# Patient Record
Sex: MALE | Race: WHITE | NOT HISPANIC OR LATINO | Employment: OTHER | RURAL
[De-identification: names, ages, dates, MRNs, and addresses within clinical notes are randomized per-mention and may not be internally consistent; named-entity substitution may affect disease eponyms.]

---

## 2017-02-06 RX ORDER — BLOOD SUGAR DIAGNOSTIC
STRIP MISCELLANEOUS
Qty: 200 EACH | Refills: 2 | Status: SHIPPED | OUTPATIENT
Start: 2017-02-06 | End: 2018-04-17 | Stop reason: SDUPTHER

## 2017-03-22 ENCOUNTER — OFFICE VISIT (OUTPATIENT)
Dept: FAMILY MEDICINE CLINIC | Facility: CLINIC | Age: 67
End: 2017-03-22

## 2017-03-22 VITALS
BODY MASS INDEX: 26.9 KG/M2 | SYSTOLIC BLOOD PRESSURE: 122 MMHG | DIASTOLIC BLOOD PRESSURE: 68 MMHG | TEMPERATURE: 97.9 F | HEIGHT: 69 IN | WEIGHT: 181.6 LBS

## 2017-03-22 DIAGNOSIS — J06.9 ACUTE URI: Primary | ICD-10-CM

## 2017-03-22 PROCEDURE — 99213 OFFICE O/P EST LOW 20 MIN: CPT | Performed by: FAMILY MEDICINE

## 2017-03-22 PROCEDURE — 96372 THER/PROPH/DIAG INJ SC/IM: CPT | Performed by: FAMILY MEDICINE

## 2017-03-22 RX ORDER — AZITHROMYCIN 250 MG/1
TABLET, FILM COATED ORAL
Qty: 6 TABLET | Refills: 0 | Status: SHIPPED | OUTPATIENT
Start: 2017-03-22 | End: 2017-06-19

## 2017-03-22 RX ORDER — TAMSULOSIN HYDROCHLORIDE 0.4 MG/1
1 CAPSULE ORAL NIGHTLY
COMMUNITY
End: 2017-06-24 | Stop reason: SDUPTHER

## 2017-03-22 RX ORDER — CEFTRIAXONE SODIUM 250 MG/1
250 INJECTION, POWDER, FOR SOLUTION INTRAMUSCULAR; INTRAVENOUS ONCE
Status: COMPLETED | OUTPATIENT
Start: 2017-03-22 | End: 2017-03-22

## 2017-03-22 RX ORDER — FLUTICASONE PROPIONATE 50 MCG
SPRAY, SUSPENSION (ML) NASAL
Refills: 5 | COMMUNITY
Start: 2017-01-09 | End: 2017-05-07 | Stop reason: SDUPTHER

## 2017-03-22 RX ORDER — GLIPIZIDE 10 MG/1
10 TABLET, FILM COATED, EXTENDED RELEASE ORAL 2 TIMES DAILY
COMMUNITY
End: 2018-04-17 | Stop reason: SDUPTHER

## 2017-03-22 RX ORDER — LISINOPRIL 10 MG/1
10 TABLET ORAL EVERY MORNING
COMMUNITY
End: 2018-03-20 | Stop reason: SDUPTHER

## 2017-03-22 RX ORDER — PREDNISONE 20 MG/1
TABLET ORAL
Qty: 8 TABLET | Refills: 0 | Status: SHIPPED | OUTPATIENT
Start: 2017-03-22 | End: 2017-06-19

## 2017-03-22 RX ORDER — INSULIN GLARGINE 100 [IU]/ML
15 INJECTION, SOLUTION SUBCUTANEOUS NIGHTLY
COMMUNITY
End: 2017-06-19 | Stop reason: CLARIF

## 2017-03-22 RX ORDER — ATORVASTATIN CALCIUM 20 MG/1
20 TABLET, FILM COATED ORAL NIGHTLY
COMMUNITY
End: 2017-06-19 | Stop reason: DRUGHIGH

## 2017-03-22 RX ORDER — BRIMONIDINE TARTRATE 0.1 %
DROPS OPHTHALMIC (EYE)
Refills: 5 | COMMUNITY
Start: 2017-01-09 | End: 2017-06-19

## 2017-03-22 RX ADMIN — CEFTRIAXONE SODIUM 250 MG: 250 INJECTION, POWDER, FOR SOLUTION INTRAMUSCULAR; INTRAVENOUS at 15:21

## 2017-03-22 NOTE — PROGRESS NOTES
"Bernard Matta    1950    Chief Complaint   Patient presents with   • URI       Vitals:    03/22/17 1455   BP: 122/68   Temp: 97.9 °F (36.6 °C)   Weight: 181 lb 9.6 oz (82.4 kg)   Height: 69\" (175.3 cm)       URI    This is a new problem. The current episode started in the past 7 days. The problem has been unchanged. There has been no fever. Associated symptoms include congestion, coughing, sinus pain and sneezing. He has tried inhaler use for the symptoms. The treatment provided mild relief.       Review of Systems   HENT: Positive for congestion and sneezing.    Respiratory: Positive for cough.        Past Medical History:   Diagnosis Date   • Diabetes mellitus    • Erectile disorder due to 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    • Hyperlipidemia    • Hypertension    • Ischemic optic neuropathy of both eyes          Current Outpatient Prescriptions:   •  ACCU-CHEK SMARTVIEW test strip, USE TO TEST TWICE A DAY, Disp: 200 each, Rfl: 2  •  ALPHAGAN P 0.1 % solution ophthalmic solution, INSTILL 1 DROP INTO LEFT EYE 3 TIMES A DAY, Disp: , Rfl: 5  •  atorvastatin (LIPITOR) 20 MG tablet, Take 20 mg by mouth Every Night., Disp: , Rfl:   •  fluticasone (FLONASE) 50 MCG/ACT nasal spray, USE 2 SPRAYS IN EACH NOSTIL EVERY DAY, Disp: , Rfl: 5  •  glipiZIDE (GLUCOTROL) 10 MG 24 hr tablet, Take 10 mg by mouth 2 (Two) Times a Day., Disp: , Rfl:   •  insulin detemir (LEVEMIR) 100 UNIT/ML injection, Inject 15 Units under the skin Every Night., Disp: 3 each, Rfl: 12  •  insulin glargine (LANTUS) 100 UNIT/ML injection, Inject 15 Units under the skin Every Night., Disp: , Rfl:   •  lisinopril (PRINIVIL,ZESTRIL) 10 MG tablet, Take 10 mg by mouth Every Morning., Disp: , Rfl:   •  metFORMIN (GLUCOPHAGE) 500 MG tablet, Take 500 mg by mouth 2 (Two) Times a Day., Disp: , Rfl: 1  •  tamsulosin (FLOMAX) 0.4 MG capsule 24 hr capsule, Take 1 capsule by mouth Every Night., Disp: , Rfl:   •  azithromycin (ZITHROMAX Z-RAJESH) 250 MG tablet, Take 2 tablets " "the first day, then 1 tablet daily for 4 days., Disp: 6 tablet, Rfl: 0  •  predniSONE (DELTASONE) 20 MG tablet, 2 pills now then one every morning till medicine gone, Disp: 8 tablet, Rfl: 0    Current Facility-Administered Medications:   •  cefTRIAXone (ROCEPHIN) injection 250 mg, 250 mg, Intramuscular, Once, Brando Webb MD    No Known Allergies    There is no problem list on file for this patient.      Social History     Social History   • Marital status:      Spouse name: N/A   • Number of children: N/A   • Years of education: N/A     Social History Main Topics   • Smoking status: Former Smoker   • Smokeless tobacco: None   • Alcohol use None   • Drug use: None   • Sexual activity: Not Asked     Other Topics Concern   • None     Social History Narrative       History reviewed. No pertinent surgical history.    Physical Exam   Constitutional: He appears well-developed and well-nourished.   HENT:   Head: Normocephalic.   Right Ear: External ear normal.   Left Ear: External ear normal.   Mouth/Throat: Oropharynx is clear and moist.   Cardiovascular: Normal rate and regular rhythm.    Pulmonary/Chest: Effort normal. He has wheezes. He has rales.   Lymphadenopathy:     He has no cervical adenopathy.   Neurological: He is alert.   Psychiatric: He has a normal mood and affect.   Vitals reviewed.      Vitals:    03/22/17 1455   BP: 122/68   Temp: 97.9 °F (36.6 °C)   Weight: 181 lb 9.6 oz (82.4 kg)   Height: 69\" (175.3 cm)       Bernard was seen today for uri.    Diagnoses and all orders for this visit:    Acute URI  -     cefTRIAXone (ROCEPHIN) injection 250 mg; Inject 250 mg into the shoulder, thigh, or buttocks 1 (One) Time.    Other orders  -     azithromycin (ZITHROMAX Z-RAJESH) 250 MG tablet; Take 2 tablets the first day, then 1 tablet daily for 4 days.  -     predniSONE (DELTASONE) 20 MG tablet; 2 pills now then one every morning till medicine gone        Problem List Items Addressed This Visit     None "      Visit Diagnoses     Acute URI    -  Primary    Relevant Medications    cefTRIAXone (ROCEPHIN) injection 250 mg (Start on 3/22/2017  3:45 PM)    azithromycin (ZITHROMAX Z-RAJESH) 250 MG tablet              Above plus Flonase and Zyrtec                        Brandoramana Webb MD  3/22/2017

## 2017-03-31 ENCOUNTER — OFFICE VISIT (OUTPATIENT)
Dept: FAMILY MEDICINE CLINIC | Facility: CLINIC | Age: 67
End: 2017-03-31

## 2017-03-31 VITALS
DIASTOLIC BLOOD PRESSURE: 70 MMHG | OXYGEN SATURATION: 98 % | SYSTOLIC BLOOD PRESSURE: 116 MMHG | HEART RATE: 68 BPM | TEMPERATURE: 98 F

## 2017-03-31 DIAGNOSIS — J40 BRONCHITIS: ICD-10-CM

## 2017-03-31 DIAGNOSIS — J06.9 ACUTE URI: Primary | ICD-10-CM

## 2017-03-31 LAB
BASOPHILS # BLD AUTO: 0.03 10*3/MM3 (ref 0–0.2)
BASOPHILS NFR BLD AUTO: 0.4 % (ref 0–2)
BUN SERPL-MCNC: 19 MG/DL (ref 5–21)
BUN/CREAT SERPL: 17.3 (ref 7–25)
CALCIUM SERPL-MCNC: 9.4 MG/DL (ref 8.4–10.4)
CHLORIDE SERPL-SCNC: 95 MMOL/L (ref 98–110)
CO2 SERPL-SCNC: 30 MMOL/L (ref 24–31)
CREAT SERPL-MCNC: 1.1 MG/DL (ref 0.5–1.4)
EOSINOPHIL # BLD AUTO: 0.37 10*3/MM3 (ref 0–0.7)
EOSINOPHIL NFR BLD AUTO: 4.8 % (ref 0–4)
ERYTHROCYTE [DISTWIDTH] IN BLOOD BY AUTOMATED COUNT: 12.9 % (ref 12–15)
GLUCOSE SERPL-MCNC: 324 MG/DL (ref 70–100)
HCT VFR BLD AUTO: 40.1 % (ref 40–52)
HGB BLD-MCNC: 13 G/DL (ref 14–18)
IMM GRANULOCYTES # BLD: 0.03 10*3/MM3 (ref 0–0.03)
IMM GRANULOCYTES NFR BLD: 0.4 % (ref 0–5)
LYMPHOCYTES # BLD AUTO: 1.57 10*3/MM3 (ref 0.72–4.86)
LYMPHOCYTES NFR BLD AUTO: 20.2 % (ref 15–45)
MCH RBC QN AUTO: 29.7 PG (ref 28–32)
MCHC RBC AUTO-ENTMCNC: 32.4 G/DL (ref 33–36)
MCV RBC AUTO: 91.8 FL (ref 82–95)
MONOCYTES # BLD AUTO: 0.77 10*3/MM3 (ref 0.19–1.3)
MONOCYTES NFR BLD AUTO: 9.9 % (ref 4–12)
NEUTROPHILS # BLD AUTO: 4.99 10*3/MM3 (ref 1.87–8.4)
NEUTROPHILS NFR BLD AUTO: 64.3 % (ref 39–78)
PLATELET # BLD AUTO: 203 10*3/MM3 (ref 130–400)
POTASSIUM SERPL-SCNC: 5 MMOL/L (ref 3.5–5.3)
RBC # BLD AUTO: 4.37 10*6/MM3 (ref 4.8–5.9)
SODIUM SERPL-SCNC: 137 MMOL/L (ref 135–145)
WBC # BLD AUTO: 7.76 10*3/MM3 (ref 4.8–10.8)

## 2017-03-31 PROCEDURE — 99213 OFFICE O/P EST LOW 20 MIN: CPT | Performed by: FAMILY MEDICINE

## 2017-03-31 RX ORDER — LEVOFLOXACIN 500 MG/1
500 TABLET, FILM COATED ORAL DAILY
Qty: 7 TABLET | Refills: 0 | Status: SHIPPED | OUTPATIENT
Start: 2017-03-31 | End: 2017-06-19

## 2017-03-31 NOTE — PROGRESS NOTES
Bernard Matta    1950    No chief complaint on file.      There were no vitals filed for this visit.    URI    This is a new problem. The current episode started in the past 7 days. The problem has been unchanged. There has been no fever. The fever has been present for 1 to 2 days. Associated symptoms include congestion, coughing, sinus pain and sneezing. Treatments tried: Finish Z-Rajesh. The treatment provided mild relief.       Review of Systems   HENT: Positive for congestion and sneezing.    Respiratory: Positive for cough.        Past Medical History:   Diagnosis Date   • Diabetes mellitus    • Erectile disorder due to 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    • Hyperlipidemia    • Hypertension    • Ischemic optic neuropathy of both eyes          Current Outpatient Prescriptions:   •  [employer]-CHEK SMARTVIEW test strip, USE TO TEST TWICE A DAY, Disp: 200 each, Rfl: 2  •  ALPHAGAN P 0.1 % solution ophthalmic solution, INSTILL 1 DROP INTO LEFT EYE 3 TIMES A DAY, Disp: , Rfl: 5  •  atorvastatin (LIPITOR) 20 MG tablet, Take 20 mg by mouth Every Night., Disp: , Rfl:   •  azithromycin (ZITHROMAX Z-RAJESH) 250 MG tablet, Take 2 tablets the first day, then 1 tablet daily for 4 days., Disp: 6 tablet, Rfl: 0  •  fluticasone (FLONASE) 50 MCG/ACT nasal spray, USE 2 SPRAYS IN EACH NOSTIL EVERY DAY, Disp: , Rfl: 5  •  glipiZIDE (GLUCOTROL) 10 MG 24 hr tablet, Take 10 mg by mouth 2 (Two) Times a Day., Disp: , Rfl:   •  insulin detemir (LEVEMIR) 100 UNIT/ML injection, Inject 15 Units under the skin Every Night., Disp: 3 each, Rfl: 12  •  insulin glargine (LANTUS) 100 UNIT/ML injection, Inject 15 Units under the skin Every Night., Disp: , Rfl:   •  levoFLOXacin (LEVAQUIN) 500 MG tablet, Take 1 tablet by mouth Daily., Disp: 7 tablet, Rfl: 0  •  lisinopril (PRINIVIL,ZESTRIL) 10 MG tablet, Take 10 mg by mouth Every Morning., Disp: , Rfl:   •  metFORMIN (GLUCOPHAGE) 500 MG tablet, Take 500 mg by mouth 2 (Two) Times a Day., Disp: , Rfl: 1  •   predniSONE (DELTASONE) 20 MG tablet, 2 pills now then one every morning till medicine gone, Disp: 8 tablet, Rfl: 0  •  tamsulosin (FLOMAX) 0.4 MG capsule 24 hr capsule, Take 1 capsule by mouth Every Night., Disp: , Rfl:     No Known Allergies    There is no problem list on file for this patient.      Social History     Social History   • Marital status:      Spouse name: N/A   • Number of children: N/A   • Years of education: N/A     Social History Main Topics   • Smoking status: Former Smoker   • Smokeless tobacco: Not on file   • Alcohol use Not on file   • Drug use: Not on file   • Sexual activity: Not on file     Other Topics Concern   • Not on file     Social History Narrative       No past surgical history on file.    Physical Exam   Constitutional: He appears well-developed and well-nourished.   HENT:   Right Ear: External ear normal.   Left Ear: External ear normal.   Mouth/Throat: Oropharynx is clear and moist.   Eyes: Conjunctivae are normal.   Cardiovascular: Normal rate and regular rhythm.    Pulmonary/Chest: Effort normal. He has no wheezes. He has rales.   Lymphadenopathy:     He has no cervical adenopathy.   Neurological: He is alert.   Vitals reviewed.      There were no vitals filed for this visit.    Diagnoses and all orders for this visit:    Acute URI  -     CBC & Differential  -     Basic Metabolic Panel    Other orders  -     levoFLOXacin (LEVAQUIN) 500 MG tablet; Take 1 tablet by mouth Daily.        Problem List Items Addressed This Visit     None      Visit Diagnoses     Acute URI    -  Primary    Relevant Medications    levoFLOXacin (LEVAQUIN) 500 MG tablet    Other Relevant Orders    CBC & Differential    Basic Metabolic Panel                Plan above plus chest x-ray and lab                      Brando Webb MD  3/31/2017

## 2017-05-08 RX ORDER — FLUTICASONE PROPIONATE 50 MCG
2 SPRAY, SUSPENSION (ML) NASAL DAILY
Qty: 16 ML | Refills: 5 | Status: SHIPPED | OUTPATIENT
Start: 2017-05-08 | End: 2017-06-03 | Stop reason: SDUPTHER

## 2017-06-03 RX ORDER — FLUTICASONE PROPIONATE 50 MCG
2 SPRAY, SUSPENSION (ML) NASAL DAILY
Qty: 16 ML | Refills: 5 | Status: SHIPPED | OUTPATIENT
Start: 2017-06-03 | End: 2017-10-30 | Stop reason: SDUPTHER

## 2017-06-19 ENCOUNTER — OFFICE VISIT (OUTPATIENT)
Dept: FAMILY MEDICINE CLINIC | Facility: CLINIC | Age: 67
End: 2017-06-19

## 2017-06-19 VITALS
SYSTOLIC BLOOD PRESSURE: 122 MMHG | OXYGEN SATURATION: 98 % | HEART RATE: 85 BPM | WEIGHT: 178 LBS | TEMPERATURE: 98.1 F | DIASTOLIC BLOOD PRESSURE: 77 MMHG | BODY MASS INDEX: 26.36 KG/M2 | HEIGHT: 69 IN

## 2017-06-19 DIAGNOSIS — E55.9 UNSPECIFIED VITAMIN D DEFICIENCY: ICD-10-CM

## 2017-06-19 DIAGNOSIS — R53.83 FATIGUE, UNSPECIFIED TYPE: ICD-10-CM

## 2017-06-19 DIAGNOSIS — E78.2 MIXED HYPERLIPIDEMIA: Primary | ICD-10-CM

## 2017-06-19 DIAGNOSIS — Z72.89 OTHER PROBLEMS RELATED TO LIFESTYLE: ICD-10-CM

## 2017-06-19 DIAGNOSIS — N40.1 BENIGN NON-NODULAR PROSTATIC HYPERPLASIA WITH LOWER URINARY TRACT SYMPTOMS: ICD-10-CM

## 2017-06-19 DIAGNOSIS — R73.9 HYPERGLYCEMIA: ICD-10-CM

## 2017-06-19 DIAGNOSIS — Z12.5 SPECIAL SCREENING FOR MALIGNANT NEOPLASM OF PROSTATE: ICD-10-CM

## 2017-06-19 LAB
25(OH)D3+25(OH)D2 SERPL-MCNC: 55.6 NG/ML (ref 30–100)
ALBUMIN SERPL-MCNC: 4.5 G/DL (ref 3.5–5)
ALBUMIN/GLOB SERPL: 1.6 G/DL (ref 1.1–2.5)
ALP SERPL-CCNC: 67 U/L (ref 24–120)
ALT SERPL-CCNC: 54 U/L (ref 0–54)
AST SERPL-CCNC: 23 U/L (ref 7–45)
BASOPHILS # BLD AUTO: 0.04 10*3/MM3 (ref 0–0.2)
BASOPHILS NFR BLD AUTO: 0.6 % (ref 0–2)
BILIRUB BLD-MCNC: NEGATIVE MG/DL
BILIRUB SERPL-MCNC: 0.6 MG/DL (ref 0.1–1)
BUN SERPL-MCNC: 15 MG/DL (ref 5–21)
BUN/CREAT SERPL: 14.3 (ref 7–25)
CALCIUM SERPL-MCNC: 9.9 MG/DL (ref 8.4–10.4)
CHLORIDE SERPL-SCNC: 97 MMOL/L (ref 98–110)
CHOLEST SERPL-MCNC: 159 MG/DL (ref 130–200)
CLARITY, POC: CLEAR
CO2 SERPL-SCNC: 31 MMOL/L (ref 24–31)
COLOR UR: YELLOW
CREAT SERPL-MCNC: 1.05 MG/DL (ref 0.5–1.4)
EOSINOPHIL # BLD AUTO: 0.36 10*3/MM3 (ref 0–0.7)
EOSINOPHIL NFR BLD AUTO: 5.7 % (ref 0–4)
ERYTHROCYTE [DISTWIDTH] IN BLOOD BY AUTOMATED COUNT: 13.2 % (ref 12–15)
GLOBULIN SER CALC-MCNC: 2.8 GM/DL
GLUCOSE SERPL-MCNC: 148 MG/DL (ref 70–100)
GLUCOSE UR STRIP-MCNC: ABNORMAL MG/DL
HBA1C MFR BLD: 8.2 %
HCT VFR BLD AUTO: 41.4 % (ref 40–52)
HDLC SERPL-MCNC: 41 MG/DL
HGB BLD-MCNC: 13.5 G/DL (ref 14–18)
IMM GRANULOCYTES # BLD: 0.01 10*3/MM3 (ref 0–0.03)
IMM GRANULOCYTES NFR BLD: 0.2 % (ref 0–5)
KETONES UR QL: NEGATIVE
LDLC SERPL CALC-MCNC: 100 MG/DL (ref 0–99)
LEUKOCYTE EST, POC: NEGATIVE
LYMPHOCYTES # BLD AUTO: 1.31 10*3/MM3 (ref 0.72–4.86)
LYMPHOCYTES NFR BLD AUTO: 20.8 % (ref 15–45)
MCH RBC QN AUTO: 29.7 PG (ref 28–32)
MCHC RBC AUTO-ENTMCNC: 32.6 G/DL (ref 33–36)
MCV RBC AUTO: 91.2 FL (ref 82–95)
MONOCYTES # BLD AUTO: 0.42 10*3/MM3 (ref 0.19–1.3)
MONOCYTES NFR BLD AUTO: 6.7 % (ref 4–12)
NEUTROPHILS # BLD AUTO: 4.15 10*3/MM3 (ref 1.87–8.4)
NEUTROPHILS NFR BLD AUTO: 66 % (ref 39–78)
NITRITE UR-MCNC: NEGATIVE MG/ML
PH UR: 5.5 [PH] (ref 5–8)
PLATELET # BLD AUTO: 167 10*3/MM3 (ref 130–400)
POTASSIUM SERPL-SCNC: 5.2 MMOL/L (ref 3.5–5.3)
PROT SERPL-MCNC: 7.3 G/DL (ref 6.3–8.7)
PROT UR STRIP-MCNC: NEGATIVE MG/DL
PSA SERPL-MCNC: 0.67 NG/ML (ref 0–4)
RBC # BLD AUTO: 4.54 10*6/MM3 (ref 4.8–5.9)
RBC # UR STRIP: NEGATIVE /UL
SODIUM SERPL-SCNC: 140 MMOL/L (ref 135–145)
SP GR UR: 1.02 (ref 1–1.03)
T4 FREE SERPL-MCNC: 1.16 NG/DL (ref 0.78–2.19)
TRIGL SERPL-MCNC: 92 MG/DL (ref 0–149)
TSH SERPL DL<=0.005 MIU/L-ACNC: 1.63 MIU/ML (ref 0.47–4.68)
UROBILINOGEN UR QL: NORMAL
VLDLC SERPL CALC-MCNC: 18.4 MG/DL
WBC # BLD AUTO: 6.29 10*3/MM3 (ref 4.8–10.8)

## 2017-06-19 PROCEDURE — G0102 PROSTATE CA SCREENING; DRE: HCPCS | Performed by: FAMILY MEDICINE

## 2017-06-19 PROCEDURE — 81003 URINALYSIS AUTO W/O SCOPE: CPT | Performed by: FAMILY MEDICINE

## 2017-06-19 PROCEDURE — G0439 PPPS, SUBSEQ VISIT: HCPCS | Performed by: FAMILY MEDICINE

## 2017-06-19 RX ORDER — DORZOLAMIDE HYDROCHLORIDE AND TIMOLOL MALEATE 20; 5 MG/ML; MG/ML
1 SOLUTION/ DROPS OPHTHALMIC 2 TIMES DAILY
COMMUNITY

## 2017-06-19 RX ORDER — MULTIPLE VITAMINS W/ MINERALS TAB 9MG-400MCG
1 TAB ORAL DAILY
COMMUNITY

## 2017-06-19 RX ORDER — CETIRIZINE HYDROCHLORIDE 10 MG/1
10 TABLET ORAL DAILY
COMMUNITY

## 2017-06-19 RX ORDER — OMEGA-3S/DHA/EPA/FISH OIL/D3 300MG-1000
400 CAPSULE ORAL DAILY
COMMUNITY

## 2017-06-19 RX ORDER — ATORVASTATIN CALCIUM 40 MG/1
40 TABLET, FILM COATED ORAL DAILY
COMMUNITY
End: 2017-07-20 | Stop reason: SDUPTHER

## 2017-06-19 RX ORDER — LATANOPROST 50 UG/ML
1 SOLUTION/ DROPS OPHTHALMIC NIGHTLY
COMMUNITY

## 2017-06-19 NOTE — PATIENT INSTRUCTIONS
Exercising to Stay Healthy  Exercising regularly is important. It has many health benefits, such as:  · Improving your overall fitness, flexibility, and endurance.  · Increasing your bone density.  · Helping with weight control.  · Decreasing your body fat.  · Increasing your muscle strength.  · Reducing stress and tension.  · Improving your overall health.  In order to become healthy and stay healthy, it is recommended that you do moderate-intensity and vigorous-intensity exercise. You can tell that you are exercising at a moderate intensity if you have a higher heart rate and faster breathing, but you are still able to hold a conversation. You can tell that you are exercising at a vigorous intensity if you are breathing much harder and faster and cannot hold a conversation while exercising.  HOW OFTEN SHOULD I EXERCISE?  Choose an activity that you enjoy and set realistic goals. Your health care provider can help you to make an activity plan that works for you. Exercise regularly as directed by your health care provider. This may include:   · Doing resistance training twice each week, such as:    Push-ups.    Sit-ups.    Lifting weights.    Using resistance bands.  · Doing a given intensity of exercise for a given amount of time. Choose from these options:    150 minutes of moderate-intensity exercise every week.    75 minutes of vigorous-intensity exercise every week.    A mix of moderate-intensity and vigorous-intensity exercise every week.  Children, pregnant women, people who are out of shape, people who are overweight, and older adults may need to consult a health care provider for individual recommendations. If you have any sort of medical condition, be sure to consult your health care provider before starting a new exercise program.   WHAT ARE SOME EXERCISE IDEAS?  Some moderate-intensity exercise ideas include:   · Walking at a rate of 1 mile in 15  minutes.  · Biking.  · Hiking.  · Golfing.  · Dancing.  Some vigorous-intensity exercise ideas include:   · Walking at a rate of at least 4.5 miles per hour.  · Jogging or running at a rate of 5 miles per hour.  · Biking at a rate of at least 10 miles per hour.  · Lap swimming.  · Roller-skating or in-line skating.  · Cross-country skiing.  · Vigorous competitive sports, such as football, basketball, and soccer.  · Jumping rope.  · Aerobic dancing.  WHAT ARE SOME EVERYDAY ACTIVITIES THAT CAN HELP ME TO GET EXERCISE?  · Yard work, such as:    Pushing a .    Raking and bagging leaves.  · Washing and waxing your car.  · Pushing a stroller.  · Shoveling snow.  · Gardening.  · Washing windows or floors.  HOW CAN I BE MORE ACTIVE IN MY DAY-TO-DAY ACTIVITIES?  · Use the stairs instead of the elevator.  · Take a walk during your lunch break.  · If you drive, park your car farther away from work or school.  · If you take public transportation, get off one stop early and walk the rest of the way.  · Make all of your phone calls while standing up and walking around.  · Get up, stretch, and walk around every 30 minutes throughout the day.  WHAT GUIDELINES SHOULD I FOLLOW WHILE EXERCISING?  · Do not exercise so much that you hurt yourself, feel dizzy, or get very short of breath.  · Consult your health care provider before starting a new exercise program.  · Wear comfortable clothes and shoes with good support.  · Drink plenty of water while you exercise to prevent dehydration or heat stroke. Body water is lost during exercise and must be replaced.  · Work out until you breathe faster and your heart beats faster.     This information is not intended to replace advice given to you by your health care provider. Make sure you discuss any questions you have with your health care provider.     Document Released: 01/20/2012 Document Revised: 01/08/2016 Document Reviewed: 05/21/2015  [employer] Patient Education  ©2017 [employer]    Fall Prevention in the Home   Falls can cause injuries and can affect people from all age groups. There are many simple things that you can do to make your home safe and to help prevent falls.  WHAT CAN I DO ON THE OUTSIDE OF MY HOME?  · Regularly repair the edges of walkways and driveways and fix any cracks.  · Remove high doorway thresholds.  · Trim any shrubbery on the main path into your home.  · Use bright outdoor lighting.  · Clear walkways of debris and clutter, including tools and rocks.  · Regularly check that handrails are securely fastened and in good repair. Both sides of any steps should have handrails.  · Install guardrails along the edges of any raised decks or porches.  · Have leaves, snow, and ice cleared regularly.  · Use sand or salt on walkways during winter months.  · In the garage, clean up any spills right away, including grease or oil spills.  WHAT CAN I DO IN THE BATHROOM?  · Use night lights.  · Install grab bars by the toilet and in the tub and shower. Do not use towel bars as grab bars.  · Use non-skid mats or decals on the floor of the tub or shower.  · If you need to sit down while you are in the shower, use a plastic, non-slip stool.  · Keep the floor dry. Immediately clean up any water that spills on the floor.  · Remove soap buildup in the tub or shower on a regular basis.  · Attach bath mats securely with double-sided non-slip rug tape.  · Remove throw rugs and other tripping hazards from the floor.  WHAT CAN I DO IN THE BEDROOM?  · Use night lights.  · Make sure that a bedside light is easy to reach.  · Do not use oversized bedding that drapes onto the floor.  · Have a firm chair that has side arms to use for getting dressed.  · Remove throw rugs and other tripping hazards from the floor.  WHAT CAN I DO IN THE KITCHEN?   · Clean up any spills right away.  · Avoid walking on wet floors.  · Place frequently used items in easy-to-reach places.  · If you need to  reach for something above you, use a sturdy step stool that has a grab bar.  · Keep electrical cables out of the way.  · Do not use floor polish or wax that makes floors slippery. If you have to use wax, make sure that it is non-skid floor wax.  · Remove throw rugs and other tripping hazards from the floor.  WHAT CAN I DO IN THE STAIRWAYS?  · Do not leave any items on the stairs.  · Make sure that there are handrails on both sides of the stairs. Fix handrails that are broken or loose. Make sure that handrails are as long as the stairways.  · Check any carpeting to make sure that it is firmly attached to the stairs. Fix any carpet that is loose or worn.  · Avoid having throw rugs at the top or bottom of stairways, or secure the rugs with carpet tape to prevent them from moving.  · Make sure that you have a light switch at the top of the stairs and the bottom of the stairs. If you do not have them, have them installed.  WHAT ARE SOME OTHER FALL PREVENTION TIPS?  · Wear closed-toe shoes that fit well and support your feet. Wear shoes that have rubber soles or low heels.  · When you use a stepladder, make sure that it is completely opened and that the sides are firmly locked. Have someone hold the ladder while you are using it. Do not climb a closed stepladder.  · Add color or contrast paint or tape to grab bars and handrails in your home. Place contrasting color strips on the first and last steps.  · Use mobility aids as needed, such as canes, walkers, scooters, and crutches.  · Turn on lights if it is dark. Replace any light bulbs that burn out.  · Set up furniture so that there are clear paths. Keep the furniture in the same spot.  · Fix any uneven floor surfaces.  · Choose a carpet design that does not hide the edge of steps of a stairway.  · Be aware of any and all pets.  · Review your medicines with your healthcare provider. Some medicines can cause dizziness or changes in blood pressure, which increase your risk  of falling.  Talk with your health care provider about other ways that you can decrease your risk of falls. This may include working with a physical therapist or  to improve your strength, balance, and endurance.     This information is not intended to replace advice given to you by your health care provider. Make sure you discuss any questions you have with your health care provider.     Document Released: 2003 Document Revised: 04/10/2017 Document Reviewed: 2016  [employer] Patient Education © [employer]    Medicare Wellness  Personal Prevention Plan of Service     Date of Office Visit:  2017  Encounter Provider:  Brando Webb MD  Place of Service:  [facility]  Patient Name: Bernard Matta  :  1950    As part of the Medicare Wellness portion of your visit today, we are providing you with this personalized preventive plan of services (PPPS). This plan is based upon recommendations of the [place] Preventive Services Task Force (USPSTF) and the Advisory Committee on Immunization Practices (ACIP).    This lists the preventive care services that should be considered, and provides dates of when you are due. Items listed as completed are up-to-date and do not require any further intervention.    Health Maintenance   Topic Date Due   • HEPATITIS C SCREENING  2017   • MEDICARE ANNUAL WELLNESS  2017   • ZOSTER VACCINE  2017   • LIPID PANEL  2017   • AAA SCREEN (ONE-TIME)  2017   • PNEUMOCOCCAL VACCINES (65+ LOW/MEDIUM RISK) (2 of 2 - PPSV23) 2017   • INFLUENZA VACCINE  2017   • TDAP/TD VACCINES (2 - Td) 2023   • COLONOSCOPY  2026       No orders of the defined types were placed in this encounter.      Return in 3 months (on 2017).

## 2017-06-19 NOTE — PROGRESS NOTES
QUICK REFERENCE INFORMATION:  The ABCs of the Annual Wellness Visit    Subsequent Medicare Wellness Visit    HEALTH RISK ASSESSMENT    1950    Recent Hospitalizations:  No hospitalization(s) within the last year..        Current Medical Providers:  Patient Care Team:  Brando Webb MD as PCP - General (Family Medicine)  Jim Cha OD (Optometry)        Smoking Status:  History   Smoking Status   • Former Smoker   Smokeless Tobacco   • Never Used       Alcohol Consumption:  History   Alcohol use Not on file       Depression Screen:   PHQ-9 Depression Screening 6/19/2017   Little interest or pleasure in doing things 0   Feeling down, depressed, or hopeless 0   Trouble falling or staying asleep, or sleeping too much -   Feeling tired or having little energy -   Poor appetite or overeating -   Feeling bad about yourself - or that you are a failure or have let yourself or your family down -   Trouble concentrating on things, such as reading the newspaper or watching television -   Moving or speaking so slowly that other people could have noticed. Or the opposite - being so fidgety or restless that you have been moving around a lot more than usual -   Thoughts that you would be better off dead, or of hurting yourself in some way -   PHQ-9 Total Score 0   If you checked off any problems, how difficult have these problems made it for you to do your work, take care of things at home, or get along with other people? -       Health Habits and Functional and Cognitive Screening:  Functional & Cognitive Status 6/19/2017   Do you have difficulty preparing food and eating? No   Do you have difficulty bathing yourself? No   Do you have difficulty getting dressed? No   Do you have difficulty using the toilet? No   Do you have difficulty moving around from place to place? No   In the past year have you fallen or experienced a near fall? No   Do you need help using the phone?  No   Are you deaf or do you have  serious difficulty hearing?  No   Do you need help with transportation? No   Do you need help shopping? No   Do you need help preparing meals?  No   Do you need help with housework?  No   Do you need help with laundry? No   Do you need help taking your medications? No   Do you need help managing money? -   Do you have difficulty concentrating, remembering or making decisions? -       Health Habits  Current Diet: Well Balanced Diet  Dental Exam: Up to date  Eye Exam: Up to date  Exercise (times per week): 7 times per week  Current Exercise Activities Include: Walking      Does the patient have evidence of cognitive impairment? Yes    Aspirin use counseling: Taking ASA appropriately as indicated      Recent Lab Results:  CMP:  Lab Results   Component Value Date     (H) 03/31/2017    BUN 19 03/31/2017    CREATININE 1.10 03/31/2017    EGFRIFNONA 67 03/31/2017    EGFRIFAFRI 81 03/31/2017    BCR 17.3 03/31/2017     03/31/2017    K 5.0 03/31/2017    CO2 30.0 03/31/2017    CALCIUM 9.4 03/31/2017     Lipid Panel:     HbA1c:       Visual Acuity:  Patient visually challenged    Age-appropriate Screening Schedule:  Refer to the list below for future screening recommendations based on patient's age, sex and/or medical conditions. Orders for these recommended tests are listed in the plan section. The patient has been provided with a written plan.    Health Maintenance   Topic Date Due   • ZOSTER VACCINE  01/17/2017   • LIPID PANEL  06/14/2017   • INFLUENZA VACCINE  08/01/2017   • TDAP/TD VACCINES (2 - Td) 05/08/2023   • COLONOSCOPY  07/12/2026   • PNEUMOCOCCAL VACCINES (65+ LOW/MEDIUM RISK)  Completed      -- The Timed Up and Go (TUG) Test (CDC Version)                  - Testing directions adhered to:                                  1) Patients wears regular footwear and may use walking aid(s) if    needed.                                  2) Patient to sit back in standard arm chair and identify a line 10 feet  "   away from patient on floor.                                  3) Test time begins at \"Go\" and stops when patient reseated in arm    chair.                    - Instructions read aloud (and demonstrated as applicable) to patient:                                      When I say \"Go,\" I want you to:                                    1) Stand up from the chair.                                  2) Walk to the line on the floor (10 feet away) at your normal pace.                                  3) Turn.                                  4) Walk back to the chair at your normal pace.                                  5) Sit down again.                    - Result: 4                    - Observations during test:Normal gait        Subjective   History of Present Illness    Bernard Matat is a 66 y.o. male who presents for an Subsequent Wellness Visit.    The following portions of the patient's history were reviewed and updated as appropriate: allergies, current medications, past family history, past medical history, past social history, past surgical history and problem list.    Outpatient Medications Prior to Visit   Medication Sig Dispense Refill   • ACCU-CHEK SMARTVIEW test strip USE TO TEST TWICE A  each 2   • fluticasone (FLONASE) 50 MCG/ACT nasal spray 2 sprays into each nostril Daily. 16 mL 5   • glipiZIDE (GLUCOTROL) 10 MG 24 hr tablet Take 10 mg by mouth 2 (Two) Times a Day.     • insulin detemir (LEVEMIR) 100 UNIT/ML injection Inject 15 Units under the skin Every Night. 3 each 12   • lisinopril (PRINIVIL,ZESTRIL) 10 MG tablet Take 10 mg by mouth Every Morning.     • metFORMIN (GLUCOPHAGE) 500 MG tablet Take 1 tablet by mouth 2 (Two) Times a Day. 180 tablet 2   • tamsulosin (FLOMAX) 0.4 MG capsule 24 hr capsule Take 1 capsule by mouth Every Night.     • ALPHAGAN P 0.1 % solution ophthalmic solution INSTILL 1 DROP INTO LEFT EYE 3 TIMES A DAY  5   • atorvastatin (LIPITOR) 20 MG tablet Take 20 mg by mouth Every " Night.     • azithromycin (ZITHROMAX Z-RAJESH) 250 MG tablet Take 2 tablets the first day, then 1 tablet daily for 4 days. 6 tablet 0   • insulin glargine (LANTUS) 100 UNIT/ML injection Inject 15 Units under the skin Every Night.     • levoFLOXacin (LEVAQUIN) 500 MG tablet Take 1 tablet by mouth Daily. 7 tablet 0   • predniSONE (DELTASONE) 20 MG tablet 2 pills now then one every morning till medicine gone 8 tablet 0     No facility-administered medications prior to visit.        There is no problem list on file for this patient.      Advance Care Planning:  has NO advance directive - information provided to the patient today    Identification of Risk Factors:  Risk factors include: weight , unhealthy diet, cardiovascular risk and increased fall risk.    Review of Systems   HENT:        [facility] glaucoma specialist and retinal specialist   Eyes:        See above plus eyes are stable at this point   Genitourinary:        Nocturia ×2 or 3   All other systems reviewed and are negative.      Compared to one year ago, the patient feels his physical health is the same.  Compared to one year ago, the patient feels his mental health is the same.    Objective     Physical Exam   Constitutional: He is oriented to person, place, and time. He appears well-developed and well-nourished.   HENT:   Head: Normocephalic.   Right Ear: External ear normal.   Left Ear: External ear normal.   Nose: Nose normal.   Mouth/Throat: Oropharynx is clear and moist.   Eyes: EOM are normal. Pupils are equal, round, and reactive to light. Right eye exhibits no discharge. Left eye exhibits no discharge.   Neck: No thyromegaly present.   Cardiovascular: Normal rate, regular rhythm and normal heart sounds.    Pulmonary/Chest: Effort normal and breath sounds normal.   Abdominal: Soft. Bowel sounds are normal.   Genitourinary: Rectum normal, prostate normal and penis normal.   Genitourinary Comments: Prostate median raphae well-maintained-guaiac not done because  "stool not present rectum-testicles normal no inguinal hernias no inguinal adenopathy   Musculoskeletal: Normal range of motion.    Bernard had a diabetic foot exam performed today.   During the foot exam he had a monofilament test not performed.    Vascular Status -  His exam exhibits right foot vasculature normal. His exam exhibits no right foot edema. His exam exhibits left foot vasculature normal. His exam exhibits no left foot edema.   Skin Integrity  -  His right foot skin is intact.     Bernard 's left foot skin is intact. .   Foot Structure and Biomechanics -  His right foot has no hallux valgus, no pes cavus, no claw toes and no hammer toes present. His left foot has no hallux valgus, no pes cavus, no claw toes and no hammer toes.      Lymphadenopathy:     He has no cervical adenopathy.   Neurological: He is alert and oriented to person, place, and time.   Skin: Skin is warm and dry.   Psychiatric: He has a normal mood and affect. His behavior is normal. Judgment and thought content normal.   Vitals reviewed.      Vitals:    06/19/17 0921   BP: 122/77   BP Location: Right arm   Patient Position: Sitting   Cuff Size: Adult   Pulse: 85   Temp: 98.1 °F (36.7 °C)   TempSrc: Oral   SpO2: 98%   Weight: 178 lb (80.7 kg)   Height: 69\" (175.3 cm)   PainSc: 0-No pain       Body mass index is 26.29 kg/(m^2).  Discussed the patient's BMI with him. The BMI is in the acceptable range.    Assessment/Plan   Patient Self-Management and Personalized Health Advice  The patient has been provided with information about: diet, exercise, prevention of cardiac or vascular disease and fall prevention and preventive services including:   · Advance directive, Fall Risk plan of care done, Glaucoma screening recommended, Prostate cancer screening discussed.    Visit Diagnoses:    ICD-10-CM ICD-9-CM   1. Mixed hyperlipidemia E78.2 272.2   2. Special screening for malignant neoplasm of prostate Z12.5 V76.44   3. Fatigue, unspecified type R53.83 " 780.79   4. Hyperglycemia R73.9 790.29   5. Other problems related to lifestyle Z72.89 V69.8   6. Unspecified vitamin D deficiency E55.9 268.9   7. Benign non-nodular prostatic hyperplasia with lower urinary tract symptoms N40.1 600.91       Orders Placed This Encounter   Procedures   • Hepatitis C antibody     Standing Status:   Future     Standing Expiration Date:   6/19/2018   • TSH   • T4, free   • Comprehensive Metabolic Panel   • Hemoglobin A1c   • Lipid Panel   • PSA   • Vitamin D 25 Hydroxy   • CBC & Differential     Order Specific Question:   Manual Differential     Answer:   No       Outpatient Encounter Prescriptions as of 6/19/2017   Medication Sig Dispense Refill   • ACCU-CHEK SMARTVIEW test strip USE TO TEST TWICE A  each 2   • aspirin 81 MG tablet Take 81 mg by mouth Daily.     • atorvastatin (LIPITOR) 40 MG tablet Take 40 mg by mouth Daily.     • cetirizine (zyrTEC) 10 MG tablet Take 10 mg by mouth Daily.     • cholecalciferol (VITAMIN D3) 400 UNITS tablet Take 400 Units by mouth Daily.     • dorzolamide-timolol (COSOPT) 22.3-6.8 MG/ML ophthalmic solution 1 drop 2 (Two) Times a Day.     • fluticasone (FLONASE) 50 MCG/ACT nasal spray 2 sprays into each nostril Daily. 16 mL 5   • glipiZIDE (GLUCOTROL) 10 MG 24 hr tablet Take 10 mg by mouth 2 (Two) Times a Day.     • insulin detemir (LEVEMIR) 100 UNIT/ML injection Inject 15 Units under the skin Every Night. 3 each 12   • latanoprost (XALATAN) 0.005 % ophthalmic solution 1 drop Every Night.     • lisinopril (PRINIVIL,ZESTRIL) 10 MG tablet Take 10 mg by mouth Every Morning.     • metFORMIN (GLUCOPHAGE) 500 MG tablet Take 1 tablet by mouth 2 (Two) Times a Day. 180 tablet 2   • Multiple Vitamins-Minerals (MULTIVITAMIN WITH MINERALS) tablet tablet Take 1 tablet by mouth Daily.     • tamsulosin (FLOMAX) 0.4 MG capsule 24 hr capsule Take 1 capsule by mouth Every Night.     • [DISCONTINUED] ALPHAGAN P 0.1 % solution ophthalmic solution INSTILL 1 DROP  INTO LEFT EYE 3 TIMES A DAY  5   • [DISCONTINUED] atorvastatin (LIPITOR) 20 MG tablet Take 20 mg by mouth Every Night.     • [DISCONTINUED] azithromycin (ZITHROMAX Z-RAJESH) 250 MG tablet Take 2 tablets the first day, then 1 tablet daily for 4 days. 6 tablet 0   • [DISCONTINUED] insulin glargine (LANTUS) 100 UNIT/ML injection Inject 15 Units under the skin Every Night.     • [DISCONTINUED] levoFLOXacin (LEVAQUIN) 500 MG tablet Take 1 tablet by mouth Daily. 7 tablet 0   • [DISCONTINUED] predniSONE (DELTASONE) 20 MG tablet 2 pills now then one every morning till medicine gone 8 tablet 0     No facility-administered encounter medications on file as of 6/19/2017.        Reviewed use of high risk medication in the elderly: yes  Reviewed for potential of harmful drug interactions in the elderly: yes    Follow Up:  Return in 3 months (on 9/19/2017).     An After Visit Summary and PPPS with all of these plans were given to the patient.      plan-has not been years since colonoscopy, up-to-date on immunizations, continue seeing specialist, aggressive regulation of diabetes

## 2017-06-20 ENCOUNTER — TELEPHONE (OUTPATIENT)
Dept: FAMILY MEDICINE CLINIC | Facility: CLINIC | Age: 67
End: 2017-06-20

## 2017-06-20 NOTE — TELEPHONE ENCOUNTER
----- Message from Brando Webb MD sent at 6/20/2017  7:10 AM CDT -----  All lab good except sugar–too high A1c is 8–call sugars 1 week

## 2017-06-23 LAB
Lab: NORMAL
Lab: NORMAL

## 2017-06-24 ENCOUNTER — RESULTS ENCOUNTER (OUTPATIENT)
Dept: FAMILY MEDICINE CLINIC | Facility: CLINIC | Age: 67
End: 2017-06-24

## 2017-06-24 DIAGNOSIS — Z72.89 OTHER PROBLEMS RELATED TO LIFESTYLE: ICD-10-CM

## 2017-06-24 LAB
HCV AB S/CO SERPL IA: <0.1 S/CO RATIO (ref 0–0.9)
WRITTEN AUTHORIZATION: NORMAL

## 2017-06-26 RX ORDER — TAMSULOSIN HYDROCHLORIDE 0.4 MG/1
1 CAPSULE ORAL EVERY EVENING
Qty: 180 CAPSULE | Refills: 3 | Status: SHIPPED | OUTPATIENT
Start: 2017-06-26 | End: 2017-09-25 | Stop reason: SDUPTHER

## 2017-07-03 ENCOUNTER — TELEPHONE (OUTPATIENT)
Dept: FAMILY MEDICINE CLINIC | Facility: CLINIC | Age: 67
End: 2017-07-03

## 2017-07-03 NOTE — TELEPHONE ENCOUNTER
BS READINGS-ADVISED PT'S WIFE TO INCREASE LEVEMIR TO 20 UNITS QD AND CHECK SUGARS BID. BRING RESULTS 1 WK.

## 2017-07-20 RX ORDER — ATORVASTATIN CALCIUM 40 MG/1
40 TABLET, FILM COATED ORAL NIGHTLY
Qty: 90 TABLET | Refills: 1 | Status: SHIPPED | OUTPATIENT
Start: 2017-07-20 | End: 2018-06-17 | Stop reason: SDUPTHER

## 2017-09-25 RX ORDER — TAMSULOSIN HYDROCHLORIDE 0.4 MG/1
1 CAPSULE ORAL EVERY EVENING
Qty: 180 CAPSULE | Refills: 3 | Status: SHIPPED | OUTPATIENT
Start: 2017-09-25 | End: 2018-01-22 | Stop reason: SDUPTHER

## 2017-10-02 ENCOUNTER — CLINICAL SUPPORT (OUTPATIENT)
Dept: FAMILY MEDICINE CLINIC | Facility: CLINIC | Age: 67
End: 2017-10-02

## 2017-10-02 DIAGNOSIS — Z23 NEED FOR INFLUENZA VACCINATION: Primary | ICD-10-CM

## 2017-10-02 PROCEDURE — 90471 IMMUNIZATION ADMIN: CPT | Performed by: FAMILY MEDICINE

## 2017-10-02 PROCEDURE — 90662 IIV NO PRSV INCREASED AG IM: CPT | Performed by: FAMILY MEDICINE

## 2017-10-30 RX ORDER — FLUTICASONE PROPIONATE 50 MCG
SPRAY, SUSPENSION (ML) NASAL
Qty: 16 ML | Refills: 5 | Status: SHIPPED | OUTPATIENT
Start: 2017-10-30 | End: 2018-04-17 | Stop reason: SDUPTHER

## 2017-11-01 DIAGNOSIS — E11.9 TYPE 2 DIABETES MELLITUS WITHOUT COMPLICATION, WITH LONG-TERM CURRENT USE OF INSULIN (HCC): Primary | ICD-10-CM

## 2017-11-01 DIAGNOSIS — Z79.4 TYPE 2 DIABETES MELLITUS WITHOUT COMPLICATION, WITH LONG-TERM CURRENT USE OF INSULIN (HCC): Primary | ICD-10-CM

## 2017-11-01 RX ORDER — PEN NEEDLE, DIABETIC 31 GX5/16"
NEEDLE, DISPOSABLE MISCELLANEOUS
Qty: 100 EACH | Refills: 2 | Status: SHIPPED | OUTPATIENT
Start: 2017-11-01 | End: 2018-09-03 | Stop reason: SDUPTHER

## 2018-01-22 RX ORDER — TAMSULOSIN HYDROCHLORIDE 0.4 MG/1
2 CAPSULE ORAL EVERY EVENING
Qty: 180 CAPSULE | Refills: 3 | Status: SHIPPED | OUTPATIENT
Start: 2018-01-22 | End: 2019-01-07 | Stop reason: SDUPTHER

## 2018-02-19 ENCOUNTER — OFFICE VISIT (OUTPATIENT)
Dept: FAMILY MEDICINE CLINIC | Facility: CLINIC | Age: 68
End: 2018-02-19

## 2018-02-19 VITALS
HEART RATE: 79 BPM | OXYGEN SATURATION: 98 % | SYSTOLIC BLOOD PRESSURE: 114 MMHG | HEIGHT: 69 IN | WEIGHT: 179.8 LBS | DIASTOLIC BLOOD PRESSURE: 68 MMHG | TEMPERATURE: 98.7 F | BODY MASS INDEX: 26.63 KG/M2

## 2018-02-19 DIAGNOSIS — J06.9 ACUTE URI: Primary | ICD-10-CM

## 2018-02-19 PROCEDURE — 99213 OFFICE O/P EST LOW 20 MIN: CPT | Performed by: FAMILY MEDICINE

## 2018-02-19 RX ORDER — AZITHROMYCIN 250 MG/1
TABLET, FILM COATED ORAL
Qty: 6 TABLET | Refills: 0 | Status: SHIPPED | OUTPATIENT
Start: 2018-02-19 | End: 2018-06-21

## 2018-02-19 NOTE — PROGRESS NOTES
Subjective   Bernard Matta is a 67 y.o. male.     History of Present Illness    {Common H&P Review Areas:64710}    Review of Systems    Objective   Physical Exam    Assessment/Plan   Bernard was seen today for uri and cough.    Diagnoses and all orders for this visit:    Acute URI    Other orders  -     azithromycin (ZITHROMAX Z-RAJESH) 250 MG tablet; Take 2 tablets the first day, then 1 tablet daily for 4 days.

## 2018-02-19 NOTE — PROGRESS NOTES
Subjective   Bernard Matta is a 67 y.o. male.     URI    The current episode started in the past 7 days. The problem has been unchanged. The fever has been present for less than 1 day. Associated symptoms include congestion, coughing, sinus pain and sneezing. He has tried nothing for the symptoms.       The following portions of the patient's history were reviewed and updated as appropriate: allergies, current medications, past family history, past medical history, past social history, past surgical history and problem list.    Review of Systems   HENT: Positive for congestion, sinus pain and sneezing.    Respiratory: Positive for cough. Negative for shortness of breath.        Objective   Physical Exam   Constitutional: He is oriented to person, place, and time. He appears well-developed and well-nourished.   HENT:   Right Ear: External ear normal.   Left Ear: External ear normal.   Mouth/Throat: Oropharynx is clear and moist.   Cardiovascular: Normal rate and regular rhythm.    Pulmonary/Chest: Effort normal and breath sounds normal.   Lymphadenopathy:     He has no cervical adenopathy.   Neurological: He is alert and oriented to person, place, and time.   Psychiatric: He has a normal mood and affect. His behavior is normal.   Nursing note and vitals reviewed.      Assessment/Plan   Bernard was seen today for uri and cough.    Diagnoses and all orders for this visit:    Acute URI    Other orders  -     azithromycin (ZITHROMAX Z-RAJESH) 250 MG tablet; Take 2 tablets the first day, then 1 tablet daily for 4 days.              plan-above

## 2018-03-20 RX ORDER — LISINOPRIL 10 MG/1
TABLET ORAL
Qty: 90 TABLET | Refills: 0 | Status: SHIPPED | OUTPATIENT
Start: 2018-03-20 | End: 2018-06-17 | Stop reason: SDUPTHER

## 2018-04-17 RX ORDER — FLUTICASONE PROPIONATE 50 MCG
SPRAY, SUSPENSION (ML) NASAL
Qty: 16 ML | Refills: 5 | Status: SHIPPED | OUTPATIENT
Start: 2018-04-17 | End: 2018-08-31 | Stop reason: SDUPTHER

## 2018-04-17 RX ORDER — GLIPIZIDE 10 MG/1
TABLET, FILM COATED, EXTENDED RELEASE ORAL
Qty: 180 TABLET | Refills: 0 | Status: SHIPPED | OUTPATIENT
Start: 2018-04-17 | End: 2018-07-15 | Stop reason: SDUPTHER

## 2018-04-17 RX ORDER — BLOOD SUGAR DIAGNOSTIC
STRIP MISCELLANEOUS
Qty: 200 EACH | Refills: 1 | Status: SHIPPED | OUTPATIENT
Start: 2018-04-17 | End: 2018-12-03 | Stop reason: SDUPTHER

## 2018-04-27 PROBLEM — I10 HYPERTENSION: Status: ACTIVE | Noted: 2018-04-27

## 2018-04-27 PROBLEM — H47.019 ANTERIOR ISCHEMIC OPTIC NEUROPATHY: Status: ACTIVE | Noted: 2018-04-27

## 2018-04-27 PROBLEM — E11.9 TYPE 2 DIABETES MELLITUS: Status: ACTIVE | Noted: 2018-04-27

## 2018-05-29 RX ORDER — INSULIN DETEMIR 100 [IU]/ML
20 INJECTION, SOLUTION SUBCUTANEOUS NIGHTLY
Qty: 3 ML | Refills: 3 | Status: SHIPPED | OUTPATIENT
Start: 2018-05-29 | End: 2018-07-19 | Stop reason: SDUPTHER

## 2018-06-18 RX ORDER — ATORVASTATIN CALCIUM 40 MG/1
40 TABLET, FILM COATED ORAL NIGHTLY
Qty: 90 TABLET | Refills: 0 | Status: SHIPPED | OUTPATIENT
Start: 2018-06-18 | End: 2018-09-15 | Stop reason: SDUPTHER

## 2018-06-18 RX ORDER — LISINOPRIL 10 MG/1
TABLET ORAL
Qty: 90 TABLET | Refills: 0 | Status: SHIPPED | OUTPATIENT
Start: 2018-06-18 | End: 2018-09-15 | Stop reason: SDUPTHER

## 2018-06-21 ENCOUNTER — OFFICE VISIT (OUTPATIENT)
Dept: FAMILY MEDICINE CLINIC | Facility: CLINIC | Age: 68
End: 2018-06-21

## 2018-06-21 VITALS
SYSTOLIC BLOOD PRESSURE: 120 MMHG | HEIGHT: 69 IN | HEART RATE: 84 BPM | BODY MASS INDEX: 26.01 KG/M2 | WEIGHT: 175.6 LBS | DIASTOLIC BLOOD PRESSURE: 72 MMHG | OXYGEN SATURATION: 97 % | TEMPERATURE: 98.5 F

## 2018-06-21 DIAGNOSIS — R73.9 HYPERGLYCEMIA: ICD-10-CM

## 2018-06-21 DIAGNOSIS — Z00.00 ANNUAL PHYSICAL EXAM: ICD-10-CM

## 2018-06-21 DIAGNOSIS — Z23 NEED FOR SHINGLES VACCINE: ICD-10-CM

## 2018-06-21 DIAGNOSIS — R53.83 FATIGUE, UNSPECIFIED TYPE: Primary | ICD-10-CM

## 2018-06-21 DIAGNOSIS — E78.2 MIXED HYPERLIPIDEMIA: ICD-10-CM

## 2018-06-21 DIAGNOSIS — E55.9 VITAMIN D DEFICIENCY: ICD-10-CM

## 2018-06-21 DIAGNOSIS — Z12.5 SPECIAL SCREENING FOR MALIGNANT NEOPLASM OF PROSTATE: ICD-10-CM

## 2018-06-21 DIAGNOSIS — Z12.12 SCREENING FOR MALIGNANT NEOPLASM OF THE RECTUM: ICD-10-CM

## 2018-06-21 DIAGNOSIS — Z13.6 SCREENING FOR AAA (ABDOMINAL AORTIC ANEURYSM): ICD-10-CM

## 2018-06-21 LAB
BILIRUB BLD-MCNC: NEGATIVE MG/DL
CLARITY, POC: CLEAR
COLOR UR: YELLOW
GLUCOSE UR STRIP-MCNC: ABNORMAL MG/DL
KETONES UR QL: NEGATIVE
LEUKOCYTE EST, POC: NEGATIVE
NITRITE UR-MCNC: NEGATIVE MG/ML
PH UR: 6 [PH] (ref 5–8)
PROT UR STRIP-MCNC: ABNORMAL MG/DL
RBC # UR STRIP: NEGATIVE /UL
SP GR UR: 1.02 (ref 1–1.03)
UROBILINOGEN UR QL: NORMAL

## 2018-06-21 PROCEDURE — 81003 URINALYSIS AUTO W/O SCOPE: CPT | Performed by: FAMILY MEDICINE

## 2018-06-21 PROCEDURE — 96160 PT-FOCUSED HLTH RISK ASSMT: CPT | Performed by: FAMILY MEDICINE

## 2018-06-21 PROCEDURE — G0439 PPPS, SUBSEQ VISIT: HCPCS | Performed by: FAMILY MEDICINE

## 2018-06-21 PROCEDURE — 99213 OFFICE O/P EST LOW 20 MIN: CPT | Performed by: FAMILY MEDICINE

## 2018-06-21 NOTE — PROGRESS NOTES
QUICK REFERENCE INFORMATION:  The ABCs of the Annual Wellness Visit    Subsequent Medicare Wellness Visit    HEALTH RISK ASSESSMENT    1950    Recent Hospitalizations:  No hospitalization(s) within the last year..        Current Medical Providers:  Patient Care Team:  Brando Webb MD as PCP - General (Family Medicine)  Jim Cha OD (Optometry)  Anand Santoyo MD as Consulting Physician (Ophthalmology)        Smoking Status:  History   Smoking Status   • Former Smoker   • Packs/day: 0.50   • Years: 10.00   • Types: Cigarettes   • Quit date: 1/1/1979   Smokeless Tobacco   • Never Used       Alcohol Consumption:  History   Alcohol Use No       Depression Screen:   PHQ-2/PHQ-9 Depression Screening 6/21/2018   Little interest or pleasure in doing things 0   Feeling down, depressed, or hopeless 0   Trouble falling or staying asleep, or sleeping too much -   Feeling tired or having little energy -   Poor appetite or overeating -   Feeling bad about yourself - or that you are a failure or have let yourself or your family down -   Trouble concentrating on things, such as reading the newspaper or watching television -   Moving or speaking so slowly that other people could have noticed. Or the opposite - being so fidgety or restless that you have been moving around a lot more than usual -   Thoughts that you would be better off dead, or of hurting yourself in some way -   Total Score 0   If you checked off any problems, how difficult have these problems made it for you to do your work, take care of things at home, or get along with other people? -       Health Habits and Functional and Cognitive Screening:  Functional & Cognitive Status 6/21/2018   Do you have difficulty preparing food and eating? No   Do you have difficulty bathing yourself, getting dressed or grooming yourself? No   Do you have difficulty using the toilet? No   Do you have difficulty moving around from place to place? No   Do you have  trouble with steps or getting out of a bed or a chair? No   In the past year have you fallen or experienced a near fall? No   Current Diet Well Balanced Diet   Dental Exam Up to date   Eye Exam Up to date   Exercise (times per week) 5 times per week   Current Exercise Activities Include Walking   Do you need help using the phone?  No   Are you deaf or do you have serious difficulty hearing?  No   Do you need help with transportation? No   Do you need help shopping? No   Do you need help preparing meals?  No   Do you need help with housework?  No   Do you need help with laundry? No   Do you need help taking your medications? No   Do you need help managing money? No   Do you ever drive or ride in a car without wearing a seat belt? No   Have you felt unusual stress, anger or loneliness in the last month? No   Who do you live with? Spouse   If you need help, do you have trouble finding someone available to you? No   Have you been bothered in the last four weeks by sexual problems? No   Do you have difficulty concentrating, remembering or making decisions? No           Does the patient have evidence of cognitive impairment? Yes    Aspirin use counseling: Taking ASA appropriately as indicated      Recent Lab Results:  CMP:  Lab Results   Component Value Date     (H) 06/19/2017    BUN 15 06/19/2017    CREATININE 1.05 06/19/2017    EGFRIFNONA 70 06/19/2017    EGFRIFAFRI 85 06/19/2017    BCR 14.3 06/19/2017     06/19/2017    K 5.2 06/19/2017    CO2 31.0 06/19/2017    CALCIUM 9.9 06/19/2017    PROTENTOTREF 7.3 06/19/2017    ALBUMIN 4.50 06/19/2017    LABGLOBREF 2.8 06/19/2017    LABIL2 1.6 06/19/2017    BILITOT 0.6 06/19/2017    ALKPHOS 67 06/19/2017    AST 23 06/19/2017    ALT 54 06/19/2017     Lipid Panel:  Lab Results   Component Value Date    TRIG 92 06/19/2017    HDL 41 06/19/2017    VLDL 18.4 06/19/2017     HbA1c:  Lab Results   Component Value Date    HGBA1C 8.20 06/19/2017       Visual Acuity:  No exam  data present    Age-appropriate Screening Schedule:  Refer to the list below for future screening recommendations based on patient's age, sex and/or medical conditions. Orders for these recommended tests are listed in the plan section. The patient has been provided with a written plan.    Health Maintenance   Topic Date Due   • ZOSTER VACCINE (2 of 2) 08/14/2017   • INFLUENZA VACCINE  08/01/2018   • HEMOGLOBIN A1C  12/21/2018   • DIABETIC EYE EXAM  01/01/2019   • DIABETIC FOOT EXAM  06/21/2019   • LIPID PANEL  06/21/2019   • URINE MICROALBUMIN  06/21/2019   • TDAP/TD VACCINES (2 - Td) 05/08/2023   • COLONOSCOPY  07/12/2026   • PNEUMOCOCCAL VACCINES (65+ LOW/MEDIUM RISK)  Completed        Subjective   History of Present Illness    Bernard Matta is a 67 y.o. male who presents for an Subsequent Wellness Visit.    The following portions of the patient's history were reviewed and updated as appropriate: allergies, current medications, past family history, past medical history, past social history, past surgical history and problem list.    Outpatient Medications Prior to Visit   Medication Sig Dispense Refill   • ACCU-CHEK SMARTVIEW test strip USE TO TEST TWICE A  each 1   • aspirin 81 MG tablet Take 81 mg by mouth Daily.     • atorvastatin (LIPITOR) 40 MG tablet TAKE 1 TABLET BY MOUTH EVERY NIGHT. 90 tablet 0   • B-D ULTRAFINE III SHORT PEN 31G X 8 MM misc USE WITH LANTUS SOLOSTAR AS DIRECTED 100 each 2   • cetirizine (zyrTEC) 10 MG tablet Take 10 mg by mouth Daily.     • cholecalciferol (VITAMIN D3) 400 UNITS tablet Take 400 Units by mouth Daily.     • dorzolamide-timolol (COSOPT) 22.3-6.8 MG/ML ophthalmic solution 1 drop 2 (Two) Times a Day.     • fluticasone (FLONASE) 50 MCG/ACT nasal spray USE 2 SPRAYS IN EACH NOSTIL EVERY DAY 16 mL 5   • glipiZIDE (GLUCOTROL XL) 10 MG 24 hr tablet TAKE 1 TABLET BY MOUTH TWICE A  tablet 0   • insulin detemir (LEVEMIR) 100 UNIT/ML injection Inject 15 Units under the skin  Every Night. 3 each 12   • latanoprost (XALATAN) 0.005 % ophthalmic solution 1 drop Every Night.     • LEVEMIR FLEXTOUCH 100 UNIT/ML injection INJECT 20 UNITS UNDER THE SKIN EVERY NIGHT. 3 mL 3   • lisinopril (PRINIVIL,ZESTRIL) 10 MG tablet TAKE 1 TABLET BY MOUTH EVERY MORNING 90 tablet 0   • metFORMIN (GLUCOPHAGE) 500 MG tablet TAKE 1 TABLET BY MOUTH 2 (TWO) TIMES A DAY. 180 tablet 0   • Multiple Vitamins-Minerals (MULTIVITAMIN WITH MINERALS) tablet tablet Take 1 tablet by mouth Daily.     • tamsulosin (FLOMAX) 0.4 MG capsule 24 hr capsule Take 2 capsules by mouth Every Evening. 180 capsule 3   • azithromycin (ZITHROMAX Z-RAJESH) 250 MG tablet Take 2 tablets the first day, then 1 tablet daily for 4 days. 6 tablet 0     No facility-administered medications prior to visit.        Patient Active Problem List   Diagnosis   • Anterior ischemic optic neuropathy   • Hypertension   • Type 2 diabetes mellitus       Advance Care Planning:  has NO advance directive - information provided to the patient today    Identification of Risk Factors:  Risk factors include: weight , unhealthy diet, cardiovascular risk, inactivity and increased fall risk.    Review of Systems   Eyes:        Sees ophthalmologist every 6 months   Cardiovascular: Negative for chest pain and leg swelling.        Sees cardiologist yearly   Gastrointestinal: Negative for abdominal distention and abdominal pain.   Genitourinary:        Nocturia ×3   All other systems reviewed and are negative.      Compared to one year ago, the patient feels his physical health is the same.  Compared to one year ago, the patient feels his mental health is the same.    Objective     Physical Exam   Constitutional: He is oriented to person, place, and time. He appears well-developed and well-nourished.   HENT:   Right Ear: External ear normal.   Left Ear: External ear normal.   Mouth/Throat: Oropharynx is clear and moist.   Eyes: EOM are normal. Pupils are equal, round, and  "reactive to light.   Neck: No thyromegaly present.   Good carotid pulses   Cardiovascular: Normal rate and regular rhythm.    Pulmonary/Chest: Effort normal and breath sounds normal.   Abdominal: Soft. Bowel sounds are normal. He exhibits no mass. There is no tenderness.   Genitourinary: Rectum normal, prostate normal and penis normal. Rectal exam shows guaiac negative stool.   Genitourinary Comments: No testicular masses inguinal hernia or adenopathy-prostate raphae maintained 2+ not enlarged no nodules guaiac-negative   Musculoskeletal: He exhibits no edema.    Bernard had a diabetic foot exam performed today.   During the foot exam he had a monofilament test not performed.  Vascular Status -  His right foot exhibits abnormal foot vasculature . His right foot exhibits no edema. His left foot exhibits abnormal foot vasculature . His left foot exhibits no edema.  Skin Integrity  -  His right foot skin is not intact.  His left foot skin is not intact..   Foot Structure and Biomechanics -  His right foot has no hallux valgus, no pes cavus, no claw toes and no hammer toes present. His left foot has no hallux valgus, no pes cavus, no claw toes and no hammer toes.  Lymphadenopathy:     He has no cervical adenopathy.   Neurological: He is alert and oriented to person, place, and time.   Skin: Skin is warm and dry. Capillary refill takes less than 2 seconds.   Psychiatric: He has a normal mood and affect. His behavior is normal. Judgment and thought content normal.   Nursing note and vitals reviewed.      Vitals:    06/21/18 0759   BP: 120/72   BP Location: Left arm   Patient Position: Sitting   Cuff Size: Adult   Pulse: 84   Temp: 98.5 °F (36.9 °C)   TempSrc: Oral   SpO2: 97%   Weight: 79.7 kg (175 lb 9.6 oz)   Height: 175.3 cm (69.02\")   PainSc: 0-No pain       Patient's Body mass index is 25.92 kg/m². BMI is within normal parameters. No follow-up required.      Assessment/Plan   Patient Self-Management and Personalized " Health Advice  The patient has been provided with information about: diet, exercise, weight management, prevention of cardiac or vascular disease and fall prevention and preventive services including:   · Advance directive, Colorectal cancer screening, fecal occult blood test, Fall Risk plan of care done, Screening for AAA, referral for ultrasound placed.    Visit Diagnoses:    ICD-10-CM ICD-9-CM   1. Fatigue, unspecified type R53.83 780.79   2. Mixed hyperlipidemia E78.2 272.2   3. Hyperglycemia R73.9 790.29   4. Special screening for malignant neoplasm of prostate Z12.5 V76.44   5. Vitamin D deficiency E55.9 268.9   6. Annual physical exam Z00.00 V70.0       Orders Placed This Encounter   Procedures   • TSH   • T4, free   • Comprehensive Metabolic Panel   • Lipid Panel   • Hemoglobin A1c   • PSA Screen   • Vitamin D 25 Hydroxy   • POC Urinalysis Dipstick, Automated   • CBC & Differential     Order Specific Question:   Manual Differential     Answer:   No       Outpatient Encounter Prescriptions as of 6/21/2018   Medication Sig Dispense Refill   • ACCU-CHEK SMARTVIEW test strip USE TO TEST TWICE A  each 1   • aspirin 81 MG tablet Take 81 mg by mouth Daily.     • atorvastatin (LIPITOR) 40 MG tablet TAKE 1 TABLET BY MOUTH EVERY NIGHT. 90 tablet 0   • B-D ULTRAFINE III SHORT PEN 31G X 8 MM misc USE WITH LANTUS SOLOSTAR AS DIRECTED 100 each 2   • cetirizine (zyrTEC) 10 MG tablet Take 10 mg by mouth Daily.     • cholecalciferol (VITAMIN D3) 400 UNITS tablet Take 400 Units by mouth Daily.     • dorzolamide-timolol (COSOPT) 22.3-6.8 MG/ML ophthalmic solution 1 drop 2 (Two) Times a Day.     • fluticasone (FLONASE) 50 MCG/ACT nasal spray USE 2 SPRAYS IN EACH NOSTIL EVERY DAY 16 mL 5   • glipiZIDE (GLUCOTROL XL) 10 MG 24 hr tablet TAKE 1 TABLET BY MOUTH TWICE A  tablet 0   • insulin detemir (LEVEMIR) 100 UNIT/ML injection Inject 15 Units under the skin Every Night. 3 each 12   • latanoprost (XALATAN) 0.005 %  ophthalmic solution 1 drop Every Night.     • LEVEMIR FLEXTOUCH 100 UNIT/ML injection INJECT 20 UNITS UNDER THE SKIN EVERY NIGHT. 3 mL 3   • lisinopril (PRINIVIL,ZESTRIL) 10 MG tablet TAKE 1 TABLET BY MOUTH EVERY MORNING 90 tablet 0   • metFORMIN (GLUCOPHAGE) 500 MG tablet TAKE 1 TABLET BY MOUTH 2 (TWO) TIMES A DAY. 180 tablet 0   • Multiple Vitamins-Minerals (MULTIVITAMIN WITH MINERALS) tablet tablet Take 1 tablet by mouth Daily.     • tamsulosin (FLOMAX) 0.4 MG capsule 24 hr capsule Take 2 capsules by mouth Every Evening. 180 capsule 3   • [DISCONTINUED] azithromycin (ZITHROMAX Z-RAJESH) 250 MG tablet Take 2 tablets the first day, then 1 tablet daily for 4 days. 6 tablet 0     No facility-administered encounter medications on file as of 6/21/2018.        Reviewed use of high risk medication in the elderly: yes  Reviewed for potential of harmful drug interactions in the elderly: yes    Follow Up:  Return in 6 months (on 12/21/2018).     An After Visit Summary and PPPS with all of these plans were given to the patient.       plan-above

## 2018-06-21 NOTE — PATIENT INSTRUCTIONS
Exercising to Stay Healthy  Exercising regularly is important. It has many health benefits, such as:  · Improving your overall fitness, flexibility, and endurance.  · Increasing your bone density.  · Helping with weight control.  · Decreasing your body fat.  · Increasing your muscle strength.  · Reducing stress and tension.  · Improving your overall health.    In order to become healthy and stay healthy, it is recommended that you do moderate-intensity and vigorous-intensity exercise. You can tell that you are exercising at a moderate intensity if you have a higher heart rate and faster breathing, but you are still able to hold a conversation. You can tell that you are exercising at a vigorous intensity if you are breathing much harder and faster and cannot hold a conversation while exercising.  How often should I exercise?  Choose an activity that you enjoy and set realistic goals. Your health care provider can help you to make an activity plan that works for you. Exercise regularly as directed by your health care provider. This may include:  · Doing resistance training twice each week, such as:  ? Push-ups.  ? Sit-ups.  ? Lifting weights.  ? Using resistance bands.  · Doing a given intensity of exercise for a given amount of time. Choose from these options:  ? 150 minutes of moderate-intensity exercise every week.  ? 75 minutes of vigorous-intensity exercise every week.  ? A mix of moderate-intensity and vigorous-intensity exercise every week.    Children, pregnant women, people who are out of shape, people who are overweight, and older adults may need to consult a health care provider for individual recommendations. If you have any sort of medical condition, be sure to consult your health care provider before starting a new exercise program.  What are some exercise ideas?  Some moderate-intensity exercise ideas include:  · Walking at a rate of 1 mile in 15  minutes.  · Biking.  · Hiking.  · Golfing.  · Dancing.    Some vigorous-intensity exercise ideas include:  · Walking at a rate of at least 4.5 miles per hour.  · Jogging or running at a rate of 5 miles per hour.  · Biking at a rate of at least 10 miles per hour.  · Lap swimming.  · Roller-skating or in-line skating.  · Cross-country skiing.  · Vigorous competitive sports, such as football, basketball, and soccer.  · Jumping rope.  · Aerobic dancing.    What are some everyday activities that can help me to get exercise?  · Yard work, such as:  ? Pushing a .  ? Raking and bagging leaves.  · Washing and waxing your car.  · Pushing a stroller.  · Shoveling snow.  · Gardening.  · Washing windows or floors.  How can I be more active in my day-to-day activities?  · Use the stairs instead of the elevator.  · Take a walk during your lunch break.  · If you drive, park your car farther away from work or school.  · If you take public transportation, get off one stop early and walk the rest of the way.  · Make all of your phone calls while standing up and walking around.  · Get up, stretch, and walk around every 30 minutes throughout the day.  What guidelines should I follow while exercising?  · Do not exercise so much that you hurt yourself, feel dizzy, or get very short of breath.  · Consult your health care provider before starting a new exercise program.  · Wear comfortable clothes and shoes with good support.  · Drink plenty of water while you exercise to prevent dehydration or heat stroke. Body water is lost during exercise and must be replaced.  · Work out until you breathe faster and your heart beats faster.  This information is not intended to replace advice given to you by your health care provider. Make sure you discuss any questions you have with your health care provider.  Document Released: 01/20/2012 Document Revised: 05/25/2017 Document Reviewed: 05/21/2015  [employer] Interactive Patient Education © 2018  [employer]    Fall Prevention in the Home  Falls can cause injuries and can affect people from all age groups. There are many simple things that you can do to make your home safe and to help prevent falls.  What can I do on the outside of my home?  · Regularly repair the edges of walkways and driveways and fix any cracks.  · Remove high doorway thresholds.  · Trim any shrubbery on the main path into your home.  · Use bright outdoor lighting.  · Clear walkways of debris and clutter, including tools and rocks.  · Regularly check that handrails are securely fastened and in good repair. Both sides of any steps should have handrails.  · Install guardrails along the edges of any raised decks or porches.  · Have leaves, snow, and ice cleared regularly.  · Use sand or salt on walkways during winter months.  · In the garage, clean up any spills right away, including grease or oil spills.  What can I do in the bathroom?  · Use night lights.  · Install grab bars by the toilet and in the tub and shower. Do not use towel bars as grab bars.  · Use non-skid mats or decals on the floor of the tub or shower.  · If you need to sit down while you are in the shower, use a plastic, non-slip stool.  · Keep the floor dry. Immediately clean up any water that spills on the floor.  · Remove soap buildup in the tub or shower on a regular basis.  · Attach bath mats securely with double-sided non-slip rug tape.  · Remove throw rugs and other tripping hazards from the floor.  What can I do in the bedroom?  · Use night lights.  · Make sure that a bedside light is easy to reach.  · Do not use oversized bedding that drapes onto the floor.  · Have a firm chair that has side arms to use for getting dressed.  · Remove throw rugs and other tripping hazards from the floor.  What can I do in the kitchen?  · Clean up any spills right away.  · Avoid walking on wet floors.  · Place frequently used items in easy-to-reach places.  · If you need to reach  for something above you, use a sturdy step stool that has a grab bar.  · Keep electrical cables out of the way.  · Do not use floor polish or wax that makes floors slippery. If you have to use wax, make sure that it is non-skid floor wax.  · Remove throw rugs and other tripping hazards from the floor.  What can I do in the stairways?  · Do not leave any items on the stairs.  · Make sure that there are handrails on both sides of the stairs. Fix handrails that are broken or loose. Make sure that handrails are as long as the stairways.  · Check any carpeting to make sure that it is firmly attached to the stairs. Fix any carpet that is loose or worn.  · Avoid having throw rugs at the top or bottom of stairways, or secure the rugs with carpet tape to prevent them from moving.  · Make sure that you have a light switch at the top of the stairs and the bottom of the stairs. If you do not have them, have them installed.  What are some other fall prevention tips?  · Wear closed-toe shoes that fit well and support your feet. Wear shoes that have rubber soles or low heels.  · When you use a stepladder, make sure that it is completely opened and that the sides are firmly locked. Have someone hold the ladder while you are using it. Do not climb a closed stepladder.  · Add color or contrast paint or tape to grab bars and handrails in your home. Place contrasting color strips on the first and last steps.  · Use mobility aids as needed, such as canes, walkers, scooters, and crutches.  · Turn on lights if it is dark. Replace any light bulbs that burn out.  · Set up furniture so that there are clear paths. Keep the furniture in the same spot.  · Fix any uneven floor surfaces.  · Choose a carpet design that does not hide the edge of steps of a stairway.  · Be aware of any and all pets.  · Review your medicines with your healthcare provider. Some medicines can cause dizziness or changes in blood pressure, which increase your risk of  falling.  Talk with your health care provider about other ways that you can decrease your risk of falls. This may include working with a physical therapist or  to improve your strength, balance, and endurance.  This information is not intended to replace advice given to you by your health care provider. Make sure you discuss any questions you have with your health care provider.  Document Released: 2003 Document Revised: 2017 Document Reviewed: 2016  [employer] Interactive Patient Education © 2018 [employer]    Medicare Wellness  Personal Prevention Plan of Service     Date of Office Visit:  2018  Encounter Provider:  Brando Webb MD  Place of Service:  [facility]  Patient Name: Bernard Matta  :  1950    As part of the Medicare Wellness portion of your visit today, we are providing you with this personalized preventive plan of services (PPPS). This plan is based upon recommendations of the [place] Preventive Services Task Force (USPSTF) and the Advisory Committee on Immunization Practices (ACIP).    This lists the preventive care services that should be considered, and provides dates of when you are due. Items listed as completed are up-to-date and do not require any further intervention.    Health Maintenance   Topic Date Due   • URINE MICROALBUMIN  1950   • DIABETIC EYE EXAM  2017   • AAA SCREEN (ONE-TIME)  2017   • ZOSTER VACCINE (2 of 2) 2017   • HEMOGLOBIN A1C  2017   • MEDICARE ANNUAL WELLNESS  2018   • DIABETIC FOOT EXAM  2018   • LIPID PANEL  2018   • INFLUENZA VACCINE  2018   • TDAP/TD VACCINES (2 - Td) 2023   • COLONOSCOPY  2026   • HEPATITIS C SCREENING  Completed   • PNEUMOCOCCAL VACCINES (65+ LOW/MEDIUM RISK)  Completed       Orders Placed This Encounter   Procedures   • TSH   • T4, free   • Comprehensive Metabolic Panel   • Lipid Panel   • Hemoglobin  A1c   • PSA Screen   • Vitamin D 25 Hydroxy   • POC Urinalysis Dipstick, Automated   • CBC & Differential     Order Specific Question:   Manual Differential     Answer:   No       Return in 6 months (on 12/21/2018).

## 2018-06-22 LAB
25(OH)D3+25(OH)D2 SERPL-MCNC: 54 NG/ML (ref 30–100)
ALBUMIN SERPL-MCNC: 4.1 G/DL (ref 3.5–5)
ALBUMIN/GLOB SERPL: 1.4 G/DL (ref 1.1–2.5)
ALP SERPL-CCNC: 67 U/L (ref 24–120)
ALT SERPL-CCNC: 47 U/L (ref 0–54)
AST SERPL-CCNC: 23 U/L (ref 7–45)
BASOPHILS # BLD AUTO: 0.04 10*3/MM3 (ref 0–0.2)
BASOPHILS NFR BLD AUTO: 0.7 % (ref 0–2)
BILIRUB SERPL-MCNC: 0.6 MG/DL (ref 0.1–1)
BUN SERPL-MCNC: 20 MG/DL (ref 5–21)
BUN/CREAT SERPL: 22 (ref 7–25)
CALCIUM SERPL-MCNC: 9.7 MG/DL (ref 8.4–10.4)
CHLORIDE SERPL-SCNC: 100 MMOL/L (ref 98–110)
CHOLEST SERPL-MCNC: 164 MG/DL (ref 130–200)
CO2 SERPL-SCNC: 30 MMOL/L (ref 24–31)
CREAT SERPL-MCNC: 0.91 MG/DL (ref 0.5–1.4)
EOSINOPHIL # BLD AUTO: 0.27 10*3/MM3 (ref 0–0.7)
EOSINOPHIL NFR BLD AUTO: 4.9 % (ref 0–4)
ERYTHROCYTE [DISTWIDTH] IN BLOOD BY AUTOMATED COUNT: 12.9 % (ref 12–15)
GFR SERPLBLD CREATININE-BSD FMLA CKD-EPI: 100 ML/MIN/1.73
GFR SERPLBLD CREATININE-BSD FMLA CKD-EPI: 83 ML/MIN/1.73
GLOBULIN SER CALC-MCNC: 2.9 GM/DL
GLUCOSE SERPL-MCNC: 158 MG/DL (ref 70–100)
HBA1C MFR BLD: 10.4 %
HCT VFR BLD AUTO: 41 % (ref 40–52)
HDLC SERPL-MCNC: 37 MG/DL
HGB BLD-MCNC: 13.2 G/DL (ref 14–18)
IMM GRANULOCYTES # BLD: 0.01 10*3/MM3 (ref 0–0.03)
IMM GRANULOCYTES NFR BLD: 0.2 % (ref 0–5)
LDLC SERPL CALC-MCNC: 109 MG/DL (ref 0–99)
LYMPHOCYTES # BLD AUTO: 0.98 10*3/MM3 (ref 0.72–4.86)
LYMPHOCYTES NFR BLD AUTO: 17.9 % (ref 15–45)
MCH RBC QN AUTO: 29.7 PG (ref 28–32)
MCHC RBC AUTO-ENTMCNC: 32.2 G/DL (ref 33–36)
MCV RBC AUTO: 92.1 FL (ref 82–95)
MICROALBUMIN UR-MCNC: 20.4 UG/ML
MONOCYTES # BLD AUTO: 0.46 10*3/MM3 (ref 0.19–1.3)
MONOCYTES NFR BLD AUTO: 8.4 % (ref 4–12)
NEUTROPHILS # BLD AUTO: 3.7 10*3/MM3 (ref 1.87–8.4)
NEUTROPHILS NFR BLD AUTO: 67.9 % (ref 39–78)
NRBC BLD AUTO-RTO: 0.4 /100 WBC (ref 0–0)
PLATELET # BLD AUTO: 188 10*3/MM3 (ref 130–400)
POTASSIUM SERPL-SCNC: 4.8 MMOL/L (ref 3.5–5.3)
PROT SERPL-MCNC: 7 G/DL (ref 6.3–8.7)
PSA SERPL-MCNC: 0.69 NG/ML (ref 0–4)
RBC # BLD AUTO: 4.45 10*6/MM3 (ref 4.8–5.9)
SODIUM SERPL-SCNC: 142 MMOL/L (ref 135–145)
T4 FREE SERPL-MCNC: 1.23 NG/DL (ref 0.78–2.19)
TRIGL SERPL-MCNC: 92 MG/DL (ref 0–149)
TSH SERPL DL<=0.005 MIU/L-ACNC: 1.57 MIU/ML (ref 0.47–4.68)
VLDLC SERPL CALC-MCNC: 18.4 MG/DL
WBC # BLD AUTO: 5.46 10*3/MM3 (ref 4.8–10.8)

## 2018-06-26 DIAGNOSIS — Z13.6 SCREENING FOR AAA (ABDOMINAL AORTIC ANEURYSM): ICD-10-CM

## 2018-06-28 ENCOUNTER — TELEPHONE (OUTPATIENT)
Dept: FAMILY MEDICINE CLINIC | Facility: CLINIC | Age: 68
End: 2018-06-28

## 2018-06-28 NOTE — TELEPHONE ENCOUNTER
BLOOD SUGAR READINGS      06/24  178  187  06/26  172  194  06/28  148  198      LEVEMIR  25U QHS  GLIPIZIDE 10MG (2) QAM  METFORMIN 500MG (2) QAM             increase Levemir to 35 units plus come by tomorrow and  some more money that I will them

## 2018-07-16 RX ORDER — GLIPIZIDE 10 MG/1
TABLET, FILM COATED, EXTENDED RELEASE ORAL
Qty: 180 TABLET | Refills: 3 | Status: SHIPPED | OUTPATIENT
Start: 2018-07-16 | End: 2019-06-26 | Stop reason: SDUPTHER

## 2018-07-21 ENCOUNTER — RESULTS ENCOUNTER (OUTPATIENT)
Dept: FAMILY MEDICINE CLINIC | Facility: CLINIC | Age: 68
End: 2018-07-21

## 2018-07-21 DIAGNOSIS — Z12.12 SCREENING FOR MALIGNANT NEOPLASM OF THE RECTUM: ICD-10-CM

## 2018-07-24 NOTE — PROGRESS NOTES
Patient returned call and stated had mailed hemoccult back.  Advised patient we did not receive.  She will come to office to receive another set to complete

## 2018-07-25 DIAGNOSIS — I10 SYSTEMIC PRIMARY ARTERIAL HYPERTENSION: ICD-10-CM

## 2018-07-25 DIAGNOSIS — E78.5 DYSLIPIDEMIA: ICD-10-CM

## 2018-07-25 DIAGNOSIS — R94.31 ABNORMAL ELECTROCARDIOGRAM (ECG) (EKG): ICD-10-CM

## 2018-07-25 DIAGNOSIS — Z87.891 HISTORY OF TOBACCO ABUSE: ICD-10-CM

## 2018-07-25 PROBLEM — N52.9 ERECTILE DYSFUNCTION: Status: ACTIVE | Noted: 2018-07-25

## 2018-07-25 PROBLEM — E11.9 TYPE 2 DIABETES MELLITUS (HCC): Status: ACTIVE | Noted: 2018-07-25

## 2018-07-25 RX ORDER — M-VIT,TX,IRON,MINS/CALC/FOLIC 27MG-0.4MG
1 TABLET ORAL DAILY
COMMUNITY
End: 2019-07-29

## 2018-07-25 RX ORDER — OMEGA-3S/DHA/EPA/FISH OIL/D3 300MG-1000
400 CAPSULE ORAL DAILY
COMMUNITY

## 2018-07-25 RX ORDER — LISINOPRIL 5 MG/1
5 TABLET ORAL DAILY
COMMUNITY
End: 2018-07-31 | Stop reason: DRUGHIGH

## 2018-07-25 RX ORDER — CHROMIUM PICOLINATE 200 MCG
TABLET ORAL
COMMUNITY
End: 2018-07-31 | Stop reason: ALTCHOICE

## 2018-07-25 RX ORDER — ATORVASTATIN CALCIUM 40 MG/1
40 TABLET, FILM COATED ORAL DAILY
COMMUNITY

## 2018-07-25 RX ORDER — TAMSULOSIN HYDROCHLORIDE 0.4 MG/1
0.8 CAPSULE ORAL DAILY
COMMUNITY

## 2018-07-25 RX ORDER — TRAZODONE HYDROCHLORIDE 50 MG/1
50 TABLET ORAL NIGHTLY
COMMUNITY
End: 2018-07-31

## 2018-07-25 RX ORDER — GLIPIZIDE 10 MG/1
10 TABLET, FILM COATED, EXTENDED RELEASE ORAL 2 TIMES DAILY
COMMUNITY
End: 2020-10-27 | Stop reason: ALTCHOICE

## 2018-07-25 RX ORDER — LATANOPROST 50 UG/ML
1 SOLUTION/ DROPS OPHTHALMIC NIGHTLY
COMMUNITY

## 2018-07-27 LAB — HEMOCCULT STL QL IA: NEGATIVE

## 2018-07-31 ENCOUNTER — OFFICE VISIT (OUTPATIENT)
Dept: CARDIOLOGY | Age: 68
End: 2018-07-31
Payer: MEDICARE

## 2018-07-31 VITALS
HEIGHT: 69 IN | HEART RATE: 77 BPM | BODY MASS INDEX: 26.81 KG/M2 | WEIGHT: 181 LBS | SYSTOLIC BLOOD PRESSURE: 140 MMHG | DIASTOLIC BLOOD PRESSURE: 88 MMHG

## 2018-07-31 DIAGNOSIS — R94.31 ABNORMAL ELECTROCARDIOGRAM (ECG) (EKG): Primary | ICD-10-CM

## 2018-07-31 DIAGNOSIS — Z87.891 HISTORY OF TOBACCO ABUSE: ICD-10-CM

## 2018-07-31 DIAGNOSIS — E78.5 DYSLIPIDEMIA: ICD-10-CM

## 2018-07-31 PROCEDURE — 3017F COLORECTAL CA SCREEN DOC REV: CPT | Performed by: INTERNAL MEDICINE

## 2018-07-31 PROCEDURE — G8427 DOCREV CUR MEDS BY ELIG CLIN: HCPCS | Performed by: INTERNAL MEDICINE

## 2018-07-31 PROCEDURE — 99201 PR OFFICE OUTPATIENT NEW 10 MINUTES: CPT | Performed by: INTERNAL MEDICINE

## 2018-07-31 PROCEDURE — 1036F TOBACCO NON-USER: CPT | Performed by: INTERNAL MEDICINE

## 2018-07-31 PROCEDURE — 93000 ELECTROCARDIOGRAM COMPLETE: CPT | Performed by: INTERNAL MEDICINE

## 2018-07-31 PROCEDURE — G8419 CALC BMI OUT NRM PARAM NOF/U: HCPCS | Performed by: INTERNAL MEDICINE

## 2018-07-31 PROCEDURE — 4040F PNEUMOC VAC/ADMIN/RCVD: CPT | Performed by: INTERNAL MEDICINE

## 2018-07-31 PROCEDURE — 1101F PT FALLS ASSESS-DOCD LE1/YR: CPT | Performed by: INTERNAL MEDICINE

## 2018-07-31 PROCEDURE — 1123F ACP DISCUSS/DSCN MKR DOCD: CPT | Performed by: INTERNAL MEDICINE

## 2018-07-31 RX ORDER — LISINOPRIL 10 MG/1
10 TABLET ORAL DAILY
COMMUNITY

## 2018-07-31 ASSESSMENT — ENCOUNTER SYMPTOMS
CHEST TIGHTNESS: 0
BLOOD IN STOOL: 0
WHEEZING: 0
SHORTNESS OF BREATH: 0
CHOKING: 0
BACK PAIN: 0
APNEA: 0
NAUSEA: 0
ABDOMINAL DISTENTION: 0
COUGH: 0

## 2018-07-31 NOTE — PROGRESS NOTES
MRN: [id number] Todays Date: 2018  Last Appointment: Visit date not found  Lexi Carter is a 69 y.o. patient  : 1950  Referring Provider: No ref. 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 Walks 3 miles/day with no cardiac symptoms. S/P nephrectomy for clear cell carcinoma in . Risk Factors:  History reviewed. No pertinent family history. HTN: none      Diabetes: No  none    Past Medical History:   Diagnosis Date    Abnormal EKG     Dyslipidemia     ED (erectile dysfunction)     History of tobacco abuse     Hypertension     Type 2 diabetes mellitus ([address]      No Known Allergies  History reviewed. No pertinent surgical history. History reviewed. No pertinent family history.     Social History     Social History    Marital status:      Spouse name: N/A    Number of children: N/A    Years of education: N/A     Social History Main Topics    Smoking status: Former Smoker    Smokeless tobacco: Never Used    Alcohol use No    Drug use: No    Sexual activity: Not Asked     Other Topics Concern    None     Social History Narrative    None         Current Outpatient Prescriptions:     lisinopril (PRINIVIL;ZESTRIL) 10 MG tablet, Take 10 mg by mouth daily, Disp: , Rfl:     atorvastatin (LIPITOR) 40 MG tablet, Take 40 mg by mouth daily , Disp: , Rfl:     tamsulosin (FLOMAX) 0.4 MG capsule, Take 0.8 mg by mouth daily Takes 2 capsules every evening, Disp: , Rfl:     Multiple Vitamins-Minerals (THERAPEUTIC MULTIVITAMIN-MINERALS) tablet, Take 1 tablet by mouth daily, Disp: , Rfl:     aspirin 81 MG tablet, Take 81 mg by mouth daily, Disp: , Rfl:     vitamin D3 (CHOLECALCIFEROL) 400 units TABS tablet, Take 400 Units by mouth daily, Disp: , Rfl:     glipiZIDE (GLUCOTROL XL) 10 MG extended release tablet, Take 10 mg by mouth 2

## 2018-08-31 RX ORDER — FLUTICASONE PROPIONATE 50 MCG
SPRAY, SUSPENSION (ML) NASAL
Qty: 16 ML | Refills: 5 | Status: SHIPPED | OUTPATIENT
Start: 2018-08-31 | End: 2019-05-03 | Stop reason: SDUPTHER

## 2018-09-03 DIAGNOSIS — Z79.4 TYPE 2 DIABETES MELLITUS WITHOUT COMPLICATION, WITH LONG-TERM CURRENT USE OF INSULIN (HCC): ICD-10-CM

## 2018-09-03 DIAGNOSIS — E11.9 TYPE 2 DIABETES MELLITUS WITHOUT COMPLICATION, WITH LONG-TERM CURRENT USE OF INSULIN (HCC): ICD-10-CM

## 2018-09-04 RX ORDER — LANCETS
EACH MISCELLANEOUS
Qty: 200 EACH | Refills: 3 | Status: SHIPPED | OUTPATIENT
Start: 2018-09-04 | End: 2018-09-06 | Stop reason: SDUPTHER

## 2018-09-04 RX ORDER — PEN NEEDLE, DIABETIC 31 GX5/16"
NEEDLE, DISPOSABLE MISCELLANEOUS
Qty: 100 EACH | Refills: 2 | Status: SHIPPED | OUTPATIENT
Start: 2018-09-04 | End: 2019-07-30

## 2018-09-07 RX ORDER — LANCETS
EACH MISCELLANEOUS
Qty: 200 EACH | Refills: 1 | Status: SHIPPED | OUTPATIENT
Start: 2018-09-07 | End: 2019-06-05 | Stop reason: SDUPTHER

## 2018-09-17 RX ORDER — LISINOPRIL 10 MG/1
TABLET ORAL
Qty: 90 TABLET | Refills: 2 | Status: SHIPPED | OUTPATIENT
Start: 2018-09-17 | End: 2019-05-26 | Stop reason: SDUPTHER

## 2018-09-17 RX ORDER — ATORVASTATIN CALCIUM 40 MG/1
40 TABLET, FILM COATED ORAL NIGHTLY
Qty: 90 TABLET | Refills: 2 | Status: SHIPPED | OUTPATIENT
Start: 2018-09-17 | End: 2019-07-30 | Stop reason: SDUPTHER

## 2018-10-08 ENCOUNTER — FLU SHOT (OUTPATIENT)
Dept: FAMILY MEDICINE CLINIC | Facility: CLINIC | Age: 68
End: 2018-10-08

## 2018-10-08 PROCEDURE — G0008 ADMIN INFLUENZA VIRUS VAC: HCPCS | Performed by: FAMILY MEDICINE

## 2018-10-08 PROCEDURE — 90662 IIV NO PRSV INCREASED AG IM: CPT | Performed by: FAMILY MEDICINE

## 2018-12-03 RX ORDER — BLOOD SUGAR DIAGNOSTIC
STRIP MISCELLANEOUS
Qty: 200 EACH | Refills: 3 | Status: SHIPPED | OUTPATIENT
Start: 2018-12-03 | End: 2019-12-06 | Stop reason: SDUPTHER

## 2019-01-07 RX ORDER — TAMSULOSIN HYDROCHLORIDE 0.4 MG/1
2 CAPSULE ORAL EVERY EVENING
Qty: 180 CAPSULE | Refills: 3 | Status: SHIPPED | OUTPATIENT
Start: 2019-01-07 | End: 2020-01-13

## 2019-01-07 RX ORDER — TAMSULOSIN HYDROCHLORIDE 0.4 MG/1
2 CAPSULE ORAL EVERY EVENING
Qty: 180 CAPSULE | Refills: 3 | Status: SHIPPED | OUTPATIENT
Start: 2019-01-07 | End: 2019-06-26 | Stop reason: SDUPTHER

## 2019-05-03 RX ORDER — FLUTICASONE PROPIONATE 50 MCG
SPRAY, SUSPENSION (ML) NASAL
Qty: 16 ML | Refills: 5 | Status: SHIPPED | OUTPATIENT
Start: 2019-05-03 | End: 2019-12-06 | Stop reason: SDUPTHER

## 2019-05-29 RX ORDER — LISINOPRIL 10 MG/1
10 TABLET ORAL EVERY MORNING
Qty: 30 TABLET | Refills: 0 | Status: SHIPPED | OUTPATIENT
Start: 2019-05-29 | End: 2019-06-29 | Stop reason: SDUPTHER

## 2019-05-29 NOTE — TELEPHONE ENCOUNTER
Refill Lisinopril 10mg and Metformin 500mg.    Patient last seen on 06/21/2018.  30 day supply sent instead of 90 because patient did not keep 6 month follow up appt and does not have an appt scheduled yet.

## 2019-06-05 DIAGNOSIS — E11.9 TYPE 2 DIABETES MELLITUS WITHOUT COMPLICATION, WITH LONG-TERM CURRENT USE OF INSULIN (HCC): Primary | ICD-10-CM

## 2019-06-05 DIAGNOSIS — Z79.4 TYPE 2 DIABETES MELLITUS WITHOUT COMPLICATION, WITH LONG-TERM CURRENT USE OF INSULIN (HCC): Primary | ICD-10-CM

## 2019-06-05 RX ORDER — LANCETS
EACH MISCELLANEOUS
Qty: 204 EACH | Refills: 0 | Status: SHIPPED | OUTPATIENT
Start: 2019-06-05 | End: 2019-09-13 | Stop reason: SDUPTHER

## 2019-06-26 ENCOUNTER — OFFICE VISIT (OUTPATIENT)
Dept: FAMILY MEDICINE CLINIC | Facility: CLINIC | Age: 69
End: 2019-06-26

## 2019-06-26 VITALS
BODY MASS INDEX: 27.43 KG/M2 | HEIGHT: 69 IN | TEMPERATURE: 97.9 F | DIASTOLIC BLOOD PRESSURE: 89 MMHG | SYSTOLIC BLOOD PRESSURE: 136 MMHG | WEIGHT: 185.2 LBS | HEART RATE: 89 BPM | OXYGEN SATURATION: 98 %

## 2019-06-26 DIAGNOSIS — Z12.12 SCREENING FOR COLORECTAL CANCER: ICD-10-CM

## 2019-06-26 DIAGNOSIS — R06.02 SHORTNESS OF BREATH: ICD-10-CM

## 2019-06-26 DIAGNOSIS — Z12.11 SCREENING FOR COLORECTAL CANCER: ICD-10-CM

## 2019-06-26 DIAGNOSIS — E11.8 TYPE 2 DIABETES MELLITUS WITH COMPLICATION, WITHOUT LONG-TERM CURRENT USE OF INSULIN (HCC): ICD-10-CM

## 2019-06-26 DIAGNOSIS — Z12.5 SPECIAL SCREENING FOR MALIGNANT NEOPLASM OF PROSTATE: ICD-10-CM

## 2019-06-26 DIAGNOSIS — R53.83 FATIGUE, UNSPECIFIED TYPE: ICD-10-CM

## 2019-06-26 DIAGNOSIS — E78.2 MIXED HYPERLIPIDEMIA: Primary | ICD-10-CM

## 2019-06-26 DIAGNOSIS — Z00.00 ANNUAL PHYSICAL EXAM: ICD-10-CM

## 2019-06-26 DIAGNOSIS — E55.9 VITAMIN D DEFICIENCY: ICD-10-CM

## 2019-06-26 LAB
BILIRUB BLD-MCNC: NEGATIVE MG/DL
CLARITY, POC: CLEAR
COLOR UR: YELLOW
GLUCOSE UR STRIP-MCNC: ABNORMAL MG/DL
KETONES UR QL: NEGATIVE
LEUKOCYTE EST, POC: NEGATIVE
NITRITE UR-MCNC: NEGATIVE MG/ML
PH UR: 5 [PH] (ref 5–8)
PROT UR STRIP-MCNC: NEGATIVE MG/DL
RBC # UR STRIP: NEGATIVE /UL
SP GR UR: 1.02 (ref 1–1.03)
UROBILINOGEN UR QL: NORMAL

## 2019-06-26 PROCEDURE — G0439 PPPS, SUBSEQ VISIT: HCPCS | Performed by: FAMILY MEDICINE

## 2019-06-26 PROCEDURE — 81003 URINALYSIS AUTO W/O SCOPE: CPT | Performed by: FAMILY MEDICINE

## 2019-06-26 NOTE — PATIENT INSTRUCTIONS
Exercising to Stay Healthy  Exercising regularly is important. It has many health benefits, such as:  · Improving your overall fitness, flexibility, and endurance.  · Increasing your bone density.  · Helping with weight control.  · Decreasing your body fat.  · Increasing your muscle strength.  · Reducing stress and tension.  · Improving your overall health.    In order to become healthy and stay healthy, it is recommended that you do moderate-intensity and vigorous-intensity exercise. You can tell that you are exercising at a moderate intensity if you have a higher heart rate and faster breathing, but you are still able to hold a conversation. You can tell that you are exercising at a vigorous intensity if you are breathing much harder and faster and cannot hold a conversation while exercising.  How often should I exercise?  Choose an activity that you enjoy and set realistic goals. Your health care provider can help you to make an activity plan that works for you. Exercise regularly as directed by your health care provider. This may include:  · Doing resistance training twice each week, such as:  ? Push-ups.  ? Sit-ups.  ? Lifting weights.  ? Using resistance bands.  · Doing a given intensity of exercise for a given amount of time. Choose from these options:  ? 150 minutes of moderate-intensity exercise every week.  ? 75 minutes of vigorous-intensity exercise every week.  ? A mix of moderate-intensity and vigorous-intensity exercise every week.    Children, pregnant women, people who are out of shape, people who are overweight, and older adults may need to consult a health care provider for individual recommendations. If you have any sort of medical condition, be sure to consult your health care provider before starting a new exercise program.  What are some exercise ideas?  Some moderate-intensity exercise ideas include:  · Walking at a rate of 1 mile in 15  minutes.  · Biking.  · Hiking.  · Golfing.  · Dancing.    Some vigorous-intensity exercise ideas include:  · Walking at a rate of at least 4.5 miles per hour.  · Jogging or running at a rate of 5 miles per hour.  · Biking at a rate of at least 10 miles per hour.  · Lap swimming.  · Roller-skating or in-line skating.  · Cross-country skiing.  · Vigorous competitive sports, such as football, basketball, and soccer.  · Jumping rope.  · Aerobic dancing.    What are some everyday activities that can help me to get exercise?  · Yard work, such as:  ? Pushing a .  ? Raking and bagging leaves.  · Washing and waxing your car.  · Pushing a stroller.  · Shoveling snow.  · Gardening.  · Washing windows or floors.  How can I be more active in my day-to-day activities?  · Use the stairs instead of the elevator.  · Take a walk during your lunch break.  · If you drive, park your car farther away from work or school.  · If you take public transportation, get off one stop early and walk the rest of the way.  · Make all of your phone calls while standing up and walking around.  · Get up, stretch, and walk around every 30 minutes throughout the day.  What guidelines should I follow while exercising?  · Do not exercise so much that you hurt yourself, feel dizzy, or get very short of breath.  · Consult your health care provider before starting a new exercise program.  · Wear comfortable clothes and shoes with good support.  · Drink plenty of water while you exercise to prevent dehydration or heat stroke. Body water is lost during exercise and must be replaced.  · Work out until you breathe faster and your heart beats faster.  This information is not intended to replace advice given to you by your health care provider. Make sure you discuss any questions you have with your health care provider.  Document Released: 01/20/2012 Document Revised: 05/25/2017 Document Reviewed: 05/21/2015  [employer] Interactive Patient Education © 2018  [employer]      Fall Prevention in the Home, Adult  Falls can cause injuries and can affect people from all age groups. There are many simple things that you can do to make your home safe and to help prevent falls. Ask for help when making these changes, if needed.  What actions can I take to prevent falls?  General instructions  · Use good lighting in all rooms. Replace any light bulbs that burn out.  · Turn on lights if it is dark. Use night-lights.  · Place frequently used items in easy-to-reach places. Lower the shelves around your home if necessary.  · Set up furniture so that there are clear paths around it. Avoid moving your furniture around.  · Remove throw rugs and other tripping hazards from the floor.  · Avoid walking on wet floors.  · Fix any uneven floor surfaces.  · Add color or contrast paint or tape to grab bars and handrails in your home. Place contrasting color strips on the first and last steps of stairways.  · When you use a stepladder, make sure that it is completely opened and that the sides are firmly locked. Have someone hold the ladder while you are using it. Do not climb a closed stepladder.  · Be aware of any and all pets.  What can I do in the bathroom?  · Keep the floor dry. Immediately clean up any water that spills onto the floor.  · Remove soap buildup in the tub or shower on a regular basis.  · Use non-skid mats or decals on the floor of the tub or shower.  · Attach bath mats securely with double-sided, non-slip rug tape.  · If you need to sit down while you are in the shower, use a plastic, non-slip stool.  · Install grab bars by the toilet and in the tub and shower. Do not use towel bars as grab bars.  What can I do in the bedroom?  · Make sure that a bedside light is easy to reach.  · Do not use oversized bedding that drapes onto the floor.  · Have a firm chair that has side arms to use for getting dressed.  What can I do in the kitchen?  · Clean up any spills right away.  · If  you need to reach for something above you, use a sturdy step stool that has a grab bar.  · Keep electrical cables out of the way.  · Do not use floor polish or wax that makes floors slippery. If you must use wax, make sure that it is non-skid floor wax.  What can I do in the stairways?  · Do not leave any items on the stairs.  · Make sure that you have a light switch at the top of the stairs and the bottom of the stairs. Have them installed if you do not have them.  · Make sure that there are handrails on both sides of the stairs. Fix handrails that are broken or loose. Make sure that handrails are as long as the stairways.  · Install non-slip stair treads on all stairs in your home.  · Avoid having throw rugs at the top or bottom of stairways, or secure the rugs with carpet tape to prevent them from moving.  · Choose a carpet design that does not hide the edge of steps on the stairway.  · Check any carpeting to make sure that it is firmly attached to the stairs. Fix any carpet that is loose or worn.  What can I do on the outside of my home?  · Use bright outdoor lighting.  · Regularly repair the edges of walkways and driveways and fix any cracks.  · Remove high doorway thresholds.  · Trim any shrubbery on the main path into your home.  · Regularly check that handrails are securely fastened and in good repair. Both sides of any steps should have handrails.  · Install guardrails along the edges of any raised decks or porches.  · Clear walkways of debris and clutter, including tools and rocks.  · Have leaves, snow, and ice cleared regularly.  · Use sand or salt on walkways during winter months.  · In the garage, clean up any spills right away, including grease or oil spills.  What other actions can I take?  · Wear closed-toe shoes that fit well and support your feet. Wear shoes that have rubber soles or low heels.  · Use mobility aids as needed, such as canes, walkers, scooters, and crutches.  · Review your medicines  with your health care provider. Some medicines can cause dizziness or changes in blood pressure, which increase your risk of falling.  Talk with your health care provider about other ways that you can decrease your risk of falls. This may include working with a physical therapist or  to improve your strength, balance, and endurance.  Where to find more information  · Centers for Disease Control and Prevention, [place]: https://www.cdc.gov  · National [place] on Aging: https://xm5vwpk.antonieta.nih.gov  Contact a health care provider if:  · You are afraid of falling at home.  · You feel weak, drowsy, or dizzy at home.  · You fall at home.  Summary  · There are many simple things that you can do to make your home safe and to help prevent falls.  · Ways to make your home safe include removing tripping hazards and installing grab bars in the bathroom.  · Ask for help when making these changes in your home.  This information is not intended to replace advice given to you by your health care provider. Make sure you discuss any questions you have with your health care provider.  Document Released: 2003 Document Revised: 2018 Document Reviewed: 2018  [employer] Interactive Patient Education © 2019 [employer]    Medicare Wellness  Personal Prevention Plan of Service     Date of Office Visit:  2019  Encounter Provider:  Brando Webb MD  Place of Service:  [facility]  Patient Name: Bernard Matta  :  1950    As part of the Medicare Wellness portion of your visit today, we are providing you with this personalized preventive plan of services (PPPS). This plan is based upon recommendations of the [place] Preventive Services Task Force (USPSTF) and the Advisory Committee on Immunization Practices (ACIP).    This lists the preventive care services that should be considered, and provides dates of when you are due. Items listed as completed are  up-to-date and do not require any further intervention.    Health Maintenance   Topic Date Due   • DIABETIC EYE EXAM  01/01/2019   • INFLUENZA VACCINE  08/01/2019   • HEMOGLOBIN A1C  12/26/2019   • MEDICARE ANNUAL WELLNESS  06/26/2020   • DIABETIC FOOT EXAM  06/26/2020   • LIPID PANEL  06/26/2020   • URINE MICROALBUMIN  06/26/2020   • TDAP/TD VACCINES (2 - Td) 05/08/2023   • COLONOSCOPY  07/12/2026   • HEPATITIS C SCREENING  Completed   • PNEUMOCOCCAL VACCINES (65+ LOW/MEDIUM RISK)  Completed   • AAA SCREEN (ONE-TIME)  Completed   • ZOSTER VACCINE  Addressed       Orders Placed This Encounter   Procedures   • TSH   • T4, free   • Cologuard - Stool, Per Rectum     Standing Status:   Future     Standing Expiration Date:   6/26/2020   • Comprehensive Metabolic Panel   • Lipid Panel   • Vitamin D 25 Hydroxy   • PSA Screen   • Hemoglobin A1c   • Microalbumin / Creatinine Urine Ratio - Urine, Clean Catch   • POC Urinalysis Dipstick, Multipro   • CBC & Differential     Order Specific Question:   Manual Differential     Answer:   No       Return in 6 months (on 12/26/2019).

## 2019-06-26 NOTE — PROGRESS NOTES
QUICK REFERENCE INFORMATION:  The ABCs of the Annual Wellness Visit    Subsequent Medicare Wellness Visit     HEALTH RISK ASSESSMENT    : 1950    Recent Hospitalizations:  No hospitalization(s) within the last year..  ccc      Current Medical Providers:  Patient Care Team:  Brando Webb MD as PCP - General (Family Medicine)  Jim Cha OD (Optometry)  Anand Santoyo MD as Consulting Physician (Ophthalmology)        Smoking Status:  Social History     Tobacco Use   Smoking Status Former Smoker   • Packs/day: 0.50   • Years: 10.00   • Pack years: 5.00   • Types: Cigarettes   • Last attempt to quit: 1979   • Years since quittin.5   Smokeless Tobacco Never Used       Alcohol Consumption:  Social History     Substance and Sexual Activity   Alcohol Use No       Depression Screen:   PHQ-2/PHQ-9 Depression Screening 2019   Little interest or pleasure in doing things 0   Feeling down, depressed, or hopeless 0   Trouble falling or staying asleep, or sleeping too much -   Feeling tired or having little energy -   Poor appetite or overeating -   Feeling bad about yourself - or that you are a failure or have let yourself or your family down -   Trouble concentrating on things, such as reading the newspaper or watching television -   Moving or speaking so slowly that other people could have noticed. Or the opposite - being so fidgety or restless that you have been moving around a lot more than usual -   Thoughts that you would be better off dead, or of hurting yourself in some way -   Total Score 0   If you checked off any problems, how difficult have these problems made it for you to do your work, take care of things at home, or get along with other people? -       Health Habits and Functional and Cognitive Screening:  Functional & Cognitive Status 2019   Do you have difficulty preparing food and eating? No   Do you have difficulty bathing yourself, getting dressed or grooming  yourself? No   Do you have difficulty using the toilet? No   Do you have difficulty moving around from place to place? No   Do you have trouble with steps or getting out of a bed or a chair? No   In the past year have you fallen or experienced a near fall? No   Current Diet Unhealthy Diet   Dental Exam Up to date   Eye Exam Up to date   Exercise (times per week) 3 times per week   Current Exercise Activities Include Walking   Do you need help using the phone?  No   Are you deaf or do you have serious difficulty hearing?  No   Do you need help with transportation? No   Do you need help shopping? No   Do you need help preparing meals?  No   Do you need help with housework?  No   Do you need help with laundry? No   Do you need help taking your medications? No   Do you need help managing money? No   Do you ever drive or ride in a car without wearing a seat belt? No   Have you felt unusual stress, anger or loneliness in the last month? No   Who do you live with? -   If you need help, do you have trouble finding someone available to you? No   Have you been bothered in the last four weeks by sexual problems? No   Do you have difficulty concentrating, remembering or making decisions? No           Does the patient have evidence of cognitive impairment? Yes    Asiprin use counseling: Taking ASA appropriately as indicated      Recent Lab Results:    Lab Results   Component Value Date     (H) 06/21/2018     Lab Results   Component Value Date    HGBA1C 10.40 06/21/2018     Lab Results   Component Value Date    TRIG 92 06/21/2018    HDL 37 (L) 06/21/2018    VLDL 18.4 06/21/2018           Age-appropriate Screening Schedule:  Refer to the list below for future screening recommendations based on patient's age, sex and/or medical conditions. Orders for these recommended tests are listed in the plan section. The patient has been provided with a written plan.    Health Maintenance   Topic Date Due   • DIABETIC EYE EXAM   01/01/2019   • INFLUENZA VACCINE  08/01/2019   • HEMOGLOBIN A1C  12/26/2019   • DIABETIC FOOT EXAM  06/26/2020   • LIPID PANEL  06/26/2020   • URINE MICROALBUMIN  06/26/2020   • TDAP/TD VACCINES (2 - Td) 05/08/2023   • COLONOSCOPY  07/12/2026   • PNEUMOCOCCAL VACCINES (65+ LOW/MEDIUM RISK)  Completed   • ZOSTER VACCINE  Addressed        Subjective   History of Present Illness    Bernard Matta is a 68 y.o. male who presents for an Annual Wellness Visit.    The following portions of the patient's history were reviewed and updated as appropriate: allergies, current medications, past family history, past medical history, past social history, past surgical history and problem list.    Outpatient Medications Prior to Visit   Medication Sig Dispense Refill   • ACCU-CHEK FASTCLIX LANCETS misc USE TO TEST BLOOD SUGAR TWICE A  each 0   • ACCU-CHEK SMARTVIEW test strip USE TO TEST TWICE A  each 3   • aspirin 81 MG tablet Take 81 mg by mouth Daily.     • atorvastatin (LIPITOR) 40 MG tablet TAKE 1 TABLET BY MOUTH EVERY NIGHT. 90 tablet 2   • B-D ULTRAFINE III SHORT PEN 31G X 8 MM misc USE WITH LANTUS SOLOSTAR AS DIRECTED 100 each 2   • cetirizine (zyrTEC) 10 MG tablet Take 10 mg by mouth Daily.     • cholecalciferol (VITAMIN D3) 400 UNITS tablet Take 400 Units by mouth Daily.     • dorzolamide-timolol (COSOPT) 22.3-6.8 MG/ML ophthalmic solution 1 drop 2 (Two) Times a Day.     • fluticasone (FLONASE) 50 MCG/ACT nasal spray USE 2 SPRAYS IN EACH NOSTRIL EVERY DAY 16 mL 5   • glipiZIDE (GLUCOTROL XL) 10 MG 24 hr tablet TAKE 1 TABLET BY MOUTH TWICE A  tablet 3   • insulin detemir (LEVEMIR FLEXTOUCH) 100 UNIT/ML injection Inject 35 Units under the skin into the appropriate area as directed Every Night. 32 mL 3   • latanoprost (XALATAN) 0.005 % ophthalmic solution 1 drop Every Night.     • lisinopril (PRINIVIL,ZESTRIL) 10 MG tablet Take 1 tablet by mouth Every Morning. Patient needs to reschedule follow up appt for  more refills 30 tablet 0   • metFORMIN (GLUCOPHAGE) 500 MG tablet Take 1 tablet by mouth 2 (Two) Times a Day. Patient needs to schedule follow up appt for more refills 60 tablet 0   • Multiple Vitamins-Minerals (MULTIVITAMIN WITH MINERALS) tablet tablet Take 1 tablet by mouth Daily.     • tamsulosin (FLOMAX) 0.4 MG capsule 24 hr capsule TAKE 2 CAPSULES BY MOUTH EVERY EVENING. 180 capsule 3   • tamsulosin (FLOMAX) 0.4 MG capsule 24 hr capsule Take 2 capsules by mouth Every Evening. 180 capsule 3     No facility-administered medications prior to visit.        Patient Active Problem List   Diagnosis   • Anterior ischemic optic neuropathy   • Hypertension   • Type 2 diabetes mellitus (CMS/[facility])       Advance Care Planning:  Patient does not have an advance directive - information provided to the patient today    Identification of Risk Factors:  Risk factors include: weight , inactivity and increased fall risk.    Review of Systems   Respiratory: Positive for shortness of breath.         Exertional shortness of breath over the last 6 months-denies chest discomfort   Cardiovascular: Negative for chest pain.   Genitourinary: Negative for difficulty urinating.   All other systems reviewed and are negative.      Compared to one year ago, the patient feels his physical health is the same.  Compared to one year ago, the patient feels his mental health is the same.    Objective     Physical Exam   Constitutional: He is oriented to person, place, and time.   Slightly overweight   HENT:   Right Ear: External ear normal.   Left Ear: External ear normal.   Mouth/Throat: Oropharynx is clear and moist.   Eyes: EOM are normal. Pupils are equal, round, and reactive to light.   Neck: No thyromegaly present.   Carotid pulses normal no bruits   Cardiovascular: Normal rate and regular rhythm.   Pulmonary/Chest: Effort normal and breath sounds normal.   Abdominal: Soft. Bowel sounds are normal.   Genitourinary: Rectum normal, prostate normal  "and penis normal.   Genitourinary Comments: No testicular masses inguinal hernia or adenopathy-prostate normal median raphae well-maintained no nodules-guaiac negative   Musculoskeletal: He exhibits no edema.    Bernard had a diabetic foot exam performed today.   During the foot exam he had a monofilament test not performed.  Vascular Status -  His right foot exhibits normal foot vasculature  and no edema. His left foot exhibits normal foot vasculature  and no edema.  Skin Integrity  -  His right foot skin is intact.His left foot skin is intact..   Foot Structure and Biomechanics -  His right foot has no hallux valgus, no pes cavus, no claw toes and no hammer toes present. His left foot has no hallux valgus, no pes cavus, no claw toes and no hammer toes.  Lymphadenopathy:     He has no cervical adenopathy.   Neurological: He is alert and oriented to person, place, and time.   Skin: Skin is warm. Capillary refill takes less than 2 seconds.   Psychiatric: He has a normal mood and affect. His behavior is normal. Judgment and thought content normal.   Nursing note and vitals reviewed.      Vitals:    06/26/19 1341   BP: 136/89   BP Location: Left arm   Patient Position: Sitting   Cuff Size: Adult   Pulse: 89   Temp: 97.9 °F (36.6 °C)   TempSrc: Temporal   SpO2: 98%   Weight: 84 kg (185 lb 3.2 oz)   Height: 175.3 cm (69\")   PainSc: 0-No pain       Patient's Body mass index is 27.35 kg/m². BMI is above normal parameters. Recommendations include: exercise counseling.      Assessment/Plan   Patient Self-Management and Personalized Health Advice  The patient has been provided with information about: fall prevention and preventive services including:   · Colorectal cancer screening, cologuard test ordered, Fall Risk plan of care done, Prostate cancer screening discussed.    Visit Diagnoses:    ICD-10-CM ICD-9-CM   1. Mixed hyperlipidemia E78.2 272.2   2. Type 2 diabetes mellitus with complication, without long-term current use " of insulin (CMS/HCC) E11.8 250.90   3. Fatigue, unspecified type R53.83 780.79   4. Vitamin D deficiency E55.9 268.9   5. Screening for colorectal cancer Z12.11 V76.51    Z12.12    6. Special screening for malignant neoplasm of prostate Z12.5 V76.44   7. Annual physical exam Z00.00 V70.0   8. Shortness of breath R06.02 786.05       Orders Placed This Encounter   Procedures   • XR Chest PA & Lateral     Standing Status:   Future     Standing Expiration Date:   6/26/2020     Order Specific Question:   Reason for Exam:     Answer:   shortness of breath   • TSH   • T4, free   • Cologuard - Stool, Per Rectum     Standing Status:   Future     Standing Expiration Date:   6/26/2020   • Comprehensive Metabolic Panel   • Lipid Panel   • Vitamin D 25 Hydroxy   • PSA Screen   • Hemoglobin A1c   • Microalbumin / Creatinine Urine Ratio - Urine, Clean Catch   • POC Urinalysis Dipstick, Multipro   • CBC & Differential     Order Specific Question:   Manual Differential     Answer:   No       Outpatient Encounter Medications as of 6/26/2019   Medication Sig Dispense Refill   • ACCU-CHEK FASTCLIX LANCETS misc USE TO TEST BLOOD SUGAR TWICE A  each 0   • ACCU-CHEK SMARTVIEW test strip USE TO TEST TWICE A  each 3   • aspirin 81 MG tablet Take 81 mg by mouth Daily.     • atorvastatin (LIPITOR) 40 MG tablet TAKE 1 TABLET BY MOUTH EVERY NIGHT. 90 tablet 2   • B-D ULTRAFINE III SHORT PEN 31G X 8 MM misc USE WITH LANTUS SOLOSTAR AS DIRECTED 100 each 2   • cetirizine (zyrTEC) 10 MG tablet Take 10 mg by mouth Daily.     • cholecalciferol (VITAMIN D3) 400 UNITS tablet Take 400 Units by mouth Daily.     • dorzolamide-timolol (COSOPT) 22.3-6.8 MG/ML ophthalmic solution 1 drop 2 (Two) Times a Day.     • fluticasone (FLONASE) 50 MCG/ACT nasal spray USE 2 SPRAYS IN EACH NOSTRIL EVERY DAY 16 mL 5   • glipiZIDE (GLUCOTROL XL) 10 MG 24 hr tablet TAKE 1 TABLET BY MOUTH TWICE A  tablet 3   • insulin detemir (LEVEMIR FLEXTOUCH) 100  UNIT/ML injection Inject 35 Units under the skin into the appropriate area as directed Every Night. 32 mL 3   • latanoprost (XALATAN) 0.005 % ophthalmic solution 1 drop Every Night.     • lisinopril (PRINIVIL,ZESTRIL) 10 MG tablet Take 1 tablet by mouth Every Morning. Patient needs to reschedule follow up appt for more refills 30 tablet 0   • metFORMIN (GLUCOPHAGE) 500 MG tablet Take 1 tablet by mouth 2 (Two) Times a Day. Patient needs to schedule follow up appt for more refills 60 tablet 0   • Multiple Vitamins-Minerals (MULTIVITAMIN WITH MINERALS) tablet tablet Take 1 tablet by mouth Daily.     • tamsulosin (FLOMAX) 0.4 MG capsule 24 hr capsule TAKE 2 CAPSULES BY MOUTH EVERY EVENING. 180 capsule 3   • [DISCONTINUED] tamsulosin (FLOMAX) 0.4 MG capsule 24 hr capsule Take 2 capsules by mouth Every Evening. 180 capsule 3     No facility-administered encounter medications on file as of 6/26/2019.        Reviewed use of high risk medication in the elderly: yes  Reviewed for potential of harmful drug interactions in the elderly: yes    Follow Up:  Return in 6 months (on 12/26/2019).     An After Visit Summary and PPPS with all of these plans were given to the patient.

## 2019-06-27 DIAGNOSIS — R06.02 SHORTNESS OF BREATH: ICD-10-CM

## 2019-06-27 DIAGNOSIS — E11.8 TYPE 2 DIABETES MELLITUS WITH COMPLICATION, WITHOUT LONG-TERM CURRENT USE OF INSULIN (HCC): Primary | ICD-10-CM

## 2019-06-27 LAB
25(OH)D3+25(OH)D2 SERPL-MCNC: 48.9 NG/ML (ref 30–100)
ALBUMIN SERPL-MCNC: 4 G/DL (ref 3.5–5.2)
ALBUMIN/CREAT UR: 16 MG/G CREAT (ref 0–30)
ALBUMIN/GLOB SERPL: 1.4 G/DL
ALP SERPL-CCNC: 76 U/L (ref 39–117)
ALT SERPL-CCNC: 29 U/L (ref 1–41)
AST SERPL-CCNC: 15 U/L (ref 1–40)
BASOPHILS # BLD AUTO: 0.03 10*3/MM3 (ref 0–0.2)
BASOPHILS NFR BLD AUTO: 0.6 % (ref 0–1.5)
BILIRUB SERPL-MCNC: 0.5 MG/DL (ref 0.2–1.2)
BUN SERPL-MCNC: 17 MG/DL (ref 8–23)
BUN/CREAT SERPL: 13.7 (ref 7–25)
CALCIUM SERPL-MCNC: 9.5 MG/DL (ref 8.6–10.5)
CHLORIDE SERPL-SCNC: 99 MMOL/L (ref 98–107)
CHOLEST SERPL-MCNC: 168 MG/DL (ref 0–200)
CO2 SERPL-SCNC: 29.7 MMOL/L (ref 22–29)
CREAT SERPL-MCNC: 1.24 MG/DL (ref 0.76–1.27)
CREAT UR-MCNC: 84.8 MG/DL
EOSINOPHIL # BLD AUTO: 0.24 10*3/MM3 (ref 0–0.4)
EOSINOPHIL NFR BLD AUTO: 4.5 % (ref 0.3–6.2)
ERYTHROCYTE [DISTWIDTH] IN BLOOD BY AUTOMATED COUNT: 13.7 % (ref 12.3–15.4)
GLOBULIN SER CALC-MCNC: 2.9 GM/DL
GLUCOSE SERPL-MCNC: 221 MG/DL (ref 65–99)
HBA1C MFR BLD: 11 % (ref 4.8–5.6)
HCT VFR BLD AUTO: 41.3 % (ref 37.5–51)
HDLC SERPL-MCNC: 40 MG/DL (ref 40–60)
HGB BLD-MCNC: 12.7 G/DL (ref 13–17.7)
IMM GRANULOCYTES # BLD AUTO: 0 10*3/MM3 (ref 0–0.05)
IMM GRANULOCYTES NFR BLD AUTO: 0 % (ref 0–0.5)
LDLC SERPL CALC-MCNC: 105 MG/DL (ref 0–100)
LYMPHOCYTES # BLD AUTO: 1.38 10*3/MM3 (ref 0.7–3.1)
LYMPHOCYTES NFR BLD AUTO: 26 % (ref 19.6–45.3)
MCH RBC QN AUTO: 29.9 PG (ref 26.6–33)
MCHC RBC AUTO-ENTMCNC: 30.8 G/DL (ref 31.5–35.7)
MCV RBC AUTO: 97.2 FL (ref 79–97)
MICROALBUMIN UR-MCNC: 13.6 UG/ML
MONOCYTES # BLD AUTO: 0.53 10*3/MM3 (ref 0.1–0.9)
MONOCYTES NFR BLD AUTO: 10 % (ref 5–12)
NEUTROPHILS # BLD AUTO: 3.13 10*3/MM3 (ref 1.7–7)
NEUTROPHILS NFR BLD AUTO: 58.9 % (ref 42.7–76)
PLATELET # BLD AUTO: 173 10*3/MM3 (ref 140–450)
POTASSIUM SERPL-SCNC: 4.3 MMOL/L (ref 3.5–5.2)
PROT SERPL-MCNC: 6.9 G/DL (ref 6–8.5)
PSA SERPL-MCNC: 0.72 NG/ML (ref 0–4)
RBC # BLD AUTO: 4.25 10*6/MM3 (ref 4.14–5.8)
SODIUM SERPL-SCNC: 141 MMOL/L (ref 136–145)
T4 FREE SERPL-MCNC: 1.35 NG/DL (ref 0.93–1.7)
TRIGL SERPL-MCNC: 113 MG/DL (ref 0–150)
TSH SERPL DL<=0.005 MIU/L-ACNC: 2.55 MIU/ML (ref 0.27–4.2)
VLDLC SERPL CALC-MCNC: 22.6 MG/DL
WBC # BLD AUTO: 5.31 10*3/MM3 (ref 3.4–10.8)

## 2019-06-27 RX ORDER — GLIPIZIDE 10 MG/1
TABLET, FILM COATED, EXTENDED RELEASE ORAL
Qty: 180 TABLET | Refills: 3 | Status: SHIPPED | OUTPATIENT
Start: 2019-06-27 | End: 2019-07-30

## 2019-06-28 NOTE — TELEPHONE ENCOUNTER
Pt called, stated that he received phone call from Roxanne yesterday advising that he had medication at [facility] for . Pt said he went to [facility] today, and they advised Pt no medication had been requested for Pt. He is requesting this to be resent.

## 2019-07-01 RX ORDER — LISINOPRIL 10 MG/1
10 TABLET ORAL EVERY MORNING
Qty: 30 TABLET | Refills: 0 | Status: SHIPPED | OUTPATIENT
Start: 2019-07-01 | End: 2019-07-12 | Stop reason: SDUPTHER

## 2019-07-12 ENCOUNTER — OFFICE VISIT (OUTPATIENT)
Dept: FAMILY MEDICINE CLINIC | Facility: CLINIC | Age: 69
End: 2019-07-12

## 2019-07-12 VITALS
BODY MASS INDEX: 27.64 KG/M2 | OXYGEN SATURATION: 98 % | HEART RATE: 87 BPM | SYSTOLIC BLOOD PRESSURE: 133 MMHG | WEIGHT: 186.6 LBS | HEIGHT: 69 IN | DIASTOLIC BLOOD PRESSURE: 82 MMHG | TEMPERATURE: 98.2 F

## 2019-07-12 DIAGNOSIS — Z12.12 SCREENING FOR COLORECTAL CANCER: ICD-10-CM

## 2019-07-12 DIAGNOSIS — R19.5 POSITIVE COLORECTAL CANCER SCREENING USING COLOGUARD TEST: Primary | ICD-10-CM

## 2019-07-12 DIAGNOSIS — Z12.11 SCREENING FOR COLORECTAL CANCER: ICD-10-CM

## 2019-07-12 DIAGNOSIS — R53.83 FATIGUE, UNSPECIFIED TYPE: ICD-10-CM

## 2019-07-12 DIAGNOSIS — Z79.4 TYPE 2 DIABETES MELLITUS WITHOUT COMPLICATION, WITH LONG-TERM CURRENT USE OF INSULIN (HCC): ICD-10-CM

## 2019-07-12 DIAGNOSIS — E11.9 TYPE 2 DIABETES MELLITUS WITHOUT COMPLICATION, WITH LONG-TERM CURRENT USE OF INSULIN (HCC): ICD-10-CM

## 2019-07-12 PROCEDURE — 99213 OFFICE O/P EST LOW 20 MIN: CPT | Performed by: FAMILY MEDICINE

## 2019-07-12 RX ORDER — LISINOPRIL 10 MG/1
10 TABLET ORAL EVERY MORNING
Qty: 90 TABLET | Refills: 3 | Status: SHIPPED | OUTPATIENT
Start: 2019-07-12 | End: 2020-07-13

## 2019-07-12 NOTE — PROGRESS NOTES
Subjective   Bernard Matta is a 68 y.o. male.     68-year-old male with insulin-dependent diabetes fatigue hypoglycemic episodes and poor diabetic control      Fatigue   This is a new problem. The current episode started more than 1 month ago. The problem occurs intermittently. The problem has been waxing and waning. Associated symptoms include fatigue. Pertinent negatives include no chest pain. Associated symptoms comments: Hypoglycemic episodes through the night. Exacerbated by: Diabetic therapy. Treatments tried: Medication adjustment.       The following portions of the patient's history were reviewed and updated as appropriate: allergies, current medications, past family history, past medical history, past social history, past surgical history and problem list.    Review of Systems   Constitutional: Positive for fatigue.   Eyes:        History of ischemic optic retinopathy   Cardiovascular: Negative for chest pain.   Endocrine: Negative for polydipsia, polyphagia and polyuria.       Objective   Physical Exam   Constitutional: He is oriented to person, place, and time.   Overweight   Neurological: He is alert and oriented to person, place, and time.   Psychiatric: He has a normal mood and affect. His behavior is normal. Judgment and thought content normal.   Nursing note and vitals reviewed.      Assessment/Plan   Bernard was seen today for diabetes.    Diagnoses and all orders for this visit:    Positive colorectal cancer screening using Cologuard test  -     Ambulatory Referral to General Surgery    Fatigue, unspecified type    Type 2 diabetes mellitus without complication, with long-term current use of insulin (CMS/[facility])  -     Ambulatory Referral to Diabetic Education    Plan diabetic education-reduce glipizide 10-1-1/2 each a.m. increase Levemir to 45 units

## 2019-07-19 ENCOUNTER — TELEPHONE (OUTPATIENT)
Dept: FAMILY MEDICINE CLINIC | Facility: CLINIC | Age: 69
End: 2019-07-19

## 2019-07-19 DIAGNOSIS — E16.1 NOCTURNAL HYPOGLYCEMIA: Primary | ICD-10-CM

## 2019-07-29 ENCOUNTER — OFFICE VISIT (OUTPATIENT)
Dept: CARDIOLOGY | Age: 69
End: 2019-07-29
Payer: MEDICARE

## 2019-07-29 VITALS
WEIGHT: 183 LBS | HEIGHT: 69 IN | DIASTOLIC BLOOD PRESSURE: 76 MMHG | HEART RATE: 79 BPM | SYSTOLIC BLOOD PRESSURE: 124 MMHG | BODY MASS INDEX: 27.11 KG/M2

## 2019-07-29 DIAGNOSIS — E78.5 DYSLIPIDEMIA: ICD-10-CM

## 2019-07-29 DIAGNOSIS — I10 SYSTEMIC PRIMARY ARTERIAL HYPERTENSION: Primary | ICD-10-CM

## 2019-07-29 PROCEDURE — 93000 ELECTROCARDIOGRAM COMPLETE: CPT | Performed by: NURSE PRACTITIONER

## 2019-07-29 PROCEDURE — 99213 OFFICE O/P EST LOW 20 MIN: CPT | Performed by: NURSE PRACTITIONER

## 2019-07-29 RX ORDER — DORZOLAMIDE HYDROCHLORIDE AND TIMOLOL MALEATE 20; 5 MG/ML; MG/ML
1 SOLUTION/ DROPS OPHTHALMIC 2 TIMES DAILY
COMMUNITY

## 2019-07-29 RX ORDER — CETIRIZINE HYDROCHLORIDE 10 MG/1
10 TABLET ORAL DAILY
COMMUNITY

## 2019-07-30 ENCOUNTER — OFFICE VISIT (OUTPATIENT)
Dept: ENDOCRINOLOGY | Facility: CLINIC | Age: 69
End: 2019-07-30

## 2019-07-30 ENCOUNTER — TELEPHONE (OUTPATIENT)
Dept: ENDOCRINOLOGY | Facility: CLINIC | Age: 69
End: 2019-07-30

## 2019-07-30 VITALS
BODY MASS INDEX: 27.59 KG/M2 | HEART RATE: 88 BPM | HEIGHT: 69 IN | SYSTOLIC BLOOD PRESSURE: 128 MMHG | DIASTOLIC BLOOD PRESSURE: 76 MMHG | WEIGHT: 186.3 LBS | OXYGEN SATURATION: 98 %

## 2019-07-30 DIAGNOSIS — E11.69 MIXED DIABETIC HYPERLIPIDEMIA ASSOCIATED WITH TYPE 2 DIABETES MELLITUS (HCC): ICD-10-CM

## 2019-07-30 DIAGNOSIS — Z79.4 TYPE 2 DIABETES MELLITUS WITH HYPERGLYCEMIA, WITH LONG-TERM CURRENT USE OF INSULIN (HCC): Primary | ICD-10-CM

## 2019-07-30 DIAGNOSIS — E11.59 HYPERTENSION ASSOCIATED WITH DIABETES (HCC): ICD-10-CM

## 2019-07-30 DIAGNOSIS — E78.2 MIXED DIABETIC HYPERLIPIDEMIA ASSOCIATED WITH TYPE 2 DIABETES MELLITUS (HCC): ICD-10-CM

## 2019-07-30 DIAGNOSIS — I15.2 HYPERTENSION ASSOCIATED WITH DIABETES (HCC): ICD-10-CM

## 2019-07-30 DIAGNOSIS — E11.65 TYPE 2 DIABETES MELLITUS WITH HYPERGLYCEMIA, WITH LONG-TERM CURRENT USE OF INSULIN (HCC): Primary | ICD-10-CM

## 2019-07-30 LAB — GLUCOSE BLDC GLUCOMTR-MCNC: 360 MG/DL (ref 70–130)

## 2019-07-30 PROCEDURE — 99204 OFFICE O/P NEW MOD 45 MIN: CPT | Performed by: INTERNAL MEDICINE

## 2019-07-30 PROCEDURE — 82962 GLUCOSE BLOOD TEST: CPT | Performed by: INTERNAL MEDICINE

## 2019-07-30 RX ORDER — ATORVASTATIN CALCIUM 40 MG/1
40 TABLET, FILM COATED ORAL NIGHTLY
Qty: 30 TABLET | Refills: 11 | Status: SHIPPED | OUTPATIENT
Start: 2019-07-30 | End: 2020-10-12

## 2019-07-30 RX ORDER — METFORMIN HYDROCHLORIDE 500 MG/1
TABLET, EXTENDED RELEASE ORAL
Qty: 120 TABLET | Refills: 11 | Status: SHIPPED | OUTPATIENT
Start: 2019-07-30 | End: 2020-07-13 | Stop reason: SDUPTHER

## 2019-07-30 NOTE — PROGRESS NOTES
Bernard Matta is a 68 y.o.. male seen by diabetes educator on 07/30/2019 for review of medications.     1. Humalog Dosing - start with just sliding scale    I. Sliding scale: 2 units per 50 mg/dl above 150 sliding scale   II. Patient demonstrated understanding by calculating correct dosage for example blood sugars    2. Stop Levemir - start Tresiba    I. Decrease dose to 28 units daily, same time of day.    3. Metformin   I. 1,000 mg twice a week, take with meals    4. Trulicity    I. Instructed on using a prefilled auto injector, injection placement, and rotation of injection sites - provided hand-out showing injection sites.    II. Reviewed once weekly injection to be given the same day of the week.    III. Dose: 0.75 mg weekly.    IV. Reviewed side effects - if nausea occurs try smaller meals. If vomiting occurs, stop taking medication.    5. Ashley paperwork faxed.     Follow up per Dr. Haywood.  Sena Figueredo, BSN, RN  Diabetes Educator

## 2019-07-30 NOTE — PROGRESS NOTES
" Bernard Matta is a 68 y.o. male who presents for  evaluation of   Chief Complaint   Patient presents with   • Diabetes       Referring provider    Brando Webb MD  [address] [place] [address]    Primary Care Provider    Brando Webb MD    Duration 10 years    Timing - Diabetes is Constant    Quality -  poorly controlled    Severity -  severe    Complications - none    Current symptoms/problems  increase appetite, polydipsia and polyuria     Alleviating Factors: Compliance      Side Effects  none    Current diet  in general, a \"healthy\" diet      Current exercise walking    Current monitoring regimen: home blood tests - checking 4 x daily   Home blood sugar records:     Hypoglycemia nocturnal    Past Medical History:   Diagnosis Date   • Diabetes mellitus (CMS/[facility])    • Erectile disorder due to 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    • Hyperlipidemia    • Hypertension    • Ischemic optic neuropathy of both eyes      Family History   Problem Relation Age of Onset   • No Known Problems Maternal Grandmother    • No Known Problems Maternal Grandfather    • No Known Problems Paternal Grandmother    • No Known Problems Paternal Grandfather    • No Known Problems Mother    • No Known Problems Father      Social History     Tobacco Use   • Smoking status: Former Smoker     Packs/day: 0.50     Years: 10.00     Pack years: 5.00     Types: Cigarettes     Last attempt to quit: 1979     Years since quittin.6   • Smokeless tobacco: Never Used   Substance Use Topics   • Alcohol use: No   • Drug use: No         Current Outpatient Medications:   •  ACCU-CHEK FASTCLIX LANCETS misc, USE TO TEST BLOOD SUGAR TWICE A DAY, Disp: 204 each, Rfl: 0  •  ACCU-CHEK SMARTVIEW test strip, USE TO TEST TWICE A DAY, Disp: 200 each, Rfl: 3  •  aspirin 81 MG tablet, Take 81 mg by mouth Daily., Disp: , Rfl:   •  atorvastatin (LIPITOR) 40 MG tablet, Take 1 tablet by mouth Every Night., Disp: 30 tablet, Rfl: 11  •  " cetirizine (zyrTEC) 10 MG tablet, Take 10 mg by mouth Daily., Disp: , Rfl:   •  cholecalciferol (VITAMIN D3) 400 UNITS tablet, Take 400 Units by mouth Daily., Disp: , Rfl:   •  dorzolamide-timolol (COSOPT) 22.3-6.8 MG/ML ophthalmic solution, 1 drop 2 (Two) Times a Day., Disp: , Rfl:   •  fluticasone (FLONASE) 50 MCG/ACT nasal spray, USE 2 SPRAYS IN EACH NOSTRIL EVERY DAY, Disp: 16 mL, Rfl: 5  •  latanoprost (XALATAN) 0.005 % ophthalmic solution, 1 drop Every Night., Disp: , Rfl:   •  lisinopril (PRINIVIL,ZESTRIL) 10 MG tablet, Take 1 tablet by mouth Every Morning., Disp: 90 tablet, Rfl: 3  •  Multiple Vitamins-Minerals (MULTIVITAMIN WITH MINERALS) tablet tablet, Take 1 tablet by mouth Daily., Disp: , Rfl:   •  tamsulosin (FLOMAX) 0.4 MG capsule 24 hr capsule, TAKE 2 CAPSULES BY MOUTH EVERY EVENING., Disp: 180 capsule, Rfl: 3  •  Dulaglutide 0.75 MG/0.5ML solution pen-injector, Inject 0.75 mg under the skin into the appropriate area as directed 1 (One) Time Per Week., Disp: 4 pen, Rfl: 11  •  insulin degludec (TRESIBA FLEXTOUCH) 100 UNIT/ML solution pen-injector injection, Inject 28 Units under the skin into the appropriate area as directed Every Night., Disp: 3 pen, Rfl: 11  •  Insulin Lispro (HUMALOG KWIKPEN) 100 UNIT/ML solution pen-injector, Up to 20 units with meals, Disp: 6 pen, Rfl: 11  •  Insulin Pen Needle (B-D UF III MINI PEN NEEDLES) 31G X 5 MM misc, Use 4 times daily  , ICD10 code is E11.9, Disp: 120 each, Rfl: 11  •  metFORMIN ER (GLUCOPHAGE XR) 500 MG 24 hr tablet, 2 tabs twice daily with meals , generic ok, Disp: 120 tablet, Rfl: 11    Review of Systems    Review of Systems   Constitutional: Positive for fatigue and unexpected weight change. Negative for activity change, appetite change, chills, diaphoresis and fever.   HENT: Negative for congestion, dental problem, drooling, ear discharge, ear pain, facial swelling, mouth sores, postnasal drip, rhinorrhea, sinus pressure, sore throat, tinnitus,  "trouble swallowing and voice change.    Eyes: Negative for photophobia, pain, discharge, redness, itching and visual disturbance.   Respiratory: Negative for apnea, cough, choking, chest tightness, shortness of breath, wheezing and stridor.    Cardiovascular: Negative for chest pain, palpitations and leg swelling.   Gastrointestinal: Negative for abdominal distention, abdominal pain, constipation, diarrhea, nausea and vomiting.   Endocrine: Negative for cold intolerance, heat intolerance, polydipsia, polyphagia and polyuria.   Genitourinary: Negative for decreased urine volume, difficulty urinating, dysuria, flank pain, frequency, hematuria and urgency.   Musculoskeletal: Positive for arthralgias and myalgias. Negative for back pain, gait problem, joint swelling, neck pain and neck stiffness.   Skin: Negative for color change, pallor, rash and wound.   Allergic/Immunologic: Negative for immunocompromised state.   Neurological: Positive for weakness. Negative for dizziness, tremors, seizures, syncope, facial asymmetry, speech difficulty, light-headedness, numbness and headaches.   Hematological: Negative for adenopathy.   Psychiatric/Behavioral: Negative for agitation, behavioral problems, confusion, decreased concentration, dysphoric mood, hallucinations, self-injury, sleep disturbance and suicidal ideas. The patient is not nervous/anxious and is not hyperactive.         Objective:   /76   Pulse 88   Ht 175.3 cm (69\")   Wt 84.5 kg (186 lb 4.8 oz)   SpO2 98%   BMI 27.51 kg/m²     Physical Exam   Constitutional: He is oriented to person, place, and time. He appears well-developed and well-nourished. He is cooperative.   HENT:   Head: Normocephalic and atraumatic.   Right Ear: External ear normal.   Left Ear: External ear normal.   Nose: Nose normal.   Mouth/Throat: Oropharynx is clear and moist. No oropharyngeal exudate.   Eyes: Conjunctivae and EOM are normal. Pupils are equal, round, and reactive to " light. No scleral icterus. Right eye exhibits normal extraocular motion. Left eye exhibits normal extraocular motion.   Neck: Neck supple. No JVD present. No muscular tenderness present. No tracheal deviation, no edema and no erythema present. No thyromegaly present.   Cardiovascular: Normal rate, regular rhythm, normal heart sounds and intact distal pulses. Exam reveals no gallop and no friction rub.   No murmur heard.  Pulmonary/Chest: Effort normal and breath sounds normal. No stridor. No respiratory distress. He has no decreased breath sounds. He has no wheezes. He has no rhonchi. He has no rales. He exhibits no tenderness.   Abdominal: Soft. Bowel sounds are normal. He exhibits no distension and no mass. There is no hepatomegaly. There is no tenderness. There is no rebound and no guarding. No hernia.   Musculoskeletal: Normal range of motion. He exhibits no edema, tenderness or deformity.   Lymphadenopathy:     He has no cervical adenopathy.   Neurological: He is alert and oriented to person, place, and time. He has normal reflexes. No cranial nerve deficit. He exhibits normal muscle tone. Coordination normal.   Skin: Skin is warm. No rash noted. No erythema. No pallor.   Psychiatric: He has a normal mood and affect. His behavior is normal. Judgment and thought content normal.   Nursing note and vitals reviewed.      Lab Review    Results for orders placed or performed in visit on 07/30/19   POC Glucose   Result Value Ref Range    Glucose 360 (A) 70 - 130 mg/dL         Assessment/Plan       ICD-10-CM ICD-9-CM   1. Type 2 diabetes mellitus with hyperglycemia, with long-term current use of insulin (CMS/[facility]) E11.65 250.00    Z79.4 790.29     V58.67   2. Hypertension associated with diabetes (CMS/[facility]) E11.59 250.80    I10 401.9   3. Mixed diabetic hyperlipidemia associated with type 2 diabetes mellitus (CMS/[facility]) E11.69 250.80    E78.2 272.2         I reviewed and summarized records from Brando Webb MD  from current year  and I reviewed / ordered labs.   From review of records :    Pt has type 2 diabetes with hyperglycemia    Glycemic Management:   Lab Results   Component Value Date    HGBA1C 11.00 (H) 06/26/2019    HGBA1C 10.40 06/21/2018    HGBA1C 8.20 06/19/2017     Lab Results   Component Value Date    BUN 17 06/26/2019    CREATININE 1.24 06/26/2019    EGFRIFNONA 58 (L) 06/26/2019    EGFRIFAFRI 70 06/26/2019    BCR 13.7 06/26/2019    K 4.3 06/26/2019    CO2 29.7 (H) 06/26/2019    CALCIUM 9.5 06/26/2019    PROTENTOTREF 6.9 06/26/2019    ALBUMIN 4.00 06/26/2019    LABIL2 1.4 06/26/2019    AST 15 06/26/2019    ALT 29 06/26/2019     Lab Results   Component Value Date    WBC 5.31 06/26/2019    HGB 12.7 (L) 06/26/2019    HCT 41.3 06/26/2019    MCV 97.2 (H) 06/26/2019     06/26/2019     levemir doing 40 units, decrease to 28 and change to tresiba    Metformin 500 mg po bid - increase to 1000 mg po bid    Glipizide - stop     Add Trulicity 0.75 mg weekly     Sliding scale before meals,     Next appt [id number] i    Approve for Insulin Pump and or Continuous Glucose Sensor     #1  Patient has diabetes mellitus, insulin-dependent.    #2 He performs blood glucose testing at least times daily and blood glucose log was brought to office with variability from .    #3  He is requiring  Basal insulin  and Prandial Insulin for a total of at least  4 injections per day and has been doing this for at least 6 months.     #4 Patient tests blood sugars at least 4 times daily and makes frequent self-adjustments and patient is injecting insulin at least 4 times daily. He has been doing this for more than 6 months . He tests frequently due to hypoglycemia and hyperglycemia.     #5 I have personally seen patient within the past 6 months    #6 We plan on seeing her every 2-3 months for continuous adjustment of her diabetes regimen     #7 patient has hypoglycemia with episodes of unawareness.    #8 patient has day-to-day variation  in her mealtime which confounds the degree of insulin dosing with multiple daily injections.    #9 patient has completed diabetes education program with us.    #10 He has demonstrated the ability to self monitor her glucose.        #11 Patient is motivated in improving  diabetes control         Lipid Management  No results found for: CHOL  Lab Results   Component Value Date    TRIG 113 06/26/2019    TRIG 92 06/21/2018    TRIG 92 06/19/2017     Lab Results   Component Value Date    HDL 40 06/26/2019    HDL 37 (L) 06/21/2018    HDL 41 06/19/2017     No components found for: LDLCALC  Lab Results   Component Value Date     (H) 06/26/2019     (H) 06/21/2018     (H) 06/19/2017     No results found for: LDLDIRECT    On lipitor 20 mg qhs - change to 40 mg qhs         Blood Pressure Management:    Vitals:    07/30/19 0929   BP: 128/76   Pulse: 88   SpO2: 98%     Lab Results   Component Value Date    CALCIUM 9.5 06/26/2019     06/26/2019    K 4.3 06/26/2019    CO2 29.7 (H) 06/26/2019    CL 99 06/26/2019    BUN 17 06/26/2019    CREATININE 1.24 06/26/2019    EGFRIFAFRI 70 06/26/2019    EGFRIFNONA 58 (L) 06/26/2019    BCR 13.7 06/26/2019               Microvascular Complication Monitoring:      Eye Exam Evaluation    -----------    Last Microalbumin-Proteinuria Assessment    Lab Results   Component Value Date    MALBCRERATIO 16.0 06/26/2019       No results found for: UTPCR    -----------      Neuropathy      Immunizations:          Preventive Care:        Weight Related:   Wt Readings from Last 3 Encounters:   07/30/19 84.5 kg (186 lb 4.8 oz)   07/12/19 84.6 kg (186 lb 9.6 oz)   06/26/19 84 kg (185 lb 3.2 oz)     Body mass index is 27.51 kg/m².        Diet interventions: moderate (500 kCal/d) deficit diet.      Bone Health    Lab Results   Component Value Date    CALCIUM 9.5 06/26/2019    [id number] 48.9 06/26/2019       Thyroid Health    Lab Results   Component Value Date    TSH 2.550 06/26/2019    TSH  1.570 06/21/2018    TSH 1.630 06/19/2017       Lab Results   Component Value Date    FREET4 1.35 06/26/2019    FREET4 1.23 06/21/2018    FREET4 1.16 06/19/2017       No results found for: Y6NYEEG    Thyroid Antibodies  TPO AB  No results found for: THYROIDAB    Thyroid Stimulating Immunoglobulin    No results found for: LABTHYR              Other Diabetes Related Aspects       No results found for: [id number]           Orders Placed This Encounter   Procedures   • POC Glucose         A copy of my note was sent to Brando Webb MD    Please see my above opinion and suggestions.

## 2019-08-12 ENCOUNTER — TELEPHONE (OUTPATIENT)
Dept: ENDOCRINOLOGY | Facility: CLINIC | Age: 69
End: 2019-08-12

## 2019-08-12 NOTE — TELEPHONE ENCOUNTER
Patient stated he will have a colonoscopy in the morning. Patient stated he normally takes 2 metformin in the morning and 2 in the evening. Instructed patient to hold the metformin in the morning and he can take the 2 in the evening if he is eating. Instructed patient to only take 25 units of Tresiba tonight then he can go back to the normal 28 units tomorrow night. Patient verbalized understanding.

## 2019-08-20 ENCOUNTER — TELEPHONE (OUTPATIENT)
Dept: ENDOCRINOLOGY | Facility: CLINIC | Age: 69
End: 2019-08-20

## 2019-08-20 NOTE — TELEPHONE ENCOUNTER
Stop Trulicity. For the next week take 20 units of Tresiba due to hypoglycemia. Then next week when Trulicity is out of system go back to Tresiba 28units.   Next week when you start back taking Tresiba 28 units also start meal time insulin of 6 units plus sliding scale 2:50 before meals. Patient verbalized understanding.

## 2019-08-27 ENCOUNTER — OFFICE VISIT (OUTPATIENT)
Dept: ENDOCRINOLOGY | Facility: CLINIC | Age: 69
End: 2019-08-27

## 2019-08-27 VITALS
BODY MASS INDEX: 27.8 KG/M2 | HEIGHT: 69 IN | WEIGHT: 187.7 LBS | DIASTOLIC BLOOD PRESSURE: 70 MMHG | SYSTOLIC BLOOD PRESSURE: 110 MMHG | HEART RATE: 89 BPM

## 2019-08-27 DIAGNOSIS — E11.8 TYPE 2 DIABETES MELLITUS WITH COMPLICATION, WITH LONG-TERM CURRENT USE OF INSULIN (HCC): Primary | ICD-10-CM

## 2019-08-27 DIAGNOSIS — I10 HYPERTENSION, UNSPECIFIED TYPE: ICD-10-CM

## 2019-08-27 DIAGNOSIS — Z79.4 TYPE 2 DIABETES MELLITUS WITH COMPLICATION, WITH LONG-TERM CURRENT USE OF INSULIN (HCC): Primary | ICD-10-CM

## 2019-08-27 LAB — HBA1C MFR BLD: 8.6 %

## 2019-08-27 PROCEDURE — 99214 OFFICE O/P EST MOD 30 MIN: CPT | Performed by: NURSE PRACTITIONER

## 2019-08-27 PROCEDURE — 83036 HEMOGLOBIN GLYCOSYLATED A1C: CPT | Performed by: NURSE PRACTITIONER

## 2019-08-27 NOTE — PROGRESS NOTES
" Bernard Matta is a 68 y.o. male who presents for  evaluation of his type 2 diabetes. Patient states that his blood sugar has been running low in the mornings.       Chief Complaint   Patient presents with   • Diabetes       Referring provider    No referring provider defined for this encounter.    Primary Care Provider    Brando Webb MD    Duration 10 years    Timing - Diabetes is Constant    Quality -  poorly controlled    Severity -  severe    Complications - none    Current symptoms/problems  increase appetite, polydipsia and polyuria     Alleviating Factors: Compliance      Side Effects  none    Current diet  in general, a \"healthy\" diet      Current exercise walking    Current monitoring regimen: home blood tests - checking 4 x daily     Home blood sugar records:     Hypoglycemia nocturnal    Past Medical History:   Diagnosis Date   • Diabetes mellitus (CMS/[facility])    • Erectile disorder due to 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    • Hyperlipidemia    • Hypertension    • Ischemic optic neuropathy of both eyes      Family History   Problem Relation Age of Onset   • No Known Problems Maternal Grandmother    • No Known Problems Maternal Grandfather    • No Known Problems Paternal Grandmother    • No Known Problems Paternal Grandfather    • No Known Problems Mother    • No Known Problems Father      Social History     Tobacco Use   • Smoking status: Former Smoker     Packs/day: 0.50     Years: 10.00     Pack years: 5.00     Types: Cigarettes     Last attempt to quit: 1979     Years since quittin.6   • Smokeless tobacco: Never Used   Substance Use Topics   • Alcohol use: No   • Drug use: No         Current Outpatient Medications:   •  ACCU-CHEK FASTCLIX LANCETS misc, USE TO TEST BLOOD SUGAR TWICE A DAY, Disp: 204 each, Rfl: 0  •  ACCU-CHEK SMARTVIEW test strip, USE TO TEST TWICE A DAY, Disp: 200 each, Rfl: 3  •  aspirin 81 MG tablet, Take 81 mg by mouth Daily., Disp: , Rfl:   •  atorvastatin (LIPITOR) " 40 MG tablet, Take 1 tablet by mouth Every Night., Disp: 30 tablet, Rfl: 11  •  cetirizine (zyrTEC) 10 MG tablet, Take 10 mg by mouth Daily., Disp: , Rfl:   •  cholecalciferol (VITAMIN D3) 400 UNITS tablet, Take 400 Units by mouth Daily., Disp: , Rfl:   •  dorzolamide-timolol (COSOPT) 22.3-6.8 MG/ML ophthalmic solution, 1 drop 2 (Two) Times a Day., Disp: , Rfl:   •  Dulaglutide 0.75 MG/0.5ML solution pen-injector, Inject 0.75 mg under the skin into the appropriate area as directed 1 (One) Time Per Week., Disp: 4 pen, Rfl: 11  •  fluticasone (FLONASE) 50 MCG/ACT nasal spray, USE 2 SPRAYS IN EACH NOSTRIL EVERY DAY, Disp: 16 mL, Rfl: 5  •  insulin degludec (TRESIBA FLEXTOUCH) 100 UNIT/ML solution pen-injector injection, Inject 28 Units under the skin into the appropriate area as directed Every Night., Disp: 3 pen, Rfl: 11  •  Insulin Lispro (HUMALOG KWIKPEN) 100 UNIT/ML solution pen-injector, Up to 20 units with meals, Disp: 6 pen, Rfl: 11  •  Insulin Pen Needle (B-D UF III MINI PEN NEEDLES) 31G X 5 MM misc, Use 4 times daily  , ICD10 code is E11.9, Disp: 120 each, Rfl: 11  •  latanoprost (XALATAN) 0.005 % ophthalmic solution, 1 drop Every Night., Disp: , Rfl:   •  lisinopril (PRINIVIL,ZESTRIL) 10 MG tablet, Take 1 tablet by mouth Every Morning., Disp: 90 tablet, Rfl: 3  •  metFORMIN ER (GLUCOPHAGE XR) 500 MG 24 hr tablet, 2 tabs twice daily with meals , generic ok, Disp: 120 tablet, Rfl: 11  •  Multiple Vitamins-Minerals (MULTIVITAMIN WITH MINERALS) tablet tablet, Take 1 tablet by mouth Daily., Disp: , Rfl:   •  tamsulosin (FLOMAX) 0.4 MG capsule 24 hr capsule, TAKE 2 CAPSULES BY MOUTH EVERY EVENING., Disp: 180 capsule, Rfl: 3    Review of Systems    Review of Systems   Constitutional: Negative for activity change, appetite change, chills, diaphoresis, fatigue, fever and unexpected weight change.   HENT: Negative for congestion, dental problem, drooling, ear discharge, ear pain, facial swelling, mouth sores, postnasal  "drip, rhinorrhea, sinus pressure, sore throat, tinnitus, trouble swallowing and voice change.    Eyes: Negative for photophobia, pain, discharge, redness, itching and visual disturbance.   Respiratory: Negative for apnea, cough, choking, chest tightness, shortness of breath, wheezing and stridor.    Cardiovascular: Negative for chest pain, palpitations and leg swelling.   Gastrointestinal: Negative for abdominal distention, abdominal pain, constipation, diarrhea, nausea and vomiting.   Endocrine: Negative for cold intolerance, heat intolerance, polydipsia, polyphagia and polyuria.   Genitourinary: Negative for decreased urine volume, difficulty urinating, dysuria, flank pain, frequency, hematuria and urgency.   Musculoskeletal: Negative for arthralgias, back pain, gait problem, joint swelling, myalgias, neck pain and neck stiffness.   Skin: Negative for color change, pallor, rash and wound.   Allergic/Immunologic: Negative for immunocompromised state.   Neurological: Negative for dizziness, tremors, seizures, syncope, facial asymmetry, speech difficulty, weakness, light-headedness, numbness and headaches.   Hematological: Negative for adenopathy.   Psychiatric/Behavioral: Negative for agitation, behavioral problems, confusion, decreased concentration, dysphoric mood, hallucinations, self-injury, sleep disturbance and suicidal ideas. The patient is not nervous/anxious and is not hyperactive.         Objective:   /70 (BP Location: Right arm, Patient Position: Sitting, Cuff Size: Adult)   Pulse 89   Ht 175.3 cm (69.02\")   Wt 85.1 kg (187 lb 11.2 oz)   BMI 27.71 kg/m²     Physical Exam   Constitutional: He is oriented to person, place, and time. He appears well-developed and well-nourished. He is cooperative.   HENT:   Head: Normocephalic and atraumatic.   Right Ear: External ear normal.   Left Ear: External ear normal.   Nose: Nose normal.   Mouth/Throat: Oropharynx is clear and moist. No oropharyngeal " exudate.   Eyes: Conjunctivae and EOM are normal. Pupils are equal, round, and reactive to light. No scleral icterus. Right eye exhibits normal extraocular motion. Left eye exhibits normal extraocular motion.   Neck: Neck supple. No JVD present. No muscular tenderness present. No tracheal deviation, no edema and no erythema present. No thyromegaly present.   Cardiovascular: Normal rate, regular rhythm, normal heart sounds and intact distal pulses. Exam reveals no gallop and no friction rub.   No murmur heard.  Pulmonary/Chest: Effort normal and breath sounds normal. No stridor. No respiratory distress. He has no decreased breath sounds. He has no wheezes. He has no rhonchi. He has no rales. He exhibits no tenderness.   Abdominal: Soft. Bowel sounds are normal. He exhibits no distension and no mass. There is no hepatomegaly. There is no tenderness. There is no rebound and no guarding. No hernia.   Musculoskeletal: Normal range of motion. He exhibits no edema, tenderness or deformity.   Lymphadenopathy:     He has no cervical adenopathy.   Neurological: He is alert and oriented to person, place, and time. He has normal reflexes. No cranial nerve deficit. He exhibits normal muscle tone. Coordination normal.   Skin: Skin is warm. No rash noted. No erythema. No pallor.   Psychiatric: He has a normal mood and affect. His behavior is normal. Judgment and thought content normal.   Nursing note and vitals reviewed.      Lab Review    Results for orders placed or performed in visit on 08/27/19   POC Glycosylated Hemoglobin (Hb A1C)   Result Value Ref Range    Hemoglobin A1C 8.6 %         Assessment/Plan       ICD-10-CM ICD-9-CM   1. Type 2 diabetes mellitus with complication, with long-term current use of insulin (CMS/[facility]) E11.8 250.90    Z79.4 V58.67   2. Hypertension, unspecified type I10 401.9          Most recent labs reviewed    1. Type 2 diabetes     Glycemic Management:   Lab Results   Component Value Date    HGBA1C  8.6 08/27/2019    HGBA1C 11.00 (H) 06/26/2019    HGBA1C 10.40 06/21/2018     Lab Results   Component Value Date    BUN 17 06/26/2019    CREATININE 1.24 06/26/2019    EGFRIFNONA 58 (L) 06/26/2019    EGFRIFAFRI 70 06/26/2019    BCR 13.7 06/26/2019    K 4.3 06/26/2019    CO2 29.7 (H) 06/26/2019    CALCIUM 9.5 06/26/2019    PROTENTOTREF 6.9 06/26/2019    ALBUMIN 4.00 06/26/2019    LABIL2 1.4 06/26/2019    AST 15 06/26/2019    ALT 29 06/26/2019     Lab Results   Component Value Date    WBC 5.31 06/26/2019    HGB 12.7 (L) 06/26/2019    HCT 41.3 06/26/2019    MCV 97.2 (H) 06/26/2019     06/26/2019      Previously on Glipizide - stopped      levemir doing 40 units, decrease to 28 and change to tresiba--decreased to 20 - decrease to 17 if still waking up low decrease to 15     Metformin 1000 mg po bid    Trulicity 0.75 mg weekly---stopped a week ago ( 8/18/2019) due to GI side effects     Sliding scale before meals     Set dose of 6 units with each meal     Average blood sugars     Results for orders placed or performed in visit on 08/27/19   POC Glycosylated Hemoglobin (Hb A1C)   Result Value Ref Range    Hemoglobin A1C 8.6 %         Next appt sglt2 i    Lipid Management  Most recent labs reviewed     Lab Results   Component Value Date    TRIG 113 06/26/2019    TRIG 92 06/21/2018    TRIG 92 06/19/2017     Lab Results   Component Value Date    HDL 40 06/26/2019    HDL 37 (L) 06/21/2018    HDL 41 06/19/2017     No components found for: LDLCALC  Lab Results   Component Value Date     (H) 06/26/2019     (H) 06/21/2018     (H) 06/19/2017     No results found for: LDLDIRECT    On lipitor 20 mg qhs - change to 40 mg qhs     Continue on medication     2. Hypertension     Blood Pressure Management:    Vitals:    08/27/19 0958   BP: 110/70   Pulse: 89     Lab Results   Component Value Date    CALCIUM 9.5 06/26/2019     06/26/2019    K 4.3 06/26/2019    CO2 29.7 (H) 06/26/2019    CL 99  06/26/2019    BUN 17 06/26/2019    CREATININE 1.24 06/26/2019    EGFRIFAFRI 70 06/26/2019    EGFRIFNONA 58 (L) 06/26/2019    BCR 13.7 06/26/2019     Lisinopril 10mg     Continue on medication     Microvascular Complication Monitoring:      Eye Exam Evaluation    -----------    Last Microalbumin-Proteinuria Assessment    Lab Results   Component Value Date    MALBCRERATIO 16.0 06/26/2019       No results found for: UTPCR    -----------      Neuropathy      Immunizations:          Preventive Care:        Weight Related:   Wt Readings from Last 3 Encounters:   08/27/19 85.1 kg (187 lb 11.2 oz)   07/30/19 84.5 kg (186 lb 4.8 oz)   07/12/19 84.6 kg (186 lb 9.6 oz)     Body mass index is 27.71 kg/m².        Diet interventions: moderate (500 kCal/d) deficit diet.      Bone Health    Lab Results   Component Value Date    CALCIUM 9.5 06/26/2019    TYGN65OY 48.9 06/26/2019       Thyroid Health    Lab Results   Component Value Date    TSH 2.550 06/26/2019    TSH 1.570 06/21/2018    TSH 1.630 06/19/2017       Lab Results   Component Value Date    FREET4 1.35 06/26/2019    FREET4 1.23 06/21/2018    FREET4 1.16 06/19/2017         Other Diabetes Related Aspects         Orders Placed This Encounter   Procedures   • POC Glycosylated Hemoglobin (Hb A1C)     Return in about 3 months (around 11/27/2019), or if symptoms worsen or fail to improve, for Recheck.      A copy of my note was sent to Brando Webb MD    Please see my above opinion and suggestions.         This document has been electronically signed by ALONSO Alfaro on August 27, 2019 10:52 AM

## 2019-09-09 ENCOUNTER — FLU SHOT (OUTPATIENT)
Dept: FAMILY MEDICINE CLINIC | Facility: CLINIC | Age: 69
End: 2019-09-09

## 2019-09-09 DIAGNOSIS — Z23 NEED FOR INFLUENZA VACCINATION: Primary | ICD-10-CM

## 2019-09-09 PROCEDURE — G0008 ADMIN INFLUENZA VIRUS VAC: HCPCS | Performed by: FAMILY MEDICINE

## 2019-09-09 PROCEDURE — 90653 IIV ADJUVANT VACCINE IM: CPT | Performed by: FAMILY MEDICINE

## 2019-09-12 PROBLEM — Z86.010 HX OF ADENOMATOUS COLONIC POLYPS: Status: ACTIVE | Noted: 2019-09-12

## 2019-09-13 DIAGNOSIS — Z79.4 TYPE 2 DIABETES MELLITUS WITHOUT COMPLICATION, WITH LONG-TERM CURRENT USE OF INSULIN (HCC): ICD-10-CM

## 2019-09-13 DIAGNOSIS — E11.9 TYPE 2 DIABETES MELLITUS WITHOUT COMPLICATION, WITH LONG-TERM CURRENT USE OF INSULIN (HCC): ICD-10-CM

## 2019-09-13 RX ORDER — LANCETS
EACH MISCELLANEOUS
Qty: 180 EACH | Refills: 3 | Status: SHIPPED | OUTPATIENT
Start: 2019-09-13 | End: 2020-09-14 | Stop reason: SDUPTHER

## 2019-11-27 ENCOUNTER — OFFICE VISIT (OUTPATIENT)
Dept: ENDOCRINOLOGY | Facility: CLINIC | Age: 69
End: 2019-11-27

## 2019-11-27 VITALS
HEIGHT: 69 IN | SYSTOLIC BLOOD PRESSURE: 110 MMHG | OXYGEN SATURATION: 98 % | BODY MASS INDEX: 27.7 KG/M2 | WEIGHT: 187 LBS | HEART RATE: 94 BPM | DIASTOLIC BLOOD PRESSURE: 68 MMHG

## 2019-11-27 DIAGNOSIS — E11.649 TYPE 2 DIABETES MELLITUS WITH HYPOGLYCEMIA WITHOUT COMA, WITH LONG-TERM CURRENT USE OF INSULIN (HCC): Primary | ICD-10-CM

## 2019-11-27 DIAGNOSIS — Z79.4 TYPE 2 DIABETES MELLITUS WITH HYPOGLYCEMIA WITHOUT COMA, WITH LONG-TERM CURRENT USE OF INSULIN (HCC): Primary | ICD-10-CM

## 2019-11-27 DIAGNOSIS — E55.9 VITAMIN D DEFICIENCY: ICD-10-CM

## 2019-11-27 PROCEDURE — 99214 OFFICE O/P EST MOD 30 MIN: CPT | Performed by: NURSE PRACTITIONER

## 2019-11-27 NOTE — PROGRESS NOTES
" Bernard Matta is a 69 y.o. male who presents for  evaluation of his type 2 diabetes.     Chief Complaint   Patient presents with   • Diabetes       Referring provider    No referring provider defined for this encounter.    Primary Care Provider    Brando Webb MD    Duration 10 years    Timing - Diabetes is Constant    Quality -  poorly controlled, improving     Severity -  severe    Complications - none    Current symptoms/problems  increase appetite, polydipsia and polyuria     Alleviating Factors: Compliance      Side Effects  none    Current diet  in general, a \"healthy\" diet      Current exercise walking    Current monitoring regimen: home blood tests - checking 4 x daily     Home blood sugar records:     Hypoglycemia nocturnal    Past Medical History:   Diagnosis Date   • Diabetes mellitus (CMS/[facility])    • Erectile disorder due to 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    • Hyperlipidemia    • Hypertension    • Ischemic optic neuropathy of both eyes      Family History   Problem Relation Age of Onset   • No Known Problems Maternal Grandmother    • No Known Problems Maternal Grandfather    • No Known Problems Paternal Grandmother    • No Known Problems Paternal Grandfather    • No Known Problems Mother    • No Known Problems Father      Social History     Tobacco Use   • Smoking status: Former Smoker     Packs/day: 0.50     Years: 10.00     Pack years: 5.00     Types: Cigarettes     Last attempt to quit: 1979     Years since quittin.9   • Smokeless tobacco: Never Used   Substance Use Topics   • Alcohol use: No   • Drug use: No         Current Outpatient Medications:   •  ACCU-CHEK FASTCLIX LANCETS misc, USE TO TEST BLOOD SUGAR TWICE A DAY, Disp: 180 each, Rfl: 3  •  ACCU-CHEK SMARTVIEW test strip, USE TO TEST TWICE A DAY, Disp: 200 each, Rfl: 3  •  aspirin 81 MG tablet, Take 81 mg by mouth Daily., Disp: , Rfl:   •  atorvastatin (LIPITOR) 40 MG tablet, Take 1 tablet by mouth Every Night., Disp: 30 " tablet, Rfl: 11  •  cetirizine (zyrTEC) 10 MG tablet, Take 10 mg by mouth Daily., Disp: , Rfl:   •  cholecalciferol (VITAMIN D3) 400 UNITS tablet, Take 400 Units by mouth Daily., Disp: , Rfl:   •  dorzolamide-timolol (COSOPT) 22.3-6.8 MG/ML ophthalmic solution, 1 drop 2 (Two) Times a Day., Disp: , Rfl:   •  Dulaglutide 0.75 MG/0.5ML solution pen-injector, Inject 0.75 mg under the skin into the appropriate area as directed 1 (One) Time Per Week., Disp: 4 pen, Rfl: 11  •  fluticasone (FLONASE) 50 MCG/ACT nasal spray, USE 2 SPRAYS IN EACH NOSTRIL EVERY DAY, Disp: 16 mL, Rfl: 5  •  insulin degludec (TRESIBA FLEXTOUCH) 100 UNIT/ML solution pen-injector injection, Inject 28 Units under the skin into the appropriate area as directed Every Night., Disp: 3 pen, Rfl: 11  •  Insulin Lispro (HUMALOG KWIKPEN) 100 UNIT/ML solution pen-injector, Up to 20 units with meals, Disp: 6 pen, Rfl: 11  •  Insulin Pen Needle (B-D UF III MINI PEN NEEDLES) 31G X 5 MM misc, Use 4 times daily  , ICD10 code is E11.9, Disp: 120 each, Rfl: 11  •  latanoprost (XALATAN) 0.005 % ophthalmic solution, 1 drop Every Night., Disp: , Rfl:   •  lisinopril (PRINIVIL,ZESTRIL) 10 MG tablet, Take 1 tablet by mouth Every Morning., Disp: 90 tablet, Rfl: 3  •  metFORMIN ER (GLUCOPHAGE XR) 500 MG 24 hr tablet, 2 tabs twice daily with meals , generic ok, Disp: 120 tablet, Rfl: 11  •  Multiple Vitamins-Minerals (MULTIVITAMIN WITH MINERALS) tablet tablet, Take 1 tablet by mouth Daily., Disp: , Rfl:   •  tamsulosin (FLOMAX) 0.4 MG capsule 24 hr capsule, TAKE 2 CAPSULES BY MOUTH EVERY EVENING., Disp: 180 capsule, Rfl: 3    Review of Systems    Review of Systems   Constitutional: Negative for activity change, appetite change, chills, diaphoresis, fatigue, fever and unexpected weight change.   HENT: Negative for congestion, dental problem, drooling, ear discharge, ear pain, facial swelling, mouth sores, postnasal drip, rhinorrhea, sinus pressure, sore throat, tinnitus,  "trouble swallowing and voice change.    Eyes: Negative for photophobia, pain, discharge, redness, itching and visual disturbance.   Respiratory: Negative for apnea, cough, choking, chest tightness, shortness of breath, wheezing and stridor.    Cardiovascular: Negative for chest pain, palpitations and leg swelling.   Gastrointestinal: Negative for abdominal distention, abdominal pain, constipation, diarrhea, nausea and vomiting.   Endocrine: Negative for cold intolerance, heat intolerance, polydipsia, polyphagia and polyuria.   Genitourinary: Negative for decreased urine volume, difficulty urinating, dysuria, flank pain, frequency, hematuria and urgency.   Musculoskeletal: Negative for arthralgias, back pain, gait problem, joint swelling, myalgias, neck pain and neck stiffness.   Skin: Negative for color change, pallor, rash and wound.   Allergic/Immunologic: Negative for immunocompromised state.   Neurological: Negative for dizziness, tremors, seizures, syncope, facial asymmetry, speech difficulty, weakness, light-headedness, numbness and headaches.   Hematological: Negative for adenopathy.   Psychiatric/Behavioral: Negative for agitation, behavioral problems, confusion, decreased concentration, dysphoric mood, hallucinations, self-injury, sleep disturbance and suicidal ideas. The patient is not nervous/anxious and is not hyperactive.         Objective:   /68   Pulse 94   Ht 175.3 cm (69\")   Wt 84.8 kg (187 lb)   SpO2 98%   BMI 27.62 kg/m²     Physical Exam   Constitutional: He is oriented to person, place, and time. He appears well-developed and well-nourished. He is cooperative.   HENT:   Head: Normocephalic and atraumatic.   Right Ear: External ear normal.   Left Ear: External ear normal.   Nose: Nose normal.   Mouth/Throat: Oropharynx is clear and moist. No oropharyngeal exudate.   Eyes: Conjunctivae and EOM are normal. Pupils are equal, round, and reactive to light. No scleral icterus. Right eye " exhibits normal extraocular motion. Left eye exhibits normal extraocular motion.   Neck: Neck supple. No JVD present. No muscular tenderness present. No tracheal deviation, no edema and no erythema present. No thyromegaly present.   Cardiovascular: Normal rate, regular rhythm, normal heart sounds and intact distal pulses. Exam reveals no gallop and no friction rub.   No murmur heard.  Pulmonary/Chest: Effort normal and breath sounds normal. No stridor. No respiratory distress. He has no decreased breath sounds. He has no wheezes. He has no rhonchi. He has no rales. He exhibits no tenderness.   Abdominal: Soft. Bowel sounds are normal. He exhibits no distension and no mass. There is no hepatomegaly. There is no tenderness. There is no rebound and no guarding. No hernia.   Musculoskeletal: Normal range of motion. He exhibits no edema, tenderness or deformity.   Lymphadenopathy:     He has no cervical adenopathy.   Neurological: He is alert and oriented to person, place, and time. He has normal reflexes. No cranial nerve deficit. He exhibits normal muscle tone. Coordination normal.   Skin: Skin is warm. No rash noted. No erythema. No pallor.   Psychiatric: He has a normal mood and affect. His behavior is normal. Judgment and thought content normal.   Nursing note and vitals reviewed.      Lab Review    Results for orders placed or performed in visit on 08/27/19   POC Glycosylated Hemoglobin (Hb A1C)   Result Value Ref Range    Hemoglobin A1C 8.6 %         Assessment/Plan       ICD-10-CM ICD-9-CM   1. Type 2 diabetes mellitus with hypoglycemia without coma, with long-term current use of insulin (CMS/[facility]) E11.649 250.80    Z79.4 251.2     V58.67   2. Vitamin D deficiency E55.9 268.9          Most recent labs reviewed    1. Type 2 diabetes     Glycemic Management:   Lab Results   Component Value Date    HGBA1C 8.6 08/27/2019    HGBA1C 11.00 (H) 06/26/2019    HGBA1C 10.40 06/21/2018     Lab Results   Component Value Date     BUN 17 06/26/2019    CREATININE 1.24 06/26/2019    EGFRIFNONA 58 (L) 06/26/2019    EGFRIFAFRI 70 06/26/2019    BCR 13.7 06/26/2019    K 4.3 06/26/2019    CO2 29.7 (H) 06/26/2019    CALCIUM 9.5 06/26/2019    PROTENTOTREF 6.9 06/26/2019    ALBUMIN 4.00 06/26/2019    LABIL2 1.4 06/26/2019    AST 15 06/26/2019    ALT 29 06/26/2019     Lab Results   Component Value Date    WBC 5.31 06/26/2019    HGB 12.7 (L) 06/26/2019    HCT 41.3 06/26/2019    MCV 97.2 (H) 06/26/2019     06/26/2019      Previously on Glipizide - stopped      levemir doing 40 units, decrease to 28 and change to tresiba--decreased to 20 -17 - decreased to 15 last night    Metformin 1000 mg po bid    Trulicity 0.75 mg weekly---stopped a week ago ( 8/18/2019) due to GI side effects     Sliding scale before meals     Set dose of 6 units with each meal     Average blood sugars     Rarely goes above 150     This AM woke up at 98, patient understand how to titrate Tresiba     Results for orders placed or performed in visit on 08/27/19   POC Glycosylated Hemoglobin (Hb A1C)   Result Value Ref Range    Hemoglobin A1C 8.6 %         Next appt sglt2 i    Lipid Management  Most recent labs reviewed     Lab Results   Component Value Date    TRIG 113 06/26/2019    TRIG 92 06/21/2018    TRIG 92 06/19/2017     Lab Results   Component Value Date    HDL 40 06/26/2019    HDL 37 (L) 06/21/2018    HDL 41 06/19/2017     No components found for: LDLCALC  Lab Results   Component Value Date     (H) 06/26/2019     (H) 06/21/2018     (H) 06/19/2017     No results found for: LDLDIRECT    On lipitor 20 mg qhs - change to 40 mg qhs     Continue on medication     2. Hypertension     Blood Pressure Management:    Vitals:    11/27/19 0854   BP: 110/68   Pulse: 94   SpO2: 98%     Lab Results   Component Value Date    CALCIUM 9.5 06/26/2019     06/26/2019    K 4.3 06/26/2019    CO2 29.7 (H) 06/26/2019    CL 99 06/26/2019    BUN 17 06/26/2019     CREATININE 1.24 06/26/2019    EGFRIFAFRI 70 06/26/2019    EGFRIFNONA 58 (L) 06/26/2019    BCR 13.7 06/26/2019     Lisinopril 10mg     Continue on medication     Microvascular Complication Monitoring:      Eye Exam Evaluation    -----------    Last Microalbumin-Proteinuria Assessment    Lab Results   Component Value Date    TRIXIE 16.0 06/26/2019       No results found for: UTPCR    -----------      Neuropathy      Immunizations:          Preventive Care:        Weight Related:   Wt Readings from Last 3 Encounters:   11/27/19 84.8 kg (187 lb)   08/27/19 85.1 kg (187 lb 11.2 oz)   07/30/19 84.5 kg (186 lb 4.8 oz)     Body mass index is 27.62 kg/m².        Diet interventions: moderate (500 kCal/d) deficit diet.      Bone Health    Lab Results   Component Value Date    CALCIUM 9.5 06/26/2019    [id number] 48.9 06/26/2019       Thyroid Health    Lab Results   Component Value Date    TSH 2.550 06/26/2019    TSH 1.570 06/21/2018    TSH 1.630 06/19/2017       Lab Results   Component Value Date    FREET4 1.35 06/26/2019    FREET4 1.23 06/21/2018    FREET4 1.16 06/19/2017         Other Diabetes Related Aspects     Complete ordered labs before next scheduled visit     Orders Placed This Encounter   Procedures   • TSH     Standing Status:   Future     Standing Expiration Date:   11/27/2020   • Vitamin D 25 Hydroxy     Standing Status:   Future     Standing Expiration Date:   11/26/2020   • Comprehensive Metabolic Panel     Standing Status:   Future     Standing Expiration Date:   11/27/2020   • Hemoglobin A1c     Standing Status:   Future     Standing Expiration Date:   11/27/2020   • Vitamin B12     Standing Status:   Future     Standing Expiration Date:   11/27/2020   • Lipid Panel     These lab test should be done fasting. This means do not eat or drink for at least 12 hours prior to getting your blood drawn.     Standing Status:   Future     Standing Expiration Date:   11/27/2020   • CBC & Differential     Standing  Status:   Future     Standing Expiration Date:   11/27/2020     Order Specific Question:   Manual Differential     Answer:   No     Return in about 3 months (around 2/27/2020), or if symptoms worsen or fail to improve, for Recheck.      A copy of my note was sent to Brando Webb MD    Please see my above opinion and suggestions.         This document has been electronically signed by ALONSO Alfaro on November 27, 2019 10:57 AM

## 2019-12-03 ENCOUNTER — TELEPHONE (OUTPATIENT)
Dept: ENDOCRINOLOGY | Facility: CLINIC | Age: 69
End: 2019-12-03

## 2019-12-03 NOTE — TELEPHONE ENCOUNTER
Bernard Matta [place]: [id number] - [place] Case ID: [id number] Need help? Call us at [phone]   Status   [facility]   [facility]   Nasbret RECINOS Form

## 2019-12-06 DIAGNOSIS — E11.649 TYPE 2 DIABETES MELLITUS WITH HYPOGLYCEMIA WITHOUT COMA, WITH LONG-TERM CURRENT USE OF INSULIN (HCC): Primary | ICD-10-CM

## 2019-12-06 DIAGNOSIS — Z79.4 TYPE 2 DIABETES MELLITUS WITH HYPOGLYCEMIA WITHOUT COMA, WITH LONG-TERM CURRENT USE OF INSULIN (HCC): Primary | ICD-10-CM

## 2019-12-06 RX ORDER — BLOOD SUGAR DIAGNOSTIC
STRIP MISCELLANEOUS
Qty: 200 EACH | Refills: 3 | Status: SHIPPED | OUTPATIENT
Start: 2019-12-06 | End: 2020-09-14 | Stop reason: SDUPTHER

## 2019-12-06 RX ORDER — FLUTICASONE PROPIONATE 50 MCG
SPRAY, SUSPENSION (ML) NASAL
Qty: 48 ML | Refills: 1 | Status: SHIPPED | OUTPATIENT
Start: 2019-12-06 | End: 2020-05-29

## 2020-01-13 RX ORDER — TAMSULOSIN HYDROCHLORIDE 0.4 MG/1
2 CAPSULE ORAL EVERY EVENING
Qty: 180 CAPSULE | Refills: 1 | Status: SHIPPED | OUTPATIENT
Start: 2020-01-13 | End: 2020-07-13

## 2020-02-27 ENCOUNTER — OFFICE VISIT (OUTPATIENT)
Dept: ENDOCRINOLOGY | Facility: CLINIC | Age: 70
End: 2020-02-27

## 2020-02-27 VITALS
SYSTOLIC BLOOD PRESSURE: 120 MMHG | OXYGEN SATURATION: 98 % | WEIGHT: 188.4 LBS | DIASTOLIC BLOOD PRESSURE: 70 MMHG | HEIGHT: 69 IN | HEART RATE: 89 BPM | BODY MASS INDEX: 27.91 KG/M2

## 2020-02-27 DIAGNOSIS — Z79.4 TYPE 2 DIABETES MELLITUS WITH COMPLICATION, WITH LONG-TERM CURRENT USE OF INSULIN (HCC): Primary | ICD-10-CM

## 2020-02-27 DIAGNOSIS — I10 HYPERTENSION, UNSPECIFIED TYPE: ICD-10-CM

## 2020-02-27 DIAGNOSIS — E11.8 TYPE 2 DIABETES MELLITUS WITH COMPLICATION, WITH LONG-TERM CURRENT USE OF INSULIN (HCC): Primary | ICD-10-CM

## 2020-02-27 PROCEDURE — 99214 OFFICE O/P EST MOD 30 MIN: CPT | Performed by: NURSE PRACTITIONER

## 2020-02-27 NOTE — PROGRESS NOTES
" Bernard Matta is a 69 y.o. male who presents for  Follow up of his type 2 diabetes.     Chief Complaint   Patient presents with   • Diabetes       Referring provider    No referring provider defined for this encounter.    Primary Care Provider    Brando Webb MD    Duration 10 years    Timing - Diabetes is Constant    Quality -  poorly controlled, improving     Severity -  severe    Complications - none    Current symptoms/problems  increase appetite, polydipsia and polyuria     Alleviating Factors: Compliance      Side Effects  none    Current diet  in general, a \"healthy\" diet      Current exercise walking    Current monitoring regimen: home blood tests - checking 4 x daily     Home blood sugar records:     Hypoglycemia nocturnal, none since last visit     Past Medical History:   Diagnosis Date   • Diabetes mellitus (CMS/[facility])    • Erectile disorder due to 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    • Hyperlipidemia    • Hypertension    • Ischemic optic neuropathy of both eyes      Family History   Problem Relation Age of Onset   • No Known Problems Maternal Grandmother    • No Known Problems Maternal Grandfather    • No Known Problems Paternal Grandmother    • No Known Problems Paternal Grandfather    • No Known Problems Mother    • No Known Problems Father      Social History     Tobacco Use   • Smoking status: Former Smoker     Packs/day: 0.50     Years: 10.00     Pack years: 5.00     Types: Cigarettes     Last attempt to quit: 1979     Years since quittin.1   • Smokeless tobacco: Never Used   Substance Use Topics   • Alcohol use: No   • Drug use: No         Current Outpatient Medications:   •  ACCU-CHEK FASTCLIX LANCETS misc, USE TO TEST BLOOD SUGAR TWICE A DAY, Disp: 180 each, Rfl: 3  •  ACCU-CHEK SMARTVIEW test strip, USE TO TEST TWICE A DAY, Disp: 200 each, Rfl: 3  •  aspirin 81 MG tablet, Take 81 mg by mouth Daily., Disp: , Rfl:   •  atorvastatin (LIPITOR) 40 MG tablet, Take 1 tablet by mouth " Every Night., Disp: 30 tablet, Rfl: 11  •  cetirizine (zyrTEC) 10 MG tablet, Take 10 mg by mouth Daily., Disp: , Rfl:   •  cholecalciferol (VITAMIN D3) 400 UNITS tablet, Take 400 Units by mouth Daily., Disp: , Rfl:   •  dorzolamide-timolol (COSOPT) 22.3-6.8 MG/ML ophthalmic solution, 1 drop 2 (Two) Times a Day., Disp: , Rfl:   •  fluticasone (FLONASE) 50 MCG/ACT nasal spray, USE 2 SPRAYS IN EACH NOSTRIL EVERY DAY, Disp: 48 mL, Rfl: 1  •  insulin degludec (TRESIBA FLEXTOUCH) 100 UNIT/ML solution pen-injector injection, Inject 28 Units under the skin into the appropriate area as directed Every Night., Disp: 3 pen, Rfl: 11  •  Insulin Lispro (HUMALOG KWIKPEN) 100 UNIT/ML solution pen-injector, Up to 20 units with meals, Disp: 6 pen, Rfl: 11  •  Insulin Pen Needle (B-D UF III MINI PEN NEEDLES) 31G X 5 MM misc, Use 4 times daily  , ICD10 code is E11.9, Disp: 120 each, Rfl: 11  •  latanoprost (XALATAN) 0.005 % ophthalmic solution, 1 drop Every Night., Disp: , Rfl:   •  lisinopril (PRINIVIL,ZESTRIL) 10 MG tablet, Take 1 tablet by mouth Every Morning., Disp: 90 tablet, Rfl: 3  •  metFORMIN ER (GLUCOPHAGE XR) 500 MG 24 hr tablet, 2 tabs twice daily with meals , generic ok, Disp: 120 tablet, Rfl: 11  •  Multiple Vitamins-Minerals (MULTIVITAMIN WITH MINERALS) tablet tablet, Take 1 tablet by mouth Daily., Disp: , Rfl:   •  tamsulosin (FLOMAX) 0.4 MG capsule 24 hr capsule, TAKE 2 CAPSULES BY MOUTH EVERY EVENING., Disp: 180 capsule, Rfl: 1  •  Empagliflozin (JARDIANCE) 10 MG tablet, Take 10 mg by mouth Daily., Disp: 30 tablet, Rfl: 11    Review of Systems    Review of Systems   Constitutional: Negative for activity change, appetite change, chills, diaphoresis, fatigue, fever and unexpected weight change.   HENT: Negative for congestion, dental problem, drooling, ear discharge, ear pain, facial swelling, mouth sores, postnasal drip, rhinorrhea, sinus pressure, sore throat, tinnitus, trouble swallowing and voice change.    Eyes:  "Negative for photophobia, pain, discharge, redness, itching and visual disturbance.   Respiratory: Negative for apnea, cough, choking, chest tightness, shortness of breath, wheezing and stridor.    Cardiovascular: Negative for chest pain, palpitations and leg swelling.   Gastrointestinal: Negative for abdominal distention, abdominal pain, constipation, diarrhea, nausea and vomiting.   Endocrine: Negative for cold intolerance, heat intolerance, polydipsia, polyphagia and polyuria.   Genitourinary: Negative for decreased urine volume, difficulty urinating, dysuria, flank pain, frequency, hematuria and urgency.   Musculoskeletal: Negative for arthralgias, back pain, gait problem, joint swelling, myalgias, neck pain and neck stiffness.   Skin: Negative for color change, pallor, rash and wound.   Allergic/Immunologic: Negative for immunocompromised state.   Neurological: Negative for dizziness, tremors, seizures, syncope, facial asymmetry, speech difficulty, weakness, light-headedness, numbness and headaches.   Hematological: Negative for adenopathy.   Psychiatric/Behavioral: Negative for agitation, behavioral problems, confusion, decreased concentration, dysphoric mood, hallucinations, self-injury, sleep disturbance and suicidal ideas. The patient is not nervous/anxious and is not hyperactive.         Objective:   /70   Pulse 89   Ht 175.3 cm (69\")   Wt 85.5 kg (188 lb 6.4 oz)   SpO2 98%   BMI 27.82 kg/m²     Physical Exam   Constitutional: He is oriented to person, place, and time. He appears well-developed and well-nourished. He is cooperative.   HENT:   Head: Normocephalic and atraumatic.   Right Ear: External ear normal.   Left Ear: External ear normal.   Nose: Nose normal.   Mouth/Throat: Oropharynx is clear and moist. No oropharyngeal exudate.   Eyes: Pupils are equal, round, and reactive to light. Conjunctivae and EOM are normal. No scleral icterus. Right eye exhibits normal extraocular motion. Left " eye exhibits normal extraocular motion.   Neck: Neck supple. No JVD present. No muscular tenderness present. No tracheal deviation, no edema and no erythema present. No thyromegaly present.   Cardiovascular: Normal rate, regular rhythm, normal heart sounds and intact distal pulses. Exam reveals no gallop and no friction rub.   No murmur heard.  Pulmonary/Chest: Effort normal and breath sounds normal. No stridor. No respiratory distress. He has no decreased breath sounds. He has no wheezes. He has no rhonchi. He has no rales. He exhibits no tenderness.   Abdominal: Soft. Bowel sounds are normal. He exhibits no distension and no mass. There is no hepatomegaly. There is no tenderness. There is no rebound and no guarding. No hernia.   Musculoskeletal: Normal range of motion. He exhibits no edema, tenderness or deformity.   Lymphadenopathy:     He has no cervical adenopathy.   Neurological: He is alert and oriented to person, place, and time. He has normal reflexes. No cranial nerve deficit. He exhibits normal muscle tone. Coordination normal.   Skin: Skin is warm. No rash noted. No erythema. No pallor.   Psychiatric: He has a normal mood and affect. His behavior is normal. Judgment and thought content normal.   Nursing note and vitals reviewed.      Lab Review    Results for orders placed or performed in visit on 08/27/19   POC Glycosylated Hemoglobin (Hb A1C)   Result Value Ref Range    Hemoglobin A1C 8.6 %         Assessment/Plan       ICD-10-CM ICD-9-CM   1. Type 2 diabetes mellitus with complication, with long-term current use of insulin (CMS/[facility]) E11.8 250.90    Z79.4 V58.67   2. Hypertension, unspecified type I10 401.9          Most recent labs reviewed    1. Type 2 diabetes     Glycemic Management:   Lab Results   Component Value Date    HGBA1C 8.6 08/27/2019    HGBA1C 11.00 (H) 06/26/2019    HGBA1C 10.40 06/21/2018     Lab Results   Component Value Date    BUN 17 06/26/2019    CREATININE 1.24 06/26/2019     EGFRIFNONA 58 (L) 2019    EGFRIFAFRI 70 2019    BCR 13.7 2019    K 4.3 2019    CO2 29.7 (H) 2019    CALCIUM 9.5 2019    PROTENTOTREF 6.9 2019    ALBUMIN 4.00 2019    LABIL2 1.4 2019    AST 15 2019    ALT 29 2019     Lab Results   Component Value Date    WBC 5.31 2019    HGB 12.7 (L) 2019    HCT 41.3 2019    MCV 97.2 (H) 2019     2019      Previously on Glipizide - stopped      levemir doing 40 units, decrease to 28 and change to tresiba--decreased to 20 -17 - decreased to 15 last night    Metformin 1000 mg po bid    Trulicity 0.75 mg weekly---stopped a week ago ( 2019) due to GI side effects     Sliding scale before meals     Set dose of 6 units with each meal - we need better meal time control increase to 7-8 units with meals.    Check blood sugar 2 hours after meals     This AM woke up at 93, patient understands how to titrate Tresiba     Last a1c from 2020- 8.6    We will add jardiance     Lipid Management  Most recent labs reviewed     Labs from 2020:  Chol:145  Tri  HDL:36  LDL:91    Lab Results   Component Value Date    TRIG 113 2019    TRIG 92 2018    TRIG 92 2017     Lab Results   Component Value Date    HDL 40 2019    HDL 37 (L) 2018    HDL 41 2017     No components found for: LDLCALC  Lab Results   Component Value Date     (H) 2019     (H) 2018     (H) 2017     No results found for: LDLDIRECT    On lipitor 20 mg qhs - changed to 40 mg qhs     Continue on medication     2. Hypertension     Blood Pressure Management:    Vitals:    20 0853   BP: 120/70   Pulse: 89   SpO2: 98%     Lab Results   Component Value Date    CALCIUM 9.5 2019     2019    K 4.3 2019    CO2 29.7 (H) 2019    CL 99 2019    BUN 17 2019    CREATININE 1.24 2019    EGFRIFAFRI 70 2019     EGFRIFNONA 58 (L) 06/26/2019    BCR 13.7 06/26/2019     Lisinopril 10mg     Continue on medication     Microvascular Complication Monitoring:      Eye Exam Evaluation: Up to date         -----------    Last Microalbumin-Proteinuria Assessment    Lab Results   Component Value Date    TRIXIE 16.0 06/26/2019       No results found for: UTPCR    -----------      Neuropathy      Immunizations:          Preventive Care:        Weight Related:   Wt Readings from Last 3 Encounters:   02/27/20 85.5 kg (188 lb 6.4 oz)   11/27/19 84.8 kg (187 lb)   08/27/19 85.1 kg (187 lb 11.2 oz)     Body mass index is 27.82 kg/m².        Diet interventions: moderate (500 kCal/d) deficit diet.      Bone Health    2/2020 Vit D: 46.4    Lab Results   Component Value Date    CALCIUM 9.5 06/26/2019    SYVI52YS 48.9 06/26/2019       Thyroid Health    Lab Results   Component Value Date    TSH 2.550 06/26/2019    TSH 1.570 06/21/2018    TSH 1.630 06/19/2017       Lab Results   Component Value Date    FREET4 1.35 06/26/2019    FREET4 1.23 06/21/2018    FREET4 1.16 06/19/2017 2/2020- 2.760    Other Diabetes Related Aspects       2/2020 [id number]    Complete ordered labs before next scheduled visit     No orders of the defined types were placed in this encounter.    Return in about 3 months (around 5/27/2020), or if symptoms worsen or fail to improve, for Recheck.            This document has been electronically signed by ALONSO Alfaro on February 27, 2020 10:13 AM

## 2020-02-28 ENCOUNTER — TELEPHONE (OUTPATIENT)
Dept: FAMILY MEDICINE CLINIC | Facility: CLINIC | Age: 70
End: 2020-02-28

## 2020-03-12 ENCOUNTER — OFFICE VISIT (OUTPATIENT)
Dept: FAMILY MEDICINE CLINIC | Facility: CLINIC | Age: 70
End: 2020-03-12

## 2020-03-12 VITALS
SYSTOLIC BLOOD PRESSURE: 121 MMHG | TEMPERATURE: 98.2 F | HEIGHT: 67 IN | HEART RATE: 94 BPM | WEIGHT: 187.2 LBS | OXYGEN SATURATION: 96 % | BODY MASS INDEX: 29.38 KG/M2 | DIASTOLIC BLOOD PRESSURE: 75 MMHG

## 2020-03-12 DIAGNOSIS — L03.012 CELLULITIS OF FINGER OF LEFT HAND: Primary | ICD-10-CM

## 2020-03-12 PROCEDURE — 99213 OFFICE O/P EST LOW 20 MIN: CPT | Performed by: NURSE PRACTITIONER

## 2020-03-12 RX ORDER — CLINDAMYCIN HYDROCHLORIDE 300 MG/1
300 CAPSULE ORAL 3 TIMES DAILY
Qty: 30 CAPSULE | Refills: 0 | Status: SHIPPED | OUTPATIENT
Start: 2020-03-12 | End: 2020-03-22

## 2020-03-12 NOTE — PROGRESS NOTES
CC:infected finger    History:  Bernard Matta is a 69 y.o. male who presents today for evaluation of the above problems.      Wound Infection   This is a new (infected hangnail on left index finger) problem. The current episode started in the past 7 days. The problem occurs constantly. The problem has been gradually worsening. Pertinent negatives include no chills, fever or nausea. Nothing aggravates the symptoms. He has tried nothing for the symptoms.     ROS:  Review of Systems   Constitutional: Negative for chills and fever.   Gastrointestinal: Negative for nausea.       No Known Allergies  Past Medical History:   Diagnosis Date   • Diabetes mellitus (CMS/HCC)    • Erectile disorder due to 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    • Hyperlipidemia    • Hypertension    • Ischemic optic neuropathy of both eyes      History reviewed. No pertinent surgical history.  Family History   Problem Relation Age of Onset   • No Known Problems Maternal Grandmother    • No Known Problems Maternal Grandfather    • No Known Problems Paternal Grandmother    • No Known Problems Paternal Grandfather    • No Known Problems Mother    • No Known Problems Father       reports that he quit smoking about 41 years ago. His smoking use included cigarettes. He has a 5.00 pack-year smoking history. He has never used smokeless tobacco. He reports that he does not drink alcohol or use drugs.      Current Outpatient Medications:   •  ACCU-CHEK FASTCLIX LANCETS misc, USE TO TEST BLOOD SUGAR TWICE A DAY, Disp: 180 each, Rfl: 3  •  ACCU-CHEK SMARTVIEW test strip, USE TO TEST TWICE A DAY, Disp: 200 each, Rfl: 3  •  aspirin 81 MG tablet, Take 81 mg by mouth Daily., Disp: , Rfl:   •  atorvastatin (LIPITOR) 40 MG tablet, Take 1 tablet by mouth Every Night., Disp: 30 tablet, Rfl: 11  •  cetirizine (zyrTEC) 10 MG tablet, Take 10 mg by mouth Daily., Disp: , Rfl:   •  cholecalciferol (VITAMIN D3) 400 UNITS tablet, Take 400 Units by mouth Daily., Disp: , Rfl:   •   "dorzolamide-timolol (COSOPT) 22.3-6.8 MG/ML ophthalmic solution, 1 drop 2 (Two) Times a Day., Disp: , Rfl:   •  Empagliflozin (JARDIANCE) 10 MG tablet, Take 10 mg by mouth Daily., Disp: 30 tablet, Rfl: 11  •  fluticasone (FLONASE) 50 MCG/ACT nasal spray, USE 2 SPRAYS IN EACH NOSTRIL EVERY DAY, Disp: 48 mL, Rfl: 1  •  insulin degludec (TRESIBA FLEXTOUCH) 100 UNIT/ML solution pen-injector injection, Inject 28 Units under the skin into the appropriate area as directed Every Night., Disp: 3 pen, Rfl: 11  •  Insulin Lispro (HUMALOG KWIKPEN) 100 UNIT/ML solution pen-injector, Up to 20 units with meals, Disp: 6 pen, Rfl: 11  •  Insulin Pen Needle (B-D UF III MINI PEN NEEDLES) 31G X 5 MM misc, Use 4 times daily  , ICD10 code is E11.9, Disp: 120 each, Rfl: 11  •  latanoprost (XALATAN) 0.005 % ophthalmic solution, 1 drop Every Night., Disp: , Rfl:   •  lisinopril (PRINIVIL,ZESTRIL) 10 MG tablet, Take 1 tablet by mouth Every Morning., Disp: 90 tablet, Rfl: 3  •  metFORMIN ER (GLUCOPHAGE XR) 500 MG 24 hr tablet, 2 tabs twice daily with meals , generic ok, Disp: 120 tablet, Rfl: 11  •  Multiple Vitamins-Minerals (MULTIVITAMIN WITH MINERALS) tablet tablet, Take 1 tablet by mouth Daily., Disp: , Rfl:   •  tamsulosin (FLOMAX) 0.4 MG capsule 24 hr capsule, TAKE 2 CAPSULES BY MOUTH EVERY EVENING., Disp: 180 capsule, Rfl: 1  •  clindamycin (CLEOCIN) 300 MG capsule, Take 1 capsule by mouth 3 (Three) Times a Day for 10 days., Disp: 30 capsule, Rfl: 0    OBJECTIVE:  /75 (BP Location: Left arm, Patient Position: Sitting, Cuff Size: Adult)   Pulse 94   Temp 98.2 °F (36.8 °C) (Oral)   Ht 170.2 cm (67\")   Wt 84.9 kg (187 lb 3.2 oz)   SpO2 96%   BMI 29.32 kg/m²    Physical Exam   Constitutional: He is oriented to person, place, and time. Vital signs are normal. He appears well-developed and well-nourished.   Cardiovascular: Normal rate and regular rhythm.   Potential grade 1 transient aortic murmur.   Pulmonary/Chest: Effort " normal and breath sounds normal.   Neurological: He is alert and oriented to person, place, and time.   Skin:   Hangnail on left index finger with dime size erythema extending onto finger. No drainage. No odor.    Psychiatric: He has a normal mood and affect. His behavior is normal.   Vitals reviewed.      Assessment/Plan    Bernard was seen today for hand pain.    Diagnoses and all orders for this visit:    Cellulitis of finger of left hand  -     clindamycin (CLEOCIN) 300 MG capsule; Take 1 capsule by mouth 3 (Three) Times a Day for 10 days.    Not concerning for systemic infection.    Advised epsom salt soaks 3x daily for about 20 minutes and antibiotic ointment with band-aid to cover lesion.     An After Visit Summary was printed and given to the patient at discharge.  Return if symptoms worsen or fail to improve, for Next scheduled follow up.       ALONSO Lott 03/12/2020    Electronically signed.

## 2020-05-29 RX ORDER — FLUTICASONE PROPIONATE 50 MCG
SPRAY, SUSPENSION (ML) NASAL
Qty: 48 ML | Refills: 1 | Status: SHIPPED | OUTPATIENT
Start: 2020-05-29 | End: 2020-09-14 | Stop reason: SDUPTHER

## 2020-07-13 ENCOUNTER — OFFICE VISIT (OUTPATIENT)
Dept: ENDOCRINOLOGY | Facility: CLINIC | Age: 70
End: 2020-07-13

## 2020-07-13 VITALS
HEIGHT: 69 IN | OXYGEN SATURATION: 98 % | WEIGHT: 185.6 LBS | DIASTOLIC BLOOD PRESSURE: 88 MMHG | BODY MASS INDEX: 27.49 KG/M2 | SYSTOLIC BLOOD PRESSURE: 142 MMHG | HEART RATE: 86 BPM

## 2020-07-13 DIAGNOSIS — E11.8 TYPE 2 DIABETES MELLITUS WITH COMPLICATION, WITH LONG-TERM CURRENT USE OF INSULIN (HCC): Primary | ICD-10-CM

## 2020-07-13 DIAGNOSIS — Z79.4 TYPE 2 DIABETES MELLITUS WITH COMPLICATION, WITH LONG-TERM CURRENT USE OF INSULIN (HCC): Primary | ICD-10-CM

## 2020-07-13 PROCEDURE — 99214 OFFICE O/P EST MOD 30 MIN: CPT | Performed by: NURSE PRACTITIONER

## 2020-07-13 RX ORDER — LISINOPRIL 10 MG/1
TABLET ORAL
Qty: 90 TABLET | Refills: 0 | Status: SHIPPED | OUTPATIENT
Start: 2020-07-13 | End: 2020-07-14 | Stop reason: SDUPTHER

## 2020-07-13 RX ORDER — TAMSULOSIN HYDROCHLORIDE 0.4 MG/1
2 CAPSULE ORAL EVERY EVENING
Qty: 180 CAPSULE | Refills: 0 | Status: SHIPPED | OUTPATIENT
Start: 2020-07-13 | End: 2020-07-14 | Stop reason: SDUPTHER

## 2020-07-13 RX ORDER — METFORMIN HYDROCHLORIDE 500 MG/1
TABLET, EXTENDED RELEASE ORAL
Qty: 120 TABLET | Refills: 11 | Status: SHIPPED | OUTPATIENT
Start: 2020-07-13 | End: 2020-07-14 | Stop reason: SINTOL

## 2020-07-13 NOTE — PROGRESS NOTES
" Bernard Matta is a 69 y.o. male who presents for  Follow up of his type 2 diabetes.     Chief Complaint   Patient presents with   • Diabetes       Referring provider    No referring provider defined for this encounter.    Primary Care Provider    Brando Webb MD    Duration 10 years    Timing - Diabetes is Constant    Quality -  poorly controlled, improving     Severity -  severe    Complications - none    Current symptoms/problems  increase appetite, polydipsia and polyuria     Alleviating Factors: Compliance      Side Effects  none    Current diet  in general, a \"healthy\" diet      Current exercise walking    Current monitoring regimen: home blood tests - checking 4 x daily     Home blood sugar records:     Hypoglycemia nocturnal, none since last visit     Blood sugar has been running in normal range     Past Medical History:   Diagnosis Date   • Diabetes mellitus (CMS/[facility])    • Erectile disorder due to 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    • Hyperlipidemia    • Hypertension    • Ischemic optic neuropathy of both eyes      Family History   Problem Relation Age of Onset   • No Known Problems Maternal Grandmother    • No Known Problems Maternal Grandfather    • No Known Problems Paternal Grandmother    • No Known Problems Paternal Grandfather    • No Known Problems Mother    • No Known Problems Father      Social History     Tobacco Use   • Smoking status: Former Smoker     Packs/day: 0.50     Years: 10.00     Pack years: 5.00     Types: Cigarettes     Last attempt to quit: 1979     Years since quittin.5   • Smokeless tobacco: Never Used   Substance Use Topics   • Alcohol use: No   • Drug use: No         Current Outpatient Medications:   •  ACCU-CHEK FASTCLIX LANCETS misc, USE TO TEST BLOOD SUGAR TWICE A DAY, Disp: 180 each, Rfl: 3  •  ACCU-CHEK SMARTVIEW test strip, USE TO TEST TWICE A DAY, Disp: 200 each, Rfl: 3  •  aspirin 81 MG tablet, Take 81 mg by mouth Daily., Disp: , Rfl:   •  atorvastatin " (LIPITOR) 40 MG tablet, Take 1 tablet by mouth Every Night., Disp: 30 tablet, Rfl: 11  •  cetirizine (zyrTEC) 10 MG tablet, Take 10 mg by mouth Daily., Disp: , Rfl:   •  cholecalciferol (VITAMIN D3) 400 UNITS tablet, Take 400 Units by mouth Daily., Disp: , Rfl:   •  dorzolamide-timolol (COSOPT) 22.3-6.8 MG/ML ophthalmic solution, 1 drop 2 (Two) Times a Day., Disp: , Rfl:   •  fluticasone (FLONASE) 50 MCG/ACT nasal spray, USE 2 SPRAYS IN EACH NOSTRIL EVERY DAY, Disp: 48 mL, Rfl: 1  •  insulin degludec (TRESIBA FLEXTOUCH) 100 UNIT/ML solution pen-injector injection, Inject 28 Units under the skin into the appropriate area as directed Every Night. (Patient taking differently: Inject 15 Units under the skin into the appropriate area as directed Every Night.), Disp: 3 pen, Rfl: 11  •  Insulin Lispro (HUMALOG KWIKPEN) 100 UNIT/ML solution pen-injector, Up to 20 units with meals, Disp: 6 pen, Rfl: 11  •  Insulin Pen Needle (B-D UF III MINI PEN NEEDLES) 31G X 5 MM misc, Use 4 times daily  , ICD10 code is E11.9, Disp: 120 each, Rfl: 11  •  latanoprost (XALATAN) 0.005 % ophthalmic solution, 1 drop Every Night., Disp: , Rfl:   •  lisinopril (PRINIVIL,ZESTRIL) 10 MG tablet, TAKE 1 TABLET BY MOUTH EVERY DAY IN THE MORNING, Disp: 90 tablet, Rfl: 0  •  metFORMIN ER (Glucophage XR) 500 MG 24 hr tablet, 2 tabs twice daily with meals , generic ok, Disp: 120 tablet, Rfl: 11  •  Multiple Vitamins-Minerals (MULTIVITAMIN WITH MINERALS) tablet tablet, Take 1 tablet by mouth Daily., Disp: , Rfl:   •  tamsulosin (FLOMAX) 0.4 MG capsule 24 hr capsule, TAKE 2 CAPSULES BY MOUTH EVERY EVENING., Disp: 180 capsule, Rfl: 0    Review of Systems    Review of Systems   Constitutional: Negative for activity change, appetite change, chills, diaphoresis, fatigue, fever and unexpected weight change.   HENT: Negative for congestion, dental problem, drooling, ear discharge, ear pain, facial swelling, mouth sores, postnasal drip, rhinorrhea, sinus pressure,  "sore throat, tinnitus, trouble swallowing and voice change.    Eyes: Negative for photophobia, pain, discharge, redness, itching and visual disturbance.   Respiratory: Negative for apnea, cough, choking, chest tightness, shortness of breath, wheezing and stridor.    Cardiovascular: Negative for chest pain, palpitations and leg swelling.   Gastrointestinal: Negative for abdominal distention, abdominal pain, constipation, diarrhea, nausea and vomiting.   Endocrine: Negative for cold intolerance, heat intolerance, polydipsia, polyphagia and polyuria.   Genitourinary: Negative for decreased urine volume, difficulty urinating, dysuria, flank pain, frequency, hematuria and urgency.   Musculoskeletal: Negative for arthralgias, back pain, gait problem, joint swelling, myalgias, neck pain and neck stiffness.   Skin: Negative for color change, pallor, rash and wound.   Allergic/Immunologic: Negative for immunocompromised state.   Neurological: Negative for dizziness, tremors, seizures, syncope, facial asymmetry, speech difficulty, weakness, light-headedness, numbness and headaches.   Hematological: Negative for adenopathy.   Psychiatric/Behavioral: Negative for agitation, behavioral problems, confusion, decreased concentration, dysphoric mood, hallucinations, self-injury, sleep disturbance and suicidal ideas. The patient is not nervous/anxious and is not hyperactive.         Objective:   /88   Pulse 86   Ht 175.3 cm (69\")   Wt 84.2 kg (185 lb 9.6 oz)   SpO2 98%   BMI 27.41 kg/m²     Physical Exam   Constitutional: He is oriented to person, place, and time. He appears well-developed and well-nourished. He is cooperative.   HENT:   Head: Normocephalic and atraumatic.   Right Ear: External ear normal.   Left Ear: External ear normal.   Nose: Nose normal.   Mouth/Throat: Oropharynx is clear and moist. No oropharyngeal exudate.   Eyes: Pupils are equal, round, and reactive to light. Conjunctivae and EOM are normal. No " scleral icterus. Right eye exhibits normal extraocular motion. Left eye exhibits normal extraocular motion.   Neck: Neck supple. No JVD present. No muscular tenderness present. No tracheal deviation, no edema and no erythema present. No thyromegaly present.   Cardiovascular: Normal rate, regular rhythm, normal heart sounds and intact distal pulses. Exam reveals no gallop and no friction rub.   No murmur heard.  Pulmonary/Chest: Effort normal and breath sounds normal. No stridor. No respiratory distress. He has no decreased breath sounds. He has no wheezes. He has no rhonchi. He has no rales. He exhibits no tenderness.   Abdominal: Soft. Bowel sounds are normal. He exhibits no distension and no mass. There is no hepatomegaly. There is no tenderness. There is no rebound and no guarding. No hernia.   Musculoskeletal: Normal range of motion. He exhibits no edema, tenderness or deformity.   Lymphadenopathy:     He has no cervical adenopathy.   Neurological: He is alert and oriented to person, place, and time. He has normal reflexes. No cranial nerve deficit. He exhibits normal muscle tone. Coordination normal.   Skin: Skin is warm. No rash noted. No erythema. No pallor.   Psychiatric: He has a normal mood and affect. His behavior is normal. Judgment and thought content normal.   Nursing note and vitals reviewed.      Lab Review    Results for orders placed or performed in visit on 08/27/19   POC Glycosylated Hemoglobin (Hb A1C)   Result Value Ref Range    Hemoglobin A1C 8.6 %         Assessment/Plan       ICD-10-CM ICD-9-CM   1. Type 2 diabetes mellitus with complication, with long-term current use of insulin (CMS/[facility]) E11.8 250.90    Z79.4 V58.67          Most recent labs reviewed    1. Type 2 diabetes     Glycemic Management:   Lab Results   Component Value Date    HGBA1C 8.6 08/27/2019    HGBA1C 11.00 (H) 06/26/2019    HGBA1C 10.40 06/21/2018     Lab Results   Component Value Date    BUN 17 06/26/2019    CREATININE  1.24 2019    EGFRIFNONA 58 (L) 2019    EGFRIFAFRI 70 2019    BCR 13.7 2019    K 4.3 2019    CO2 29.7 (H) 2019    CALCIUM 9.5 2019    PROTENTOTREF 6.9 2019    ALBUMIN 4.00 2019    LABIL2 1.4 2019    AST 15 2019    ALT 29 2019     Lab Results   Component Value Date    WBC 5.31 2019    HGB 12.7 (L) 2019    HCT 41.3 2019    MCV 97.2 (H) 2019     2019      Previously on Glipizide - stopped      levemir doing 40 units, decrease to 28 and change to tresiba--decreased to 20 -17 - decreased to 15     Metformin 1000 mg po bid    Trulicity 0.75 mg weekly---stopped a week ago ( 2019) due to GI side effects     Sliding scale before meals     Set dose of 6 units with each meal - we need better meal time control increase to 7-8 units with meals.    Check blood sugar 2 hours after meals     Last a1c from 2020- 8.6      Lipid Management  Most recent labs reviewed     Labs from 2020:  Chol:145  Tri  HDL:36  LDL:91    Lab Results   Component Value Date    TRIG 113 2019    TRIG 92 2018    TRIG 92 2017     Lab Results   Component Value Date    HDL 40 2019    HDL 37 (L) 2018    HDL 41 2017     No components found for: LDLCALC  Lab Results   Component Value Date     (H) 2019     (H) 2018     (H) 2017     No results found for: LDLDIRECT    On lipitor 20 mg qhs - changed to 40 mg qhs     Continue on medication      2. Hypertension     Blood Pressure Management:    Vitals:    20 1006   BP: 142/88   Pulse: 86   SpO2: 98%     Lab Results   Component Value Date    CALCIUM 9.5 2019     2019    K 4.3 2019    CO2 29.7 (H) 2019    CL 99 2019    BUN 17 2019    CREATININE 1.24 2019    EGFRIFAFRI 70 2019    EGFRIFNONA 58 (L) 2019    BCR 13.7 2019     Lisinopril 10mg     Continue on  medication     Microvascular Complication Monitoring:      Eye Exam Evaluation: Up to date     -----------    Last Microalbumin-Proteinuria Assessment    Lab Results   Component Value Date    TRIXIE 16.0 06/26/2019       No results found for: UTPCR    -----------      Neuropathy      Immunizations:          Preventive Care:        Weight Related:   Wt Readings from Last 3 Encounters:   07/13/20 84.2 kg (185 lb 9.6 oz)   03/12/20 84.9 kg (187 lb 3.2 oz)   02/27/20 85.5 kg (188 lb 6.4 oz)     Body mass index is 27.41 kg/m².        Diet interventions: moderate (500 kCal/d) deficit diet.      Bone Health    2/2020 Vit D: 46.4    Lab Results   Component Value Date    CALCIUM 9.5 06/26/2019    TPOY14JD 48.9 06/26/2019       Thyroid Health    Lab Results   Component Value Date    TSH 2.550 06/26/2019    TSH 1.570 06/21/2018    TSH 1.630 06/19/2017       Lab Results   Component Value Date    FREET4 1.35 06/26/2019    FREET4 1.23 06/21/2018    FREET4 1.16 06/19/2017 2/2020- 2.760    Other Diabetes Related Aspects     DM eye exam: up to date     2/2020 [id number]    Complete ordered labs before next scheduled visit, PCP doing labs next week     No orders of the defined types were placed in this encounter.    Return in about 4 months (around 11/13/2020), or if symptoms worsen or fail to improve, for Recheck.            This document has been electronically signed by ALONSO Alfaro on July 13, 2020 13:14

## 2020-07-14 ENCOUNTER — OFFICE VISIT (OUTPATIENT)
Dept: FAMILY MEDICINE CLINIC | Facility: CLINIC | Age: 70
End: 2020-07-14

## 2020-07-14 VITALS
BODY MASS INDEX: 27.25 KG/M2 | SYSTOLIC BLOOD PRESSURE: 124 MMHG | HEART RATE: 92 BPM | RESPIRATION RATE: 16 BRPM | TEMPERATURE: 96.8 F | HEIGHT: 69 IN | OXYGEN SATURATION: 97 % | DIASTOLIC BLOOD PRESSURE: 82 MMHG | WEIGHT: 184 LBS

## 2020-07-14 DIAGNOSIS — E11.649 TYPE 2 DIABETES MELLITUS WITH HYPOGLYCEMIA WITHOUT COMA, WITH LONG-TERM CURRENT USE OF INSULIN (HCC): Primary | ICD-10-CM

## 2020-07-14 DIAGNOSIS — Z86.010 HX OF ADENOMATOUS COLONIC POLYPS: ICD-10-CM

## 2020-07-14 DIAGNOSIS — E78.2 MIXED HYPERLIPIDEMIA: ICD-10-CM

## 2020-07-14 DIAGNOSIS — Z12.5 SPECIAL SCREENING FOR MALIGNANT NEOPLASM OF PROSTATE: ICD-10-CM

## 2020-07-14 DIAGNOSIS — R53.83 FATIGUE, UNSPECIFIED TYPE: ICD-10-CM

## 2020-07-14 DIAGNOSIS — Z79.4 TYPE 2 DIABETES MELLITUS WITH HYPOGLYCEMIA WITHOUT COMA, WITH LONG-TERM CURRENT USE OF INSULIN (HCC): Primary | ICD-10-CM

## 2020-07-14 DIAGNOSIS — Z00.00 ANNUAL PHYSICAL EXAM: ICD-10-CM

## 2020-07-14 LAB
BILIRUB BLD-MCNC: NEGATIVE MG/DL
CLARITY, POC: CLEAR
COLOR UR: YELLOW
GLUCOSE UR STRIP-MCNC: ABNORMAL MG/DL
KETONES UR QL: NEGATIVE
LEUKOCYTE EST, POC: NEGATIVE
NITRITE UR-MCNC: NEGATIVE MG/ML
PH UR: 5.5 [PH] (ref 5–8)
PROT UR STRIP-MCNC: NEGATIVE MG/DL
RBC # UR STRIP: ABNORMAL /UL
SP GR UR: 1.02 (ref 1–1.03)
UROBILINOGEN UR QL: NORMAL

## 2020-07-14 PROCEDURE — 96160 PT-FOCUSED HLTH RISK ASSMT: CPT | Performed by: FAMILY MEDICINE

## 2020-07-14 PROCEDURE — G0439 PPPS, SUBSEQ VISIT: HCPCS | Performed by: FAMILY MEDICINE

## 2020-07-14 PROCEDURE — 81003 URINALYSIS AUTO W/O SCOPE: CPT | Performed by: FAMILY MEDICINE

## 2020-07-14 RX ORDER — TAMSULOSIN HYDROCHLORIDE 0.4 MG/1
2 CAPSULE ORAL EVERY EVENING
Qty: 180 CAPSULE | Refills: 0 | Status: SHIPPED | OUTPATIENT
Start: 2020-07-14 | End: 2020-09-14 | Stop reason: SDUPTHER

## 2020-07-14 RX ORDER — LISINOPRIL 10 MG/1
10 TABLET ORAL EVERY MORNING
Qty: 90 TABLET | Refills: 0 | Status: SHIPPED | OUTPATIENT
Start: 2020-07-14 | End: 2020-09-14 | Stop reason: SDUPTHER

## 2020-07-14 NOTE — PATIENT INSTRUCTIONS
Exercising to Stay Healthy  To become healthy and stay healthy, it is recommended that you do moderate-intensity and vigorous-intensity exercise. You can tell that you are exercising at a moderate intensity if your heart starts beating faster and you start breathing faster but can still hold a conversation. You can tell that you are exercising at a vigorous intensity if you are breathing much harder and faster and cannot hold a conversation while exercising.  Exercising regularly is important. It has many health benefits, such as:  · Improving overall fitness, flexibility, and endurance.  · Increasing bone density.  · Helping with weight control.  · Decreasing body fat.  · Increasing muscle strength.  · Reducing stress and tension.  · Improving overall health.  How often should I exercise?  Choose an activity that you enjoy, and set realistic goals. Your health care provider can help you make an activity plan that works for you.  Exercise regularly as told by your health care provider. This may include:  · Doing strength training two times a week, such as:  ? Lifting weights.  ? Using resistance bands.  ? Push-ups.  ? Sit-ups.  ? Yoga.  · Doing a certain intensity of exercise for a given amount of time. Choose from these options:  ? A total of 150 minutes of moderate-intensity exercise every week.  ? A total of 75 minutes of vigorous-intensity exercise every week.  ? A mix of moderate-intensity and vigorous-intensity exercise every week.  Children, pregnant women, people who have not exercised regularly, people who are overweight, and older adults may need to talk with a health care provider about what activities are safe to do. If you have a medical condition, be sure to talk with your health care provider before you start a new exercise program.  What are some exercise ideas?  Moderate-intensity exercise ideas include:  · Walking 1 mile (1.6 km) in about 15  minutes.  · Biking.  · Hiking.  · Golfing.  · Dancing.  · Water aerobics.  Vigorous-intensity exercise ideas include:  · Walking 4.5 miles (7.2 km) or more in about 1 hour.  · Jogging or running 5 miles (8 km) in about 1 hour.  · Biking 10 miles (16.1 km) or more in about 1 hour.  · Lap swimming.  · Roller-skating or in-line skating.  · Cross-country skiing.  · Vigorous competitive sports, such as football, basketball, and soccer.  · Jumping rope.  · Aerobic dancing.  What are some everyday activities that can help me to get exercise?  · Yard work, such as:  ? Pushing a .  ? Raking and bagging leaves.  · Washing your car.  · Pushing a stroller.  · Shoveling snow.  · Gardening.  · Washing windows or floors.  How can I be more active in my day-to-day activities?  · Use stairs instead of an elevator.  · Take a walk during your lunch break.  · If you drive, park your car farther away from your work or school.  · If you take public transportation, get off one stop early and walk the rest of the way.  · Stand up or walk around during all of your indoor phone calls.  · Get up, stretch, and walk around every 30 minutes throughout the day.  · Enjoy exercise with a friend. Support to continue exercising will help you keep a regular routine of activity.  What guidelines can I follow while exercising?  · Before you start a new exercise program, talk with your health care provider.  · Do not exercise so much that you hurt yourself, feel dizzy, or get very short of breath.  · Wear comfortable clothes and wear shoes with good support.  · Drink plenty of water while you exercise to prevent dehydration or heat stroke.  · Work out until your breathing and your heartbeat get faster.  Where to find more information  · [place] Department of Health and Human Services: www.hhs.gov  · Centers for Disease Control and Prevention (CDC): www.cdc.gov  Summary  · Exercising regularly is important. It will improve your overall fitness,  flexibility, and endurance.  · Regular exercise also will improve your overall health. It can help you control your weight, reduce stress, and improve your bone density.  · Do not exercise so much that you hurt yourself, feel dizzy, or get very short of breath.  · Before you start a new exercise program, talk with your health care provider.  This information is not intended to replace advice given to you by your health care provider. Make sure you discuss any questions you have with your health care provider.  Document Released: 01/20/2012 Document Revised: 11/30/2018 Document Reviewed: 11/08/2018  Elsevier Patient Education © 2020 [employer]      Fall Prevention in the Home, Adult  Falls can cause injuries and can affect people from all age groups. There are many simple things that you can do to make your home safe and to help prevent falls. Ask for help when making these changes, if needed.  What actions can I take to prevent falls?  General instructions  · Use good lighting in all rooms. Replace any light bulbs that burn out.  · Turn on lights if it is dark. Use night-lights.  · Place frequently used items in easy-to-reach places. Lower the shelves around your home if necessary.  · Set up furniture so that there are clear paths around it. Avoid moving your furniture around.  · Remove throw rugs and other tripping hazards from the floor.  · Avoid walking on wet floors.  · Fix any uneven floor surfaces.  · Add color or contrast paint or tape to grab bars and handrails in your home. Place contrasting color strips on the first and last steps of stairways.  · When you use a stepladder, make sure that it is completely opened and that the sides are firmly locked. Have someone hold the ladder while you are using it. Do not climb a closed stepladder.  · Be aware of any and all pets.  What can I do in the bathroom?         · Keep the floor dry. Immediately clean up any water that spills onto the floor.  · Remove soap  buildup in the tub or shower on a regular basis.  · Use non-skid mats or decals on the floor of the tub or shower.  · Attach bath mats securely with double-sided, non-slip rug tape.  · If you need to sit down while you are in the shower, use a plastic, non-slip stool.  · Install grab bars by the toilet and in the tub and shower. Do not use towel bars as grab bars.  What can I do in the bedroom?  · Make sure that a bedside light is easy to reach.  · Do not use oversized bedding that drapes onto the floor.  · Have a firm chair that has side arms to use for getting dressed.  What can I do in the kitchen?  · Clean up any spills right away.  · If you need to reach for something above you, use a sturdy step stool that has a grab bar.  · Keep electrical cables out of the way.  · Do not use floor polish or wax that makes floors slippery. If you must use wax, make sure that it is non-skid floor wax.  What can I do in the stairways?  · Do not leave any items on the stairs.  · Make sure that you have a light switch at the top of the stairs and the bottom of the stairs. Have them installed if you do not have them.  · Make sure that there are handrails on both sides of the stairs. Fix handrails that are broken or loose. Make sure that handrails are as long as the stairways.  · Install non-slip stair treads on all stairs in your home.  · Avoid having throw rugs at the top or bottom of stairways, or secure the rugs with carpet tape to prevent them from moving.  · Choose a carpet design that does not hide the edge of steps on the stairway.  · Check any carpeting to make sure that it is firmly attached to the stairs. Fix any carpet that is loose or worn.  What can I do on the outside of my home?  · Use bright outdoor lighting.  · Regularly repair the edges of walkways and driveways and fix any cracks.  · Remove high doorway thresholds.  · Trim any shrubbery on the main path into your home.  · Regularly check that handrails are  securely fastened and in good repair. Both sides of any steps should have handrails.  · Install guardrails along the edges of any raised decks or porches.  · Clear walkways of debris and clutter, including tools and rocks.  · Have leaves, snow, and ice cleared regularly.  · Use sand or salt on walkways during winter months.  · In the garage, clean up any spills right away, including grease or oil spills.  What other actions can I take?  · Wear closed-toe shoes that fit well and support your feet. Wear shoes that have rubber soles or low heels.  · Use mobility aids as needed, such as canes, walkers, scooters, and crutches.  · Review your medicines with your health care provider. Some medicines can cause dizziness or changes in blood pressure, which increase your risk of falling.  Talk with your health care provider about other ways that you can decrease your risk of falls. This may include working with a physical therapist or  to improve your strength, balance, and endurance.  Where to find more information  · Centers for Disease Control and Prevention, [place]: https://www.cdc.gov  · National [place] [employer] Aging: https://lx0xgmz.antonieta.nih.gov  Contact a health care provider if:  · You are afraid of falling at home.  · You feel weak, drowsy, or dizzy at home.  · You fall at home.  Summary  · There are many simple things that you can do to make your home safe and to help prevent falls.  · Ways to make your home safe include removing tripping hazards and installing grab bars in the bathroom.  · Ask for help when making these changes in your home.  This information is not intended to replace advice given to you by your health care provider. Make sure you discuss any questions you have with your health care provider.  Document Released: 12/08/2003 Document Revised: 11/30/2018 Document Reviewed: 08/02/2018  [employer] Patient Education © 2020 [employer]    Medicare Wellness  Personal Prevention Plan of Service      Date of Office Visit:  2020  Encounter Provider:  Brando Webb MD  Place of Service:  [facility]  Patient Name: Bernard Matta  :  1950    As part of the Medicare Wellness portion of your visit today, we are providing you with this personalized preventive plan of services (PPPS). This plan is based upon recommendations of the [place] Preventive Services Task Force (USPSTF) and the Advisory Committee on Immunization Practices (ACIP).    This lists the preventive care services that should be considered, and provides dates of when you are due. Items listed as completed are up-to-date and do not require any further intervention.    Health Maintenance   Topic Date Due   • INFLUENZA VACCINE  2020   • HEMOGLOBIN A1C  2021   • DIABETIC EYE EXAM  2021   • MEDICARE ANNUAL WELLNESS  2021   • DIABETIC FOOT EXAM  2021   • LIPID PANEL  2021   • URINE MICROALBUMIN  2021   • TDAP/TD VACCINES (2 - Td) 2023   • COLONOSCOPY  2029   • HEPATITIS C SCREENING  Completed   • Pneumococcal Vaccine Once at 65 Years Old  Completed   • AAA SCREEN (ONE-TIME)  Completed   • ZOSTER VACCINE  Addressed       No orders of the defined types were placed in this encounter.      Return in 6 months (on 2021).

## 2020-07-14 NOTE — PROGRESS NOTES
The ABCs of the Annual Wellness Visit  Subsequent Medicare Wellness Visit    Chief Complaint   Patient presents with   • Medicare Wellness-subsequent     Fasting       Subjective   History of Present Illness:  Bernard Matta is a 69 y.o. male who presents for a Subsequent Medicare Wellness Visit.    HEALTH RISK ASSESSMENT    Recent Hospitalizations:  No hospitalization(s) within the last year.    Current Medical Providers:  Patient Care Team:  Brando Webb MD as PCP - General (Family Medicine)  Jim Cha OD (Optometry)  Anand Santoyo MD as Consulting Physician (Ophthalmology)    Smoking Status:  Social History     Tobacco Use   Smoking Status Former Smoker   • Packs/day: 0.50   • Years: 10.00   • Pack years: 5.00   • Types: Cigarettes   • Last attempt to quit: 1979   • Years since quittin.5   Smokeless Tobacco Never Used       Alcohol Consumption:  Social History     Substance and Sexual Activity   Alcohol Use No       Depression Screen:   PHQ-2/PHQ-9 Depression Screening 2020   Little interest or pleasure in doing things 0   Feeling down, depressed, or hopeless 0   Trouble falling or staying asleep, or sleeping too much -   Feeling tired or having little energy -   Poor appetite or overeating -   Feeling bad about yourself - or that you are a failure or have let yourself or your family down -   Trouble concentrating on things, such as reading the newspaper or watching television -   Moving or speaking so slowly that other people could have noticed. Or the opposite - being so fidgety or restless that you have been moving around a lot more than usual -   Thoughts that you would be better off dead, or of hurting yourself in some way -   Total Score 0   If you checked off any problems, how difficult have these problems made it for you to do your work, take care of things at home, or get along with other people? -       Fall Risk Screen:  STEADI Fall Risk Assessment was completed, and  patient is at LOW risk for falls.Assessment completed on:7/14/2020    Health Habits and Functional and Cognitive Screening:  Functional & Cognitive Status 7/14/2020   Do you have difficulty preparing food and eating? No   Do you have difficulty bathing yourself, getting dressed or grooming yourself? No   Do you have difficulty using the toilet? No   Do you have difficulty moving around from place to place? No   Do you have trouble with steps or getting out of a bed or a chair? No   Current Diet Well Balanced Diet   Dental Exam Up to date   Eye Exam Up to date   Exercise (times per week) 3 times per week   Current Exercise Activities Include Walking   Do you need help using the phone?  No   Are you deaf or do you have serious difficulty hearing?  No   Do you need help with transportation? No   Do you need help shopping? No   Do you need help preparing meals?  No   Do you need help with housework?  No   Do you need help with laundry? No   Do you need help taking your medications? No   Do you need help managing money? No   Do you ever drive or ride in a car without wearing a seat belt? No   Have you felt unusual stress, anger or loneliness in the last month? No   Who do you live with? Spouse   If you need help, do you have trouble finding someone available to you? No   Have you been bothered in the last four weeks by sexual problems? No   Do you have difficulty concentrating, remembering or making decisions? No         Does the patient have evidence of cognitive impairment? Yes    Asprin use counseling:Taking ASA appropriately as indicated    Age-appropriate Screening Schedule:  Refer to the list below for future screening recommendations based on patient's age, sex and/or medical conditions. Orders for these recommended tests are listed in the plan section. The patient has been provided with a written plan.    Health Maintenance   Topic Date Due   • INFLUENZA VACCINE  08/01/2020   • HEMOGLOBIN A1C  01/14/2021   •  DIABETIC EYE EXAM  02/19/2021   • DIABETIC FOOT EXAM  07/14/2021   • LIPID PANEL  07/14/2021   • URINE MICROALBUMIN  07/14/2021   • TDAP/TD VACCINES (2 - Td) 05/08/2023   • COLONOSCOPY  08/13/2029   • ZOSTER VACCINE  Addressed          The following portions of the patient's history were reviewed and updated as appropriate: allergies, current medications, past family history, past medical history, past social history, past surgical history and problem list.    Outpatient Medications Prior to Visit   Medication Sig Dispense Refill   • aspirin 81 MG tablet Take 81 mg by mouth Daily.     • atorvastatin (LIPITOR) 40 MG tablet Take 1 tablet by mouth Every Night. 30 tablet 11   • cetirizine (zyrTEC) 10 MG tablet Take 10 mg by mouth Daily.     • cholecalciferol (VITAMIN D3) 400 UNITS tablet Take 400 Units by mouth Daily.     • dorzolamide-timolol (COSOPT) 22.3-6.8 MG/ML ophthalmic solution 1 drop 2 (Two) Times a Day.     • fluticasone (FLONASE) 50 MCG/ACT nasal spray USE 2 SPRAYS IN EACH NOSTRIL EVERY DAY 48 mL 1   • insulin degludec (TRESIBA FLEXTOUCH) 100 UNIT/ML solution pen-injector injection Inject 28 Units under the skin into the appropriate area as directed Every Night. (Patient taking differently: Inject 15 Units under the skin into the appropriate area as directed Every Night.) 3 pen 11   • Insulin Lispro (HUMALOG KWIKPEN) 100 UNIT/ML solution pen-injector Up to 20 units with meals 6 pen 11   • Insulin Pen Needle (B-D UF III MINI PEN NEEDLES) 31G X 5 MM misc Use 4 times daily  , ICD10 code is E11.9 120 each 11   • latanoprost (XALATAN) 0.005 % ophthalmic solution 1 drop Every Night.     • metFORMIN (GLUCOPHAGE) 500 MG tablet Take 500 mg by mouth 2 (Two) Times a Day With Meals.     • Multiple Vitamins-Minerals (MULTIVITAMIN WITH MINERALS) tablet tablet Take 1 tablet by mouth Daily.     • lisinopril (PRINIVIL,ZESTRIL) 10 MG tablet TAKE 1 TABLET BY MOUTH EVERY DAY IN THE MORNING 90 tablet 0   • tamsulosin (FLOMAX)  "0.4 MG capsule 24 hr capsule TAKE 2 CAPSULES BY MOUTH EVERY EVENING. 180 capsule 0   • ACCU-CHEK FASTCLIX LANCETS misc USE TO TEST BLOOD SUGAR TWICE A  each 3   • ACCU-CHEK SMARTVIEW test strip USE TO TEST TWICE A  each 3   • metFORMIN ER (Glucophage XR) 500 MG 24 hr tablet 2 tabs twice daily with meals , generic ok 120 tablet 11     No facility-administered medications prior to visit.        Patient Active Problem List   Diagnosis   • Anterior ischemic optic neuropathy   • Hypertension   • Type 2 diabetes mellitus (CMS/HCC)   • Hx of adenomatous colonic polyps       Advanced Care Planning:  ACP discussion was declined by the patient. Patient does not have an advance directive, declines further assistance.    Review of Systems   Cardiovascular: Negative for chest pain and leg swelling.        Sees cardiologist next month   Gastrointestinal:        Residual colonic polyps-referred for scope   Endocrine:        Seeing diabetologist   All other systems reviewed and are negative.      Compared to one year ago, the patient feels his physical health is better.  Compared to one year ago, the patient feels his mental health is better.    Reviewed chart for potential of high risk medication in the elderly: yes  Reviewed chart for potential of harmful drug interactions in the elderly:yes    Objective         Vitals:    07/14/20 0918   BP: 124/82   BP Location: Left arm   Patient Position: Sitting   Cuff Size: Adult   Pulse: 92   Resp: 16   Temp: 96.8 °F (36 °C)   TempSrc: Temporal   SpO2: 97%   Weight: 83.5 kg (184 lb)   Height: 175.3 cm (69\")   PainSc: 0-No pain       Body mass index is 27.17 kg/m².  Discussed the patient's BMI with him. The BMI is above average; BMI management plan is completed.    Physical Exam   Constitutional: He is oriented to person, place, and time. He appears well-developed and well-nourished.   Slightly overweight   HENT:   Right Ear: External ear normal.   Left Ear: External ear " normal.   Eyes: Pupils are equal, round, and reactive to light.   Neck: No thyromegaly present.   Good carotid pulses no bruits heard   Cardiovascular: Normal rate and regular rhythm.   Pulmonary/Chest: Effort normal and breath sounds normal.   Abdominal: Soft. Bowel sounds are normal. He exhibits no distension and no mass.   Genitourinary: Rectum normal, prostate normal and penis normal.   Genitourinary Comments: No testicular masses inguinal hernias or adenopathy- prostate 2-3+ no nodules   Musculoskeletal: He exhibits no edema.   Lymphadenopathy:     He has no cervical adenopathy.   Neurological: He is alert and oriented to person, place, and time.   Skin: Skin is warm and dry. Capillary refill takes less than 2 seconds.   Psychiatric: He has a normal mood and affect. His behavior is normal. Judgment and thought content normal.   Nursing note and vitals reviewed.            Assessment/Plan   Medicare Risks and Personalized Health Plan  CMS Preventative Services Quick Reference  Colon Cancer Screening  Prostate Cancer Screening     The above risks/problems have been discussed with the patient.  Pertinent information has been shared with the patient in the After Visit Summary.  Follow up plans and orders are seen below in the Assessment/Plan Section.    Diagnoses and all orders for this visit:    1. Type 2 diabetes mellitus with hypoglycemia without coma, with long-term current use of insulin (CMS/formerly [facility]) (Primary)  -     MicroAlbumin, Urine, Random - Urine, Clean Catch  -     Hemoglobin A1c  -     POC Urinalysis Dipstick, Multipro    2. Fatigue, unspecified type  -     TSH  -     T4, free  -     CBC & Differential    3. Mixed hyperlipidemia  -     Comprehensive Metabolic Panel  -     Lipid Panel    4. Special screening for malignant neoplasm of prostate  -     PSA Screen    5. Annual physical exam    6. Hx of adenomatous colonic polyps  -     Ambulatory Referral to Gastroenterology    Other orders  -     lisinopril  (PRINIVIL,ZESTRIL) 10 MG tablet; Take 1 tablet by mouth Every Morning.  Dispense: 90 tablet; Refill: 0  -     tamsulosin (FLOMAX) 0.4 MG capsule 24 hr capsule; Take 2 capsules by mouth Every Evening.  Dispense: 180 capsule; Refill: 0      Follow Up:  Return in 6 months (on 1/14/2021).     An After Visit Summary and PPPS were given to the patient.         Plan above, COVID-19 safety, get flu shot early

## 2020-07-15 ENCOUNTER — TELEPHONE (OUTPATIENT)
Dept: ENDOCRINOLOGY | Facility: CLINIC | Age: 70
End: 2020-07-15

## 2020-07-15 ENCOUNTER — TELEPHONE (OUTPATIENT)
Dept: FAMILY MEDICINE CLINIC | Facility: CLINIC | Age: 70
End: 2020-07-15

## 2020-07-15 LAB
ALBUMIN SERPL-MCNC: 4.7 G/DL (ref 3.5–5.2)
ALBUMIN/GLOB SERPL: 2 G/DL
ALP SERPL-CCNC: 73 U/L (ref 39–117)
ALT SERPL-CCNC: 23 U/L (ref 1–41)
AST SERPL-CCNC: 13 U/L (ref 1–40)
BASOPHILS # BLD AUTO: 0.04 10*3/MM3 (ref 0–0.2)
BASOPHILS NFR BLD AUTO: 0.7 % (ref 0–1.5)
BILIRUB SERPL-MCNC: 0.5 MG/DL (ref 0–1.2)
BUN SERPL-MCNC: 25 MG/DL (ref 8–23)
BUN/CREAT SERPL: 12 (ref 7–25)
CALCIUM SERPL-MCNC: 9.7 MG/DL (ref 8.6–10.5)
CHLORIDE SERPL-SCNC: 98 MMOL/L (ref 98–107)
CHOLEST SERPL-MCNC: 164 MG/DL (ref 0–200)
CO2 SERPL-SCNC: 28.9 MMOL/L (ref 22–29)
CREAT SERPL-MCNC: 2.09 MG/DL (ref 0.76–1.27)
EOSINOPHIL # BLD AUTO: 0.29 10*3/MM3 (ref 0–0.4)
EOSINOPHIL NFR BLD AUTO: 4.8 % (ref 0.3–6.2)
ERYTHROCYTE [DISTWIDTH] IN BLOOD BY AUTOMATED COUNT: 12.7 % (ref 12.3–15.4)
GLOBULIN SER CALC-MCNC: 2.3 GM/DL
GLUCOSE SERPL-MCNC: 101 MG/DL (ref 65–99)
HBA1C MFR BLD: 10.2 % (ref 4.8–5.6)
HCT VFR BLD AUTO: 33.8 % (ref 37.5–51)
HDLC SERPL-MCNC: 35 MG/DL (ref 40–60)
HGB BLD-MCNC: 11.4 G/DL (ref 13–17.7)
IMM GRANULOCYTES # BLD AUTO: 0.02 10*3/MM3 (ref 0–0.05)
IMM GRANULOCYTES NFR BLD AUTO: 0.3 % (ref 0–0.5)
LDLC SERPL CALC-MCNC: 103 MG/DL (ref 0–100)
LYMPHOCYTES # BLD AUTO: 1.41 10*3/MM3 (ref 0.7–3.1)
LYMPHOCYTES NFR BLD AUTO: 23.5 % (ref 19.6–45.3)
MCH RBC QN AUTO: 29.9 PG (ref 26.6–33)
MCHC RBC AUTO-ENTMCNC: 33.7 G/DL (ref 31.5–35.7)
MCV RBC AUTO: 88.7 FL (ref 79–97)
MICROALBUMIN UR-MCNC: 26.1 UG/ML
MONOCYTES # BLD AUTO: 0.5 10*3/MM3 (ref 0.1–0.9)
MONOCYTES NFR BLD AUTO: 8.3 % (ref 5–12)
NEUTROPHILS # BLD AUTO: 3.74 10*3/MM3 (ref 1.7–7)
NEUTROPHILS NFR BLD AUTO: 62.4 % (ref 42.7–76)
NRBC BLD AUTO-RTO: 0 /100 WBC (ref 0–0.2)
PLATELET # BLD AUTO: 177 10*3/MM3 (ref 140–450)
POTASSIUM SERPL-SCNC: 5.2 MMOL/L (ref 3.5–5.2)
PROT SERPL-MCNC: 7 G/DL (ref 6–8.5)
PSA SERPL-MCNC: 1.02 NG/ML (ref 0–4)
RBC # BLD AUTO: 3.81 10*6/MM3 (ref 4.14–5.8)
SODIUM SERPL-SCNC: 139 MMOL/L (ref 136–145)
T4 FREE SERPL-MCNC: 1.42 NG/DL (ref 0.93–1.7)
TRIGL SERPL-MCNC: 128 MG/DL (ref 0–150)
TSH SERPL DL<=0.005 MIU/L-ACNC: 2.28 UIU/ML (ref 0.27–4.2)
VLDLC SERPL CALC-MCNC: 25.6 MG/DL
WBC # BLD AUTO: 6 10*3/MM3 (ref 3.4–10.8)

## 2020-07-17 ENCOUNTER — TELEPHONE (OUTPATIENT)
Dept: FAMILY MEDICINE CLINIC | Facility: CLINIC | Age: 70
End: 2020-07-17

## 2020-07-17 ENCOUNTER — TELEPHONE (OUTPATIENT)
Dept: ENDOCRINOLOGY | Facility: CLINIC | Age: 70
End: 2020-07-17

## 2020-07-17 NOTE — TELEPHONE ENCOUNTER
Patient called questioning how he was suppose to be taking his regular Metformin 500mg since Dr. Webb took him off metformin ER.  I informed him that after we got his A1C result of 10.2, I called Dr. Abraham's and they are suppose to be calling him to talk about his insulin and making some changes.  I suggested he call them to find out what they want him to do.

## 2020-07-20 ENCOUNTER — TELEPHONE (OUTPATIENT)
Dept: ENDOCRINOLOGY | Facility: CLINIC | Age: 70
End: 2020-07-20

## 2020-07-20 NOTE — TELEPHONE ENCOUNTER
Pt is wanting to discuss his Metformin with you, he states that his PCP took him off of it, and he would like to be called at [phone].         Returned patient call, voicemail left

## 2020-07-21 ENCOUNTER — TELEPHONE (OUTPATIENT)
Dept: ENDOCRINOLOGY | Facility: CLINIC | Age: 70
End: 2020-07-21

## 2020-08-01 NOTE — H&P (VIEW-ONLY)
Chief Complaint   Patient presents with   • Colonoscopy     had colon last yeat  22 polyps removed1 polyp could not get dr. linda       PCP: Brando Webb MD  REFER: Brando Webb,*    Subjective     HPI    Bernard Matta is a 69 y.o. male who presents to office for preventative maintenance.  There is  a personal history of colon polyps.  There is not a history of colon cancer.  He does not have complaints of nausea/vomiting, change in bowels, weight loss, no BRBPR, no melena.  There is not a family history of colon cancer.  There is not a family history of colon polyps.  His last colonoscopy-2019 .  Bowels do move on regular basis.    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     • atorvastatin (LIPITOR) 40 MG tablet Take 1 tablet by mouth Every Night. 30 tablet 11   • cetirizine (zyrTEC) 10 MG tablet Take 10 mg by mouth Daily.     • cholecalciferol (VITAMIN D3) 400 UNITS tablet Take 400 Units by mouth Daily.     • dorzolamide-timolol (COSOPT) 22.3-6.8 MG/ML ophthalmic solution 1 drop 2 (Two) Times a Day.     • fluticasone (FLONASE) 50 MCG/ACT nasal spray USE 2 SPRAYS IN EACH NOSTRIL EVERY DAY 48 mL 1   • insulin degludec (TRESIBA FLEXTOUCH) 100 UNIT/ML solution pen-injector injection Inject 28 Units under the skin into the appropriate area as directed Every Night. (Patient taking differently: Inject 15 Units under the skin into  the appropriate area as directed Every Night.) 3 pen 11   • Insulin Lispro (HUMALOG KWIKPEN) 100 UNIT/ML solution pen-injector Up to 20 units with meals 6 pen 11   • latanoprost (XALATAN) 0.005 % ophthalmic solution 1 drop Every Night.     • lisinopril (PRINIVIL,ZESTRIL) 10 MG tablet Take 1 tablet by mouth Every Morning. 90 tablet 0   • Multiple Vitamins-Minerals (MULTIVITAMIN WITH MINERALS) tablet tablet Take 1 tablet by mouth Daily.     • tamsulosin (FLOMAX) 0.4 MG capsule 24 hr capsule Take 2 capsules by mouth Every Evening. 180 capsule 0     No Known Allergies  Social History     Socioeconomic History   • Marital status:      Spouse name: Not on file   • Number of children: Not on file   • Years of education: Not on file   • Highest education level: Not on file   Tobacco Use   • Smoking status: Former Smoker     Packs/day: 0.50     Years: 10.00     Pack years: 5.00     Types: Cigarettes     Last attempt to quit: 1979     Years since quittin.6   • Smokeless tobacco: Never Used   Substance and Sexual Activity   • Alcohol use: No   • Drug use: No   • Sexual activity: Defer     Family History   Problem Relation Age of Onset   • No Known Problems Maternal Grandmother    • No Known Problems Maternal Grandfather    • No Known Problems Paternal Grandmother    • No Known Problems Paternal Grandfather    • No Known Problems Mother    • No Known Problems Father    • Colon cancer Neg Hx    • Colon polyps Neg Hx    • Esophageal cancer Neg Hx      Review of Systems   Constitutional: Negative for fatigue, fever and unexpected weight change.   HENT: Negative for hearing loss, sore throat and voice change.    Eyes: Negative for visual disturbance.   Respiratory: Negative for cough, shortness of breath and wheezing.    Cardiovascular: Negative for chest pain and palpitations.   Gastrointestinal: Negative for abdominal pain, blood in stool and vomiting.   Endocrine: Negative for polydipsia and polyuria.    Genitourinary: Negative for difficulty urinating, dysuria, hematuria and urgency.   Musculoskeletal: Negative for joint swelling and myalgias.   Skin: Negative for color change, rash and wound.   Neurological: Negative for dizziness, tremors, seizures and syncope.   Hematological: Does not bruise/bleed easily.   Psychiatric/Behavioral: Negative for agitation and confusion. The patient is not nervous/anxious.      Objective   Vitals:    08/03/20 0931   BP: 126/84   Pulse: 79   Temp: 97.8 °F (36.6 °C)   SpO2: 98%     Physical Exam   Constitutional: He is oriented to person, place, and time. He appears well-developed and well-nourished. He is cooperative.   HENT:   Head: Normocephalic and atraumatic.   Eyes: Pupils are equal, round, and reactive to light. Conjunctivae are normal. No scleral icterus.   Neck: Normal range of motion. Neck supple. No JVD present. No thyroid mass and no thyromegaly present.   Cardiovascular: Normal rate, regular rhythm and normal heart sounds. Exam reveals no gallop and no friction rub.   No murmur heard.  Pulmonary/Chest: Effort normal and breath sounds normal. No accessory muscle usage. No respiratory distress. He has no wheezes. He has no rales.   Abdominal: Soft. Normal appearance and bowel sounds are normal. He exhibits no distension, no ascites and no mass. There is no hepatosplenomegaly. There is no tenderness. There is no rebound and no guarding.   Musculoskeletal: Normal range of motion. He exhibits no edema or tenderness.     Vascular Status -  His right foot exhibits normal foot vasculature  and no edema. His left foot exhibits normal foot vasculature  and no edema.  Lymphadenopathy:     He has no cervical adenopathy.   Neurological: He is alert and oriented to person, place, and time. He has normal strength. Gait normal.   Skin: Skin is warm, dry and intact. No rash noted.     Imaging Results (Most Recent)     None        Body mass index is 27.02 kg/m².    Assessment/Plan    Bernard was seen today for colonoscopy.    Diagnoses and all orders for this visit:    Hx of adenomatous colonic polyps  -     Case Request; Standing  -     Case Request      COLONOSCOPY WITH ANESTHESIA (N/A)  Patient already had suprep    Pt to hold diabetes medication/insulin day of procedure to prevent any risk of complications of hypoglycemia intraprocedure.  If taking insulin 1/2 the PM dose as well     Advised pt to stop ASA, use of NSAIDs, Fish Oil, and MV 5 days prior to procedure, per Dr Mckenzie protocol.  Tylenol based products are ok to take.  Pt verbalized understanding.     Patient is to continue all blood pressure and cardiac medications prior to procedure and has been advised to take medications morning of procedure   Pt verbalized understanding     All risks, benefits, alternatives, and indications of colonoscopy procedure have been discussed with the patient. Risks to include perforation of the colon requiring possible surgery or colostomy, risk of bleeding from biopsies or removal of colon tissue, possibility of missing a colon polyp or cancer, or adverse drug reaction.  Benefits to include the diagnosis and management of disease of the colon and rectum. Alternatives to include barium enema, radiographic evaluation, lab testing or no intervention. He verbalizes understanding and agrees.     Precautions are currently being put in place due to COVID-19.  I have explained to Bernard Matta they will be required to undergo COVID testing prior to their procedure.  Bernard Matta verbalized understanding and was willing to proceed.     Patient's Body mass index is 27.02 kg/m². BMI is above normal parameters. Recommendations include: no follow up.          Kendall Saenz, APRN  08/03/20        There are no Patient Instructions on file for this visit.

## 2020-08-01 NOTE — PROGRESS NOTES
Chief Complaint   Patient presents with   • Colonoscopy     had colon last yeat  22 polyps removed1 polyp could not get dr. linda       PCP: Brando Webb MD  REFER: Brando Webb,*    Subjective     HPI    Bernard Matta is a 69 y.o. male who presents to office for preventative maintenance.  There is  a personal history of colon polyps.  There is not a history of colon cancer.  He does not have complaints of nausea/vomiting, change in bowels, weight loss, no BRBPR, no melena.  There is not a family history of colon cancer.  There is not a family history of colon polyps.  His last colonoscopy-2019 .  Bowels do move on regular basis.    Colonoscopy Dr Linda 8/2019-multiple polyps removed-patient stated 22 polyps removed  Prior colonoscopy was apx 2016    Past Medical History:   Diagnosis Date   • Diabetes mellitus (CMS/HCC)    • Erectile disorder due to 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    • Hyperlipidemia    • Hypertension    • Ischemic optic neuropathy of both eyes      Past Surgical History:   Procedure Laterality Date   • COLONOSCOPY  2019    [place]     Outpatient Medications Marked as Taking for the 8/3/20 encounter (Office Visit) with Kendall Saenz APRN   Medication Sig Dispense Refill   • aspirin 81 MG tablet Take 81 mg by mouth Daily.     • atorvastatin (LIPITOR) 40 MG tablet Take 1 tablet by mouth Every Night. 30 tablet 11   • cetirizine (zyrTEC) 10 MG tablet Take 10 mg by mouth Daily.     • cholecalciferol (VITAMIN D3) 400 UNITS tablet Take 400 Units by mouth Daily.     • dorzolamide-timolol (COSOPT) 22.3-6.8 MG/ML ophthalmic solution 1 drop 2 (Two) Times a Day.     • fluticasone (FLONASE) 50 MCG/ACT nasal spray USE 2 SPRAYS IN EACH NOSTRIL EVERY DAY 48 mL 1   • insulin degludec (TRESIBA FLEXTOUCH) 100 UNIT/ML solution pen-injector injection Inject 28 Units under the skin into the appropriate area as directed Every Night. (Patient taking differently: Inject 15 Units under the skin into  the appropriate area as directed Every Night.) 3 pen 11   • Insulin Lispro (HUMALOG KWIKPEN) 100 UNIT/ML solution pen-injector Up to 20 units with meals 6 pen 11   • latanoprost (XALATAN) 0.005 % ophthalmic solution 1 drop Every Night.     • lisinopril (PRINIVIL,ZESTRIL) 10 MG tablet Take 1 tablet by mouth Every Morning. 90 tablet 0   • Multiple Vitamins-Minerals (MULTIVITAMIN WITH MINERALS) tablet tablet Take 1 tablet by mouth Daily.     • tamsulosin (FLOMAX) 0.4 MG capsule 24 hr capsule Take 2 capsules by mouth Every Evening. 180 capsule 0     No Known Allergies  Social History     Socioeconomic History   • Marital status:      Spouse name: Not on file   • Number of children: Not on file   • Years of education: Not on file   • Highest education level: Not on file   Tobacco Use   • Smoking status: Former Smoker     Packs/day: 0.50     Years: 10.00     Pack years: 5.00     Types: Cigarettes     Last attempt to quit: 1979     Years since quittin.6   • Smokeless tobacco: Never Used   Substance and Sexual Activity   • Alcohol use: No   • Drug use: No   • Sexual activity: Defer     Family History   Problem Relation Age of Onset   • No Known Problems Maternal Grandmother    • No Known Problems Maternal Grandfather    • No Known Problems Paternal Grandmother    • No Known Problems Paternal Grandfather    • No Known Problems Mother    • No Known Problems Father    • Colon cancer Neg Hx    • Colon polyps Neg Hx    • Esophageal cancer Neg Hx      Review of Systems   Constitutional: Negative for fatigue, fever and unexpected weight change.   HENT: Negative for hearing loss, sore throat and voice change.    Eyes: Negative for visual disturbance.   Respiratory: Negative for cough, shortness of breath and wheezing.    Cardiovascular: Negative for chest pain and palpitations.   Gastrointestinal: Negative for abdominal pain, blood in stool and vomiting.   Endocrine: Negative for polydipsia and polyuria.    Genitourinary: Negative for difficulty urinating, dysuria, hematuria and urgency.   Musculoskeletal: Negative for joint swelling and myalgias.   Skin: Negative for color change, rash and wound.   Neurological: Negative for dizziness, tremors, seizures and syncope.   Hematological: Does not bruise/bleed easily.   Psychiatric/Behavioral: Negative for agitation and confusion. The patient is not nervous/anxious.      Objective   Vitals:    08/03/20 0931   BP: 126/84   Pulse: 79   Temp: 97.8 °F (36.6 °C)   SpO2: 98%     Physical Exam   Constitutional: He is oriented to person, place, and time. He appears well-developed and well-nourished. He is cooperative.   HENT:   Head: Normocephalic and atraumatic.   Eyes: Pupils are equal, round, and reactive to light. Conjunctivae are normal. No scleral icterus.   Neck: Normal range of motion. Neck supple. No JVD present. No thyroid mass and no thyromegaly present.   Cardiovascular: Normal rate, regular rhythm and normal heart sounds. Exam reveals no gallop and no friction rub.   No murmur heard.  Pulmonary/Chest: Effort normal and breath sounds normal. No accessory muscle usage. No respiratory distress. He has no wheezes. He has no rales.   Abdominal: Soft. Normal appearance and bowel sounds are normal. He exhibits no distension, no ascites and no mass. There is no hepatosplenomegaly. There is no tenderness. There is no rebound and no guarding.   Musculoskeletal: Normal range of motion. He exhibits no edema or tenderness.     Vascular Status -  His right foot exhibits normal foot vasculature  and no edema. His left foot exhibits normal foot vasculature  and no edema.  Lymphadenopathy:     He has no cervical adenopathy.   Neurological: He is alert and oriented to person, place, and time. He has normal strength. Gait normal.   Skin: Skin is warm, dry and intact. No rash noted.     Imaging Results (Most Recent)     None        Body mass index is 27.02 kg/m².    Assessment/Plan    Bernard was seen today for colonoscopy.    Diagnoses and all orders for this visit:    Hx of adenomatous colonic polyps  -     Case Request; Standing  -     Case Request      COLONOSCOPY WITH ANESTHESIA (N/A)  Patient already had suprep    Pt to hold diabetes medication/insulin day of procedure to prevent any risk of complications of hypoglycemia intraprocedure.  If taking insulin 1/2 the PM dose as well     Advised pt to stop ASA, use of NSAIDs, Fish Oil, and MV 5 days prior to procedure, per Dr Mckenzie protocol.  Tylenol based products are ok to take.  Pt verbalized understanding.     Patient is to continue all blood pressure and cardiac medications prior to procedure and has been advised to take medications morning of procedure   Pt verbalized understanding     All risks, benefits, alternatives, and indications of colonoscopy procedure have been discussed with the patient. Risks to include perforation of the colon requiring possible surgery or colostomy, risk of bleeding from biopsies or removal of colon tissue, possibility of missing a colon polyp or cancer, or adverse drug reaction.  Benefits to include the diagnosis and management of disease of the colon and rectum. Alternatives to include barium enema, radiographic evaluation, lab testing or no intervention. He verbalizes understanding and agrees.     Precautions are currently being put in place due to COVID-19.  I have explained to Bernard Matta they will be required to undergo COVID testing prior to their procedure.  Bernard Matta verbalized understanding and was willing to proceed.     Patient's Body mass index is 27.02 kg/m². BMI is above normal parameters. Recommendations include: no follow up.          Kendall Saenz, APRN  08/03/20        There are no Patient Instructions on file for this visit.

## 2020-08-03 ENCOUNTER — OFFICE VISIT (OUTPATIENT)
Dept: GASTROENTEROLOGY | Facility: CLINIC | Age: 70
End: 2020-08-03

## 2020-08-03 VITALS
DIASTOLIC BLOOD PRESSURE: 84 MMHG | TEMPERATURE: 97.8 F | HEART RATE: 79 BPM | BODY MASS INDEX: 27.11 KG/M2 | SYSTOLIC BLOOD PRESSURE: 126 MMHG | HEIGHT: 69 IN | WEIGHT: 183 LBS | OXYGEN SATURATION: 98 %

## 2020-08-03 DIAGNOSIS — Z86.010 HX OF ADENOMATOUS COLONIC POLYPS: Primary | ICD-10-CM

## 2020-08-03 PROCEDURE — S0260 H&P FOR SURGERY: HCPCS | Performed by: NURSE PRACTITIONER

## 2020-08-04 ENCOUNTER — TRANSCRIBE ORDERS (OUTPATIENT)
Dept: ADMINISTRATIVE | Facility: HOSPITAL | Age: 70
End: 2020-08-04

## 2020-08-04 DIAGNOSIS — Z01.818 PRE-OP TESTING: Primary | ICD-10-CM

## 2020-08-08 ENCOUNTER — LAB (OUTPATIENT)
Dept: LAB | Facility: HOSPITAL | Age: 70
End: 2020-08-08

## 2020-08-08 PROCEDURE — U0003 INFECTIOUS AGENT DETECTION BY NUCLEIC ACID (DNA OR RNA); SEVERE ACUTE RESPIRATORY SYNDROME CORONAVIRUS 2 (SARS-COV-2) (CORONAVIRUS DISEASE [COVID-19]), AMPLIFIED PROBE TECHNIQUE, MAKING USE OF HIGH THROUGHPUT TECHNOLOGIES AS DESCRIBED BY CMS-2020-01-R: HCPCS | Performed by: INTERNAL MEDICINE

## 2020-08-08 PROCEDURE — C9803 HOPD COVID-19 SPEC COLLECT: HCPCS | Performed by: INTERNAL MEDICINE

## 2020-08-09 LAB
COVID LABCORP PRIORITY: NORMAL
SARS-COV-2 RNA RESP QL NAA+PROBE: NOT DETECTED

## 2020-08-11 ENCOUNTER — ANESTHESIA EVENT (OUTPATIENT)
Dept: GASTROENTEROLOGY | Facility: HOSPITAL | Age: 70
End: 2020-08-11

## 2020-08-11 ENCOUNTER — TELEPHONE (OUTPATIENT)
Dept: GASTROENTEROLOGY | Facility: CLINIC | Age: 70
End: 2020-08-11

## 2020-08-11 ENCOUNTER — ANESTHESIA (OUTPATIENT)
Dept: GASTROENTEROLOGY | Facility: HOSPITAL | Age: 70
End: 2020-08-11

## 2020-08-11 ENCOUNTER — HOSPITAL ENCOUNTER (OUTPATIENT)
Facility: HOSPITAL | Age: 70
Setting detail: HOSPITAL OUTPATIENT SURGERY
Discharge: HOME | End: 2020-08-11
Attending: INTERNAL MEDICINE | Admitting: INTERNAL MEDICINE

## 2020-08-11 VITALS
DIASTOLIC BLOOD PRESSURE: 60 MMHG | HEART RATE: 81 BPM | BODY MASS INDEX: 26.36 KG/M2 | OXYGEN SATURATION: 97 % | RESPIRATION RATE: 16 BRPM | TEMPERATURE: 97 F | SYSTOLIC BLOOD PRESSURE: 87 MMHG | WEIGHT: 178 LBS | HEIGHT: 69 IN

## 2020-08-11 DIAGNOSIS — Z86.010 HX OF ADENOMATOUS COLONIC POLYPS: ICD-10-CM

## 2020-08-11 LAB — GLUCOSE BLDC GLUCOMTR-MCNC: 182 MG/DL (ref 70–130)

## 2020-08-11 PROCEDURE — 88305 TISSUE EXAM BY PATHOLOGIST: CPT | Performed by: INTERNAL MEDICINE

## 2020-08-11 PROCEDURE — 25010000002 PROPOFOL 10 MG/ML EMULSION: Performed by: NURSE ANESTHETIST, CERTIFIED REGISTERED

## 2020-08-11 PROCEDURE — 82962 GLUCOSE BLOOD TEST: CPT

## 2020-08-11 PROCEDURE — 45385 COLONOSCOPY W/LESION REMOVAL: CPT | Performed by: INTERNAL MEDICINE

## 2020-08-11 RX ORDER — SODIUM CHLORIDE 9 MG/ML
500 INJECTION, SOLUTION INTRAVENOUS CONTINUOUS PRN
Status: DISCONTINUED | OUTPATIENT
Start: 2020-08-11 | End: 2020-08-11 | Stop reason: HOSPADM

## 2020-08-11 RX ORDER — SODIUM CHLORIDE 0.9 % (FLUSH) 0.9 %
10 SYRINGE (ML) INJECTION AS NEEDED
Status: DISCONTINUED | OUTPATIENT
Start: 2020-08-11 | End: 2020-08-11 | Stop reason: HOSPADM

## 2020-08-11 RX ORDER — PROPOFOL 10 MG/ML
VIAL (ML) INTRAVENOUS AS NEEDED
Status: DISCONTINUED | OUTPATIENT
Start: 2020-08-11 | End: 2020-08-11 | Stop reason: SURG

## 2020-08-11 RX ORDER — LIDOCAINE HYDROCHLORIDE 10 MG/ML
0.5 INJECTION, SOLUTION EPIDURAL; INFILTRATION; INTRACAUDAL; PERINEURAL ONCE AS NEEDED
Status: DISCONTINUED | OUTPATIENT
Start: 2020-08-11 | End: 2020-08-11 | Stop reason: HOSPADM

## 2020-08-11 RX ADMIN — LIDOCAINE HYDROCHLORIDE 50 MG: 20 INJECTION, SOLUTION INTRAVENOUS at 10:32

## 2020-08-11 RX ADMIN — SODIUM CHLORIDE 500 ML: 9 INJECTION, SOLUTION INTRAVENOUS at 08:13

## 2020-08-11 RX ADMIN — PROPOFOL 450 MG: 10 INJECTION, EMULSION INTRAVENOUS at 10:33

## 2020-08-11 NOTE — ANESTHESIA POSTPROCEDURE EVALUATION
Patient: Bernard Matta    Procedure Summary     Date:  08/11/20 Room / Location:   [facility] /  [facility]    Anesthesia Start:  1030 Anesthesia Stop:  1053    Procedure:  COLONOSCOPY WITH ANESTHESIA (N/A ) Diagnosis:       Hx of adenomatous colonic polyps      (Hx of adenomatous colonic polyps [Z86.010])    Surgeon:  Shaquille Mckenzie DO Provider:  Stefan Aranda CRNA    Anesthesia Type:  MAC ASA Status:  3          Anesthesia Type: MAC    Vitals  Vitals Value Taken Time   BP     Temp     Pulse 78 8/11/2020 10:52 AM   Resp     SpO2 100 % 8/11/2020 10:52 AM   Vitals shown include unvalidated device data.        Post Anesthesia Care and Evaluation    Patient location during evaluation: PACU  Patient participation: complete - patient participated  Level of consciousness: awake and awake and alert  Pain score: 0  Pain management: adequate  Airway patency: patent  Anesthetic complications: No anesthetic complications    Cardiovascular status: acceptable and stable  Respiratory status: acceptable and unassisted  Hydration status: acceptable

## 2020-08-11 NOTE — ANESTHESIA PREPROCEDURE EVALUATION
Anesthesia Evaluation     Patient summary reviewed and Nursing notes reviewed   no history of anesthetic complications:  NPO Solid Status: > 8 hours  NPO Liquid Status: > 4 hours           Airway   Mallampati: I  TM distance: >3 FB  Neck ROM: full  No difficulty expected  Dental    (+) lower dentures and upper dentures    Pulmonary    (+) a smoker (quit 40 years ago) Former,   Cardiovascular   Exercise tolerance: good (4-7 METS)    (+) hypertension, hyperlipidemia,   (-) CAD      Neuro/Psych  (-) seizures, TIA, CVA  GI/Hepatic/Renal/Endo    (+)   diabetes mellitus type 2 using insulin,   (-) liver disease, no renal disease    Musculoskeletal     Abdominal    Substance History      OB/GYN          Other                        Anesthesia Plan    ASA 3     MAC     intravenous induction     Anesthetic plan, all risks, benefits, and alternatives have been provided, discussed and informed consent has been obtained with: patient.

## 2020-08-12 LAB
LAB AP CASE REPORT: NORMAL
PATH REPORT.FINAL DX SPEC: NORMAL
PATH REPORT.GROSS SPEC: NORMAL

## 2020-08-19 ENCOUNTER — OFFICE VISIT (OUTPATIENT)
Dept: ENDOCRINOLOGY | Facility: CLINIC | Age: 70
End: 2020-08-19

## 2020-08-19 VITALS
SYSTOLIC BLOOD PRESSURE: 120 MMHG | OXYGEN SATURATION: 98 % | DIASTOLIC BLOOD PRESSURE: 70 MMHG | BODY MASS INDEX: 26.96 KG/M2 | HEIGHT: 69 IN | WEIGHT: 182 LBS | HEART RATE: 85 BPM

## 2020-08-19 DIAGNOSIS — I15.2 HYPERTENSION ASSOCIATED WITH DIABETES (HCC): ICD-10-CM

## 2020-08-19 DIAGNOSIS — E11.59 HYPERTENSION ASSOCIATED WITH DIABETES (HCC): ICD-10-CM

## 2020-08-19 DIAGNOSIS — Z79.4 TYPE 2 DIABETES MELLITUS WITH COMPLICATION, WITH LONG-TERM CURRENT USE OF INSULIN (HCC): Primary | ICD-10-CM

## 2020-08-19 DIAGNOSIS — E11.8 TYPE 2 DIABETES MELLITUS WITH COMPLICATION, WITH LONG-TERM CURRENT USE OF INSULIN (HCC): Primary | ICD-10-CM

## 2020-08-19 PROCEDURE — 99214 OFFICE O/P EST MOD 30 MIN: CPT | Performed by: NURSE PRACTITIONER

## 2020-08-19 NOTE — PROGRESS NOTES
" Bernard Matta is a 69 y.o. male who presents for  Follow up of his type 2 diabetes.     Chief Complaint   Patient presents with   • Diabetes       Referring provider    No referring provider defined for this encounter.    Primary Care Provider    Brando Webb MD    Duration 10 years    Timing - Diabetes is Constant    Quality -  poorly controlled, improving     Severity -  severe    Complications - none    Current symptoms/problems  increase appetite, polydipsia and polyuria     Alleviating Factors: Compliance      Side Effects  none    Current diet  in general, a \"healthy\" diet      Current exercise walking    Current monitoring regimen: home blood tests - checking 4 x daily     Home blood sugar records:     Hypoglycemia nocturnal, none since last visit       Blood sugar has been running high     Past Medical History:   Diagnosis Date   • Diabetes mellitus (CMS/[facility])    • Erectile disorder due to 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    • Hyperlipidemia    • Hypertension    • Ischemic optic neuropathy of both eyes      Family History   Problem Relation Age of Onset   • No Known Problems Maternal Grandmother    • No Known Problems Maternal Grandfather    • No Known Problems Paternal Grandmother    • No Known Problems Paternal Grandfather    • No Known Problems Mother    • No Known Problems Father    • Colon cancer Neg Hx    • Colon polyps Neg Hx    • Esophageal cancer Neg Hx      Social History     Tobacco Use   • Smoking status: Former Smoker     Packs/day: 0.50     Years: 10.00     Pack years: 5.00     Types: Cigarettes     Last attempt to quit: 1979     Years since quittin.6   • Smokeless tobacco: Never Used   Substance Use Topics   • Alcohol use: No   • Drug use: No         Current Outpatient Medications:   •  ACCU-CHEK FASTCLIX LANCETS misc, USE TO TEST BLOOD SUGAR TWICE A DAY, Disp: 180 each, Rfl: 3  •  ACCU-CHEK SMARTVIEW test strip, USE TO TEST TWICE A DAY, Disp: 200 each, Rfl: 3  •  aspirin 81 " MG tablet, Take 81 mg by mouth Daily., Disp: , Rfl:   •  atorvastatin (LIPITOR) 40 MG tablet, Take 1 tablet by mouth Every Night., Disp: 30 tablet, Rfl: 11  •  cetirizine (zyrTEC) 10 MG tablet, Take 10 mg by mouth Daily., Disp: , Rfl:   •  cholecalciferol (VITAMIN D3) 400 UNITS tablet, Take 400 Units by mouth Daily., Disp: , Rfl:   •  dorzolamide-timolol (COSOPT) 22.3-6.8 MG/ML ophthalmic solution, 1 drop 2 (Two) Times a Day., Disp: , Rfl:   •  fluticasone (FLONASE) 50 MCG/ACT nasal spray, USE 2 SPRAYS IN EACH NOSTRIL EVERY DAY, Disp: 48 mL, Rfl: 1  •  insulin degludec (Tresiba FlexTouch) 100 UNIT/ML solution pen-injector injection, Inject 15 Units under the skin into the appropriate area as directed Every Night., Disp: 9 pen, Rfl: 11  •  Insulin Lispro (HUMALOG KWIKPEN) 100 UNIT/ML solution pen-injector, Up to 20 units with meals, Disp: 6 pen, Rfl: 11  •  Insulin Pen Needle (B-D UF III MINI PEN NEEDLES) 31G X 5 MM misc, Use 4 times daily  , ICD10 code is E11.9, Disp: 120 each, Rfl: 11  •  latanoprost (XALATAN) 0.005 % ophthalmic solution, 1 drop Every Night., Disp: , Rfl:   •  lisinopril (PRINIVIL,ZESTRIL) 10 MG tablet, Take 1 tablet by mouth Every Morning., Disp: 90 tablet, Rfl: 0  •  Multiple Vitamins-Minerals (MULTIVITAMIN WITH MINERALS) tablet tablet, Take 1 tablet by mouth Daily., Disp: , Rfl:   •  tamsulosin (FLOMAX) 0.4 MG capsule 24 hr capsule, Take 2 capsules by mouth Every Evening., Disp: 180 capsule, Rfl: 0    Review of Systems    Review of Systems   Constitutional: Negative for activity change, appetite change, chills, diaphoresis, fatigue, fever and unexpected weight change.   HENT: Negative for congestion, dental problem, drooling, ear discharge, ear pain, facial swelling, mouth sores, postnasal drip, rhinorrhea, sinus pressure, sore throat, tinnitus, trouble swallowing and voice change.    Eyes: Negative for photophobia, pain, discharge, redness, itching and visual disturbance.   Respiratory:  "Negative for apnea, cough, choking, chest tightness, shortness of breath, wheezing and stridor.    Cardiovascular: Negative for chest pain, palpitations and leg swelling.   Gastrointestinal: Negative for abdominal distention, abdominal pain, constipation, diarrhea, nausea and vomiting.   Endocrine: Negative for cold intolerance, heat intolerance, polydipsia, polyphagia and polyuria.   Genitourinary: Negative for decreased urine volume, difficulty urinating, dysuria, flank pain, frequency, hematuria and urgency.   Musculoskeletal: Negative for arthralgias, back pain, gait problem, joint swelling, myalgias, neck pain and neck stiffness.   Skin: Negative for color change, pallor, rash and wound.   Allergic/Immunologic: Negative for immunocompromised state.   Neurological: Negative for dizziness, tremors, seizures, syncope, facial asymmetry, speech difficulty, weakness, light-headedness, numbness and headaches.   Hematological: Negative for adenopathy.   Psychiatric/Behavioral: Negative for agitation, behavioral problems, confusion, decreased concentration, dysphoric mood, hallucinations, self-injury, sleep disturbance and suicidal ideas. The patient is not nervous/anxious and is not hyperactive.         Objective:   /70   Pulse 85   Ht 175.3 cm (69\")   Wt 82.6 kg (182 lb)   SpO2 98%   BMI 26.88 kg/m²     Physical Exam   Constitutional: He is oriented to person, place, and time. He appears well-developed and well-nourished. He is cooperative.   HENT:   Head: Normocephalic and atraumatic.   Right Ear: External ear normal.   Left Ear: External ear normal.   Nose: Nose normal.   Mouth/Throat: Oropharynx is clear and moist. No oropharyngeal exudate.   Eyes: Pupils are equal, round, and reactive to light. Conjunctivae and EOM are normal. No scleral icterus. Right eye exhibits normal extraocular motion. Left eye exhibits normal extraocular motion.   Neck: Neck supple. No JVD present. No muscular tenderness present. " No tracheal deviation, no edema and no erythema present. No thyromegaly present.   Cardiovascular: Normal rate, regular rhythm, normal heart sounds and intact distal pulses. Exam reveals no gallop and no friction rub.   No murmur heard.  Pulmonary/Chest: Effort normal and breath sounds normal. No stridor. No respiratory distress. He has no decreased breath sounds. He has no wheezes. He has no rhonchi. He has no rales. He exhibits no tenderness.   Abdominal: Soft. Bowel sounds are normal. He exhibits no distension and no mass. There is no hepatomegaly. There is no tenderness. There is no rebound and no guarding. No hernia.   Musculoskeletal: Normal range of motion. He exhibits no edema, tenderness or deformity.   Lymphadenopathy:     He has no cervical adenopathy.   Neurological: He is alert and oriented to person, place, and time. He has normal reflexes. No cranial nerve deficit. He exhibits normal muscle tone. Coordination normal.   Skin: Skin is warm. No rash noted. No erythema. No pallor.   Psychiatric: He has a normal mood and affect. His behavior is normal. Judgment and thought content normal.   Nursing note and vitals reviewed.      Lab Review          Assessment/Plan       ICD-10-CM ICD-9-CM   1. Type 2 diabetes mellitus with complication, with long-term current use of insulin (CMS/[facility]) E11.8 250.90    Z79.4 V58.67   2. Hypertension associated with diabetes (CMS/[facility]) E11.59 250.80    I10 401.9          Most recent labs reviewed    1. Type 2 diabetes     Glycemic Management:   Lab Results   Component Value Date    HGBA1C 10.20 (H) 07/14/2020    HGBA1C 8.6 08/27/2019    HGBA1C 11.00 (H) 06/26/2019     Lab Results   Component Value Date    BUN 25 (H) 07/14/2020    CREATININE 2.09 (H) 07/14/2020    EGFRIFNONA 31 (L) 07/14/2020    EGFRIFAFRI 38 (L) 07/14/2020    BCR 12.0 07/14/2020    K 5.2 07/14/2020    CO2 28.9 07/14/2020    CALCIUM 9.7 07/14/2020    PROTENTOTREF 7.0 07/14/2020    ALBUMIN 4.70 07/14/2020     LABIL2 2.0 07/14/2020    AST 13 07/14/2020    ALT 23 07/14/2020     Lab Results   Component Value Date    WBC 6.00 07/14/2020    HGB 11.4 (L) 07/14/2020    HCT 33.8 (L) 07/14/2020    MCV 88.7 07/14/2020     07/14/2020      Previously on Glipizide - stopped      levemir doing 40 units, decrease to 28 and change to tresiba--decreased to 20 -17 - 18-  Increase to 20    Metformin 1000 mg po bid- stopped,  side effects     Trulicity 0.75 mg weekly---stopped a week ago ( 8/18/2019) due to GI side effects     Sliding scale before meals     Set dose of 6 units with each meal - we need better meal time control increase to 7-8 units with meals.- He has not been giving set doses, only using sliding scale- he will start giving set dose in addition to sliding scale        a1c from 2/2020- 8.6    Lab Results   Component Value Date    HGBA1C 10.20 (H) 07/14/2020       We need better meal time control     Diagnostic eleuterio inserted in office today to define blood sugar pattern           approve for CGM      1. Patient is diabetic, insulin dependent  2. He checks his sugars 4 times daily with variability from 50 up to 400  3, requires basal insulin and prandial insulin 3 times daily for a total of 4 injections per day  4. Based on glucose readings we make adjustments to the insulin  5. We have not achieved ideal outcomes with more information with a continuous  glucose sensor we should be able to do so  6. We see him every 2 to 3 months for diabetes management  7. Patient has hypoglycemia with unawareness  8. Patient has completed diabetes education  9. Patient has day to day variation in mealtime which confounds the degree of insulin dosing with multiple injections unless we have the CGMS that allows us to better  insulin dosing           Lipid Management  Most recent labs reviewed     Lab Results   Component Value Date    TRIG 128 07/14/2020    TRIG 113 06/26/2019    TRIG 92 06/21/2018     Lab Results   Component Value  Date    HDL 35 (L) 07/14/2020    HDL 40 06/26/2019    HDL 37 (L) 06/21/2018     No components found for: LDLCALC  Lab Results   Component Value Date     (H) 07/14/2020     (H) 06/26/2019     (H) 06/21/2018     No results found for: LDLDIRECT    On lipitor 20 mg qhs - changed to 40 mg qhs     Continue on medication      2. Hypertension     Blood Pressure Management:    Vitals:    08/19/20 0845   BP: 120/70   Pulse: 85   SpO2: 98%     Lab Results   Component Value Date    CALCIUM 9.7 07/14/2020     07/14/2020    K 5.2 07/14/2020    CO2 28.9 07/14/2020    CL 98 07/14/2020    BUN 25 (H) 07/14/2020    CREATININE 2.09 (H) 07/14/2020    EGFRIFAFRI 38 (L) 07/14/2020    EGFRIFNONA 31 (L) 07/14/2020    BCR 12.0 07/14/2020     Lisinopril 10mg     Continue on medication     Microvascular Complication Monitoring:      Eye Exam Evaluation: Up to date     -----------    Last Microalbumin-Proteinuria Assessment    Lab Results   Component Value Date    MALBCRERATIO 16.0 06/26/2019       No results found for: UTPCR    -----------      Neuropathy      Immunizations:          Preventive Care:        Weight Related:   Wt Readings from Last 3 Encounters:   08/19/20 82.6 kg (182 lb)   08/11/20 80.7 kg (178 lb)   08/03/20 83 kg (183 lb)     Body mass index is 26.88 kg/m².        Diet interventions: moderate (500 kCal/d) deficit diet.      Bone Health    2/2020 Vit D: 46.4    Lab Results   Component Value Date    CALCIUM 9.7 07/14/2020    GILP61LJ 48.9 06/26/2019       Thyroid Health    Lab Results   Component Value Date    TSH 2.280 07/14/2020    TSH 2.550 06/26/2019    TSH 1.570 06/21/2018       Lab Results   Component Value Date    FREET4 1.42 07/14/2020    FREET4 1.35 06/26/2019    FREET4 1.23 06/21/2018         Other Diabetes Related Aspects     DM eye exam: up to date       No orders of the defined types were placed in this encounter.    Return in about 4 weeks (around 9/16/2020), or if symptoms worsen or  fail to improve, for Recheck.            This document has been electronically signed by ALONSO Alfaro on August 19, 2020 10:44

## 2020-08-26 ENCOUNTER — TELEPHONE (OUTPATIENT)
Dept: GASTROENTEROLOGY | Facility: CLINIC | Age: 70
End: 2020-08-26

## 2020-08-26 NOTE — TELEPHONE ENCOUNTER
patient called for results of his colon done on 8-.i told him the 3 polyps  Showed tubular adenoma which was not cancer but dr. Mckenize would do another colon in 1 year.

## 2020-09-09 ENCOUNTER — OFFICE VISIT (OUTPATIENT)
Dept: ENDOCRINOLOGY | Facility: CLINIC | Age: 70
End: 2020-09-09

## 2020-09-09 ENCOUNTER — FLU SHOT (OUTPATIENT)
Dept: FAMILY MEDICINE CLINIC | Facility: CLINIC | Age: 70
End: 2020-09-09

## 2020-09-09 DIAGNOSIS — Z79.4 TYPE 2 DIABETES MELLITUS WITH HYPERGLYCEMIA, WITH LONG-TERM CURRENT USE OF INSULIN (HCC): Primary | ICD-10-CM

## 2020-09-09 DIAGNOSIS — E55.9 VITAMIN D DEFICIENCY: ICD-10-CM

## 2020-09-09 DIAGNOSIS — E11.59 HYPERTENSION ASSOCIATED WITH DIABETES (HCC): ICD-10-CM

## 2020-09-09 DIAGNOSIS — E11.65 TYPE 2 DIABETES MELLITUS WITH HYPERGLYCEMIA, WITH LONG-TERM CURRENT USE OF INSULIN (HCC): Primary | ICD-10-CM

## 2020-09-09 DIAGNOSIS — Z23 NEED FOR INFLUENZA VACCINATION: ICD-10-CM

## 2020-09-09 DIAGNOSIS — I15.2 HYPERTENSION ASSOCIATED WITH DIABETES (HCC): ICD-10-CM

## 2020-09-09 PROCEDURE — 95251 CONT GLUC MNTR ANALYSIS I&R: CPT | Performed by: NURSE PRACTITIONER

## 2020-09-09 PROCEDURE — G0008 ADMIN INFLUENZA VIRUS VAC: HCPCS | Performed by: FAMILY MEDICINE

## 2020-09-09 PROCEDURE — 99213 OFFICE O/P EST LOW 20 MIN: CPT | Performed by: NURSE PRACTITIONER

## 2020-09-09 PROCEDURE — 90694 VACC AIIV4 NO PRSRV 0.5ML IM: CPT | Performed by: FAMILY MEDICINE

## 2020-09-09 NOTE — PROGRESS NOTES
" Bernard Matta is a 69 y.o. male who presents for  Follow up of his type 2 diabetes.     Chief Complaint   Patient presents with   • Diabetes     You have chosen to receive care through a telephone visit. Do you consent to use a telephone visit for your medical care today? Yes    This telephone visit lasted 14 minutes     Referring provider    No referring provider defined for this encounter.    Primary Care Provider    Barndo Webb MD    Duration 10 years    Timing - Diabetes is Constant    Quality -  poorly controlled    Severity -  severe    Complications - none    Current symptoms/problems  increase appetite, polydipsia and polyuria     Alleviating Factors: Compliance      Side Effects  none    Current diet  in general, a \"healthy\" diet      Current exercise walking    Current monitoring regimen: home blood tests - checking 4 x daily     Home blood sugar records:     Hypoglycemia nocturnal, none since last visit       Diagnostic Ashley readings from Aug 21- Sept 3     Very high-4%  High- 39%  Target-57%  Low-0%    Past Medical History:   Diagnosis Date   • Diabetes mellitus (CMS/HCC)    • Erectile disorder due to 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    • Hyperlipidemia    • Hypertension    • Ischemic optic neuropathy of both eyes      Family History   Problem Relation Age of Onset   • No Known Problems Maternal Grandmother    • No Known Problems Maternal Grandfather    • No Known Problems Paternal Grandmother    • No Known Problems Paternal Grandfather    • No Known Problems Mother    • No Known Problems Father    • Colon cancer Neg Hx    • Colon polyps Neg Hx    • Esophageal cancer Neg Hx      Social History     Tobacco Use   • Smoking status: Former Smoker     Packs/day: 0.50     Years: 10.00     Pack years: 5.00     Types: Cigarettes     Last attempt to quit: 1979     Years since quittin.7   • Smokeless tobacco: Never Used   Substance Use Topics   • Alcohol use: No   • Drug use: No "         Current Outpatient Medications:   •  ACCU-CHEK FASTCLIX LANCETS misc, USE TO TEST BLOOD SUGAR TWICE A DAY, Disp: 180 each, Rfl: 3  •  ACCU-CHEK SMARTVIEW test strip, USE TO TEST TWICE A DAY, Disp: 200 each, Rfl: 3  •  aspirin 81 MG tablet, Take 81 mg by mouth Daily., Disp: , Rfl:   •  atorvastatin (LIPITOR) 40 MG tablet, Take 1 tablet by mouth Every Night., Disp: 30 tablet, Rfl: 11  •  cetirizine (zyrTEC) 10 MG tablet, Take 10 mg by mouth Daily., Disp: , Rfl:   •  cholecalciferol (VITAMIN D3) 400 UNITS tablet, Take 400 Units by mouth Daily., Disp: , Rfl:   •  dorzolamide-timolol (COSOPT) 22.3-6.8 MG/ML ophthalmic solution, 1 drop 2 (Two) Times a Day., Disp: , Rfl:   •  fluticasone (FLONASE) 50 MCG/ACT nasal spray, USE 2 SPRAYS IN EACH NOSTRIL EVERY DAY, Disp: 48 mL, Rfl: 1  •  insulin degludec (Tresiba FlexTouch) 100 UNIT/ML solution pen-injector injection, Inject 15 Units under the skin into the appropriate area as directed Every Night., Disp: 9 pen, Rfl: 11  •  Insulin Lispro (HUMALOG KWIKPEN) 100 UNIT/ML solution pen-injector, Up to 20 units with meals, Disp: 6 pen, Rfl: 11  •  Insulin Pen Needle (B-D UF III MINI PEN NEEDLES) 31G X 5 MM misc, USE 4 TIMES DAILY, Disp: 200 each, Rfl: 11  •  latanoprost (XALATAN) 0.005 % ophthalmic solution, 1 drop Every Night., Disp: , Rfl:   •  lisinopril (PRINIVIL,ZESTRIL) 10 MG tablet, Take 1 tablet by mouth Every Morning., Disp: 90 tablet, Rfl: 0  •  Multiple Vitamins-Minerals (MULTIVITAMIN WITH MINERALS) tablet tablet, Take 1 tablet by mouth Daily., Disp: , Rfl:   •  tamsulosin (FLOMAX) 0.4 MG capsule 24 hr capsule, Take 2 capsules by mouth Every Evening., Disp: 180 capsule, Rfl: 0    Review of Systems    Review of Systems   Constitutional: Negative for activity change, appetite change, chills, diaphoresis, fatigue, fever and unexpected weight change.   HENT: Negative for congestion, dental problem, drooling, ear discharge, ear pain, facial swelling, mouth sores,  postnasal drip, rhinorrhea, sinus pressure, sore throat, tinnitus, trouble swallowing and voice change.    Eyes: Negative for photophobia, pain, discharge, redness, itching and visual disturbance.   Respiratory: Negative for apnea, cough, choking, chest tightness, shortness of breath, wheezing and stridor.    Cardiovascular: Negative for chest pain, palpitations and leg swelling.   Gastrointestinal: Negative for abdominal distention, abdominal pain, constipation, diarrhea, nausea and vomiting.   Endocrine: Negative for cold intolerance, heat intolerance, polydipsia, polyphagia and polyuria.   Genitourinary: Negative for decreased urine volume, difficulty urinating, dysuria, flank pain, frequency, hematuria and urgency.   Musculoskeletal: Negative for arthralgias, back pain, gait problem, joint swelling, myalgias, neck pain and neck stiffness.   Skin: Negative for color change, pallor, rash and wound.   Allergic/Immunologic: Negative for immunocompromised state.   Neurological: Negative for dizziness, tremors, seizures, syncope, facial asymmetry, speech difficulty, weakness, light-headedness, numbness and headaches.   Hematological: Negative for adenopathy.   Psychiatric/Behavioral: Negative for agitation, behavioral problems, confusion, decreased concentration, dysphoric mood, hallucinations, self-injury, sleep disturbance and suicidal ideas. The patient is not nervous/anxious and is not hyperactive.         Objective:   There were no vitals taken for this visit.    Physical Exam   Constitutional: He is oriented to person, place, and time.   Neurological: He is alert and oriented to person, place, and time.   Psychiatric: He has a normal mood and affect. His speech is normal and behavior is normal. Judgment and thought content normal. Cognition and memory are normal.       Lab Review          Assessment/Plan       ICD-10-CM ICD-9-CM   1. Type 2 diabetes mellitus with hyperglycemia, with long-term current use of  insulin (CMS/HCC) E11.65 250.00    Z79.4 790.29     V58.67   2. Hypertension associated with diabetes (CMS/[facility]) E11.59 250.80    I10 401.9   3. Vitamin D deficiency E55.9 268.9          Most recent labs reviewed    1. Type 2 diabetes     Glycemic Management:   Lab Results   Component Value Date    HGBA1C 10.20 (H) 07/14/2020    HGBA1C 8.6 08/27/2019    HGBA1C 11.00 (H) 06/26/2019     Lab Results   Component Value Date    BUN 25 (H) 07/14/2020    CREATININE 2.09 (H) 07/14/2020    EGFRIFNONA 31 (L) 07/14/2020    EGFRIFAFRI 38 (L) 07/14/2020    BCR 12.0 07/14/2020    K 5.2 07/14/2020    CO2 28.9 07/14/2020    CALCIUM 9.7 07/14/2020    PROTENTOTREF 7.0 07/14/2020    ALBUMIN 4.70 07/14/2020    LABIL2 2.0 07/14/2020    AST 13 07/14/2020    ALT 23 07/14/2020     Lab Results   Component Value Date    WBC 6.00 07/14/2020    HGB 11.4 (L) 07/14/2020    HCT 33.8 (L) 07/14/2020    MCV 88.7 07/14/2020     07/14/2020      Previously on Glipizide - stopped       tresiba--decreased to 20 -17 - 18-  Increase-22    Metformin 1000 mg po bid- stopped,  side effects     Trulicity 0.75 mg weekly---stopped-  side effects     Sliding scale before meals     Set dose of 6 units with each meal - we need better meal time control increase to 7-8 units with meals.- He has not been giving set doses, only using sliding scale- he will start giving set dose in addition to sliding scale     Ashley revealed highs around meal times, especially after supper- We will increase Treiba as well as meal time insulin     He did not have time to check his blood sugar while we were in the phone      He has concerns with his kidney function- PCP stopped metformin     Diagnostic Ashley readings from Aug 21- Sept 3     Very high-4%  High- 39%  Target-57%  Low-0%    Lab Results   Component Value Date    HGBA1C 10.20 (H) 07/14/2020       We need better meal time control       Lipid Management  Most recent labs reviewed     Lab Results   Component Value Date     TRIG 128 07/14/2020    TRIG 113 06/26/2019    TRIG 92 06/21/2018     Lab Results   Component Value Date    HDL 35 (L) 07/14/2020    HDL 40 06/26/2019    HDL 37 (L) 06/21/2018     No components found for: LDLCALC  Lab Results   Component Value Date     (H) 07/14/2020     (H) 06/26/2019     (H) 06/21/2018     No results found for: LDLDIRECT    On lipitor 20 mg qhs - changed to 40 mg qhs     Continue on medication      2. Hypertension     Blood Pressure Management:    There were no vitals filed for this visit.  Lab Results   Component Value Date    CALCIUM 9.7 07/14/2020     07/14/2020    K 5.2 07/14/2020    CO2 28.9 07/14/2020    CL 98 07/14/2020    BUN 25 (H) 07/14/2020    CREATININE 2.09 (H) 07/14/2020    EGFRIFAFRI 38 (L) 07/14/2020    EGFRIFNONA 31 (L) 07/14/2020    BCR 12.0 07/14/2020     Lisinopril 10mg     Continue on medication     Microvascular Complication Monitoring:      Eye Exam Evaluation: Up to date     -----------    Last Microalbumin-Proteinuria Assessment    Lab Results   Component Value Date    MALBCRERATIO 16.0 06/26/2019       No results found for: UTPCR    -----------      Neuropathy      Immunizations:          Preventive Care:        Weight Related:   Wt Readings from Last 3 Encounters:   08/19/20 82.6 kg (182 lb)   08/11/20 80.7 kg (178 lb)   08/03/20 83 kg (183 lb)     There is no height or weight on file to calculate BMI.        Diet interventions: moderate (500 kCal/d) deficit diet.      Bone Health    2/2020 Vit D: 46.4    Lab Results   Component Value Date    CALCIUM 9.7 07/14/2020    GVYB35EQ 48.9 06/26/2019       Thyroid Health    Lab Results   Component Value Date    TSH 2.280 07/14/2020    TSH 2.550 06/26/2019    TSH 1.570 06/21/2018       Lab Results   Component Value Date    FREET4 1.42 07/14/2020    FREET4 1.35 06/26/2019    FREET4 1.23 06/21/2018         Other Diabetes Related Aspects     DM eye exam: up to date       Orders Placed This Encounter    Procedures   • TSH     Standing Status:   Future     Standing Expiration Date:   9/10/2021   • Vitamin D 25 Hydroxy     Standing Status:   Future     Standing Expiration Date:   9/9/2021   • Comprehensive Metabolic Panel     Standing Status:   Future     Standing Expiration Date:   9/9/2021   • Hemoglobin A1c     Standing Status:   Future     Standing Expiration Date:   9/9/2021   • Vitamin B12     Standing Status:   Future     Standing Expiration Date:   9/9/2021   • Lipid Panel     These lab test should be done fasting. This means do not eat or drink for at least 12 hours prior to getting your blood drawn.     Standing Status:   Future     Standing Expiration Date:   9/9/2021   • Microalbumin / Creatinine Urine Ratio - Urine, Clean Catch     Standing Status:   Future     Standing Expiration Date:   9/9/2021   • CBC & Differential     Standing Status:   Future     Standing Expiration Date:   9/9/2021     Order Specific Question:   Manual Differential     Answer:   No     Return if symptoms worsen or fail to improve, for Next scheduled follow up.            This document has been electronically signed by ALONSO Alfaro on September 9, 2020 12:50

## 2020-09-14 ENCOUNTER — OFFICE VISIT (OUTPATIENT)
Dept: FAMILY MEDICINE CLINIC | Facility: CLINIC | Age: 70
End: 2020-09-14

## 2020-09-14 VITALS
OXYGEN SATURATION: 98 % | HEIGHT: 69 IN | DIASTOLIC BLOOD PRESSURE: 82 MMHG | HEART RATE: 97 BPM | BODY MASS INDEX: 27.7 KG/M2 | WEIGHT: 187 LBS | SYSTOLIC BLOOD PRESSURE: 132 MMHG | TEMPERATURE: 96.8 F

## 2020-09-14 DIAGNOSIS — Z23 NEED FOR INFLUENZA VACCINATION: Primary | ICD-10-CM

## 2020-09-14 DIAGNOSIS — Z79.4 TYPE 2 DIABETES MELLITUS WITH HYPOGLYCEMIA WITHOUT COMA, WITH LONG-TERM CURRENT USE OF INSULIN (HCC): ICD-10-CM

## 2020-09-14 DIAGNOSIS — E11.649 TYPE 2 DIABETES MELLITUS WITH HYPOGLYCEMIA WITHOUT COMA, WITH LONG-TERM CURRENT USE OF INSULIN (HCC): ICD-10-CM

## 2020-09-14 DIAGNOSIS — Z79.4 TYPE 2 DIABETES MELLITUS WITHOUT COMPLICATION, WITH LONG-TERM CURRENT USE OF INSULIN (HCC): ICD-10-CM

## 2020-09-14 DIAGNOSIS — E11.9 TYPE 2 DIABETES MELLITUS WITHOUT COMPLICATION, WITH LONG-TERM CURRENT USE OF INSULIN (HCC): ICD-10-CM

## 2020-09-14 PROCEDURE — 99214 OFFICE O/P EST MOD 30 MIN: CPT | Performed by: FAMILY MEDICINE

## 2020-09-14 RX ORDER — LANCETS
1 EACH MISCELLANEOUS 2 TIMES DAILY
Qty: 180 EACH | Refills: 3 | Status: SHIPPED | OUTPATIENT
Start: 2020-09-14 | End: 2021-05-10

## 2020-09-14 RX ORDER — FLUTICASONE PROPIONATE 50 MCG
2 SPRAY, SUSPENSION (ML) NASAL DAILY
Qty: 48 ML | Refills: 3 | Status: SHIPPED | OUTPATIENT
Start: 2020-09-14 | End: 2021-09-16

## 2020-09-14 RX ORDER — TAMSULOSIN HYDROCHLORIDE 0.4 MG/1
2 CAPSULE ORAL EVERY EVENING
Qty: 180 CAPSULE | Refills: 3 | Status: SHIPPED | OUTPATIENT
Start: 2020-09-14 | End: 2021-07-12

## 2020-09-14 RX ORDER — LISINOPRIL 10 MG/1
10 TABLET ORAL EVERY MORNING
Qty: 90 TABLET | Refills: 3 | Status: SHIPPED | OUTPATIENT
Start: 2020-09-14 | End: 2021-07-12

## 2020-09-14 RX ORDER — BLOOD SUGAR DIAGNOSTIC
1 STRIP MISCELLANEOUS 2 TIMES DAILY
Qty: 200 EACH | Refills: 3 | Status: SHIPPED | OUTPATIENT
Start: 2020-09-14 | End: 2020-11-24 | Stop reason: SDUPTHER

## 2020-09-14 NOTE — PROGRESS NOTES
Subjective   Beranrd Matta is a 69 y.o. male.     69-year-old male with diabetes hyperlipidemia and hypertension    Hyperlipidemia  This is a chronic problem. The current episode started more than 1 year ago. The problem is controlled. Recent lipid tests were reviewed and are variable. Exacerbating diseases include diabetes. There are no known factors aggravating his hyperlipidemia. Pertinent negatives include no chest pain or shortness of breath. Current antihyperlipidemic treatment includes diet change and statins. The current treatment provides moderate improvement of lipids. There are no compliance problems.  Risk factors for coronary artery disease include diabetes mellitus, dyslipidemia, hypertension and male sex.       The following portions of the patient's history were reviewed and updated as appropriate: allergies, current medications, past family history, past medical history, past social history, past surgical history and problem list.    Review of Systems   Respiratory: Negative for shortness of breath.         Flu shot last week   Cardiovascular: Negative for chest pain and leg swelling.   Endocrine: Negative for polydipsia, polyphagia and polyuria.       Objective   Physical Exam  Vitals signs and nursing note reviewed.   Constitutional:       Appearance: Normal appearance.   Eyes:      Extraocular Movements: Extraocular movements intact.      Pupils: Pupils are equal, round, and reactive to light.   Cardiovascular:      Rate and Rhythm: Normal rate and regular rhythm.   Pulmonary:      Effort: Pulmonary effort is normal.      Breath sounds: Normal breath sounds.   Abdominal:      General: Abdomen is flat.      Palpations: Abdomen is soft.   Musculoskeletal:      Right lower leg: No edema.      Left lower leg: No edema.   Skin:     General: Skin is warm and dry.   Neurological:      General: No focal deficit present.      Mental Status: He is alert and oriented to person, place, and time.    Psychiatric:         Mood and Affect: Mood normal.         Behavior: Behavior normal.         Thought Content: Thought content normal.         Judgment: Judgment normal.     Plan above-flu shot last week-continue seeing diabetologist-may need to see nephrologist  Assessment/Plan   Bernard was seen today for diabetes.    Diagnoses and all orders for this visit:    Need for influenza vaccination    Type 2 diabetes mellitus with hypoglycemia without coma, with long-term current use of insulin (CMS/[facility])  -     glucose blood (Accu-Chek SmartView) test strip; 1 each by Other route 2 (Two) Times a Day. Use as instructed    Type 2 diabetes mellitus without complication, with long-term current use of insulin (CMS/[facility])  -     Accu-Chek FastClix Lancets misc; 1 each by Other route 2 (two) times a day.    Other orders  -     lisinopril (PRINIVIL,ZESTRIL) 10 MG tablet; Take 1 tablet by mouth Every Morning.  -     tamsulosin (FLOMAX) 0.4 MG capsule 24 hr capsule; Take 2 capsules by mouth Every Evening.  -     fluticasone (FLONASE) 50 MCG/ACT nasal spray; 2 sprays by Each Nare route Daily.

## 2020-10-12 RX ORDER — ATORVASTATIN CALCIUM 40 MG/1
TABLET, FILM COATED ORAL
Qty: 90 TABLET | Refills: 3 | Status: SHIPPED | OUTPATIENT
Start: 2020-10-12 | End: 2020-11-10 | Stop reason: SDUPTHER

## 2020-10-27 ENCOUNTER — OFFICE VISIT (OUTPATIENT)
Dept: CARDIOLOGY | Age: 70
End: 2020-10-27
Payer: MEDICARE

## 2020-10-27 VITALS
HEIGHT: 69 IN | DIASTOLIC BLOOD PRESSURE: 74 MMHG | HEART RATE: 87 BPM | BODY MASS INDEX: 28.14 KG/M2 | WEIGHT: 190 LBS | SYSTOLIC BLOOD PRESSURE: 122 MMHG

## 2020-10-27 PROCEDURE — G8427 DOCREV CUR MEDS BY ELIG CLIN: HCPCS | Performed by: INTERNAL MEDICINE

## 2020-10-27 PROCEDURE — G8484 FLU IMMUNIZE NO ADMIN: HCPCS | Performed by: INTERNAL MEDICINE

## 2020-10-27 PROCEDURE — 1123F ACP DISCUSS/DSCN MKR DOCD: CPT | Performed by: INTERNAL MEDICINE

## 2020-10-27 PROCEDURE — 93000 ELECTROCARDIOGRAM COMPLETE: CPT | Performed by: INTERNAL MEDICINE

## 2020-10-27 PROCEDURE — 1036F TOBACCO NON-USER: CPT | Performed by: INTERNAL MEDICINE

## 2020-10-27 PROCEDURE — G8417 CALC BMI ABV UP PARAM F/U: HCPCS | Performed by: INTERNAL MEDICINE

## 2020-10-27 PROCEDURE — 3017F COLORECTAL CA SCREEN DOC REV: CPT | Performed by: INTERNAL MEDICINE

## 2020-10-27 PROCEDURE — 4040F PNEUMOC VAC/ADMIN/RCVD: CPT | Performed by: INTERNAL MEDICINE

## 2020-10-27 PROCEDURE — 99213 OFFICE O/P EST LOW 20 MIN: CPT | Performed by: INTERNAL MEDICINE

## 2020-10-27 NOTE — PROGRESS NOTES
depressed HDL. Does exercise regularly. 3.  Type 2 diabetes -inadequate control. To be addressed by Dr. Lindy Hraley in December  4.   Pandemic response -appropriate

## 2020-11-10 ENCOUNTER — OFFICE VISIT (OUTPATIENT)
Dept: ENDOCRINOLOGY | Facility: CLINIC | Age: 70
End: 2020-11-10

## 2020-11-10 ENCOUNTER — TELEPHONE (OUTPATIENT)
Dept: ENDOCRINOLOGY | Facility: CLINIC | Age: 70
End: 2020-11-10

## 2020-11-10 VITALS
HEART RATE: 97 BPM | WEIGHT: 190.1 LBS | BODY MASS INDEX: 28.16 KG/M2 | HEIGHT: 69 IN | SYSTOLIC BLOOD PRESSURE: 128 MMHG | DIASTOLIC BLOOD PRESSURE: 64 MMHG | OXYGEN SATURATION: 98 %

## 2020-11-10 DIAGNOSIS — E11.69 MIXED DIABETIC HYPERLIPIDEMIA ASSOCIATED WITH TYPE 2 DIABETES MELLITUS (HCC): ICD-10-CM

## 2020-11-10 DIAGNOSIS — E78.2 MIXED DIABETIC HYPERLIPIDEMIA ASSOCIATED WITH TYPE 2 DIABETES MELLITUS (HCC): ICD-10-CM

## 2020-11-10 DIAGNOSIS — E11.59 HYPERTENSION ASSOCIATED WITH DIABETES (HCC): ICD-10-CM

## 2020-11-10 DIAGNOSIS — E11.65 TYPE 2 DIABETES MELLITUS WITH HYPERGLYCEMIA, WITH LONG-TERM CURRENT USE OF INSULIN (HCC): Primary | ICD-10-CM

## 2020-11-10 DIAGNOSIS — E65 LIPOHYPERTROPHY: ICD-10-CM

## 2020-11-10 DIAGNOSIS — I15.2 HYPERTENSION ASSOCIATED WITH DIABETES (HCC): ICD-10-CM

## 2020-11-10 DIAGNOSIS — Z79.4 TYPE 2 DIABETES MELLITUS WITH HYPERGLYCEMIA, WITH LONG-TERM CURRENT USE OF INSULIN (HCC): Primary | ICD-10-CM

## 2020-11-10 DIAGNOSIS — E55.9 VITAMIN D DEFICIENCY: ICD-10-CM

## 2020-11-10 LAB
GLUCOSE BLDC GLUCOMTR-MCNC: 313 MG/DL (ref 70–130)
HBA1C MFR BLD: 9.8 %

## 2020-11-10 PROCEDURE — 95250 CONT GLUC MNTR PHYS/QHP EQP: CPT | Performed by: INTERNAL MEDICINE

## 2020-11-10 PROCEDURE — 99214 OFFICE O/P EST MOD 30 MIN: CPT | Performed by: INTERNAL MEDICINE

## 2020-11-10 PROCEDURE — 83036 HEMOGLOBIN GLYCOSYLATED A1C: CPT | Performed by: INTERNAL MEDICINE

## 2020-11-10 RX ORDER — INSULIN DEGLUDEC INJECTION 100 U/ML
22 INJECTION, SOLUTION SUBCUTANEOUS NIGHTLY
Qty: 7 PEN | Refills: 3 | Status: SHIPPED | OUTPATIENT
Start: 2020-11-10 | End: 2021-05-10

## 2020-11-10 RX ORDER — ATORVASTATIN CALCIUM 40 MG/1
40 TABLET, FILM COATED ORAL
Qty: 90 TABLET | Refills: 3 | Status: SHIPPED | OUTPATIENT
Start: 2020-11-10 | End: 2021-09-16

## 2020-11-10 NOTE — TELEPHONE ENCOUNTER
Pt left a vm stating that he is needing to talk to Sena, this pt just left and had a Ashley sensor put on.

## 2020-11-10 NOTE — TELEPHONE ENCOUNTER
Spoke with pt. Clarified do not give Humalog. New instructions Tresiba 22 units daily and Afrezza 8 units before meals. Pt verbalized understanding.

## 2020-11-10 NOTE — PROGRESS NOTES
" Bernard Matta is a 70 y.o. male who presents for  Follow up of his type 2 diabetes.     Chief Complaint   Patient presents with   • Follow-up     4 mo joseline type 2           Referring provider     Primary Care Provider    Brando Webb MD    Duration 10 years    Timing - Diabetes is Constant    Quality -  poorly controlled    Severity -  severe    Complications - none    Current symptoms/problems  increase appetite, polydipsia and polyuria     Alleviating Factors: Compliance      Side Effects  none    Current diet  in general, a \"healthy\" diet      Current exercise walking    Current monitoring regimen: home blood tests - checking 4 x daily     Home blood sugar records:     Hypoglycemia nocturnal, none since last visit           Past Medical History:   Diagnosis Date   • Diabetes mellitus (CMS/HCC)    • Erectile disorder due to 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    • Hyperlipidemia    • Hypertension    • Ischemic optic neuropathy of both eyes      Family History   Problem Relation Age of Onset   • No Known Problems Maternal Grandmother    • No Known Problems Maternal Grandfather    • No Known Problems Paternal Grandmother    • No Known Problems Paternal Grandfather    • No Known Problems Mother    • No Known Problems Father    • Colon cancer Neg Hx    • Colon polyps Neg Hx    • Esophageal cancer Neg Hx      Social History     Tobacco Use   • Smoking status: Former Smoker     Packs/day: 0.50     Years: 10.00     Pack years: 5.00     Types: Cigarettes     Quit date: 1979     Years since quittin.8   • Smokeless tobacco: Never Used   Substance Use Topics   • Alcohol use: No   • Drug use: No         Current Outpatient Medications:   •  Accu-Chek FastClix Lancets misc, 1 each by Other route 2 (two) times a day., Disp: 180 each, Rfl: 3  •  aspirin 81 MG tablet, Take 81 mg by mouth Daily., Disp: , Rfl:   •  atorvastatin (LIPITOR) 40 MG tablet, TAKE 1 TABLET BY MOUTH EVERY DAY AT NIGHT, Disp: 90 tablet, Rfl: " 3  •  cetirizine (zyrTEC) 10 MG tablet, Take 10 mg by mouth Daily., Disp: , Rfl:   •  cholecalciferol (VITAMIN D3) 400 UNITS tablet, Take 400 Units by mouth Daily., Disp: , Rfl:   •  dorzolamide-timolol (COSOPT) 22.3-6.8 MG/ML ophthalmic solution, 1 drop 2 (Two) Times a Day., Disp: , Rfl:   •  fluticasone (FLONASE) 50 MCG/ACT nasal spray, 2 sprays by Each Nare route Daily., Disp: 48 mL, Rfl: 3  •  glucose blood (Accu-Chek SmartView) test strip, 1 each by Other route 2 (Two) Times a Day. Use as instructed, Disp: 200 each, Rfl: 3  •  insulin degludec (Tresiba FlexTouch) 100 UNIT/ML solution pen-injector injection, Inject 15 Units under the skin into the appropriate area as directed Every Night. (Patient taking differently: Inject 22 Units under the skin into the appropriate area as directed Every Night.), Disp: 9 pen, Rfl: 11  •  Insulin Lispro (HUMALOG KWIKPEN) 100 UNIT/ML solution pen-injector, Up to 20 units with meals (Patient taking differently: 10-14 units after with meals as needed), Disp: 6 pen, Rfl: 11  •  Insulin Pen Needle (B-D UF III MINI PEN NEEDLES) 31G X 5 MM misc, USE 4 TIMES DAILY, Disp: 200 each, Rfl: 11  •  latanoprost (XALATAN) 0.005 % ophthalmic solution, 1 drop Every Night., Disp: , Rfl:   •  lisinopril (PRINIVIL,ZESTRIL) 10 MG tablet, Take 1 tablet by mouth Every Morning., Disp: 90 tablet, Rfl: 3  •  Multiple Vitamins-Minerals (MULTIVITAMIN WITH MINERALS) tablet tablet, Take 1 tablet by mouth Daily., Disp: , Rfl:   •  tamsulosin (FLOMAX) 0.4 MG capsule 24 hr capsule, Take 2 capsules by mouth Every Evening., Disp: 180 capsule, Rfl: 3    Review of Systems    Review of Systems   Constitutional: Negative for activity change, appetite change, chills, diaphoresis, fatigue, fever and unexpected weight change.   HENT: Negative for congestion, dental problem, drooling, ear discharge, ear pain, facial swelling, mouth sores, postnasal drip, rhinorrhea, sinus pressure, sore throat, tinnitus, trouble  "swallowing and voice change.    Eyes: Negative for photophobia, pain, discharge, redness, itching and visual disturbance.   Respiratory: Negative for apnea, cough, choking, chest tightness, shortness of breath, wheezing and stridor.    Cardiovascular: Negative for chest pain, palpitations and leg swelling.   Gastrointestinal: Negative for abdominal distention, abdominal pain, constipation, diarrhea, nausea and vomiting.   Endocrine: Negative for cold intolerance, heat intolerance, polydipsia, polyphagia and polyuria.   Genitourinary: Negative for decreased urine volume, difficulty urinating, dysuria, flank pain, frequency, hematuria and urgency.   Musculoskeletal: Negative for arthralgias, back pain, gait problem, joint swelling, myalgias, neck pain and neck stiffness.   Skin: Negative for color change, pallor, rash and wound.   Allergic/Immunologic: Negative for immunocompromised state.   Neurological: Negative for dizziness, tremors, seizures, syncope, facial asymmetry, speech difficulty, weakness, light-headedness, numbness and headaches.   Hematological: Negative for adenopathy.   Psychiatric/Behavioral: Negative for agitation, behavioral problems, confusion, decreased concentration, dysphoric mood, hallucinations, self-injury, sleep disturbance and suicidal ideas. The patient is not nervous/anxious and is not hyperactive.         Objective:   /64 (BP Location: Left arm, Patient Position: Sitting, Cuff Size: Large Adult)   Pulse 97   Ht 174 cm (68.5\")   Wt 86.2 kg (190 lb 1.6 oz)   SpO2 98%   BMI 28.48 kg/m²     Physical Exam  Vitals signs and nursing note reviewed.   Constitutional:       Appearance: He is well-developed.   HENT:      Head: Normocephalic and atraumatic.      Right Ear: External ear normal.      Left Ear: External ear normal.      Nose: Nose normal.      Mouth/Throat:      Pharynx: No oropharyngeal exudate.   Eyes:      General: No scleral icterus.     Extraocular Movements:      " Right eye: Normal extraocular motion.      Left eye: Normal extraocular motion.      Conjunctiva/sclera: Conjunctivae normal.      Pupils: Pupils are equal, round, and reactive to light.   Neck:      Musculoskeletal: Neck supple. No edema, erythema or muscular tenderness.      Thyroid: No thyromegaly.      Vascular: No JVD.      Trachea: No tracheal deviation.   Cardiovascular:      Rate and Rhythm: Normal rate and regular rhythm.      Heart sounds: Normal heart sounds. No murmur. No friction rub. No gallop.    Pulmonary:      Effort: Pulmonary effort is normal. No respiratory distress.      Breath sounds: Normal breath sounds. No stridor. No decreased breath sounds, wheezing, rhonchi or rales.   Chest:      Chest wall: No tenderness.   Abdominal:      General: Bowel sounds are normal. There is no distension.      Palpations: Abdomen is soft. There is no hepatomegaly or mass.      Tenderness: There is no abdominal tenderness. There is no guarding or rebound.      Hernia: No hernia is present.   Musculoskeletal: Normal range of motion.         General: No tenderness or deformity.   Lymphadenopathy:      Cervical: No cervical adenopathy.   Skin:     General: Skin is warm.      Coloration: Skin is not pale.      Findings: No erythema or rash.   Neurological:      Mental Status: He is alert and oriented to person, place, and time.      Cranial Nerves: No cranial nerve deficit.      Motor: No abnormal muscle tone.      Coordination: Coordination normal.      Deep Tendon Reflexes: Reflexes are normal and symmetric.   Psychiatric:         Behavior: Behavior normal. Behavior is cooperative.         Thought Content: Thought content normal.         Judgment: Judgment normal.         Lab Review          Assessment/Plan       ICD-10-CM ICD-9-CM   1. Type 2 diabetes mellitus with hyperglycemia, with long-term current use of insulin (CMS/[facility])  E11.65 250.00    Z79.4 790.29     V58.67   2. Hypertension associated with diabetes  (CMS/[facility])  E11.59 250.80    I10 401.9   3. Mixed diabetic hyperlipidemia associated with type 2 diabetes mellitus (CMS/[facility])  E11.69 250.80    E78.2 272.2   4. Vitamin D deficiency  E55.9 268.9          Most recent labs reviewed    1. Type 2 diabetes     Glycemic Management:   Lab Results   Component Value Date    HGBA1C 10.20 (H) 07/14/2020    HGBA1C 8.6 08/27/2019    HGBA1C 11.00 (H) 06/26/2019     Lab Results   Component Value Date    BUN 25 (H) 07/14/2020    CREATININE 2.09 (H) 07/14/2020    EGFRIFNONA 31 (L) 07/14/2020    EGFRIFAFRI 38 (L) 07/14/2020    BCR 12.0 07/14/2020    K 5.2 07/14/2020    CO2 28.9 07/14/2020    CALCIUM 9.7 07/14/2020    PROTENTOTREF 7.0 07/14/2020    ALBUMIN 4.70 07/14/2020    LABIL2 2.0 07/14/2020    AST 13 07/14/2020    ALT 23 07/14/2020     Lab Results   Component Value Date    WBC 6.00 07/14/2020    HGB 11.4 (L) 07/14/2020    HCT 33.8 (L) 07/14/2020    MCV 88.7 07/14/2020     07/14/2020      Previously on Glipizide - stopped       tresiba--keep at 22     Metformin 1000 mg po bid- stopped,  side effects     Trulicity 0.75 mg weekly---stopped-  side effects     Doing novolog / humalog w meals but only sliding scale    Today we spoke about adding mealtime coverage, roughly 1 unit per 10 grams in addition to 1 unit per 25 above 150 but  this was confusing     New plan since there is lipohypertrophy anyway    Tresiba 22 units at night    Afrezza  Start  8 units before each meal -- blue cartridge    Eventually 4-32 u w meals, max dose 96 per day         Come back in 2 weeks  to see how well you did       Approve for  FSL2  #1  Patient has diabetes mellitus, insulin-dependent.    #2 He performs blood glucose testing at least 4 times daily and blood glucose log was brought to office with variability from .    #3  He is requiring  Basal insulin  and Prandial Insulin for a total of at least  4 injections or boluses per day and has been doing this for at least 6 months     #4  Patient tests blood sugars at least 4 times daily and makes frequent self-adjustments based on information provided by fingerstick and patient is injecting insulin at least 4 times daily. He has been doing this for more than 6 months . He tests frequently due to hypoglycemia and hyperglycemia.   He has frequent variability with activity     #5 I have personally seen patient within the past 6 months    #6 We plan on seeing her every 2-3 months for continuous adjustment of her diabetes regimen     #7 patient has hypoglycemia with episodes of unawareness.    #8 patient has day-to-day variation in her mealtime which confounds the degree of insulin dosing with multiple daily injections.    #9 patient has completed diabetes education program with us.    #10 He has demonstrated the ability to self monitor her glucose.        #11 Patient is motivated in improving  diabetes control         Uncontrolled diabetes  Hypoglycemia and Hyperglycemia    Insertion of continuous glucose monitor to define pattern    The continuous glucose monitor that was inserted is a eleuterio glucose monitor        Lab Results   Component Value Date    HGBA1C 10.20 (H) 07/14/2020       We need better meal time control       Lipid Management  Most recent labs reviewed     Lab Results   Component Value Date    TRIG 128 07/14/2020    TRIG 113 06/26/2019    TRIG 92 06/21/2018     Lab Results   Component Value Date    HDL 35 (L) 07/14/2020    HDL 40 06/26/2019    HDL 37 (L) 06/21/2018     No components found for: LDLCALC  Lab Results   Component Value Date     (H) 07/14/2020     (H) 06/26/2019     (H) 06/21/2018     No results found for: LDLDIRECT    On lipitor 20 mg qhs - changed to 40 mg qhs     Continue on medication      2. Hypertension     Blood Pressure Management:    Vitals:    11/10/20 0917   BP: 128/64   Pulse: 97   SpO2: 98%     Lab Results   Component Value Date    CALCIUM 9.7 07/14/2020     07/14/2020    K 5.2  2020    CO2 28.9 2020    CL 98 2020    BUN 25 (H) 2020    CREATININE 2.09 (H) 2020    EGFRIFAFRI 38 (L) 2020    EGFRIFNONA 31 (L) 2020    BCR 12.0 2020     Lisinopril 10mg     Continue on medication     Microvascular Complication Monitoring:      Eye Exam Evaluation: Up to date     -----------    Last Microalbumin-Proteinuria Assessment    Lab Results   Component Value Date    MALBCRERATIO 16.0 2019       No results found for: UTPCR    -----------      Neuropathy      Immunizations:          Preventive Care:        Weight Related:   Wt Readings from Last 3 Encounters:   11/10/20 86.2 kg (190 lb 1.6 oz)   20 84.8 kg (187 lb)   20 82.6 kg (182 lb)     Body mass index is 28.48 kg/m².        Diet interventions: moderate (500 kCal/d) deficit diet.      Bone Health    2020 Vit D: 46.4    Lab Results   Component Value Date    CALCIUM 9.7 2020    OYIG83AF 48.9 2019       Thyroid Health    Lab Results   Component Value Date    TSH 2.280 2020    TSH 2.550 2019    TSH 1.570 2018       Lab Results   Component Value Date    FREET4 1.42 2020    FREET4 1.35 2019    FREET4 1.23 2018         Other Diabetes Related Aspects     DM eye exam: up to date       No orders of the defined types were placed in this encounter.    No follow-ups on file.            This document has been electronically signed by Deric Abraham MD on November 10, 2020 09:35 CST    Patient Demographics    Patient Name   Bernard Matta Sex   Male    1950 [facility]    Address   [address] [place] [address] Phone   [phone] (Home)   [phone] (Mobile) *Preferred*   Emergency Contact(s)    Name Relation Home Work Mobile   Nury Matta Spouse   [phone]   PCP and Center    Primary Care Provider Phone Center   Brando Webb [phone] [facility]   Patient Employment    Status   Retired   Active  Insurance as of 11/10/2020    [employer] MEDICARE REPLACEMENT - HU[employer]NA MEDICARE REPLACEMENT    Payor Plan Insurance Group Employer/Plan Group   HUMANA MEDICARE REPLACEMENT HUMANA MEDICARE REPLACEMENT [id number]    Payor Plan Address Payor Plan Phone Number Payor Plan Fax Number Effective Dates   [address] [address] [phone]  1/1/2019 - None Entered   [facility] [id number]      Subscriber Name Subscriber Birth Date Member ID    VERENA CRUZ 1950 [id number]    Current Medications    Accu-Chek FastClix Lancets misc   aspirin 81 MG tablet   atorvastatin (LIPITOR) 40 MG tablet   cetirizine (zyrTEC) 10 MG tablet   cholecalciferol (VITAMIN D3) 400 UNITS tablet   dorzolamide-timolol (COSOPT) 22.3-6.8 MG/ML ophthalmic solution   fluticasone (FLONASE) 50 MCG/ACT nasal spray   glucose blood (Accu-Chek SmartView) test strip   insulin degludec (Tresiba FlexTouch) 100 UNIT/ML solution pen-injector injection   Insulin Lispro (HUMALOG KWIKPEN) 100 UNIT/ML solution pen-injector   Insulin Pen Needle (B-D UF III MINI PEN NEEDLES) 31G X 5 MM misc   latanoprost (XALATAN) 0.005 % ophthalmic solution   lisinopril (PRINIVIL,ZESTRIL) 10 MG tablet   Multiple Vitamins-Minerals (MULTIVITAMIN WITH MINERALS) tablet tablet   tamsulosin (FLOMAX) 0.4 MG capsule 24 hr capsule   atorvastatin (LIPITOR) 40 MG tablet (Discontinued)   insulin degludec (Tresiba FlexTouch) 100 UNIT/ML solution pen-injector injection (Discontinued)   Allergies    No Known Allergies  Future Appointments     Provider Department Center   12/14/2020 9:00 AM Brando Webb MD [facility]

## 2020-11-13 DIAGNOSIS — E11.9 TYPE 2 DIABETES MELLITUS WITHOUT COMPLICATION, WITH LONG-TERM CURRENT USE OF INSULIN (HCC): Primary | ICD-10-CM

## 2020-11-13 DIAGNOSIS — Z79.4 TYPE 2 DIABETES MELLITUS WITHOUT COMPLICATION, WITH LONG-TERM CURRENT USE OF INSULIN (HCC): Primary | ICD-10-CM

## 2020-11-13 RX ORDER — BLOOD-GLUCOSE METER
1 EACH MISCELLANEOUS ONCE
Qty: 1 KIT | Refills: 0 | Status: SHIPPED | OUTPATIENT
Start: 2020-11-13 | End: 2020-11-13

## 2020-11-24 ENCOUNTER — OFFICE VISIT (OUTPATIENT)
Dept: ENDOCRINOLOGY | Facility: CLINIC | Age: 70
End: 2020-11-24

## 2020-11-24 VITALS
OXYGEN SATURATION: 98 % | HEIGHT: 69 IN | SYSTOLIC BLOOD PRESSURE: 118 MMHG | DIASTOLIC BLOOD PRESSURE: 68 MMHG | WEIGHT: 190.7 LBS | HEART RATE: 93 BPM | BODY MASS INDEX: 28.24 KG/M2

## 2020-11-24 DIAGNOSIS — E11.65 TYPE 2 DIABETES MELLITUS WITH HYPERGLYCEMIA, WITH LONG-TERM CURRENT USE OF INSULIN (HCC): Primary | ICD-10-CM

## 2020-11-24 DIAGNOSIS — E11.59 HYPERTENSION ASSOCIATED WITH DIABETES (HCC): ICD-10-CM

## 2020-11-24 DIAGNOSIS — N18.32 STAGE 3B CHRONIC KIDNEY DISEASE (HCC): ICD-10-CM

## 2020-11-24 DIAGNOSIS — I15.2 HYPERTENSION ASSOCIATED WITH DIABETES (HCC): ICD-10-CM

## 2020-11-24 DIAGNOSIS — E11.649 TYPE 2 DIABETES MELLITUS WITH HYPOGLYCEMIA WITHOUT COMA, WITH LONG-TERM CURRENT USE OF INSULIN (HCC): ICD-10-CM

## 2020-11-24 DIAGNOSIS — E11.69 MIXED DIABETIC HYPERLIPIDEMIA ASSOCIATED WITH TYPE 2 DIABETES MELLITUS (HCC): ICD-10-CM

## 2020-11-24 DIAGNOSIS — Z79.4 TYPE 2 DIABETES MELLITUS WITH HYPERGLYCEMIA, WITH LONG-TERM CURRENT USE OF INSULIN (HCC): Primary | ICD-10-CM

## 2020-11-24 DIAGNOSIS — Z79.4 TYPE 2 DIABETES MELLITUS WITH HYPOGLYCEMIA WITHOUT COMA, WITH LONG-TERM CURRENT USE OF INSULIN (HCC): ICD-10-CM

## 2020-11-24 DIAGNOSIS — E55.9 VITAMIN D DEFICIENCY: ICD-10-CM

## 2020-11-24 DIAGNOSIS — E78.2 MIXED DIABETIC HYPERLIPIDEMIA ASSOCIATED WITH TYPE 2 DIABETES MELLITUS (HCC): ICD-10-CM

## 2020-11-24 PROCEDURE — 99214 OFFICE O/P EST MOD 30 MIN: CPT | Performed by: INTERNAL MEDICINE

## 2020-11-24 PROCEDURE — 95251 CONT GLUC MNTR ANALYSIS I&R: CPT | Performed by: INTERNAL MEDICINE

## 2020-11-24 RX ORDER — BLOOD-GLUCOSE METER
KIT MISCELLANEOUS
Qty: 120 EACH | Refills: 11 | Status: SHIPPED | OUTPATIENT
Start: 2020-11-24 | End: 2021-05-10

## 2020-11-24 RX ORDER — GLUCOSAMINE HCL/CHONDROITIN SU 500-400 MG
CAPSULE ORAL
Qty: 120 EACH | Refills: 11 | Status: SHIPPED | OUTPATIENT
Start: 2020-11-24 | End: 2021-05-10

## 2020-11-24 RX ORDER — ISOPROPYL ALCOHOL 0.7 ML/1
SWAB TOPICAL
Qty: 120 EACH | Refills: 11 | Status: SHIPPED | OUTPATIENT
Start: 2020-11-24 | End: 2021-12-10

## 2020-11-24 RX ORDER — LANCING DEVICE
EACH MISCELLANEOUS
Qty: 1 EACH | Refills: 11 | Status: SHIPPED | OUTPATIENT
Start: 2020-11-24 | End: 2021-05-19

## 2020-11-24 RX ORDER — GLUCOSAMINE HCL/CHONDROITIN SU 500-400 MG
CAPSULE ORAL
Qty: 120 EACH | Refills: 11 | Status: SHIPPED | OUTPATIENT
Start: 2020-11-24 | End: 2020-11-24 | Stop reason: SDUPTHER

## 2020-11-24 RX ORDER — BLOOD SUGAR DIAGNOSTIC
1 STRIP MISCELLANEOUS 2 TIMES DAILY
Qty: 200 EACH | Refills: 3 | Status: SHIPPED | OUTPATIENT
Start: 2020-11-24 | End: 2021-05-10

## 2020-11-24 NOTE — PROGRESS NOTES
" Bernard Matta is a 70 y.o. male who presents for  Follow up of his type 2 diabetes.     Chief Complaint   Patient presents with   • Follow-up     2 week joseline type 2           Referring provider     Primary Care Provider    Brando Webb MD    Duration 10 years    Timing - Diabetes is Constant    Quality -  Mild improvement     Severity -  severe    Complications - CKD stage III    Current symptoms/problems  increase appetite, polydipsia and polyuria     Alleviating Factors: Compliance      Side Effects  none    Current diet  in general, a \"healthy\" diet      Current exercise walking    Current monitoring regimen: home blood tests - checking 4 x daily     Home blood sugar records:     Hypoglycemia nocturnal, none since last visit           Past Medical History:   Diagnosis Date   • Diabetes mellitus (CMS/HCC)    • Erectile disorder due to 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    • Hyperlipidemia    • Hypertension    • Ischemic optic neuropathy of both eyes      Family History   Problem Relation Age of Onset   • No Known Problems Maternal Grandmother    • No Known Problems Maternal Grandfather    • No Known Problems Paternal Grandmother    • No Known Problems Paternal Grandfather    • No Known Problems Mother    • No Known Problems Father    • Colon cancer Neg Hx    • Colon polyps Neg Hx    • Esophageal cancer Neg Hx      Social History     Tobacco Use   • Smoking status: Former Smoker     Packs/day: 0.50     Years: 10.00     Pack years: 5.00     Types: Cigarettes     Quit date: 1979     Years since quittin.9   • Smokeless tobacco: Never Used   Substance Use Topics   • Alcohol use: No   • Drug use: No         Current Outpatient Medications:   •  Accu-Chek FastClix Lancets misc, 1 each by Other route 2 (two) times a day., Disp: 180 each, Rfl: 3  •  aspirin 81 MG tablet, Take 81 mg by mouth Daily., Disp: , Rfl:   •  atorvastatin (LIPITOR) 40 MG tablet, Take 1 tablet by mouth every night at bedtime., Disp: " 90 tablet, Rfl: 3  •  cetirizine (zyrTEC) 10 MG tablet, Take 10 mg by mouth Daily., Disp: , Rfl:   •  cholecalciferol (VITAMIN D3) 400 UNITS tablet, Take 400 Units by mouth Daily., Disp: , Rfl:   •  dorzolamide-timolol (COSOPT) 22.3-6.8 MG/ML ophthalmic solution, 1 drop 2 (Two) Times a Day., Disp: , Rfl:   •  fluticasone (FLONASE) 50 MCG/ACT nasal spray, 2 sprays by Each Nare route Daily., Disp: 48 mL, Rfl: 3  •  glucose blood (Accu-Chek SmartView) test strip, 1 each by Other route 2 (Two) Times a Day. Use as instructed, Disp: 200 each, Rfl: 3  •  insulin degludec (Tresiba FlexTouch) 100 UNIT/ML solution pen-injector injection, Inject 22 Units under the skin into the appropriate area as directed Every Night., Disp: 7 pen, Rfl: 3  •  Insulin Lispro (HUMALOG KWIKPEN) 100 UNIT/ML solution pen-injector, Up to 20 units with meals (Patient taking differently: 10-14 units after with meals as needed), Disp: 6 pen, Rfl: 11  •  Insulin Pen Needle (B-D UF III MINI PEN NEEDLES) 31G X 5 MM misc, USE 4 TIMES DAILY, Disp: 200 each, Rfl: 11  •  Insulin Regular Human 4 & 8 & 12 units powder, Inhale 4-32 Units 3 (Three) Times a Day With Meals. 4-32 units TID , max dose 96 units daily, Disp: 360 each, Rfl: 11  •  latanoprost (XALATAN) 0.005 % ophthalmic solution, 1 drop Every Night., Disp: , Rfl:   •  lisinopril (PRINIVIL,ZESTRIL) 10 MG tablet, Take 1 tablet by mouth Every Morning., Disp: 90 tablet, Rfl: 3  •  Multiple Vitamins-Minerals (MULTIVITAMIN WITH MINERALS) tablet tablet, Take 1 tablet by mouth Daily., Disp: , Rfl:   •  tamsulosin (FLOMAX) 0.4 MG capsule 24 hr capsule, Take 2 capsules by mouth Every Evening., Disp: 180 capsule, Rfl: 3    Review of Systems    Review of Systems   Constitutional: Positive for fatigue. Negative for activity change, appetite change, chills, diaphoresis, fever and unexpected weight change.   HENT: Negative for congestion, dental problem, drooling, ear discharge, ear pain, facial swelling, mouth  "sores, postnasal drip, rhinorrhea, sinus pressure, sore throat, tinnitus, trouble swallowing and voice change.    Eyes: Negative for photophobia, pain, discharge, redness, itching and visual disturbance.   Respiratory: Negative for apnea, cough, choking, chest tightness, shortness of breath, wheezing and stridor.    Cardiovascular: Negative for chest pain, palpitations and leg swelling.   Gastrointestinal: Negative for abdominal distention, abdominal pain, constipation, diarrhea, nausea and vomiting.   Endocrine: Negative for cold intolerance, heat intolerance, polydipsia, polyphagia and polyuria.   Genitourinary: Negative for decreased urine volume, difficulty urinating, dysuria, flank pain, frequency, hematuria and urgency.   Musculoskeletal: Negative for arthralgias, back pain, gait problem, joint swelling, myalgias, neck pain and neck stiffness.   Skin: Negative for color change, pallor, rash and wound.   Allergic/Immunologic: Negative for immunocompromised state.   Neurological: Negative for dizziness, tremors, seizures, syncope, facial asymmetry, speech difficulty, weakness, light-headedness, numbness and headaches.   Hematological: Negative for adenopathy.   Psychiatric/Behavioral: Negative for agitation, behavioral problems, confusion, decreased concentration, dysphoric mood, hallucinations, self-injury, sleep disturbance and suicidal ideas. The patient is not nervous/anxious and is not hyperactive.         Objective:   /68 (BP Location: Right arm, Patient Position: Sitting, Cuff Size: Large Adult)   Pulse 93   Ht 174 cm (68.5\")   Wt 86.5 kg (190 lb 11.2 oz)   SpO2 98%   BMI 28.57 kg/m²     Physical Exam  Vitals signs and nursing note reviewed.   Constitutional:       Appearance: He is well-developed.   HENT:      Head: Normocephalic and atraumatic.      Right Ear: External ear normal.      Left Ear: External ear normal.      Nose: Nose normal.      Mouth/Throat:      Pharynx: No oropharyngeal " exudate.   Eyes:      General: No scleral icterus.     Extraocular Movements:      Right eye: Normal extraocular motion.      Left eye: Normal extraocular motion.      Conjunctiva/sclera: Conjunctivae normal.      Pupils: Pupils are equal, round, and reactive to light.   Neck:      Musculoskeletal: Neck supple. No edema, erythema or muscular tenderness.      Thyroid: No thyromegaly.      Vascular: No JVD.      Trachea: No tracheal deviation.   Cardiovascular:      Rate and Rhythm: Normal rate and regular rhythm.      Heart sounds: Normal heart sounds. No murmur. No friction rub. No gallop.    Pulmonary:      Effort: Pulmonary effort is normal. No respiratory distress.      Breath sounds: Normal breath sounds. No stridor. No decreased breath sounds, wheezing, rhonchi or rales.   Chest:      Chest wall: No tenderness.   Abdominal:      General: Bowel sounds are normal. There is no distension.      Palpations: Abdomen is soft. There is no hepatomegaly or mass.      Tenderness: There is no abdominal tenderness. There is no guarding or rebound.      Hernia: No hernia is present.   Musculoskeletal: Normal range of motion.         General: No tenderness or deformity.   Lymphadenopathy:      Cervical: No cervical adenopathy.   Skin:     General: Skin is warm.      Coloration: Skin is not pale.      Findings: No erythema or rash.   Neurological:      Mental Status: He is alert and oriented to person, place, and time.      Cranial Nerves: No cranial nerve deficit.      Motor: No abnormal muscle tone.      Coordination: Coordination normal.      Deep Tendon Reflexes: Reflexes are normal and symmetric.   Psychiatric:         Behavior: Behavior normal. Behavior is cooperative.         Thought Content: Thought content normal.         Judgment: Judgment normal.         Lab Review          Assessment/Plan       ICD-10-CM ICD-9-CM   1. Type 2 diabetes mellitus with hyperglycemia, with long-term current use of insulin (CMS/[facility])   E11.65 250.00    Z79.4 790.29     V58.67   2. Hypertension associated with diabetes (CMS/[facility])  E11.59 250.80    I10 401.9   3. Mixed diabetic hyperlipidemia associated with type 2 diabetes mellitus (CMS/[facility])  E11.69 250.80    E78.2 272.2   4. Vitamin D deficiency  E55.9 268.9          Most recent labs reviewed    1. Type 2 diabetes     Glycemic Management:   Lab Results   Component Value Date    HGBA1C 9.8 11/10/2020    HGBA1C 10.20 (H) 07/14/2020    HGBA1C 8.6 08/27/2019     Lab Results   Component Value Date    BUN 25 (H) 07/14/2020    CREATININE 2.09 (H) 07/14/2020    EGFRIFNONA 31 (L) 07/14/2020    EGFRIFAFRI 38 (L) 07/14/2020    BCR 12.0 07/14/2020    K 5.2 07/14/2020    CO2 28.9 07/14/2020    CALCIUM 9.7 07/14/2020    PROTENTOTREF 7.0 07/14/2020    ALBUMIN 4.70 07/14/2020    LABIL2 2.0 07/14/2020    AST 13 07/14/2020    ALT 23 07/14/2020     Lab Results   Component Value Date    WBC 6.00 07/14/2020    HGB 11.4 (L) 07/14/2020    HCT 33.8 (L) 07/14/2020    MCV 88.7 07/14/2020     07/14/2020      Previously on Glipizide - stopped      Metformin 1000 mg po bid- stopped,  side effects     Trulicity 0.75 mg weekly---stopped-  side effects     Ashley for 2 weeks reviewed  avg 269  No lows    Overnight dropping     After meals - rising      Plan     Tresiba--keep at 22 ----decrease to 20       Afrezza    Doing 4-8  units of afrezza w meals     Increase to 12 units w every meal   If 2 hours later it is more than 180 --- take extra 4       Approve for  FSL2 , submitted           Lab Results   Component Value Date    HGBA1C 9.8 11/10/2020        Lipid Management  Most recent labs reviewed     Lab Results   Component Value Date    TRIG 128 07/14/2020    TRIG 113 06/26/2019    TRIG 92 06/21/2018     Lab Results   Component Value Date    HDL 35 (L) 07/14/2020    HDL 40 06/26/2019    HDL 37 (L) 06/21/2018     No components found for: LDLCALC  Lab Results   Component Value Date     (H) 07/14/2020     (H)  06/26/2019     (H) 06/21/2018     No results found for: LDLDIRECT    On lipitor 20 mg qhs - changed to 40 mg qhs     Continue on medication      2. Hypertension     Blood Pressure Management:    Vitals:    11/24/20 1430   BP: 118/68   Pulse: 93   SpO2: 98%     Lab Results   Component Value Date    CALCIUM 9.7 07/14/2020     07/14/2020    K 5.2 07/14/2020    CO2 28.9 07/14/2020    CL 98 07/14/2020    BUN 25 (H) 07/14/2020    CREATININE 2.09 (H) 07/14/2020    EGFRIFAFRI 38 (L) 07/14/2020    EGFRIFNONA 31 (L) 07/14/2020    BCR 12.0 07/14/2020     Lisinopril 10mg     Continue on medication     ckd stage III, refer to nephrology     Microvascular Complication Monitoring:      Eye Exam Evaluation: Up to date     -----------    Last Microalbumin-Proteinuria Assessment    Lab Results   Component Value Date    MALBCRERATIO 16.0 06/26/2019       No results found for: UTPCR    -----------      Neuropathy      Immunizations:          Preventive Care:        Weight Related:   Wt Readings from Last 3 Encounters:   11/24/20 86.5 kg (190 lb 11.2 oz)   11/10/20 86.2 kg (190 lb 1.6 oz)   09/14/20 84.8 kg (187 lb)     Body mass index is 28.57 kg/m².        Diet interventions: moderate (500 kCal/d) deficit diet.      Bone Health    2/2020 Vit D: 46.4    Lab Results   Component Value Date    CALCIUM 9.7 07/14/2020    LXOF17PI 48.9 06/26/2019       Thyroid Health    Lab Results   Component Value Date    TSH 2.280 07/14/2020    TSH 2.550 06/26/2019    TSH 1.570 06/21/2018       Lab Results   Component Value Date    FREET4 1.42 07/14/2020    FREET4 1.35 06/26/2019    FREET4 1.23 06/21/2018         Other Diabetes Related Aspects     DM eye exam: up to date       No orders of the defined types were placed in this encounter.    No follow-ups on file.            This document has been electronically signed by Deric Abraham MD on November 24, 2020 14:42 CST    Patient Demographics

## 2020-11-30 ENCOUNTER — TELEPHONE (OUTPATIENT)
Dept: ENDOCRINOLOGY | Facility: CLINIC | Age: 70
End: 2020-11-30

## 2020-12-14 ENCOUNTER — OFFICE VISIT (OUTPATIENT)
Dept: FAMILY MEDICINE CLINIC | Facility: CLINIC | Age: 70
End: 2020-12-14

## 2020-12-14 ENCOUNTER — NURSE TRIAGE (OUTPATIENT)
Dept: CALL CENTER | Facility: HOSPITAL | Age: 70
End: 2020-12-14

## 2020-12-14 VITALS
HEIGHT: 69 IN | RESPIRATION RATE: 16 BRPM | WEIGHT: 186 LBS | DIASTOLIC BLOOD PRESSURE: 78 MMHG | BODY MASS INDEX: 27.55 KG/M2 | HEART RATE: 107 BPM | OXYGEN SATURATION: 98 % | TEMPERATURE: 97.4 F | SYSTOLIC BLOOD PRESSURE: 116 MMHG

## 2020-12-14 DIAGNOSIS — E11.9 TYPE 2 DIABETES MELLITUS WITHOUT COMPLICATION, WITH LONG-TERM CURRENT USE OF INSULIN (HCC): Primary | ICD-10-CM

## 2020-12-14 DIAGNOSIS — L98.9 SKIN LESION: ICD-10-CM

## 2020-12-14 DIAGNOSIS — Z79.4 TYPE 2 DIABETES MELLITUS WITHOUT COMPLICATION, WITH LONG-TERM CURRENT USE OF INSULIN (HCC): Primary | ICD-10-CM

## 2020-12-14 DIAGNOSIS — E78.2 MIXED HYPERLIPIDEMIA: ICD-10-CM

## 2020-12-14 PROCEDURE — 99214 OFFICE O/P EST MOD 30 MIN: CPT | Performed by: FAMILY MEDICINE

## 2020-12-14 NOTE — PROGRESS NOTES
Subjective   Bernard Matta is a 70 y.o. male.     70-year-old male with diabetes and hyperlipidemia    Hyperlipidemia  This is a chronic problem. The current episode started more than 1 year ago. The problem is controlled. Recent lipid tests were reviewed and are normal. Exacerbating diseases include diabetes. There are no known factors aggravating his hyperlipidemia. Pertinent negatives include no chest pain. Current antihyperlipidemic treatment includes diet change and statins. The current treatment provides moderate improvement of lipids. There are no compliance problems.  Risk factors for coronary artery disease include diabetes mellitus, male sex, hypertension and dyslipidemia.     Vitals:    12/14/20 0857   BP: 116/78   Pulse: 107   Resp: 16   Temp: 97.4 °F (36.3 °C)   SpO2: 98%       The following portions of the patient's history were reviewed and updated as appropriate: allergies, current medications, past family history, past medical history, past social history, past surgical history and problem list.    Review of Systems   Eyes:        Visually challenged   Cardiovascular: Negative for chest pain and leg swelling.   Endocrine: Negative for polydipsia, polyphagia and polyuria.        Poorly controlled diabetes-seeing endocrinologist   Skin:        Probable basal cell right cheek       Objective   Physical Exam  Vitals signs and nursing note reviewed.   Constitutional:       Appearance: Normal appearance.   Cardiovascular:      Rate and Rhythm: Normal rate and regular rhythm.      Pulses: Normal pulses.      Heart sounds: Normal heart sounds.   Pulmonary:      Effort: Pulmonary effort is normal.      Breath sounds: Normal breath sounds.   Musculoskeletal:      Right lower leg: No edema.      Left lower leg: No edema.   Skin:     Comments: 1 cm lesion basal cell right cheek   Neurological:      General: No focal deficit present.      Mental Status: He is alert and oriented to person, place, and time.    Psychiatric:         Mood and Affect: Mood normal.         Behavior: Behavior normal.         Patient's Body mass index is 27.87 kg/m². BMI is above normal parameters. Recommendations include: exercise counseling.      Assessment/Plan   Patient Active Problem List   Diagnosis   • Anterior ischemic optic neuropathy   • Hypertension   • Type 2 diabetes mellitus (CMS/HCC)   • Hx of adenomatous colonic polyps     Diagnoses and all orders for this visit:    1. Type 2 diabetes mellitus without complication, with long-term current use of insulin (CMS/HCC) (Primary)  -     Hemoglobin A1c    2. Mixed hyperlipidemia  -     Comprehensive Metabolic Panel  -     Lipid Panel    3. Skin lesion  -     Ambulatory Referral to Dermatology       Return if symptoms worsen or fail to improve, for Next scheduled follow up, Annual physical.       Plan above-COVID-19 safety-dermatology referral-continue seeing endocrinologist      Electronically signed by Brando Webb MD 12/14/2020

## 2020-12-15 DIAGNOSIS — E87.5 HYPERKALEMIA: Primary | ICD-10-CM

## 2020-12-15 LAB
ALBUMIN SERPL-MCNC: 4.6 G/DL (ref 3.5–5.2)
ALBUMIN/GLOB SERPL: 1.6 G/DL
ALP SERPL-CCNC: 97 U/L (ref 39–117)
ALT SERPL-CCNC: 34 U/L (ref 1–41)
AST SERPL-CCNC: 18 U/L (ref 1–40)
BILIRUB SERPL-MCNC: 0.5 MG/DL (ref 0–1.2)
BUN SERPL-MCNC: 33 MG/DL (ref 8–23)
BUN/CREAT SERPL: 16.4 (ref 7–25)
CALCIUM SERPL-MCNC: 9.9 MG/DL (ref 8.6–10.5)
CHLORIDE SERPL-SCNC: 104 MMOL/L (ref 98–107)
CHOLEST SERPL-MCNC: 170 MG/DL (ref 0–200)
CO2 SERPL-SCNC: 31 MMOL/L (ref 22–29)
CREAT SERPL-MCNC: 2.01 MG/DL (ref 0.76–1.27)
GLOBULIN SER CALC-MCNC: 2.8 GM/DL
GLUCOSE SERPL-MCNC: 49 MG/DL (ref 65–99)
HBA1C MFR BLD: 10.61 % (ref 4.8–5.6)
HDLC SERPL-MCNC: 40 MG/DL (ref 40–60)
LDLC SERPL CALC-MCNC: 108 MG/DL (ref 0–100)
POTASSIUM SERPL-SCNC: 5.3 MMOL/L (ref 3.5–5.2)
PROT SERPL-MCNC: 7.4 G/DL (ref 6–8.5)
SODIUM SERPL-SCNC: 147 MMOL/L (ref 136–145)
TRIGL SERPL-MCNC: 123 MG/DL (ref 0–150)
VLDLC SERPL CALC-MCNC: 22 MG/DL (ref 5–40)

## 2020-12-15 NOTE — TELEPHONE ENCOUNTER
"Lab [facility] calling has critical value of Blood Glucose drawn at 09:11 AM Value is 49, called to Dr. Webb called he will take care of it    Reason for Disposition  • Lab or radiology calling with CRITICAL test results    Additional Information  • Negative: Lab calling with strep throat test results and triager can call in prescription  • Negative: Lab calling with urinalysis test results and triager can call in prescription  • Negative: Medication questions  • Negative: ED call to PCP  • Negative: Physician call to PCP  • Negative: Call about patient who is currently hospitalized  • Negative: [1] Prescription not at pharmacy AND [2] was prescribed today by PCP    Answer Assessment - Initial Assessment Questions  1. REASON FOR CALL or QUESTION: \"What is your reason for calling today?\" or \"How can I best  help you?\" or \"What question do you have that I can help answer?\"      Critical Value Lab [facility]  2. CALLER: Document the source of call. (e.g., laboratory, patient).      Lab [facility] Blood Glucose 49    Protocols used: PCP CALL - NO TRIAGE-ADULT-      "

## 2020-12-17 ENCOUNTER — TELEPHONE (OUTPATIENT)
Dept: ENDOCRINOLOGY | Facility: CLINIC | Age: 70
End: 2020-12-17

## 2020-12-17 NOTE — TELEPHONE ENCOUNTER
Pt called stating that he has been taking afrezza, the past few days he has been waking up and his sugars are in the 200-300's, middle of the day it has been 438. It was 264 this morning. He is needing to discuss this with a nurse please.

## 2020-12-21 ENCOUNTER — TELEPHONE (OUTPATIENT)
Dept: ENDOCRINOLOGY | Facility: CLINIC | Age: 70
End: 2020-12-21

## 2021-01-06 ENCOUNTER — OFFICE VISIT (OUTPATIENT)
Dept: ENDOCRINOLOGY | Facility: CLINIC | Age: 71
End: 2021-01-06

## 2021-01-06 DIAGNOSIS — I15.2 HYPERTENSION ASSOCIATED WITH DIABETES (HCC): ICD-10-CM

## 2021-01-06 DIAGNOSIS — Z79.4 TYPE 2 DIABETES MELLITUS WITH HYPERGLYCEMIA, WITH LONG-TERM CURRENT USE OF INSULIN (HCC): Primary | ICD-10-CM

## 2021-01-06 DIAGNOSIS — E11.65 TYPE 2 DIABETES MELLITUS WITH HYPERGLYCEMIA, WITH LONG-TERM CURRENT USE OF INSULIN (HCC): Primary | ICD-10-CM

## 2021-01-06 DIAGNOSIS — E55.9 VITAMIN D DEFICIENCY: ICD-10-CM

## 2021-01-06 DIAGNOSIS — E78.2 MIXED DIABETIC HYPERLIPIDEMIA ASSOCIATED WITH TYPE 2 DIABETES MELLITUS (HCC): ICD-10-CM

## 2021-01-06 DIAGNOSIS — E11.59 HYPERTENSION ASSOCIATED WITH DIABETES (HCC): ICD-10-CM

## 2021-01-06 DIAGNOSIS — E11.69 MIXED DIABETIC HYPERLIPIDEMIA ASSOCIATED WITH TYPE 2 DIABETES MELLITUS (HCC): ICD-10-CM

## 2021-01-06 PROCEDURE — 99442 PR PHYS/QHP TELEPHONE EVALUATION 11-20 MIN: CPT | Performed by: NURSE PRACTITIONER

## 2021-01-06 NOTE — PROGRESS NOTES
" Bernard Matta is a 70 y.o. male who presents for  Follow up of his type 2 diabetes.     Chief Complaint   Patient presents with   • Diabetes     You have chosen to receive care through a telephone visit. Do you consent to use a telephone visit for your medical care today? Yes    This telephone visit lasted 13 minutes       Referring provider     Primary Care Provider    Brando Webb MD    Duration 10 years    Timing - Diabetes is Constant    Quality -  Mild improvement     Severity -  severe    Complications - CKD stage III    Current symptoms/problems  increase appetite, polydipsia and polyuria     Alleviating Factors: Compliance      Side Effects  none    Current diet  in general, a \"healthy\" diet      Current exercise walking    Current monitoring regimen: home blood tests - checking 4 x daily     Home blood sugar records:     Hypoglycemia nocturnal, none since last visit       Issues with hyperglycemia     Past Medical History:   Diagnosis Date   • Diabetes mellitus (CMS/[facility])    • Erectile disorder due to 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    • Hyperlipidemia    • Hypertension    • Ischemic optic neuropathy of both eyes      Family History   Problem Relation Age of Onset   • No Known Problems Maternal Grandmother    • No Known Problems Maternal Grandfather    • No Known Problems Paternal Grandmother    • No Known Problems Paternal Grandfather    • No Known Problems Mother    • No Known Problems Father    • Colon cancer Neg Hx    • Colon polyps Neg Hx    • Esophageal cancer Neg Hx      Social History     Tobacco Use   • Smoking status: Former Smoker     Packs/day: 0.50     Years: 10.00     Pack years: 5.00     Types: Cigarettes     Quit date: 1979     Years since quittin.0   • Smokeless tobacco: Never Used   Substance Use Topics   • Alcohol use: No   • Drug use: No         Current Outpatient Medications:   •  Accu-Chek FastClix Lancets misc, 1 each by Other route 2 (two) times a day., Disp: 180 " each, Rfl: 3  •  Alcohol Swabs (Alcohol Wipes) 70 % pads, Use 4 x daily, Disp: 120 each, Rfl: 11  •  aspirin 81 MG tablet, Take 81 mg by mouth Daily., Disp: , Rfl:   •  atorvastatin (LIPITOR) 40 MG tablet, Take 1 tablet by mouth every night at bedtime., Disp: 90 tablet, Rfl: 3  •  B-D ULTRA-FINE 33 LANCETS misc, Use 4 x daily , any brand covered by insurance, Disp: 120 each, Rfl: 11  •  Blood Glucose Monitoring Suppl w/Device kit, accu chek albertina, Disp: 1 each, Rfl: 1  •  cetirizine (zyrTEC) 10 MG tablet, Take 10 mg by mouth Daily., Disp: , Rfl:   •  cholecalciferol (VITAMIN D3) 400 UNITS tablet, Take 400 Units by mouth Daily., Disp: , Rfl:   •  dorzolamide-timolol (COSOPT) 22.3-6.8 MG/ML ophthalmic solution, 1 drop 2 (Two) Times a Day., Disp: , Rfl:   •  fluticasone (FLONASE) 50 MCG/ACT nasal spray, 2 sprays by Each Nare route Daily., Disp: 48 mL, Rfl: 3  •  glucose blood (Accu-Chek SmartView) test strip, 1 each by Other route 2 (Two) Times a Day. Use as instructed, Disp: 200 each, Rfl: 3  •  Glucose Blood (Blood Glucose Test) strip, accuchek albertina ICD10 code is E11.9, Disp: 120 each, Rfl: 11  •  insulin degludec (Tresiba FlexTouch) 100 UNIT/ML solution pen-injector injection, Inject 22 Units under the skin into the appropriate area as directed Every Night., Disp: 7 pen, Rfl: 3  •  Insulin Pen Needle (B-D UF III MINI PEN NEEDLES) 31G X 5 MM misc, USE 4 TIMES DAILY, Disp: 200 each, Rfl: 11  •  Insulin Regular Human 4 & 8 & 12 units powder, Inhale 4-32 Units 3 (Three) Times a Day With Meals. 4-32 units TID , max dose 96 units daily, Disp: 360 each, Rfl: 11  •  Lancet Devices (Lancing Device) misc, USE AS INDICATED TO CORRELATE WITH STRIPS AND METER, Disp: 1 each, Rfl: 11  •  latanoprost (XALATAN) 0.005 % ophthalmic solution, 1 drop Every Night., Disp: , Rfl:   •  lisinopril (PRINIVIL,ZESTRIL) 10 MG tablet, Take 1 tablet by mouth Every Morning., Disp: 90 tablet, Rfl: 3  •  Multiple Vitamins-Minerals (MULTIVITAMIN WITH  MINERALS) tablet tablet, Take 1 tablet by mouth Daily., Disp: , Rfl:   •  tamsulosin (FLOMAX) 0.4 MG capsule 24 hr capsule, Take 2 capsules by mouth Every Evening., Disp: 180 capsule, Rfl: 3    Review of Systems    Review of Systems   Constitutional: Positive for fatigue. Negative for activity change, appetite change, chills, diaphoresis, fever and unexpected weight change.   HENT: Negative for congestion, dental problem, drooling, ear discharge, ear pain, facial swelling, mouth sores, postnasal drip, rhinorrhea, sinus pressure, sore throat, tinnitus, trouble swallowing and voice change.    Eyes: Negative for photophobia, pain, discharge, redness, itching and visual disturbance.   Respiratory: Negative for apnea, cough, choking, chest tightness, shortness of breath, wheezing and stridor.    Cardiovascular: Negative for chest pain, palpitations and leg swelling.   Gastrointestinal: Negative for abdominal distention, abdominal pain, constipation, diarrhea, nausea and vomiting.   Endocrine: Negative for cold intolerance, heat intolerance, polydipsia, polyphagia and polyuria.   Genitourinary: Negative for decreased urine volume, difficulty urinating, dysuria, flank pain, frequency, hematuria and urgency.   Musculoskeletal: Negative for arthralgias, back pain, gait problem, joint swelling, myalgias, neck pain and neck stiffness.   Skin: Negative for color change, pallor, rash and wound.   Allergic/Immunologic: Negative for immunocompromised state.   Neurological: Negative for dizziness, tremors, seizures, syncope, facial asymmetry, speech difficulty, weakness, light-headedness, numbness and headaches.   Hematological: Negative for adenopathy.   Psychiatric/Behavioral: Negative for agitation, behavioral problems, confusion, decreased concentration, dysphoric mood, hallucinations, self-injury, sleep disturbance and suicidal ideas. The patient is not nervous/anxious and is not hyperactive.         Objective:   There were no  vitals taken for this visit.    Physical Exam  Neurological:      Mental Status: He is alert.   Psychiatric:         Mood and Affect: Mood normal.         Speech: Speech normal.         Behavior: Behavior normal. Behavior is cooperative.         Thought Content: Thought content normal.         Cognition and Memory: Cognition and memory normal.         Judgment: Judgment normal.         Lab Review          Assessment/Plan       ICD-10-CM ICD-9-CM   1. Type 2 diabetes mellitus with hyperglycemia, with long-term current use of insulin (CMS/[facility])  E11.65 250.00    Z79.4 790.29     V58.67   2. Hypertension associated with diabetes (CMS/[facility])  E11.59 250.80    I10 401.9   3. Mixed diabetic hyperlipidemia associated with type 2 diabetes mellitus (CMS/[facility])  E11.69 250.80    E78.2 272.2   4. Vitamin D deficiency  E55.9 268.9          Most recent labs reviewed    1. Type 2 diabetes     Glycemic Management:   Lab Results   Component Value Date    HGBA1C 10.61 (H) 12/14/2020    HGBA1C 9.8 11/10/2020    HGBA1C 10.20 (H) 07/14/2020     Lab Results   Component Value Date    BUN 19 12/29/2020    CREATININE 1.80 (H) 12/29/2020    EGFRIFNONA 37 (L) 12/29/2020    EGFRIFAFRI 45 (L) 12/29/2020    BCR 10.6 12/29/2020    K 4.5 12/29/2020    CO2 30.2 (H) 12/29/2020    CALCIUM 9.1 12/29/2020    PROTENTOTREF 7.4 12/14/2020    ALBUMIN 4.60 12/14/2020    LABIL2 1.6 12/14/2020    AST 18 12/14/2020    ALT 34 12/14/2020     Lab Results   Component Value Date    WBC 6.00 07/14/2020    HGB 11.4 (L) 07/14/2020    HCT 33.8 (L) 07/14/2020    MCV 88.7 07/14/2020     07/14/2020      Previously on Glipizide - stopped      Metformin 1000 mg po bid- stopped,  side effects     Trulicity 0.75 mg weekly---stopped-  side effects           Plan     Tresiba--20, increased to 22    Now waking up most days under 150, no lows    Highs tend to be during the day       Afrezza- Having coughing spells, raspy voice    Doing 12 units of afrezza w meals     If 2  hours later it is more than 180 --- taking extra 4       We will go back to Humalog- 12 units with meals     Please check your blood sugar 2 hours after meals, our goal is to be under 180    We discussed giving additional insulin if he is over 180. We will start out by giving an extra 2 units.     Approve for  FSL2 - patient states this is too expensive right now, he would like to wait until his deductible is met           Lab Results   Component Value Date    HGBA1C 10.61 (H) 12/14/2020        Lipid Management  Most recent labs reviewed     Lab Results   Component Value Date    TRIG 123 12/14/2020    TRIG 128 07/14/2020    TRIG 113 06/26/2019     Lab Results   Component Value Date    HDL 40 12/14/2020    HDL 35 (L) 07/14/2020    HDL 40 06/26/2019     No components found for: LDLCALC  Lab Results   Component Value Date     (H) 12/14/2020     (H) 07/14/2020     (H) 06/26/2019     No results found for: LDLDIRECT    On lipitor 20 mg qhs - changed to 40 mg qhs     Continue on medication      2. Hypertension     Blood Pressure Management:    There were no vitals filed for this visit.  Lab Results   Component Value Date    CALCIUM 9.1 12/29/2020     12/29/2020    K 4.5 12/29/2020    CO2 30.2 (H) 12/29/2020     12/29/2020    BUN 19 12/29/2020    CREATININE 1.80 (H) 12/29/2020    EGFRIFAFRI 45 (L) 12/29/2020    EGFRIFNONA 37 (L) 12/29/2020    BCR 10.6 12/29/2020     Lisinopril 10mg     Continue on medication     Following with Dr. Lemus     Microvascular Complication Monitoring:      Eye Exam Evaluation: Up to date     -----------    Last Microalbumin-Proteinuria Assessment    Lab Results   Component Value Date    MALBCRERATIO 16.0 06/26/2019       No results found for: UTPCR    -----------      Neuropathy      Immunizations:          Preventive Care:        Weight Related:   Wt Readings from Last 3 Encounters:   12/14/20 84.4 kg (186 lb)   11/24/20 86.5 kg (190 lb 11.2 oz)   11/10/20 86.2  kg (190 lb 1.6 oz)     There is no height or weight on file to calculate BMI.        Diet interventions: moderate (500 kCal/d) deficit diet.      Bone Health        Lab Results   Component Value Date    CALCIUM 9.1 12/29/2020    ONPE58HN 48.9 06/26/2019       Thyroid Health    Lab Results   Component Value Date    TSH 2.280 07/14/2020    TSH 2.550 06/26/2019    TSH 1.570 06/21/2018       Lab Results   Component Value Date    FREET4 1.42 07/14/2020    FREET4 1.35 06/26/2019    FREET4 1.23 06/21/2018         Other Diabetes Related Aspects     DM eye exam: up to date       Orders Placed This Encounter   Procedures   • TSH     Standing Status:   Future     Standing Expiration Date:   1/7/2022   • Vitamin D 25 Hydroxy     Standing Status:   Future     Standing Expiration Date:   1/6/2022   • Comprehensive Metabolic Panel     Standing Status:   Future     Standing Expiration Date:   1/6/2022   • Hemoglobin A1c     Standing Status:   Future     Standing Expiration Date:   1/6/2022   • Vitamin B12     Standing Status:   Future     Standing Expiration Date:   1/6/2022   • Lipid Panel     These lab test should be done fasting. This means do not eat or drink for at least 12 hours prior to getting your blood drawn.     Standing Status:   Future     Standing Expiration Date:   1/6/2022   • Microalbumin / Creatinine Urine Ratio - Urine, Clean Catch     Standing Status:   Future     Standing Expiration Date:   1/6/2022   • CBC & Differential     Standing Status:   Future     Standing Expiration Date:   1/6/2022     Order Specific Question:   Manual Differential     Answer:   No     Return in about 4 weeks (around 2/3/2021), or if symptoms worsen or fail to improve, for Recheck.          This document has been electronically signed by ALONSO Alfaro on January 6, 2021 12:52 CST          This document has been electronically signed by ALONSO Alfaro on January 6, 2021 12:52 CST

## 2021-01-20 RX ORDER — FLURBIPROFEN SODIUM 0.3 MG/ML
SOLUTION/ DROPS OPHTHALMIC
Qty: 200 EACH | Refills: 11 | Status: SHIPPED | OUTPATIENT
Start: 2021-01-20 | End: 2021-01-26 | Stop reason: SDUPTHER

## 2021-01-26 RX ORDER — FLURBIPROFEN SODIUM 0.3 MG/ML
SOLUTION/ DROPS OPHTHALMIC
Qty: 600 EACH | Refills: 3 | Status: SHIPPED | OUTPATIENT
Start: 2021-01-26 | End: 2022-03-25

## 2021-02-11 ENCOUNTER — OFFICE VISIT (OUTPATIENT)
Dept: ENDOCRINOLOGY | Facility: CLINIC | Age: 71
End: 2021-02-11

## 2021-02-11 DIAGNOSIS — E11.65 TYPE 2 DIABETES MELLITUS WITH HYPERGLYCEMIA, WITH LONG-TERM CURRENT USE OF INSULIN (HCC): Primary | ICD-10-CM

## 2021-02-11 DIAGNOSIS — E78.2 MIXED DIABETIC HYPERLIPIDEMIA ASSOCIATED WITH TYPE 2 DIABETES MELLITUS (HCC): ICD-10-CM

## 2021-02-11 DIAGNOSIS — Z79.4 TYPE 2 DIABETES MELLITUS WITH HYPERGLYCEMIA, WITH LONG-TERM CURRENT USE OF INSULIN (HCC): Primary | ICD-10-CM

## 2021-02-11 DIAGNOSIS — E11.69 MIXED DIABETIC HYPERLIPIDEMIA ASSOCIATED WITH TYPE 2 DIABETES MELLITUS (HCC): ICD-10-CM

## 2021-02-11 DIAGNOSIS — I15.2 HYPERTENSION ASSOCIATED WITH DIABETES (HCC): ICD-10-CM

## 2021-02-11 DIAGNOSIS — E55.9 VITAMIN D DEFICIENCY: ICD-10-CM

## 2021-02-11 DIAGNOSIS — E11.59 HYPERTENSION ASSOCIATED WITH DIABETES (HCC): ICD-10-CM

## 2021-02-11 PROCEDURE — 99442 PR PHYS/QHP TELEPHONE EVALUATION 11-20 MIN: CPT | Performed by: NURSE PRACTITIONER

## 2021-02-11 NOTE — PROGRESS NOTES
" Bernard Matta is a 70 y.o. male who presents for follow up    Patient has Type 2 DM    Chief Complaint   Patient presents with   • Diabetes     You have chosen to receive care through a telephone visit. Do you consent to use a telephone visit for your medical care today? Yes    This telephone visit lasted 11 minutes       Referring provider     Primary Care Provider    Brando Webb MD    Duration 10 years    Timing - Diabetes is Constant    Quality -  Mild improvement     Severity -  severe    Complications - CKD stage III    Current symptoms/problems  increase appetite, polydipsia and polyuria     Alleviating Factors: Compliance      Side Effects  none    Current diet  in general, a \"healthy\" diet      Current exercise walking    Current monitoring regimen: home blood tests - checking 4 x daily     Home blood sugar records:     Hypoglycemia nocturnal, none since last visit       Blood glucose readings have been improving since last visit     Past Medical History:   Diagnosis Date   • Diabetes mellitus (CMS/[facility])    • Erectile disorder due to 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    • Hyperlipidemia    • Hypertension    • Ischemic optic neuropathy of both eyes      Family History   Problem Relation Age of Onset   • No Known Problems Maternal Grandmother    • No Known Problems Maternal Grandfather    • No Known Problems Paternal Grandmother    • No Known Problems Paternal Grandfather    • No Known Problems Mother    • No Known Problems Father    • Colon cancer Neg Hx    • Colon polyps Neg Hx    • Esophageal cancer Neg Hx      Social History     Tobacco Use   • Smoking status: Former Smoker     Packs/day: 0.50     Years: 10.00     Pack years: 5.00     Types: Cigarettes     Quit date: 1979     Years since quittin.1   • Smokeless tobacco: Never Used   Substance Use Topics   • Alcohol use: No   • Drug use: No         Current Outpatient Medications:   •  Accu-Chek FastClix Lancets misc, 1 each by Other route " 2 (two) times a day., Disp: 180 each, Rfl: 3  •  Alcohol Swabs (Alcohol Wipes) 70 % pads, Use 4 x daily, Disp: 120 each, Rfl: 11  •  aspirin 81 MG tablet, Take 81 mg by mouth Daily., Disp: , Rfl:   •  atorvastatin (LIPITOR) 40 MG tablet, Take 1 tablet by mouth every night at bedtime., Disp: 90 tablet, Rfl: 3  •  B-D ULTRA-FINE 33 LANCETS misc, Use 4 x daily , any brand covered by insurance, Disp: 120 each, Rfl: 11  •  Blood Glucose Monitoring Suppl w/Device kit, accu chek albertina, Disp: 1 each, Rfl: 1  •  cetirizine (zyrTEC) 10 MG tablet, Take 10 mg by mouth Daily., Disp: , Rfl:   •  cholecalciferol (VITAMIN D3) 400 UNITS tablet, Take 400 Units by mouth Daily., Disp: , Rfl:   •  dorzolamide-timolol (COSOPT) 22.3-6.8 MG/ML ophthalmic solution, 1 drop 2 (Two) Times a Day., Disp: , Rfl:   •  fluticasone (FLONASE) 50 MCG/ACT nasal spray, 2 sprays by Each Nare route Daily., Disp: 48 mL, Rfl: 3  •  glucose blood (Accu-Chek SmartView) test strip, 1 each by Other route 2 (Two) Times a Day. Use as instructed, Disp: 200 each, Rfl: 3  •  Glucose Blood (Blood Glucose Test) strip, accuchek albertina ICD10 code is E11.9, Disp: 120 each, Rfl: 11  •  insulin degludec (Tresiba FlexTouch) 100 UNIT/ML solution pen-injector injection, Inject 22 Units under the skin into the appropriate area as directed Every Night., Disp: 7 pen, Rfl: 3  •  Insulin Pen Needle (B-D UF III MINI PEN NEEDLES) 31G X 5 MM misc, USE 4 TIMES DAILY, E10.65, Disp: 600 each, Rfl: 3  •  Insulin Regular Human 4 & 8 & 12 units powder, Inhale 4-32 Units 3 (Three) Times a Day With Meals. 4-32 units TID , max dose 96 units daily, Disp: 360 each, Rfl: 11  •  Lancet Devices (Lancing Device) misc, USE AS INDICATED TO CORRELATE WITH STRIPS AND METER, Disp: 1 each, Rfl: 11  •  latanoprost (XALATAN) 0.005 % ophthalmic solution, 1 drop Every Night., Disp: , Rfl:   •  lisinopril (PRINIVIL,ZESTRIL) 10 MG tablet, Take 1 tablet by mouth Every Morning., Disp: 90 tablet, Rfl: 3  •  Multiple  Vitamins-Minerals (MULTIVITAMIN WITH MINERALS) tablet tablet, Take 1 tablet by mouth Daily., Disp: , Rfl:   •  tamsulosin (FLOMAX) 0.4 MG capsule 24 hr capsule, Take 2 capsules by mouth Every Evening., Disp: 180 capsule, Rfl: 3    Review of Systems    Review of Systems   Constitutional: Positive for fatigue. Negative for activity change, appetite change, chills, diaphoresis, fever and unexpected weight change.   HENT: Negative for congestion, dental problem, drooling, ear discharge, ear pain, facial swelling, mouth sores, postnasal drip, rhinorrhea, sinus pressure, sore throat, tinnitus, trouble swallowing and voice change.    Eyes: Negative for photophobia, pain, discharge, redness, itching and visual disturbance.   Respiratory: Negative for apnea, cough, choking, chest tightness, shortness of breath, wheezing and stridor.    Cardiovascular: Negative for chest pain, palpitations and leg swelling.   Gastrointestinal: Negative for abdominal distention, abdominal pain, constipation, diarrhea, nausea and vomiting.   Endocrine: Negative for cold intolerance, heat intolerance, polydipsia, polyphagia and polyuria.   Genitourinary: Negative for decreased urine volume, difficulty urinating, dysuria, flank pain, frequency, hematuria and urgency.   Musculoskeletal: Negative for arthralgias, back pain, gait problem, joint swelling, myalgias, neck pain and neck stiffness.   Skin: Negative for color change, pallor, rash and wound.   Allergic/Immunologic: Negative for immunocompromised state.   Neurological: Negative for dizziness, tremors, seizures, syncope, facial asymmetry, speech difficulty, weakness, light-headedness, numbness and headaches.   Hematological: Negative for adenopathy.   Psychiatric/Behavioral: Negative for agitation, behavioral problems, confusion, decreased concentration, dysphoric mood, hallucinations, self-injury, sleep disturbance and suicidal ideas. The patient is not nervous/anxious and is not  hyperactive.         Objective:   There were no vitals taken for this visit.    Physical Exam  Neurological:      Mental Status: He is alert.   Psychiatric:         Mood and Affect: Mood normal.         Speech: Speech normal.         Behavior: Behavior normal. Behavior is cooperative.         Thought Content: Thought content normal.         Cognition and Memory: Cognition and memory normal.         Judgment: Judgment normal.         Lab Review          Assessment/Plan       ICD-10-CM ICD-9-CM   1. Type 2 diabetes mellitus with hyperglycemia, with long-term current use of insulin (CMS/[facility])  E11.65 250.00    Z79.4 790.29     V58.67   2. Hypertension associated with diabetes (CMS/[facility])  E11.59 250.80    I15.2 401.9   3. Mixed diabetic hyperlipidemia associated with type 2 diabetes mellitus (CMS/[facility])  E11.69 250.80    E78.2 272.2   4. Vitamin D deficiency  E55.9 268.9          Most recent labs reviewed    1. Type 2 diabetes     Glycemic Management:   Lab Results   Component Value Date    HGBA1C 10.61 (H) 12/14/2020    HGBA1C 9.8 11/10/2020    HGBA1C 10.20 (H) 07/14/2020     Lab Results   Component Value Date    BUN 19 12/29/2020    CREATININE 1.80 (H) 12/29/2020    EGFRIFNONA 37 (L) 12/29/2020    EGFRIFAFRI 45 (L) 12/29/2020    BCR 10.6 12/29/2020    K 4.5 12/29/2020    CO2 30.2 (H) 12/29/2020    CALCIUM 9.1 12/29/2020    PROTENTOTREF 7.4 12/14/2020    ALBUMIN 4.60 12/14/2020    LABIL2 1.6 12/14/2020    AST 18 12/14/2020    ALT 34 12/14/2020     Lab Results   Component Value Date    WBC 6.00 07/14/2020    HGB 11.4 (L) 07/14/2020    HCT 33.8 (L) 07/14/2020    MCV 88.7 07/14/2020     07/14/2020      Previously on Glipizide - stopped      Metformin 1000 mg po bid- stopped,  side effects     Trulicity 0.75 mg weekly---stopped-  side effects           Plan     Tresiba-- 22- continue     Now waking up most days under 150, no lows        Afrezza- Having coughing spells, raspy voice but would still like to continue      Doing 8-12 units of afrezza w meals     If 2 hours later it is more than 180 --- taking extra 4 , this seems to be working          Approve for  FSL2 - patient states this is too expensive right now, he would like to wait until his deductible is met      He would greatly benefit from the Free style         Lab Results   Component Value Date    HGBA1C 10.61 (H) 12/14/2020        Lipid Management  Most recent labs reviewed     Lab Results   Component Value Date    TRIG 123 12/14/2020    TRIG 128 07/14/2020    TRIG 113 06/26/2019     Lab Results   Component Value Date    HDL 40 12/14/2020    HDL 35 (L) 07/14/2020    HDL 40 06/26/2019     No components found for: LDLCALC  Lab Results   Component Value Date     (H) 12/14/2020     (H) 07/14/2020     (H) 06/26/2019     No results found for: LDLDIRECT    On lipitor 20 mg qhs - changed to 40 mg qhs     Continue on medication      2. Hypertension     Blood Pressure Management:    There were no vitals filed for this visit.  Lab Results   Component Value Date    CALCIUM 9.1 12/29/2020     12/29/2020    K 4.5 12/29/2020    CO2 30.2 (H) 12/29/2020     12/29/2020    BUN 19 12/29/2020    CREATININE 1.80 (H) 12/29/2020    EGFRIFAFRI 45 (L) 12/29/2020    EGFRIFNONA 37 (L) 12/29/2020    BCR 10.6 12/29/2020     Lisinopril 10mg     Continue on medication     Following with Dr. Lemus     Microvascular Complication Monitoring:      Eye Exam Evaluation: Up to date     -----------    Last Microalbumin-Proteinuria Assessment    Lab Results   Component Value Date    MALBCRERATIO 16.0 06/26/2019       No results found for: UTPCR    -----------      Neuropathy  No issues with feet at this time     Immunizations:          Preventive Care:        Weight Related:   Wt Readings from Last 3 Encounters:   12/14/20 84.4 kg (186 lb)   11/24/20 86.5 kg (190 lb 11.2 oz)   11/10/20 86.2 kg (190 lb 1.6 oz)     There is no height or weight on file to calculate  BMI.        Diet interventions: moderate (500 kCal/d) deficit diet.      Bone Health        Lab Results   Component Value Date    CALCIUM 9.1 12/29/2020    [id number] 48.9 06/26/2019       Thyroid Health    Lab Results   Component Value Date    TSH 2.280 07/14/2020    TSH 2.550 06/26/2019    TSH 1.570 06/21/2018       Lab Results   Component Value Date    FREET4 1.42 07/14/2020    FREET4 1.35 06/26/2019    FREET4 1.23 06/21/2018         Other Diabetes Related Aspects     DM eye exam: up to date 12/2020    Please complete ordered labs before next appt.      No orders of the defined types were placed in this encounter.    Return in about 3 months (around 5/11/2021), or if symptoms worsen or fail to improve, for Recheck.          This document has been electronically signed by ALONSO Alfaro on February 11, 2021 10:12 CST          This document has been electronically signed by ALONSO Alfaro on February 11, 2021 10:12 CST

## 2021-03-01 NOTE — PROGRESS NOTES
Subjective    Mr. Matta is 70 y.o. male    Chief Complaint: BPH    History of Present Illness     Benign Prostatic Hypertrophy  Patient complains of lower urinary tract symptoms. He reports frequency, incomplete emptying, nocturia two times a night, straining, urgency and weak stream. He denies intermittency. Patient states symptoms are of moderate severity. Onset of symptoms was several years ago and was gradual in onset. His AUA Symptom Score is, 15/35.He reports a history of no complicating symptoms. He denies flank pain, gross hematuria, kidney stones and recurrent UTI.  Patient has tried Alpha blockers without improvement. Last PSA was 1.02 .            The following portions of the patient's history were reviewed and updated as appropriate: allergies, current medications, past family history, past medical history, past social history, past surgical history and problem list.    Review of Systems   Constitutional: Negative for appetite change, chills and fever.   HENT: Negative for hearing loss and sore throat.    Eyes: Negative for pain and redness.   Respiratory: Negative for cough and shortness of breath.    Cardiovascular: Negative for chest pain and leg swelling.   Gastrointestinal: Negative for abdominal pain, anal bleeding, blood in stool, nausea and vomiting.   Endocrine: Negative for cold intolerance and heat intolerance.   Genitourinary: Positive for frequency and urgency. Negative for dysuria, flank pain and hematuria.   Musculoskeletal: Negative for joint swelling and myalgias.   Skin: Negative for color change and rash.   Allergic/Immunologic: Negative for immunocompromised state.   Neurological: Negative for dizziness and speech difficulty.   Hematological: Negative for adenopathy. Does not bruise/bleed easily.   Psychiatric/Behavioral: Negative for dysphoric mood and suicidal ideas.         Current Outpatient Medications:   •  Accu-Chek FastClix Lancets misc, 1 each by Other route 2 (two) times  a day., Disp: 180 each, Rfl: 3  •  Alcohol Swabs (Alcohol Wipes) 70 % pads, Use 4 x daily, Disp: 120 each, Rfl: 11  •  aspirin 81 MG tablet, Take 81 mg by mouth Daily., Disp: , Rfl:   •  atorvastatin (LIPITOR) 40 MG tablet, Take 1 tablet by mouth every night at bedtime., Disp: 90 tablet, Rfl: 3  •  B-D ULTRA-FINE 33 LANCETS misc, Use 4 x daily , any brand covered by insurance, Disp: 120 each, Rfl: 11  •  Blood Glucose Monitoring Suppl w/Device kit, accu chek albertina, Disp: 1 each, Rfl: 1  •  cetirizine (zyrTEC) 10 MG tablet, Take 10 mg by mouth Daily., Disp: , Rfl:   •  cholecalciferol (VITAMIN D3) 400 UNITS tablet, Take 400 Units by mouth Daily., Disp: , Rfl:   •  dorzolamide-timolol (COSOPT) 22.3-6.8 MG/ML ophthalmic solution, 1 drop 2 (Two) Times a Day., Disp: , Rfl:   •  fluticasone (FLONASE) 50 MCG/ACT nasal spray, 2 sprays by Each Nare route Daily., Disp: 48 mL, Rfl: 3  •  glucose blood (Accu-Chek SmartView) test strip, 1 each by Other route 2 (Two) Times a Day. Use as instructed, Disp: 200 each, Rfl: 3  •  Glucose Blood (Blood Glucose Test) strip, accuchek albertina ICD10 code is E11.9, Disp: 120 each, Rfl: 11  •  insulin degludec (Tresiba FlexTouch) 100 UNIT/ML solution pen-injector injection, Inject 22 Units under the skin into the appropriate area as directed Every Night., Disp: 7 pen, Rfl: 3  •  Insulin Lispro, 1 Unit Dial, (HumaLOG KwikPen) 100 UNIT/ML solution pen-injector, Humalog KwikPen (U-100) Insulin 100 unit/mL subcutaneous  sliding scale, Disp: , Rfl:   •  Insulin Pen Needle (B-D UF III MINI PEN NEEDLES) 31G X 5 MM misc, USE 4 TIMES DAILY, E10.65, Disp: 600 each, Rfl: 3  •  Lancet Devices (Lancing Device) misc, USE AS INDICATED TO CORRELATE WITH STRIPS AND METER, Disp: 1 each, Rfl: 11  •  latanoprost (XALATAN) 0.005 % ophthalmic solution, 1 drop Every Night., Disp: , Rfl:   •  lisinopril (PRINIVIL,ZESTRIL) 10 MG tablet, Take 1 tablet by mouth Every Morning., Disp: 90 tablet, Rfl: 3  •  Multiple  Vitamins-Minerals (MULTIVITAMIN WITH MINERALS) tablet tablet, Take 1 tablet by mouth Daily., Disp: , Rfl:   •  tamsulosin (FLOMAX) 0.4 MG capsule 24 hr capsule, Take 2 capsules by mouth Every Evening., Disp: 180 capsule, Rfl: 3  •  Dulaglutide 0.75 MG/0.5ML solution pen-injector, Inject  under the skin into the appropriate area as directed., Disp: , Rfl:   •  insulin detemir (LEVEMIR) 100 UNIT/ML injection, Inject  under the skin into the appropriate area as directed., Disp: , Rfl:   •  Insulin Regular Human 4 & 8 & 12 units powder, Inhale 4-32 Units 3 (Three) Times a Day With Meals. 4-32 units TID , max dose 96 units daily, Disp: 360 each, Rfl: 11  •  Pseudoephedrine-Naproxen Na (ALEVE-D SINUS & COLD PO), daily, Disp: , Rfl:     Past Medical History:   Diagnosis Date   • Diabetes mellitus (CMS/HCC)    • Erectile disorder due to 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    • Hyperlipidemia    • Hypertension    • Ischemic optic neuropathy of both eyes        Past Surgical History:   Procedure Laterality Date   • COLONOSCOPY      [place]   • COLONOSCOPY N/A 2020    Procedure: COLONOSCOPY WITH ANESTHESIA;  Surgeon: Shaquille Mckenzie DO;  Location: [facility] ENDOSCOPY;  Service: Gastroenterology;  Laterality: N/A;  preop; hx of polyps   postop; polyps   PCP Brando Webb        Social History     Socioeconomic History   • Marital status:      Spouse name: Not on file   • Number of children: Not on file   • Years of education: Not on file   • Highest education level: Not on file   Tobacco Use   • Smoking status: Former Smoker     Packs/day: 0.50     Years: 10.00     Pack years: 5.00     Types: Cigarettes     Quit date: 1979     Years since quittin.2   • Smokeless tobacco: Never Used   Vaping Use   • Vaping Use: Never used   Substance and Sexual Activity   • Alcohol use: No   • Drug use: No   • Sexual activity: Defer       Family History   Problem Relation Age of Onset   • No Known Problems Maternal Grandmother   "  • No Known Problems Maternal Grandfather    • No Known Problems Paternal Grandmother    • No Known Problems Paternal Grandfather    • No Known Problems Mother    • No Known Problems Father    • Colon cancer Neg Hx    • Colon polyps Neg Hx    • Esophageal cancer Neg Hx        Objective    Temp 97.2 °F (36.2 °C) (Temporal)   Ht 175.3 cm (69\")   Wt 86.2 kg (190 lb)   BMI 28.06 kg/m²     Physical Exam  Vitals reviewed.   Constitutional:       General: He is not in acute distress.     Appearance: He is well-developed. He is not diaphoretic.   HENT:      Nose: Nose normal.   Neck:      Thyroid: No thyromegaly.      Trachea: No tracheal deviation.   Cardiovascular:      Rate and Rhythm: Normal rate and regular rhythm.      Comments: No significant edema or tenderness   Pulmonary:      Effort: No accessory muscle usage or respiratory distress.      Breath sounds: Normal breath sounds.   Abdominal:      General: Bowel sounds are normal. There is no distension.      Palpations: Abdomen is soft. There is no mass.      Tenderness: There is no abdominal tenderness. There is no guarding or rebound.      Hernia: No hernia is present.      Comments: Stool specimen is not indicated for my portion of the exam   Genitourinary:     Penis: Normal. No tenderness (no lesion or deformities).       Testes: Normal.         Right: Mass, tenderness or swelling not present.         Left: Mass, tenderness or swelling not present.      Prostate: Tender: no nodules.      Rectum: Guaiac result negative. No mass or tenderness. Normal anal tone.      Comments:  The urethral meatus normal in position without evidence of stricture. Epididymis without mass or tenderness. Vas Deferens is palpably normal.Anus and perineum without mass or tenderness. The seminal vesicle are without mass or enlargement. The prostate is approximately 40 ml. It is Symmetric, with a Soft consistency. There are no nodules present. . The seminal vesicles are Not palpable " due to the size of the prostate.    Musculoskeletal:      Cervical back: Normal range of motion and neck supple.   Lymphadenopathy:      Cervical: No cervical adenopathy.      Lower Body: No right inguinal adenopathy. No left inguinal adenopathy.   Skin:     General: Skin is warm and dry.      Coloration: Skin is not pale.      Findings: No rash.   Neurological:      Mental Status: He is alert and oriented to person, place, and time.   Psychiatric:         Behavior: Behavior normal.             Results for orders placed or performed in visit on 03/08/21   POC Urinalysis Dipstick, Multipro    Specimen: Urine   Result Value Ref Range    Color Yellow Yellow, Straw, Dark Yellow, Delisa    Clarity, UA Clear Clear    Glucose, UA 3+ (A) Negative, 1000 mg/dL (3+) mg/dL    Bilirubin Negative Negative    Ketones, UA Negative Negative    Specific Gravity  1.020 1.005 - 1.030    Blood, UA Trace (A) Negative    pH, Urine 5.5 5.0 - 8.0    Protein, POC Negative Negative mg/dL    Urobilinogen, UA Normal Normal    Nitrite, UA Negative Negative    Leukocytes Negative Negative     Assessment and Plan    Diagnoses and all orders for this visit:    1. Bladder outlet obstruction (Primary)  -     POC Urinalysis Dipstick, Multipro    2. BPH with urinary obstruction        Patient with AUA symptom score 15/35 despite alpha-blocker therapy with Flomax 0.8.  Plan for cystoscopic evaluation to decide on further therapy from here.  He is not interested in TURP.

## 2021-03-08 ENCOUNTER — OFFICE VISIT (OUTPATIENT)
Dept: UROLOGY | Facility: CLINIC | Age: 71
End: 2021-03-08

## 2021-03-08 VITALS — HEIGHT: 69 IN | BODY MASS INDEX: 28.14 KG/M2 | TEMPERATURE: 97.2 F | WEIGHT: 190 LBS

## 2021-03-08 DIAGNOSIS — N40.1 BPH WITH URINARY OBSTRUCTION: ICD-10-CM

## 2021-03-08 DIAGNOSIS — N32.0 BLADDER OUTLET OBSTRUCTION: Primary | ICD-10-CM

## 2021-03-08 DIAGNOSIS — N13.8 BPH WITH URINARY OBSTRUCTION: ICD-10-CM

## 2021-03-08 PROCEDURE — 81003 URINALYSIS AUTO W/O SCOPE: CPT | Performed by: UROLOGY

## 2021-03-08 PROCEDURE — 99204 OFFICE O/P NEW MOD 45 MIN: CPT | Performed by: UROLOGY

## 2021-03-08 RX ORDER — INSULIN LISPRO 100 [IU]/ML
INJECTION, SOLUTION INTRAVENOUS; SUBCUTANEOUS
COMMUNITY
End: 2021-05-10 | Stop reason: SDUPTHER

## 2021-04-19 ENCOUNTER — PROCEDURE VISIT (OUTPATIENT)
Dept: UROLOGY | Facility: CLINIC | Age: 71
End: 2021-04-19

## 2021-04-19 DIAGNOSIS — N32.0 BLADDER OUTLET OBSTRUCTION: Primary | ICD-10-CM

## 2021-04-19 LAB
BILIRUB BLD-MCNC: NEGATIVE MG/DL
CLARITY, POC: CLEAR
COLOR UR: YELLOW
GLUCOSE UR STRIP-MCNC: ABNORMAL MG/DL
KETONES UR QL: NEGATIVE
LEUKOCYTE EST, POC: NEGATIVE
NITRITE UR-MCNC: NEGATIVE MG/ML
PH UR: 6 [PH] (ref 5–8)
PROT UR STRIP-MCNC: NEGATIVE MG/DL
RBC # UR STRIP: NEGATIVE /UL
SP GR UR: 1.02 (ref 1–1.03)
UROBILINOGEN UR QL: NORMAL

## 2021-04-19 PROCEDURE — 52000 CYSTOURETHROSCOPY: CPT | Performed by: UROLOGY

## 2021-04-19 PROCEDURE — 81003 URINALYSIS AUTO W/O SCOPE: CPT | Performed by: UROLOGY

## 2021-04-19 NOTE — PROGRESS NOTES
Pre- operative diagnosis:  Benign prostatic hypertrophy    Post operative diagnosis:  Same    Procedure:  The patient was prepped and draped in a normal sterile fashion.  The urethra was anesthetized with 2% lidocaine jelly.  A flexible cystoscope was introduced per urethra.      Urethra:  Normal    Bladder:  moderate trabeculation and diverticuli    Ureteral orifices:  Normal position bilaterally and Clear efflux bilaterally    Prostate:  lateral lobe hypertrophy    Patient tolerated the procedure well    Complications: none    Blood loss: minimal    Follow up:    Routine follow up    After discussion of his options he wants to hold off on pursuing UroLift.  He will continue Flomax.  He will follow-up in 1 year.

## 2021-04-21 ENCOUNTER — OFFICE VISIT (OUTPATIENT)
Dept: UROLOGY | Facility: CLINIC | Age: 71
End: 2021-04-21

## 2021-04-21 VITALS — BODY MASS INDEX: 27.85 KG/M2 | TEMPERATURE: 98.1 F | WEIGHT: 188 LBS | HEIGHT: 69 IN

## 2021-04-21 DIAGNOSIS — N39.0 URINARY TRACT INFECTION WITH HEMATURIA, SITE UNSPECIFIED: ICD-10-CM

## 2021-04-21 DIAGNOSIS — R30.0 DYSURIA: ICD-10-CM

## 2021-04-21 DIAGNOSIS — R31.9 URINARY TRACT INFECTION WITH HEMATURIA, SITE UNSPECIFIED: ICD-10-CM

## 2021-04-21 DIAGNOSIS — R39.11 HESITANCY: Primary | ICD-10-CM

## 2021-04-21 LAB
BILIRUB BLD-MCNC: NEGATIVE MG/DL
CLARITY, POC: CLEAR
COLOR UR: YELLOW
GLUCOSE UR STRIP-MCNC: ABNORMAL MG/DL
KETONES UR QL: NEGATIVE
LEUKOCYTE EST, POC: ABNORMAL
NITRITE UR-MCNC: POSITIVE MG/ML
PH UR: 5.5 [PH] (ref 5–8)
PROT UR STRIP-MCNC: ABNORMAL MG/DL
RBC # UR STRIP: ABNORMAL /UL
SP GR UR: 1.01 (ref 1–1.03)
UROBILINOGEN UR QL: NORMAL

## 2021-04-21 PROCEDURE — 51798 US URINE CAPACITY MEASURE: CPT | Performed by: PHYSICIAN ASSISTANT

## 2021-04-21 PROCEDURE — 81003 URINALYSIS AUTO W/O SCOPE: CPT | Performed by: PHYSICIAN ASSISTANT

## 2021-04-21 PROCEDURE — 99214 OFFICE O/P EST MOD 30 MIN: CPT | Performed by: PHYSICIAN ASSISTANT

## 2021-04-21 PROCEDURE — 87147 CULTURE TYPE IMMUNOLOGIC: CPT | Performed by: PHYSICIAN ASSISTANT

## 2021-04-21 PROCEDURE — 87186 SC STD MICRODIL/AGAR DIL: CPT | Performed by: PHYSICIAN ASSISTANT

## 2021-04-21 PROCEDURE — 87086 URINE CULTURE/COLONY COUNT: CPT | Performed by: PHYSICIAN ASSISTANT

## 2021-04-21 RX ORDER — CEFDINIR 300 MG/1
300 CAPSULE ORAL 2 TIMES DAILY
Qty: 20 CAPSULE | Refills: 0 | Status: SHIPPED | OUTPATIENT
Start: 2021-04-21 | End: 2021-05-01

## 2021-04-21 RX ORDER — PHENAZOPYRIDINE HYDROCHLORIDE 100 MG/1
100 TABLET, FILM COATED ORAL 3 TIMES DAILY PRN
Qty: 20 TABLET | Refills: 0 | Status: SHIPPED | OUTPATIENT
Start: 2021-04-21 | End: 2021-09-14

## 2021-04-21 NOTE — PROGRESS NOTES
Subjective    Mr. Matta is 70 y.o. male    Chief Complaint: Unable to Urinate after Cysto    History of Present Illness  Patient is 70-year-old gentleman who presents with decent creasing difficulty voiding with frequency urgency burning with urination.  He does not have any acute pain does has an urge to void and going small frequent amounts.  He had a cystoscopy 04/19/2021 with Dr. Wyatt for evaluation of worsening lower urinary tract symptoms and obstructive symptoms.  Patient is not seen any gross hematuria.  No fever chills nausea vomiting or flank pain.  Patient is taking 2 tamsulosin daily after his cystoscopy he decided to continue on the medication as opposed to attempting a UroLift procedure.      The following portions of the patient's history were reviewed and updated as appropriate: allergies, current medications, past family history, past medical history, past social history, past surgical history and problem list.    Review of Systems   Constitutional: Negative for appetite change and fever.   HENT: Negative for hearing loss and sore throat.    Eyes: Negative for pain and redness.   Respiratory: Negative for cough and shortness of breath.    Cardiovascular: Negative for chest pain and leg swelling.   Gastrointestinal: Negative for anal bleeding, nausea and vomiting.   Endocrine: Negative for cold intolerance and heat intolerance.   Genitourinary: Positive for dysuria and frequency. Negative for flank pain, hematuria and urgency.   Musculoskeletal: Negative for joint swelling and myalgias.   Skin: Negative for color change and rash.   Allergic/Immunologic: Negative for immunocompromised state.   Neurological: Negative for dizziness and speech difficulty.   Hematological: Negative for adenopathy. Does not bruise/bleed easily.   Psychiatric/Behavioral: Negative for dysphoric mood and suicidal ideas.         Current Outpatient Medications:   •  Accu-Chek FastClix Lancets misc, 1 each by Other route 2  "(two) times a day., Disp: 180 each, Rfl: 3  •  Alcohol Swabs (Alcohol Wipes) 70 % pads, Use 4 x daily, Disp: 120 each, Rfl: 11  •  aspirin 81 MG tablet, Take 81 mg by mouth Daily., Disp: , Rfl:   •  atorvastatin (LIPITOR) 40 MG tablet, Take 1 tablet by mouth every night at bedtime., Disp: 90 tablet, Rfl: 3  •  B-D ULTRA-FINE 33 LANCETS misc, Use 4 x daily , any brand covered by insurance, Disp: 120 each, Rfl: 11  •  Blood Glucose Monitoring Suppl w/Device kit, accu chek albertina, Disp: 1 each, Rfl: 1  •  cetirizine (zyrTEC) 10 MG tablet, Take 10 mg by mouth Daily., Disp: , Rfl:   •  cholecalciferol (VITAMIN D3) 400 UNITS tablet, Take 400 Units by mouth Daily., Disp: , Rfl:   •  dorzolamide-timolol (COSOPT) 22.3-6.8 MG/ML ophthalmic solution, 1 drop 2 (Two) Times a Day., Disp: , Rfl:   •  Dulaglutide 0.75 MG/0.5ML solution pen-injector, Inject  under the skin into the appropriate area as directed., Disp: , Rfl:   •  fluticasone (FLONASE) 50 MCG/ACT nasal spray, 2 sprays by Each Nare route Daily., Disp: 48 mL, Rfl: 3  •  glucose blood (Accu-Chek SmartView) test strip, 1 each by Other route 2 (Two) Times a Day. Use as instructed, Disp: 200 each, Rfl: 3  •  Glucose Blood (Blood Glucose Test) strip, accuchek albertina ICD10 code is E11.9, Disp: 120 each, Rfl: 11  •  glucose blood test strip, Accu-Chek Marry Plus test strips, Disp: , Rfl:   •  insulin degludec (Tresiba FlexTouch) 100 UNIT/ML solution pen-injector injection, Inject 22 Units under the skin into the appropriate area as directed Every Night., Disp: 7 pen, Rfl: 3  •  Insulin Lispro, 1 Unit Dial, (HumaLOG KwikPen) 100 UNIT/ML solution pen-injector, Humalog KwikPen (U-100) Insulin 100 unit/mL subcutaneous  sliding scale, Disp: , Rfl:   •  Insulin Pen Needle (B-D UF III MINI PEN NEEDLES) 31G X 5 MM misc, USE 4 TIMES DAILY, E10.65, Disp: 600 each, Rfl: 3  •  Insulin Pen Needle 31G X 5 MM misc, Droplet Pen Needle 31 gauge x 3/16\", Disp: , Rfl:   •  Lancet Devices (Lancing " Device) misc, USE AS INDICATED TO CORRELATE WITH STRIPS AND METER, Disp: 1 each, Rfl: 11  •  latanoprost (XALATAN) 0.005 % ophthalmic solution, 1 drop Every Night., Disp: , Rfl:   •  lisinopril (PRINIVIL,ZESTRIL) 10 MG tablet, Take 1 tablet by mouth Every Morning., Disp: 90 tablet, Rfl: 3  •  Multiple Vitamins-Minerals (MULTIVITAMIN WITH MINERALS) tablet tablet, Take 1 tablet by mouth Daily., Disp: , Rfl:   •  tamsulosin (FLOMAX) 0.4 MG capsule 24 hr capsule, Take 2 capsules by mouth Every Evening., Disp: 180 capsule, Rfl: 3  •  cefdinir (OMNICEF) 300 MG capsule, Take 1 capsule by mouth 2 (Two) Times a Day for 10 days., Disp: 20 capsule, Rfl: 0  •  insulin detemir (LEVEMIR) 100 UNIT/ML injection, Inject  under the skin into the appropriate area as directed., Disp: , Rfl:   •  Insulin Regular Human 4 & 8 & 12 units powder, Inhale 4-32 Units 3 (Three) Times a Day With Meals. 4-32 units TID , max dose 96 units daily, Disp: 360 each, Rfl: 11  •  phenazopyridine (PYRIDIUM) 100 MG tablet, Take 1 tablet by mouth 3 (Three) Times a Day As Needed for Bladder Spasms., Disp: 20 tablet, Rfl: 0  •  Pseudoephedrine-Naproxen Na (ALEVE-D SINUS & COLD PO), daily, Disp: , Rfl:     Past Medical History:   Diagnosis Date   • Diabetes mellitus (CMS/HCC)    • Erectile disorder due to 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    • Hyperlipidemia    • Hypertension    • Ischemic optic neuropathy of both eyes        Past Surgical History:   Procedure Laterality Date   • COLONOSCOPY  2019    [place]   • COLONOSCOPY N/A 8/11/2020    Procedure: COLONOSCOPY WITH ANESTHESIA;  Surgeon: Shaquille Mckenzie DO;  Location: [facility] ENDOSCOPY;  Service: Gastroenterology;  Laterality: N/A;  preop; hx of polyps   postop; polyps   PCP Brando Webb        Social History     Socioeconomic History   • Marital status:      Spouse name: Not on file   • Number of children: Not on file   • Years of education: Not on file   • Highest education level: Not on file   Tobacco  "Use   • Smoking status: Former Smoker     Packs/day: 0.50     Years: 10.00     Pack years: 5.00     Types: Cigarettes     Quit date: 1979     Years since quittin.3   • Smokeless tobacco: Never Used   Vaping Use   • Vaping Use: Never used   Substance and Sexual Activity   • Alcohol use: No   • Drug use: No   • Sexual activity: Defer       Family History   Problem Relation Age of Onset   • No Known Problems Maternal Grandmother    • No Known Problems Maternal Grandfather    • No Known Problems Paternal Grandmother    • No Known Problems Paternal Grandfather    • No Known Problems Mother    • No Known Problems Father    • Colon cancer Neg Hx    • Colon polyps Neg Hx    • Esophageal cancer Neg Hx        Objective    Bladder Scan interpretation  Estimation of residual urine via abdominal ultrasound  Residual Urine: 359 ml  Indication: Retention   Position: Supine  Examination: Incremental scanning of the suprapubic area using 3 MHz transducer using copious amounts of acoustic gel.   Findings: An anechoic area was demonstrated which represented the bladder, with measurement of residual urine as noted. I inspected this myself. In that the residual urine was stable or insignificant, no treatment will be necessary at this time.         Temp 98.1 °F (36.7 °C) (Temporal)   Ht 175.3 cm (69\")   Wt 85.3 kg (188 lb)   BMI 27.76 kg/m²     Physical Exam  Vitals reviewed.   Constitutional:       General: He is not in acute distress.     Appearance: Normal appearance. He is not ill-appearing.   HENT:      Head: Normocephalic and atraumatic.      Right Ear: External ear normal.      Left Ear: External ear normal.      Nose: No congestion.   Eyes:      Conjunctiva/sclera: Conjunctivae normal.   Neck:      Comments: I observed no obvious neck masses  Pulmonary:      Effort: Pulmonary effort is normal.   Abdominal:      General: There is no distension.      Palpations: Abdomen is soft.      Tenderness: There is no abdominal " tenderness.   Skin:     Coloration: Skin is not pale.      Findings: No rash.   Neurological:      General: No focal deficit present.      Mental Status: He is alert and oriented to person, place, and time.   Psychiatric:         Mood and Affect: Mood normal.         Behavior: Behavior normal.             Results for orders placed or performed in visit on 04/21/21   POC Urinalysis Dipstick, Multipro    Specimen: Urine   Result Value Ref Range    Color Yellow Yellow, Straw, Dark Yellow, Delisa    Clarity, UA Clear Clear    Glucose, UA >=1000 mg/dL (3+) (A) Negative, 1000 mg/dL (3+) mg/dL    Bilirubin Negative Negative    Ketones, UA Negative Negative    Specific Gravity  1.015 1.005 - 1.030    Blood, UA Moderate (A) Negative    pH, Urine 5.5 5.0 - 8.0    Protein, POC 30 mg/dL (A) Negative mg/dL    Urobilinogen, UA Normal Normal    Nitrite, UA Positive (A) Negative    Leukocytes Trace (A) Negative     Assessment and Plan    Diagnoses and all orders for this visit:    1. Hesitancy (Primary)  -     POC Urinalysis Dipstick, Multipro    2. Urinary tract infection with hematuria, site unspecified  -     Urine Culture - Urine, Urine, Random Void; Future  -     Urine Culture - Urine, Urine, Random Void  -     phenazopyridine (PYRIDIUM) 100 MG tablet; Take 1 tablet by mouth 3 (Three) Times a Day As Needed for Bladder Spasms.  Dispense: 20 tablet; Refill: 0  -     cefdinir (OMNICEF) 300 MG capsule; Take 1 capsule by mouth 2 (Two) Times a Day for 10 days.  Dispense: 20 capsule; Refill: 0    3. Dysuria    Urine suspicious for urinary tract infection will send urine for culture sensitivity treat with Pyridium and antibiotic.  He does have elevated residual in his bladder he is going frequent small amounts has some urgency no pain.  This could be related to urinary tract infection so at this point we are avoiding catheter placement.  If he should get worse or not voiding at all he needs to go to the emergency department if  overnight to have a catheter placed to return here tomorrow for bladder scan and possible catheter placement.  Otherwise he will follow-up as scheduled.

## 2021-04-24 LAB — BACTERIA SPEC AEROBE CULT: ABNORMAL

## 2021-04-26 ENCOUNTER — TELEPHONE (OUTPATIENT)
Dept: UROLOGY | Facility: CLINIC | Age: 71
End: 2021-04-26

## 2021-04-26 DIAGNOSIS — N39.0 URINARY TRACT INFECTION WITHOUT HEMATURIA, SITE UNSPECIFIED: Primary | ICD-10-CM

## 2021-04-26 RX ORDER — SULFAMETHOXAZOLE AND TRIMETHOPRIM 800; 160 MG/1; MG/1
1 TABLET ORAL 2 TIMES DAILY
Qty: 20 TABLET | Refills: 0 | Status: SHIPPED | OUTPATIENT
Start: 2021-04-26 | End: 2021-04-28 | Stop reason: SDUPTHER

## 2021-04-26 NOTE — TELEPHONE ENCOUNTER
Contacted pt and advised of results. Pt stated understanding     ----- Message from JORJE Pope sent at 4/26/2021  8:22 AM CDT -----  Regarding: Urine culture  Urine culture and sensitivities now complete would switch from his current antibiotic to Bactrim DS which I sent to his pharmacy and take for total of 10 days.  ----- Message -----  From: Isaura Portillo MA  Sent: 4/21/2021   2:33 PM CDT  To: JORJE Pope

## 2021-04-28 DIAGNOSIS — N39.0 URINARY TRACT INFECTION WITHOUT HEMATURIA, SITE UNSPECIFIED: Primary | ICD-10-CM

## 2021-04-28 RX ORDER — SULFAMETHOXAZOLE AND TRIMETHOPRIM 800; 160 MG/1; MG/1
1 TABLET ORAL 2 TIMES DAILY
Qty: 20 TABLET | Refills: 0 | Status: SHIPPED | OUTPATIENT
Start: 2021-04-28 | End: 2021-05-08

## 2021-04-28 NOTE — TELEPHONE ENCOUNTER
Pt called to advise that the Bactrim was sent to [facility] and not [facility]. Advised pt that we will fix the script and have it sent to the local pharmacy.

## 2021-05-06 ENCOUNTER — LAB (OUTPATIENT)
Dept: FAMILY MEDICINE CLINIC | Facility: CLINIC | Age: 71
End: 2021-05-06

## 2021-05-10 ENCOUNTER — OFFICE VISIT (OUTPATIENT)
Dept: ENDOCRINOLOGY | Facility: CLINIC | Age: 71
End: 2021-05-10

## 2021-05-10 VITALS
BODY MASS INDEX: 27.42 KG/M2 | DIASTOLIC BLOOD PRESSURE: 66 MMHG | HEIGHT: 69 IN | WEIGHT: 185.1 LBS | SYSTOLIC BLOOD PRESSURE: 108 MMHG | OXYGEN SATURATION: 98 % | HEART RATE: 87 BPM

## 2021-05-10 DIAGNOSIS — E11.65 TYPE 2 DIABETES MELLITUS WITH HYPERGLYCEMIA, WITH LONG-TERM CURRENT USE OF INSULIN (HCC): Primary | ICD-10-CM

## 2021-05-10 DIAGNOSIS — E78.2 MIXED DIABETIC HYPERLIPIDEMIA ASSOCIATED WITH TYPE 2 DIABETES MELLITUS (HCC): ICD-10-CM

## 2021-05-10 DIAGNOSIS — N18.4 CHRONIC KIDNEY DISEASE (CKD), STAGE IV (SEVERE) (HCC): ICD-10-CM

## 2021-05-10 DIAGNOSIS — E11.59 HYPERTENSION ASSOCIATED WITH DIABETES (HCC): ICD-10-CM

## 2021-05-10 DIAGNOSIS — I15.2 HYPERTENSION ASSOCIATED WITH DIABETES (HCC): ICD-10-CM

## 2021-05-10 DIAGNOSIS — E11.69 MIXED DIABETIC HYPERLIPIDEMIA ASSOCIATED WITH TYPE 2 DIABETES MELLITUS (HCC): ICD-10-CM

## 2021-05-10 DIAGNOSIS — Z79.4 TYPE 2 DIABETES MELLITUS WITH HYPERGLYCEMIA, WITH LONG-TERM CURRENT USE OF INSULIN (HCC): Primary | ICD-10-CM

## 2021-05-10 DIAGNOSIS — E87.5 HYPERKALEMIA: ICD-10-CM

## 2021-05-10 PROCEDURE — 99214 OFFICE O/P EST MOD 30 MIN: CPT | Performed by: INTERNAL MEDICINE

## 2021-05-10 RX ORDER — SULFAMETHOXAZOLE AND TRIMETHOPRIM 200; 40 MG/5ML; MG/5ML
SUSPENSION ORAL 2 TIMES DAILY
COMMUNITY
End: 2021-07-22

## 2021-05-10 RX ORDER — INSULIN LISPRO 100 [IU]/ML
INJECTION, SOLUTION INTRAVENOUS; SUBCUTANEOUS
Qty: 15 PEN | Refills: 3 | Status: SHIPPED | OUTPATIENT
Start: 2021-05-10 | End: 2021-07-22

## 2021-05-10 RX ORDER — LANCETS
EACH MISCELLANEOUS
Qty: 360 EACH | Refills: 3 | Status: SHIPPED | OUTPATIENT
Start: 2021-05-10 | End: 2021-05-19 | Stop reason: SDUPTHER

## 2021-05-10 RX ORDER — INSULIN DEGLUDEC INJECTION 100 U/ML
20 INJECTION, SOLUTION SUBCUTANEOUS NIGHTLY
Qty: 6 PEN | Refills: 3 | Status: SHIPPED | OUTPATIENT
Start: 2021-05-10 | End: 2022-01-03

## 2021-05-10 RX ORDER — GLUCOSAMINE HCL/CHONDROITIN SU 500-400 MG
CAPSULE ORAL
Qty: 360 EACH | Refills: 11 | Status: SHIPPED | OUTPATIENT
Start: 2021-05-10 | End: 2021-05-10 | Stop reason: SDUPTHER

## 2021-05-10 RX ORDER — GLUCOSAMINE HCL/CHONDROITIN SU 500-400 MG
CAPSULE ORAL
Qty: 360 EACH | Refills: 11 | OUTPATIENT
Start: 2021-05-10 | End: 2022-06-24

## 2021-05-10 NOTE — PROGRESS NOTES
Bernard Matta is a 70 y.o. male who presents for follow up T2DM      HPI     T2DM complicated by CKD  GFR from May 2020 - 20 with K of 5.9          Assessment/Plan       ICD-10-CM ICD-9-CM   1. Type 2 diabetes mellitus with hyperglycemia, with long-term current use of insulin (CMS/HCC)  E11.65 250.00    Z79.4 790.29     V58.67   2. Hypertension associated with diabetes (CMS/HCC)  E11.59 250.80    I15.2 401.9   3. Mixed diabetic hyperlipidemia associated with type 2 diabetes mellitus (CMS/HCC)  E11.69 250.80    E78.2 272.2   4. Chronic kidney disease (CKD), stage IV (severe) (CMS/HCC)  N18.4 585.4   5. Hyperkalemia  E87.5 276.7          Most recent labs reviewed    1. Type 2 diabetes     Glycemic Management:   Lab Results   Component Value Date    HGBA1C 10.61 (H) 12/14/2020    HGBA1C 9.8 11/10/2020    HGBA1C 10.20 (H) 07/14/2020     Lab Results   Component Value Date    BUN 19 12/29/2020    CREATININE 1.80 (H) 12/29/2020    EGFRIFNONA 37 (L) 12/29/2020    EGFRIFAFRI 45 (L) 12/29/2020    BCR 10.6 12/29/2020    K 4.5 12/29/2020    CO2 30.2 (H) 12/29/2020    CALCIUM 9.1 12/29/2020    PROTENTOTREF 7.4 12/14/2020    ALBUMIN 4.60 12/14/2020    LABIL2 1.6 12/14/2020    AST 18 12/14/2020    ALT 34 12/14/2020     Lab Results   Component Value Date    WBC 6.00 07/14/2020    HGB 11.4 (L) 07/14/2020    HCT 33.8 (L) 07/14/2020    MCV 88.7 07/14/2020     07/14/2020      Previously on Glipizide - stopped      Metformin 1000 mg po bid- stopped,  side effects     Trulicity 0.75 mg weekly---stopped-  side effects           Plan     Tresiba-- 23-  To be cautious take 20     Humalog , sliding scale only  add 6 units   Explained 1 unit per 10 grams if they can count carbs        He would greatly benefit from the Free style but can't afford       Lab Results   Component Value Date    HGBA1C 10.61 (H) 12/14/2020        Lipid Management       Lab Results   Component Value Date    TRIG 123 12/14/2020    TRIG 128 07/14/2020    TRIG  113 06/26/2019     Lab Results   Component Value Date    HDL 40 12/14/2020    HDL 35 (L) 07/14/2020    HDL 40 06/26/2019     No components found for: LDLCALC  Lab Results   Component Value Date     (H) 12/14/2020     (H) 07/14/2020     (H) 06/26/2019     No results found for: LDLDIRECT    On lipitor 20 mg qhs - changed to 40 mg qhs          2. Hypertension     Blood Pressure Management:    Vitals:    05/10/21 1024   BP: 108/66   Pulse: 87   SpO2: 98%     Per Allan  Refer back  GFR 20   K elevated  Gave binding resin     -----------    Last Microalbumin-Proteinuria Assessment    Lab Results   Component Value Date    MALBCRERATIO 16.0 06/26/2019       No results found for: UTPCR    -----------      Neuropathy  No issues with feet at this time     Immunizations:          Preventive Care:        Weight Related:   Wt Readings from Last 3 Encounters:   05/10/21 84 kg (185 lb 1.6 oz)   04/21/21 85.3 kg (188 lb)   03/08/21 86.2 kg (190 lb)     Body mass index is 27.33 kg/m².        Diet interventions: moderate (500 kCal/d) deficit diet.      Bone Health        Lab Results   Component Value Date    CALCIUM 9.1 12/29/2020    RDCA58RK 48.9 06/26/2019       Thyroid Health    Lab Results   Component Value Date    TSH 2.280 07/14/2020    TSH 2.550 06/26/2019    TSH 1.570 06/21/2018       Lab Results   Component Value Date    FREET4 1.42 07/14/2020    FREET4 1.35 06/26/2019    FREET4 1.23 06/21/2018         Other Diabetes Related Aspects     DM eye exam: up to date 12/2020    Please complete ordered labs before next appt.

## 2021-05-13 ENCOUNTER — DOCUMENTATION (OUTPATIENT)
Dept: ENDOCRINOLOGY | Facility: CLINIC | Age: 71
End: 2021-05-13

## 2021-05-17 ENCOUNTER — TELEPHONE (OUTPATIENT)
Dept: ENDOCRINOLOGY | Facility: CLINIC | Age: 71
End: 2021-05-17

## 2021-05-19 RX ORDER — LANCING DEVICE
EACH MISCELLANEOUS
Qty: 1 EACH | Refills: 11 | Status: SHIPPED | OUTPATIENT
Start: 2021-05-19

## 2021-05-19 RX ORDER — LANCETS
EACH MISCELLANEOUS
Qty: 360 EACH | Refills: 3 | Status: SHIPPED | OUTPATIENT
Start: 2021-05-19

## 2021-05-27 DIAGNOSIS — E11.9 TYPE 2 DIABETES MELLITUS WITHOUT COMPLICATION, WITH LONG-TERM CURRENT USE OF INSULIN (HCC): Primary | ICD-10-CM

## 2021-05-27 DIAGNOSIS — Z79.4 TYPE 2 DIABETES MELLITUS WITHOUT COMPLICATION, WITH LONG-TERM CURRENT USE OF INSULIN (HCC): Primary | ICD-10-CM

## 2021-05-29 RX ORDER — INSULIN ASPART 100 [IU]/ML
INJECTION, SOLUTION INTRAVENOUS; SUBCUTANEOUS
Qty: 15 PEN | Refills: 3 | Status: SHIPPED | OUTPATIENT
Start: 2021-05-29 | End: 2021-06-03 | Stop reason: SDUPTHER

## 2021-06-03 DIAGNOSIS — E11.9 TYPE 2 DIABETES MELLITUS WITHOUT COMPLICATION, WITH LONG-TERM CURRENT USE OF INSULIN (HCC): ICD-10-CM

## 2021-06-03 DIAGNOSIS — Z79.4 TYPE 2 DIABETES MELLITUS WITHOUT COMPLICATION, WITH LONG-TERM CURRENT USE OF INSULIN (HCC): ICD-10-CM

## 2021-06-03 NOTE — PROGRESS NOTES
Chief Complaint   Patient presents with   • Colonoscopy     8- colon polyps  1 year recall       PCP: Brando Webb MD  REFER: No ref. provider found    Subjective     HPI    Bernard Matta is a 70 y.o. male who presents to office for preventative maintenance.  There is  a personal history of colon polyps.  There is not a history of colon cancer.  He does not have complaints of nausea/vomiting, change in bowels, weight loss, no BRBPR, no melena.  There is not a family history of colon cancer.  There is not a family history of colon polyps.  His last colonoscopy-2020 .  Bowels do move on regular basis.    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    • Hyperlipidemia    • Hypertension    • Ischemic optic neuropathy of both eyes      Past Surgical History:   Procedure Laterality Date   • COLONOSCOPY  2019    [facility]   • COLONOSCOPY N/A 8/11/2020    Procedure: COLONOSCOPY WITH ANESTHESIA;  Surgeon: Shaquille Mckenzie DO;  Location: [facility] ENDOSCOPY;  Service: Gastroenterology;  Laterality: N/A;  preop; hx of polyps   postop; polyps   PCP Brando Webb      Outpatient Medications Marked as Taking for the 6/7/21 encounter (Office Visit) with Kendall Saenz APRN   Medication Sig Dispense Refill   • aspirin 81 MG tablet Take 81 mg by mouth Daily.     • atorvastatin (LIPITOR) 40 MG tablet Take 1 tablet by mouth every night at bedtime. 90 tablet 3   • cetirizine (zyrTEC) 10 MG tablet Take 10 mg by mouth Daily.     • cholecalciferol (VITAMIN D3) 400 UNITS tablet Take 400 Units by mouth Daily.     • dorzolamide-timolol (COSOPT) 22.3-6.8 MG/ML ophthalmic solution 1 drop 2 (Two) Times a Day.     • fluticasone (FLONASE) 50 MCG/ACT nasal spray 2 sprays by Each Nare route Daily. 48 mL 3   • insulin aspart (NovoLOG FlexPen) 100 UNIT/ML solution pen-injector sc pen Use up  to 20 units with meals. 15 pen 3   • insulin degludec (Tresiba FlexTouch) 100 UNIT/ML solution pen-injector injection Inject 20 Units under the skin into the appropriate area as directed Every Night. 6 pen 3   • latanoprost (XALATAN) 0.005 % ophthalmic solution 1 drop Every Night.     • lisinopril (PRINIVIL,ZESTRIL) 10 MG tablet Take 1 tablet by mouth Every Morning. 90 tablet 3   • Multiple Vitamins-Minerals (MULTIVITAMIN WITH MINERALS) tablet tablet Take 1 tablet by mouth Daily.     • tamsulosin (FLOMAX) 0.4 MG capsule 24 hr capsule Take 2 capsules by mouth Every Evening. 180 capsule 3     No Known Allergies  Social History     Socioeconomic History   • Marital status:      Spouse name: Not on file   • Number of children: Not on file   • Years of education: Not on file   • Highest education level: Not on file   Tobacco Use   • Smoking status: Former Smoker     Packs/day: 0.50     Years: 10.00     Pack years: 5.00     Types: Cigarettes     Quit date: 1979     Years since quittin.4   • Smokeless tobacco: Never Used   Vaping Use   • Vaping Use: Never used   Substance and Sexual Activity   • Alcohol use: No   • Drug use: No   • Sexual activity: Defer     Review of Systems   Constitutional: Negative for unexpected weight change.   Respiratory: Negative for shortness of breath.    Cardiovascular: Negative for chest pain.   Gastrointestinal: Negative for abdominal pain and anal bleeding.     Objective   Vitals:    21 0922   BP: 126/74   Pulse: 100   Temp: 97 °F (36.1 °C)   SpO2: 98%     Physical Exam  Constitutional:       Appearance: Normal appearance. He is well-developed.   Eyes:      General: No scleral icterus.  Cardiovascular:      Rate and Rhythm: Regular rhythm.      Heart sounds: Normal heart sounds. No murmur heard.     Pulmonary:      Effort: Pulmonary effort is normal. No accessory muscle usage.      Breath sounds: Normal breath sounds.   Abdominal:      General: Bowel sounds are normal.  There is no distension.      Palpations: Abdomen is soft. There is no mass.      Tenderness: There is no abdominal tenderness. There is no guarding or rebound.   Skin:     General: Skin is warm and dry.      Coloration: Skin is not jaundiced.   Neurological:      Mental Status: He is alert.   Psychiatric:         Behavior: Behavior is cooperative.       Imaging Results (Most Recent)     None        Body mass index is 27.76 kg/m².    Assessment/Plan   Diagnoses and all orders for this visit:    1. History of adenomatous polyp of colon (Primary)  -     Case Request; Standing  -     Implement Anesthesia Orders Day of Procedure; Standing  -     Obtain Informed Consent; Standing  -     Case Request    Other orders  -     sodium-potassium-magnesium sulfates (Suprep Bowel Prep Kit) 17.5-3.13-1.6 GM/177ML solution oral solution; Take as directed  Dispense: 177 mL; Refill: 0      COLONOSCOPY WITH ANESTHESIA (N/A)      Pt to hold diabetes medication/insulin day of procedure to prevent any risk of complications of hypoglycemia intraprocedure.  If taking insulin 1/2 the PM dose as well     Patient is to continue all blood pressure and cardiac medications prior to procedure and has been advised to take medications morning of procedure   Pt verbalized understanding      Advised pt to stop use of NSAIDs, Fish Oil, and MV 5 days prior to procedure, per Dr Mckenzie protocol.  Tylenol based products are ok to take.  Pt verbalized understanding.     All risks, benefits, alternatives, and indications of colonoscopy procedure have been discussed with the patient. Risks to include perforation of the colon requiring possible surgery or colostomy, risk of bleeding from biopsies or removal of colon tissue, possibility of missing a colon polyp or cancer, or adverse drug reaction.  Benefits to include the diagnosis and management of disease of the colon and rectum. Alternatives to include barium enema, radiographic evaluation, lab testing or no  intervention. He verbalizes understanding and agrees.         Kendall Saenz, APRN  06/07/21        There are no Patient Instructions on file for this visit.

## 2021-06-07 ENCOUNTER — OFFICE VISIT (OUTPATIENT)
Dept: GASTROENTEROLOGY | Facility: CLINIC | Age: 71
End: 2021-06-07

## 2021-06-07 VITALS
WEIGHT: 188 LBS | OXYGEN SATURATION: 98 % | HEART RATE: 100 BPM | HEIGHT: 69 IN | SYSTOLIC BLOOD PRESSURE: 126 MMHG | TEMPERATURE: 97 F | DIASTOLIC BLOOD PRESSURE: 74 MMHG | BODY MASS INDEX: 27.85 KG/M2

## 2021-06-07 DIAGNOSIS — Z86.010 HISTORY OF ADENOMATOUS POLYP OF COLON: Primary | ICD-10-CM

## 2021-06-07 PROCEDURE — S0260 H&P FOR SURGERY: HCPCS | Performed by: NURSE PRACTITIONER

## 2021-06-07 RX ORDER — SODIUM, POTASSIUM,MAG SULFATES 17.5-3.13G
SOLUTION, RECONSTITUTED, ORAL ORAL
Qty: 177 ML | Refills: 0 | Status: SHIPPED | OUTPATIENT
Start: 2021-06-07 | End: 2021-09-14

## 2021-06-07 RX ORDER — INSULIN ASPART 100 [IU]/ML
INJECTION, SOLUTION INTRAVENOUS; SUBCUTANEOUS
Qty: 15 PEN | Refills: 3 | Status: SHIPPED | OUTPATIENT
Start: 2021-06-07 | End: 2022-07-12

## 2021-07-12 RX ORDER — TAMSULOSIN HYDROCHLORIDE 0.4 MG/1
2 CAPSULE ORAL EVERY EVENING
Qty: 180 CAPSULE | Refills: 3 | Status: SHIPPED | OUTPATIENT
Start: 2021-07-12 | End: 2022-03-24

## 2021-07-12 RX ORDER — LISINOPRIL 10 MG/1
TABLET ORAL
Qty: 90 TABLET | Refills: 3 | Status: SHIPPED | OUTPATIENT
Start: 2021-07-12 | End: 2022-03-24

## 2021-07-22 ENCOUNTER — OFFICE VISIT (OUTPATIENT)
Dept: FAMILY MEDICINE CLINIC | Facility: CLINIC | Age: 71
End: 2021-07-22

## 2021-07-22 VITALS
SYSTOLIC BLOOD PRESSURE: 129 MMHG | HEIGHT: 68 IN | DIASTOLIC BLOOD PRESSURE: 81 MMHG | WEIGHT: 182.6 LBS | TEMPERATURE: 98.2 F | BODY MASS INDEX: 27.68 KG/M2 | HEART RATE: 81 BPM | OXYGEN SATURATION: 99 %

## 2021-07-22 DIAGNOSIS — R53.83 FATIGUE, UNSPECIFIED TYPE: ICD-10-CM

## 2021-07-22 DIAGNOSIS — E11.9 TYPE 2 DIABETES MELLITUS WITHOUT COMPLICATION, WITH LONG-TERM CURRENT USE OF INSULIN (HCC): ICD-10-CM

## 2021-07-22 DIAGNOSIS — Z12.5 SPECIAL SCREENING FOR MALIGNANT NEOPLASM OF PROSTATE: ICD-10-CM

## 2021-07-22 DIAGNOSIS — Z79.4 TYPE 2 DIABETES MELLITUS WITHOUT COMPLICATION, WITH LONG-TERM CURRENT USE OF INSULIN (HCC): ICD-10-CM

## 2021-07-22 DIAGNOSIS — E78.2 MIXED HYPERLIPIDEMIA: Primary | ICD-10-CM

## 2021-07-22 DIAGNOSIS — Z00.00 MEDICARE ANNUAL WELLNESS VISIT, SUBSEQUENT: ICD-10-CM

## 2021-07-22 LAB
BILIRUB BLD-MCNC: NEGATIVE MG/DL
CLARITY, POC: CLEAR
COLOR UR: YELLOW
GLUCOSE UR STRIP-MCNC: ABNORMAL MG/DL
KETONES UR QL: NEGATIVE
LEUKOCYTE EST, POC: NEGATIVE
NITRITE UR-MCNC: NEGATIVE MG/ML
PH UR: 5 [PH] (ref 5–8)
PROT UR STRIP-MCNC: NEGATIVE MG/DL
RBC # UR STRIP: ABNORMAL /UL
SP GR UR: 1.02 (ref 1–1.03)
UROBILINOGEN UR QL: NORMAL

## 2021-07-22 PROCEDURE — 1126F AMNT PAIN NOTED NONE PRSNT: CPT | Performed by: NURSE PRACTITIONER

## 2021-07-22 PROCEDURE — 81003 URINALYSIS AUTO W/O SCOPE: CPT | Performed by: NURSE PRACTITIONER

## 2021-07-22 PROCEDURE — 1170F FXNL STATUS ASSESSED: CPT | Performed by: NURSE PRACTITIONER

## 2021-07-22 PROCEDURE — G0439 PPPS, SUBSEQ VISIT: HCPCS | Performed by: NURSE PRACTITIONER

## 2021-07-22 PROCEDURE — 1160F RVW MEDS BY RX/DR IN RCRD: CPT | Performed by: NURSE PRACTITIONER

## 2021-07-22 PROCEDURE — 96160 PT-FOCUSED HLTH RISK ASSMT: CPT | Performed by: NURSE PRACTITIONER

## 2021-07-22 NOTE — PATIENT INSTRUCTIONS
Medicare Wellness  Personal Prevention Plan of Service     Date of Office Visit:  2021  Encounter Provider:  ALONSO Lemus  Place of Service:  [facility]  Patient Name: Bernard Matta  :  1950    As part of the Medicare Wellness portion of your visit today, we are providing you with this personalized preventive plan of services (PPPS). This plan is based upon recommendations of the [place] Preventive Services Task Force (USPSTF) and the Advisory Committee on Immunization Practices (ACIP).    This lists the preventive care services that should be considered, and provides dates of when you are due. Items listed as completed are up-to-date and do not require any further intervention.    Health Maintenance   Topic Date Due   • Pneumococcal Vaccine 65+ (2 of 2 - PPSV23) 2020   • URINE MICROALBUMIN  2021   • COLORECTAL CANCER SCREENING  2021   • INFLUENZA VACCINE  10/01/2021   • HEMOGLOBIN A1C  2021   • LIPID PANEL  2022   • DIABETIC EYE EXAM  2022   • ANNUAL WELLNESS VISIT  2022   • DIABETIC FOOT EXAM  2022   • TDAP/TD VACCINES (2 - Td or Tdap) 2023   • HEPATITIS C SCREENING  Completed   • COVID-19 Vaccine  Completed   • AAA SCREEN (ONE-TIME)  Completed   • ZOSTER VACCINE  Addressed       Orders Placed This Encounter   Procedures   • TSH     Order Specific Question:   Release to patient     Answer:   Immediate   • T4, free     Order Specific Question:   Release to patient     Answer:   Immediate   • Comprehensive Metabolic Panel     Order Specific Question:   Release to patient     Answer:   Immediate   • Hemoglobin A1c     Order Specific Question:   Release to patient     Answer:   Immediate   • Lipid Panel   • PSA Screen     Order Specific Question:   Release to patient     Answer:   Immediate   • POC Urinalysis Dipstick, Multipro     Order Specific Question:   Release to patient     Answer:   Immediate    • CBC & Differential     Order Specific Question:   Manual Differential     Answer:   No       Return in about 6 months (around 1/22/2022).      Advance Care Planning and Advance Directives     You make decisions on a daily basis - decisions about where you want to live, your career, your home, your life. Perhaps one of the most important decisions you face is your choice for future medical care. Take time to talk with your family and your healthcare team and start planning today.  Advance Care Planning is a process that can help you:  · Understand possible future healthcare decisions in light of your own experiences  · Reflect on those decision in light of your goals and values  · Discuss your decisions with those closest to you and the healthcare professionals that care for you  · Make a plan by creating a document that reflects your wishes    Surrogate Decision Maker  In the event of a medical emergency, which has left you unable to communicate or to make your own decisions, you would need someone to make decisions for you.  It is important to discuss your preferences for medical treatment with this person while you are in good health.     Qualities of a surrogate decision maker:  • Willing to take on this role and responsibility  • Knows what you want for future medical care  • Willing to follow your wishes even if they don't agree with them  • Able to make difficult medical decisions under stressful circumstances    Advance Directives  These are legal documents you can create that will guide your healthcare team and decision maker(s) when needed. These documents can be stored in the electronic medical record.    · Living Will - a legal document to guide your care if you have a terminal condition or a serious illness and are unable to communicate. States vary by statute in document names/types, but most forms may include one or more of the following:        -  Directions regarding life-prolonging treatments         -  Directions regarding artificially provided nutrition/hydration        -  Choosing a healthcare decision maker        -  Direction regarding organ/tissue donation    · Durable Power of  for Healthcare - this document names an -in-fact to make medical decisions for you, but it may also allow this person to make personal and financial decisions for you. Please seek the advice of an  if you need this type of document.    **Advance Directives are not required and no one may discriminate against you if you do not sign one.    Medical Orders  Many states allow specific forms/orders signed by your physician to record your wishes for medical treatment in your current state of health. This form, signed in personal communication with your physician, addresses resuscitation and other medical interventions that you may or may not want.      For more information or to schedule a time with a [facility] Advance Care Planning Facilitator contact: [facility] or call [phone] and someone will contact you directly.      Obesity, Adult  Obesity is the condition of having too much total body fat. Being overweight or obese means that your weight is greater than what is considered healthy for your body size. Obesity is determined by a measurement called BMI. BMI is an estimate of body fat and is calculated from height and weight. For adults, a BMI of 30 or higher is considered obese.  Obesity can lead to other health concerns and major illnesses, including:  · Stroke.  · Coronary artery disease (CAD).  · Type 2 diabetes.  · Some types of cancer, including cancers of the colon, breast, uterus, and gallbladder.  · Osteoarthritis.  · High blood pressure (hypertension).  · High cholesterol.  · Sleep apnea.  · Gallbladder stones.  · Infertility problems.  What are the causes?  Common causes of this condition include:  · Eating daily meals that are high in calories, sugar, and fat.  · Being born  with genes that may make you more likely to become obese.  · Having a medical condition that causes obesity, including:  ? Hypothyroidism.  ? Polycystic ovarian syndrome (PCOS).  ? Binge-eating disorder.  ? Cushing syndrome.  · Taking certain medicines, such as steroids, antidepressants, and seizure medicines.  · Not being physically active (sedentary lifestyle).  · Not getting enough sleep.  · Drinking high amounts of sugar-sweetened beverages, such as soft drinks.  What increases the risk?  The following factors may make you more likely to develop this condition:  · Having a family history of obesity.  · Being a woman of  descent.  · Being a man of  descent.  · Living in an area with limited access to:  ? [place], recreation centers, or sidewalks.  ? Healthy food choices, such as grocery stores and farmers' markets.  What are the signs or symptoms?  The main sign of this condition is having too much body fat.  How is this diagnosed?  This condition is diagnosed based on:  · Your BMI. If you are an adult with a BMI of 30 or higher, you are considered obese.  · Your waist circumference. This measures the distance around your waistline.  · Your skinfold thickness. Your health care provider may gently pinch a fold of your skin and measure it.  You may have other tests to check for underlying conditions.  How is this treated?  Treatment for this condition often includes changing your lifestyle. Treatment may include some or all of the following:  · Dietary changes. This may include developing a healthy meal plan.  · Regular physical activity. This may include activity that causes your heart to beat faster (aerobic exercise) and strength training. Work with your health care provider to design an exercise program that works for you.  · Medicine to help you lose weight if you are unable to lose 1 pound a week after 6 weeks of healthy eating and more physical activity.  · Treating conditions that  cause the obesity (underlying conditions).  · Surgery. Surgical options may include gastric banding and gastric bypass. Surgery may be done if:  ? Other treatments have not helped to improve your condition.  ? You have a BMI of 40 or higher.  ? You have life-threatening health problems related to obesity.  Follow these instructions at home:  Eating and drinking    · Follow recommendations from your health care provider about what you eat and drink. Your health care provider may advise you to:  ? Limit fast food, sweets, and processed snack foods.  ? Choose low-fat options, such as low-fat milk instead of whole milk.  ? Eat 5 or more servings of fruits or vegetables every day.  ? Eat at home more often. This gives you more control over what you eat.  ? Choose healthy foods when you eat out.  ? Learn to read food labels. This will help you understand how much food is considered 1 serving.  ? Learn what a healthy serving size is.  ? Keep low-fat snacks available.  ? Limit sugary drinks, such as soda, fruit juice, sweetened iced tea, and flavored milk.  · Drink enough water to keep your urine pale yellow.  · Do not follow a fad diet. Fad diets can be unhealthy and even dangerous.  Physical activity  · Exercise regularly, as told by your health care provider.  ? Most adults should get up to 150 minutes of moderate-intensity exercise every week.  ? Ask your health care provider what types of exercise are safe for you and how often you should exercise.  · Warm up and stretch before being active.  · Cool down and stretch after being active.  · Rest between periods of activity.  Lifestyle  · Work with your health care provider and a dietitian to set a weight-loss goal that is healthy and reasonable for you.  · Limit your screen time.  · Find ways to reward yourself that do not involve food.  · Do not drink alcohol if:  ? Your health care provider tells you not to drink.  ? You are pregnant, may be pregnant, or are planning  to become pregnant.  · If you drink alcohol:  ? Limit how much you use to:  § 0-1 drink a day for women.  § 0-2 drinks a day for men.  ? Be aware of how much alcohol is in your drink. In the [place] one drink equals one 12 oz bottle of beer (355 mL), one 5 oz glass of wine (148 mL), or one 1½ oz glass of hard liquor (44 mL).  General instructions  · Keep a weight-loss journal to keep track of the food you eat and how much exercise you get.  · Take over-the-counter and prescription medicines only as told by your health care provider.  · Take vitamins and supplements only as told by your health care provider.  · Consider joining a support group. Your health care provider may be able to recommend a support group.  · Keep all follow-up visits as told by your health care provider. This is important.  Contact a health care provider if:  · You are unable to meet your weight loss goal after 6 weeks of dietary and lifestyle changes.  Get help right away if you are having:  · Trouble breathing.  · Suicidal thoughts or behaviors.  Summary  · Obesity is the condition of having too much total body fat.  · Being overweight or obese means that your weight is greater than what is considered healthy for your body size.  · Work with your health care provider and a dietitian to set a weight-loss goal that is healthy and reasonable for you.  · Exercise regularly, as told by your health care provider. Ask your health care provider what types of exercise are safe for you and how often you should exercise.  This information is not intended to replace advice given to you by your health care provider. Make sure you discuss any questions you have with your health care provider.  Document Revised: 08/22/2019 Document Reviewed: 08/22/2019  [employer]vier Patient Education © 2021 [employer]

## 2021-07-22 NOTE — PROGRESS NOTES
The ABCs of the Annual Wellness Visit  Subsequent Medicare Wellness Visit    Chief Complaint   Patient presents with   • Medicare Wellness-subsequent     fasting       Subjective   History of Present Illness:  Bernard Matta is a 70 y.o. male who presents for a Subsequent Medicare Wellness Visit.    HEALTH RISK ASSESSMENT    Recent Hospitalizations:  No hospitalization(s) within the last year.    Current Medical Providers:  Patient Care Team:  Brando Webb MD as PCP - General (Family Medicine)  Jim Cha OD (Optometry)  Anand Santoyo MD as Consulting Physician (Ophthalmology)  Deric Byers MD as Consulting Physician (Endocrinology)  Shaquille Mckenzie DO as Consulting Physician (Gastroenterology)    Smoking Status:  Social History     Tobacco Use   Smoking Status Former Smoker   • Packs/day: 0.50   • Years: 10.00   • Pack years: 5.00   • Types: Cigarettes   • Quit date: 1979   • Years since quittin.5   Smokeless Tobacco Never Used       Alcohol Consumption:  Social History     Substance and Sexual Activity   Alcohol Use No       Depression Screen:   PHQ-2/PHQ-9 Depression Screening 2021   Little interest or pleasure in doing things 0   Feeling down, depressed, or hopeless 0   Trouble falling or staying asleep, or sleeping too much -   Feeling tired or having little energy -   Poor appetite or overeating -   Feeling bad about yourself - or that you are a failure or have let yourself or your family down -   Trouble concentrating on things, such as reading the newspaper or watching television -   Moving or speaking so slowly that other people could have noticed. Or the opposite - being so fidgety or restless that you have been moving around a lot more than usual -   Thoughts that you would be better off dead, or of hurting yourself in some way -   Total Score 0   If you checked off any problems, how difficult have these problems made it for you to do your work, take care  of things at home, or get along with other people? -       Fall Risk Screen:  STEFANI Fall Risk Assessment was completed, and patient is at LOW risk for falls.Assessment completed on:7/22/2021    Health Habits and Functional and Cognitive Screening:  Functional & Cognitive Status 7/22/2021   Do you have difficulty preparing food and eating? No   Do you have difficulty bathing yourself, getting dressed or grooming yourself? No   Do you have difficulty using the toilet? No   Do you have difficulty moving around from place to place? No   Do you have trouble with steps or getting out of a bed or a chair? No   Current Diet Diabetic Diet   Dental Exam Up to date   Eye Exam Up to date   Exercise (times per week) 3 times per week   Current Exercises Include Walking   Current Exercise Activities Include -   Do you need help using the phone?  No   Are you deaf or do you have serious difficulty hearing?  No   Do you need help with transportation? No   Do you need help shopping? No   Do you need help preparing meals?  No   Do you need help with housework?  No   Do you need help with laundry? No   Do you need help taking your medications? No   Do you need help managing money? -   Do you ever drive or ride in a car without wearing a seat belt? No   Have you felt unusual stress, anger or loneliness in the last month? No   Who do you live with? Spouse   If you need help, do you have trouble finding someone available to you? No   Have you been bothered in the last four weeks by sexual problems? No   Do you have difficulty concentrating, remembering or making decisions? No         Does the patient have evidence of cognitive impairment? No    Asprin use counseling:Taking ASA appropriately as indicated    Age-appropriate Screening Schedule:  Refer to the list below for future screening recommendations based on patient's age, sex and/or medical conditions. Orders for these recommended tests are listed in the plan section. The patient has  been provided with a written plan.    Health Maintenance   Topic Date Due   • URINE MICROALBUMIN  07/14/2021   • INFLUENZA VACCINE  10/01/2021   • HEMOGLOBIN A1C  11/06/2021   • LIPID PANEL  05/06/2022   • DIABETIC EYE EXAM  06/09/2022   • DIABETIC FOOT EXAM  07/22/2022   • TDAP/TD VACCINES (2 - Td or Tdap) 05/08/2023   • ZOSTER VACCINE  Addressed          The following portions of the patient's history were reviewed and updated as appropriate: allergies, current medications, past family history, past medical history, past social history, past surgical history and problem list.    Outpatient Medications Prior to Visit   Medication Sig Dispense Refill   • Accu-Chek FastClix Lancets misc Use 4 x daily ,   ICD10 code is E11.9 You dispensed the wrong this, patient wants acucheck fast click lancets 360 each 3   • Alcohol Swabs (Alcohol Wipes) 70 % pads Use 4 x daily 120 each 11   • aspirin 81 MG tablet Take 81 mg by mouth Daily.     • atorvastatin (LIPITOR) 40 MG tablet Take 1 tablet by mouth every night at bedtime. 90 tablet 3   • Blood Glucose Monitoring Suppl w/Device kit accu chek albertina 1 each 1   • cetirizine (zyrTEC) 10 MG tablet Take 10 mg by mouth Daily.     • cholecalciferol (VITAMIN D3) 400 UNITS tablet Take 400 Units by mouth Daily.     • dorzolamide-timolol (COSOPT) 22.3-6.8 MG/ML ophthalmic solution 1 drop 2 (Two) Times a Day.     • fluticasone (FLONASE) 50 MCG/ACT nasal spray 2 sprays by Each Nare route Daily. 48 mL 3   • Glucose Blood (Blood Glucose Test) strip Use 4 x daily , accuchek breanna plus strips   ICD10 code is E11.9 360 each 11   • insulin aspart (NovoLOG FlexPen) 100 UNIT/ML solution pen-injector sc pen Use up to 20 units with meals. 15 pen 3   • insulin degludec (Tresiba FlexTouch) 100 UNIT/ML solution pen-injector injection Inject 20 Units under the skin into the appropriate area as directed Every Night. 6 pen 3   • Insulin Pen Needle (B-D UF III MINI PEN NEEDLES) 31G X 5 MM misc USE 4 TIMES  "DAILY, E10.65 600 each 3   • Insulin Pen Needle 31G X 5 MM misc Droplet Pen Needle 31 gauge x 3/16\"     • Lancet Devices (Lancing Device) misc acucheck fast click lancing device. Make certain you send the correct one. Talk to patient . States you sent the wrong one 1 each 11   • latanoprost (XALATAN) 0.005 % ophthalmic solution 1 drop Every Night.     • lisinopril (PRINIVIL,ZESTRIL) 10 MG tablet TAKE 1 TABLET EVERY MORNING 90 tablet 3   • Multiple Vitamins-Minerals (MULTIVITAMIN WITH MINERALS) tablet tablet Take 1 tablet by mouth Daily.     • phenazopyridine (PYRIDIUM) 100 MG tablet Take 1 tablet by mouth 3 (Three) Times a Day As Needed for Bladder Spasms. (Patient taking differently: Take 100 mg by mouth 3 (Three) Times a Day As Needed for Bladder Spasms.) 20 tablet 0   • tamsulosin (FLOMAX) 0.4 MG capsule 24 hr capsule TAKE 2 CAPSULES BY MOUTH EVERY EVENING. 180 capsule 3   • Insulin Lispro, 1 Unit Dial, (HumaLOG KwikPen) 100 UNIT/ML solution pen-injector Up to 20 units w meals 15 pen 3   • sodium-potassium-magnesium sulfates (Suprep Bowel Prep Kit) 17.5-3.13-1.6 GM/177ML solution oral solution Take as directed 177 mL 0   • Pseudoephedrine-Naproxen Na (ALEVE-D SINUS & COLD PO) daily     • sulfamethoxazole-trimethoprim (BACTRIM,SEPTRA) 200-40 MG/5ML suspension Take  by mouth 2 (Two) Times a Day.       No facility-administered medications prior to visit.       Patient Active Problem List   Diagnosis   • Anterior ischemic optic neuropathy   • Hypertension   • Type 2 diabetes mellitus (CMS/HCC)   • Hx of adenomatous colonic polyps   • History of adenomatous polyp of colon       Advanced Care Planning:  ACP discussion was held with the patient during this visit. Patient does not have an advance directive, information provided.    Review of Systems   Constitutional: Negative for fever.   Respiratory: Negative for shortness of breath.    Cardiovascular: Negative for chest pain.   Gastrointestinal: Negative for " "constipation, diarrhea, nausea and vomiting.   Neurological: Negative for dizziness, light-headedness and headaches.       Compared to one year ago, the patient feels his physical health is the same.  Compared to one year ago, the patient feels his mental health is the same.    Reviewed chart for potential of high risk medication in the elderly: yes  Reviewed chart for potential of harmful drug interactions in the elderly:yes    Objective         Vitals:    07/22/21 0751   BP: 129/81   BP Location: Left arm   Patient Position: Sitting   Cuff Size: Adult   Pulse: 81   Temp: 98.2 °F (36.8 °C)   TempSrc: Temporal   SpO2: 99%   Weight: 82.8 kg (182 lb 9.6 oz)   Height: 172.7 cm (68\")   PainSc: 0-No pain       Body mass index is 27.76 kg/m².  Discussed the patient's BMI with him. The BMI is above average; BMI management plan is completed.    Physical Exam  Vitals reviewed.   Constitutional:       Appearance: He is well-developed.   HENT:      Right Ear: Tympanic membrane, ear canal and external ear normal.      Left Ear: Tympanic membrane, ear canal and external ear normal.   Cardiovascular:      Rate and Rhythm: Normal rate and regular rhythm.      Heart sounds: Normal heart sounds.   Pulmonary:      Effort: Pulmonary effort is normal.      Breath sounds: Normal breath sounds.   Abdominal:      General: Abdomen is flat. Bowel sounds are normal.      Palpations: Abdomen is soft.   Genitourinary:     Penis: Normal and uncircumcised.       Testes: Normal.      Prostate: Normal.      Rectum: Normal.   Musculoskeletal:      Right foot: No deformity.      Left foot: No deformity.   Feet:      Right foot:      Skin integrity: Skin integrity normal. No ulcer, blister or skin breakdown.      Left foot:      Skin integrity: Skin integrity normal. No ulcer, blister or skin breakdown.      Comments: Diabetic foot exam performed  Neurological:      Mental Status: He is alert and oriented to person, place, and time.   Psychiatric:    "      Behavior: Behavior normal.               Assessment/Plan   Medicare Risks and Personalized Health Plan  CMS Preventative Services Quick Reference  Advance Directive Discussion  Colon Cancer Screening  Obesity/Overweight   Prostate Cancer Screening     The above risks/problems have been discussed with the patient.  Pertinent information has been shared with the patient in the After Visit Summary.  Follow up plans and orders are seen below in the Assessment/Plan Section.    Diagnoses and all orders for this visit:    1. Mixed hyperlipidemia (Primary)  -     Lipid Panel    2. Type 2 diabetes mellitus without complication, with long-term current use of insulin (CMS/[facility])  -     Comprehensive Metabolic Panel  -     Hemoglobin A1c  -     POC Urinalysis Dipstick, Multipro    3. Fatigue, unspecified type  -     CBC & Differential  -     TSH  -     T4, free  -     Comprehensive Metabolic Panel  -     POC Urinalysis Dipstick, Multipro    4. Special screening for malignant neoplasm of prostate  -     PSA Screen    5. Medicare annual wellness visit, subsequent  -     CBC & Differential  -     TSH  -     T4, free  -     Comprehensive Metabolic Panel  -     Hemoglobin A1c  -     Lipid Panel  -     POC Urinalysis Dipstick, Multipro  -     PSA Screen    Other orders  -     Cancel: TSH  -     Cancel: T4, free  -     Cancel: CBC & Differential  -     Cancel: Comprehensive Metabolic Panel  -     Cancel: Hemoglobin A1c  -     Cancel: Lipid Panel  -     Cancel: PSA Screen  -     Cancel: POC Urinalysis Dipstick, Multipro      Follow Up:  Return in about 6 months (around 1/22/2022) for Next scheduled follow up.     An After Visit Summary and PPPS were given to the patient.         Marj Castellon, APRN 7/22/21

## 2021-07-23 LAB
ALBUMIN SERPL-MCNC: 4.6 G/DL (ref 3.5–5.2)
ALBUMIN/GLOB SERPL: 1.9 G/DL
ALP SERPL-CCNC: 100 U/L (ref 39–117)
ALT SERPL-CCNC: 29 U/L (ref 1–41)
AST SERPL-CCNC: 20 U/L (ref 1–40)
BASOPHILS # BLD AUTO: 0.05 10*3/MM3 (ref 0–0.2)
BASOPHILS NFR BLD AUTO: 0.8 % (ref 0–1.5)
BILIRUB SERPL-MCNC: 0.4 MG/DL (ref 0–1.2)
BUN SERPL-MCNC: 22 MG/DL (ref 8–23)
BUN/CREAT SERPL: 12 (ref 7–25)
CALCIUM SERPL-MCNC: 9.3 MG/DL (ref 8.6–10.5)
CHLORIDE SERPL-SCNC: 102 MMOL/L (ref 98–107)
CHOLEST SERPL-MCNC: 151 MG/DL (ref 0–200)
CO2 SERPL-SCNC: 29.2 MMOL/L (ref 22–29)
CREAT SERPL-MCNC: 1.84 MG/DL (ref 0.76–1.27)
EOSINOPHIL # BLD AUTO: 0.37 10*3/MM3 (ref 0–0.4)
EOSINOPHIL NFR BLD AUTO: 5.8 % (ref 0.3–6.2)
ERYTHROCYTE [DISTWIDTH] IN BLOOD BY AUTOMATED COUNT: 13.2 % (ref 12.3–15.4)
GLOBULIN SER CALC-MCNC: 2.4 GM/DL
GLUCOSE SERPL-MCNC: 213 MG/DL (ref 65–99)
HBA1C MFR BLD: 10.41 % (ref 4.8–5.6)
HCT VFR BLD AUTO: 37.6 % (ref 37.5–51)
HDLC SERPL-MCNC: 37 MG/DL (ref 40–60)
HGB BLD-MCNC: 11.9 G/DL (ref 13–17.7)
IMM GRANULOCYTES # BLD AUTO: 0.01 10*3/MM3 (ref 0–0.05)
IMM GRANULOCYTES NFR BLD AUTO: 0.2 % (ref 0–0.5)
LDLC SERPL CALC-MCNC: 95 MG/DL (ref 0–100)
LYMPHOCYTES # BLD AUTO: 1.15 10*3/MM3 (ref 0.7–3.1)
LYMPHOCYTES NFR BLD AUTO: 18.1 % (ref 19.6–45.3)
MCH RBC QN AUTO: 28.9 PG (ref 26.6–33)
MCHC RBC AUTO-ENTMCNC: 31.6 G/DL (ref 31.5–35.7)
MCV RBC AUTO: 91.3 FL (ref 79–97)
MONOCYTES # BLD AUTO: 0.53 10*3/MM3 (ref 0.1–0.9)
MONOCYTES NFR BLD AUTO: 8.4 % (ref 5–12)
NEUTROPHILS # BLD AUTO: 4.23 10*3/MM3 (ref 1.7–7)
NEUTROPHILS NFR BLD AUTO: 66.7 % (ref 42.7–76)
NRBC BLD AUTO-RTO: 0.2 /100 WBC (ref 0–0.2)
PLATELET # BLD AUTO: 172 10*3/MM3 (ref 140–450)
POTASSIUM SERPL-SCNC: 4.9 MMOL/L (ref 3.5–5.2)
PROT SERPL-MCNC: 7 G/DL (ref 6–8.5)
PSA SERPL-MCNC: 0.89 NG/ML (ref 0–4)
RBC # BLD AUTO: 4.12 10*6/MM3 (ref 4.14–5.8)
SODIUM SERPL-SCNC: 142 MMOL/L (ref 136–145)
T4 FREE SERPL-MCNC: 1.22 NG/DL (ref 0.93–1.7)
TRIGL SERPL-MCNC: 105 MG/DL (ref 0–150)
TSH SERPL DL<=0.005 MIU/L-ACNC: 1.97 UIU/ML (ref 0.27–4.2)
VLDLC SERPL CALC-MCNC: 19 MG/DL (ref 5–40)
WBC # BLD AUTO: 6.34 10*3/MM3 (ref 3.4–10.8)

## 2021-07-23 NOTE — PROGRESS NOTES
Patient had routine labs at our office yesterday. A1C is 10.4, so I wanted to forward to you in case you wanted to make adjustments. Next appt. with you is in September.

## 2021-08-09 ENCOUNTER — TELEPHONE (OUTPATIENT)
Dept: ENDOCRINOLOGY | Facility: CLINIC | Age: 71
End: 2021-08-09

## 2021-08-09 NOTE — TELEPHONE ENCOUNTER
Pt called stating that he has eight boxes of Novolog and he called [employer] they are sending more out before he needs it. He is wanting to know what can be done.

## 2021-08-12 ENCOUNTER — ANESTHESIA (OUTPATIENT)
Dept: GASTROENTEROLOGY | Facility: HOSPITAL | Age: 71
End: 2021-08-12

## 2021-08-12 ENCOUNTER — HOSPITAL ENCOUNTER (OUTPATIENT)
Facility: HOSPITAL | Age: 71
Setting detail: HOSPITAL OUTPATIENT SURGERY
Discharge: HOME | End: 2021-08-12
Attending: INTERNAL MEDICINE | Admitting: INTERNAL MEDICINE

## 2021-08-12 ENCOUNTER — ANESTHESIA EVENT (OUTPATIENT)
Dept: GASTROENTEROLOGY | Facility: HOSPITAL | Age: 71
End: 2021-08-12

## 2021-08-12 VITALS
RESPIRATION RATE: 17 BRPM | WEIGHT: 180 LBS | TEMPERATURE: 97.5 F | DIASTOLIC BLOOD PRESSURE: 79 MMHG | HEART RATE: 88 BPM | OXYGEN SATURATION: 99 % | BODY MASS INDEX: 27.28 KG/M2 | SYSTOLIC BLOOD PRESSURE: 125 MMHG | HEIGHT: 68 IN

## 2021-08-12 DIAGNOSIS — Z86.010 HISTORY OF ADENOMATOUS POLYP OF COLON: ICD-10-CM

## 2021-08-12 LAB — GLUCOSE BLDC GLUCOMTR-MCNC: 125 MG/DL (ref 70–130)

## 2021-08-12 PROCEDURE — 25010000002 PROPOFOL 10 MG/ML EMULSION: Performed by: NURSE ANESTHETIST, CERTIFIED REGISTERED

## 2021-08-12 PROCEDURE — 82962 GLUCOSE BLOOD TEST: CPT

## 2021-08-12 PROCEDURE — G0105 COLORECTAL SCRN; HI RISK IND: HCPCS | Performed by: INTERNAL MEDICINE

## 2021-08-12 RX ORDER — SODIUM CHLORIDE 9 MG/ML
100 INJECTION, SOLUTION INTRAVENOUS CONTINUOUS
Status: DISCONTINUED | OUTPATIENT
Start: 2021-08-12 | End: 2021-08-12 | Stop reason: HOSPADM

## 2021-08-12 RX ORDER — MIDAZOLAM HYDROCHLORIDE 1 MG/ML
0.5 INJECTION, SOLUTION INTRAMUSCULAR; INTRAVENOUS
Status: DISCONTINUED | OUTPATIENT
Start: 2021-08-12 | End: 2021-08-12 | Stop reason: HOSPADM

## 2021-08-12 RX ORDER — PROPOFOL 10 MG/ML
VIAL (ML) INTRAVENOUS AS NEEDED
Status: DISCONTINUED | OUTPATIENT
Start: 2021-08-12 | End: 2021-08-12 | Stop reason: SURG

## 2021-08-12 RX ORDER — LIDOCAINE HYDROCHLORIDE 20 MG/ML
INJECTION, SOLUTION EPIDURAL; INFILTRATION; INTRACAUDAL; PERINEURAL AS NEEDED
Status: DISCONTINUED | OUTPATIENT
Start: 2021-08-12 | End: 2021-08-12 | Stop reason: SURG

## 2021-08-12 RX ORDER — SODIUM CHLORIDE 0.9 % (FLUSH) 0.9 %
10 SYRINGE (ML) INJECTION EVERY 12 HOURS SCHEDULED
Status: DISCONTINUED | OUTPATIENT
Start: 2021-08-12 | End: 2021-08-12 | Stop reason: HOSPADM

## 2021-08-12 RX ORDER — SODIUM CHLORIDE 0.9 % (FLUSH) 0.9 %
10 SYRINGE (ML) INJECTION AS NEEDED
Status: DISCONTINUED | OUTPATIENT
Start: 2021-08-12 | End: 2021-08-12 | Stop reason: HOSPADM

## 2021-08-12 RX ADMIN — PROPOFOL 50 MG: 10 INJECTION, EMULSION INTRAVENOUS at 10:05

## 2021-08-12 RX ADMIN — LIDOCAINE HYDROCHLORIDE 100 MG: 20 INJECTION, SOLUTION EPIDURAL; INFILTRATION; INTRACAUDAL; PERINEURAL at 10:00

## 2021-08-12 RX ADMIN — PROPOFOL 100 MG: 10 INJECTION, EMULSION INTRAVENOUS at 10:00

## 2021-08-12 RX ADMIN — SODIUM CHLORIDE 100 ML/HR: 9 INJECTION, SOLUTION INTRAVENOUS at 09:19

## 2021-08-12 NOTE — ANESTHESIA PREPROCEDURE EVALUATION
Anesthesia Evaluation     Patient summary reviewed   no history of anesthetic complications:  NPO Solid Status: > 8 hours  NPO Liquid Status: > 4 hours           Airway   Mallampati: I  TM distance: >3 FB  Neck ROM: full  No difficulty expected  Dental    (+) lower dentures and upper dentures    Pulmonary    (+) a smoker (quit 40 years ago) Former,   Cardiovascular   Exercise tolerance: good (4-7 METS)    (+) hypertension, hyperlipidemia,   (-) CAD      Neuro/Psych  (-) seizures, TIA, CVA    ROS Comment: Anterior ischemic optic neuropathy  GI/Hepatic/Renal/Endo    (+)   diabetes mellitus type 2 using insulin,   (-) liver disease, no renal disease    Musculoskeletal     Abdominal    Substance History      OB/GYN          Other                          Anesthesia Plan    ASA 3     MAC     intravenous induction     Anesthetic plan, all risks, benefits, and alternatives have been provided, discussed and informed consent has been obtained with: patient and spouse/significant other.

## 2021-08-12 NOTE — ANESTHESIA POSTPROCEDURE EVALUATION
Patient: Bernard Matta    Procedure Summary     Date: 08/12/21 Room / Location: [facility] ENDOSCOPY 5 / [facility] ENDOSCOPY    Anesthesia Start: 0956 Anesthesia Stop: 1014    Procedure: COLONOSCOPY WITH ANESTHESIA (N/A ) Diagnosis:       History of adenomatous polyp of colon      (History of adenomatous polyp of colon [Z86.010])    Surgeons: Shaquille Mckenzie DO Provider: Randy Burch CRNA    Anesthesia Type: MAC ASA Status: 3          Anesthesia Type: MAC    Vitals  Vitals Value Taken Time   BP     Temp     Pulse 85 08/12/21 1014   Resp     SpO2 99 % 08/12/21 1014   Vitals shown include unvalidated device data.        Post Anesthesia Care and Evaluation    Patient location during evaluation: PHASE II  Patient participation: complete - patient participated  Level of consciousness: awake  Pain score: 0  Pain management: adequate  Airway patency: patent  Anesthetic complications: No anesthetic complications  PONV Status: none  Cardiovascular status: acceptable  Respiratory status: acceptable  Hydration status: acceptable

## 2021-08-12 NOTE — H&P
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    • Hyperlipidemia    • Hypertension    • Ischemic optic neuropathy of both eyes        Past Surgical History:  Past Surgical History:   Procedure Laterality Date   • COLONOSCOPY  2019    [facility]   • COLONOSCOPY N/A 8/11/2020    Procedure: COLONOSCOPY WITH ANESTHESIA;  Surgeon: Shaquille Mckenzie DO;  Location[facility] ENDOSCOPY;  Service: Gastroenterology;  Laterality: N/A;  preop; hx of polyps   postop; polyps   PCP Brando Webb        Family History:  Family History   Problem Relation Age of Onset   • No Known Problems Maternal Grandmother    • No Known Problems Maternal Grandfather    • No Known Problems Paternal Grandmother    • No Known Problems Paternal Grandfather    • No Known Problems Mother    • No Known Problems Father    • Colon cancer Neg Hx    • Colon polyps Neg Hx    • Esophageal cancer Neg Hx        Social History:   reports that he quit smoking about 42 years ago. His smoking use included cigarettes. He has a 5.00 pack-year smoking history. He has never used smokeless tobacco. He reports that he does not drink alcohol and does not use drugs.    Medications:   Prior to Admission medications    Medication Sig Start Date End Date Taking? Authorizing Provider   aspirin 81 MG tablet Take 81 mg by mouth Daily.   Yes Lui Ruiz MD   atorvastatin (LIPITOR) 40 MG tablet Take 1 tablet by mouth every night at bedtime. 11/10/20  Yes Deric Byers MD   cetirizine (zyrTEC) 10 MG tablet Take 10 mg by mouth Daily.   Yes Lui Ruiz MD   cholecalciferol (VITAMIN D3) 400 UNITS tablet Take 400 Units by mouth Daily.   Yes Lui Ruiz MD   dorzolamide-timolol (COSOPT) 22.3-6.8 MG/ML ophthalmic solution  "1 drop 2 (Two) Times a Day.   Yes Lui Ruiz MD   fluticasone (FLONASE) 50 MCG/ACT nasal spray 2 sprays by Each Nare route Daily. 9/14/20  Yes Brando Webb MD   insulin aspart (NovoLOG FlexPen) 100 UNIT/ML solution pen-injector sc pen Use up to 20 units with meals. 6/7/21  Yes Deric Byers MD   insulin degludec (Tresiba FlexTouch) 100 UNIT/ML solution pen-injector injection Inject 20 Units under the skin into the appropriate area as directed Every Night. 5/10/21  Yes Deric Byers MD   latanoprost (XALATAN) 0.005 % ophthalmic solution 1 drop Every Night.   Yes Lui Ruiz MD   lisinopril (PRINIVIL,ZESTRIL) 10 MG tablet TAKE 1 TABLET EVERY MORNING 7/12/21  Yes Marj Castellon APRN   Multiple Vitamins-Minerals (MULTIVITAMIN WITH MINERALS) tablet tablet Take 1 tablet by mouth Daily.   Yes Lui Ruiz MD   sodium-potassium-magnesium sulfates (Suprep Bowel Prep Kit) 17.5-3.13-1.6 GM/177ML solution oral solution Take as directed 6/7/21  Yes Kendall Saenz APRN   tamsulosin (FLOMAX) 0.4 MG capsule 24 hr capsule TAKE 2 CAPSULES BY MOUTH EVERY EVENING. 7/12/21  Yes Marj Castellon APRN   Accu-Chek FastClix Lancets misc Use 4 x daily ,   ICD10 code is E11.9 You dispensed the wrong this, patient wants acucheck fast click lancets 5/19/21   Deric Byers MD   Alcohol Swabs (Alcohol Wipes) 70 % pads Use 4 x daily 11/24/20   Deric Byers MD   Blood Glucose Monitoring Suppl w/Device kit accu chek albertina 11/24/20   Deric Byers MD   Glucose Blood (Blood Glucose Test) strip Use 4 x daily , accuchek breanna plus strips   ICD10 code is E11.9 5/10/21   Deric Byers MD   Insulin Pen Needle (B-D UF III MINI PEN NEEDLES) 31G X 5 MM misc USE 4 TIMES DAILY, E10.65 1/26/21   Luis Metzger APRN   Insulin Pen Needle 31G X 5 MM misc Droplet Pen Needle 31 gauge x 3/16\"    Provider, MD Lui   Lancet Devices " "(Lancing Device) misc acucheck fast click lancing device. Make certain you send the correct one. Talk to patient . States you sent the wrong one 5/19/21   Deric Byers MD   phenazopyridine (PYRIDIUM) 100 MG tablet Take 1 tablet by mouth 3 (Three) Times a Day As Needed for Bladder Spasms.  Patient taking differently: Take 100 mg by mouth 3 (Three) Times a Day As Needed for Bladder Spasms. 4/21/21   Reji Wolfe PA       Allergies:  Patient has no known allergies.    ROS:    General: Weight stable  Resp: No SOA  Cardiovascular: No CP    Objective     Blood pressure 172/85, pulse 99, temperature 97.5 °F (36.4 °C), temperature source Temporal, resp. rate 20, height 172.7 cm (68\"), weight 81.6 kg (180 lb), SpO2 98 %.    Physical Exam   Constitutional: Pt is oriented to person, place, and in no distress.   HENT: Mouth/Throat: Oropharynx is clear.   Cardiovascular: Normal rate, regular rhythm.    Pulmonary/Chest: Effort normal. No respiratory distress. No  wheezes.   Abdominal: Soft. Non-distended.  Skin: Skin is warm and dry.   Psychiatric: Mood, memory, affect and judgment appear normal.     Assessment/Plan     Diagnosis:  Polyps    Anticipated Surgical Procedure:  C-scope    The risks, benefits, and alternatives of this procedure have been discussed with the patient or the responsible party- the patient understands and agrees to proceed.        "

## 2021-08-19 ENCOUNTER — TELEPHONE (OUTPATIENT)
Dept: GASTROENTEROLOGY | Facility: CLINIC | Age: 71
End: 2021-08-19

## 2021-09-07 ENCOUNTER — TELEPHONE (OUTPATIENT)
Dept: ENDOCRINOLOGY | Facility: CLINIC | Age: 71
End: 2021-09-07

## 2021-09-07 DIAGNOSIS — E11.65 TYPE 2 DIABETES MELLITUS WITH HYPERGLYCEMIA, WITH LONG-TERM CURRENT USE OF INSULIN (HCC): Primary | ICD-10-CM

## 2021-09-07 DIAGNOSIS — E55.9 VITAMIN D DEFICIENCY: ICD-10-CM

## 2021-09-07 DIAGNOSIS — Z79.4 TYPE 2 DIABETES MELLITUS WITH HYPERGLYCEMIA, WITH LONG-TERM CURRENT USE OF INSULIN (HCC): Primary | ICD-10-CM

## 2021-09-07 DIAGNOSIS — E78.2 MIXED DIABETIC HYPERLIPIDEMIA ASSOCIATED WITH TYPE 2 DIABETES MELLITUS (HCC): ICD-10-CM

## 2021-09-07 DIAGNOSIS — E11.69 MIXED DIABETIC HYPERLIPIDEMIA ASSOCIATED WITH TYPE 2 DIABETES MELLITUS (HCC): ICD-10-CM

## 2021-09-08 ENCOUNTER — LAB (OUTPATIENT)
Dept: FAMILY MEDICINE CLINIC | Facility: CLINIC | Age: 71
End: 2021-09-08

## 2021-09-09 LAB
25(OH)D3+25(OH)D2 SERPL-MCNC: 47.9 NG/ML (ref 30–100)
ALBUMIN SERPL-MCNC: 4.4 G/DL (ref 3.7–4.7)
ALBUMIN/CREAT UR: 24 MG/G CREAT (ref 0–29)
ALBUMIN/GLOB SERPL: 1.7 {RATIO} (ref 1.2–2.2)
ALP SERPL-CCNC: 94 IU/L (ref 48–121)
ALT SERPL-CCNC: 26 IU/L (ref 0–44)
AST SERPL-CCNC: 16 IU/L (ref 0–40)
BASOPHILS # BLD AUTO: 0.1 X10E3/UL (ref 0–0.2)
BASOPHILS NFR BLD AUTO: 1 %
BILIRUB SERPL-MCNC: 0.5 MG/DL (ref 0–1.2)
BUN SERPL-MCNC: 19 MG/DL (ref 8–27)
BUN/CREAT SERPL: 10 (ref 10–24)
CALCIUM SERPL-MCNC: 9.5 MG/DL (ref 8.6–10.2)
CHLORIDE SERPL-SCNC: 101 MMOL/L (ref 96–106)
CHOLEST SERPL-MCNC: 153 MG/DL (ref 100–199)
CO2 SERPL-SCNC: 29 MMOL/L (ref 20–29)
CREAT SERPL-MCNC: 1.99 MG/DL (ref 0.76–1.27)
CREAT UR-MCNC: 83.8 MG/DL
EOSINOPHIL # BLD AUTO: 0.3 X10E3/UL (ref 0–0.4)
EOSINOPHIL NFR BLD AUTO: 5 %
ERYTHROCYTE [DISTWIDTH] IN BLOOD BY AUTOMATED COUNT: 12.5 % (ref 11.6–15.4)
GLOBULIN SER CALC-MCNC: 2.6 G/DL (ref 1.5–4.5)
GLUCOSE SERPL-MCNC: 118 MG/DL (ref 65–99)
HBA1C MFR BLD: 10.8 % (ref 4.8–5.6)
HCT VFR BLD AUTO: 34.1 % (ref 37.5–51)
HDLC SERPL-MCNC: 39 MG/DL
HGB BLD-MCNC: 11.5 G/DL (ref 13–17.7)
IMM GRANULOCYTES # BLD AUTO: 0 X10E3/UL (ref 0–0.1)
IMM GRANULOCYTES NFR BLD AUTO: 0 %
LDLC SERPL CALC-MCNC: 96 MG/DL (ref 0–99)
LYMPHOCYTES # BLD AUTO: 1.2 X10E3/UL (ref 0.7–3.1)
LYMPHOCYTES NFR BLD AUTO: 22 %
MCH RBC QN AUTO: 29.9 PG (ref 26.6–33)
MCHC RBC AUTO-ENTMCNC: 33.7 G/DL (ref 31.5–35.7)
MCV RBC AUTO: 89 FL (ref 79–97)
MICROALBUMIN UR-MCNC: 20.2 UG/ML
MONOCYTES # BLD AUTO: 0.5 X10E3/UL (ref 0.1–0.9)
MONOCYTES NFR BLD AUTO: 9 %
NEUTROPHILS # BLD AUTO: 3.4 X10E3/UL (ref 1.4–7)
NEUTROPHILS NFR BLD AUTO: 63 %
PLATELET # BLD AUTO: 165 X10E3/UL (ref 150–450)
POTASSIUM SERPL-SCNC: 4.9 MMOL/L (ref 3.5–5.2)
PROT SERPL-MCNC: 7 G/DL (ref 6–8.5)
RBC # BLD AUTO: 3.84 X10E6/UL (ref 4.14–5.8)
SODIUM SERPL-SCNC: 139 MMOL/L (ref 134–144)
TRIGL SERPL-MCNC: 97 MG/DL (ref 0–149)
TSH SERPL DL<=0.005 MIU/L-ACNC: 2.17 UIU/ML (ref 0.45–4.5)
VIT B12 SERPL-MCNC: 765 PG/ML (ref 232–1245)
VLDLC SERPL CALC-MCNC: 18 MG/DL (ref 5–40)
WBC # BLD AUTO: 5.3 X10E3/UL (ref 3.4–10.8)

## 2021-09-14 ENCOUNTER — OFFICE VISIT (OUTPATIENT)
Dept: ENDOCRINOLOGY | Facility: CLINIC | Age: 71
End: 2021-09-14

## 2021-09-14 VITALS
DIASTOLIC BLOOD PRESSURE: 70 MMHG | WEIGHT: 181 LBS | HEART RATE: 92 BPM | HEIGHT: 68 IN | SYSTOLIC BLOOD PRESSURE: 130 MMHG | OXYGEN SATURATION: 98 % | BODY MASS INDEX: 27.43 KG/M2

## 2021-09-14 DIAGNOSIS — Z79.4 TYPE 2 DIABETES MELLITUS WITH HYPERGLYCEMIA, WITH LONG-TERM CURRENT USE OF INSULIN (HCC): Primary | ICD-10-CM

## 2021-09-14 DIAGNOSIS — E11.65 TYPE 2 DIABETES MELLITUS WITH HYPERGLYCEMIA, WITH LONG-TERM CURRENT USE OF INSULIN (HCC): Primary | ICD-10-CM

## 2021-09-14 DIAGNOSIS — N18.32 CHRONIC KIDNEY DISEASE (CKD) STAGE G3B/A2, MODERATELY DECREASED GLOMERULAR FILTRATION RATE (GFR) BETWEEN 30-44 ML/MIN/1.73 SQUARE METER AND ALBUMINURIA CREATININE RATIO BETWEEN 30-299 MG/G (CMS* (HCC): ICD-10-CM

## 2021-09-14 PROCEDURE — 99214 OFFICE O/P EST MOD 30 MIN: CPT | Performed by: INTERNAL MEDICINE

## 2021-09-14 NOTE — PROGRESS NOTES
" Bernard Matta is a 70 y.o. male who presents for follow up T2DM      HPI     T2DM complicated by CKD stage 3b / A2    Has hyperglycemia   Still struggling     PE    /70   Pulse 92   Ht 172.7 cm (68\")   Wt 82.1 kg (181 lb)   SpO2 98%   BMI 27.52 kg/m²   AOx3  No visible goiter  Normal Respiratory Effort , Lung CTA  RRR  No Edema    Labs    Lab Results   Component Value Date    WBC 5.3 09/08/2021    HGB 11.5 (L) 09/08/2021    HCT 34.1 (L) 09/08/2021    MCV 89 09/08/2021     09/08/2021     Lab Results   Component Value Date    BUN 19 09/08/2021    CREATININE 1.99 (H) 09/08/2021    EGFRIFNONA 33 (L) 09/08/2021    EGFRIFAFRI 38 (L) 09/08/2021    BCR 10 09/08/2021    K 4.9 09/08/2021    CO2 29 09/08/2021    CALCIUM 9.5 09/08/2021    PROTENTOTREF 7.0 09/08/2021    ALBUMIN 4.4 09/08/2021    LABIL2 1.7 09/08/2021    AST 16 09/08/2021    ALT 26 09/08/2021         Assessment/Plan       ICD-10-CM ICD-9-CM   1. Type 2 diabetes mellitus with hyperglycemia, with long-term current use of insulin (CMS/[facility])  E11.65 250.00    Z79.4 790.29     V58.67          Most recent labs reviewed    1. Type 2 diabetes     Glycemic Management:   Lab Results   Component Value Date    HGBA1C 10.8 (H) 09/08/2021    HGBA1C 10.41 (H) 07/22/2021    HGBA1C 10.61 (H) 12/14/2020     Lab Results   Component Value Date    BUN 19 09/08/2021    CREATININE 1.99 (H) 09/08/2021    EGFRIFNONA 33 (L) 09/08/2021    EGFRIFAFRI 38 (L) 09/08/2021    BCR 10 09/08/2021    K 4.9 09/08/2021    CO2 29 09/08/2021    CALCIUM 9.5 09/08/2021    PROTENTOTREF 7.0 09/08/2021    ALBUMIN 4.4 09/08/2021    LABIL2 1.7 09/08/2021    AST 16 09/08/2021    ALT 26 09/08/2021     Lab Results   Component Value Date    WBC 5.3 09/08/2021    HGB 11.5 (L) 09/08/2021    HCT 34.1 (L) 09/08/2021    MCV 89 09/08/2021     09/08/2021      Previously on Glipizide - stopped      Metformin 1000 mg po bid- stopped,  side effects     Trulicity 0.75 mg weekly---stopped-  side " effects     Jardiance , side effects         Plan     Tresiba-- 23-  To be cautious take 20 - now take 18  I was asked to write addendum by [employer] of how often patient is taking tresiba.    I thought it was obvious . Tresiba is every day, so for clarification purposes, patient is taking tresiba every 24 hours.   Frustrating that this addendum had to be written     Humalog , sliding scale only  add 6 units   This time we focused on self titrating by carbs and glucose reading and guesstimate between 2 to 20 units how much to take to achieve a 2 hour glucose goal less than 180          He would greatly benefit from the Free style but can't afford   Try again     Criteria for Approval of CGM and/or Insulin Pump     The patient has diabetes mellitus, insulin-dependent.    Our Diabetes Department has evaluated the patient in the last six months and will continue counseling on insulin adjustment.     The patient performs blood glucose testing at least four times daily with proven glucose variability from 50 to 300 mg per dl.    The patient is administering basal insulin and prandial insulin four times per day for more than six months.    The patient uses a home blood glucose monitor to assess blood glucose at least four times daily for more than six months.    The patient requires frequent adjustment of insulin treatment regimen based on blood glucose readings.    The patient has frequent variability in blood glucose readings due to activity and variability in meal content and time.     The patient has completed a diabetes education program with us.    The patient has demonstrated the ability to self-monitor her glucose.     The patient is motivated in improving diabetes control          Lipid Management       Lab Results   Component Value Date    TRIG 97 09/08/2021    TRIG 105 07/22/2021    TRIG 123 12/14/2020     Lab Results   Component Value Date    HDL 39 (L) 09/08/2021    HDL 37 (L) 07/22/2021    HDL 40  12/14/2020     No components found for: LDLCALC  Lab Results   Component Value Date    LDL 96 09/08/2021    LDL 95 07/22/2021     (H) 12/14/2020     No results found for: LDLDIRECT    On lipitor 20 mg qhs - changed to 40 mg qhs          2. Hypertension     Blood Pressure Management:    Vitals:    09/14/21 0803   BP: 130/70   Pulse: 92   SpO2: 98%     Per Allan       -----------    Last Microalbumin-Proteinuria Assessment    Lab Results   Component Value Date    MALBCRERATIO 24 09/08/2021    MALBCRERATIO 16.0 06/26/2019     ckd stage 3b/A2  Has had hyperkalemia , has used binding resins       On ace, can't tolerate sglt2 inhibitor   No finerenone for now   -----------      Neuropathy  No issues with feet at this time     Immunizations:          Preventive Care:        Weight Related:   Wt Readings from Last 3 Encounters:   09/14/21 82.1 kg (181 lb)   08/12/21 81.6 kg (180 lb)   07/22/21 82.8 kg (182 lb 9.6 oz)     Body mass index is 27.52 kg/m².        Diet interventions: moderate (500 kCal/d) deficit diet.      Bone Health        Lab Results   Component Value Date    CALCIUM 9.5 09/08/2021    REVI40ID 47.9 09/08/2021       Thyroid Health    Lab Results   Component Value Date    TSH 2.170 09/08/2021    TSH 1.970 07/22/2021    TSH 2.280 07/14/2020       Lab Results   Component Value Date    FREET4 1.22 07/22/2021    FREET4 1.42 07/14/2020    FREET4 1.35 06/26/2019         Other Diabetes Related Aspects     DM eye exam: up to date 12/2020    Please complete ordered labs before next appt.

## 2021-09-16 RX ORDER — ATORVASTATIN CALCIUM 40 MG/1
40 TABLET, FILM COATED ORAL
Qty: 90 TABLET | Refills: 3 | Status: SHIPPED | OUTPATIENT
Start: 2021-09-16 | End: 2022-07-12

## 2021-09-16 RX ORDER — FLUTICASONE PROPIONATE 50 MCG
SPRAY, SUSPENSION (ML) NASAL
Qty: 48 G | Refills: 5 | Status: SHIPPED | OUTPATIENT
Start: 2021-09-16 | End: 2022-09-19

## 2021-09-22 ENCOUNTER — DOCUMENTATION (OUTPATIENT)
Dept: ENDOCRINOLOGY | Facility: CLINIC | Age: 71
End: 2021-09-22

## 2021-09-22 NOTE — PROGRESS NOTES
Sent form for FSL[employer] on [employer] site. Per Ange's recommendation of using [employer]

## 2021-09-24 ENCOUNTER — DOCUMENTATION (OUTPATIENT)
Dept: ENDOCRINOLOGY | Facility: CLINIC | Age: 71
End: 2021-09-24

## 2021-10-11 ENCOUNTER — TELEPHONE (OUTPATIENT)
Dept: ENDOCRINOLOGY | Facility: CLINIC | Age: 71
End: 2021-10-11

## 2021-10-12 ENCOUNTER — TELEPHONE (OUTPATIENT)
Dept: ENDOCRINOLOGY | Facility: CLINIC | Age: 71
End: 2021-10-12

## 2021-10-12 NOTE — TELEPHONE ENCOUNTER
[employer] needs us to add notes including usage of tresiba and faxe to them at   Fax [phone]    Ph [phone] ask for Blanche

## 2021-10-19 ENCOUNTER — FLU SHOT (OUTPATIENT)
Dept: FAMILY MEDICINE CLINIC | Facility: CLINIC | Age: 71
End: 2021-10-19

## 2021-10-19 DIAGNOSIS — Z23 NEED FOR INFLUENZA VACCINATION: Primary | ICD-10-CM

## 2021-10-19 PROCEDURE — G0008 ADMIN INFLUENZA VIRUS VAC: HCPCS | Performed by: FAMILY MEDICINE

## 2021-10-19 PROCEDURE — 90662 IIV NO PRSV INCREASED AG IM: CPT | Performed by: FAMILY MEDICINE

## 2021-10-20 ENCOUNTER — PATIENT OUTREACH (OUTPATIENT)
Dept: CASE MANAGEMENT | Facility: OTHER | Age: 71
End: 2021-10-20

## 2021-10-20 NOTE — OUTREACH NOTE
Ambulatory Case Management Note    Patient Outreach    Proactive outreach for a [facility] patient with an abnormal Hgb A1C. Spoke with patient and he has been DM Type 2 since 2013. He reports medication compliance and denied any financial insecurity filling any medications. He has been trying to obtain a Free Style Ashley 2 glucose monitor. He was informed yesterday that the monitor and sensors for  the first month would be $100. Then, the sensors would be $50 per month and he can not afford. AC encouraged him to discuss if any type of assistance plan is available with the company. Discussed diet;generally feels his diet is healthy and has been provided educational resources in the past. He walks 3 miles 2-3 times per week with his spouse. He is aware of his Hgb A1C goal of 7. Encouragement provided for positive efforts. He denied needs at this time.     General & Health Literacy Assessment    Questions/Answers      Most Recent Value   Assessment Completed With Patient   Living Arrangement Spouse   Type of Residence Private Residence   Home Care Services No   Equiptment Used at Home --  [glucometer]   Communication Device Yes   Bed or Wheelchair Confined No   Difficulty Keeping Appointments No        Care Evaluation    Questions/Answers      Most Recent Value   Annual Wellness Visit:  Patient Has Completed  [completed 7.22.21]   Care Gaps Addressed Colon Cancer Screening,  Diabetic A1C,  Diabetic Eye Exam,  Flu Shot,  Pneumonia Vaccine,  Other (See Comment)   Colon Cancer Screening Type Colonoscopy   Colonoscopy Status Up to Date (< 10 yrs)   Colon Cancer Screening Completion at [facility] or [facility]   Colon Cancer Screening Comments completed 8.12.21   HbA1c Status Up to Date-outside defined limits   HbA1c Completion at [facility] or [facility]   HbA1c Comments 10.8 on 9.8.21   Diabetic Eye Exam Status Up to Date   Diabetic Eye Exam Completion at [facility] or Other Other   Diabetic Eye Exam Comments completed  at [facility] 6.9.21   Flu Shot Status Up to Date   Flu Shot Completion at [facility] or Other [facility]   Flu Shot Comments completed 10.19.21   Pneumonia Vaccine Status Up to Date   Pneumonia Vaccine Completion at [facility] or Other [facility]   Pneumonia Vaccine Comments completed PCV 13 on 6.11.15 and PPSV 23 on 1.1.13   Care Gap Comments completed COVID-19 vaccines 3.2.21 and 3.30.21   Other Patient Education/Resources  --  [provided ACM contact information]   Medication Adherence Medications understood   Healthy Lifestyle (Self-Efficacy) recognizes when to contact medical assistance        SDOH updated and reviewed with the patient during this program:    Sent via [employer].        Angelika Basurto RN  Ambulatory Case Management    10/20/2021, 15:56 CDT

## 2021-10-26 ENCOUNTER — OFFICE VISIT (OUTPATIENT)
Dept: CARDIOLOGY CLINIC | Age: 71
End: 2021-10-26
Payer: MEDICARE

## 2021-10-26 VITALS
BODY MASS INDEX: 26.96 KG/M2 | HEART RATE: 90 BPM | HEIGHT: 69 IN | SYSTOLIC BLOOD PRESSURE: 134 MMHG | WEIGHT: 182 LBS | OXYGEN SATURATION: 99 % | DIASTOLIC BLOOD PRESSURE: 72 MMHG

## 2021-10-26 DIAGNOSIS — I10 ESSENTIAL HYPERTENSION: Primary | ICD-10-CM

## 2021-10-26 DIAGNOSIS — R94.31 ABNORMAL ELECTROCARDIOGRAM (ECG) (EKG): ICD-10-CM

## 2021-10-26 DIAGNOSIS — E78.5 DYSLIPIDEMIA: ICD-10-CM

## 2021-10-26 PROCEDURE — G8427 DOCREV CUR MEDS BY ELIG CLIN: HCPCS | Performed by: NURSE PRACTITIONER

## 2021-10-26 PROCEDURE — 99214 OFFICE O/P EST MOD 30 MIN: CPT | Performed by: NURSE PRACTITIONER

## 2021-10-26 PROCEDURE — 3017F COLORECTAL CA SCREEN DOC REV: CPT | Performed by: NURSE PRACTITIONER

## 2021-10-26 PROCEDURE — 93000 ELECTROCARDIOGRAM COMPLETE: CPT | Performed by: NURSE PRACTITIONER

## 2021-10-26 PROCEDURE — G8484 FLU IMMUNIZE NO ADMIN: HCPCS | Performed by: NURSE PRACTITIONER

## 2021-10-26 PROCEDURE — 1123F ACP DISCUSS/DSCN MKR DOCD: CPT | Performed by: NURSE PRACTITIONER

## 2021-10-26 PROCEDURE — G8417 CALC BMI ABV UP PARAM F/U: HCPCS | Performed by: NURSE PRACTITIONER

## 2021-10-26 PROCEDURE — 4040F PNEUMOC VAC/ADMIN/RCVD: CPT | Performed by: NURSE PRACTITIONER

## 2021-10-26 PROCEDURE — 1036F TOBACCO NON-USER: CPT | Performed by: NURSE PRACTITIONER

## 2021-10-26 ASSESSMENT — ENCOUNTER SYMPTOMS
SORE THROAT: 0
SHORTNESS OF BREATH: 0
COUGH: 0
WHEEZING: 0
CHEST TIGHTNESS: 0

## 2021-10-26 NOTE — PROGRESS NOTES
daily      vitamin D3 (CHOLECALCIFEROL) 400 units TABS tablet Take 400 Units by mouth daily      fluticasone 100 MCG/ACT AEPB 100 mcg by Nasal route       latanoprost (XALATAN) 0.005 % ophthalmic solution 1 drop nightly       No current facility-administered medications for this visit. Allergies: Patient has no known allergies. Past Medical History:   Diagnosis Date    Abnormal EKG     Dyslipidemia     ED (erectile dysfunction)     History of tobacco abuse     Hypertension     Type 2 diabetes mellitus ([address]      History reviewed. No pertinent surgical history. History reviewed. No pertinent family history. Social History     Tobacco Use    Smoking status: Former Smoker    Smokeless tobacco: Never Used    Tobacco comment: quit around 1980   Substance Use Topics    Alcohol use: No          Review of Systems:    Review of Systems   Constitutional: Negative for activity change, chills, diaphoresis, fatigue and fever. HENT: Negative for congestion and sore throat. Respiratory: Negative for cough, chest tightness, shortness of breath and wheezing. Cardiovascular: Negative for chest pain, palpitations and leg swelling. Neurological: Negative for dizziness, syncope, light-headedness and headaches. Psychiatric/Behavioral: Negative for confusion. The patient is not nervous/anxious. Objective:    /72   Pulse 90   Ht 5' 9\" (1.753 m)   Wt 182 lb (82.6 kg)   SpO2 99%   BMI 26.88 kg/m²     GENERAL - well developed and well nourished, conversant  HEENT   PERRLA, Hearing appears normal, gentleman lids are normal.  External inspection of ears and nose appear normal.  NECK - no thyromegaly, no JVD, trachea is in the midline  CARDIOVASCULAR  PMI is in the mid line clavicular position, Normal S1 and S2 with no systolic murmur. No S3 or S4    PULMONARY  no respiratory distress. No wheezes or rales. Lungs are clear to ausculation, normal respiratory effort.   ABDOMEN   soft, non tender, no rebound  MUSCULOSKELETAL   range of motion of the upper and lower extermites appears normal and equal and is without pain   EXTREMITIES - no significant edema   NEUROLOGIC  gait and station are normal  SKIN - turgor is normal, can warm and dry. PSYCHIATRIC - normal mood and affect, alert and orientated x 3,      ASSESSMENT:    ALL THE CARDIOLOGY PROBLEMS ARE LISTED ABOVE; HOWEVER, THE FOLLOWING SPECIFIC CARDIAC PROBLEMS / CONDITIONS WERE ADDRESSED AND TREATED DURING THE OFFICE VISIT TODAY:                                                                                            MEDICAL DECISION MAKING             Cardiac Specific Problem / Diagnosis  Discussion and Data Reviewed Diagnostic Procedures Ordered Management Options Selected           1. Hypertension  Stable   Review and summation of old records:    Blood pressure in the office today 134/72. O2 sat 99%. Patient is on lisinopril 10 mg daily. No Continue current medications:     Yes           2. Hyperlipidemia  Stable   Patient is on Lipitor 40 mg daily. No Continue current medications:    Yes           3. History of abnormal EKG Shows no change  EKG in the office today showing sinus rhythm with a heart rate of 90 bpm with a right bundle branch block. This is unchanged from previous EKG. Patient is on aspirin 81 mg daily. Yes Continue current medications: Yes                     Orders Placed This Encounter   Procedures    EKG 12 lead     Order Specific Question:   Reason for Exam?     Answer:   Irregular heart rate     No orders of the defined types were placed in this encounter. Discussed with patient and spouse. Return in about 1 year (around 10/26/2022) for Dr Elgin Kitchen . I greatly appreciate the opportunity to meet John Rivers and your confidence in allowing me to participate in his cardiovascular care.     MARYURI Reyez NP  10/26/2021 1:45 PM CDT                    This dictation was generated by voice recognition computer software. Although all attempts are made to edit dictation for accuracy, there may be errors in the transcription that are not intended.

## 2021-12-02 ENCOUNTER — TELEPHONE (OUTPATIENT)
Dept: ENDOCRINOLOGY | Facility: CLINIC | Age: 71
End: 2021-12-02

## 2021-12-10 RX ORDER — ISOPROPYL ALCOHOL 0.75 G/1
SWAB TOPICAL
Qty: 400 EACH | Refills: 11 | Status: SHIPPED | OUTPATIENT
Start: 2021-12-10

## 2021-12-16 ENCOUNTER — TELEMEDICINE (OUTPATIENT)
Dept: ENDOCRINOLOGY | Facility: CLINIC | Age: 71
End: 2021-12-16

## 2021-12-16 DIAGNOSIS — Z79.4 TYPE 2 DIABETES MELLITUS WITH HYPERGLYCEMIA, WITH LONG-TERM CURRENT USE OF INSULIN (HCC): Primary | ICD-10-CM

## 2021-12-16 DIAGNOSIS — E11.65 TYPE 2 DIABETES MELLITUS WITH HYPERGLYCEMIA, WITH LONG-TERM CURRENT USE OF INSULIN (HCC): Primary | ICD-10-CM

## 2021-12-16 DIAGNOSIS — N18.32 CHRONIC KIDNEY DISEASE (CKD) STAGE G3B/A2, MODERATELY DECREASED GLOMERULAR FILTRATION RATE (GFR) BETWEEN 30-44 ML/MIN/1.73 SQUARE METER AND ALBUMINURIA CREATININE RATIO BETWEEN 30-299 MG/G (CMS* (HCC): ICD-10-CM

## 2021-12-16 DIAGNOSIS — E78.2 MIXED DIABETIC HYPERLIPIDEMIA ASSOCIATED WITH TYPE 2 DIABETES MELLITUS (HCC): ICD-10-CM

## 2021-12-16 DIAGNOSIS — E11.69 MIXED DIABETIC HYPERLIPIDEMIA ASSOCIATED WITH TYPE 2 DIABETES MELLITUS (HCC): ICD-10-CM

## 2021-12-16 PROCEDURE — 99214 OFFICE O/P EST MOD 30 MIN: CPT | Performed by: INTERNAL MEDICINE

## 2021-12-16 NOTE — PROGRESS NOTES
Bernard Matta is a 71 y.o. male who presents for follow up T2DM                                      This was a Telehealth Encounter. Benefits and Disadvantages of a Telehealth Visit were discussed and accepted by patient. .  Patient agreed to receive service through Telehealth visit as patient is being compliant with social distancing recommendations imparted by CDC.     You have chosen to receive care through a telehealth visit.  Do you consent to use a video/audio connection for your medical care today? Yes          HPI     T2DM complicated by CKD stage 3b / A2    Has hyperglycemia   Still struggling     PE    There were no vitals taken for this visit.  AOx3  No visible goiter  Normal Respiratory Effort , Lung CTA  RRR  No Edema    Labs    Lab Results   Component Value Date    WBC 5.3 09/08/2021    HGB 11.5 (L) 09/08/2021    HCT 34.1 (L) 09/08/2021    MCV 89 09/08/2021     09/08/2021     Lab Results   Component Value Date    GLUCOSE 118 (H) 09/08/2021    BUN 19 09/08/2021    CREATININE 1.99 (H) 09/08/2021    EGFRIFNONA 33 (L) 09/08/2021    EGFRIFAFRI 38 (L) 09/08/2021    BCR 10 09/08/2021    K 4.9 09/08/2021    CO2 29 09/08/2021    CALCIUM 9.5 09/08/2021    PROTENTOTREF 7.0 09/08/2021    ALBUMIN 4.4 09/08/2021    LABIL2 1.7 09/08/2021    AST 16 09/08/2021    ALT 26 09/08/2021         Assessment/Plan       ICD-10-CM ICD-9-CM   1. Type 2 diabetes mellitus with hyperglycemia, with long-term current use of insulin ([facility])  E11.65 250.00    Z79.4 790.29     V58.67   2. Chronic kidney disease (CKD) stage G3b/A2, moderately decreased glomerular filtration rate (GFR) between 30-44 mL/min/1.73 square meter and albuminuria creatinine ratio between  mg/g (CMS* ([facility])  N18.32 585.3   3. Mixed diabetic hyperlipidemia associated with type 2 diabetes mellitus ([facility])  E11.69 250.80    E78.2 272.2          Most recent labs reviewed    1. Type 2 diabetes     Glycemic Management:   Lab Results   Component Value Date     HGBA1C 10.8 (H) 09/08/2021    HGBA1C 10.41 (H) 07/22/2021    HGBA1C 10.61 (H) 12/14/2020     Lab Results   Component Value Date    GLUCOSE 118 (H) 09/08/2021    BUN 19 09/08/2021    CREATININE 1.99 (H) 09/08/2021    EGFRIFNONA 33 (L) 09/08/2021    EGFRIFAFRI 38 (L) 09/08/2021    BCR 10 09/08/2021    K 4.9 09/08/2021    CO2 29 09/08/2021    CALCIUM 9.5 09/08/2021    PROTENTOTREF 7.0 09/08/2021    ALBUMIN 4.4 09/08/2021    LABIL2 1.7 09/08/2021    AST 16 09/08/2021    ALT 26 09/08/2021     Lab Results   Component Value Date    WBC 5.3 09/08/2021    HGB 11.5 (L) 09/08/2021    HCT 34.1 (L) 09/08/2021    MCV 89 09/08/2021     09/08/2021      Previously on Glipizide - stopped      Metformin 1000 mg po bid- stopped,  side effects     Trulicity 0.75 mg weekly---stopped-  side effects     Jardiance , side effects         Plan     Tresiba 18 units     Humalog , sliding scale only  add 6 units   This time we focused on self titrating by carbs and glucose reading and guesstimate between 2 to 20 units how much to take to achieve a 2 hour glucose goal less than 180          He would greatly benefit from the Free style but can't afford        Lipid Management       Lab Results   Component Value Date    TRIG 97 09/08/2021    TRIG 105 07/22/2021    TRIG 123 12/14/2020     Lab Results   Component Value Date    HDL 39 (L) 09/08/2021    HDL 37 (L) 07/22/2021    HDL 40 12/14/2020     No components found for: LDLCALC  Lab Results   Component Value Date    LDL 96 09/08/2021    LDL 95 07/22/2021     (H) 12/14/2020     No results found for: LDLDIRECT    On lipitor 20 mg qhs - changed to 40 mg qhs          2. Hypertension     Blood Pressure Management:    There were no vitals filed for this visit.  Per Allan       -----------    Last Microalbumin-Proteinuria Assessment    Lab Results   Component Value Date    TRIXIE 24 09/08/2021    TRIXIE 16.0 06/26/2019     ckd stage 3b/A2  Has had hyperkalemia , has used binding  resins       On ace, can't tolerate sglt2 inhibitor   No finerenone for now   -----------      Neuropathy  No issues with feet at this time     Immunizations:          Preventive Care:        Weight Related:   Wt Readings from Last 3 Encounters:   09/14/21 82.1 kg (181 lb)   08/12/21 81.6 kg (180 lb)   07/22/21 82.8 kg (182 lb 9.6 oz)     There is no height or weight on file to calculate BMI.        Diet interventions: moderate (500 kCal/d) deficit diet.      Bone Health        Lab Results   Component Value Date    CALCIUM 9.5 09/08/2021    [id number] 47.9 09/08/2021       Thyroid Health    Lab Results   Component Value Date    TSH 2.170 09/08/2021    TSH 1.970 07/22/2021    TSH 2.280 07/14/2020       Lab Results   Component Value Date    FREET4 1.22 07/22/2021    FREET4 1.42 07/14/2020    FREET4 1.35 06/26/2019         Other Diabetes Related Aspects     DM eye exam: up to date 12/2020    Please complete ordered labs before next appt.

## 2022-01-03 RX ORDER — INSULIN DEGLUDEC INJECTION 100 U/ML
20 INJECTION, SOLUTION SUBCUTANEOUS NIGHTLY
Qty: 30 ML | Refills: 2 | Status: SHIPPED | OUTPATIENT
Start: 2022-01-03

## 2022-02-14 ENCOUNTER — OFFICE VISIT (OUTPATIENT)
Dept: FAMILY MEDICINE CLINIC | Facility: CLINIC | Age: 72
End: 2022-02-14

## 2022-02-14 VITALS
SYSTOLIC BLOOD PRESSURE: 167 MMHG | HEART RATE: 106 BPM | DIASTOLIC BLOOD PRESSURE: 98 MMHG | HEIGHT: 68 IN | BODY MASS INDEX: 27.58 KG/M2 | OXYGEN SATURATION: 98 % | WEIGHT: 182 LBS | TEMPERATURE: 97.5 F

## 2022-02-14 DIAGNOSIS — I10 PRIMARY HYPERTENSION: Primary | ICD-10-CM

## 2022-02-14 PROCEDURE — 99213 OFFICE O/P EST LOW 20 MIN: CPT | Performed by: FAMILY MEDICINE

## 2022-02-14 RX ORDER — AMOXICILLIN 500 MG/1
500 CAPSULE ORAL 2 TIMES DAILY
Qty: 14 CAPSULE | Refills: 0 | Status: SHIPPED | OUTPATIENT
Start: 2022-02-14 | End: 2022-02-21

## 2022-02-14 RX ORDER — CLONIDINE HYDROCHLORIDE 0.1 MG/1
TABLET ORAL
Qty: 30 TABLET | Refills: 2 | Status: SHIPPED | OUTPATIENT
Start: 2022-02-14 | End: 2022-03-17 | Stop reason: SDUPTHER

## 2022-02-14 RX ORDER — AMITRIPTYLINE HYDROCHLORIDE 10 MG/1
TABLET, FILM COATED ORAL
Qty: 30 TABLET | Refills: 2 | Status: SHIPPED | OUTPATIENT
Start: 2022-02-14 | End: 2022-03-03 | Stop reason: SDUPTHER

## 2022-02-14 NOTE — PROGRESS NOTES
Subjective   Bernard Matta is a 71 y.o. male.     71-year-old male with azotemia and hypertension      The following portions of the patient's history were reviewed and updated as appropriate: allergies, current medications, past family history, past medical history, past social history, past surgical history and problem list.    Review of Systems   Cardiovascular: Negative for chest pain and leg swelling.   Skin:        Paronychia right thumb with cellulitis   Neurological: Negative for headaches.       Objective   Physical Exam  Vitals and nursing note reviewed.   Constitutional:       Appearance: Normal appearance.   Cardiovascular:      Rate and Rhythm: Normal rate.      Pulses: Normal pulses.      Heart sounds: Normal heart sounds.   Pulmonary:      Effort: Pulmonary effort is normal.      Breath sounds: Normal breath sounds.   Musculoskeletal:      Right lower leg: No edema.      Left lower leg: No edema.   Skin:     Comments: Paronychia infection right thumb cellulitis- early   Neurological:      General: No focal deficit present.      Mental Status: He is alert and oriented to person, place, and time.   Psychiatric:         Mood and Affect: Mood normal.         Behavior: Behavior normal.         Thought Content: Thought content normal.         Judgment: Judgment normal.         Assessment/Plan   Diagnoses and all orders for this visit:    1. Primary hypertension (Primary)    Other orders  -     amitriptyline (ELAVIL) 10 MG tablet; 1 nightly as needed sleep  Dispense: 30 tablet; Refill: 2  -     amoxicillin (AMOXIL) 500 MG capsule; Take 1 capsule by mouth 2 (Two) Times a Day for 7 days.  Dispense: 14 capsule; Refill: 0  -     cloNIDine (CATAPRES) 0.1 MG tablet; 1 pill at bedtime for blood pressure  Dispense: 30 tablet; Refill: 2    Also,  Plan above-(last creatinine 1.7) also, antibiotics for his cellulitis and right paronychial thumb infection next diagnosis #2

## 2022-03-03 RX ORDER — AMITRIPTYLINE HYDROCHLORIDE 10 MG/1
TABLET, FILM COATED ORAL
Qty: 90 TABLET | Refills: 0 | Status: SHIPPED | OUTPATIENT
Start: 2022-03-03 | End: 2022-06-06 | Stop reason: SDUPTHER

## 2022-03-03 NOTE — TELEPHONE ENCOUNTER
Insurance requires 90 days supply    Rx Refill Note  Requested Prescriptions     Pending Prescriptions Disp Refills   • amitriptyline (ELAVIL) 10 MG tablet 90 tablet 0     Si nightly as needed sleep      Last office visit with prescribing clinician: 2022      Next office visit with prescribing clinician: Visit date not found            Tra Hillman Rep  22, 11:53 CST

## 2022-03-17 ENCOUNTER — OFFICE VISIT (OUTPATIENT)
Dept: FAMILY MEDICINE CLINIC | Facility: CLINIC | Age: 72
End: 2022-03-17

## 2022-03-17 VITALS
DIASTOLIC BLOOD PRESSURE: 78 MMHG | WEIGHT: 184.4 LBS | TEMPERATURE: 98.2 F | HEIGHT: 68 IN | BODY MASS INDEX: 27.95 KG/M2 | SYSTOLIC BLOOD PRESSURE: 143 MMHG | OXYGEN SATURATION: 99 % | HEART RATE: 93 BPM

## 2022-03-17 DIAGNOSIS — H93.8X2 IRRITATION OF LEFT EAR: Primary | ICD-10-CM

## 2022-03-17 PROCEDURE — 99212 OFFICE O/P EST SF 10 MIN: CPT | Performed by: NURSE PRACTITIONER

## 2022-03-17 RX ORDER — CLONIDINE HYDROCHLORIDE 0.1 MG/1
TABLET ORAL
Qty: 90 TABLET | Refills: 2 | Status: SHIPPED | OUTPATIENT
Start: 2022-03-17 | End: 2022-04-18 | Stop reason: SDUPTHER

## 2022-03-17 NOTE — PROGRESS NOTES
CC: bloody drainage from ear    History:  Bernard Matta is a 71 y.o. male who presents today for evaluation of the above problems.      Patient states that when he got up this morning he noticed bloody drainage from his left ear.  He denies any recent illness.  He denies any ear pain.  He notes that he does clean his ears with Q-tips after showering.    HPI  ROS:  Review of Systems   HENT:        Ear drainage       No Known Allergies  Past Medical History:   Diagnosis Date   • Diabetes mellitus (HCC)    • Erectile disorder due to 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    • Hyperlipidemia    • Hypertension    • Ischemic optic neuropathy of both eyes      Past Surgical History:   Procedure Laterality Date   • COLONOSCOPY  2019    [facility]   • COLONOSCOPY N/A 8/11/2020    Procedure: COLONOSCOPY WITH ANESTHESIA;  Surgeon: Shaquille Mckenzie DO;  Location: [facility] ENDOSCOPY;  Service: Gastroenterology;  Laterality: N/A;  preop; hx of polyps   postop; polyps   PCP Brando Webb    • COLONOSCOPY N/A 8/12/2021    Procedure: COLONOSCOPY WITH ANESTHESIA;  Surgeon: Shaquille Mckenzie DO;  Location:  [facility] ENDOSCOPY;  Service: Gastroenterology;  Laterality: N/A;  pre hx adenomatous colon polyp  post normal  Brando Webb MD     Family History   Problem Relation Age of Onset   • No Known Problems Maternal Grandmother    • No Known Problems Maternal Grandfather    • No Known Problems Paternal Grandmother    • No Known Problems Paternal Grandfather    • No Known Problems Mother    • No Known Problems Father    • Colon cancer Neg Hx    • Colon polyps Neg Hx    • Esophageal cancer Neg Hx       reports that he quit smoking about 43 years ago. His smoking use included cigarettes. He has a 5.00 pack-year smoking history. He has never used smokeless tobacco. He reports that he does not drink alcohol and does not use drugs.      Current Outpatient Medications:   •  amitriptyline (ELAVIL) 10 MG tablet, 1 nightly as needed sleep, Disp: 90  tablet, Rfl: 0  •  aspirin 81 MG tablet, Take 81 mg by mouth Daily., Disp: , Rfl:   •  atorvastatin (LIPITOR) 40 MG tablet, TAKE 1 TABLET BY MOUTH EVERY NIGHT AT BEDTIME., Disp: 90 tablet, Rfl: 3  •  cetirizine (zyrTEC) 10 MG tablet, Take 10 mg by mouth Daily., Disp: , Rfl:   •  cholecalciferol (VITAMIN D3) 400 UNITS tablet, Take 400 Units by mouth Daily., Disp: , Rfl:   •  cloNIDine (CATAPRES) 0.1 MG tablet, 1 pill at bedtime for blood pressure, Disp: 90 tablet, Rfl: 2  •  dorzolamide-timolol (COSOPT) 22.3-6.8 MG/ML ophthalmic solution, 1 drop 2 (Two) Times a Day., Disp: , Rfl:   •  fluticasone (FLONASE) 50 MCG/ACT nasal spray, USE 2 SPRAYS IN EACH NOSTRIL EVERY DAY, Disp: 48 g, Rfl: 5  •  insulin aspart (NovoLOG FlexPen) 100 UNIT/ML solution pen-injector sc pen, Use up to 20 units with meals., Disp: 15 pen, Rfl: 3  •  latanoprost (XALATAN) 0.005 % ophthalmic solution, 1 drop Every Night., Disp: , Rfl:   •  lisinopril (PRINIVIL,ZESTRIL) 10 MG tablet, TAKE 1 TABLET EVERY MORNING, Disp: 90 tablet, Rfl: 3  •  Multiple Vitamins-Minerals (MULTIVITAMIN WITH MINERALS) tablet tablet, Take 1 tablet by mouth Daily., Disp: , Rfl:   •  tamsulosin (FLOMAX) 0.4 MG capsule 24 hr capsule, TAKE 2 CAPSULES BY MOUTH EVERY EVENING., Disp: 180 capsule, Rfl: 3  •  Tresiba FlexTouch 100 UNIT/ML solution pen-injector injection, INJECT 20 UNITS UNDER THE SKIN INTO THE APPROPRIATE AREA AS DIRECTED EVERY NIGHT., Disp: 30 mL, Rfl: 2  •  Accu-Chek FastClix Lancets misc, Use 4 x daily ,   ICD10 code is E11.9 You dispensed the wrong this, patient wants acucheck fast click lancets, Disp: 360 each, Rfl: 3  •  Alcohol Swabs (B-D SINGLE USE SWABS REGULAR) pads, USE FOUR TIMES DAILY, Disp: 400 each, Rfl: 11  •  Blood Glucose Monitoring Suppl w/Device kit, accu chek albertina, Disp: 1 each, Rfl: 1  •  Glucose Blood (Blood Glucose Test) strip, Use 4 x daily , accuchek breanna plus strips   ICD10 code is E11.9, Disp: 360 each, Rfl: 11  •  Insulin Pen Needle  "(B-D UF III MINI PEN NEEDLES) 31G X 5 MM misc, USE 4 TIMES DAILY, E10.65, Disp: 600 each, Rfl: 3  •  Insulin Pen Needle 31G X 5 MM misc, Droplet Pen Needle 31 gauge x 3/16\", Disp: , Rfl:   •  Lancet Devices (Lancing Device) misc, acucheck fast click lancing device. Make certain you send the correct one. Talk to patient . States you sent the wrong one, Disp: 1 each, Rfl: 11    OBJECTIVE:  /78 (BP Location: Left arm, Patient Position: Sitting, Cuff Size: Adult)   Pulse 93   Temp 98.2 °F (36.8 °C) (Temporal)   Ht 172.7 cm (68\")   Wt 83.6 kg (184 lb 6.4 oz)   SpO2 99%   BMI 28.04 kg/m²    Physical Exam  Vitals reviewed.   Constitutional:       Appearance: He is well-developed.   HENT:      Right Ear: Tympanic membrane, ear canal and external ear normal. There is no impacted cerumen.      Left Ear: Tympanic membrane, ear canal and external ear normal. There is no impacted cerumen.   Cardiovascular:      Rate and Rhythm: Normal rate.   Pulmonary:      Effort: Pulmonary effort is normal.   Neurological:      Mental Status: He is alert and oriented to person, place, and time.   Psychiatric:         Behavior: Behavior normal.         Assessment/Plan    Diagnoses and all orders for this visit:    1. Irritation of left ear (Primary)    Other orders  -     cloNIDine (CATAPRES) 0.1 MG tablet; 1 pill at bedtime for blood pressure  Dispense: 90 tablet; Refill: 2    There was no visible abnormality with the left ear patient advised he likely scratched it with a Q-tip.    An After Visit Summary was printed and given to the patient at discharge.  Return if symptoms worsen or fail to improve, for Next scheduled follow up.       Marj Castellon, ALONSO 3/17/22    Electronically signed.  "

## 2022-03-24 RX ORDER — LISINOPRIL 10 MG/1
TABLET ORAL
Qty: 90 TABLET | Refills: 1 | Status: SHIPPED | OUTPATIENT
Start: 2022-03-24 | End: 2022-09-19

## 2022-03-24 RX ORDER — TAMSULOSIN HYDROCHLORIDE 0.4 MG/1
CAPSULE ORAL
Qty: 180 CAPSULE | Refills: 1 | Status: SHIPPED | OUTPATIENT
Start: 2022-03-24 | End: 2022-09-19

## 2022-03-24 NOTE — TELEPHONE ENCOUNTER
Rx Refill Note  Requested Prescriptions     Pending Prescriptions Disp Refills   • tamsulosin (FLOMAX) 0.4 MG capsule 24 hr capsule [Pharmacy Med Name: TAMSULOSIN HYDROCHLORIDE 0.4 MG Capsule] 180 capsule 3     Sig: TAKE 2 CAPSULES EVERY EVENING   • lisinopril (PRINIVIL,ZESTRIL) 10 MG tablet [Pharmacy Med Name: LISINOPRIL 10 MG Tablet] 90 tablet 3     Sig: TAKE 1 TABLET EVERY MORNING      Last office visit with prescribing clinician: 3/17/2022      Next office visit with prescribing clinician: Visit date not found   CPE done 07/2021    Pt was given a year supply of both medications July 2021.    {TIP  Please add Last Relevant Lab Date if appropriate: 12/16/2021  {TIP  Is Refill Pharmacy correct?: yes    Roxanne Ling MA  03/24/22, 11:43 CDT

## 2022-03-25 RX ORDER — PEN NEEDLE, DIABETIC 31 GX3/16"
NEEDLE, DISPOSABLE MISCELLANEOUS
Qty: 400 EACH | Refills: 3 | Status: SHIPPED | OUTPATIENT
Start: 2022-03-25

## 2022-04-05 ENCOUNTER — TELEMEDICINE (OUTPATIENT)
Dept: ENDOCRINOLOGY | Facility: CLINIC | Age: 72
End: 2022-04-05

## 2022-04-05 DIAGNOSIS — E11.65 TYPE 2 DIABETES MELLITUS WITH HYPERGLYCEMIA, WITH LONG-TERM CURRENT USE OF INSULIN: Primary | ICD-10-CM

## 2022-04-05 DIAGNOSIS — Z79.4 TYPE 2 DIABETES MELLITUS WITH STAGE 3B CHRONIC KIDNEY DISEASE, WITH LONG-TERM CURRENT USE OF INSULIN: ICD-10-CM

## 2022-04-05 DIAGNOSIS — E55.9 VITAMIN D DEFICIENCY: ICD-10-CM

## 2022-04-05 DIAGNOSIS — E11.69 MIXED DIABETIC HYPERLIPIDEMIA ASSOCIATED WITH TYPE 2 DIABETES MELLITUS: ICD-10-CM

## 2022-04-05 DIAGNOSIS — N18.32 TYPE 2 DIABETES MELLITUS WITH STAGE 3B CHRONIC KIDNEY DISEASE, WITH LONG-TERM CURRENT USE OF INSULIN: ICD-10-CM

## 2022-04-05 DIAGNOSIS — E78.2 MIXED DIABETIC HYPERLIPIDEMIA ASSOCIATED WITH TYPE 2 DIABETES MELLITUS: ICD-10-CM

## 2022-04-05 DIAGNOSIS — E11.59 HYPERTENSION ASSOCIATED WITH DIABETES: ICD-10-CM

## 2022-04-05 DIAGNOSIS — E11.22 TYPE 2 DIABETES MELLITUS WITH STAGE 3B CHRONIC KIDNEY DISEASE, WITH LONG-TERM CURRENT USE OF INSULIN: ICD-10-CM

## 2022-04-05 DIAGNOSIS — Z79.4 TYPE 2 DIABETES MELLITUS WITH HYPERGLYCEMIA, WITH LONG-TERM CURRENT USE OF INSULIN: Primary | ICD-10-CM

## 2022-04-05 DIAGNOSIS — I15.2 HYPERTENSION ASSOCIATED WITH DIABETES: ICD-10-CM

## 2022-04-05 DIAGNOSIS — N18.32 STAGE 3B CHRONIC KIDNEY DISEASE: ICD-10-CM

## 2022-04-05 PROCEDURE — 99214 OFFICE O/P EST MOD 30 MIN: CPT | Performed by: INTERNAL MEDICINE

## 2022-04-05 NOTE — PROGRESS NOTES
Bernard Matta is a 71 y.o. male who presents for follow up T2DM                                      This was a Telehealth Encounter. Benefits and Disadvantages of a Telehealth Visit were discussed and accepted by patient. .  Patient agreed to receive service through Telehealth visit as patient is being compliant with social distancing recommendations imparted by CDC.     You have chosen to receive care through a telehealth visit.  Do you consent to use a video/audio connection for your medical care today? Yes          HPI     T2DM complicated by CKD stage 3b / A2    Has hyperglycemia   Still struggling     PE    There were no vitals taken for this visit.  AOx3  No visible goiter  Normal Respiratory Effort , Lung CTA  RRR  No Edema    Labs    Lab Results   Component Value Date    WBC 5.3 09/08/2021    HGB 11.5 (L) 09/08/2021    HCT 34.1 (L) 09/08/2021    MCV 89 09/08/2021     09/08/2021     Lab Results   Component Value Date    GLUCOSE 118 (H) 09/08/2021    BUN 19 09/08/2021    CREATININE 1.99 (H) 09/08/2021    EGFRIFNONA 33 (L) 09/08/2021    EGFRIFAFRI 38 (L) 09/08/2021    BCR 10 09/08/2021    K 4.9 09/08/2021    CO2 29 09/08/2021    CALCIUM 9.5 09/08/2021    PROTENTOTREF 7.0 09/08/2021    ALBUMIN 4.4 09/08/2021    LABIL2 1.7 09/08/2021    AST 16 09/08/2021    ALT 26 09/08/2021         Assessment/Plan       ICD-10-CM ICD-9-CM   1. Type 2 diabetes mellitus with hyperglycemia, with long-term current use of insulin (HCC)  E11.65 250.00    Z79.4 790.29     V58.67   2. Mixed diabetic hyperlipidemia associated with type 2 diabetes mellitus (HCC)  E11.69 250.80    E78.2 272.2   3. Hypertension associated with diabetes (HCC)  E11.59 250.80    I15.2 401.9   4. Stage 3b chronic kidney disease (HCC)  N18.32 585.3   5. Vitamin D deficiency  E55.9 268.9   6. Diabetic nephropathy associated with type 2 diabetes mellitus (HCC)  E11.21 250.40     583.81          Most recent labs reviewed    1. Type 2 diabetes     Glycemic  Management:   Lab Results   Component Value Date    HGBA1C 10.8 (H) 09/08/2021    HGBA1C 10.41 (H) 07/22/2021    HGBA1C 10.61 (H) 12/14/2020     Lab Results   Component Value Date    GLUCOSE 118 (H) 09/08/2021    BUN 19 09/08/2021    CREATININE 1.99 (H) 09/08/2021    EGFRIFNONA 33 (L) 09/08/2021    EGFRIFAFRI 38 (L) 09/08/2021    BCR 10 09/08/2021    K 4.9 09/08/2021    CO2 29 09/08/2021    CALCIUM 9.5 09/08/2021    PROTENTOTREF 7.0 09/08/2021    ALBUMIN 4.4 09/08/2021    LABIL2 1.7 09/08/2021    AST 16 09/08/2021    ALT 26 09/08/2021     Lab Results   Component Value Date    WBC 5.3 09/08/2021    HGB 11.5 (L) 09/08/2021    HCT 34.1 (L) 09/08/2021    MCV 89 09/08/2021     09/08/2021      Previously on Glipizide - stopped      Metformin 1000 mg po bid- stopped,  side effects     Trulicity 0.75 mg weekly---stopped-  side effects     Jardiance , side effects         Plan     Tresiba 19 units     Humalog , sliding scale only  add 6 units - he self titrated to 10   This time we focused on self titrating by carbs and glucose reading and guesstimate between 2 to 20 units how much to take to achieve a 2 hour glucose goal less than 180          He would greatly benefit from the Free style but can't afford        Lipid Management       Lab Results   Component Value Date    TRIG 97 09/08/2021    TRIG 105 07/22/2021    TRIG 123 12/14/2020     Lab Results   Component Value Date    HDL 39 (L) 09/08/2021    HDL 37 (L) 07/22/2021    HDL 40 12/14/2020     No components found for: LDLCALC  Lab Results   Component Value Date    LDL 96 09/08/2021    LDL 95 07/22/2021     (H) 12/14/2020     No results found for: LDLDIRECT    On lipitor 20 mg qhs - changed to 40 mg qhs          2. Hypertension     Blood Pressure Management:    There were no vitals filed for this visit.  Per Allan       -----------    Last Microalbumin-Proteinuria Assessment    Lab Results   Component Value Date    TRIXIE 24 09/08/2021    TRIXIE  16.0 06/26/2019     ckd stage 3b/A2  Has had hyperkalemia , has used binding resins   April 2022 GFR 42 , UACR 32, K 4.8    On ace, can't tolerate sglt2 inhibitor     Start finerenone   -----------      Neuropathy  No issues with feet at this time     Immunizations:          Preventive Care:        Weight Related:   Wt Readings from Last 3 Encounters:   03/17/22 83.6 kg (184 lb 6.4 oz)   02/14/22 82.6 kg (182 lb)   09/14/21 82.1 kg (181 lb)     There is no height or weight on file to calculate BMI.        Diet interventions: moderate (500 kCal/d) deficit diet.      Bone Health        Lab Results   Component Value Date    CALCIUM 9.5 09/08/2021    DMYB66DQ 47.9 09/08/2021       Thyroid Health    Lab Results   Component Value Date    TSH 2.170 09/08/2021    TSH 1.970 07/22/2021    TSH 2.280 07/14/2020       Lab Results   Component Value Date    FREET4 1.22 07/22/2021    FREET4 1.42 07/14/2020    FREET4 1.35 06/26/2019         Other Diabetes Related Aspects     DM eye exam: up to date 12/2020    Please complete ordered labs before next appt.

## 2022-04-06 ENCOUNTER — SPECIALTY PHARMACY (OUTPATIENT)
Dept: ENDOCRINOLOGY | Facility: CLINIC | Age: 72
End: 2022-04-06

## 2022-04-06 NOTE — PROGRESS NOTES
Specialty Pharmacy Patient Management Program  Endocrinology Initial Assessment     Bernard Matta is a 71 y.o. male with Type 2 Diabetes seen by an Endocrinology provider and enrolled in the Endocrinology Patient Management program offered by [facility].  An initial outreach was conducted, including assessment of therapy appropriateness and specialty medication education for Kerendia. The patient was introduced to services offered by [facility], including: regular assessments, refill coordination, curbside pick-up or mail order delivery options, prior authorization maintenance, and financial assistance programs as applicable. The patient was also provided with contact information for the pharmacy team.     Insurance Coverage & Financial Support  Kerendia bridge card    Relevant Past Medical History and Comorbidities  Relevant medical history and concomitant health conditions were discussed with the patient. The patient's chart has been reviewed for relevant past medical history and comorbid health conditions and updated as necessary.   Past Medical History:   Diagnosis Date   • Diabetes mellitus (HCC)    • Erectile disorder due to 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    • Hyperlipidemia    • Hypertension    • Ischemic optic neuropathy of both eyes      Social History     Socioeconomic History   • Marital status:    Tobacco Use   • Smoking status: Former Smoker     Packs/day: 0.50     Years: 10.00     Pack years: 5.00     Types: Cigarettes     Quit date: 1979     Years since quittin.2   • Smokeless tobacco: Never Used   Vaping Use   • Vaping Use: Never used   Substance and Sexual Activity   • Alcohol use: No   • Drug use: No   • Sexual activity: Defer       Allergies  Known allergies and reactions were discussed with the patient. The patient's chart has been reviewed for  allergy information and updated as necessary.   Patient has no known allergies.    Current  Medication List  This medication list has been reviewed with the patient and evaluated for any interactions or necessary modifications/recommendations, and updated to include all prescription medications, OTC medications, and supplements the patient is currently taking.  This list reflects what is contained in the patient's profile, which has also been marked as reviewed to communicate to other providers it is the most up to date version of the patient's current medication therapy.     Current Outpatient Medications:   •  lisinopril (PRINIVIL,ZESTRIL) 10 MG tablet, TAKE 1 TABLET EVERY MORNING, Disp: 90 tablet, Rfl: 1  •  tamsulosin (FLOMAX) 0.4 MG capsule 24 hr capsule, TAKE 2 CAPSULES EVERY EVENING, Disp: 180 capsule, Rfl: 1  •  Accu-Chek FastClix Lancets misc, Use 4 x daily ,   ICD10 code is E11.9 You dispensed the wrong this, patient wants acucheck fast click lancets, Disp: 360 each, Rfl: 3  •  Alcohol Swabs (B-D SINGLE USE SWABS REGULAR) pads, USE FOUR TIMES DAILY, Disp: 400 each, Rfl: 11  •  amitriptyline (ELAVIL) 10 MG tablet, 1 nightly as needed sleep, Disp: 90 tablet, Rfl: 0  •  aspirin 81 MG tablet, Take 81 mg by mouth Daily., Disp: , Rfl:   •  atorvastatin (LIPITOR) 40 MG tablet, TAKE 1 TABLET BY MOUTH EVERY NIGHT AT BEDTIME., Disp: 90 tablet, Rfl: 3  •  Blood Glucose Monitoring Suppl w/Device kit, accu chek albertina, Disp: 1 each, Rfl: 1  •  cetirizine (zyrTEC) 10 MG tablet, Take 10 mg by mouth Daily., Disp: , Rfl:   •  cholecalciferol (VITAMIN D3) 400 UNITS tablet, Take 400 Units by mouth Daily., Disp: , Rfl:   •  cloNIDine (CATAPRES) 0.1 MG tablet, 1 pill at bedtime for blood pressure, Disp: 90 tablet, Rfl: 2  •  dorzolamide-timolol (COSOPT) 22.3-6.8 MG/ML ophthalmic solution, 1 drop 2 (Two) Times a Day., Disp: , Rfl:   •  Droplet Pen Needles 31G X 5 MM misc, USE FOUR TIMES DAILY AS DIRECTED, Disp: 400 each, Rfl: 3  •  Finerenone 10 MG tablet, Take 1 tablet by mouth Daily., Disp: 30 tablet, Rfl: 11  •   "fluticasone (FLONASE) 50 MCG/ACT nasal spray, USE 2 SPRAYS IN EACH NOSTRIL EVERY DAY, Disp: 48 g, Rfl: 5  •  Glucose Blood (Blood Glucose Test) strip, Use 4 x daily , accuchek breanna plus strips   ICD10 code is E11.9, Disp: 360 each, Rfl: 11  •  insulin aspart (NovoLOG FlexPen) 100 UNIT/ML solution pen-injector sc pen, Use up to 20 units with meals., Disp: 15 pen, Rfl: 3  •  Insulin Pen Needle 31G X 5 MM misc, Droplet Pen Needle 31 gauge x 3/16\", Disp: , Rfl:   •  Lancet Devices (Lancing Device) misc, acucheck fast click lancing device. Make certain you send the correct one. Talk to patient . States you sent the wrong one, Disp: 1 each, Rfl: 11  •  latanoprost (XALATAN) 0.005 % ophthalmic solution, 1 drop Every Night., Disp: , Rfl:   •  Multiple Vitamins-Minerals (MULTIVITAMIN WITH MINERALS) tablet tablet, Take 1 tablet by mouth Daily., Disp: , Rfl:   •  Tresiba FlexTouch 100 UNIT/ML solution pen-injector injection, INJECT 20 UNITS UNDER THE SKIN INTO THE APPROPRIATE AREA AS DIRECTED EVERY NIGHT., Disp: 30 mL, Rfl: 2    Drug Interactions  Ace inhibitors or angiotensin receptor blockers taken with Kerendia can increase the risk of hyperkalemia    Recommended Medications Assessment  • Statin - Currently Taking  • ACEi/ARB - Currently Taking    Relevant Laboratory Values  A1C Last 3 Results    HGBA1C Last 3 Results 7/22/21 9/8/21   Hemoglobin A1C 10.41 (A) 10.8 (A)   (A) Abnormal value       Comments are available for some flowsheets but are not being displayed.           Lab Results   Component Value Date    HGBA1C 10.8 (H) 09/08/2021     Lab Results   Component Value Date    GLUCOSE 118 (H) 09/08/2021    CALCIUM 9.5 09/08/2021     09/08/2021    K 4.9 09/08/2021    CO2 29 09/08/2021     09/08/2021    BUN 19 09/08/2021    CREATININE 1.99 (H) 09/08/2021    EGFRIFAFRI 38 (L) 09/08/2021    EGFRIFNONA 33 (L) 09/08/2021    BCR 10 09/08/2021     Lab Results   Component Value Date    CHLPL 153 09/08/2021    TRIG 97 " 09/08/2021    HDL 39 (L) 09/08/2021    LDL 96 09/08/2021     Microalbumin    Microalbumin 9/8/21   Microalbumin, Urine 20.2             Initial Education Provided for Specialty Medication  The patient has been provided with the following education and any applicable administration techniques (i.e. self-injection) have been demonstrated for the therapies indicated. All questions and concerns have been addressed prior to the patient receiving the medication, and the patient has verbalized understanding of the education and any materials provided.  Additional patient education shall be provided and documented upon request by the patient, provider or payer.      KERENDIA® (finerenone)  Medication Expectations   Why am I taking this medication? This medication helps reduce the progression of kidney disease, death from cardiovascular disease, and the risk of heart failure hospitalization in patients with chronic kidney disease and type 2 diabetes.    What should I expect while on this medication? You may see a slight increase in your potassium level when you get lab work.    How does the medication work? This medication blocks some sodium from being reabsorbed and some of the mineralocorticoid receptors in your body from being over activated. This is thought to decrease inflammation and fibrosis.    How long will I be on this medication? This is a maintenance medication meaning it will hopefully be beneficial for you going forward. If your potassium level or kidney function change we may need to adjust or stop the medication.     How do I take this medication? Take as directed on your prescription label. This medication may be taken anytime during the day and with or without food.   What are some possible side effects? The most common side effects include high potassium, low blood pressure, and low sodium. Call your doctor if you notice swelling in your face or hands, confusion, weakness, muscle twitching,  lightheadedness, fainting, or an uneven heartbeat.    What happens if I miss a dose? If you miss a dose, take it as soon as you remember. If it is close to your next dose, skip it (do not take 2 doses at once)     Medication Safety   What are things I should warn my doctor immediately about? Tell your doctor if you have ever been diagnosed with adrenal insufficiency. Let your provider know if you take potassium supplements or vitamins that may not be listed on your medication list. Also let your provider know if you take amiodarone or erythromycin. Also tell your doctor if you notice any signs/symptoms of an allergic reaction (rash, hives, difficulty breathing, etc.)    What are things that I should be cautious or aware of? Be cautious of any side effects from this medication or any new medications/supplements you start   What are some medications that can interact with this one? Do not eat grapefruit or drink grapefruit juice while taking this medication. The side effects of this medication may be increased when taken with other medications that increase potassium.       Medication Storage/Handling   How should I handle this medication? Keep this medication out of reach of pets/children in tightly sealed container.   How does this medication need to be stored? Store at room temperature and away from heat, moisture, and direct light.   How should I dispose of this medication? Take to your local police station or health department for proper disposal. Some pharmacies have take-back bins for medication drop-off.      Resources/Support   How can I remind myself to take this medication? You can download reminder apps to help you manage your refills. You may also set an alarm on your phone to remind you. The pharmacy carries pill boxes that you can place next to an area you pass everyday (such as where you place your car keys or where you charge your phone)   Is financial support available?  [facility] can provide co-pay  cards if you have commercial insurance or other patient assistance if you qualify. Ask your pharmacist for details.     Which vaccines are recommended for me? Talk to your doctor about these vaccines: Flu, Coronavirus (COVID-19), Pneumococcal (pneumonia), Tdap, Hepatitis B, Zoster (shingles)        Adherence and Self-Administration  • Barriers to Patient Adherence and/or Self-Administration: None, cost may be an issue in the future.  • Methods for Supporting Patient Adherence and/or Self-Administration: Kerendia bridge/copay card for now    Goals of Therapy  • Patient Goals of Therapy:  Take medication daily  • Clinical Goals or Therapeutic Targets, If Applicable: We hope to see a potassium within normal limits at the 1 month lab check. We would like to see an overall improvement in the amount of protein in the urine    Reassessment Plan & Follow-Up  1. Medication Therapy Changes: Add Kerenda  2. Additional Plans, Therapy Recommendations, or Therapy Problems to Be Addressed: None, we will continue to follow and assist with other medications as needed - there is room for improvement with his a1c  3. Pharmacist to perform regular reassessments no more than (6) months from the previous assessment.  4. Welcome information and patient satisfaction survey to be sent by retail team with patient's initial fill.  5. Care Coordinator to set up future refill outreaches, coordinate prescription delivery, and escalate clinical questions to pharmacist.     Attestation  I attest that the initiated specialty medication(s) are appropriate for the patient based on my assessment.  If the prescribed therapy is at any point deemed not appropriate based on the current or future assessments, a consultation will be initiated with the patient's specialty care provider to determine the best course of action. The revised plan of therapy will be documented along with any additional patient education provided.     Anthony Mota, TanaD, MPH, BCPS     4/6/2022  11:45 CDT

## 2022-04-18 ENCOUNTER — OFFICE VISIT (OUTPATIENT)
Dept: FAMILY MEDICINE CLINIC | Facility: CLINIC | Age: 72
End: 2022-04-18

## 2022-04-18 VITALS
WEIGHT: 187 LBS | RESPIRATION RATE: 16 BRPM | BODY MASS INDEX: 28.34 KG/M2 | HEIGHT: 68 IN | OXYGEN SATURATION: 96 % | SYSTOLIC BLOOD PRESSURE: 168 MMHG | DIASTOLIC BLOOD PRESSURE: 100 MMHG | HEART RATE: 109 BPM | TEMPERATURE: 97.5 F

## 2022-04-18 DIAGNOSIS — I10 PRIMARY HYPERTENSION: Primary | ICD-10-CM

## 2022-04-18 PROCEDURE — 99213 OFFICE O/P EST LOW 20 MIN: CPT | Performed by: FAMILY MEDICINE

## 2022-04-18 RX ORDER — CLONIDINE HYDROCHLORIDE 0.1 MG/1
0.1 TABLET ORAL 2 TIMES DAILY
Qty: 180 TABLET | Refills: 2 | Status: SHIPPED | OUTPATIENT
Start: 2022-04-18 | End: 2022-05-09 | Stop reason: DRUGHIGH

## 2022-04-18 RX ORDER — FUROSEMIDE 20 MG/1
20 TABLET ORAL DAILY
Qty: 90 TABLET | Refills: 3 | Status: SHIPPED | OUTPATIENT
Start: 2022-04-18 | End: 2022-07-25 | Stop reason: SDUPTHER

## 2022-04-18 NOTE — PROGRESS NOTES
Subjective   Bernard Matta is a 71 y.o. male.     71-year-old diabetic with hypertension      The following portions of the patient's history were reviewed and updated as appropriate: allergies, current medications, past family history, past medical history, past social history, past surgical history and problem list.    Review of Systems   Respiratory: Negative for shortness of breath.    Cardiovascular: Positive for leg swelling. Negative for chest pain.   Genitourinary:        Azotemia   Neurological: Negative for dizziness and headaches.       Objective   Physical Exam  Vitals and nursing note reviewed.   Constitutional:       Appearance: Normal appearance.   Cardiovascular:      Rate and Rhythm: Normal rate and regular rhythm.   Pulmonary:      Effort: Pulmonary effort is normal.      Breath sounds: Normal breath sounds.   Musculoskeletal:      Right lower leg: Edema present.      Left lower leg: Edema present.      Comments: Mild swelling   Skin:     General: Skin is warm and dry.   Neurological:      General: No focal deficit present.      Mental Status: He is alert and oriented to person, place, and time.   Psychiatric:         Mood and Affect: Mood normal.         Behavior: Behavior normal.         Thought Content: Thought content normal.         Judgment: Judgment normal.         Assessment/Plan   Diagnoses and all orders for this visit:    1. Primary hypertension (Primary)         Plan-increase clonidine 0.1 to twice daily- asked nephrologist if Lasix 20 mg okay to take-if so we will send it in

## 2022-04-18 NOTE — TELEPHONE ENCOUNTER
Rx Refill Note  Requested Prescriptions     Pending Prescriptions Disp Refills   • cloNIDine (CATAPRES) 0.1 MG tablet 90 tablet 2     Si pill at bedtime for blood pressure   • furosemide (Lasix) 20 MG tablet 90 tablet 3     Sig: Take 1 tablet by mouth Daily.      Last office visit with prescribing clinician: 2022      Next office visit with prescribing clinician: 2022   CPE done 2021    Okay to take lasix per Dr. Webb.  Wants to see him back in office in 3 weeks and do a BMP.    {TIP  Please add Last Relevant Lab Date if appropriate: 2021  {TIP  Is Refill Pharmacy correct?: yes    Roxanne Ling MA  22, 13:53 CDT

## 2022-04-18 NOTE — TELEPHONE ENCOUNTER
Caller: Bernard Matta    Relationship: Self    Best call back number: [phone]    Requested Prescriptions:   Requested Prescriptions     Pending Prescriptions Disp Refills   • cloNIDine (CATAPRES) 0.1 MG tablet 90 tablet 2     Si pill at bedtime for blood pressure      LASIX    Pharmacy where request should be sent: [facility]/PHARMACY #[id number] - [address] [phone] [facility] [phone] FX     Additional details provided by patient: PATIENT HAS STATED THAT HE CHECKED WITH HIS OTHER PROVIDER AND HAS GOTTEN THE CLEAR TO BE PUT ONTO LASIX.    Does the patient have less than a 3 day supply:  [x] Yes  [] No    Tra Major Rep   22 11:39 CDT

## 2022-05-09 ENCOUNTER — OFFICE VISIT (OUTPATIENT)
Dept: FAMILY MEDICINE CLINIC | Facility: CLINIC | Age: 72
End: 2022-05-09

## 2022-05-09 VITALS
RESPIRATION RATE: 18 BRPM | HEIGHT: 68 IN | OXYGEN SATURATION: 97 % | TEMPERATURE: 98 F | SYSTOLIC BLOOD PRESSURE: 158 MMHG | HEART RATE: 99 BPM | BODY MASS INDEX: 27.89 KG/M2 | WEIGHT: 184 LBS | DIASTOLIC BLOOD PRESSURE: 86 MMHG

## 2022-05-09 DIAGNOSIS — I10 PRIMARY HYPERTENSION: Primary | ICD-10-CM

## 2022-05-09 PROCEDURE — 99213 OFFICE O/P EST LOW 20 MIN: CPT | Performed by: FAMILY MEDICINE

## 2022-05-09 RX ORDER — CLONIDINE HYDROCHLORIDE 0.1 MG/1
0.2 TABLET ORAL NIGHTLY
COMMUNITY
End: 2022-07-07 | Stop reason: DRUGHIGH

## 2022-05-09 NOTE — PROGRESS NOTES
Subjective   Bernard Matta is a 71 y.o. male.     71-year-old male with history of chronic renal disease and hypertension      The following portions of the patient's history were reviewed and updated as appropriate: allergies, current medications, past family history, past medical history, past social history, past surgical history and problem list.    Review of Systems   Respiratory: Negative for shortness of breath.    Cardiovascular: Negative for chest pain and leg swelling.   Endocrine: Negative for polydipsia, polyphagia and polyuria.       Objective   Physical Exam  Vitals and nursing note reviewed.   Constitutional:       Appearance: Normal appearance.   Eyes:      Extraocular Movements: Extraocular movements intact.      Pupils: Pupils are equal, round, and reactive to light.   Cardiovascular:      Rate and Rhythm: Normal rate and regular rhythm.   Pulmonary:      Effort: Pulmonary effort is normal.      Breath sounds: Normal breath sounds.   Abdominal:      General: Abdomen is flat.      Palpations: Abdomen is soft.   Musculoskeletal:      Right lower leg: No edema.      Left lower leg: No edema.   Skin:     General: Skin is warm and dry.   Neurological:      General: No focal deficit present.      Mental Status: He is alert and oriented to person, place, and time.   Psychiatric:         Mood and Affect: Mood normal.         Behavior: Behavior normal.         Thought Content: Thought content normal.         Judgment: Judgment normal.         Assessment/Plan   Diagnoses and all orders for this visit:    1. Primary hypertension (Primary)       Labile blood pressure when takes Lasix-hold Lasix 20 mg unless he gains 2 pounds of fluid then take it-increase clonidine  .1 in the morning 2 of the .1  at night-lisinopril same-lab return 1 month

## 2022-05-11 ENCOUNTER — SPECIALTY PHARMACY (OUTPATIENT)
Dept: ENDOCRINOLOGY | Facility: CLINIC | Age: 72
End: 2022-05-11

## 2022-05-11 NOTE — PROGRESS NOTES
Specialty Pharmacy Refill Coordination Note     Bernard is a 71 y.o. male contacted today regarding refills of  [facility]ia specialty medication(s).    Reviewed and verified with patient: Allergies    Specialty medication(s) and dose(s) confirmed: yes    Refill Questions    Flowsheet Row Most Recent Value   Changes to allergies? No   Changes to medications? No   New conditions since last clinic visit No   Unplanned office visit, urgent care, ED, or hospital admission in the last 4 weeks  No   How does patient/caregiver feel medication is working? Very good   Financial problems or insurance changes  No   Since the previous refill, were any specialty medication doses or scheduled injections missed or delayed?  No   Does this patient require a clinical escalation to a pharmacist? No          Delivery Questions    Flowsheet Row Most Recent Value   Delivery method FedEx   Delivery address correct? Yes   Delivery phone number [phone]   Number of medications in delivery 1   Medication being filled and delivered [facility]   Doses left of specialty medications 0   Is there any medication that is due not being filled? No   Supplies needed? No supplies needed   Cooler needed? No   Do any medications need mixed or dated? No   Additional comments voucher   Questions or concerns for the pharmacist? No   Explain any questions or concerns for the pharmacist Labs not back yet, holding at 10mg at this time   Are any medications first time fills? No        Kerendia refilled.  We are going to keep it at 10mg we are waiting on his labs that were drawn in Dr Cortes office on 5/9/22.  We called his office and they are going to call [facility] and find out why its not back yet.           Follow-up: 1 month.      Saad Galvin  Specialty Pharmacy Technician

## 2022-05-16 ENCOUNTER — TELEPHONE (OUTPATIENT)
Dept: FAMILY MEDICINE CLINIC | Facility: CLINIC | Age: 72
End: 2022-05-16

## 2022-05-16 NOTE — TELEPHONE ENCOUNTER
Patient thought he had his A1C checked last on 05/09/2022 but I informed him that only a BMP was done that day and A1C are done every 3 months.

## 2022-05-16 NOTE — TELEPHONE ENCOUNTER
Caller: Bernard Matta    Relationship: Self    Best call back number: [phone]      What was the call regarding: PATIENT STATES HE HAD LABS DRAWN ON 5/9/22. PATIENT CALLED TO FOLLOW UP CONCERNING HIS A1C RESULTS. PATIENT WOULD LIKE A CALL BACK. PLEASE ADVISE    Do you require a callback: YES

## 2022-06-06 ENCOUNTER — SPECIALTY PHARMACY (OUTPATIENT)
Dept: ENDOCRINOLOGY | Facility: CLINIC | Age: 72
End: 2022-06-06

## 2022-06-06 ENCOUNTER — OFFICE VISIT (OUTPATIENT)
Dept: FAMILY MEDICINE CLINIC | Facility: CLINIC | Age: 72
End: 2022-06-06

## 2022-06-06 VITALS
HEIGHT: 68 IN | DIASTOLIC BLOOD PRESSURE: 88 MMHG | TEMPERATURE: 97.1 F | RESPIRATION RATE: 18 BRPM | WEIGHT: 186.6 LBS | HEART RATE: 111 BPM | BODY MASS INDEX: 28.28 KG/M2 | SYSTOLIC BLOOD PRESSURE: 140 MMHG | OXYGEN SATURATION: 95 %

## 2022-06-06 DIAGNOSIS — I10 PRIMARY HYPERTENSION: Primary | ICD-10-CM

## 2022-06-06 PROCEDURE — 99213 OFFICE O/P EST LOW 20 MIN: CPT | Performed by: FAMILY MEDICINE

## 2022-06-06 RX ORDER — AMITRIPTYLINE HYDROCHLORIDE 10 MG/1
TABLET, FILM COATED ORAL
Qty: 90 TABLET | Refills: 0 | Status: SHIPPED | OUTPATIENT
Start: 2022-06-06 | End: 2022-06-06 | Stop reason: SDUPTHER

## 2022-06-06 RX ORDER — AMITRIPTYLINE HYDROCHLORIDE 10 MG/1
TABLET, FILM COATED ORAL
Qty: 90 TABLET | Refills: 0 | Status: SHIPPED | OUTPATIENT
Start: 2022-06-06 | End: 2022-07-11 | Stop reason: SDUPTHER

## 2022-06-06 NOTE — PROGRESS NOTES
Specialty Pharmacy Refill Coordination Note     Bernard is a 71 y.o. male contacted today regarding refills of  [facility]ia specialty medication(s).    Reviewed and verified with patient: Allergies    Specialty medication(s) and dose(s) confirmed: yes    Refill Questions    Flowsheet Row Most Recent Value   Changes to allergies? No   Changes to medications? No   New conditions since last clinic visit No   Unplanned office visit, urgent care, ED, or hospital admission in the last 4 weeks  No   How does patient/caregiver feel medication is working? Very good   Since the previous refill, were any specialty medication doses or scheduled injections missed or delayed?  No   Does this patient require a clinical escalation to a pharmacist? No          Delivery Questions    Flowsheet Row Most Recent Value   Delivery method FedEx   Delivery address correct? Yes   Delivery phone number [phone]   Number of medications in delivery 1   Medication being filled and delivered [facility]   Doses left of specialty medications 4   Is there any medication that is due not being filled? No   Supplies needed? No supplies needed   Do any medications need mixed or dated? No   Additional comments voucher   Questions or concerns for the pharmacist? No        Pt reports no issues and will send a refill out tomorrow.  PA due next month.          Follow-up: 1 month.      Saad Galvin  Specialty Pharmacy Technician

## 2022-06-06 NOTE — PROGRESS NOTES
Subjective   Bernard Matta is a 71 y.o. male.     71-year-old male with history of diabetes hypertension and chronic renal disease      The following portions of the patient's history were reviewed and updated as appropriate: allergies, current medications, past family history, past medical history, past social history, past surgical history and problem list.    Review of Systems   Respiratory: Negative for shortness of breath.    Cardiovascular: Negative for chest pain and leg swelling.   Genitourinary: Positive for difficulty urinating.       Objective   Physical Exam  Vitals and nursing note reviewed.   Constitutional:       Appearance: Normal appearance.   Cardiovascular:      Rate and Rhythm: Normal rate and regular rhythm.   Pulmonary:      Effort: Pulmonary effort is normal.      Breath sounds: Normal breath sounds.   Musculoskeletal:      Right lower leg: No edema.      Left lower leg: No edema.   Skin:     General: Skin is warm and dry.   Neurological:      General: No focal deficit present.      Mental Status: He is alert and oriented to person, place, and time.   Psychiatric:         Mood and Affect: Mood normal.         Behavior: Behavior normal.         Thought Content: Thought content normal.         Judgment: Judgment normal.         Assessment & Plan   Diagnoses and all orders for this visit:    1. Primary hypertension (Primary)    Other orders  -     Discontinue: amitriptyline (ELAVIL) 10 MG tablet; 1 nightly as needed sleep  Dispense: 90 tablet; Refill: 0  -     amitriptyline (ELAVIL) 10 MG tablet; 1 nightly as needed sleep  Dispense: 90 tablet; Refill: 0         Plan above-follow-up with diabetologist and nephrologist

## 2022-06-24 RX ORDER — BLOOD SUGAR DIAGNOSTIC
STRIP MISCELLANEOUS
Qty: 400 EACH | Refills: 6 | Status: SHIPPED | OUTPATIENT
Start: 2022-06-24

## 2022-07-01 ENCOUNTER — DOCUMENTATION (OUTPATIENT)
Dept: ENDOCRINOLOGY | Facility: CLINIC | Age: 72
End: 2022-07-01

## 2022-07-05 ENCOUNTER — SPECIALTY PHARMACY (OUTPATIENT)
Dept: ENDOCRINOLOGY | Facility: CLINIC | Age: 72
End: 2022-07-05

## 2022-07-05 NOTE — PROGRESS NOTES
Specialty Pharmacy Refill Coordination Note     Bernard is a 71 y.o. male contacted today regarding refills of  [facility]ia specialty medication(s).    Reviewed and verified with patient:  Allergies         Specialty medication(s) and dose(s) confirmed: yes    PA was approved for Kerendia and pts               Follow-up: As needed.      Saad Galvin  Specialty Pharmacy Technician

## 2022-07-07 DIAGNOSIS — I10 PRIMARY HYPERTENSION: Primary | ICD-10-CM

## 2022-07-07 RX ORDER — CLONIDINE HYDROCHLORIDE 0.1 MG/1
0.1 TABLET ORAL 2 TIMES DAILY
Qty: 180 TABLET | Refills: 1 | Status: SHIPPED | OUTPATIENT
Start: 2022-07-07 | End: 2022-11-28

## 2022-07-07 NOTE — TELEPHONE ENCOUNTER
Per Dr. Webb dose directions for clonidine.    Take (1) 0.1mg tablet every morning and evening along with lisinopril.     Will send new rx

## 2022-07-08 ENCOUNTER — SPECIALTY PHARMACY (OUTPATIENT)
Dept: ENDOCRINOLOGY | Facility: CLINIC | Age: 72
End: 2022-07-08

## 2022-07-08 NOTE — PROGRESS NOTES
Specialty Pharmacy Refill Coordination Note     Bernard is a 71 y.o. male contacted today regarding refills of  [facility]ia specialty medication(s).    Reviewed and verified with patient:  Allergies         Specialty medication(s) and dose(s) confirmed: yes    Refill Questions    Flowsheet Row Most Recent Value   Changes to allergies? No   Changes to medications? No   New conditions since last clinic visit No   Unplanned office visit, urgent care, ED, or hospital admission in the last 4 weeks  No   How does patient/caregiver feel medication is working? Very good   Financial problems or insurance changes  No   Since the previous refill, were any specialty medication doses or scheduled injections missed or delayed?  No   Does this patient require a clinical escalation to a pharmacist? No          Delivery Questions    Flowsheet Row Most Recent Value   Delivery method FedEx   Delivery address correct? Yes   Delivery phone number [phone]   Number of medications in delivery 1   Medication being filled and delivered [facility]   Doses left of specialty medications 4   Is there any medication that is due not being filled? No   Supplies needed? No supplies needed   Cooler needed? No   Do any medications need mixed or dated? No   Copay form of payment Credit card on file   Questions or concerns for the pharmacist? No   Are any medications first time fills? No        Voucher has  monthly cost is going to be $55 he is ok with that this month.  We will call and verify monthly cost for him.  No issues to report.          Follow-up: 1 month.      Saad Galvin  Specialty Pharmacy Technician

## 2022-07-09 DIAGNOSIS — E11.9 TYPE 2 DIABETES MELLITUS WITHOUT COMPLICATION, WITH LONG-TERM CURRENT USE OF INSULIN: ICD-10-CM

## 2022-07-09 DIAGNOSIS — Z79.4 TYPE 2 DIABETES MELLITUS WITHOUT COMPLICATION, WITH LONG-TERM CURRENT USE OF INSULIN: ICD-10-CM

## 2022-07-11 RX ORDER — AMITRIPTYLINE HYDROCHLORIDE 10 MG/1
TABLET, FILM COATED ORAL
Qty: 90 TABLET | Refills: 3 | Status: SHIPPED | OUTPATIENT
Start: 2022-07-11

## 2022-07-11 NOTE — TELEPHONE ENCOUNTER
Rx Refill Note  Requested Prescriptions     Pending Prescriptions Disp Refills   • amitriptyline (ELAVIL) 10 MG tablet [Pharmacy Med Name: AMITRIPTYLINE HYDROCHLORIDE 10 MG Tablet] 90 tablet 0     Sig: TAKE 1 TABLET EVERY NIGHT AS NEEDED FOR SLEEP      Last office visit with prescribing clinician: 6/6/2022      Next office visit with prescribing clinician: 7/25/2022            Roxanne Yepez MA  07/11/22, 07:30 CDT

## 2022-07-12 RX ORDER — INSULIN ASPART 100 [IU]/ML
INJECTION, SOLUTION INTRAVENOUS; SUBCUTANEOUS
Qty: 60 ML | Refills: 3 | Status: SHIPPED | OUTPATIENT
Start: 2022-07-12

## 2022-07-12 RX ORDER — ATORVASTATIN CALCIUM 40 MG/1
40 TABLET, FILM COATED ORAL
Qty: 90 TABLET | Refills: 3 | Status: SHIPPED | OUTPATIENT
Start: 2022-07-12

## 2022-07-25 ENCOUNTER — OFFICE VISIT (OUTPATIENT)
Dept: FAMILY MEDICINE CLINIC | Facility: CLINIC | Age: 72
End: 2022-07-25

## 2022-07-25 VITALS
HEART RATE: 110 BPM | RESPIRATION RATE: 16 BRPM | BODY MASS INDEX: 28.34 KG/M2 | SYSTOLIC BLOOD PRESSURE: 142 MMHG | HEIGHT: 68 IN | WEIGHT: 187 LBS | TEMPERATURE: 97.8 F | OXYGEN SATURATION: 97 % | DIASTOLIC BLOOD PRESSURE: 80 MMHG

## 2022-07-25 DIAGNOSIS — R53.83 FATIGUE, UNSPECIFIED TYPE: ICD-10-CM

## 2022-07-25 DIAGNOSIS — E78.2 MIXED HYPERLIPIDEMIA: ICD-10-CM

## 2022-07-25 DIAGNOSIS — Z79.4 TYPE 2 DIABETES MELLITUS WITHOUT COMPLICATION, WITH LONG-TERM CURRENT USE OF INSULIN: Primary | ICD-10-CM

## 2022-07-25 DIAGNOSIS — R41.3 MEMORY LOSS: ICD-10-CM

## 2022-07-25 DIAGNOSIS — Z00.00 MEDICARE ANNUAL WELLNESS VISIT, SUBSEQUENT: ICD-10-CM

## 2022-07-25 DIAGNOSIS — E11.9 TYPE 2 DIABETES MELLITUS WITHOUT COMPLICATION, WITH LONG-TERM CURRENT USE OF INSULIN: Primary | ICD-10-CM

## 2022-07-25 DIAGNOSIS — Z12.5 SPECIAL SCREENING FOR MALIGNANT NEOPLASM OF PROSTATE: ICD-10-CM

## 2022-07-25 PROCEDURE — G0439 PPPS, SUBSEQ VISIT: HCPCS | Performed by: FAMILY MEDICINE

## 2022-07-25 PROCEDURE — 1160F RVW MEDS BY RX/DR IN RCRD: CPT | Performed by: FAMILY MEDICINE

## 2022-07-25 PROCEDURE — 81003 URINALYSIS AUTO W/O SCOPE: CPT | Performed by: FAMILY MEDICINE

## 2022-07-25 PROCEDURE — 3062F POS MACROALBUMINURIA REV: CPT | Performed by: FAMILY MEDICINE

## 2022-07-25 PROCEDURE — 1170F FXNL STATUS ASSESSED: CPT | Performed by: FAMILY MEDICINE

## 2022-07-25 PROCEDURE — 1126F AMNT PAIN NOTED NONE PRSNT: CPT | Performed by: FAMILY MEDICINE

## 2022-07-25 PROCEDURE — 96160 PT-FOCUSED HLTH RISK ASSMT: CPT | Performed by: FAMILY MEDICINE

## 2022-07-25 RX ORDER — FUROSEMIDE 20 MG/1
20 TABLET ORAL DAILY
Qty: 90 TABLET | Refills: 3 | Status: SHIPPED | OUTPATIENT
Start: 2022-07-25

## 2022-07-25 NOTE — PROGRESS NOTES
The ABCs of the Annual Wellness Visit  Subsequent Medicare Wellness Visit    Chief Complaint   Patient presents with   • Medicare Wellness-subsequent     Fasting      Subjective    History of Present Illness:  Bernard Matta is a 71 y.o. male who presents for a Subsequent Medicare Wellness Visit.    The following portions of the patient's history were reviewed and   updated as appropriate: allergies, current medications, past family history, past medical history, past social history, past surgical history and problem list.    Compared to one year ago, the patient feels his physical   health is the same.    Compared to one year ago, the patient feels his mental   health is the same.    Recent Hospitalizations:  He was not admitted to the hospital during the last year.       Current Medical Providers:  Patient Care Team:  Brando Webb MD as PCP - General (Family Medicine)  Jim Cha OD (Optometry)  Anand Santoyo MD as Consulting Physician (Ophthalmology)  Deric Byers MD as Consulting Physician (Endocrinology)  Shaquille Mckenzie DO as Consulting Physician (Gastroenterology)    Outpatient Medications Prior to Visit   Medication Sig Dispense Refill   • Accu-Chek FastClix Lancets misc Use 4 x daily ,   ICD10 code is E11.9 You dispensed the wrong this, patient wants acucheck fast click lancets 360 each 3   • Alcohol Swabs (B-D SINGLE USE SWABS REGULAR) pads USE FOUR TIMES DAILY 400 each 11   • amitriptyline (ELAVIL) 10 MG tablet TAKE 1 TABLET EVERY NIGHT AS NEEDED FOR SLEEP 90 tablet 3   • aspirin 81 MG tablet Take 81 mg by mouth Daily.     • atorvastatin (LIPITOR) 40 MG tablet TAKE 1 TABLET BY MOUTH EVERY NIGHT AT BEDTIME. 90 tablet 3   • Blood Glucose Monitoring Suppl w/Device kit accu chek albertina 1 each 1   • cetirizine (zyrTEC) 10 MG tablet Take 10 mg by mouth Daily.     • cholecalciferol (VITAMIN D3) 400 UNITS tablet Take 400 Units by mouth Daily.     • cloNIDine (Catapres) 0.1 MG  "tablet Take 1 tablet by mouth 2 (Two) Times a Day. 180 tablet 1   • dorzolamide-timolol (COSOPT) 22.3-6.8 MG/ML ophthalmic solution 1 drop 2 (Two) Times a Day.     • Droplet Pen Needles 31G X 5 MM misc USE FOUR TIMES DAILY AS DIRECTED 400 each 3   • Finerenone 10 MG tablet Take 1 tablet by mouth Daily. 30 tablet 11   • fluticasone (FLONASE) 50 MCG/ACT nasal spray USE 2 SPRAYS IN EACH NOSTRIL EVERY DAY 48 g 5   • glucose blood (Accu-Chek Marry Plus) test strip TEST BLOOD SUGAR FOUR TIMES DAILY 400 each 6   • insulin aspart (NovoLOG FlexPen) 100 UNIT/ML solution pen-injector sc pen USE UP TO 20 UNITS WITH MEALS. DISCARD PEN 28 DAYS AFTER OPENING 60 mL 3   • Insulin Pen Needle 31G X 5 MM misc Droplet Pen Needle 31 gauge x 3/16\"     • Lancet Devices (Lancing Device) misc acucheck fast click lancing device. Make certain you send the correct one. Talk to patient . States you sent the wrong one 1 each 11   • latanoprost (XALATAN) 0.005 % ophthalmic solution 1 drop Every Night.     • lisinopril (PRINIVIL,ZESTRIL) 10 MG tablet TAKE 1 TABLET EVERY MORNING (Patient taking differently: Every Night.) 90 tablet 1   • Multiple Vitamins-Minerals (MULTIVITAMIN WITH MINERALS) tablet tablet Take 1 tablet by mouth Daily.     • tamsulosin (FLOMAX) 0.4 MG capsule 24 hr capsule TAKE 2 CAPSULES EVERY EVENING 180 capsule 1   • Tresiba FlexTouch 100 UNIT/ML solution pen-injector injection INJECT 20 UNITS UNDER THE SKIN INTO THE APPROPRIATE AREA AS DIRECTED EVERY NIGHT. 30 mL 2   • furosemide (Lasix) 20 MG tablet Take 1 tablet by mouth Daily. 90 tablet 3     No facility-administered medications prior to visit.       No opioid medication identified on active medication list. I have reviewed chart for other potential  high risk medication/s and harmful drug interactions in the elderly.          Aspirin is on active medication list. Aspirin use is indicated based on review of current medical condition/s. Pros and cons of this therapy have been " "discussed today. Benefits of this medication outweigh potential harm.  Patient has been encouraged to continue taking this medication.  .      Patient Active Problem List   Diagnosis   • Anterior ischemic optic neuropathy   • Hypertension   • Type 2 diabetes mellitus (HCC)   • Hx of adenomatous colonic polyps     Advance Care Planning  Advance Directive is on file.  ACP discussion was declined by the patient. Patient has an advance directive in EMR which is still valid.     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    Cardiovascular: Negative for chest pain and leg swelling.   Gastrointestinal:        Recent colonoscopy   Genitourinary: Negative for difficulty urinating.   All other systems reviewed and are negative.       Objective    Vitals:    07/25/22 0901   BP: 142/80   BP Location: Left arm   Patient Position: Sitting   Cuff Size: Adult   Pulse: 110   Resp: 16   Temp: 97.8 °F (36.6 °C)   TempSrc: Temporal   SpO2: 97%   Weight: 84.8 kg (187 lb)   Height: 172.7 cm (68\")   PainSc: 0-No pain     Estimated body mass index is 28.43 kg/m² as calculated from the following:    Height as of this encounter: 172.7 cm (68\").    Weight as of this encounter: 84.8 kg (187 lb).    BMI is >= 25 and <30. (Overweight) The following options were offered after discussion;: exercise counseling/recommendations      Does the patient have evidence of cognitive impairment? No    Physical Exam  Vitals and nursing note reviewed.   Constitutional:       Appearance: Normal appearance.   HENT:      Right Ear: Tympanic membrane and ear canal normal.      Left Ear: Tympanic membrane and ear canal normal.   Eyes:      Conjunctiva/sclera: Conjunctivae normal.   Neck:      Vascular: No carotid bruit.   Cardiovascular:      Rate and Rhythm: Normal rate and regular rhythm.      Pulses: Normal pulses.      Heart sounds: Normal heart sounds.   Pulmonary:      Effort: " Pulmonary effort is normal.      Breath sounds: Normal breath sounds.   Abdominal:      General: Abdomen is flat.      Palpations: Abdomen is soft. There is no mass.   Genitourinary:     Comments: Per urologist  Musculoskeletal:      Right lower leg: No edema.      Left lower leg: No edema.   Lymphadenopathy:      Cervical: No cervical adenopathy.   Skin:     General: Skin is warm and dry.   Neurological:      General: No focal deficit present.      Mental Status: He is alert and oriented to person, place, and time.   Psychiatric:         Mood and Affect: Mood normal.         Behavior: Behavior normal.         Thought Content: Thought content normal.         Judgment: Judgment normal.                 HEALTH RISK ASSESSMENT    Smoking Status:  Social History     Tobacco Use   Smoking Status Former Smoker   • Packs/day: 0.50   • Years: 10.00   • Pack years: 5.00   • Types: Cigarettes   • Quit date: 1979   • Years since quittin.5   Smokeless Tobacco Never Used     Alcohol Consumption:  Social History     Substance and Sexual Activity   Alcohol Use No     Fall Risk Screen:    [facility] Fall Risk Assessment was completed, and patient is at LOW risk for falls.Assessment completed on:2022    Depression Screening:  PHQ-2/PHQ-9 Depression Screening 2022   Retired PHQ-9 Total Score -   Retired Total Score -   Little Interest or Pleasure in Doing Things 0-->not at all   Feeling Down, Depressed or Hopeless 0-->not at all   PHQ-9: Brief Depression Severity Measure Score 0       Health Habits and Functional and Cognitive Screening:  Functional & Cognitive Status 2022   Do you have difficulty preparing food and eating? No   Do you have difficulty bathing yourself, getting dressed or grooming yourself? No   Do you have difficulty using the toilet? No   Do you have difficulty moving around from place to place? No   Do you have trouble with steps or getting out of a bed or a chair? No   Current Diet Well Balanced  Diet   Dental Exam Up to date   Eye Exam Up to date   Exercise (times per week) 4 times per week   Current Exercises Include Walking;Yard Work   Current Exercise Activities Include -   Do you need help using the phone?  No   Are you deaf or do you have serious difficulty hearing?  No   Do you need help with transportation? No   Do you need help shopping? No   Do you need help preparing meals?  No   Do you need help with housework?  No   Do you need help with laundry? No   Do you need help taking your medications? No   Do you need help managing money? No   Do you ever drive or ride in a car without wearing a seat belt? No   Have you felt unusual stress, anger or loneliness in the last month? No   Who do you live with? Spouse   If you need help, do you have trouble finding someone available to you? No   Have you been bothered in the last four weeks by sexual problems? No   Do you have difficulty concentrating, remembering or making decisions? No       Age-appropriate Screening Schedule:  Refer to the list below for future screening recommendations based on patient's age, sex and/or medical conditions. Orders for these recommended tests are listed in the plan section. The patient has been provided with a written plan.    Health Maintenance   Topic Date Due   • HEMOGLOBIN A1C  06/10/2022   • INFLUENZA VACCINE  10/01/2022   • LIPID PANEL  12/10/2022   • URINE MICROALBUMIN  12/10/2022   • TDAP/TD VACCINES (2 - Td or Tdap) 05/08/2023   • DIABETIC EYE EXAM  06/13/2023   • DIABETIC FOOT EXAM  07/25/2023   • ZOSTER VACCINE  Addressed              Assessment & Plan   CMS Preventative Services Quick Reference  Risk Factors Identified During Encounter  Fall Risk-High or Moderate  The above risks/problems have been discussed with the patient.  Follow up actions/plans if indicated are seen below in the Assessment/Plan Section.  Pertinent information has been shared with the patient in the After Visit Summary.    Diagnoses and all  orders for this visit:    1. Type 2 diabetes mellitus without complication, with long-term current use of insulin (HCC) (Primary)  -     Cancel: POC Urinalysis Dipstick, Multipro  -     Hemoglobin A1c  -     POC Urinalysis Dipstick, Multipro  -     MicroAlbumin, Urine, Random - Urine, Clean Catch    2. Special screening for malignant neoplasm of prostate  -     PSA Screen    3. Memory loss  -     Folate    4. Medicare annual wellness visit, subsequent    5. Fatigue, unspecified type  -     TSH  -     T4, free  -     CBC & Differential    6. Mixed hyperlipidemia  -     Comprehensive Metabolic Panel  -     Lipid Panel    Other orders  -     furosemide (Lasix) 20 MG tablet; Take 1 tablet by mouth Daily.  Dispense: 90 tablet; Refill: 3        Follow Up:   Return in about 6 months (around 1/25/2023).     An After Visit Summary and PPPS were made available to the patient.          I spent 25 minutes caring for Bernard on this date of service. This time includes time spent by me in the following activities:preparing for the visit, reviewing tests, obtaining and/or reviewing a separately obtained history, performing a medically appropriate examination and/or evaluation , counseling and educating the patient/family/caregiver, ordering medications, tests, or procedures and documenting information in the medical record     Plan above-continue seeing specialist-advised to get second booster shot for COVID

## 2022-07-25 NOTE — PATIENT INSTRUCTIONS
Advance Care Planning and Advance Directives     You make decisions on a daily basis - decisions about where you want to live, your career, your home, your life. Perhaps one of the most important decisions you face is your choice for future medical care. Take time to talk with your family and your healthcare team and start planning today.  Advance Care Planning is a process that can help you:  · Understand possible future healthcare decisions in light of your own experiences  · Reflect on those decision in light of your goals and values  · Discuss your decisions with those closest to you and the healthcare professionals that care for you  · Make a plan by creating a document that reflects your wishes    Surrogate Decision Maker  In the event of a medical emergency, which has left you unable to communicate or to make your own decisions, you would need someone to make decisions for you.  It is important to discuss your preferences for medical treatment with this person while you are in good health.     Qualities of a surrogate decision maker:  • Willing to take on this role and responsibility  • Knows what you want for future medical care  • Willing to follow your wishes even if they don't agree with them  • Able to make difficult medical decisions under stressful circumstances    Advance Directives  These are legal documents you can create that will guide your healthcare team and decision maker(s) when needed. These documents can be stored in the electronic medical record.    · Living Will - a legal document to guide your care if you have a terminal condition or a serious illness and are unable to communicate. States vary by statute in document names/types, but most forms may include one or more of the following:        -  Directions regarding life-prolonging treatments        -  Directions regarding artificially provided nutrition/hydration        -  Choosing a healthcare decision maker        -  Direction  regarding organ/tissue donation    · Durable Power of  for Healthcare - this document names an -in-fact to make medical decisions for you, but it may also allow this person to make personal and financial decisions for you. Please seek the advice of an  if you need this type of document.    **Advance Directives are not required and no one may discriminate against you if you do not sign one.    Medical Orders  Many states allow specific forms/orders signed by your physician to record your wishes for medical treatment in your current state of health. This form, signed in personal communication with your physician, addresses resuscitation and other medical interventions that you may or may not want.      For more information or to schedule a time with a [facility] Advance Care Planning Facilitator contact: [place] or call [phone] and someone will contact you directly.    Medicare Wellness  Personal Prevention Plan of Service     Date of Office Visit:    Encounter Provider:  Brando Webb MD  Place of Service:  [facility]  Patient Name: Bernard Matta  :  1950    As part of the Medicare Wellness portion of your visit today, we are providing you with this personalized preventive plan of services (PPPS). This plan is based upon recommendations of the [place] Preventive Services Task Force (USPSTF) and the Advisory Committee on Immunization Practices (ACIP).    This lists the preventive care services that should be considered, and provides dates of when you are due. Items listed as completed are up-to-date and do not require any further intervention.    Health Maintenance   Topic Date Due   • HEMOGLOBIN A1C  06/10/2022   • COVID-19 Vaccine (4 - Booster for Moderna series) 2022 (Originally 3/2/2022)   • Pneumococcal Vaccine 65+ (3 - PPSV23 or PCV20) 2023 (Originally 2018)   • COLORECTAL CANCER SCREENING   08/12/2022   • INFLUENZA VACCINE  10/01/2022   • LIPID PANEL  12/10/2022   • URINE MICROALBUMIN  12/10/2022   • TDAP/TD VACCINES (2 - Td or Tdap) 05/08/2023   • DIABETIC EYE EXAM  06/13/2023   • ANNUAL WELLNESS VISIT  07/25/2023   • DIABETIC FOOT EXAM  07/25/2023   • HEPATITIS C SCREENING  Completed   • AAA SCREEN (ONE-TIME)  Completed   • ZOSTER VACCINE  Addressed       No orders of the defined types were placed in this encounter.      No follow-ups on file.

## 2022-07-26 LAB
ALBUMIN SERPL-MCNC: 4.2 G/DL (ref 3.5–5.2)
ALBUMIN/GLOB SERPL: 1.6 G/DL
ALP SERPL-CCNC: 96 U/L (ref 39–117)
ALT SERPL-CCNC: 21 U/L (ref 1–41)
AST SERPL-CCNC: 17 U/L (ref 1–40)
BASOPHILS # BLD AUTO: 0.05 10*3/MM3 (ref 0–0.2)
BASOPHILS NFR BLD AUTO: 1 % (ref 0–1.5)
BILIRUB SERPL-MCNC: 0.4 MG/DL (ref 0–1.2)
BUN SERPL-MCNC: 34 MG/DL (ref 8–23)
BUN/CREAT SERPL: 15 (ref 7–25)
CALCIUM SERPL-MCNC: 9.3 MG/DL (ref 8.6–10.5)
CHLORIDE SERPL-SCNC: 100 MMOL/L (ref 98–107)
CHOLEST SERPL-MCNC: 159 MG/DL (ref 0–200)
CO2 SERPL-SCNC: 29.1 MMOL/L (ref 22–29)
CREAT SERPL-MCNC: 2.27 MG/DL (ref 0.76–1.27)
EGFRCR SERPLBLD CKD-EPI 2021: 29.7 ML/MIN/1.73
EOSINOPHIL # BLD AUTO: 0.32 10*3/MM3 (ref 0–0.4)
EOSINOPHIL NFR BLD AUTO: 6.2 % (ref 0.3–6.2)
ERYTHROCYTE [DISTWIDTH] IN BLOOD BY AUTOMATED COUNT: 13.3 % (ref 12.3–15.4)
FOLATE SERPL-MCNC: >20 NG/ML (ref 4.78–24.2)
GLOBULIN SER CALC-MCNC: 2.7 GM/DL
GLUCOSE SERPL-MCNC: 170 MG/DL (ref 65–99)
HBA1C MFR BLD: 9.2 % (ref 4.8–5.6)
HCT VFR BLD AUTO: 30.8 % (ref 37.5–51)
HDLC SERPL-MCNC: 43 MG/DL (ref 40–60)
HGB BLD-MCNC: 9.7 G/DL (ref 13–17.7)
IMM GRANULOCYTES # BLD AUTO: 0.01 10*3/MM3 (ref 0–0.05)
IMM GRANULOCYTES NFR BLD AUTO: 0.2 % (ref 0–0.5)
LDLC SERPL CALC-MCNC: 100 MG/DL (ref 0–100)
LYMPHOCYTES # BLD AUTO: 1 10*3/MM3 (ref 0.7–3.1)
LYMPHOCYTES NFR BLD AUTO: 19.5 % (ref 19.6–45.3)
MCH RBC QN AUTO: 27.4 PG (ref 26.6–33)
MCHC RBC AUTO-ENTMCNC: 31.5 G/DL (ref 31.5–35.7)
MCV RBC AUTO: 87 FL (ref 79–97)
MONOCYTES # BLD AUTO: 0.4 10*3/MM3 (ref 0.1–0.9)
MONOCYTES NFR BLD AUTO: 7.8 % (ref 5–12)
NEUTROPHILS # BLD AUTO: 3.36 10*3/MM3 (ref 1.7–7)
NEUTROPHILS NFR BLD AUTO: 65.3 % (ref 42.7–76)
NRBC BLD AUTO-RTO: 0 /100 WBC (ref 0–0.2)
PLATELET # BLD AUTO: 191 10*3/MM3 (ref 140–450)
POTASSIUM SERPL-SCNC: 5.3 MMOL/L (ref 3.5–5.2)
PROT SERPL-MCNC: 6.9 G/DL (ref 6–8.5)
PSA SERPL-MCNC: 0.79 NG/ML (ref 0–4)
RBC # BLD AUTO: 3.54 10*6/MM3 (ref 4.14–5.8)
SODIUM SERPL-SCNC: 137 MMOL/L (ref 136–145)
T4 FREE SERPL-MCNC: 1.28 NG/DL (ref 0.93–1.7)
TRIGL SERPL-MCNC: 85 MG/DL (ref 0–150)
TSH SERPL DL<=0.005 MIU/L-ACNC: 2.9 UIU/ML (ref 0.27–4.2)
VLDLC SERPL CALC-MCNC: 16 MG/DL (ref 5–40)
WBC # BLD AUTO: 5.14 10*3/MM3 (ref 3.4–10.8)

## 2022-07-27 ENCOUNTER — SPECIALTY PHARMACY (OUTPATIENT)
Dept: ENDOCRINOLOGY | Facility: CLINIC | Age: 72
End: 2022-07-27

## 2022-07-28 ENCOUNTER — IMMUNIZATION (OUTPATIENT)
Dept: FAMILY MEDICINE CLINIC | Facility: CLINIC | Age: 72
End: 2022-07-28

## 2022-07-28 DIAGNOSIS — Z23 NEED FOR COVID-19 VACCINE: Primary | ICD-10-CM

## 2022-07-28 PROCEDURE — 91305 COVID-19 (PFIZER) 12+ YRS: CPT | Performed by: NURSE PRACTITIONER

## 2022-07-28 PROCEDURE — 0054A COVID-19 (PFIZER) 12+ YRS: CPT | Performed by: NURSE PRACTITIONER

## 2022-07-29 ENCOUNTER — TELEPHONE (OUTPATIENT)
Dept: ENDOCRINOLOGY | Facility: CLINIC | Age: 72
End: 2022-07-29

## 2022-07-29 ENCOUNTER — TELEPHONE (OUTPATIENT)
Dept: FAMILY MEDICINE CLINIC | Facility: CLINIC | Age: 72
End: 2022-07-29

## 2022-07-29 NOTE — TELEPHONE ENCOUNTER
Good-he should feel better-hold blood pressure medicine day monitor blood pressure and sugar start back tomorrow

## 2022-08-02 ENCOUNTER — OFFICE VISIT (OUTPATIENT)
Dept: ENDOCRINOLOGY | Facility: CLINIC | Age: 72
End: 2022-08-02

## 2022-08-02 DIAGNOSIS — E11.65 TYPE 2 DIABETES MELLITUS WITH HYPERGLYCEMIA, WITH LONG-TERM CURRENT USE OF INSULIN: ICD-10-CM

## 2022-08-02 DIAGNOSIS — Z79.4 TYPE 2 DIABETES MELLITUS WITH HYPERGLYCEMIA, WITH LONG-TERM CURRENT USE OF INSULIN: ICD-10-CM

## 2022-08-02 PROCEDURE — G0108 DIAB MANAGE TRN  PER INDIV: HCPCS | Performed by: DIETITIAN, REGISTERED

## 2022-08-03 ENCOUNTER — TELEPHONE (OUTPATIENT)
Dept: ENDOCRINOLOGY | Facility: CLINIC | Age: 72
End: 2022-08-03

## 2022-08-03 NOTE — TELEPHONE ENCOUNTER
Pt called and said he has an upcoming appt with Dr Lemus and another upcoming with Dr Haywood. He wants to know if you can send orders for blood work to Dr Cortes at [facility] in [place]. Phone is [phone].    Thanks

## 2022-08-04 DIAGNOSIS — E78.2 MIXED DIABETIC HYPERLIPIDEMIA ASSOCIATED WITH TYPE 2 DIABETES MELLITUS: ICD-10-CM

## 2022-08-04 DIAGNOSIS — Z79.4 TYPE 2 DIABETES MELLITUS WITH HYPERGLYCEMIA, WITH LONG-TERM CURRENT USE OF INSULIN: Primary | ICD-10-CM

## 2022-08-04 DIAGNOSIS — E11.65 TYPE 2 DIABETES MELLITUS WITH HYPERGLYCEMIA, WITH LONG-TERM CURRENT USE OF INSULIN: Primary | ICD-10-CM

## 2022-08-04 DIAGNOSIS — E55.9 VITAMIN D DEFICIENCY: ICD-10-CM

## 2022-08-04 DIAGNOSIS — E11.69 MIXED DIABETIC HYPERLIPIDEMIA ASSOCIATED WITH TYPE 2 DIABETES MELLITUS: ICD-10-CM

## 2022-08-05 ENCOUNTER — TELEPHONE (OUTPATIENT)
Dept: ENDOCRINOLOGY | Facility: CLINIC | Age: 72
End: 2022-08-05

## 2022-08-05 RX ORDER — PROCHLORPERAZINE 25 MG/1
1 SUPPOSITORY RECTAL ONCE
Qty: 1 EACH | Refills: 0 | Status: SHIPPED | OUTPATIENT
Start: 2022-08-05 | End: 2022-08-05

## 2022-08-05 RX ORDER — PROCHLORPERAZINE 25 MG/1
1 SUPPOSITORY RECTAL
Qty: 1 EACH | Refills: 2 | Status: SHIPPED | OUTPATIENT
Start: 2022-08-05 | End: 2022-08-15 | Stop reason: SDUPTHER

## 2022-08-05 RX ORDER — PROCHLORPERAZINE 25 MG/1
SUPPOSITORY RECTAL
Qty: 3 EACH | Refills: 3 | Status: SHIPPED | OUTPATIENT
Start: 2022-08-05 | End: 2022-08-15 | Stop reason: SDUPTHER

## 2022-08-11 NOTE — PROGRESS NOTES
Bernard Matta is a 71 y.o. male referred for outpatient diabetes education by Dr. Abraham. Patient attended individual education for 30 minutes on 08/02/2022. Topics covered included:     1. Healthy Food Choices   i. Provided patient with detailed carbohydrate counting guide.    ii. Instructed patient to eat 45-60 grams of carbohydrate with each meal (3-4 exchange choices) and 15 grams of carb for snacks (1 exchange choices).   iii. Provided patient with list of non-starchy vegetables and foods that are low in carbohydrate for snacks and to incorporate with meals (Planning Healthy Meals Packet).    iv. Choose fruits, vegetables, whole grains, legumes, low-fat milk, fiber-rich foods, minimal saturated fats, and watch cholesterol and sodium intake.    v. Reviewed carbohydrate-containing foods, standard serving sizes, and measuring foods.   vi. Reviewed the difference between simple and complex carbohydrate. Encouraged patient to choose complex carbohydrates more often.      2. Discussed Dexcom CGM with pt. Dexcom prescription sent to preferred pharmacy.     Provided patient with Diabetes and You book, and Carb Counting and Meal Planning book. Provided handout of sample menu with carbs listed.      Thank you Dr. Abraham for this referral.        CHRISTI Rodriguez, RD, LD

## 2022-08-15 ENCOUNTER — TELEPHONE (OUTPATIENT)
Dept: ENDOCRINOLOGY | Facility: CLINIC | Age: 72
End: 2022-08-15

## 2022-08-15 RX ORDER — PROCHLORPERAZINE 25 MG/1
SUPPOSITORY RECTAL
Qty: 3 EACH | Refills: 3 | Status: SHIPPED | OUTPATIENT
Start: 2022-08-15 | End: 2022-08-18 | Stop reason: SDUPTHER

## 2022-08-15 RX ORDER — PROCHLORPERAZINE 25 MG/1
1 SUPPOSITORY RECTAL
Qty: 1 EACH | Refills: 2 | Status: SHIPPED | OUTPATIENT
Start: 2022-08-15 | End: 2022-08-18 | Stop reason: SDUPTHER

## 2022-08-15 NOTE — TELEPHONE ENCOUNTER
Rx for Dexcom sent to new pharmacy as [facility] did not carry product (per patient).

## 2022-08-18 DIAGNOSIS — Z79.4 TYPE 2 DIABETES MELLITUS WITH HYPERGLYCEMIA, WITH LONG-TERM CURRENT USE OF INSULIN: Primary | ICD-10-CM

## 2022-08-18 DIAGNOSIS — E11.65 TYPE 2 DIABETES MELLITUS WITH HYPERGLYCEMIA, WITH LONG-TERM CURRENT USE OF INSULIN: Primary | ICD-10-CM

## 2022-08-18 RX ORDER — PROCHLORPERAZINE 25 MG/1
1 SUPPOSITORY RECTAL
Qty: 1 EACH | Refills: 2 | Status: SHIPPED | OUTPATIENT
Start: 2022-08-18

## 2022-08-18 RX ORDER — PROCHLORPERAZINE 25 MG/1
SUPPOSITORY RECTAL
Qty: 3 EACH | Refills: 5 | Status: SHIPPED | OUTPATIENT
Start: 2022-08-18

## 2022-08-19 ENCOUNTER — TELEPHONE (OUTPATIENT)
Dept: FAMILY MEDICINE CLINIC | Facility: CLINIC | Age: 72
End: 2022-08-19

## 2022-08-19 ENCOUNTER — TELEPHONE (OUTPATIENT)
Dept: ENDOCRINOLOGY | Facility: CLINIC | Age: 72
End: 2022-08-19

## 2022-08-19 NOTE — TELEPHONE ENCOUNTER
Rosemarie from [facility] called regarding him getting a continuous glucose monitor and supplies that were recommended by Ryan. She said they are needing a PA but the pt is not meeting the criteria. She is requesting to speak with the nurse to see if they can get this for the pt. She said you can call her directly at [phone].    Thanks

## 2022-08-19 NOTE — TELEPHONE ENCOUNTER
Caller: Bernard Matta    Relationship: Self    Best call back number: [phone]    What is the best time to reach you: ANYTIME    Who are you requesting to speak with (clinical staff, provider,  specific staff member): CLINICAL      What was the call regarding: PATIENT HAD LABS DONE AT OFFICE ON 08/08/22. THESE LABS WERE NOT ORDERED BY DR ROSADO BUT BY 2 OTHER PROVIDERS. PATIENT HAD REQUESTED THAT COPIES OF THE LAB RESULTS BE FAXED TO 2 DIFFERENT PROVIDERS.    Allan Flores MD      Nephrologist  FAX NUMBER [phone]    AND    LANDON CHACON  Endocrinologist  COULD NOT GET FAX NUMBER      Do you require a callback: YES PLEASE ONCE COPIES HAVE BEEN FAXED

## 2022-08-22 ENCOUNTER — DOCUMENTATION (OUTPATIENT)
Dept: ENDOCRINOLOGY | Facility: CLINIC | Age: 72
End: 2022-08-22

## 2022-08-22 NOTE — TELEPHONE ENCOUNTER
JUDITH CALLED BACK TO OFFER A PEER TO PEER.     REFERENCE# [id number]    PEER TO PEER EXPIRES 8/24/2022 AT 12PM CDT

## 2022-08-23 ENCOUNTER — DOCUMENTATION (OUTPATIENT)
Dept: ENDOCRINOLOGY | Facility: CLINIC | Age: 72
End: 2022-08-23

## 2022-08-23 ENCOUNTER — OFFICE VISIT (OUTPATIENT)
Dept: ENDOCRINOLOGY | Facility: CLINIC | Age: 72
End: 2022-08-23

## 2022-08-23 VITALS
DIASTOLIC BLOOD PRESSURE: 60 MMHG | OXYGEN SATURATION: 96 % | WEIGHT: 187 LBS | BODY MASS INDEX: 28.34 KG/M2 | SYSTOLIC BLOOD PRESSURE: 104 MMHG | HEART RATE: 106 BPM | HEIGHT: 68 IN

## 2022-08-23 DIAGNOSIS — E11.59 HYPERTENSION ASSOCIATED WITH DIABETES: ICD-10-CM

## 2022-08-23 DIAGNOSIS — I15.2 HYPERTENSION ASSOCIATED WITH DIABETES: ICD-10-CM

## 2022-08-23 DIAGNOSIS — N18.32 STAGE 3B CHRONIC KIDNEY DISEASE: ICD-10-CM

## 2022-08-23 DIAGNOSIS — E78.2 MIXED DIABETIC HYPERLIPIDEMIA ASSOCIATED WITH TYPE 2 DIABETES MELLITUS: ICD-10-CM

## 2022-08-23 DIAGNOSIS — Z79.4 TYPE 2 DIABETES MELLITUS WITH HYPERGLYCEMIA, WITH LONG-TERM CURRENT USE OF INSULIN: Primary | ICD-10-CM

## 2022-08-23 DIAGNOSIS — E11.65 TYPE 2 DIABETES MELLITUS WITH HYPERGLYCEMIA, WITH LONG-TERM CURRENT USE OF INSULIN: Primary | ICD-10-CM

## 2022-08-23 DIAGNOSIS — E11.69 MIXED DIABETIC HYPERLIPIDEMIA ASSOCIATED WITH TYPE 2 DIABETES MELLITUS: ICD-10-CM

## 2022-08-23 LAB — HBA1C MFR BLD: 8.9 %

## 2022-08-23 PROCEDURE — 99214 OFFICE O/P EST MOD 30 MIN: CPT | Performed by: INTERNAL MEDICINE

## 2022-08-23 RX ORDER — GLUCAGON 3 MG/1
1 POWDER NASAL AS NEEDED
Qty: 2 EACH | Refills: 11 | Status: SHIPPED | OUTPATIENT
Start: 2022-08-23

## 2022-08-23 NOTE — PROGRESS NOTES
" Bernard Matta is a 71 y.o. male who presents for follow up T2DM                                          HPI     T2DM complicated by CKD stage 3b / A2    Has better understanding but still running hyperglycemic    PE    /60   Pulse 106   Ht 172.7 cm (68\")   Wt 84.8 kg (187 lb)   SpO2 96%   BMI 28.43 kg/m²   AOx3  No visible goiter  Normal Respiratory Effort , Lung CTA  RRR  No Edema    Labs    Lab Results   Component Value Date    WBC 5.14 07/25/2022    HGB 9.7 (L) 07/25/2022    HCT 30.8 (L) 07/25/2022    MCV 87.0 07/25/2022     07/25/2022     Lab Results   Component Value Date    GLUCOSE 170 (H) 07/25/2022    BUN 34 (H) 07/25/2022    CREATININE 2.27 (H) 07/25/2022    EGFRIFNONA 33 (L) 09/08/2021    EGFRIFAFRI 38 (L) 09/08/2021    BCR 15.0 07/25/2022    K 5.3 (H) 07/25/2022    CO2 29.1 (H) 07/25/2022    CALCIUM 9.3 07/25/2022    PROTENTOTREF 6.9 07/25/2022    ALBUMIN 4.20 07/25/2022    LABIL2 1.6 07/25/2022    AST 17 07/25/2022    ALT 21 07/25/2022         Assessment & Plan       ICD-10-CM ICD-9-CM   1. Type 2 diabetes mellitus with hyperglycemia, with long-term current use of insulin ([facility])  E11.65 250.00    Z79.4 790.29     V58.67   2. Mixed diabetic hyperlipidemia associated with type 2 diabetes mellitus ([facility])  E11.69 250.80    E78.2 272.2   3. Hypertension associated with diabetes ([facility])  E11.59 250.80    I15.2 401.9   4. Stage 3b chronic kidney disease ([facility])  N18.32 585.3          Most recent labs reviewed    1. Type 2 diabetes     Glycemic Management:   Lab Results   Component Value Date    HGBA1C 8.9 08/23/2022    HGBA1C 9.20 (H) 07/25/2022    HGBA1C 10.8 (H) 09/08/2021     Lab Results   Component Value Date    GLUCOSE 170 (H) 07/25/2022    BUN 34 (H) 07/25/2022    CREATININE 2.27 (H) 07/25/2022    EGFRIFNONA 33 (L) 09/08/2021    EGFRIFAFRI 38 (L) 09/08/2021    BCR 15.0 07/25/2022    K 5.3 (H) 07/25/2022    CO2 29.1 (H) 07/25/2022    CALCIUM 9.3 07/25/2022    PROTENTOTREF 6.9 07/25/2022    " ALBUMIN 4.20 07/25/2022    LABIL2 1.6 07/25/2022    AST 17 07/25/2022    ALT 21 07/25/2022     Lab Results   Component Value Date    WBC 5.14 07/25/2022    HGB 9.7 (L) 07/25/2022    HCT 30.8 (L) 07/25/2022    MCV 87.0 07/25/2022     07/25/2022      Previously on Glipizide - stopped      Metformin 1000 mg po bid- stopped,  side effects     Trulicity 0.75 mg weekly---stopped-  side effects     Jardiance , side effects         Plan     Tresiba 20 , keep     Humalog , now he is learning to guesstimate , taking between 2 to 20   He just didn't know that he could take novolog with tresiba close together        Will get dexcom          Lipid Management       Lab Results   Component Value Date    TRIG 85 07/25/2022    TRIG 97 09/08/2021    TRIG 105 07/22/2021     Lab Results   Component Value Date    HDL 43 07/25/2022    HDL 39 (L) 09/08/2021    HDL 37 (L) 07/22/2021     No components found for: LDLCALC  Lab Results   Component Value Date     07/25/2022    LDL 96 09/08/2021    LDL 95 07/22/2021     No results found for: LDLDIRECT    On lipitor 20 mg qhs - changed to 40 mg qhs          2. Hypertension     Blood Pressure Management:    Vitals:    08/23/22 1103   BP: 104/60   Pulse: 106   SpO2: 96%     Per Allan       -----------    Last Microalbumin-Proteinuria Assessment    Lab Results   Component Value Date    MALBCRERATIO 24 09/08/2021    MALBCRERATIO 16.0 06/26/2019     ckd stage 3b/A2  Has had hyperkalemia , has used binding resins   April 2022 GFR 42 , UACR 32, K 4.8    On ace, can't tolerate sglt2 inhibitor     Start finerenone   -----------      Neuropathy  No issues with feet at this time     Immunizations:          Preventive Care:        Weight Related:   Wt Readings from Last 3 Encounters:   08/23/22 84.8 kg (187 lb)   07/25/22 84.8 kg (187 lb)   06/06/22 84.6 kg (186 lb 9.6 oz)     Body mass index is 28.43 kg/m².        Diet interventions: moderate (500 kCal/d) deficit diet.      Bone  Health        Lab Results   Component Value Date    CALCIUM 9.3 07/25/2022    XWJA10PQ 47.9 09/08/2021       Thyroid Health    Lab Results   Component Value Date    TSH 2.900 07/25/2022    TSH 2.170 09/08/2021    TSH 1.970 07/22/2021       Lab Results   Component Value Date    FREET4 1.28 07/25/2022    FREET4 1.22 07/22/2021    FREET4 1.42 07/14/2020         Other Diabetes Related Aspects     DM eye exam: up to date 12/2020    Please complete ordered labs before next appt.            This document has been electronically signed by Deric Abraham MD on September 16, 2022 21:43 CDT

## 2022-08-23 NOTE — PROGRESS NOTES
Order for Dexcom is sent to [facility]. Confirmation received and sent to  for scanning.

## 2022-09-08 ENCOUNTER — DOCUMENTATION (OUTPATIENT)
Dept: ENDOCRINOLOGY | Facility: CLINIC | Age: 72
End: 2022-09-08

## 2022-09-13 ENCOUNTER — TELEPHONE (OUTPATIENT)
Dept: ENDOCRINOLOGY | Facility: CLINIC | Age: 72
End: 2022-09-13

## 2022-09-13 NOTE — TELEPHONE ENCOUNTER
Pt called and stated that on his last visit, Dr Haywood put him on the dexcom and said his insurance has approved it, but he has gotten 2 calls from [employer] stating they won't schedule his pump training yet because they are still waiting on something from Dr Haywood.    Please advise.    Thanks

## 2022-09-14 NOTE — TELEPHONE ENCOUNTER
PWO FOR CGM FAXED TO [facility] @ [phone] on 9/8/22. Once [facility] receives and processes this information, they will send this to him.

## 2022-09-15 ENCOUNTER — TELEPHONE (OUTPATIENT)
Dept: ENDOCRINOLOGY | Facility: CLINIC | Age: 72
End: 2022-09-15

## 2022-09-15 NOTE — TELEPHONE ENCOUNTER
[facility] CALLED STATING THEY DIDN'T RECEIVE THE CHART NOTES BUT THEY RECEIVED THE ORDER.     FAX# [phone]     PLEASE ADVISE

## 2022-09-15 NOTE — TELEPHONE ENCOUNTER
The last chart note is not signed, I have asked Dr Haywood to sign note. Once he does, I will print it and fax to [facility]

## 2022-09-19 ENCOUNTER — DOCUMENTATION (OUTPATIENT)
Dept: ENDOCRINOLOGY | Facility: CLINIC | Age: 72
End: 2022-09-19

## 2022-09-19 RX ORDER — LISINOPRIL 10 MG/1
10 TABLET ORAL NIGHTLY
Qty: 90 TABLET | Refills: 2 | Status: SHIPPED | OUTPATIENT
Start: 2022-09-19

## 2022-09-19 RX ORDER — FLUTICASONE PROPIONATE 50 MCG
SPRAY, SUSPENSION (ML) NASAL
Qty: 48 G | Refills: 5 | Status: SHIPPED | OUTPATIENT
Start: 2022-09-19

## 2022-09-19 RX ORDER — TAMSULOSIN HYDROCHLORIDE 0.4 MG/1
CAPSULE ORAL
Qty: 180 CAPSULE | Refills: 2 | Status: SHIPPED | OUTPATIENT
Start: 2022-09-19

## 2022-09-19 NOTE — PROGRESS NOTES
SIGNED CHART NOTES FAXED TO [facility] @ [phone]

## 2022-09-19 NOTE — TELEPHONE ENCOUNTER
Rx Refill Note  Requested Prescriptions     Pending Prescriptions Disp Refills   • fluticasone (FLONASE) 50 MCG/ACT nasal spray [Pharmacy Med Name: FLUTICASONE PROPIONATE 50 MCG/ACT Suspension] 48 g 5     Sig: USE 2 SPRAYS IN EACH NOSTRIL EVERY DAY   • tamsulosin (FLOMAX) 0.4 MG capsule 24 hr capsule [Pharmacy Med Name: TAMSULOSIN HYDROCHLORIDE 0.4 MG Capsule] 180 capsule 1     Sig: TAKE 2 CAPSULES EVERY EVENING   • lisinopril (PRINIVIL,ZESTRIL) 10 MG tablet [Pharmacy Med Name: LISINOPRIL 10 MG Tablet] 90 tablet 1     Sig: TAKE 1 TABLET EVERY MORNING      Last office visit with prescribing clinician: 7/25/22  Next office visit with prescribing clinician: Visit date not found            Roxanne Yepez MA  09/19/22, 07:34 CDT

## 2022-09-20 ENCOUNTER — FLU SHOT (OUTPATIENT)
Dept: FAMILY MEDICINE CLINIC | Facility: CLINIC | Age: 72
End: 2022-09-20

## 2022-09-20 DIAGNOSIS — Z23 NEED FOR INFLUENZA VACCINATION: Primary | ICD-10-CM

## 2022-09-20 PROCEDURE — 90686 IIV4 VACC NO PRSV 0.5 ML IM: CPT | Performed by: FAMILY MEDICINE

## 2022-09-20 PROCEDURE — G0008 ADMIN INFLUENZA VIRUS VAC: HCPCS | Performed by: FAMILY MEDICINE

## 2022-09-21 ENCOUNTER — DOCUMENTATION (OUTPATIENT)
Dept: ENDOCRINOLOGY | Facility: CLINIC | Age: 72
End: 2022-09-21

## 2022-09-21 ENCOUNTER — TELEPHONE (OUTPATIENT)
Dept: FAMILY MEDICINE CLINIC | Facility: CLINIC | Age: 72
End: 2022-09-21

## 2022-09-21 NOTE — TELEPHONE ENCOUNTER
Pt calling--COVID booster is scheduled for next Thursday--states last time he had booster, his blood pressure dropped the next day--inquiring if you think he needs to hold the medicine for next Friday?

## 2022-09-29 ENCOUNTER — IMMUNIZATION (OUTPATIENT)
Dept: FAMILY MEDICINE CLINIC | Facility: CLINIC | Age: 72
End: 2022-09-29

## 2022-09-29 DIAGNOSIS — Z23 NEED FOR VACCINATION: Primary | ICD-10-CM

## 2022-09-29 PROCEDURE — 0124A COVID-19 (PFIZER) BIVALENT BOOSTER 12+YRS: CPT | Performed by: NURSE PRACTITIONER

## 2022-09-29 PROCEDURE — 91312 COVID-19 (PFIZER) BIVALENT BOOSTER 12+YRS: CPT | Performed by: NURSE PRACTITIONER

## 2022-11-01 ENCOUNTER — OFFICE VISIT (OUTPATIENT)
Dept: CARDIOLOGY CLINIC | Age: 72
End: 2022-11-01
Payer: MEDICARE

## 2022-11-01 VITALS
DIASTOLIC BLOOD PRESSURE: 64 MMHG | HEART RATE: 84 BPM | BODY MASS INDEX: 28.29 KG/M2 | WEIGHT: 191 LBS | SYSTOLIC BLOOD PRESSURE: 112 MMHG | HEIGHT: 69 IN

## 2022-11-01 DIAGNOSIS — R94.31 ABNORMAL ELECTROCARDIOGRAM (ECG) (EKG): Primary | ICD-10-CM

## 2022-11-01 PROCEDURE — 3074F SYST BP LT 130 MM HG: CPT | Performed by: INTERNAL MEDICINE

## 2022-11-01 PROCEDURE — 99214 OFFICE O/P EST MOD 30 MIN: CPT | Performed by: INTERNAL MEDICINE

## 2022-11-01 PROCEDURE — G8417 CALC BMI ABV UP PARAM F/U: HCPCS | Performed by: INTERNAL MEDICINE

## 2022-11-01 PROCEDURE — G8427 DOCREV CUR MEDS BY ELIG CLIN: HCPCS | Performed by: INTERNAL MEDICINE

## 2022-11-01 PROCEDURE — 1123F ACP DISCUSS/DSCN MKR DOCD: CPT | Performed by: INTERNAL MEDICINE

## 2022-11-01 PROCEDURE — 3078F DIAST BP <80 MM HG: CPT | Performed by: INTERNAL MEDICINE

## 2022-11-01 PROCEDURE — 3017F COLORECTAL CA SCREEN DOC REV: CPT | Performed by: INTERNAL MEDICINE

## 2022-11-01 PROCEDURE — 93000 ELECTROCARDIOGRAM COMPLETE: CPT | Performed by: INTERNAL MEDICINE

## 2022-11-01 PROCEDURE — 1036F TOBACCO NON-USER: CPT | Performed by: INTERNAL MEDICINE

## 2022-11-01 PROCEDURE — G8484 FLU IMMUNIZE NO ADMIN: HCPCS | Performed by: INTERNAL MEDICINE

## 2022-11-01 RX ORDER — BLOOD-GLUCOSE SENSOR
EACH MISCELLANEOUS CONTINUOUS
COMMUNITY

## 2022-11-01 RX ORDER — AMITRIPTYLINE HYDROCHLORIDE 10 MG/1
10 TABLET, FILM COATED ORAL NIGHTLY
COMMUNITY

## 2022-11-01 RX ORDER — CLONIDINE HYDROCHLORIDE 0.1 MG/1
0.1 TABLET ORAL 2 TIMES DAILY
COMMUNITY

## 2022-11-01 RX ORDER — FUROSEMIDE 20 MG/1
20 TABLET ORAL DAILY
COMMUNITY

## 2022-11-01 NOTE — PROGRESS NOTES
HISTORY  70-year-old gentleman with a history of dyslipidemia, hypertension, past tobacco abuse, diabetes, and noncomplex ventricular ectopy returns for routine follow-up visit. Last assessments and February 2018 with an echo and a stress echo revealing no abnormalities. His blood pressures been controlled as have his lipids with July values , HDL 43, and triglycerides 85. On return today he denies change in exercise tolerance, shortness of breath, palpitations [never has felt], and chest pain. He gets regular exercise without difficulty. He has been vaccinated and boosted x3 for COVID-19. PHYSICAL EXAM  On exam he carries 191 pounds in a 5 foot 9 inch frame. Pressure is 112/64 pulse 84. EOMs full, sclerae and conjunctiva normal. PERRLA. Mask in place. Trachea midline with no neck masses. Assessment of internal jugular veins reveals no elevation of central venous pressure at 45 degrees. Carotid pulses normal without delay or bruit. Thyroid normal to palpation. Chest exam reveals normal respiratory effort, no abnormal breath sounds and normal expiratory phase. No skin lesions seen. PMI normal. S1, S2 normal with 1/6 systolic murmur heard at the base and along the left sternal border. Mild abdominal obesity. Normal bowel sounds without palpable mass or bruit. No clubbing or acrocyanosis. No significant lower extremity edema or signs of venous insufficiency. General motor strength appears to be within normal limits. Normal range of motion with normal gait. Alert, oriented x 3, memory and cognition normal as reflected by history and conversation. EKG reveals sinus rhythm with a right bundle branch block and left anterior hemiblock. ASSESSMENT/PLAN:   Dyslipidemia -acceptable values. Continue atorvastatin. Hypertension -well-controlled.   Continue clonidine, Lasix, and lisinopril  Ventricular ectopy -no symptoms, normal ventricular function, and no previously demonstrated coronary disease.   Pandemic response -appropriate/vaccinated/boosted x3

## 2022-11-25 DIAGNOSIS — I10 PRIMARY HYPERTENSION: ICD-10-CM

## 2022-11-28 ENCOUNTER — TELEPHONE (OUTPATIENT)
Dept: ENDOCRINOLOGY | Facility: CLINIC | Age: 72
End: 2022-11-28

## 2022-11-28 RX ORDER — CLONIDINE HYDROCHLORIDE 0.1 MG/1
TABLET ORAL
Qty: 180 TABLET | Refills: 2 | Status: SHIPPED | OUTPATIENT
Start: 2022-11-28 | End: 2023-02-24 | Stop reason: SDUPTHER

## 2022-11-28 NOTE — TELEPHONE ENCOUNTER
Called pt to return his voicemail. No answer. Left pt a voicemail advising that he does have active labs in the system to be completed before his next follow up.

## 2022-11-28 NOTE — TELEPHONE ENCOUNTER
Rx Refill Note  Requested Prescriptions     Pending Prescriptions Disp Refills   • cloNIDine (CATAPRES) 0.1 MG tablet [Pharmacy Med Name: CLONIDINE HYDROCHLORIDE 0.1 MG Tablet] 180 tablet 1     Sig: TAKE 1 TABLET TWICE DAILY      Last office visit with prescribing clinician: 7/25/2022      Next office visit with prescribing clinician: 1/27/2023       {TIP  Please add Last Relevant Lab 7/25/22    Roxanne Yepez MA  11/28/22, 08:05 CST

## 2022-12-07 ENCOUNTER — OFFICE VISIT (OUTPATIENT)
Dept: ENDOCRINOLOGY | Facility: CLINIC | Age: 72
End: 2022-12-07

## 2022-12-07 VITALS
DIASTOLIC BLOOD PRESSURE: 50 MMHG | BODY MASS INDEX: 28.49 KG/M2 | HEIGHT: 68 IN | HEART RATE: 88 BPM | WEIGHT: 188 LBS | SYSTOLIC BLOOD PRESSURE: 108 MMHG | OXYGEN SATURATION: 99 %

## 2022-12-07 DIAGNOSIS — E11.69 MIXED DIABETIC HYPERLIPIDEMIA ASSOCIATED WITH TYPE 2 DIABETES MELLITUS: ICD-10-CM

## 2022-12-07 DIAGNOSIS — Z79.4 TYPE 2 DIABETES MELLITUS WITH HYPERGLYCEMIA, WITH LONG-TERM CURRENT USE OF INSULIN: Primary | ICD-10-CM

## 2022-12-07 DIAGNOSIS — E55.9 VITAMIN D DEFICIENCY: ICD-10-CM

## 2022-12-07 DIAGNOSIS — D50.9 IRON DEFICIENCY ANEMIA, UNSPECIFIED IRON DEFICIENCY ANEMIA TYPE: ICD-10-CM

## 2022-12-07 DIAGNOSIS — E78.2 MIXED DIABETIC HYPERLIPIDEMIA ASSOCIATED WITH TYPE 2 DIABETES MELLITUS: ICD-10-CM

## 2022-12-07 DIAGNOSIS — L84 PRE-ULCERATIVE CALLUSES: ICD-10-CM

## 2022-12-07 DIAGNOSIS — N18.32 STAGE 3B CHRONIC KIDNEY DISEASE: ICD-10-CM

## 2022-12-07 DIAGNOSIS — E11.59 HYPERTENSION ASSOCIATED WITH DIABETES: ICD-10-CM

## 2022-12-07 DIAGNOSIS — Z86.010 HISTORY OF COLON POLYPS: ICD-10-CM

## 2022-12-07 DIAGNOSIS — I15.2 HYPERTENSION ASSOCIATED WITH DIABETES: ICD-10-CM

## 2022-12-07 DIAGNOSIS — E11.65 TYPE 2 DIABETES MELLITUS WITH HYPERGLYCEMIA, WITH LONG-TERM CURRENT USE OF INSULIN: Primary | ICD-10-CM

## 2022-12-07 LAB — HBA1C MFR BLD: 8.1 %

## 2022-12-07 PROCEDURE — 99214 OFFICE O/P EST MOD 30 MIN: CPT | Performed by: INTERNAL MEDICINE

## 2022-12-07 PROCEDURE — 95251 CONT GLUC MNTR ANALYSIS I&R: CPT | Performed by: INTERNAL MEDICINE

## 2022-12-07 NOTE — PROGRESS NOTES
" Bernard Matta is a 72 y.o. male who presents for follow up T2DM                                          HPI     T2DM complicated by CKD stage 3b / A2    Has better understanding but still running hyperglycemic    PE    /50   Pulse 88   Ht 172.7 cm (68\")   Wt 85.3 kg (188 lb)   SpO2 99%   BMI 28.59 kg/m²   AOx3  No visible goiter  Normal Respiratory Effort , Lung CTA  RRR  No Edema    Foot exam, Dec 7, 2022  Normal sensation to monofilament  Good hygiene , nails not thickenned  Good pulses, good color   Preulcerative callus bilaterally!   No deformity     Labs    Lab Results   Component Value Date    WBC 5.14 07/25/2022    HGB 9.7 (L) 07/25/2022    HCT 30.8 (L) 07/25/2022    MCV 87.0 07/25/2022     07/25/2022     Lab Results   Component Value Date    GLUCOSE 170 (H) 07/25/2022    BUN 34 (H) 07/25/2022    CREATININE 2.27 (H) 07/25/2022    EGFRIFNONA 33 (L) 09/08/2021    EGFRIFAFRI 38 (L) 09/08/2021    BCR 15.0 07/25/2022    K 5.3 (H) 07/25/2022    CO2 29.1 (H) 07/25/2022    CALCIUM 9.3 07/25/2022    PROTENTOTREF 6.9 07/25/2022    ALBUMIN 4.20 07/25/2022    LABIL2 1.6 07/25/2022    AST 17 07/25/2022    ALT 21 07/25/2022         Assessment & Plan       ICD-10-CM ICD-9-CM   1. Type 2 diabetes mellitus with hyperglycemia, with long-term current use of insulin (HCC)  E11.65 250.00    Z79.4 790.29     V58.67   2. Mixed diabetic hyperlipidemia associated with type 2 diabetes mellitus (HCC)  E11.69 250.80    E78.2 272.2   3. Hypertension associated with diabetes (HCC)  E11.59 250.80    I15.2 401.9   4. Vitamin D deficiency  E55.9 268.9   5. Stage 3b chronic kidney disease (HCC)  N18.32 585.3   6. Pre-ulcerative calluses  L84 700   7. Iron deficiency anemia, unspecified iron deficiency anemia type  D50.9 280.9   8. History of colon polyps  Z86.010 V12.72          Most recent labs reviewed    1. Type 2 diabetes     Glycemic Management:   Lab Results   Component Value Date    HGBA1C 8.1 12/07/2022    HGBA1C " 8.9 08/23/2022    HGBA1C 9.20 (H) 07/25/2022     Lab Results   Component Value Date    GLUCOSE 170 (H) 07/25/2022    BUN 34 (H) 07/25/2022    CREATININE 2.27 (H) 07/25/2022    EGFRIFNONA 33 (L) 09/08/2021    EGFRIFAFRI 38 (L) 09/08/2021    BCR 15.0 07/25/2022    K 5.3 (H) 07/25/2022    CO2 29.1 (H) 07/25/2022    CALCIUM 9.3 07/25/2022    PROTENTOTREF 6.9 07/25/2022    ALBUMIN 4.20 07/25/2022    LABIL2 1.6 07/25/2022    AST 17 07/25/2022    ALT 21 07/25/2022     Lab Results   Component Value Date    WBC 5.14 07/25/2022    HGB 9.7 (L) 07/25/2022    HCT 30.8 (L) 07/25/2022    MCV 87.0 07/25/2022     07/25/2022       DEC 7, 2022, HGB OF 7.9     Previously on Glipizide - stopped      Metformin 1000 mg po bid- stopped,  side effects     Trulicity 0.75 mg weekly---stopped-  side effects     Jardiance , side effects         Plan     Tresiba 20 , -15 - 12    Humalog , now he is learning to guesstimate , taking between 2 to 20 , mainly 8 to 12 --  Needs 10 to 14   He just didn't know that he could take novolog with tresiba close together      Dexcom.     Glucose TIR 45%  No lows documented but clear trend to low overnight        Lipid Management       Lab Results   Component Value Date    TRIG 85 07/25/2022    TRIG 97 09/08/2021    TRIG 105 07/22/2021     Lab Results   Component Value Date    HDL 43 07/25/2022    HDL 39 (L) 09/08/2021    HDL 37 (L) 07/22/2021     No components found for: LDLCALC  Lab Results   Component Value Date     07/25/2022    LDL 96 09/08/2021    LDL 95 07/22/2021     No results found for: LDLDIRECT    On lipitor 20 mg qhs - changed to 40 mg qhs          2. Hypertension     Blood Pressure Management:    Vitals:    12/07/22 1051   BP: 108/50   Pulse: 88   SpO2: 99%     Per Allan       -----------    Last Microalbumin-Proteinuria Assessment    Lab Results   Component Value Date    TRIXIE 24 09/08/2021    TRIXIE 16.0 06/26/2019     ckd stage 3b/A2  Has had hyperkalemia , has used  binding resins   April 2022 GFR 42 , UACR 32, K 4.8    On ace, can't tolerate sglt2 inhibitor    -----------      Neuropathy  No issues with feet at this time     Eye exam , 2022, high pressure but no retinopathy    Immunizations:          Preventive Care:        Weight Related:   Wt Readings from Last 3 Encounters:   12/07/22 85.3 kg (188 lb)   08/23/22 84.8 kg (187 lb)   07/25/22 84.8 kg (187 lb)     Body mass index is 28.59 kg/m².        Diet interventions: moderate (500 kCal/d) deficit diet.      Bone Health        Lab Results   Component Value Date    CALCIUM 9.3 07/25/2022    AJHB17AY 47.9 09/08/2021       Thyroid Health    Lab Results   Component Value Date    TSH 2.900 07/25/2022    TSH 2.170 09/08/2021    TSH 1.970 07/22/2021       Lab Results   Component Value Date    FREET4 1.28 07/25/2022    FREET4 1.22 07/22/2021    FREET4 1.42 07/14/2020        Iron Def anemia  HGB 7.9     Refer back to GI , Dr Mckenzie , h o polypectomy 2021   Hasn't noticed any stool color changes    Refer back to Allan , need epo or similar.     Start ferrous sulfate 325 mg every other day     MY NOTE TO MY COORDINATOR    He sees Dr. Mckenzie , MARSHAL Shelby , and Dr. Lemus   Please make them aware that labs from today reveal Hgb of 7.9 so we are referring back to both     Gi to consider scoping and Allan to consider epo or similar.    I HAVE SENT MY NOTE TO BOTH           This document has been electronically signed by Deric Abraham MD on December 7, 2022 11:21 CST

## 2022-12-30 ENCOUNTER — DOCUMENTATION (OUTPATIENT)
Dept: ENDOCRINOLOGY | Facility: CLINIC | Age: 72
End: 2022-12-30

## 2023-01-03 ENCOUNTER — OFFICE VISIT (OUTPATIENT)
Dept: FAMILY MEDICINE CLINIC | Facility: CLINIC | Age: 73
End: 2023-01-03
Payer: MEDICARE

## 2023-01-03 VITALS
SYSTOLIC BLOOD PRESSURE: 141 MMHG | HEIGHT: 68 IN | TEMPERATURE: 97.1 F | OXYGEN SATURATION: 98 % | HEART RATE: 106 BPM | BODY MASS INDEX: 28.64 KG/M2 | WEIGHT: 189 LBS | DIASTOLIC BLOOD PRESSURE: 85 MMHG

## 2023-01-03 DIAGNOSIS — D64.9 ANEMIA, UNSPECIFIED TYPE: Primary | ICD-10-CM

## 2023-01-03 DIAGNOSIS — R06.02 SHORTNESS OF BREATH: ICD-10-CM

## 2023-01-03 LAB
EXPIRATION DATE: ABNORMAL
HGB BLDA-MCNC: 7.9 G/DL (ref 12–17)
Lab: ABNORMAL

## 2023-01-03 PROCEDURE — 85018 HEMOGLOBIN: CPT | Performed by: FAMILY MEDICINE

## 2023-01-03 PROCEDURE — 3079F DIAST BP 80-89 MM HG: CPT | Performed by: FAMILY MEDICINE

## 2023-01-03 PROCEDURE — 1159F MED LIST DOCD IN RCRD: CPT | Performed by: FAMILY MEDICINE

## 2023-01-03 PROCEDURE — 1160F RVW MEDS BY RX/DR IN RCRD: CPT | Performed by: FAMILY MEDICINE

## 2023-01-03 PROCEDURE — 99213 OFFICE O/P EST LOW 20 MIN: CPT | Performed by: FAMILY MEDICINE

## 2023-01-03 PROCEDURE — 3077F SYST BP >= 140 MM HG: CPT | Performed by: FAMILY MEDICINE

## 2023-01-03 NOTE — PROGRESS NOTES
Subjective   Bernard Matta is a 72 y.o. male.     History of Present Illness  72-year-old male with chronic renal disease iron deficiency anemia and shortness of breath      The following portions of the patient's history were reviewed and updated as appropriate: allergies, current medications, past family history, past medical history, past social history, past surgical history and problem list.    Review of Systems   Respiratory: Positive for shortness of breath.    Cardiovascular: Negative for chest pain and leg swelling.   Genitourinary:        Chronic renal disease   Hematological:        Hemoglobin 7.9       Objective   Physical Exam  Vitals and nursing note reviewed.   Constitutional:       Appearance: Normal appearance.   Cardiovascular:      Rate and Rhythm: Normal rate and regular rhythm.   Pulmonary:      Effort: Pulmonary effort is normal.      Breath sounds: Normal breath sounds.   Musculoskeletal:      Right lower leg: No edema.      Left lower leg: No edema.   Neurological:      General: No focal deficit present.      Mental Status: He is alert and oriented to person, place, and time.   Psychiatric:         Mood and Affect: Mood normal.         Behavior: Behavior normal.         Thought Content: Thought content normal.         Judgment: Judgment normal.         Assessment & Plan   Diagnoses and all orders for this visit:    1. Anemia, unspecified type (Primary)  -     POCT hemoglobin    2. Shortness of breath       Plan above-hemoglobin 7.9-I believe is an intravenous iron candidate-we will talk to Dr. Lemus

## 2023-01-04 DIAGNOSIS — D64.9 ANEMIA, UNSPECIFIED TYPE: Primary | ICD-10-CM

## 2023-01-20 DIAGNOSIS — D64.9 ANEMIA, UNSPECIFIED TYPE: Primary | ICD-10-CM

## 2023-01-25 ENCOUNTER — LAB (OUTPATIENT)
Dept: LAB | Facility: HOSPITAL | Age: 73
End: 2023-01-25
Payer: MEDICARE

## 2023-01-25 ENCOUNTER — CONSULT (OUTPATIENT)
Dept: ONCOLOGY | Facility: CLINIC | Age: 73
End: 2023-01-25
Payer: MEDICARE

## 2023-01-25 VITALS
HEART RATE: 83 BPM | OXYGEN SATURATION: 99 % | DIASTOLIC BLOOD PRESSURE: 80 MMHG | TEMPERATURE: 97.2 F | RESPIRATION RATE: 16 BRPM | WEIGHT: 188.9 LBS | BODY MASS INDEX: 28.63 KG/M2 | SYSTOLIC BLOOD PRESSURE: 150 MMHG | HEIGHT: 68 IN

## 2023-01-25 DIAGNOSIS — E11.22 TYPE 2 DIABETES MELLITUS WITH STAGE 3B CHRONIC KIDNEY DISEASE, WITH LONG-TERM CURRENT USE OF INSULIN: ICD-10-CM

## 2023-01-25 DIAGNOSIS — N18.32 TYPE 2 DIABETES MELLITUS WITH STAGE 3B CHRONIC KIDNEY DISEASE, WITH LONG-TERM CURRENT USE OF INSULIN: ICD-10-CM

## 2023-01-25 DIAGNOSIS — Z79.4 TYPE 2 DIABETES MELLITUS WITH STAGE 3B CHRONIC KIDNEY DISEASE, WITH LONG-TERM CURRENT USE OF INSULIN: ICD-10-CM

## 2023-01-25 DIAGNOSIS — N18.32 STAGE 3B CHRONIC KIDNEY DISEASE: ICD-10-CM

## 2023-01-25 DIAGNOSIS — D64.9 ANEMIA, UNSPECIFIED TYPE: Primary | ICD-10-CM

## 2023-01-25 DIAGNOSIS — D50.9 IRON DEFICIENCY ANEMIA, UNSPECIFIED IRON DEFICIENCY ANEMIA TYPE: Primary | ICD-10-CM

## 2023-01-25 PROBLEM — K90.9 INTESTINAL MALABSORPTION, UNSPECIFIED: Status: ACTIVE | Noted: 2023-01-25

## 2023-01-25 LAB
ALBUMIN SERPL-MCNC: 4 G/DL (ref 3.5–5.2)
ALBUMIN/GLOB SERPL: 1.3 G/DL
ALP SERPL-CCNC: 91 U/L (ref 39–117)
ALT SERPL W P-5'-P-CCNC: 21 U/L (ref 1–41)
ANION GAP SERPL CALCULATED.3IONS-SCNC: 8 MMOL/L (ref 5–15)
AST SERPL-CCNC: 14 U/L (ref 1–40)
B2 MICROGLOB SERPL-MCNC: 4.1 MG/L (ref 0.8–2.2)
BASOPHILS # BLD AUTO: 0.03 10*3/MM3 (ref 0–0.2)
BASOPHILS NFR BLD AUTO: 0.5 % (ref 0–1.5)
BILIRUB SERPL-MCNC: 0.3 MG/DL (ref 0–1.2)
BUN SERPL-MCNC: 28 MG/DL (ref 8–23)
BUN/CREAT SERPL: 16.8 (ref 7–25)
CALCIUM SPEC-SCNC: 9 MG/DL (ref 8.6–10.5)
CHLORIDE SERPL-SCNC: 103 MMOL/L (ref 98–107)
CO2 SERPL-SCNC: 28 MMOL/L (ref 22–29)
CREAT SERPL-MCNC: 1.67 MG/DL (ref 0.76–1.27)
DEPRECATED RDW RBC AUTO: 57.8 FL (ref 37–54)
EGFRCR SERPLBLD CKD-EPI 2021: 42.7 ML/MIN/1.73
EOSINOPHIL # BLD AUTO: 0.45 10*3/MM3 (ref 0–0.4)
EOSINOPHIL NFR BLD AUTO: 7 % (ref 0.3–6.2)
ERYTHROCYTE [DISTWIDTH] IN BLOOD BY AUTOMATED COUNT: 17.9 % (ref 12.3–15.4)
FERRITIN SERPL-MCNC: 16.82 NG/ML (ref 30–400)
FOLATE SERPL-MCNC: >20 NG/ML (ref 4.78–24.2)
GLOBULIN UR ELPH-MCNC: 3.1 GM/DL
GLUCOSE SERPL-MCNC: 184 MG/DL (ref 65–99)
HCT VFR BLD AUTO: 31.1 % (ref 37.5–51)
HGB BLD-MCNC: 9.2 G/DL (ref 13–17.7)
HOLD SPECIMEN: NORMAL
IMM GRANULOCYTES # BLD AUTO: 0.02 10*3/MM3 (ref 0–0.05)
IMM GRANULOCYTES NFR BLD AUTO: 0.3 % (ref 0–0.5)
IRON 24H UR-MRATE: 122 MCG/DL (ref 59–158)
IRON SATN MFR SERPL: 36 % (ref 20–50)
LDH SERPL-CCNC: 183 U/L (ref 135–225)
LYMPHOCYTES # BLD AUTO: 0.94 10*3/MM3 (ref 0.7–3.1)
LYMPHOCYTES NFR BLD AUTO: 14.6 % (ref 19.6–45.3)
MCH RBC QN AUTO: 26.1 PG (ref 26.6–33)
MCHC RBC AUTO-ENTMCNC: 29.6 G/DL (ref 31.5–35.7)
MCV RBC AUTO: 88.1 FL (ref 79–97)
MONOCYTES # BLD AUTO: 0.42 10*3/MM3 (ref 0.1–0.9)
MONOCYTES NFR BLD AUTO: 6.5 % (ref 5–12)
NEUTROPHILS NFR BLD AUTO: 4.59 10*3/MM3 (ref 1.7–7)
NEUTROPHILS NFR BLD AUTO: 71.1 % (ref 42.7–76)
NRBC BLD AUTO-RTO: 0 /100 WBC (ref 0–0.2)
PLATELET # BLD AUTO: 180 10*3/MM3 (ref 140–450)
PMV BLD AUTO: 9.1 FL (ref 6–12)
POTASSIUM SERPL-SCNC: 4.9 MMOL/L (ref 3.5–5.2)
PROT SERPL-MCNC: 7.1 G/DL (ref 6–8.5)
RBC # BLD AUTO: 3.53 10*6/MM3 (ref 4.14–5.8)
RETICS # AUTO: 0.04 10*6/MM3 (ref 0.02–0.13)
RETICS/RBC NFR AUTO: 1.15 % (ref 0.7–1.9)
SODIUM SERPL-SCNC: 139 MMOL/L (ref 136–145)
TIBC SERPL-MCNC: 337 MCG/DL (ref 298–536)
TRANSFERRIN SERPL-MCNC: 226 MG/DL (ref 200–360)
VIT B12 BLD-MCNC: 610 PG/ML (ref 211–946)
WBC NRBC COR # BLD: 6.45 10*3/MM3 (ref 3.4–10.8)
WHOLE BLOOD HOLD SPECIMEN: NORMAL

## 2023-01-25 PROCEDURE — 83615 LACTATE (LD) (LDH) ENZYME: CPT

## 2023-01-25 PROCEDURE — 80053 COMPREHEN METABOLIC PANEL: CPT

## 2023-01-25 PROCEDURE — 84466 ASSAY OF TRANSFERRIN: CPT

## 2023-01-25 PROCEDURE — 85025 COMPLETE CBC W/AUTO DIFF WBC: CPT

## 2023-01-25 PROCEDURE — 99215 OFFICE O/P EST HI 40 MIN: CPT | Performed by: NURSE PRACTITIONER

## 2023-01-25 PROCEDURE — 36415 COLL VENOUS BLD VENIPUNCTURE: CPT

## 2023-01-25 PROCEDURE — 83521 IG LIGHT CHAINS FREE EACH: CPT

## 2023-01-25 PROCEDURE — 82607 VITAMIN B-12: CPT

## 2023-01-25 PROCEDURE — 82746 ASSAY OF FOLIC ACID SERUM: CPT

## 2023-01-25 PROCEDURE — 86334 IMMUNOFIX E-PHORESIS SERUM: CPT

## 2023-01-25 PROCEDURE — 82784 ASSAY IGA/IGD/IGG/IGM EACH: CPT

## 2023-01-25 PROCEDURE — 84165 PROTEIN E-PHORESIS SERUM: CPT

## 2023-01-25 PROCEDURE — 82232 ASSAY OF BETA-2 PROTEIN: CPT

## 2023-01-25 PROCEDURE — 82728 ASSAY OF FERRITIN: CPT

## 2023-01-25 PROCEDURE — 82668 ASSAY OF ERYTHROPOIETIN: CPT

## 2023-01-25 PROCEDURE — 85045 AUTOMATED RETICULOCYTE COUNT: CPT

## 2023-01-25 PROCEDURE — 83540 ASSAY OF IRON: CPT

## 2023-01-25 RX ORDER — DIPHENHYDRAMINE HYDROCHLORIDE 50 MG/ML
50 INJECTION INTRAMUSCULAR; INTRAVENOUS AS NEEDED
Status: CANCELLED | OUTPATIENT
Start: 2023-02-01

## 2023-01-25 RX ORDER — FERROUS SULFATE 325(65) MG
325 TABLET ORAL
COMMUNITY
Start: 2022-12-29

## 2023-01-25 RX ORDER — SODIUM CHLORIDE 9 MG/ML
250 INJECTION, SOLUTION INTRAVENOUS ONCE
Status: CANCELLED | OUTPATIENT
Start: 2023-02-01

## 2023-01-25 RX ORDER — FAMOTIDINE 10 MG/ML
20 INJECTION, SOLUTION INTRAVENOUS AS NEEDED
Status: CANCELLED | OUTPATIENT
Start: 2023-02-01

## 2023-01-25 NOTE — PROGRESS NOTES
"[facility] [place]  [address]  [place] [address] [phone]    Patient Name: Bernard Matta  Encounter Date: 01/25/2023   YOB: 1950  Patient Number: [id number]    Initial Note    REASON FOR CONSULTATION: Patient states \" iron is low \".    HISTORY OF PRESENT ILLNESS: Bernard Matta is a 72 y.o. male referred by Brando Webb, * for diagnostic and management recommendations for anemia. History is obtained from patient and spouse Nury. History is considered to be accurate.    He has health history significant for DM on insulin, HTN, Hyperlipidemia, iron deficiency, CKD.     He is taking oral iron qod.   He complains of headache daily, SOB, fatigue      LABS    Lab Results - Last 18 Months   Lab Units 01/25/23  1019 01/03/23  1336 07/25/22  0825 09/08/21  0902   HEMOGLOBIN g/dL 9.2*  --  9.7* 11.5*   HEMOGLOBIN, POC g/dL  --  7.9*  --   --    HEMATOCRIT % 31.1*  --  30.8* 34.1*   MCV fL 88.1  --  87.0 89   WBC 10*3/mm3 6.45  --  5.14 5.3   RDW % 17.9*  --  13.3 12.5   MPV fL 9.1  --   --   --    PLATELETS 10*3/mm3 180  --  191 165   IMM GRAN % % 0.3  --   --   --    NEUTROS ABS 10*3/mm3 4.59  --  3.36 3.4   LYMPHS ABS 10*3/mm3 0.94  --  1.00 1.2   MONOS ABS 10*3/mm3 0.42  --  0.40 0.5   EOS ABS 10*3/mm3 0.45*  --  0.32 0.3   BASOS ABS 10*3/mm3 0.03  --  0.05 0.1   IMMATURE GRANS (ABS) 10*3/mm3 0.02  --   --   --    NRBC /100 WBC 0.0  --  0.0  --        Lab Results - Last 18 Months   Lab Units 01/25/23  1019 07/25/22  0825 09/08/21  0902   GLUCOSE mg/dL 184* 170* 118*   SODIUM mmol/L 139 137 139   POTASSIUM mmol/L 4.9 5.3* 4.9   TOTAL CO2 mmol/L  --  29.1* 29   CO2 mmol/L 28.0  --   --    CHLORIDE mmol/L 103 100 101   ANION GAP mmol/L 8.0  --   --    CREATININE mg/dL 1.67* 2.27* 1.99*   BUN mg/dL 28* 34* 19   BUN / CREAT RATIO  16.8 15.0 10   CALCIUM mg/dL 9.0 9.3 9.5   EGFR IF NONAFRICN AM " mL/min/1.73  --   --  33*   ALK PHOS U/L 91 96 94   TOTAL PROTEIN g/dL 7.1  --   --    ALT (SGPT) U/L 21 21 26   AST (SGOT) U/L 14 17 16   BILIRUBIN mg/dL 0.3 0.4 0.5   ALBUMIN g/dL 4.0 4.20 4.4   GLOBULIN gm/dL 3.1  --   --        Lab Results - Last 18 Months   Lab Units 01/25/23  1019   LDH U/L 183       Lab Results - Last 18 Months   Lab Units 01/25/23  1019 07/25/22  0825 09/08/21  0902   IRON mcg/dL 122  --   --    TIBC mcg/dL 337  --   --    IRON SATURATION % 36  --   --    FERRITIN ng/mL 16.82*  --   --    TSH uIU/mL  --  2.900 2.170   FOLATE ng/mL  --  >20.00  --          PAST MEDICAL HISTORY:  ALLERGIES:  No Known Allergies  CURRENT MEDICATIONS:  Outpatient Encounter Medications as of 1/25/2023   Medication Sig Dispense Refill   • Accu-Chek FastClix Lancets misc Use 4 x daily ,   ICD10 code is E11.9 You dispensed the wrong this, patient wants acucheck fast click lancets 360 each 3   • Alcohol Swabs (B-D SINGLE USE SWABS REGULAR) pads USE FOUR TIMES DAILY 400 each 11   • amitriptyline (ELAVIL) 10 MG tablet TAKE 1 TABLET EVERY NIGHT AS NEEDED FOR SLEEP 90 tablet 3   • aspirin 81 MG tablet Take 81 mg by mouth Daily.     • atorvastatin (LIPITOR) 40 MG tablet TAKE 1 TABLET BY MOUTH EVERY NIGHT AT BEDTIME. 90 tablet 3   • Blood Glucose Monitoring Suppl w/Device kit accu chek albertina 1 each 1   • cetirizine (zyrTEC) 10 MG tablet Take 10 mg by mouth Daily.     • cholecalciferol (VITAMIN D3) 400 UNITS tablet Take 400 Units by mouth Daily.     • cloNIDine (CATAPRES) 0.1 MG tablet TAKE 1 TABLET TWICE DAILY 180 tablet 2   • Continuous Blood Gluc Sensor (Dexcom G6 Sensor) Every 10 (Ten) Days. 3 each 5   • Continuous Blood Gluc Transmit (Dexcom G6 Transmitter) misc 1 each Every 3 (Three) Months. 1 each 2   • dorzolamide-timolol (COSOPT) 22.3-6.8 MG/ML ophthalmic solution 1 drop 2 (Two) Times a Day.     • Droplet Pen Needles 31G X 5 MM misc USE FOUR TIMES DAILY AS DIRECTED 400 each 3   • ferrous sulfate 325 (65 FE) MG tablet  "325 mg. Takes 1 every other day .     • fluticasone (FLONASE) 50 MCG/ACT nasal spray USE 2 SPRAYS IN EACH NOSTRIL EVERY DAY 48 g 5   • furosemide (Lasix) 20 MG tablet Take 1 tablet by mouth Daily. 90 tablet 3   • Glucagon (Baqsimi One Pack) 3 MG/DOSE powder 1 each into the nostril(s) as directed by provider As Needed (Hypoglycemia). Apply intranasal if hypoglycemia 2 each 11   • glucose blood (Accu-Chek Marry Plus) test strip TEST BLOOD SUGAR FOUR TIMES DAILY 400 each 6   • insulin aspart (NovoLOG FlexPen) 100 UNIT/ML solution pen-injector sc pen USE UP TO 20 UNITS WITH MEALS. DISCARD PEN 28 DAYS AFTER OPENING 60 mL 3   • Insulin Pen Needle 31G X 5 MM misc Droplet Pen Needle 31 gauge x 3/16\"     • Lancet Devices (Lancing Device) misc acucheck fast click lancing device. Make certain you send the correct one. Talk to patient . States you sent the wrong one 1 each 11   • latanoprost (XALATAN) 0.005 % ophthalmic solution 1 drop Every Night.     • lisinopril (PRINIVIL,ZESTRIL) 10 MG tablet Take 1 tablet by mouth Every Night. 90 tablet 2   • Multiple Vitamins-Minerals (MULTIVITAMIN WITH MINERALS) tablet tablet Take 1 tablet by mouth Daily.     • tamsulosin (FLOMAX) 0.4 MG capsule 24 hr capsule TAKE 2 CAPSULES EVERY EVENING 180 capsule 2   • Tresiba FlexTouch 100 UNIT/ML solution pen-injector injection INJECT 20 UNITS UNDER THE SKIN INTO THE APPROPRIATE AREA AS DIRECTED EVERY NIGHT. 30 mL 2     No facility-administered encounter medications on file as of 1/25/2023.     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        Immunization History   Administered Date(s) Administered   • COVID-19 (MODERNA) 1st, 2nd, 3rd Dose Only 2021, 2021, 2021   • COVID-19 (PFIZER) BIVALENT BOOSTER 12+YRS 2022   • Covid-19 (Pfizer) Gray Cap 2022   • FLUAD TRI 65YR+ 2019   • Flu Vaccine Split Quad 2013   • FluLaval/Fluzone >6mos 2013, 2022   • Fluad Quad 65+ 2019, 2020   • Fluzone High Dose =>65 Years (Vaxcare ONLY) 2016, 10/02/2017, 10/08/2018   • Fluzone High-Dose 65+yrs 10/19/2021   • Pneumococcal Conjugate 13-Valent (PCV13) 2015   • Pneumococcal Polysaccharide (PPSV23) 2012, 2013   • Shingrix 2018, 2019   • Tdap 2013     Last Completed Mammogram     This patient has no relevant Health Maintenance data.            FAMILY HISTORY:  Family History   Problem Relation Age of Onset   • No Known Problems Maternal Grandmother    • No Known Problems Maternal Grandfather    • No Known Problems Paternal Grandmother    • No Known Problems Paternal Grandfather    • No Known Problems Mother    • No Known Problems Father    • Colon cancer Neg Hx    • Colon polyps Neg Hx    • Esophageal cancer Neg Hx      SOCIAL HISTORY:  Social History     Socioeconomic History   • Marital status:    Tobacco Use   • Smoking status: Former     Packs/day: 0.50     Years: 10.00     Pack years: 5.00     Types: Cigarettes     Quit date: 1979     Years since quittin.0   • Smokeless tobacco: Never   Vaping Use   • Vaping Use: Never used   Substance and Sexual Activity   • Alcohol use: No   • Drug use: No   • Sexual activity: Defer       REVIEW OF SYSTEMS:  Review of Systems   Constitutional: Positive for fatigue. Negative for activity change, appetite change, fever, unexpected weight gain and unexpected weight loss.   HENT: Negative for dental problem, facial swelling, swollen glands and trouble swallowing.    Eyes: Negative for double vision  "and discharge.        Wears glasses,   Blind in right eye   Respiratory: Positive for shortness of breath. Negative for cough and wheezing.    Cardiovascular: Negative for chest pain, palpitations and leg swelling.   Gastrointestinal: Negative for abdominal pain, blood in stool, nausea and vomiting.   Endocrine: Negative.    Genitourinary: Negative for dysuria, frequency and hematuria.        Nirmalitanbrenda has see Urology and is on Flomax      Musculoskeletal: Negative for arthralgias, back pain and myalgias.   Skin: Negative for rash, skin lesions and wound.   Allergic/Immunologic: Negative for immunocompromised state.   Neurological: Positive for headache. Negative for speech difficulty, light-headedness, memory problem and confusion.   Hematological: Negative for adenopathy.   Psychiatric/Behavioral: Negative for self-injury, suicidal ideas and depressed mood. The patient is not nervous/anxious.        /80   Pulse 83   Temp 97.2 °F (36.2 °C)   Resp 16   Ht 172.7 cm (68\")   Wt 85.7 kg (188 lb 14.4 oz)   SpO2 99%   BMI 28.72 kg/m²  Body surface area is 2 meters squared.    Pain Score    01/25/23 1103   PainSc:   6   PainLoc: Head         Physical Exam  Constitutional:       Appearance: Normal appearance.   HENT:      Head: Normocephalic and atraumatic.   Cardiovascular:      Rate and Rhythm: Normal rate and regular rhythm.   Pulmonary:      Effort: Pulmonary effort is normal.      Breath sounds: Normal breath sounds.   Abdominal:      General: Bowel sounds are normal.      Palpations: Abdomen is soft.   Musculoskeletal:      Right lower leg: No edema.      Left lower leg: No edema.   Skin:     General: Skin is warm and dry.   Neurological:      Mental Status: He is alert and oriented to person, place, and time.   Psychiatric:         Attention and Perception: Attention normal.         Mood and Affect: Mood normal.         Judgment: Judgment normal.         Bernard Matta reports a pain score of 6.  Given " his pain assessment as noted, treatment options were discussed and the following options were decided upon as a follow-up plan to address the patient's pain: continuation of current treatment plan for pain and tylenol.      ASSESSMENT / PLAN:    1. Iron deficiency anemia, unspecified iron deficiency anemia type    2. Stage 3b chronic kidney disease (HCC)    3. Type 2 diabetes mellitus with stage 3b chronic kidney disease, with long-term current use of insulin (HCC)       1.  Iron Deficiency   2. Stage IIIb Chronic Kidney Disease   BUN 28, Creatinine 1.67, GFR 42.7  Iron 122, Ferritin 16.82, Sat 36%, TIBC 337  Hgb 9.2,  Hct 31.1  -As patient has chronic kidney disease, Hgb < 10, and ferritin less than 100, will order Injectafer 750 mg IV x1  -After infusion, if parameters are met, including ferritin > 100, Hgb < 9 GFR < 60,   Can start LORENA therapy with Retacrit 40,000 units    3.  Type 2 Diabetes   -Non fasting glucose 184  -Patient has CGM  -Taking Tresiba  -Managed per PCP        PLAN:   1.   regarding the reason for the referral.   2.   regarding anemia and differential diagnosis.  Consider iron deficiency, anemia and chronic kidney disease   3.  Labs pending for SPEP, SCOTT, free light chains, beta-2 microglobulin, erythropoietin, vitamin B12, folate   4.  Continue current medications, follow up, treatment plans per PCP and any other provider.   5.  Return to office 4 weeks.  Patient will have preoperative labs for CBC, CMP, anemia profile with ferritin   6.  Care discussed with patient.  Understanding expressed.  Patient agreeable with plan.   7.  Further recommendations pending.        ACP discussion was held with the patient during this visit. Patient has an advance directive in EMR which is still valid.     Thank you for the referral.    I spent 47 minutes caring for Bernard on this date of service (32 minutes face to face). This time includes time spent by me in the following activities: preparing  for the visit, reviewing tests, performing a medically appropriate examination and/or evaluation, counseling and educating the patient/family/caregiver, ordering medications, tests, or procedures, documenting information in the medical record and care coordination.       Leesa Anne, APRN  01/25/2023

## 2023-01-25 NOTE — PATIENT INSTRUCTIONS
Your ferritin (iron storage) is low.  We will do one iron infusion to try to correct that.    It is important to have sufficient iron so that you can make blood.     When your iron is all normal and Ferritin is > 100, and IF your Hgb (blood count) is still less than 10g, we can start Retacrit injections.     Retacrit is the hormone erythropoetin.  This hormone is produced by the kidneys to tell your bone marrow to make blood.  Sometimes when kidneys aren't working as well as they should, you don't get that signal as you need.  The injection will give it to you.     If we start the injections, our goal is for Hgb of 11.     Please let me know if you have any questions.     Leesa

## 2023-01-26 LAB
ALBUMIN SERPL ELPH-MCNC: 3.7 G/DL (ref 2.9–4.4)
ALBUMIN/GLOB SERPL: 1.3 {RATIO} (ref 0.7–1.7)
ALPHA1 GLOB SERPL ELPH-MCNC: 0.2 G/DL (ref 0–0.4)
ALPHA2 GLOB SERPL ELPH-MCNC: 0.8 G/DL (ref 0.4–1)
B-GLOBULIN SERPL ELPH-MCNC: 0.9 G/DL (ref 0.7–1.3)
EPO SERPL-ACNC: 13.9 MIU/ML (ref 2.6–18.5)
GAMMA GLOB SERPL ELPH-MCNC: 0.9 G/DL (ref 0.4–1.8)
GLOBULIN SER-MCNC: 2.9 G/DL (ref 2.2–3.9)
IGA SERPL-MCNC: 173 MG/DL (ref 61–437)
IGG SERPL-MCNC: 1106 MG/DL (ref 603–1613)
IGM SERPL-MCNC: 94 MG/DL (ref 15–143)
INTERPRETATION SERPL IEP-IMP: ABNORMAL
KAPPA LC FREE SER-MCNC: 40 MG/L (ref 3.3–19.4)
KAPPA LC FREE/LAMBDA FREE SER: 1.55 {RATIO} (ref 0.26–1.65)
LABORATORY COMMENT REPORT: ABNORMAL
LAMBDA LC FREE SERPL-MCNC: 25.8 MG/L (ref 5.7–26.3)
M PROTEIN SERPL ELPH-MCNC: ABNORMAL G/DL
PROT SERPL-MCNC: 6.6 G/DL (ref 6–8.5)

## 2023-01-27 ENCOUNTER — PATIENT ROUNDING (BHMG ONLY) (OUTPATIENT)
Dept: ONCOLOGY | Facility: CLINIC | Age: 73
End: 2023-01-27
Payer: MEDICARE

## 2023-01-27 NOTE — PROGRESS NOTES
January 27, 2023    Hello, may I speak with Bernard Matta?    My name is ELLIOT     I am  with [facility] HEMATOLOGY & ONCOLOGY [address]  [facility] [address] [phone].    Before we get started may I verify your date of birth? 1950    I am calling to officially welcome you to our practice and ask about your recent visit. Is this a good time to talk? YES    Tell me about your visit with us. What things went well? VISIT WENT VERY WELL       We're always looking for ways to make our patients' experiences even better. Do you have recommendations on ways we may improve?  NO    Overall were you satisfied with your first visit to our practice? YES       I appreciate you taking the time to speak with me today. Is there anything else I can do for you? NO      Thank you, and have a great day.

## 2023-02-03 ENCOUNTER — INFUSION (OUTPATIENT)
Dept: ONCOLOGY | Facility: HOSPITAL | Age: 73
End: 2023-02-03
Payer: MEDICARE

## 2023-02-03 VITALS
HEIGHT: 68 IN | TEMPERATURE: 97.7 F | SYSTOLIC BLOOD PRESSURE: 142 MMHG | WEIGHT: 186 LBS | RESPIRATION RATE: 16 BRPM | OXYGEN SATURATION: 100 % | DIASTOLIC BLOOD PRESSURE: 76 MMHG | HEART RATE: 87 BPM | BODY MASS INDEX: 28.19 KG/M2

## 2023-02-03 DIAGNOSIS — K90.9 INTESTINAL MALABSORPTION, UNSPECIFIED TYPE: ICD-10-CM

## 2023-02-03 DIAGNOSIS — D50.9 IRON DEFICIENCY ANEMIA, UNSPECIFIED IRON DEFICIENCY ANEMIA TYPE: Primary | ICD-10-CM

## 2023-02-03 PROCEDURE — 25010000002 FERRIC CARBOXYMALTOSE 750 MG/15ML SOLUTION 15 ML VIAL: Performed by: NURSE PRACTITIONER

## 2023-02-03 PROCEDURE — 96365 THER/PROPH/DIAG IV INF INIT: CPT

## 2023-02-03 PROCEDURE — 96374 THER/PROPH/DIAG INJ IV PUSH: CPT

## 2023-02-03 RX ORDER — DIPHENHYDRAMINE HYDROCHLORIDE 50 MG/ML
50 INJECTION INTRAMUSCULAR; INTRAVENOUS AS NEEDED
Status: DISCONTINUED | OUTPATIENT
Start: 2023-02-03 | End: 2023-02-03 | Stop reason: HOSPADM

## 2023-02-03 RX ORDER — SODIUM CHLORIDE 9 MG/ML
250 INJECTION, SOLUTION INTRAVENOUS ONCE
Status: COMPLETED | OUTPATIENT
Start: 2023-02-03 | End: 2023-02-03

## 2023-02-03 RX ORDER — FAMOTIDINE 10 MG/ML
20 INJECTION, SOLUTION INTRAVENOUS AS NEEDED
Status: DISCONTINUED | OUTPATIENT
Start: 2023-02-03 | End: 2023-02-03 | Stop reason: HOSPADM

## 2023-02-03 RX ADMIN — SODIUM CHLORIDE 250 ML: 9 INJECTION, SOLUTION INTRAVENOUS at 08:19

## 2023-02-03 RX ADMIN — FERRIC CARBOXYMALTOSE INJECTION 750 MG: 50 INJECTION, SOLUTION INTRAVENOUS at 08:19

## 2023-02-22 ENCOUNTER — OFFICE VISIT (OUTPATIENT)
Dept: ONCOLOGY | Facility: CLINIC | Age: 73
End: 2023-02-22
Payer: MEDICARE

## 2023-02-22 ENCOUNTER — LAB (OUTPATIENT)
Dept: LAB | Facility: HOSPITAL | Age: 73
End: 2023-02-22
Payer: MEDICARE

## 2023-02-22 ENCOUNTER — INFUSION (OUTPATIENT)
Dept: ONCOLOGY | Facility: HOSPITAL | Age: 73
End: 2023-02-22
Payer: MEDICARE

## 2023-02-22 VITALS
DIASTOLIC BLOOD PRESSURE: 86 MMHG | HEART RATE: 94 BPM | OXYGEN SATURATION: 96 % | RESPIRATION RATE: 18 BRPM | SYSTOLIC BLOOD PRESSURE: 144 MMHG | TEMPERATURE: 97.3 F | HEIGHT: 68 IN | WEIGHT: 186.8 LBS | BODY MASS INDEX: 28.31 KG/M2

## 2023-02-22 VITALS
RESPIRATION RATE: 18 BRPM | SYSTOLIC BLOOD PRESSURE: 160 MMHG | HEART RATE: 86 BPM | HEIGHT: 69 IN | OXYGEN SATURATION: 98 % | WEIGHT: 186.6 LBS | TEMPERATURE: 96.8 F | DIASTOLIC BLOOD PRESSURE: 83 MMHG | BODY MASS INDEX: 27.64 KG/M2

## 2023-02-22 DIAGNOSIS — D50.9 IRON DEFICIENCY ANEMIA, UNSPECIFIED IRON DEFICIENCY ANEMIA TYPE: Primary | ICD-10-CM

## 2023-02-22 DIAGNOSIS — E11.22 TYPE 2 DIABETES MELLITUS WITH STAGE 3B CHRONIC KIDNEY DISEASE, WITH LONG-TERM CURRENT USE OF INSULIN: ICD-10-CM

## 2023-02-22 DIAGNOSIS — N18.32 STAGE 3B CHRONIC KIDNEY DISEASE: ICD-10-CM

## 2023-02-22 DIAGNOSIS — K90.9 INTESTINAL MALABSORPTION, UNSPECIFIED TYPE: ICD-10-CM

## 2023-02-22 DIAGNOSIS — N18.32 TYPE 2 DIABETES MELLITUS WITH STAGE 3B CHRONIC KIDNEY DISEASE, WITH LONG-TERM CURRENT USE OF INSULIN: ICD-10-CM

## 2023-02-22 DIAGNOSIS — Z79.4 TYPE 2 DIABETES MELLITUS WITH STAGE 3B CHRONIC KIDNEY DISEASE, WITH LONG-TERM CURRENT USE OF INSULIN: ICD-10-CM

## 2023-02-22 LAB
ALBUMIN SERPL-MCNC: 4 G/DL (ref 3.5–5.2)
ALBUMIN/GLOB SERPL: 1.4 G/DL
ALP SERPL-CCNC: 95 U/L (ref 39–117)
ALT SERPL W P-5'-P-CCNC: 30 U/L (ref 1–41)
ANION GAP SERPL CALCULATED.3IONS-SCNC: 10 MMOL/L (ref 5–15)
AST SERPL-CCNC: 19 U/L (ref 1–40)
BASOPHILS # BLD AUTO: 0.03 10*3/MM3 (ref 0–0.2)
BASOPHILS NFR BLD AUTO: 0.6 % (ref 0–1.5)
BILIRUB SERPL-MCNC: 0.2 MG/DL (ref 0–1.2)
BUN SERPL-MCNC: 31 MG/DL (ref 8–23)
BUN/CREAT SERPL: 16.1 (ref 7–25)
CALCIUM SPEC-SCNC: 8.6 MG/DL (ref 8.6–10.5)
CHLORIDE SERPL-SCNC: 100 MMOL/L (ref 98–107)
CO2 SERPL-SCNC: 27 MMOL/L (ref 22–29)
CREAT SERPL-MCNC: 1.93 MG/DL (ref 0.76–1.27)
DEPRECATED RDW RBC AUTO: 56.7 FL (ref 37–54)
EGFRCR SERPLBLD CKD-EPI 2021: 35.9 ML/MIN/1.73
EOSINOPHIL # BLD AUTO: 0.28 10*3/MM3 (ref 0–0.4)
EOSINOPHIL NFR BLD AUTO: 5.3 % (ref 0.3–6.2)
ERYTHROCYTE [DISTWIDTH] IN BLOOD BY AUTOMATED COUNT: 17.2 % (ref 12.3–15.4)
FERRITIN SERPL-MCNC: 256.2 NG/ML (ref 30–400)
GLOBULIN UR ELPH-MCNC: 2.8 GM/DL
GLUCOSE SERPL-MCNC: 384 MG/DL (ref 65–99)
HCT VFR BLD AUTO: 31.8 % (ref 37.5–51)
HGB BLD-MCNC: 9.8 G/DL (ref 13–17.7)
HOLD SPECIMEN: NORMAL
IMM GRANULOCYTES # BLD AUTO: 0.01 10*3/MM3 (ref 0–0.05)
IMM GRANULOCYTES NFR BLD AUTO: 0.2 % (ref 0–0.5)
IRON 24H UR-MRATE: 55 MCG/DL (ref 59–158)
IRON SATN MFR SERPL: 20 % (ref 20–50)
LYMPHOCYTES # BLD AUTO: 0.9 10*3/MM3 (ref 0.7–3.1)
LYMPHOCYTES NFR BLD AUTO: 17.1 % (ref 19.6–45.3)
MCH RBC QN AUTO: 27.8 PG (ref 26.6–33)
MCHC RBC AUTO-ENTMCNC: 30.8 G/DL (ref 31.5–35.7)
MCV RBC AUTO: 90.1 FL (ref 79–97)
MONOCYTES # BLD AUTO: 0.32 10*3/MM3 (ref 0.1–0.9)
MONOCYTES NFR BLD AUTO: 6.1 % (ref 5–12)
NEUTROPHILS NFR BLD AUTO: 3.71 10*3/MM3 (ref 1.7–7)
NEUTROPHILS NFR BLD AUTO: 70.7 % (ref 42.7–76)
NRBC BLD AUTO-RTO: 0 /100 WBC (ref 0–0.2)
PLATELET # BLD AUTO: 146 10*3/MM3 (ref 140–450)
PMV BLD AUTO: 8.7 FL (ref 6–12)
POTASSIUM SERPL-SCNC: 4.5 MMOL/L (ref 3.5–5.2)
PROT SERPL-MCNC: 6.8 G/DL (ref 6–8.5)
RBC # BLD AUTO: 3.53 10*6/MM3 (ref 4.14–5.8)
SODIUM SERPL-SCNC: 137 MMOL/L (ref 136–145)
TIBC SERPL-MCNC: 274 MCG/DL (ref 298–536)
TRANSFERRIN SERPL-MCNC: 184 MG/DL (ref 200–360)
WBC NRBC COR # BLD: 5.25 10*3/MM3 (ref 3.4–10.8)

## 2023-02-22 PROCEDURE — 99214 OFFICE O/P EST MOD 30 MIN: CPT | Performed by: NURSE PRACTITIONER

## 2023-02-22 PROCEDURE — 25010000002 EPOETIN ALFA-EPBX 40000 UNIT/ML SOLUTION: Performed by: NURSE PRACTITIONER

## 2023-02-22 PROCEDURE — 96372 THER/PROPH/DIAG INJ SC/IM: CPT

## 2023-02-22 PROCEDURE — 84466 ASSAY OF TRANSFERRIN: CPT

## 2023-02-22 PROCEDURE — 83540 ASSAY OF IRON: CPT

## 2023-02-22 PROCEDURE — 80053 COMPREHEN METABOLIC PANEL: CPT

## 2023-02-22 PROCEDURE — 82728 ASSAY OF FERRITIN: CPT

## 2023-02-22 PROCEDURE — 85025 COMPLETE CBC W/AUTO DIFF WBC: CPT

## 2023-02-22 PROCEDURE — 36415 COLL VENOUS BLD VENIPUNCTURE: CPT

## 2023-02-22 RX ADMIN — EPOETIN ALFA-EPBX 40000 UNITS: 40000 INJECTION, SOLUTION INTRAVENOUS; SUBCUTANEOUS at 11:59

## 2023-02-22 NOTE — PROGRESS NOTES
[facility] HEMATOLOGY & ONCOLOGY [place]  [address]  [facility] [address] [phone]    Patient Name: Bernard Matta  Encounter Date: 02/22/2023   YOB: 1950  Patient Number: [id number]    PROGRESS NOTE    HISTORY OF PRESENT ILLNESS: Bernard Matta is a 72 y.o. male  followed by this office for iron deficiency anemia. He has health history significant for DM on insulin, HTN, Hyperlipidemia, iron deficiency, CKD. He received Inejctafer 750 mg on Feb 3, 2023.     INTERVAL HISTORY    Pt presents to clinic today for continued follow up.  He states he tolerated Inejctafer infusion well.  He states he does feel some better and breathring better. Having headaches and BP has been slightly elevated.   He has no other complaints.        LABS    Lab Results - Last 18 Months   Lab Units 02/22/23  0946 01/25/23  1019 01/03/23  1336 07/25/22  0825 09/08/21  0902   HEMOGLOBIN g/dL 9.8* 9.2*  --  9.7* 11.5*   HEMOGLOBIN, POC g/dL  --   --  7.9*  --   --    HEMATOCRIT % 31.8* 31.1*  --  30.8* 34.1*   MCV fL 90.1 88.1  --  87.0 89   WBC 10*3/mm3 5.25 6.45  --  5.14 5.3   RDW % 17.2* 17.9*  --  13.3 12.5   MPV fL 8.7 9.1  --   --   --    PLATELETS 10*3/mm3 146 180  --  191 165   IMM GRAN % % 0.2 0.3  --   --   --    NEUTROS ABS 10*3/mm3 3.71 4.59  --  3.36 3.4   LYMPHS ABS 10*3/mm3 0.90 0.94  --  1.00 1.2   MONOS ABS 10*3/mm3 0.32 0.42  --  0.40 0.5   EOS ABS 10*3/mm3 0.28 0.45*  --  0.32 0.3   BASOS ABS 10*3/mm3 0.03 0.03  --  0.05 0.1   IMMATURE GRANS (ABS) 10*3/mm3 0.01 0.02  --   --   --    NRBC /100 WBC 0.0 0.0  --  0.0  --        Lab Results - Last 18 Months   Lab Units 02/22/23  0946 01/25/23  1019 07/25/22  0825 09/08/21  0902   GLUCOSE mg/dL 384* 184* 170* 118*   SODIUM mmol/L 137 139 137 139   POTASSIUM mmol/L 4.5 4.9 5.3* 4.9   TOTAL CO2 mmol/L  --   --  29.1* 29   CO2 mmol/L 27.0 28.0  --   --    CHLORIDE mmol/L 100 103 100 101   ANION  GAP mmol/L 10.0 8.0  --   --    CREATININE mg/dL 1.93* 1.67* 2.27* 1.99*   BUN mg/dL 31* 28* 34* 19   BUN / CREAT RATIO  16.1 16.8 15.0 10   CALCIUM mg/dL 8.6 9.0 9.3 9.5   EGFR IF NONAFRICN AM mL/min/1.73  --   --   --  33*   ALK PHOS U/L 95 91 96 94   TOTAL PROTEIN g/dL 6.8 7.1  --   --    ALT (SGPT) U/L 30 21 21 26   AST (SGOT) U/L 19 14 17 16   BILIRUBIN mg/dL 0.2 0.3 0.4 0.5   ALBUMIN g/dL 4.0 4.0  3.7 4.20 4.4   GLOBULIN gm/dL 2.8 3.1  --   --        Lab Results - Last 18 Months   Lab Units 01/25/23  1019   M-SPIKE g/dL Not Observed   KAPPA/LAMBDA RATIO, S  1.55   FREE LAMBDA LIGHT CHAINS mg/L 25.8   IG KAPPA FREE LIGHT CHAIN mg/L 40.0*   LDH U/L 183       Lab Results - Last 18 Months   Lab Units 02/22/23  0946 01/25/23  1019 07/25/22  0825 09/08/21  0902   IRON mcg/dL 55* 122  --   --    TIBC mcg/dL 274* 337  --   --    IRON SATURATION % 20 36  --   --    FERRITIN ng/mL 256.20 16.82*  --   --    TSH uIU/mL  --   --  2.900 2.170   FOLATE ng/mL  --  >20.00 >20.00  --          PAST MEDICAL HISTORY:  ALLERGIES:  No Known Allergies  CURRENT MEDICATIONS:  Outpatient Encounter Medications as of 2/22/2023   Medication Sig Dispense Refill   • Accu-Chek FastClix Lancets misc Use 4 x daily ,   ICD10 code is E11.9 You dispensed the wrong this, patient wants acucheck fast click lancets 360 each 3   • Alcohol Swabs (B-D SINGLE USE SWABS REGULAR) pads USE FOUR TIMES DAILY 400 each 11   • amitriptyline (ELAVIL) 10 MG tablet TAKE 1 TABLET EVERY NIGHT AS NEEDED FOR SLEEP 90 tablet 3   • aspirin 81 MG tablet Take 81 mg by mouth Daily.     • atorvastatin (LIPITOR) 40 MG tablet TAKE 1 TABLET BY MOUTH EVERY NIGHT AT BEDTIME. 90 tablet 3   • Blood Glucose Monitoring Suppl w/Device kit accu chek albertina 1 each 1   • cetirizine (zyrTEC) 10 MG tablet Take 10 mg by mouth Daily.     • cholecalciferol (VITAMIN D3) 400 UNITS tablet Take 400 Units by mouth Daily.     • cloNIDine (CATAPRES) 0.1 MG tablet TAKE 1 TABLET TWICE DAILY 180 tablet 2  "  • Continuous Blood Gluc Sensor (Dexcom G6 Sensor) Every 10 (Ten) Days. 3 each 5   • Continuous Blood Gluc Transmit (Dexcom G6 Transmitter) misc 1 each Every 3 (Three) Months. 1 each 2   • dorzolamide-timolol (COSOPT) 22.3-6.8 MG/ML ophthalmic solution 1 drop 2 (Two) Times a Day.     • Droplet Pen Needles 31G X 5 MM misc USE FOUR TIMES DAILY AS DIRECTED 400 each 3   • ferrous sulfate 325 (65 FE) MG tablet 325 mg. Takes 1 every other day .     • fluticasone (FLONASE) 50 MCG/ACT nasal spray USE 2 SPRAYS IN EACH NOSTRIL EVERY DAY 48 g 5   • furosemide (Lasix) 20 MG tablet Take 1 tablet by mouth Daily. 90 tablet 3   • Glucagon (Baqsimi One Pack) 3 MG/DOSE powder 1 each into the nostril(s) as directed by provider As Needed (Hypoglycemia). Apply intranasal if hypoglycemia 2 each 11   • glucose blood (Accu-Chek Marry Plus) test strip TEST BLOOD SUGAR FOUR TIMES DAILY 400 each 6   • insulin aspart (NovoLOG FlexPen) 100 UNIT/ML solution pen-injector sc pen USE UP TO 20 UNITS WITH MEALS. DISCARD PEN 28 DAYS AFTER OPENING 60 mL 3   • Insulin Pen Needle 31G X 5 MM misc Droplet Pen Needle 31 gauge x 3/16\"     • Lancet Devices (Lancing Device) misc acucheck fast click lancing device. Make certain you send the correct one. Talk to patient . States you sent the wrong one 1 each 11   • latanoprost (XALATAN) 0.005 % ophthalmic solution 1 drop Every Night.     • lisinopril (PRINIVIL,ZESTRIL) 10 MG tablet Take 1 tablet by mouth Every Night. 90 tablet 2   • Multiple Vitamins-Minerals (MULTIVITAMIN WITH MINERALS) tablet tablet Take 1 tablet by mouth Daily.     • tamsulosin (FLOMAX) 0.4 MG capsule 24 hr capsule TAKE 2 CAPSULES EVERY EVENING 180 capsule 2   • Tresiba FlexTouch 100 UNIT/ML solution pen-injector injection INJECT 20 UNITS UNDER THE SKIN INTO THE APPROPRIATE AREA AS DIRECTED EVERY NIGHT. 30 mL 2     No facility-administered encounter medications on file as of 2/22/2023.     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        Immunization History   Administered Date(s) Administered   • COVID-19 (MODERNA) 1st, 2nd, 3rd Dose Only 03/02/2021, 03/30/2021, 11/02/2021   • COVID-19 (PFIZER) BIVALENT BOOSTER 12+YRS 09/29/2022   • Covid-19 (Pfizer) Gray Cap 07/28/2022   • FLUAD TRI 65YR+ 09/09/2019   • Flu Vaccine Split Quad 01/01/2013   • FluLaval/Fluzone >6mos 01/01/2013, 09/20/2022   • Fluad Quad 65+ 09/09/2019, 09/09/2020   • Fluzone High Dose =>65 Years ([employer] ONLY) 09/14/2016, 10/02/2017, 10/08/2018   • Fluzone High-Dose 65+yrs 10/19/2021   • Pneumococcal Conjugate 13-Valent (PCV13) 06/11/2015   • Pneumococcal Polysaccharide (PPSV23) 04/27/2012, 01/01/2013   • Shingrix 06/21/2018, 01/07/2019   • Tdap 05/08/2013     Last Completed Mammogram     This patient has no relevant Health Maintenance data.            FAMILY HISTORY:  Family History   Problem Relation Age of Onset   • No Known Problems Maternal Grandmother    • No Known Problems Maternal Grandfather    • No Known Problems Paternal Grandmother    • No Known Problems Paternal Grandfather    • No Known Problems Mother    • No Known Problems Father    • Colon cancer Neg Hx    • Colon polyps Neg Hx    • Esophageal cancer Neg Hx      SOCIAL HISTORY:  Social History     Socioeconomic History   • Marital status:    Tobacco Use   • Smoking status: Former     Packs/day: 0.50     Years: 10.00     Pack years: 5.00     Types: Cigarettes     Quit date: 1/1/1979     Years since  "quittin.1   • Smokeless tobacco: Never   Vaping Use   • Vaping Use: Never used   Substance and Sexual Activity   • Alcohol use: No   • Drug use: No   • Sexual activity: Defer       REVIEW OF SYSTEMS:  Review of Systems   Constitutional: Positive for fatigue. Negative for activity change, appetite change, fever, unexpected weight gain and unexpected weight loss.   HENT: Negative for dental problem, facial swelling, swollen glands and trouble swallowing.    Eyes: Negative for double vision and discharge.        Wears glasses,   Blind in right eye   Respiratory: Positive for shortness of breath. Negative for cough and wheezing.    Cardiovascular: Negative for chest pain, palpitations and leg swelling.   Gastrointestinal: Negative for abdominal pain, blood in stool, nausea and vomiting.   Endocrine: Negative.    Genitourinary: Negative for dysuria, frequency and hematuria.        Hesitancy has see Urology and is on Flomax      Musculoskeletal: Negative for arthralgias, back pain and myalgias.   Skin: Negative for rash, skin lesions and wound.   Allergic/Immunologic: Negative for immunocompromised state.   Neurological: Positive for headache. Negative for speech difficulty, light-headedness, memory problem and confusion.   Hematological: Negative for adenopathy.   Psychiatric/Behavioral: Negative for self-injury, suicidal ideas and depressed mood. The patient is not nervous/anxious.        /86   Pulse 94   Temp 97.3 °F (36.3 °C) (Temporal)   Resp 18   Ht 172.7 cm (67.99\")   Wt 84.7 kg (186 lb 12.8 oz)   SpO2 96%   BMI 28.41 kg/m²  Body surface area is 1.98 meters squared.    Pain Score    23 1039   PainSc: 0-No pain         Physical Exam  Constitutional:       Appearance: Normal appearance.   HENT:      Head: Normocephalic and atraumatic.   Cardiovascular:      Rate and Rhythm: Normal rate and regular rhythm.   Pulmonary:      Effort: Pulmonary effort is normal.      Breath sounds: Normal breath " sounds.   Abdominal:      General: Bowel sounds are normal.      Palpations: Abdomen is soft.   Musculoskeletal:      Right lower leg: No edema.      Left lower leg: No edema.   Skin:     General: Skin is warm and dry.   Neurological:      Mental Status: He is alert and oriented to person, place, and time.   Psychiatric:         Attention and Perception: Attention normal.         Mood and Affect: Mood normal.         Judgment: Judgment normal.         Bernard Matta reports a pain score of 0.  Given his pain assessment as noted, treatment options were discussed and the following options were decided upon as a follow-up plan to address the patient's pain: continuation of current treatment plan for pain and tylenol. No intervention indicated today       ASSESSMENT / PLAN:    1. Iron deficiency anemia, unspecified iron deficiency anemia type    2. Intestinal malabsorption, unspecified type    3. Stage 3b chronic kidney disease (HCC)    4. Type 2 diabetes mellitus with stage 3b chronic kidney disease, with long-term current use of insulin ([facility])       1.  Iron Deficiency   2. Stage IIIb Chronic Kidney Disease   3.  Intestinal malabsorption of iron  BUN 31, Creatinine 1.93, GFR 35.9  Anemia profile has improved with Inejcafer infusion.    01/25/23 labs:  Iron 122, Ferritin 16, Sat 36%, TIBC 337   Today's labs with Iron 55, Ferritin 256.20, Sat 20%, TIBC 274  Hgb today 9.8, Hct 31.8   Can start LORENA therapy today with Retacrit 40,000 units  -Continue biweekly for Hgb < 11 or Hct < 33       4.  Type 2 Diabetes   -Non fasting glucose 384  -Patient has CGM  -Taking Tresiba  -Continue follow up with Endocrinology         PLAN:  Start LORENA therapy today with Retacrit 40,000 units  Continue biweekly for Hgb < 11 or Hct < 33   Continue current medications, treatment plans and follow up with PCP and any other providers  Return to office 6 weeks   Pre-office labs for CBC, CMP, Iron Profile and Ferritin   Care discussed with patient.   Understanding expressed.  Patient agreeable with plan.              ACP discussion was held with the patient during this visit. Patient has an advance directive in EMR which is still valid.       Leesa Anne, APRN  02/22/2023

## 2023-02-24 ENCOUNTER — OFFICE VISIT (OUTPATIENT)
Dept: FAMILY MEDICINE CLINIC | Facility: CLINIC | Age: 73
End: 2023-02-24
Payer: MEDICARE

## 2023-02-24 VITALS
WEIGHT: 187.4 LBS | HEIGHT: 69 IN | RESPIRATION RATE: 16 BRPM | SYSTOLIC BLOOD PRESSURE: 138 MMHG | OXYGEN SATURATION: 99 % | HEART RATE: 99 BPM | DIASTOLIC BLOOD PRESSURE: 78 MMHG | BODY MASS INDEX: 27.76 KG/M2

## 2023-02-24 DIAGNOSIS — I10 PRIMARY HYPERTENSION: ICD-10-CM

## 2023-02-24 PROCEDURE — 99213 OFFICE O/P EST LOW 20 MIN: CPT | Performed by: FAMILY MEDICINE

## 2023-02-24 RX ORDER — EPOETIN ALFA-EPBX 40000 [IU]/ML
INJECTION, SOLUTION INTRAVENOUS; SUBCUTANEOUS
COMMUNITY

## 2023-02-24 RX ORDER — CLONIDINE HYDROCHLORIDE 0.2 MG/1
0.2 TABLET ORAL 2 TIMES DAILY
Qty: 180 TABLET | Refills: 3 | Status: SHIPPED | OUTPATIENT
Start: 2023-02-24

## 2023-02-24 NOTE — PROGRESS NOTES
Subjective   Bernard Matta is a 72 y.o. male.     History of Present Illness  72-year-old male with hypertension diabetes and azotemia  Hypertension  Associated symptoms include headaches. Pertinent negatives include no chest pain or shortness of breath.   Headache      The following portions of the patient's history were reviewed and updated as appropriate: allergies, current medications, past family history, past medical history, past social history, past surgical history and problem list.    Review of Systems   Eyes:        Developing cataracts   Respiratory: Negative for shortness of breath.    Cardiovascular: Negative for chest pain and leg swelling.   Neurological: Positive for headaches. Negative for dizziness and light-headedness.   Hematological:        Anemia-on Procrit and IV iron       Objective   Physical Exam  Vitals and nursing note reviewed.   Constitutional:       Appearance: Normal appearance.   Eyes:      Extraocular Movements: Extraocular movements intact.      Pupils: Pupils are equal, round, and reactive to light.   Neck:      Vascular: No carotid bruit.   Cardiovascular:      Rate and Rhythm: Normal rate and regular rhythm.   Pulmonary:      Effort: Pulmonary effort is normal.      Breath sounds: Normal breath sounds.   Musculoskeletal:      Right lower leg: No edema.      Left lower leg: No edema.   Lymphadenopathy:      Cervical: No cervical adenopathy.   Skin:     General: Skin is warm and dry.   Neurological:      General: No focal deficit present.      Mental Status: He is alert and oriented to person, place, and time.   Psychiatric:         Mood and Affect: Mood normal.         Behavior: Behavior normal.         Thought Content: Thought content normal.         Judgment: Judgment normal.         Assessment & Plan   Diagnoses and all orders for this visit:    1. Primary hypertension  -     cloNIDine (CATAPRES) 0.2 MG tablet; Take 1 tablet by mouth 2 (Two) Times a Day.  Dispense: 180  tablet; Refill: 3         Plan increase clonidine to 0.2 twice daily

## 2023-03-08 ENCOUNTER — INFUSION (OUTPATIENT)
Dept: ONCOLOGY | Facility: HOSPITAL | Age: 73
End: 2023-03-08
Payer: MEDICARE

## 2023-03-08 ENCOUNTER — LAB (OUTPATIENT)
Dept: LAB | Facility: HOSPITAL | Age: 73
End: 2023-03-08
Payer: MEDICARE

## 2023-03-08 VITALS
RESPIRATION RATE: 18 BRPM | OXYGEN SATURATION: 99 % | WEIGHT: 189 LBS | SYSTOLIC BLOOD PRESSURE: 151 MMHG | TEMPERATURE: 97.5 F | BODY MASS INDEX: 28.64 KG/M2 | HEIGHT: 68 IN | HEART RATE: 80 BPM | DIASTOLIC BLOOD PRESSURE: 80 MMHG

## 2023-03-08 DIAGNOSIS — D50.9 IRON DEFICIENCY ANEMIA, UNSPECIFIED IRON DEFICIENCY ANEMIA TYPE: Primary | ICD-10-CM

## 2023-03-08 DIAGNOSIS — D50.9 IRON DEFICIENCY ANEMIA, UNSPECIFIED IRON DEFICIENCY ANEMIA TYPE: ICD-10-CM

## 2023-03-08 DIAGNOSIS — N18.32 STAGE 3B CHRONIC KIDNEY DISEASE: ICD-10-CM

## 2023-03-08 LAB
BASOPHILS # BLD AUTO: 0.05 10*3/MM3 (ref 0–0.2)
BASOPHILS NFR BLD AUTO: 0.8 % (ref 0–1.5)
DEPRECATED RDW RBC AUTO: 58.1 FL (ref 37–54)
EOSINOPHIL # BLD AUTO: 0.35 10*3/MM3 (ref 0–0.4)
EOSINOPHIL NFR BLD AUTO: 5.8 % (ref 0.3–6.2)
ERYTHROCYTE [DISTWIDTH] IN BLOOD BY AUTOMATED COUNT: 17.2 % (ref 12.3–15.4)
HCT VFR BLD AUTO: 37.2 % (ref 37.5–51)
HGB BLD-MCNC: 11.2 G/DL (ref 13–17.7)
HOLD SPECIMEN: NORMAL
IMM GRANULOCYTES # BLD AUTO: 0.01 10*3/MM3 (ref 0–0.05)
IMM GRANULOCYTES NFR BLD AUTO: 0.2 % (ref 0–0.5)
LYMPHOCYTES # BLD AUTO: 1.29 10*3/MM3 (ref 0.7–3.1)
LYMPHOCYTES NFR BLD AUTO: 21.5 % (ref 19.6–45.3)
MCH RBC QN AUTO: 27.7 PG (ref 26.6–33)
MCHC RBC AUTO-ENTMCNC: 30.1 G/DL (ref 31.5–35.7)
MCV RBC AUTO: 92.1 FL (ref 79–97)
MONOCYTES # BLD AUTO: 0.52 10*3/MM3 (ref 0.1–0.9)
MONOCYTES NFR BLD AUTO: 8.7 % (ref 5–12)
NEUTROPHILS NFR BLD AUTO: 3.78 10*3/MM3 (ref 1.7–7)
NEUTROPHILS NFR BLD AUTO: 63 % (ref 42.7–76)
NRBC BLD AUTO-RTO: 0 /100 WBC (ref 0–0.2)
PLATELET # BLD AUTO: 156 10*3/MM3 (ref 140–450)
PMV BLD AUTO: 8.8 FL (ref 6–12)
RBC # BLD AUTO: 4.04 10*6/MM3 (ref 4.14–5.8)
WBC NRBC COR # BLD: 6 10*3/MM3 (ref 3.4–10.8)

## 2023-03-08 PROCEDURE — 85025 COMPLETE CBC W/AUTO DIFF WBC: CPT

## 2023-03-08 PROCEDURE — 36415 COLL VENOUS BLD VENIPUNCTURE: CPT

## 2023-03-08 PROCEDURE — G0463 HOSPITAL OUTPT CLINIC VISIT: HCPCS

## 2023-03-22 ENCOUNTER — LAB (OUTPATIENT)
Dept: LAB | Facility: HOSPITAL | Age: 73
End: 2023-03-22
Payer: MEDICARE

## 2023-03-22 ENCOUNTER — INFUSION (OUTPATIENT)
Dept: ONCOLOGY | Facility: HOSPITAL | Age: 73
End: 2023-03-22
Payer: MEDICARE

## 2023-03-22 VITALS
HEIGHT: 68 IN | RESPIRATION RATE: 18 BRPM | DIASTOLIC BLOOD PRESSURE: 71 MMHG | BODY MASS INDEX: 28.79 KG/M2 | HEART RATE: 85 BPM | SYSTOLIC BLOOD PRESSURE: 133 MMHG | OXYGEN SATURATION: 99 % | WEIGHT: 190 LBS | TEMPERATURE: 97.7 F

## 2023-03-22 DIAGNOSIS — D50.9 IRON DEFICIENCY ANEMIA, UNSPECIFIED IRON DEFICIENCY ANEMIA TYPE: ICD-10-CM

## 2023-03-22 DIAGNOSIS — N18.32 STAGE 3B CHRONIC KIDNEY DISEASE: ICD-10-CM

## 2023-03-22 LAB
BASOPHILS # BLD AUTO: 0.04 10*3/MM3 (ref 0–0.2)
BASOPHILS NFR BLD AUTO: 0.6 % (ref 0–1.5)
DEPRECATED RDW RBC AUTO: 50 FL (ref 37–54)
EOSINOPHIL # BLD AUTO: 0.38 10*3/MM3 (ref 0–0.4)
EOSINOPHIL NFR BLD AUTO: 5.6 % (ref 0.3–6.2)
ERYTHROCYTE [DISTWIDTH] IN BLOOD BY AUTOMATED COUNT: 14.6 % (ref 12.3–15.4)
HCT VFR BLD AUTO: 36.5 % (ref 37.5–51)
HGB BLD-MCNC: 10.9 G/DL (ref 13–17.7)
IMM GRANULOCYTES # BLD AUTO: 0.02 10*3/MM3 (ref 0–0.05)
IMM GRANULOCYTES NFR BLD AUTO: 0.3 % (ref 0–0.5)
LYMPHOCYTES # BLD AUTO: 1.02 10*3/MM3 (ref 0.7–3.1)
LYMPHOCYTES NFR BLD AUTO: 15 % (ref 19.6–45.3)
MCH RBC QN AUTO: 27.9 PG (ref 26.6–33)
MCHC RBC AUTO-ENTMCNC: 29.9 G/DL (ref 31.5–35.7)
MCV RBC AUTO: 93.6 FL (ref 79–97)
MONOCYTES # BLD AUTO: 0.45 10*3/MM3 (ref 0.1–0.9)
MONOCYTES NFR BLD AUTO: 6.6 % (ref 5–12)
NEUTROPHILS NFR BLD AUTO: 4.89 10*3/MM3 (ref 1.7–7)
NEUTROPHILS NFR BLD AUTO: 71.9 % (ref 42.7–76)
NRBC BLD AUTO-RTO: 0 /100 WBC (ref 0–0.2)
PLATELET # BLD AUTO: 157 10*3/MM3 (ref 140–450)
PMV BLD AUTO: 9.3 FL (ref 6–12)
RBC # BLD AUTO: 3.9 10*6/MM3 (ref 4.14–5.8)
WBC NRBC COR # BLD: 6.8 10*3/MM3 (ref 3.4–10.8)

## 2023-03-22 PROCEDURE — 36415 COLL VENOUS BLD VENIPUNCTURE: CPT

## 2023-03-22 PROCEDURE — G0463 HOSPITAL OUTPT CLINIC VISIT: HCPCS

## 2023-03-22 PROCEDURE — 85025 COMPLETE CBC W/AUTO DIFF WBC: CPT

## 2023-04-03 ENCOUNTER — TELEPHONE (OUTPATIENT)
Dept: ENDOCRINOLOGY | Facility: CLINIC | Age: 73
End: 2023-04-03
Payer: MEDICARE

## 2023-04-03 DIAGNOSIS — E55.9 VITAMIN D DEFICIENCY: ICD-10-CM

## 2023-04-03 DIAGNOSIS — D50.9 IRON DEFICIENCY ANEMIA, UNSPECIFIED IRON DEFICIENCY ANEMIA TYPE: ICD-10-CM

## 2023-04-03 DIAGNOSIS — Z79.4 TYPE 2 DIABETES MELLITUS WITH HYPERGLYCEMIA, WITH LONG-TERM CURRENT USE OF INSULIN: Primary | ICD-10-CM

## 2023-04-03 DIAGNOSIS — E11.65 TYPE 2 DIABETES MELLITUS WITH HYPERGLYCEMIA, WITH LONG-TERM CURRENT USE OF INSULIN: Primary | ICD-10-CM

## 2023-04-03 NOTE — TELEPHONE ENCOUNTER
Patient is waiting in [facility] for lab orders to be entered for upcoming appointment,    Thank you.

## 2023-04-04 ENCOUNTER — OFFICE VISIT (OUTPATIENT)
Dept: ENDOCRINOLOGY | Facility: CLINIC | Age: 73
End: 2023-04-04
Payer: MEDICARE

## 2023-04-04 VITALS
DIASTOLIC BLOOD PRESSURE: 70 MMHG | WEIGHT: 186 LBS | SYSTOLIC BLOOD PRESSURE: 118 MMHG | HEART RATE: 86 BPM | OXYGEN SATURATION: 96 % | HEIGHT: 68 IN | BODY MASS INDEX: 28.19 KG/M2

## 2023-04-04 DIAGNOSIS — L84 PRE-ULCERATIVE CALLUSES: ICD-10-CM

## 2023-04-04 DIAGNOSIS — I15.2 HYPERTENSION ASSOCIATED WITH DIABETES: ICD-10-CM

## 2023-04-04 DIAGNOSIS — E78.2 MIXED DIABETIC HYPERLIPIDEMIA ASSOCIATED WITH TYPE 2 DIABETES MELLITUS: ICD-10-CM

## 2023-04-04 DIAGNOSIS — E11.59 HYPERTENSION ASSOCIATED WITH DIABETES: ICD-10-CM

## 2023-04-04 DIAGNOSIS — Z79.4 TYPE 2 DIABETES MELLITUS WITH HYPERGLYCEMIA, WITH LONG-TERM CURRENT USE OF INSULIN: Primary | ICD-10-CM

## 2023-04-04 DIAGNOSIS — E11.65 TYPE 2 DIABETES MELLITUS WITH HYPERGLYCEMIA, WITH LONG-TERM CURRENT USE OF INSULIN: Primary | ICD-10-CM

## 2023-04-04 DIAGNOSIS — E11.69 MIXED DIABETIC HYPERLIPIDEMIA ASSOCIATED WITH TYPE 2 DIABETES MELLITUS: ICD-10-CM

## 2023-04-04 DIAGNOSIS — N18.32 STAGE 3B CHRONIC KIDNEY DISEASE: ICD-10-CM

## 2023-04-04 LAB — HBA1C MFR BLD: 8.4 %

## 2023-04-04 PROCEDURE — 95251 CONT GLUC MNTR ANALYSIS I&R: CPT | Performed by: INTERNAL MEDICINE

## 2023-04-04 PROCEDURE — 3074F SYST BP LT 130 MM HG: CPT | Performed by: INTERNAL MEDICINE

## 2023-04-04 PROCEDURE — 99214 OFFICE O/P EST MOD 30 MIN: CPT | Performed by: INTERNAL MEDICINE

## 2023-04-04 PROCEDURE — 3052F HG A1C>EQUAL 8.0%<EQUAL 9.0%: CPT | Performed by: INTERNAL MEDICINE

## 2023-04-04 PROCEDURE — 3078F DIAST BP <80 MM HG: CPT | Performed by: INTERNAL MEDICINE

## 2023-04-04 NOTE — PROGRESS NOTES
" Bernard Matta is a 72 y.o. 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   No deformity     Labs    Lab Results   Component Value Date    WBC 6.80 03/22/2023    HGB 10.9 (L) 03/22/2023    HCT 36.5 (L) 03/22/2023    MCV 93.6 03/22/2023     03/22/2023     Lab Results   Component Value Date    GLUCOSE 384 (H) 02/22/2023    BUN 31 (H) 02/22/2023    CREATININE 1.93 (H) 02/22/2023    EGFRIFNONA 33 (L) 09/08/2021    EGFRIFAFRI 38 (L) 09/08/2021    BCR 16.1 02/22/2023    K 4.5 02/22/2023    CO2 27.0 02/22/2023    CALCIUM 8.6 02/22/2023    PROTENTOTREF 6.6 01/25/2023    ALBUMIN 4.0 02/22/2023    LABIL2 1.3 01/25/2023    AST 19 02/22/2023    ALT 30 02/22/2023         Assessment & Plan       ICD-10-CM ICD-9-CM   1. Type 2 diabetes mellitus with hyperglycemia, with long-term current use of insulin  E11.65 250.00    Z79.4 790.29     V58.67   2. Mixed diabetic hyperlipidemia associated with type 2 diabetes mellitus  E11.69 250.80    E78.2 272.2   3. Hypertension associated with diabetes  E11.59 250.80    I15.2 401.9   4. Stage 3b chronic kidney disease  N18.32 585.3   5. Pre-ulcerative calluses  L84 700         Type 2 diabetes        Lab Results   Component Value Date    HGBA1C 8.1 12/07/2022    HGBA1C 8.9 08/23/2022    HGBA1C 9.20 (H) 07/25/2022     Lab Results   Component Value Date    GLUCOSE 384 (H) 02/22/2023    BUN 31 (H) 02/22/2023    CREATININE 1.93 (H) 02/22/2023    EGFRIFNONA 33 (L) 09/08/2021    EGFRIFAFRI 38 (L) 09/08/2021    BCR 16.1 02/22/2023    K 4.5 02/22/2023    CO2 27.0 " 02/22/2023    CALCIUM 8.6 02/22/2023    PROTENTOTREF 6.6 01/25/2023    ALBUMIN 4.0 02/22/2023    LABIL2 1.3 01/25/2023    AST 19 02/22/2023    ALT 30 02/22/2023    ANIONGAP 10.0 02/22/2023     Lab Results   Component Value Date    WBC 6.80 03/22/2023    HGB 10.9 (L) 03/22/2023    HCT 36.5 (L) 03/22/2023    MCV 93.6 03/22/2023     03/22/2023             Metformin 1000 mg po bid- stopped,  side effects     Trulicity 0.75 mg weekly---stopped-  side effects     Jardiance , side effects         Plan     Tresiba 20 , -15 - 12 - 10 ---- increase to 12     Humalog , now he is learning to guesstimate , taking between 2 to 20 , mainly 8 to 12 --  Needs 10 to 14 -- 12 to 16        Ambulatory Glucose Profile Report    Days Analyzed : 2 week period ending on 04/04/23      Continuous Glucose Monitory Device:  Dexcom G6     - 23% very high target range  - 41% high target range  - 32% in target range  - 1% below target range  - GMI 8.4 %  - Average glucose 214 mg/dl    Interpretation : Diabetes Type 2 with hyperglycemia                Lipid Management       Lab Results   Component Value Date    TRIG 85 07/25/2022    TRIG 97 09/08/2021    TRIG 105 07/22/2021     Lab Results   Component Value Date    HDL 43 07/25/2022    HDL 39 (L) 09/08/2021    HDL 37 (L) 07/22/2021     No components found for: LDLCALC  Lab Results   Component Value Date     07/25/2022    LDL 96 09/08/2021    LDL 95 07/22/2021     No results found for: LDLDIRECT    On lipitor 20 mg qhs - changed to 40 mg qhs          2. Hypertension     Blood Pressure Management:    Vitals:    04/04/23 1133   BP: 118/70   Pulse: 86   SpO2: 96%     Per Allan       -----------    Last Microalbumin-Proteinuria Assessment    Lab Results   Component Value Date    MALBCRERATIO 24 09/08/2021    MALBCRERATIO 16.0 06/26/2019     ckd stage 3b/A2  Has had hyperkalemia , has used binding resins   April 2022 GFR 42 , UACR 32, K 4.8    On ace, can't tolerate sglt2 inhibitor     -----------      Neuropathy  No issues with feet at this time     Eye exam , 2022, high pressure but no retinopathy           Thyroid Health    Lab Results   Component Value Date    TSH 2.900 07/25/2022    TSH 2.170 09/08/2021    TSH 1.970 07/22/2021       Lab Results   Component Value Date    FREET4 1.28 07/25/2022    FREET4 1.22 07/22/2021    FREET4 1.42 07/14/2020        Iron Def anemia  HGB 7.9 in 2022, now 10.9     Receiving EPO   And had iron infusion     No bleeding , seeing GI, had scope in 2021 , polypectomy   Still need to see Dr. Mckenzie , I sent referral                 This document has been electronically signed by Deric Abraham MD on April 4, 2023 11:46 CDT

## 2023-04-05 ENCOUNTER — OFFICE VISIT (OUTPATIENT)
Dept: ONCOLOGY | Facility: CLINIC | Age: 73
End: 2023-04-05
Payer: MEDICARE

## 2023-04-05 ENCOUNTER — LAB (OUTPATIENT)
Dept: LAB | Facility: HOSPITAL | Age: 73
End: 2023-04-05
Payer: MEDICARE

## 2023-04-05 ENCOUNTER — APPOINTMENT (OUTPATIENT)
Dept: ONCOLOGY | Facility: HOSPITAL | Age: 73
End: 2023-04-05
Payer: MEDICARE

## 2023-04-05 VITALS
DIASTOLIC BLOOD PRESSURE: 68 MMHG | HEART RATE: 78 BPM | WEIGHT: 188.5 LBS | TEMPERATURE: 97.9 F | OXYGEN SATURATION: 93 % | RESPIRATION RATE: 16 BRPM | BODY MASS INDEX: 28.57 KG/M2 | HEIGHT: 68 IN | SYSTOLIC BLOOD PRESSURE: 118 MMHG

## 2023-04-05 DIAGNOSIS — Z79.4 TYPE 2 DIABETES MELLITUS WITH HYPERGLYCEMIA, WITH LONG-TERM CURRENT USE OF INSULIN: ICD-10-CM

## 2023-04-05 DIAGNOSIS — E11.22 TYPE 2 DIABETES MELLITUS WITH STAGE 3B CHRONIC KIDNEY DISEASE, WITH LONG-TERM CURRENT USE OF INSULIN: ICD-10-CM

## 2023-04-05 DIAGNOSIS — E55.9 VITAMIN D DEFICIENCY: ICD-10-CM

## 2023-04-05 DIAGNOSIS — N18.32 STAGE 3B CHRONIC KIDNEY DISEASE: ICD-10-CM

## 2023-04-05 DIAGNOSIS — D50.9 IRON DEFICIENCY ANEMIA, UNSPECIFIED IRON DEFICIENCY ANEMIA TYPE: Primary | ICD-10-CM

## 2023-04-05 DIAGNOSIS — K90.9 INTESTINAL MALABSORPTION, UNSPECIFIED TYPE: ICD-10-CM

## 2023-04-05 DIAGNOSIS — Z79.4 TYPE 2 DIABETES MELLITUS WITH STAGE 3B CHRONIC KIDNEY DISEASE, WITH LONG-TERM CURRENT USE OF INSULIN: ICD-10-CM

## 2023-04-05 DIAGNOSIS — D50.9 IRON DEFICIENCY ANEMIA, UNSPECIFIED IRON DEFICIENCY ANEMIA TYPE: ICD-10-CM

## 2023-04-05 DIAGNOSIS — E11.65 TYPE 2 DIABETES MELLITUS WITH HYPERGLYCEMIA, WITH LONG-TERM CURRENT USE OF INSULIN: ICD-10-CM

## 2023-04-05 DIAGNOSIS — N18.32 TYPE 2 DIABETES MELLITUS WITH STAGE 3B CHRONIC KIDNEY DISEASE, WITH LONG-TERM CURRENT USE OF INSULIN: ICD-10-CM

## 2023-04-05 LAB
25(OH)D3 SERPL-MCNC: 39.9 NG/ML (ref 30–100)
ALBUMIN SERPL-MCNC: 3.9 G/DL (ref 3.5–5.2)
ALBUMIN UR-MCNC: 2.6 MG/DL
ALBUMIN/GLOB SERPL: 1.4 G/DL
ALP SERPL-CCNC: 92 U/L (ref 39–117)
ALT SERPL W P-5'-P-CCNC: 27 U/L (ref 1–41)
ANION GAP SERPL CALCULATED.3IONS-SCNC: 11 MMOL/L (ref 5–15)
AST SERPL-CCNC: 19 U/L (ref 1–40)
BASOPHILS # BLD AUTO: 0.04 10*3/MM3 (ref 0–0.2)
BASOPHILS NFR BLD AUTO: 0.5 % (ref 0–1.5)
BILIRUB SERPL-MCNC: 0.2 MG/DL (ref 0–1.2)
BUN SERPL-MCNC: 29 MG/DL (ref 8–23)
BUN/CREAT SERPL: 15.1 (ref 7–25)
CALCIUM SPEC-SCNC: 8.8 MG/DL (ref 8.6–10.5)
CHLORIDE SERPL-SCNC: 100 MMOL/L (ref 98–107)
CHOLEST SERPL-MCNC: 143 MG/DL (ref 0–200)
CO2 SERPL-SCNC: 27 MMOL/L (ref 22–29)
CREAT SERPL-MCNC: 1.92 MG/DL (ref 0.76–1.27)
CREAT UR-MCNC: 106.5 MG/DL
DEPRECATED RDW RBC AUTO: 46.1 FL (ref 37–54)
EGFRCR SERPLBLD CKD-EPI 2021: 36.1 ML/MIN/1.73
EOSINOPHIL # BLD AUTO: 0.37 10*3/MM3 (ref 0–0.4)
EOSINOPHIL NFR BLD AUTO: 4.4 % (ref 0.3–6.2)
ERYTHROCYTE [DISTWIDTH] IN BLOOD BY AUTOMATED COUNT: 13.9 % (ref 12.3–15.4)
FERRITIN SERPL-MCNC: 59.42 NG/ML (ref 30–400)
GLOBULIN UR ELPH-MCNC: 2.7 GM/DL
GLUCOSE SERPL-MCNC: 282 MG/DL (ref 65–99)
HBA1C MFR BLD: 7.3 % (ref 4.8–5.6)
HCT VFR BLD AUTO: 36.3 % (ref 37.5–51)
HDLC SERPL-MCNC: 36 MG/DL (ref 40–60)
HGB BLD-MCNC: 11.3 G/DL (ref 13–17.7)
IMM GRANULOCYTES # BLD AUTO: 0.03 10*3/MM3 (ref 0–0.05)
IMM GRANULOCYTES NFR BLD AUTO: 0.4 % (ref 0–0.5)
IRON 24H UR-MRATE: 36 MCG/DL (ref 59–158)
IRON SATN MFR SERPL: 14 % (ref 20–50)
LDLC SERPL CALC-MCNC: 79 MG/DL (ref 0–100)
LDLC/HDLC SERPL: 2.07 {RATIO}
LYMPHOCYTES # BLD AUTO: 1.24 10*3/MM3 (ref 0.7–3.1)
LYMPHOCYTES NFR BLD AUTO: 14.6 % (ref 19.6–45.3)
MCH RBC QN AUTO: 28.3 PG (ref 26.6–33)
MCHC RBC AUTO-ENTMCNC: 31.1 G/DL (ref 31.5–35.7)
MCV RBC AUTO: 90.8 FL (ref 79–97)
MICROALBUMIN/CREAT UR: 24.4 MG/G
MONOCYTES # BLD AUTO: 0.46 10*3/MM3 (ref 0.1–0.9)
MONOCYTES NFR BLD AUTO: 5.4 % (ref 5–12)
NEUTROPHILS NFR BLD AUTO: 6.35 10*3/MM3 (ref 1.7–7)
NEUTROPHILS NFR BLD AUTO: 74.7 % (ref 42.7–76)
NRBC BLD AUTO-RTO: 0 /100 WBC (ref 0–0.2)
PLATELET # BLD AUTO: 150 10*3/MM3 (ref 140–450)
PMV BLD AUTO: 9.7 FL (ref 6–12)
POTASSIUM SERPL-SCNC: 4.6 MMOL/L (ref 3.5–5.2)
PROT SERPL-MCNC: 6.6 G/DL (ref 6–8.5)
RBC # BLD AUTO: 4 10*6/MM3 (ref 4.14–5.8)
SODIUM SERPL-SCNC: 138 MMOL/L (ref 136–145)
TIBC SERPL-MCNC: 256 MCG/DL (ref 298–536)
TRANSFERRIN SERPL-MCNC: 172 MG/DL (ref 200–360)
TRIGL SERPL-MCNC: 163 MG/DL (ref 0–150)
TSH SERPL DL<=0.05 MIU/L-ACNC: 1.74 UIU/ML (ref 0.27–4.2)
VIT B12 BLD-MCNC: 609 PG/ML (ref 211–946)
VLDLC SERPL-MCNC: 28 MG/DL (ref 5–40)
WBC NRBC COR # BLD: 8.49 10*3/MM3 (ref 3.4–10.8)

## 2023-04-05 PROCEDURE — 80061 LIPID PANEL: CPT

## 2023-04-05 PROCEDURE — 3074F SYST BP LT 130 MM HG: CPT | Performed by: NURSE PRACTITIONER

## 2023-04-05 PROCEDURE — 84443 ASSAY THYROID STIM HORMONE: CPT

## 2023-04-05 PROCEDURE — 83540 ASSAY OF IRON: CPT

## 2023-04-05 PROCEDURE — 80053 COMPREHEN METABOLIC PANEL: CPT

## 2023-04-05 PROCEDURE — 84466 ASSAY OF TRANSFERRIN: CPT

## 2023-04-05 PROCEDURE — 99214 OFFICE O/P EST MOD 30 MIN: CPT | Performed by: NURSE PRACTITIONER

## 2023-04-05 PROCEDURE — 82306 VITAMIN D 25 HYDROXY: CPT

## 2023-04-05 PROCEDURE — 83036 HEMOGLOBIN GLYCOSYLATED A1C: CPT

## 2023-04-05 PROCEDURE — 82570 ASSAY OF URINE CREATININE: CPT

## 2023-04-05 PROCEDURE — 1126F AMNT PAIN NOTED NONE PRSNT: CPT | Performed by: NURSE PRACTITIONER

## 2023-04-05 PROCEDURE — 82607 VITAMIN B-12: CPT

## 2023-04-05 PROCEDURE — 3078F DIAST BP <80 MM HG: CPT | Performed by: NURSE PRACTITIONER

## 2023-04-05 PROCEDURE — 82043 UR ALBUMIN QUANTITATIVE: CPT

## 2023-04-05 PROCEDURE — 82728 ASSAY OF FERRITIN: CPT

## 2023-04-05 PROCEDURE — 85025 COMPLETE CBC W/AUTO DIFF WBC: CPT

## 2023-04-05 PROCEDURE — 36415 COLL VENOUS BLD VENIPUNCTURE: CPT

## 2023-04-05 RX ORDER — DIPHENHYDRAMINE HCL 25 MG
25 CAPSULE ORAL ONCE
Status: CANCELLED
Start: 2023-04-14 | End: 2023-04-14

## 2023-04-05 RX ORDER — FAMOTIDINE 10 MG/ML
20 INJECTION, SOLUTION INTRAVENOUS AS NEEDED
Status: CANCELLED | OUTPATIENT
Start: 2023-04-14

## 2023-04-05 RX ORDER — ACETAMINOPHEN 325 MG/1
650 TABLET ORAL ONCE
Status: CANCELLED
Start: 2023-04-21 | End: 2023-04-21

## 2023-04-05 RX ORDER — FAMOTIDINE 10 MG/ML
20 INJECTION, SOLUTION INTRAVENOUS AS NEEDED
Status: CANCELLED | OUTPATIENT
Start: 2023-04-21

## 2023-04-05 RX ORDER — SODIUM CHLORIDE 9 MG/ML
250 INJECTION, SOLUTION INTRAVENOUS ONCE
Status: CANCELLED | OUTPATIENT
Start: 2023-04-21

## 2023-04-05 RX ORDER — DIPHENHYDRAMINE HYDROCHLORIDE 50 MG/ML
50 INJECTION INTRAMUSCULAR; INTRAVENOUS AS NEEDED
Status: CANCELLED | OUTPATIENT
Start: 2023-04-14

## 2023-04-05 RX ORDER — DIPHENHYDRAMINE HCL 25 MG
25 CAPSULE ORAL ONCE
Status: CANCELLED
Start: 2023-04-21 | End: 2023-04-21

## 2023-04-05 RX ORDER — ACETAMINOPHEN 325 MG/1
650 TABLET ORAL ONCE
Status: CANCELLED
Start: 2023-04-14 | End: 2023-04-14

## 2023-04-05 RX ORDER — DIPHENHYDRAMINE HYDROCHLORIDE 50 MG/ML
50 INJECTION INTRAMUSCULAR; INTRAVENOUS AS NEEDED
Status: CANCELLED | OUTPATIENT
Start: 2023-04-21

## 2023-04-05 RX ORDER — SODIUM CHLORIDE 9 MG/ML
250 INJECTION, SOLUTION INTRAVENOUS ONCE
Status: CANCELLED | OUTPATIENT
Start: 2023-04-14

## 2023-04-05 NOTE — PROGRESS NOTES
"[facility] ON[facility] HEMATOLOGY & ONCOLOGY [place]  [address]  [facility] [address] [phone]    Patient Name: Bernard Matta  Encounter Date: 04/05/2023   YOB: 1950  Patient Number: [id number]    PROGRESS NOTE    HISTORY OF PRESENT ILLNESS: Bernard Matta is a 72 y.o. male  followed by this office for iron deficiency anemia. He has health history significant for DM on insulin, HTN, Hyperlipidemia, iron deficiency, CKD. He received Inejctafer 750 mg on Feb 3, 2023.     INTERVAL HISTORY    Pt presents to clinic today for continued follow up.  He had Injectafer infusion on Feb 3, 2023 and Retacrit injection on Feb 22, 2023.  Hgb has been stable since then.     He states he feels \"pretty good\". Headaches are better as is dyspnea.     He had labs drawn and results were reviewed with him.         LABS    Lab Results - Last 18 Months   Lab Units 04/05/23  0855 03/22/23  0917 03/08/23  1332 02/22/23  0946 01/25/23  1019 01/03/23  1336 07/25/22  0825   HEMOGLOBIN g/dL 11.3* 10.9* 11.2* 9.8* 9.2*  --  9.7*   HEMOGLOBIN, POC g/dL  --   --   --   --   --  7.9*  --    HEMATOCRIT % 36.3* 36.5* 37.2* 31.8* 31.1*  --  30.8*   MCV fL 90.8 93.6 92.1 90.1 88.1  --  87.0   WBC 10*3/mm3 8.49 6.80 6.00 5.25 6.45  --  5.14   RDW % 13.9 14.6 17.2* 17.2* 17.9*  --  13.3   MPV fL 9.7 9.3 8.8 8.7 9.1  --   --    PLATELETS 10*3/mm3 150 157 156 146 180  --  191   IMM GRAN % % 0.4 0.3 0.2 0.2 0.3  --   --    NEUTROS ABS 10*3/mm3 6.35 4.89 3.78 3.71 4.59  --  3.36   LYMPHS ABS 10*3/mm3 1.24 1.02 1.29 0.90 0.94  --  1.00   MONOS ABS 10*3/mm3 0.46 0.45 0.52 0.32 0.42  --  0.40   EOS ABS 10*3/mm3 0.37 0.38 0.35 0.28 0.45*  --  0.32   BASOS ABS 10*3/mm3 0.04 0.04 0.05 0.03 0.03  --  0.05   IMMATURE GRANS (ABS) 10*3/mm3 0.03 0.02 0.01 0.01 0.02  --   --    NRBC /100 WBC 0.0 0.0 0.0 0.0 0.0  --  0.0       Lab Results - Last 18 Months   Lab Units 04/05/23  0855 " 02/22/23  0946 01/25/23  1019 07/25/22  0825   GLUCOSE mg/dL 282* 384* 184* 170*   SODIUM mmol/L 138 137 139 137   POTASSIUM mmol/L 4.6 4.5 4.9 5.3*   TOTAL CO2 mmol/L  --   --   --  29.1*   CO2 mmol/L 27.0 27.0 28.0  --    CHLORIDE mmol/L 100 100 103 100   ANION GAP mmol/L 11.0 10.0 8.0  --    CREATININE mg/dL 1.92* 1.93* 1.67* 2.27*   BUN mg/dL 29* 31* 28* 34*   BUN / CREAT RATIO  15.1 16.1 16.8 15.0   CALCIUM mg/dL 8.8 8.6 9.0 9.3   ALK PHOS U/L 92 95 91 96   TOTAL PROTEIN g/dL 6.6 6.8 7.1  --    ALT (SGPT) U/L 27 30 21 21   AST (SGOT) U/L 19 19 14 17   BILIRUBIN mg/dL 0.2 0.2 0.3 0.4   ALBUMIN g/dL 3.9 4.0 4.0  3.7 4.20   GLOBULIN gm/dL 2.7 2.8 3.1  --        Lab Results - Last 18 Months   Lab Units 01/25/23  1019   M-SPIKE g/dL Not Observed   KAPPA/LAMBDA RATIO, S  1.55   FREE LAMBDA LIGHT CHAINS mg/L 25.8   IG KAPPA FREE LIGHT CHAIN mg/L 40.0*   LDH U/L 183       Lab Results - Last 18 Months   Lab Units 04/05/23  0855 02/22/23  0946 01/25/23  1019 07/25/22  0825   IRON mcg/dL 36* 55* 122  --    TIBC mcg/dL 256* 274* 337  --    IRON SATURATION % 14* 20 36  --    FERRITIN ng/mL 59.42 256.20 16.82*  --    TSH uIU/mL 1.740  --   --  2.900   FOLATE ng/mL  --   --  >20.00 >20.00         PAST MEDICAL HISTORY:  ALLERGIES:  Allergies   Allergen Reactions   • Empagliflozin Other (See Comments)     CURRENT MEDICATIONS:  Outpatient Encounter Medications as of 4/5/2023   Medication Sig Dispense Refill   • Accu-Chek FastClix Lancets misc Use 4 x daily ,   ICD10 code is E11.9 You dispensed the wrong this, patient wants acucheck fast click lancets 360 each 3   • Alcohol Swabs (B-D SINGLE USE SWABS REGULAR) pads USE FOUR TIMES DAILY 400 each 11   • amitriptyline (ELAVIL) 10 MG tablet TAKE 1 TABLET EVERY NIGHT AS NEEDED FOR SLEEP 90 tablet 3   • aspirin 81 MG tablet Take 1 tablet by mouth Daily.     • atorvastatin (LIPITOR) 40 MG tablet TAKE 1 TABLET BY MOUTH EVERY NIGHT AT BEDTIME. 90 tablet 3   • Blood Glucose Monitoring  "Suppl w/Device kit accu chek albertina 1 each 1   • cetirizine (zyrTEC) 10 MG tablet Take 1 tablet by mouth Daily.     • cholecalciferol (VITAMIN D3) 400 UNITS tablet Take 1 tablet by mouth Daily.     • cloNIDine (CATAPRES) 0.2 MG tablet Take 1 tablet by mouth 2 (Two) Times a Day. 180 tablet 3   • Continuous Blood Gluc Sensor (Dexcom G6 Sensor) Every 10 (Ten) Days. 3 each 5   • Continuous Blood Gluc Transmit (Dexcom G6 Transmitter) misc 1 each Every 3 (Three) Months. 1 each 2   • dorzolamide-timolol (COSOPT) 22.3-6.8 MG/ML ophthalmic solution 1 drop 2 (Two) Times a Day.     • Droplet Pen Needles 31G X 5 MM misc USE FOUR TIMES DAILY AS DIRECTED 400 each 3   • epoetin shari-epbx (Retacrit) 07770 UNIT/ML injection Inject  under the skin into the appropriate area as directed Every 14 (Fourteen) Days.     • ferrous sulfate 325 (65 FE) MG tablet 1 tablet. Takes 1 every other day .     • fluticasone (FLONASE) 50 MCG/ACT nasal spray USE 2 SPRAYS IN EACH NOSTRIL EVERY DAY 48 g 5   • furosemide (Lasix) 20 MG tablet Take 1 tablet by mouth Daily. 90 tablet 3   • Glucagon (Baqsimi One Pack) 3 MG/DOSE powder 1 each into the nostril(s) as directed by provider As Needed (Hypoglycemia). Apply intranasal if hypoglycemia 2 each 11   • glucose blood (Accu-Chek Marry Plus) test strip TEST BLOOD SUGAR FOUR TIMES DAILY 400 each 6   • insulin aspart (NovoLOG FlexPen) 100 UNIT/ML solution pen-injector sc pen USE UP TO 20 UNITS WITH MEALS. DISCARD PEN 28 DAYS AFTER OPENING 60 mL 3   • Insulin Pen Needle 31G X 5 MM misc Droplet Pen Needle 31 gauge x 3/16\"     • Lancet Devices (Lancing Device) misc acucheck fast click lancing device. Make certain you send the correct one. Talk to patient . States you sent the wrong one 1 each 11   • latanoprost (XALATAN) 0.005 % ophthalmic solution 1 drop Every Night.     • lisinopril (PRINIVIL,ZESTRIL) 10 MG tablet Take 1 tablet by mouth Every Night. 90 tablet 2   • Multiple Vitamins-Minerals (MULTIVITAMIN WITH " MINERALS) tablet tablet Take 1 tablet by mouth Daily.     • tamsulosin (FLOMAX) 0.4 MG capsule 24 hr capsule TAKE 2 CAPSULES EVERY EVENING 180 capsule 2   • Tresiba FlexTouch 100 UNIT/ML solution pen-injector injection INJECT 20 UNITS UNDER THE SKIN INTO THE APPROPRIATE AREA AS DIRECTED EVERY NIGHT. 30 mL 2     No facility-administered encounter medications on file as of 4/5/2023.     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        Immunization History   Administered Date(s) Administered   • COVID-19 (MODERNA) 1st, 2nd, 3rd Dose Only 03/02/2021, 03/30/2021, 11/02/2021   • COVID-19 (PFIZER) BIVALENT BOOSTER 12+YRS 09/29/2022   • Covid-19 (Pfizer) Gray Cap 07/28/2022   • FLUAD TRI 65YR+ 09/09/2019   • Flu Vaccine Split Quad 01/01/2013   • FluLaval/Fluzone >6mos 01/01/2013, 09/20/2022   • Fluad Quad 65+ 09/09/2019, 09/09/2020   • Fluzone High Dose =>65 Years ([employer] ONLY) 09/14/2016, 10/02/2017, 10/08/2018   • Fluzone High-Dose 65+yrs 10/19/2021   • Pneumococcal Conjugate 13-Valent (PCV13) 06/11/2015   • Pneumococcal Polysaccharide (PPSV23) 04/27/2012, 01/01/2013   • Shingrix 06/21/2018, 01/07/2019   • Tdap 05/08/2013     Last Completed Mammogram     This patient has no relevant Health Maintenance data.            FAMILY HISTORY:  Family History   Problem Relation Age of Onset   • No Known Problems Maternal Grandmother    • No Known Problems Maternal Grandfather    • No Known Problems Paternal Grandmother    • No  "Known Problems Paternal Grandfather    • No Known Problems Mother    • No Known Problems Father    • Colon cancer Neg Hx    • Colon polyps Neg Hx    • Esophageal cancer Neg Hx      SOCIAL HISTORY:  Social History     Socioeconomic History   • Marital status:    Tobacco Use   • Smoking status: Former     Packs/day: 0.50     Years: 10.00     Pack years: 5.00     Types: Cigarettes     Quit date: 1979     Years since quittin.2   • Smokeless tobacco: Never   Vaping Use   • Vaping Use: Never used   Substance and Sexual Activity   • Alcohol use: No   • Drug use: No   • Sexual activity: Defer       REVIEW OF SYSTEMS:  Review of Systems   Constitutional: Positive for fatigue. Negative for activity change, appetite change, fever, unexpected weight gain and unexpected weight loss.   HENT: Negative for dental problem, facial swelling, swollen glands and trouble swallowing.    Eyes: Negative for double vision and discharge.        Wears glasses,   Blind in right eye   Respiratory: Positive for shortness of breath. Negative for cough and wheezing.    Cardiovascular: Negative for chest pain, palpitations and leg swelling.   Gastrointestinal: Negative for abdominal pain, blood in stool, nausea and vomiting.   Endocrine: Negative.    Genitourinary: Negative for dysuria, frequency and hematuria.        Hesitancy has see Urology and is on Flomax      Musculoskeletal: Negative for arthralgias, back pain and myalgias.   Skin: Negative for rash, skin lesions and wound.   Allergic/Immunologic: Negative for immunocompromised state.   Neurological: Positive for headache. Negative for speech difficulty, light-headedness, memory problem and confusion.   Hematological: Negative for adenopathy.   Psychiatric/Behavioral: Negative for self-injury, suicidal ideas and depressed mood. The patient is not nervous/anxious.        /68   Pulse 78   Temp 97.9 °F (36.6 °C)   Resp 16   Ht 172.7 cm (68\")   Wt 85.5 kg (188 lb 8 oz)  "  SpO2 93%   BMI 28.66 kg/m²  Body surface area is 1.99 meters squared.    Pain Score    23 0954   PainSc: 0-No pain         Physical Exam  Constitutional:       Appearance: Normal appearance.   HENT:      Head: Normocephalic and atraumatic.   Cardiovascular:      Rate and Rhythm: Normal rate and regular rhythm.   Pulmonary:      Effort: Pulmonary effort is normal.      Breath sounds: Normal breath sounds.   Abdominal:      General: Bowel sounds are normal.      Palpations: Abdomen is soft.   Musculoskeletal:      Right lower leg: No edema.      Left lower leg: No edema.   Skin:     General: Skin is warm and dry.   Neurological:      Mental Status: He is alert and oriented to person, place, and time.   Psychiatric:         Attention and Perception: Attention normal.         Mood and Affect: Mood normal.         Judgment: Judgment normal.         Bernard Matta reports a pain score of 0.  Given his pain assessment as noted, treatment options were discussed and the following options were decided upon as a follow-up plan to address the patient's pain: continuation of current treatment plan for pain and tylenol. No intervention indicated today       ASSESSMENT / PLAN:    1. Iron deficiency anemia, unspecified iron deficiency anemia type    2. Stage 3b chronic kidney disease    3. Intestinal malabsorption, unspecified type    4. Type 2 diabetes mellitus with stage 3b chronic kidney disease, with long-term current use of insulin       1.  Iron Deficiency   2. Stage IIIb Chronic Kidney Disease   3.  Intestinal malabsorption of iron  -Received Inejctafer 750 mg on Feb 3, 2023.   -Received Retacrit 2023  -Hgb 11.3, Hct 36.6, Iron 36, Ferritin 59, Sat 14%, TIBC 256  -BUN 29, Creatinine 1.92, GFR 39.1  -Iron has  significantly.  Pt denies any hematuria or blood in stool   -Will order occult blood stool cards x 3  -Will order 2 additional Injectafer infusions    4.  Type 2 Diabetes   -Non fasting glucose  282  -A1C improving.  7.3 today   -Patient has CGM  -Taking Tresiba  -Continue follow up with Endocrinology         PLAN:  Injectafer x 2 has been scheduled   Continue Retacrit  Biweekly for Hgb < 11 or Hct < 33   Continue current medications, treatment plans and follow up with PCP and any other providers  Return to office in 1 month with Pre-office labs for CBC, CMP, Iron Profile and Ferritin   Care discussed with patient.  Understanding expressed.  Patient agreeable with plan.          ACP discussion was held with the patient during this visit. Patient has an advance directive in EMR which is still valid.       Leesa Anne, APRN  04/05/2023

## 2023-04-07 ENCOUNTER — OFFICE VISIT (OUTPATIENT)
Dept: FAMILY MEDICINE CLINIC | Facility: CLINIC | Age: 73
End: 2023-04-07
Payer: MEDICARE

## 2023-04-07 VITALS
BODY MASS INDEX: 28.46 KG/M2 | SYSTOLIC BLOOD PRESSURE: 132 MMHG | HEIGHT: 68 IN | HEART RATE: 106 BPM | TEMPERATURE: 98.6 F | OXYGEN SATURATION: 98 % | DIASTOLIC BLOOD PRESSURE: 80 MMHG | WEIGHT: 187.8 LBS

## 2023-04-07 DIAGNOSIS — M79.675 GREAT TOE PAIN, LEFT: Primary | ICD-10-CM

## 2023-04-07 RX ORDER — AMOXICILLIN 500 MG/1
500 CAPSULE ORAL 2 TIMES DAILY
Qty: 14 CAPSULE | Refills: 0 | Status: SHIPPED | OUTPATIENT
Start: 2023-04-07 | End: 2023-04-14

## 2023-04-07 RX ORDER — CEFTRIAXONE 500 MG/1
500 INJECTION, POWDER, FOR SOLUTION INTRAMUSCULAR; INTRAVENOUS EVERY 24 HOURS
Status: DISCONTINUED | OUTPATIENT
Start: 2023-04-07 | End: 2023-04-07

## 2023-04-07 RX ORDER — CEFTRIAXONE 500 MG/1
500 INJECTION, POWDER, FOR SOLUTION INTRAMUSCULAR; INTRAVENOUS ONCE
Status: COMPLETED | OUTPATIENT
Start: 2023-04-07 | End: 2023-04-07

## 2023-04-07 RX ADMIN — CEFTRIAXONE 500 MG: 500 INJECTION, POWDER, FOR SOLUTION INTRAMUSCULAR; INTRAVENOUS at 13:24

## 2023-04-07 NOTE — PROGRESS NOTES
Subjective   Bernard Matta is a 72 y.o. male.     History of Present Illness  72-year-old male with history of azotemia diabetes hypertension- left toe pain after kicking concrete      The following portions of the patient's history were reviewed and updated as appropriate: allergies, current medications, past family history, past medical history, past social history, past surgical history and problem list.    Review of Systems   Constitutional: Negative for chills and fever.   Eyes:        Legally blind   Endocrine: Negative for polydipsia, polyphagia and polyuria.   Genitourinary:        Chronic renal disease   Skin:        Paronychial infection cellulitis medial aspect of left great toenail   Hematological:        Requires intravenous iron for iron deficiency       Objective   Physical Exam  Vitals and nursing note reviewed.   Constitutional:       Appearance: Normal appearance.   Musculoskeletal:         General: Swelling and tenderness present.      Comments: See skin section   Skin:     General: Skin is warm and dry.      Findings: Erythema present.      Comments: Cellulitis left great toenail-the medial aspect   Neurological:      General: No focal deficit present.      Mental Status: He is alert and oriented to person, place, and time.   Psychiatric:         Mood and Affect: Mood normal.         Behavior: Behavior normal.         Thought Content: Thought content normal.         Judgment: Judgment normal.         Assessment & Plan   Diagnoses and all orders for this visit:    1. Great toe pain, left (Primary)    Other orders  -     amoxicillin (AMOXIL) 500 MG capsule; Take 1 capsule by mouth 2 (Two) Times a Day for 7 days.  Dispense: 14 capsule; Refill: 0

## 2023-04-14 ENCOUNTER — INFUSION (OUTPATIENT)
Dept: ONCOLOGY | Facility: HOSPITAL | Age: 73
End: 2023-04-14
Payer: MEDICARE

## 2023-04-14 ENCOUNTER — LAB (OUTPATIENT)
Dept: LAB | Facility: HOSPITAL | Age: 73
End: 2023-04-14
Payer: MEDICARE

## 2023-04-14 VITALS
HEIGHT: 68 IN | TEMPERATURE: 97.1 F | WEIGHT: 187.4 LBS | SYSTOLIC BLOOD PRESSURE: 110 MMHG | DIASTOLIC BLOOD PRESSURE: 71 MMHG | HEART RATE: 65 BPM | RESPIRATION RATE: 17 BRPM | BODY MASS INDEX: 28.4 KG/M2 | OXYGEN SATURATION: 99 %

## 2023-04-14 DIAGNOSIS — K90.9 INTESTINAL MALABSORPTION, UNSPECIFIED TYPE: ICD-10-CM

## 2023-04-14 DIAGNOSIS — D50.9 IRON DEFICIENCY ANEMIA, UNSPECIFIED IRON DEFICIENCY ANEMIA TYPE: ICD-10-CM

## 2023-04-14 DIAGNOSIS — D50.9 IRON DEFICIENCY ANEMIA, UNSPECIFIED IRON DEFICIENCY ANEMIA TYPE: Primary | ICD-10-CM

## 2023-04-14 DIAGNOSIS — N18.32 STAGE 3B CHRONIC KIDNEY DISEASE: ICD-10-CM

## 2023-04-14 LAB
BASOPHILS # BLD AUTO: 0.05 10*3/MM3 (ref 0–0.2)
BASOPHILS NFR BLD AUTO: 0.9 % (ref 0–1.5)
COLLECT DATE SP2 STL: NORMAL
COLLECT DATE SP3 STL: NORMAL
COLLECT DATE STL: NORMAL
DEPRECATED RDW RBC AUTO: 46.4 FL (ref 37–54)
EOSINOPHIL # BLD AUTO: 0.42 10*3/MM3 (ref 0–0.4)
EOSINOPHIL NFR BLD AUTO: 7.9 % (ref 0.3–6.2)
ERYTHROCYTE [DISTWIDTH] IN BLOOD BY AUTOMATED COUNT: 13.8 % (ref 12.3–15.4)
GASTROCULT GAST QL: NEGATIVE
HCT VFR BLD AUTO: 37.8 % (ref 37.5–51)
HEMOCCULT SP2 STL QL: NEGATIVE
HEMOCCULT SP3 STL QL: NEGATIVE
HGB BLD-MCNC: 11.4 G/DL (ref 13–17.7)
HOLD SPECIMEN: NORMAL
IMM GRANULOCYTES # BLD AUTO: 0.01 10*3/MM3 (ref 0–0.05)
IMM GRANULOCYTES NFR BLD AUTO: 0.2 % (ref 0–0.5)
LYMPHOCYTES # BLD AUTO: 1 10*3/MM3 (ref 0.7–3.1)
LYMPHOCYTES NFR BLD AUTO: 18.8 % (ref 19.6–45.3)
Lab: 915
Lab: 920
Lab: 940
MCH RBC QN AUTO: 27.7 PG (ref 26.6–33)
MCHC RBC AUTO-ENTMCNC: 30.2 G/DL (ref 31.5–35.7)
MCV RBC AUTO: 91.7 FL (ref 79–97)
MONOCYTES # BLD AUTO: 0.4 10*3/MM3 (ref 0.1–0.9)
MONOCYTES NFR BLD AUTO: 7.5 % (ref 5–12)
NEUTROPHILS NFR BLD AUTO: 3.45 10*3/MM3 (ref 1.7–7)
NEUTROPHILS NFR BLD AUTO: 64.7 % (ref 42.7–76)
NRBC BLD AUTO-RTO: 0 /100 WBC (ref 0–0.2)
PLATELET # BLD AUTO: 143 10*3/MM3 (ref 140–450)
PMV BLD AUTO: 8.8 FL (ref 6–12)
RBC # BLD AUTO: 4.12 10*6/MM3 (ref 4.14–5.8)
WBC NRBC COR # BLD: 5.33 10*3/MM3 (ref 3.4–10.8)

## 2023-04-14 PROCEDURE — 82272 OCCULT BLD FECES 1-3 TESTS: CPT

## 2023-04-14 PROCEDURE — 96365 THER/PROPH/DIAG IV INF INIT: CPT

## 2023-04-14 PROCEDURE — 25010000002 FERRIC CARBOXYMALTOSE 750 MG/15ML SOLUTION 15 ML VIAL: Performed by: NURSE PRACTITIONER

## 2023-04-14 PROCEDURE — 96374 THER/PROPH/DIAG INJ IV PUSH: CPT

## 2023-04-14 PROCEDURE — A9270 NON-COVERED ITEM OR SERVICE: HCPCS | Performed by: NURSE PRACTITIONER

## 2023-04-14 PROCEDURE — 85025 COMPLETE CBC W/AUTO DIFF WBC: CPT

## 2023-04-14 PROCEDURE — 63710000001 ACETAMINOPHEN 325 MG TABLET: Performed by: NURSE PRACTITIONER

## 2023-04-14 PROCEDURE — 63710000001 DIPHENHYDRAMINE PER 50 MG: Performed by: NURSE PRACTITIONER

## 2023-04-14 PROCEDURE — 36415 COLL VENOUS BLD VENIPUNCTURE: CPT

## 2023-04-14 RX ORDER — ACETAMINOPHEN 325 MG/1
650 TABLET ORAL ONCE
Status: COMPLETED | OUTPATIENT
Start: 2023-04-14 | End: 2023-04-14

## 2023-04-14 RX ORDER — DIPHENHYDRAMINE HCL 25 MG
25 CAPSULE ORAL ONCE
Status: COMPLETED | OUTPATIENT
Start: 2023-04-14 | End: 2023-04-14

## 2023-04-14 RX ORDER — SODIUM CHLORIDE 9 MG/ML
250 INJECTION, SOLUTION INTRAVENOUS ONCE
Status: COMPLETED | OUTPATIENT
Start: 2023-04-14 | End: 2023-04-14

## 2023-04-14 RX ORDER — DIPHENHYDRAMINE HYDROCHLORIDE 50 MG/ML
50 INJECTION INTRAMUSCULAR; INTRAVENOUS AS NEEDED
Status: DISCONTINUED | OUTPATIENT
Start: 2023-04-14 | End: 2023-04-14 | Stop reason: HOSPADM

## 2023-04-14 RX ORDER — FAMOTIDINE 10 MG/ML
20 INJECTION, SOLUTION INTRAVENOUS AS NEEDED
Status: DISCONTINUED | OUTPATIENT
Start: 2023-04-14 | End: 2023-04-14 | Stop reason: HOSPADM

## 2023-04-14 RX ADMIN — FERRIC CARBOXYMALTOSE INJECTION 750 MG: 50 INJECTION, SOLUTION INTRAVENOUS at 09:25

## 2023-04-14 RX ADMIN — DIPHENHYDRAMINE HYDROCHLORIDE 25 MG: 25 CAPSULE ORAL at 09:23

## 2023-04-14 RX ADMIN — SODIUM CHLORIDE 250 ML: 9 INJECTION, SOLUTION INTRAVENOUS at 09:10

## 2023-04-14 RX ADMIN — ACETAMINOPHEN 650 MG: 325 TABLET ORAL at 09:22

## 2023-04-17 ENCOUNTER — PROCEDURE VISIT (OUTPATIENT)
Dept: FAMILY MEDICINE CLINIC | Facility: CLINIC | Age: 73
End: 2023-04-17
Payer: MEDICARE

## 2023-04-17 VITALS
TEMPERATURE: 98 F | BODY MASS INDEX: 28.64 KG/M2 | DIASTOLIC BLOOD PRESSURE: 76 MMHG | RESPIRATION RATE: 16 BRPM | OXYGEN SATURATION: 96 % | SYSTOLIC BLOOD PRESSURE: 132 MMHG | WEIGHT: 189 LBS | HEART RATE: 90 BPM | HEIGHT: 68 IN

## 2023-04-17 DIAGNOSIS — L98.9 SKIN LESION: Primary | ICD-10-CM

## 2023-04-17 NOTE — PROGRESS NOTES
Procedure   Hyfrecation    Date/Time: 4/17/2023 10:33 AM  Performed by: Brando Webb MD  Authorized by: Brando Webb MD   Preparation: Patient was prepped and draped in the usual sterile fashion.  Local anesthesia used: yes    Anesthesia:  Local anesthesia used: yes  Local Anesthetic: lidocaine 1% with epinephrine  Anesthetic total: 2 mL    Sedation:  Patient sedated: no    Patient tolerance: patient tolerated the procedure well with no immediate complications  Comments: Patient had a 1 cm skin lesion left neck at the level of the larynx removed.  This was done by curettement and hot cautery- it is suspicious for basal cell.  Pathology pending.  Wound care instructions were given and the patient will be advised of the pathology report

## 2023-04-19 ENCOUNTER — INFUSION (OUTPATIENT)
Dept: ONCOLOGY | Facility: HOSPITAL | Age: 73
End: 2023-04-19
Payer: MEDICARE

## 2023-04-19 ENCOUNTER — LAB (OUTPATIENT)
Dept: LAB | Facility: HOSPITAL | Age: 73
End: 2023-04-19
Payer: MEDICARE

## 2023-04-19 VITALS
HEART RATE: 87 BPM | WEIGHT: 189 LBS | SYSTOLIC BLOOD PRESSURE: 125 MMHG | BODY MASS INDEX: 28.64 KG/M2 | DIASTOLIC BLOOD PRESSURE: 69 MMHG | HEIGHT: 68 IN | OXYGEN SATURATION: 99 % | RESPIRATION RATE: 18 BRPM | TEMPERATURE: 97.9 F

## 2023-04-19 DIAGNOSIS — D50.9 IRON DEFICIENCY ANEMIA, UNSPECIFIED IRON DEFICIENCY ANEMIA TYPE: ICD-10-CM

## 2023-04-19 LAB
ALBUMIN SERPL-MCNC: 4.2 G/DL (ref 3.5–5.2)
ALBUMIN/GLOB SERPL: 1.6 G/DL
ALP SERPL-CCNC: 91 U/L (ref 39–117)
ALT SERPL W P-5'-P-CCNC: 49 U/L (ref 1–41)
ANION GAP SERPL CALCULATED.3IONS-SCNC: 8 MMOL/L (ref 5–15)
AST SERPL-CCNC: 34 U/L (ref 1–40)
BASOPHILS # BLD AUTO: 0.04 10*3/MM3 (ref 0–0.2)
BASOPHILS NFR BLD AUTO: 0.6 % (ref 0–1.5)
BILIRUB SERPL-MCNC: 0.3 MG/DL (ref 0–1.2)
BUN SERPL-MCNC: 28 MG/DL (ref 8–23)
BUN/CREAT SERPL: 15.6 (ref 7–25)
CALCIUM SPEC-SCNC: 8.5 MG/DL (ref 8.6–10.5)
CHLORIDE SERPL-SCNC: 103 MMOL/L (ref 98–107)
CO2 SERPL-SCNC: 28 MMOL/L (ref 22–29)
CREAT SERPL-MCNC: 1.79 MG/DL (ref 0.76–1.27)
DEPRECATED RDW RBC AUTO: 44.5 FL (ref 37–54)
EGFRCR SERPLBLD CKD-EPI 2021: 39.3 ML/MIN/1.73
EOSINOPHIL # BLD AUTO: 0.35 10*3/MM3 (ref 0–0.4)
EOSINOPHIL NFR BLD AUTO: 5.2 % (ref 0.3–6.2)
ERYTHROCYTE [DISTWIDTH] IN BLOOD BY AUTOMATED COUNT: 13.5 % (ref 12.3–15.4)
GLOBULIN UR ELPH-MCNC: 2.6 GM/DL
GLUCOSE SERPL-MCNC: 163 MG/DL (ref 65–99)
HCT VFR BLD AUTO: 35.8 % (ref 37.5–51)
HGB BLD-MCNC: 11 G/DL (ref 13–17.7)
IMM GRANULOCYTES # BLD AUTO: 0.02 10*3/MM3 (ref 0–0.05)
IMM GRANULOCYTES NFR BLD AUTO: 0.3 % (ref 0–0.5)
LYMPHOCYTES # BLD AUTO: 1.39 10*3/MM3 (ref 0.7–3.1)
LYMPHOCYTES NFR BLD AUTO: 20.5 % (ref 19.6–45.3)
MCH RBC QN AUTO: 27.9 PG (ref 26.6–33)
MCHC RBC AUTO-ENTMCNC: 30.7 G/DL (ref 31.5–35.7)
MCV RBC AUTO: 90.9 FL (ref 79–97)
MONOCYTES # BLD AUTO: 0.52 10*3/MM3 (ref 0.1–0.9)
MONOCYTES NFR BLD AUTO: 7.7 % (ref 5–12)
NEUTROPHILS NFR BLD AUTO: 4.45 10*3/MM3 (ref 1.7–7)
NEUTROPHILS NFR BLD AUTO: 65.7 % (ref 42.7–76)
NRBC BLD AUTO-RTO: 0 /100 WBC (ref 0–0.2)
PLATELET # BLD AUTO: 154 10*3/MM3 (ref 140–450)
PMV BLD AUTO: 8.9 FL (ref 6–12)
POTASSIUM SERPL-SCNC: 4.5 MMOL/L (ref 3.5–5.2)
PROT SERPL-MCNC: 6.8 G/DL (ref 6–8.5)
RBC # BLD AUTO: 3.94 10*6/MM3 (ref 4.14–5.8)
SODIUM SERPL-SCNC: 139 MMOL/L (ref 136–145)
WBC NRBC COR # BLD: 6.77 10*3/MM3 (ref 3.4–10.8)

## 2023-04-19 PROCEDURE — 36415 COLL VENOUS BLD VENIPUNCTURE: CPT

## 2023-04-19 PROCEDURE — 85025 COMPLETE CBC W/AUTO DIFF WBC: CPT

## 2023-04-19 PROCEDURE — 80053 COMPREHEN METABOLIC PANEL: CPT

## 2023-04-19 PROCEDURE — G0463 HOSPITAL OUTPT CLINIC VISIT: HCPCS

## 2023-04-20 LAB
DX ICD CODE: NORMAL
DX ICD CODE: NORMAL
PATH REPORT.FINAL DX SPEC: NORMAL
PATH REPORT.GROSS SPEC: NORMAL
PATH REPORT.SITE OF ORIGIN SPEC: NORMAL
PATHOLOGIST NAME: NORMAL
PAYMENT PROCEDURE: NORMAL

## 2023-04-21 ENCOUNTER — INFUSION (OUTPATIENT)
Dept: ONCOLOGY | Facility: HOSPITAL | Age: 73
End: 2023-04-21
Payer: MEDICARE

## 2023-04-21 VITALS
OXYGEN SATURATION: 99 % | SYSTOLIC BLOOD PRESSURE: 118 MMHG | BODY MASS INDEX: 28.79 KG/M2 | HEART RATE: 59 BPM | HEIGHT: 68 IN | DIASTOLIC BLOOD PRESSURE: 71 MMHG | WEIGHT: 190 LBS | TEMPERATURE: 96.4 F | RESPIRATION RATE: 18 BRPM

## 2023-04-21 DIAGNOSIS — D50.9 IRON DEFICIENCY ANEMIA, UNSPECIFIED IRON DEFICIENCY ANEMIA TYPE: Primary | ICD-10-CM

## 2023-04-21 DIAGNOSIS — K90.9 INTESTINAL MALABSORPTION, UNSPECIFIED TYPE: ICD-10-CM

## 2023-04-21 PROCEDURE — 96367 TX/PROPH/DG ADDL SEQ IV INF: CPT

## 2023-04-21 PROCEDURE — A9270 NON-COVERED ITEM OR SERVICE: HCPCS | Performed by: NURSE PRACTITIONER

## 2023-04-21 PROCEDURE — 25010000002 FERRIC CARBOXYMALTOSE 750 MG/15ML SOLUTION 15 ML VIAL: Performed by: NURSE PRACTITIONER

## 2023-04-21 PROCEDURE — 63710000001 ACETAMINOPHEN 325 MG TABLET: Performed by: NURSE PRACTITIONER

## 2023-04-21 PROCEDURE — 63710000001 DIPHENHYDRAMINE PER 50 MG: Performed by: NURSE PRACTITIONER

## 2023-04-21 PROCEDURE — 96365 THER/PROPH/DIAG IV INF INIT: CPT

## 2023-04-21 RX ORDER — SODIUM CHLORIDE 9 MG/ML
250 INJECTION, SOLUTION INTRAVENOUS ONCE
Status: COMPLETED | OUTPATIENT
Start: 2023-04-21 | End: 2023-04-21

## 2023-04-21 RX ORDER — ACETAMINOPHEN 325 MG/1
650 TABLET ORAL ONCE
Status: COMPLETED | OUTPATIENT
Start: 2023-04-21 | End: 2023-04-21

## 2023-04-21 RX ORDER — DIPHENHYDRAMINE HCL 25 MG
25 CAPSULE ORAL ONCE
Status: COMPLETED | OUTPATIENT
Start: 2023-04-21 | End: 2023-04-21

## 2023-04-21 RX ORDER — DIPHENHYDRAMINE HYDROCHLORIDE 50 MG/ML
50 INJECTION INTRAMUSCULAR; INTRAVENOUS AS NEEDED
Status: DISCONTINUED | OUTPATIENT
Start: 2023-04-21 | End: 2023-04-21 | Stop reason: HOSPADM

## 2023-04-21 RX ORDER — FAMOTIDINE 10 MG/ML
20 INJECTION, SOLUTION INTRAVENOUS AS NEEDED
Status: DISCONTINUED | OUTPATIENT
Start: 2023-04-21 | End: 2023-04-21 | Stop reason: HOSPADM

## 2023-04-21 RX ADMIN — SODIUM CHLORIDE 250 ML: 9 INJECTION, SOLUTION INTRAVENOUS at 10:00

## 2023-04-21 RX ADMIN — ACETAMINOPHEN 650 MG: 325 TABLET ORAL at 10:00

## 2023-04-21 RX ADMIN — FERRIC CARBOXYMALTOSE INJECTION 750 MG: 50 INJECTION, SOLUTION INTRAVENOUS at 10:01

## 2023-04-21 RX ADMIN — DIPHENHYDRAMINE HYDROCHLORIDE 25 MG: 25 CAPSULE ORAL at 10:00

## 2023-04-24 DIAGNOSIS — N18.9 ANEMIA SECONDARY TO RENAL FAILURE: Primary | ICD-10-CM

## 2023-04-24 DIAGNOSIS — D63.1 ANEMIA SECONDARY TO RENAL FAILURE: Primary | ICD-10-CM

## 2023-04-27 ENCOUNTER — OFFICE VISIT (OUTPATIENT)
Dept: FAMILY MEDICINE CLINIC | Facility: CLINIC | Age: 73
End: 2023-04-27
Payer: MEDICARE

## 2023-04-27 VITALS
WEIGHT: 188.2 LBS | DIASTOLIC BLOOD PRESSURE: 82 MMHG | TEMPERATURE: 98.2 F | HEIGHT: 68 IN | BODY MASS INDEX: 28.52 KG/M2 | SYSTOLIC BLOOD PRESSURE: 122 MMHG | OXYGEN SATURATION: 97 % | RESPIRATION RATE: 16 BRPM | HEART RATE: 111 BPM

## 2023-04-27 DIAGNOSIS — M79.675 PAIN OF GREAT TOE, LEFT: Primary | ICD-10-CM

## 2023-04-27 DIAGNOSIS — L03.032 INFECTION OF NAIL BED OF TOE OF LEFT FOOT: ICD-10-CM

## 2023-04-27 RX ORDER — CEFDINIR 300 MG/1
300 CAPSULE ORAL 2 TIMES DAILY
Qty: 14 CAPSULE | Refills: 0 | Status: SHIPPED | OUTPATIENT
Start: 2023-04-27

## 2023-04-27 RX ORDER — CEFTRIAXONE SODIUM 250 MG/1
500 INJECTION, POWDER, FOR SOLUTION INTRAMUSCULAR; INTRAVENOUS ONCE
Status: COMPLETED | OUTPATIENT
Start: 2023-04-27 | End: 2023-04-27

## 2023-04-27 RX ORDER — CEFDINIR 300 MG/1
300 CAPSULE ORAL 2 TIMES DAILY
Qty: 14 CAPSULE | Refills: 0 | Status: SHIPPED | OUTPATIENT
Start: 2023-04-27 | End: 2023-04-27 | Stop reason: SDUPTHER

## 2023-04-27 RX ADMIN — CEFTRIAXONE SODIUM 500 MG: 250 INJECTION, POWDER, FOR SOLUTION INTRAMUSCULAR; INTRAVENOUS at 09:32

## 2023-04-27 NOTE — PROGRESS NOTES
Procedure   Nail Removal    Date/Time: 4/27/2023 9:28 AM  Performed by: Brando Webb MD  Authorized by: Brando Webb MD   Location: left foot  Location details: left big toe  Anesthesia method: 0.    Anesthesia:  Anesthetic total: 0 mL    Sedation:  Patient sedated: no    Preparation: skin prepped with alcohol  Amount removed: 1/5  Side: medial  Wedge excision of skin of nail fold: no  Nail bed sutured: no  Nail matrix removed: none  Removed nail replaced and anchored: no  Dressing: antibiotic ointment and 4x4  Patient tolerance: patient tolerated the procedure well with no immediate complications  Comments: The patient has cellulitis and ingrown spike in his left great toenail.  The spike was removed and above dressing placed

## 2023-04-27 NOTE — TELEPHONE ENCOUNTER
Was sent into [facility] but needs to be sent to [facility]     Rx Refill Note  Requested Prescriptions     Pending Prescriptions Disp Refills   • cefdinir (OMNICEF) 300 MG capsule 14 capsule 0     Sig: Take 1 capsule by mouth 2 (Two) Times a Day.      Last office visit with prescribing clinician: 4/27/2023   Last telemedicine visit with prescribing clinician: Visit date not found   Next office visit with prescribing clinician: Visit date not found                         Would you like a call back once the refill request has been completed: [] Yes [] No    If the office needs to give you a call back, can they leave a voicemail: [] Yes [] No    Lanette Gregory, PCT  04/27/23, 13:03 CDT

## 2023-04-28 DIAGNOSIS — D63.1 ANEMIA SECONDARY TO RENAL FAILURE: Primary | ICD-10-CM

## 2023-04-28 DIAGNOSIS — N18.9 ANEMIA SECONDARY TO RENAL FAILURE: Primary | ICD-10-CM

## 2023-05-03 ENCOUNTER — OFFICE VISIT (OUTPATIENT)
Dept: ONCOLOGY | Facility: CLINIC | Age: 73
End: 2023-05-03
Payer: MEDICARE

## 2023-05-03 ENCOUNTER — LAB (OUTPATIENT)
Dept: LAB | Facility: HOSPITAL | Age: 73
End: 2023-05-03
Payer: MEDICARE

## 2023-05-03 ENCOUNTER — APPOINTMENT (OUTPATIENT)
Dept: ONCOLOGY | Facility: HOSPITAL | Age: 73
End: 2023-05-03
Payer: MEDICARE

## 2023-05-03 VITALS
RESPIRATION RATE: 16 BRPM | TEMPERATURE: 97.8 F | WEIGHT: 189 LBS | BODY MASS INDEX: 28.64 KG/M2 | SYSTOLIC BLOOD PRESSURE: 118 MMHG | OXYGEN SATURATION: 96 % | DIASTOLIC BLOOD PRESSURE: 78 MMHG | HEART RATE: 73 BPM | HEIGHT: 68 IN

## 2023-05-03 DIAGNOSIS — N18.9 ANEMIA SECONDARY TO RENAL FAILURE: ICD-10-CM

## 2023-05-03 DIAGNOSIS — N18.32 TYPE 2 DIABETES MELLITUS WITH STAGE 3B CHRONIC KIDNEY DISEASE, WITH LONG-TERM CURRENT USE OF INSULIN: ICD-10-CM

## 2023-05-03 DIAGNOSIS — E11.65 TYPE 2 DIABETES MELLITUS WITH HYPERGLYCEMIA, WITH LONG-TERM CURRENT USE OF INSULIN: ICD-10-CM

## 2023-05-03 DIAGNOSIS — Z79.4 TYPE 2 DIABETES MELLITUS WITH STAGE 3B CHRONIC KIDNEY DISEASE, WITH LONG-TERM CURRENT USE OF INSULIN: ICD-10-CM

## 2023-05-03 DIAGNOSIS — E55.9 VITAMIN D DEFICIENCY: ICD-10-CM

## 2023-05-03 DIAGNOSIS — N18.32 STAGE 3B CHRONIC KIDNEY DISEASE: Primary | ICD-10-CM

## 2023-05-03 DIAGNOSIS — D63.1 ANEMIA SECONDARY TO RENAL FAILURE: ICD-10-CM

## 2023-05-03 DIAGNOSIS — K90.9 INTESTINAL MALABSORPTION, UNSPECIFIED TYPE: ICD-10-CM

## 2023-05-03 DIAGNOSIS — E11.22 TYPE 2 DIABETES MELLITUS WITH STAGE 3B CHRONIC KIDNEY DISEASE, WITH LONG-TERM CURRENT USE OF INSULIN: ICD-10-CM

## 2023-05-03 DIAGNOSIS — Z79.4 TYPE 2 DIABETES MELLITUS WITH HYPERGLYCEMIA, WITH LONG-TERM CURRENT USE OF INSULIN: ICD-10-CM

## 2023-05-03 DIAGNOSIS — D50.9 IRON DEFICIENCY ANEMIA, UNSPECIFIED IRON DEFICIENCY ANEMIA TYPE: ICD-10-CM

## 2023-05-03 DIAGNOSIS — D50.9 IRON DEFICIENCY ANEMIA, UNSPECIFIED IRON DEFICIENCY ANEMIA TYPE: Primary | ICD-10-CM

## 2023-05-03 LAB
ALBUMIN SERPL-MCNC: 4 G/DL (ref 3.5–5.2)
ALBUMIN/GLOB SERPL: 1.4 G/DL
ALP SERPL-CCNC: 104 U/L (ref 39–117)
ALT SERPL W P-5'-P-CCNC: 48 U/L (ref 1–41)
ANION GAP SERPL CALCULATED.3IONS-SCNC: 10 MMOL/L (ref 5–15)
AST SERPL-CCNC: 26 U/L (ref 1–40)
BASOPHILS # BLD AUTO: 0.04 10*3/MM3 (ref 0–0.2)
BASOPHILS NFR BLD AUTO: 0.6 % (ref 0–1.5)
BILIRUB SERPL-MCNC: 0.3 MG/DL (ref 0–1.2)
BUN SERPL-MCNC: 32 MG/DL (ref 8–23)
BUN/CREAT SERPL: 16.2 (ref 7–25)
CALCIUM SPEC-SCNC: 8.6 MG/DL (ref 8.6–10.5)
CHLORIDE SERPL-SCNC: 101 MMOL/L (ref 98–107)
CO2 SERPL-SCNC: 27 MMOL/L (ref 22–29)
CREAT SERPL-MCNC: 1.98 MG/DL (ref 0.76–1.27)
DEPRECATED RDW RBC AUTO: 47.1 FL (ref 37–54)
EGFRCR SERPLBLD CKD-EPI 2021: 34.8 ML/MIN/1.73
EOSINOPHIL # BLD AUTO: 0.35 10*3/MM3 (ref 0–0.4)
EOSINOPHIL NFR BLD AUTO: 5.7 % (ref 0.3–6.2)
ERYTHROCYTE [DISTWIDTH] IN BLOOD BY AUTOMATED COUNT: 14.3 % (ref 12.3–15.4)
FERRITIN SERPL-MCNC: 1027 NG/ML (ref 30–400)
GLOBULIN UR ELPH-MCNC: 2.8 GM/DL
GLUCOSE SERPL-MCNC: 121 MG/DL (ref 65–99)
HCT VFR BLD AUTO: 36 % (ref 37.5–51)
HGB BLD-MCNC: 11.3 G/DL (ref 13–17.7)
HOLD SPECIMEN: NORMAL
IMM GRANULOCYTES # BLD AUTO: 0.02 10*3/MM3 (ref 0–0.05)
IMM GRANULOCYTES NFR BLD AUTO: 0.3 % (ref 0–0.5)
IRON 24H UR-MRATE: 88 MCG/DL (ref 59–158)
IRON SATN MFR SERPL: 39 % (ref 20–50)
LYMPHOCYTES # BLD AUTO: 1.13 10*3/MM3 (ref 0.7–3.1)
LYMPHOCYTES NFR BLD AUTO: 18.3 % (ref 19.6–45.3)
MCH RBC QN AUTO: 28.7 PG (ref 26.6–33)
MCHC RBC AUTO-ENTMCNC: 31.4 G/DL (ref 31.5–35.7)
MCV RBC AUTO: 91.4 FL (ref 79–97)
MONOCYTES # BLD AUTO: 0.52 10*3/MM3 (ref 0.1–0.9)
MONOCYTES NFR BLD AUTO: 8.4 % (ref 5–12)
NEUTROPHILS NFR BLD AUTO: 4.13 10*3/MM3 (ref 1.7–7)
NEUTROPHILS NFR BLD AUTO: 66.7 % (ref 42.7–76)
NRBC BLD AUTO-RTO: 0 /100 WBC (ref 0–0.2)
PLATELET # BLD AUTO: 143 10*3/MM3 (ref 140–450)
PMV BLD AUTO: 8.6 FL (ref 6–12)
POTASSIUM SERPL-SCNC: 4.5 MMOL/L (ref 3.5–5.2)
PROT SERPL-MCNC: 6.8 G/DL (ref 6–8.5)
RBC # BLD AUTO: 3.94 10*6/MM3 (ref 4.14–5.8)
SODIUM SERPL-SCNC: 138 MMOL/L (ref 136–145)
TIBC SERPL-MCNC: 226 MCG/DL (ref 298–536)
TRANSFERRIN SERPL-MCNC: 152 MG/DL (ref 200–360)
WBC NRBC COR # BLD: 6.19 10*3/MM3 (ref 3.4–10.8)

## 2023-05-03 PROCEDURE — 83540 ASSAY OF IRON: CPT

## 2023-05-03 PROCEDURE — 84466 ASSAY OF TRANSFERRIN: CPT

## 2023-05-03 PROCEDURE — 80053 COMPREHEN METABOLIC PANEL: CPT

## 2023-05-03 PROCEDURE — 82728 ASSAY OF FERRITIN: CPT

## 2023-05-03 PROCEDURE — 85025 COMPLETE CBC W/AUTO DIFF WBC: CPT

## 2023-05-03 PROCEDURE — 36415 COLL VENOUS BLD VENIPUNCTURE: CPT

## 2023-05-03 NOTE — PROGRESS NOTES
"[facility] ONC [facility] HEMATOLOGY & ONCOLOGY [place]  [address]  Located within [facility] [address] [phone]    Patient Name: Bernard Matta  Encounter Date: 05/03/2023   YOB: 1950  Patient Number: [id number]    PROGRESS NOTE    HISTORY OF PRESENT ILLNESS: Bernard Matta is a 72 y.o. male  followed by this office for iron deficiency anemia. He has health history significant for DM on insulin, HTN, Hyperlipidemia, iron deficiency, CKD. He received Inejctafer 750 mg on Feb 3, 2023.     INTERVAL HISTORY    Pt presents to clinic today for continued follow up. His last Retacrit was Feb 22. Hgb has been stable since then.      He had Injectafer infusion on April 14 and 21, 2023.        He states he feels \"pretty good\". Headaches are better and shortness of breath is \"a whole lot better\".  He had labs drawn and results were reviewed with him.         LABS    Lab Results - Last 18 Months   Lab Units 05/03/23  1221 04/19/23  1227 04/14/23  0856 04/05/23  0855 03/22/23  0917 03/08/23  1332   HEMOGLOBIN g/dL 11.3* 11.0* 11.4* 11.3* 10.9* 11.2*   HEMATOCRIT % 36.0* 35.8* 37.8 36.3* 36.5* 37.2*   MCV fL 91.4 90.9 91.7 90.8 93.6 92.1   WBC 10*3/mm3 6.19 6.77 5.33 8.49 6.80 6.00   RDW % 14.3 13.5 13.8 13.9 14.6 17.2*   MPV fL 8.6 8.9 8.8 9.7 9.3 8.8   PLATELETS 10*3/mm3 143 154 143 150 157 156   IMM GRAN % % 0.3 0.3 0.2 0.4 0.3 0.2   NEUTROS ABS 10*3/mm3 4.13 4.45 3.45 6.35 4.89 3.78   LYMPHS ABS 10*3/mm3 1.13 1.39 1.00 1.24 1.02 1.29   MONOS ABS 10*3/mm3 0.52 0.52 0.40 0.46 0.45 0.52   EOS ABS 10*3/mm3 0.35 0.35 0.42* 0.37 0.38 0.35   BASOS ABS 10*3/mm3 0.04 0.04 0.05 0.04 0.04 0.05   IMMATURE GRANS (ABS) 10*3/mm3 0.02 0.02 0.01 0.03 0.02 0.01   NRBC /100 WBC 0.0 0.0 0.0 0.0 0.0 0.0       Lab Results - Last 18 Months   Lab Units 05/03/23  1221 04/19/23  1227 04/05/23  0855 02/22/23  0946 01/25/23  1019 07/25/22  0825   GLUCOSE mg/dL 121* 163* 282* 384* 184* 170* "   SODIUM mmol/L 138 139 138 137 139 137   POTASSIUM mmol/L 4.5 4.5 4.6 4.5 4.9 5.3*   TOTAL CO2 mmol/L  --   --   --   --   --  29.1*   CO2 mmol/L 27.0 28.0 27.0 27.0 28.0  --    CHLORIDE mmol/L 101 103 100 100 103 100   ANION GAP mmol/L 10.0 8.0 11.0 10.0 8.0  --    CREATININE mg/dL 1.98* 1.79* 1.92* 1.93* 1.67* 2.27*   BUN mg/dL 32* 28* 29* 31* 28* 34*   BUN / CREAT RATIO  16.2 15.6 15.1 16.1 16.8 15.0   CALCIUM mg/dL 8.6 8.5* 8.8 8.6 9.0 9.3   ALK PHOS U/L 104 91 92 95 91 96   TOTAL PROTEIN g/dL 6.8 6.8 6.6 6.8 7.1  --    ALT (SGPT) U/L 48* 49* 27 30 21 21   AST (SGOT) U/L 26 34 19 19 14 17   BILIRUBIN mg/dL 0.3 0.3 0.2 0.2 0.3 0.4   ALBUMIN g/dL 4.0 4.2 3.9 4.0 4.0  3.7 4.20   GLOBULIN gm/dL 2.8 2.6 2.7 2.8 3.1  --        Lab Results - Last 18 Months   Lab Units 01/25/23  1019   M-SPIKE g/dL Not Observed   KAPPA/LAMBDA RATIO, S  1.55   FREE LAMBDA LIGHT CHAINS mg/L 25.8   IG KAPPA FREE LIGHT CHAIN mg/L 40.0*   LDH U/L 183       Lab Results - Last 18 Months   Lab Units 05/03/23  1221 04/05/23  0855 02/22/23  0946 01/25/23  1019 07/25/22  0825   IRON mcg/dL 88 36* 55* 122  --    TIBC mcg/dL 226* 256* 274* 337  --    IRON SATURATION % 39 14* 20 36  --    FERRITIN ng/mL 1,027.00* 59.42 256.20 16.82*  --    TSH uIU/mL  --  1.740  --   --  2.900   FOLATE ng/mL  --   --   --  >20.00 >20.00         PAST MEDICAL HISTORY:  ALLERGIES:  Allergies   Allergen Reactions   • Empagliflozin Other (See Comments)     CURRENT MEDICATIONS:  Outpatient Encounter Medications as of 5/3/2023   Medication Sig Dispense Refill   • Accu-Chek FastClix Lancets misc Use 4 x daily ,   ICD10 code is E11.9 You dispensed the wrong this, patient wants acucheck fast click lancets 360 each 3   • Alcohol Swabs (B-D SINGLE USE SWABS REGULAR) pads USE FOUR TIMES DAILY 400 each 11   • amitriptyline (ELAVIL) 10 MG tablet TAKE 1 TABLET EVERY NIGHT AS NEEDED FOR SLEEP 90 tablet 3   • aspirin 81 MG tablet Take 1 tablet by mouth Daily.     • atorvastatin  "(LIPITOR) 40 MG tablet TAKE 1 TABLET BY MOUTH EVERY NIGHT AT BEDTIME. 90 tablet 3   • Blood Glucose Monitoring Suppl w/Device kit accu chek albertina 1 each 1   • cefdinir (OMNICEF) 300 MG capsule Take 1 capsule by mouth 2 (Two) Times a Day. 14 capsule 0   • cetirizine (zyrTEC) 10 MG tablet Take 1 tablet by mouth Daily.     • cholecalciferol (VITAMIN D3) 400 UNITS tablet Take 1 tablet by mouth Daily.     • cloNIDine (CATAPRES) 0.2 MG tablet Take 1 tablet by mouth 2 (Two) Times a Day. 180 tablet 3   • Continuous Blood Gluc Sensor (Dexcom G6 Sensor) Every 10 (Ten) Days. 3 each 5   • Continuous Blood Gluc Transmit (Dexcom G6 Transmitter) misc 1 each Every 3 (Three) Months. 1 each 2   • dorzolamide-timolol (COSOPT) 22.3-6.8 MG/ML ophthalmic solution 1 drop 2 (Two) Times a Day.     • Droplet Pen Needles 31G X 5 MM misc USE FOUR TIMES DAILY AS DIRECTED 400 each 3   • epoetin shari-epbx (Retacrit) 23421 UNIT/ML injection Inject  under the skin into the appropriate area as directed Every 14 (Fourteen) Days.     • ferrous sulfate 325 (65 FE) MG tablet 1 tablet. Takes 1 every other day .     • fluticasone (FLONASE) 50 MCG/ACT nasal spray USE 2 SPRAYS IN EACH NOSTRIL EVERY DAY 48 g 5   • furosemide (Lasix) 20 MG tablet Take 1 tablet by mouth Daily. 90 tablet 3   • Glucagon (Baqsimi One Pack) 3 MG/DOSE powder 1 each into the nostril(s) as directed by provider As Needed (Hypoglycemia). Apply intranasal if hypoglycemia 2 each 11   • glucose blood (Accu-Chek Marry Plus) test strip TEST BLOOD SUGAR FOUR TIMES DAILY 400 each 6   • insulin aspart (NovoLOG FlexPen) 100 UNIT/ML solution pen-injector sc pen USE UP TO 20 UNITS WITH MEALS. DISCARD PEN 28 DAYS AFTER OPENING 60 mL 3   • Insulin Pen Needle 31G X 5 MM misc Droplet Pen Needle 31 gauge x 3/16\"     • Lancet Devices (Lancing Device) misc acucheck fast click lancing device. Make certain you send the correct one. Talk to patient . States you sent the wrong one 1 each 11   • latanoprost " (XALATAN) 0.005 % ophthalmic solution 1 drop Every Night.     • lisinopril (PRINIVIL,ZESTRIL) 10 MG tablet Take 1 tablet by mouth Every Night. 90 tablet 2   • Multiple Vitamins-Minerals (MULTIVITAMIN WITH MINERALS) tablet tablet Take 1 tablet by mouth Daily.     • tamsulosin (FLOMAX) 0.4 MG capsule 24 hr capsule TAKE 2 CAPSULES EVERY EVENING 180 capsule 2   • Tresiba FlexTouch 100 UNIT/ML solution pen-injector injection INJECT 20 UNITS UNDER THE SKIN INTO THE APPROPRIATE AREA AS DIRECTED EVERY NIGHT. 30 mL 2     No facility-administered encounter medications on file as of 5/3/2023.     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        Immunization History   Administered Date(s) Administered   • COVID-19 (MODERNA) 1st,2nd,3rd Dose Monovalent 03/02/2021, 03/30/2021, 11/02/2021   • COVID-19 (PFIZER) BIVALENT 12+YRS 09/29/2022   • Covid-19 (Pfizer) Gray Cap Monovalent 07/28/2022   • FLUAD TRI 65YR+ 09/09/2019   • Flu Vaccine Split Quad 01/01/2013   • FluLaval/Fluzone >6mos 01/01/2013, 09/20/2022   • Fluad Quad 65+ 09/09/2019, 09/09/2020   • Fluzone High Dose =>65 Years ([employer] ONLY) 09/14/2016, 10/02/2017, 10/08/2018   • Fluzone High-Dose 65+yrs 10/19/2021   • Pneumococcal Conjugate 13-Valent (PCV13) 06/11/2015   • Pneumococcal Polysaccharide (PPSV23) 04/27/2012, 01/01/2013   • Shingrix 06/21/2018, 01/07/2019   • Tdap 05/08/2013     Last Completed Mammogram     This patient has no relevant Health Maintenance data.            FAMILY HISTORY:  Family  History   Problem Relation Age of Onset   • No Known Problems Maternal Grandmother    • No Known Problems Maternal Grandfather    • No Known Problems Paternal Grandmother    • No Known Problems Paternal Grandfather    • No Known Problems Mother    • No Known Problems Father    • Colon cancer Neg Hx    • Colon polyps Neg Hx    • Esophageal cancer Neg Hx      SOCIAL HISTORY:  Social History     Socioeconomic History   • Marital status:    Tobacco Use   • Smoking status: Former     Packs/day: 0.50     Years: 10.00     Pack years: 5.00     Types: Cigarettes     Quit date: 1979     Years since quittin.3   • Smokeless tobacco: Never   Vaping Use   • Vaping Use: Never used   Substance and Sexual Activity   • Alcohol use: No   • Drug use: No   • Sexual activity: Defer       REVIEW OF SYSTEMS:  Review of Systems   Constitutional: Positive for fatigue. Negative for activity change, appetite change, fever, unexpected weight gain and unexpected weight loss.   HENT: Negative for dental problem, facial swelling, swollen glands and trouble swallowing.    Eyes: Negative for double vision and discharge.        Wears glasses,   Blind in right eye   Respiratory: Negative for cough and wheezing.    Cardiovascular: Negative for chest pain, palpitations and leg swelling.   Gastrointestinal: Negative for abdominal pain, blood in stool, nausea and vomiting.   Endocrine: Negative.    Genitourinary: Negative for dysuria, frequency and hematuria.        Hesitancy has see Urology and is on Flomax      Musculoskeletal: Negative for arthralgias, back pain and myalgias.   Skin: Negative for rash, skin lesions and wound.   Allergic/Immunologic: Negative for immunocompromised state.   Neurological: Positive for headache. Negative for speech difficulty, light-headedness, memory problem and confusion.   Hematological: Negative for adenopathy.   Psychiatric/Behavioral: Negative for self-injury, suicidal ideas and depressed mood. The  "patient is not nervous/anxious.        /78   Pulse 73   Temp 97.8 °F (36.6 °C)   Resp 16   Ht 172.7 cm (68\")   Wt 85.7 kg (189 lb)   SpO2 96%   BMI 28.74 kg/m²  Body surface area is 2 meters squared.    Pain Score    05/03/23 1309   PainSc: 0-No pain         Physical Exam  Constitutional:       Appearance: Normal appearance.   HENT:      Head: Normocephalic and atraumatic.   Cardiovascular:      Rate and Rhythm: Normal rate and regular rhythm.   Pulmonary:      Effort: Pulmonary effort is normal.      Breath sounds: Normal breath sounds.   Abdominal:      General: Bowel sounds are normal.      Palpations: Abdomen is soft.   Musculoskeletal:      Right lower leg: No edema.      Left lower leg: No edema.   Skin:     General: Skin is warm and dry.   Neurological:      Mental Status: He is alert and oriented to person, place, and time.   Psychiatric:         Attention and Perception: Attention normal.         Mood and Affect: Mood normal.         Judgment: Judgment normal.         Bernard Matta reports a pain score of 0.  Given his pain assessment as noted, treatment options were discussed and the following options were decided upon as a follow-up plan to address the patient's pain: continuation of current treatment plan for pain and tylenol. No intervention indicated today       ASSESSMENT / PLAN:    1. Stage 3b chronic kidney disease    2. Iron deficiency anemia, unspecified iron deficiency anemia type    3. Intestinal malabsorption, unspecified type    4. Type 2 diabetes mellitus with stage 3b chronic kidney disease, with long-term current use of insulin       1.  Iron Deficiency   2. Stage IIIb Chronic Kidney Disease   3.  Intestinal malabsorption of iron  -His last Retacrit was Feb 22, 2023  -He had Injectafer infusion on April 14 and 21, 2023.      -Labs today with BUN 32, creatinine 1.98, GFR 34.8, Hgb 11.3, hematocrit 36.0  -Serum iron 88, ferritin 1027, saturation 39%, TIBC 226  -Iron has improved " and Hgb remains stable greater than 11g.  -No Retacrit is indicated today.  -Overall stable for observation    4.  Type 2 Diabetes   -Non fasting glucose 121  -A1C improving.  7.3 on 04/05/2023  -Patient has CGM  -Taking Tresiba  -Continue follow up with Endocrinology       PLAN:  -Stable for observation.  No intervention indicated today.   -Will recheck CBC in 6 weeks and administer Retacrit for Hgb < 11 or HCT < 33. -Continue current medications, treatment plans and follow up with PCP and any other providers  -Return to office in 3 month with Pre-office labs for CBC, CMP, Iron Profile and Ferritin   Care discussed with patient.  Understanding expressed.  Patient agreeable with plan.          ACP discussion was held with the patient during this visit. Patient has an advance directive in EMR which is still valid.       Leesa Anne, APRN  05/03/2023

## 2023-05-17 ENCOUNTER — DOCUMENTATION (OUTPATIENT)
Dept: ENDOCRINOLOGY | Facility: CLINIC | Age: 73
End: 2023-05-17
Payer: MEDICARE

## 2023-05-26 RX ORDER — INSULIN DEGLUDEC INJECTION 100 U/ML
20 INJECTION, SOLUTION SUBCUTANEOUS NIGHTLY
Qty: 30 ML | Refills: 2 | Status: SHIPPED | OUTPATIENT
Start: 2023-05-26

## 2023-05-26 RX ORDER — PEN NEEDLE, DIABETIC 31 GX3/16"
NEEDLE, DISPOSABLE MISCELLANEOUS
Qty: 400 EACH | Refills: 3 | Status: SHIPPED | OUTPATIENT
Start: 2023-05-26

## 2023-06-02 ENCOUNTER — DOCUMENTATION (OUTPATIENT)
Dept: ENDOCRINOLOGY | Facility: CLINIC | Age: 73
End: 2023-06-02

## 2023-06-02 NOTE — PROGRESS NOTES
Pt awake and alert, VSS, no complaints of pain or nausea. Pt stable for transfer back to inpt room 372. No family or friend present with pt upon transfer. Rx for Diabetic shoes is faxed to [facility]. [phone]. Confirmation received.

## 2023-06-14 ENCOUNTER — LAB (OUTPATIENT)
Dept: LAB | Facility: HOSPITAL | Age: 73
End: 2023-06-14
Payer: MEDICARE

## 2023-06-14 ENCOUNTER — INFUSION (OUTPATIENT)
Dept: ONCOLOGY | Facility: HOSPITAL | Age: 73
End: 2023-06-14
Payer: MEDICARE

## 2023-06-14 VITALS
WEIGHT: 189 LBS | TEMPERATURE: 97.6 F | HEART RATE: 82 BPM | BODY MASS INDEX: 27.99 KG/M2 | OXYGEN SATURATION: 99 % | DIASTOLIC BLOOD PRESSURE: 82 MMHG | HEIGHT: 69 IN | RESPIRATION RATE: 20 BRPM | SYSTOLIC BLOOD PRESSURE: 151 MMHG

## 2023-06-14 DIAGNOSIS — D50.9 IRON DEFICIENCY ANEMIA, UNSPECIFIED IRON DEFICIENCY ANEMIA TYPE: Primary | ICD-10-CM

## 2023-06-14 DIAGNOSIS — N18.9 ANEMIA SECONDARY TO RENAL FAILURE: ICD-10-CM

## 2023-06-14 DIAGNOSIS — D63.1 ANEMIA SECONDARY TO RENAL FAILURE: ICD-10-CM

## 2023-06-14 LAB
BASOPHILS # BLD AUTO: 0.06 10*3/MM3 (ref 0–0.2)
BASOPHILS NFR BLD AUTO: 0.9 % (ref 0–1.5)
DEPRECATED RDW RBC AUTO: 51 FL (ref 37–54)
EOSINOPHIL # BLD AUTO: 0.42 10*3/MM3 (ref 0–0.4)
EOSINOPHIL NFR BLD AUTO: 6.6 % (ref 0.3–6.2)
ERYTHROCYTE [DISTWIDTH] IN BLOOD BY AUTOMATED COUNT: 14.7 % (ref 12.3–15.4)
HCT VFR BLD AUTO: 32.8 % (ref 37.5–51)
HGB BLD-MCNC: 10.5 G/DL (ref 13–17.7)
IMM GRANULOCYTES # BLD AUTO: 0.02 10*3/MM3 (ref 0–0.05)
IMM GRANULOCYTES NFR BLD AUTO: 0.3 % (ref 0–0.5)
LYMPHOCYTES # BLD AUTO: 1.21 10*3/MM3 (ref 0.7–3.1)
LYMPHOCYTES NFR BLD AUTO: 19 % (ref 19.6–45.3)
MCH RBC QN AUTO: 29.9 PG (ref 26.6–33)
MCHC RBC AUTO-ENTMCNC: 32 G/DL (ref 31.5–35.7)
MCV RBC AUTO: 93.4 FL (ref 79–97)
MONOCYTES # BLD AUTO: 0.49 10*3/MM3 (ref 0.1–0.9)
MONOCYTES NFR BLD AUTO: 7.7 % (ref 5–12)
NEUTROPHILS NFR BLD AUTO: 4.17 10*3/MM3 (ref 1.7–7)
NEUTROPHILS NFR BLD AUTO: 65.5 % (ref 42.7–76)
NRBC BLD AUTO-RTO: 0 /100 WBC (ref 0–0.2)
PLATELET # BLD AUTO: 152 10*3/MM3 (ref 140–450)
PMV BLD AUTO: 9.1 FL (ref 6–12)
RBC # BLD AUTO: 3.51 10*6/MM3 (ref 4.14–5.8)
WBC NRBC COR # BLD: 6.37 10*3/MM3 (ref 3.4–10.8)

## 2023-06-14 PROCEDURE — 25010000002 EPOETIN ALFA-EPBX 40000 UNIT/ML SOLUTION: Performed by: NURSE PRACTITIONER

## 2023-06-14 PROCEDURE — 96372 THER/PROPH/DIAG INJ SC/IM: CPT

## 2023-06-14 PROCEDURE — 36415 COLL VENOUS BLD VENIPUNCTURE: CPT

## 2023-06-14 PROCEDURE — 85025 COMPLETE CBC W/AUTO DIFF WBC: CPT

## 2023-06-14 RX ADMIN — EPOETIN ALFA-EPBX 40000 UNITS: 40000 INJECTION, SOLUTION INTRAVENOUS; SUBCUTANEOUS at 13:47

## 2023-06-14 NOTE — PROGRESS NOTES
Labs noted Hgb 10.5/Hct 32.8 meets Leesa GUPTA criteria for Retacrit.  Noted that order states every 2 weeks and note states every 6 weeks. Patient's next appt is in 6 weeks. Message sent to office to clarify.

## 2023-07-24 ENCOUNTER — DOCUMENTATION (OUTPATIENT)
Dept: ENDOCRINOLOGY | Facility: CLINIC | Age: 73
End: 2023-07-24
Payer: MEDICARE

## 2023-08-02 ENCOUNTER — OFFICE VISIT (OUTPATIENT)
Dept: ONCOLOGY | Facility: CLINIC | Age: 73
End: 2023-08-02
Payer: MEDICARE

## 2023-08-02 ENCOUNTER — LAB (OUTPATIENT)
Dept: LAB | Facility: HOSPITAL | Age: 73
End: 2023-08-02
Payer: MEDICARE

## 2023-08-02 VITALS
HEIGHT: 68 IN | HEART RATE: 66 BPM | DIASTOLIC BLOOD PRESSURE: 72 MMHG | WEIGHT: 189.1 LBS | RESPIRATION RATE: 16 BRPM | SYSTOLIC BLOOD PRESSURE: 122 MMHG | TEMPERATURE: 97.2 F | OXYGEN SATURATION: 98 % | BODY MASS INDEX: 28.66 KG/M2

## 2023-08-02 DIAGNOSIS — N18.32 STAGE 3B CHRONIC KIDNEY DISEASE: Primary | ICD-10-CM

## 2023-08-02 DIAGNOSIS — K90.9 INTESTINAL MALABSORPTION, UNSPECIFIED TYPE: ICD-10-CM

## 2023-08-02 DIAGNOSIS — N18.32 TYPE 2 DIABETES MELLITUS WITH STAGE 3B CHRONIC KIDNEY DISEASE, WITH LONG-TERM CURRENT USE OF INSULIN: ICD-10-CM

## 2023-08-02 DIAGNOSIS — E11.22 TYPE 2 DIABETES MELLITUS WITH STAGE 3B CHRONIC KIDNEY DISEASE, WITH LONG-TERM CURRENT USE OF INSULIN: ICD-10-CM

## 2023-08-02 DIAGNOSIS — D50.9 IRON DEFICIENCY ANEMIA, UNSPECIFIED IRON DEFICIENCY ANEMIA TYPE: Primary | ICD-10-CM

## 2023-08-02 DIAGNOSIS — Z79.4 TYPE 2 DIABETES MELLITUS WITH STAGE 3B CHRONIC KIDNEY DISEASE, WITH LONG-TERM CURRENT USE OF INSULIN: ICD-10-CM

## 2023-08-02 DIAGNOSIS — N18.32 STAGE 3B CHRONIC KIDNEY DISEASE: ICD-10-CM

## 2023-08-02 DIAGNOSIS — D50.9 IRON DEFICIENCY ANEMIA, UNSPECIFIED IRON DEFICIENCY ANEMIA TYPE: ICD-10-CM

## 2023-08-02 LAB
ALBUMIN SERPL-MCNC: 4.1 G/DL (ref 3.5–5.2)
ALBUMIN/GLOB SERPL: 1.5 G/DL
ALP SERPL-CCNC: 105 U/L (ref 39–117)
ALT SERPL W P-5'-P-CCNC: 37 U/L (ref 1–41)
ANION GAP SERPL CALCULATED.3IONS-SCNC: 8 MMOL/L (ref 5–15)
AST SERPL-CCNC: 24 U/L (ref 1–40)
BASOPHILS # BLD AUTO: 0.06 10*3/MM3 (ref 0–0.2)
BASOPHILS NFR BLD AUTO: 0.9 % (ref 0–1.5)
BILIRUB SERPL-MCNC: 0.3 MG/DL (ref 0–1.2)
BUN SERPL-MCNC: 27 MG/DL (ref 8–23)
BUN/CREAT SERPL: 14 (ref 7–25)
CALCIUM SPEC-SCNC: 9 MG/DL (ref 8.6–10.5)
CHLORIDE SERPL-SCNC: 104 MMOL/L (ref 98–107)
CO2 SERPL-SCNC: 29 MMOL/L (ref 22–29)
CREAT SERPL-MCNC: 1.93 MG/DL (ref 0.76–1.27)
DEPRECATED RDW RBC AUTO: 47.5 FL (ref 37–54)
EGFRCR SERPLBLD CKD-EPI 2021: 35.9 ML/MIN/1.73
EOSINOPHIL # BLD AUTO: 0.44 10*3/MM3 (ref 0–0.4)
EOSINOPHIL NFR BLD AUTO: 6.8 % (ref 0.3–6.2)
ERYTHROCYTE [DISTWIDTH] IN BLOOD BY AUTOMATED COUNT: 13.4 % (ref 12.3–15.4)
FERRITIN SERPL-MCNC: 290.6 NG/ML (ref 30–400)
GLOBULIN UR ELPH-MCNC: 2.7 GM/DL
GLUCOSE SERPL-MCNC: 113 MG/DL (ref 65–99)
HCT VFR BLD AUTO: 36 % (ref 37.5–51)
HGB BLD-MCNC: 11.1 G/DL (ref 13–17.7)
HOLD SPECIMEN: NORMAL
IMM GRANULOCYTES # BLD AUTO: 0.02 10*3/MM3 (ref 0–0.05)
IMM GRANULOCYTES NFR BLD AUTO: 0.3 % (ref 0–0.5)
IRON 24H UR-MRATE: 53 MCG/DL (ref 59–158)
IRON SATN MFR SERPL: 22 % (ref 20–50)
LYMPHOCYTES # BLD AUTO: 1.11 10*3/MM3 (ref 0.7–3.1)
LYMPHOCYTES NFR BLD AUTO: 17.2 % (ref 19.6–45.3)
MCH RBC QN AUTO: 29.8 PG (ref 26.6–33)
MCHC RBC AUTO-ENTMCNC: 30.8 G/DL (ref 31.5–35.7)
MCV RBC AUTO: 96.8 FL (ref 79–97)
MONOCYTES # BLD AUTO: 0.44 10*3/MM3 (ref 0.1–0.9)
MONOCYTES NFR BLD AUTO: 6.8 % (ref 5–12)
NEUTROPHILS NFR BLD AUTO: 4.37 10*3/MM3 (ref 1.7–7)
NEUTROPHILS NFR BLD AUTO: 68 % (ref 42.7–76)
NRBC BLD AUTO-RTO: 0 /100 WBC (ref 0–0.2)
PLATELET # BLD AUTO: 159 10*3/MM3 (ref 140–450)
PMV BLD AUTO: 8.9 FL (ref 6–12)
POTASSIUM SERPL-SCNC: 4.7 MMOL/L (ref 3.5–5.2)
PROT SERPL-MCNC: 6.8 G/DL (ref 6–8.5)
RBC # BLD AUTO: 3.72 10*6/MM3 (ref 4.14–5.8)
SODIUM SERPL-SCNC: 141 MMOL/L (ref 136–145)
TIBC SERPL-MCNC: 244 MCG/DL (ref 298–536)
TRANSFERRIN SERPL-MCNC: 164 MG/DL (ref 200–360)
WBC NRBC COR # BLD: 6.44 10*3/MM3 (ref 3.4–10.8)

## 2023-08-02 PROCEDURE — 36415 COLL VENOUS BLD VENIPUNCTURE: CPT

## 2023-08-02 PROCEDURE — 84466 ASSAY OF TRANSFERRIN: CPT

## 2023-08-02 PROCEDURE — 83540 ASSAY OF IRON: CPT

## 2023-08-02 PROCEDURE — 85025 COMPLETE CBC W/AUTO DIFF WBC: CPT

## 2023-08-02 PROCEDURE — 82728 ASSAY OF FERRITIN: CPT

## 2023-08-02 PROCEDURE — 80053 COMPREHEN METABOLIC PANEL: CPT

## 2023-08-10 RX ORDER — AMITRIPTYLINE HYDROCHLORIDE 10 MG/1
TABLET, FILM COATED ORAL
Qty: 90 TABLET | Refills: 3 | Status: SHIPPED | OUTPATIENT
Start: 2023-08-10

## 2023-08-10 NOTE — TELEPHONE ENCOUNTER
Rx Refill Note  Requested Prescriptions     Pending Prescriptions Disp Refills    amitriptyline (ELAVIL) 10 MG tablet [Pharmacy Med Name: AMITRIPTYLINE HYDROCHLORIDE 10 MG Tablet] 90 tablet 3     Sig: TAKE 1 TABLET EVERY NIGHT AS NEEDED FOR SLEEP      Last office visit with prescribing clinician: 4/27/2023   Last telemedicine visit with prescribing clinician: Visit date not found   Next office visit with prescribing clinician: 8/25/2023   {    Roxanne Yepez MA  08/10/23, 10:04 CDT

## 2023-08-16 RX ORDER — ATORVASTATIN CALCIUM 40 MG/1
40 TABLET, FILM COATED ORAL
Qty: 90 TABLET | Refills: 3 | Status: SHIPPED | OUTPATIENT
Start: 2023-08-16

## 2023-08-16 NOTE — TELEPHONE ENCOUNTER
Rx Refill Note  Requested Prescriptions     Pending Prescriptions Disp Refills    atorvastatin (LIPITOR) 40 MG tablet 90 tablet 3     Sig: Take 1 tablet by mouth every night at bedtime.      Last office visit with prescribing clinician: 4/27/2023   Last telemedicine visit with prescribing clinician: Visit date not found   Next office visit with prescribing clinician: 8/25/2023                         Would you like a call back once the refill request has been completed: [] Yes [] No    If the office needs to give you a call back, can they leave a voicemail: [] Yes [] No    Lanette Gregory, PCT  08/16/23, 09:22 CDT

## 2023-08-25 ENCOUNTER — OFFICE VISIT (OUTPATIENT)
Dept: FAMILY MEDICINE CLINIC | Facility: CLINIC | Age: 73
End: 2023-08-25
Payer: MEDICARE

## 2023-08-25 VITALS
BODY MASS INDEX: 28.79 KG/M2 | WEIGHT: 190 LBS | DIASTOLIC BLOOD PRESSURE: 80 MMHG | HEIGHT: 68 IN | HEART RATE: 95 BPM | RESPIRATION RATE: 16 BRPM | TEMPERATURE: 98.1 F | OXYGEN SATURATION: 95 % | SYSTOLIC BLOOD PRESSURE: 128 MMHG

## 2023-08-25 DIAGNOSIS — E11.9 TYPE 2 DIABETES MELLITUS WITHOUT COMPLICATION, WITH LONG-TERM CURRENT USE OF INSULIN: Primary | ICD-10-CM

## 2023-08-25 DIAGNOSIS — E55.9 VITAMIN D DEFICIENCY: ICD-10-CM

## 2023-08-25 DIAGNOSIS — Z00.00 MEDICARE ANNUAL WELLNESS VISIT, SUBSEQUENT: ICD-10-CM

## 2023-08-25 DIAGNOSIS — Z12.5 SPECIAL SCREENING FOR MALIGNANT NEOPLASM OF PROSTATE: ICD-10-CM

## 2023-08-25 DIAGNOSIS — D64.9 ANEMIA, UNSPECIFIED TYPE: ICD-10-CM

## 2023-08-25 DIAGNOSIS — R53.83 FATIGUE, UNSPECIFIED TYPE: ICD-10-CM

## 2023-08-25 DIAGNOSIS — R41.3 MEMORY LOSS: ICD-10-CM

## 2023-08-25 DIAGNOSIS — Z79.4 TYPE 2 DIABETES MELLITUS WITHOUT COMPLICATION, WITH LONG-TERM CURRENT USE OF INSULIN: Primary | ICD-10-CM

## 2023-08-25 DIAGNOSIS — E78.2 MIXED HYPERLIPIDEMIA: ICD-10-CM

## 2023-08-25 LAB
BILIRUB BLD-MCNC: NEGATIVE MG/DL
CLARITY, POC: CLEAR
COLOR UR: YELLOW
GLUCOSE UR STRIP-MCNC: NEGATIVE MG/DL
KETONES UR QL: NEGATIVE
LEUKOCYTE EST, POC: NEGATIVE
NITRITE UR-MCNC: NEGATIVE MG/ML
PH UR: 5 [PH] (ref 5–8)
PROT UR STRIP-MCNC: ABNORMAL MG/DL
RBC # UR STRIP: ABNORMAL /UL
SP GR UR: 1.01 (ref 1–1.03)
UROBILINOGEN UR QL: NORMAL

## 2023-08-25 NOTE — PATIENT INSTRUCTIONS
Advance Care Planning and Advance Directives     You make decisions on a daily basis - decisions about where you want to live, your career, your home, your life. Perhaps one of the most important decisions you face is your choice for future medical care. Take time to talk with your family and your healthcare team and start planning today.  Advance Care Planning is a process that can help you:  Understand possible future healthcare decisions in light of your own experiences  Reflect on those decision in light of your goals and values  Discuss your decisions with those closest to you and the healthcare professionals that care for you  Make a plan by creating a document that reflects your wishes    Surrogate Decision Maker  In the event of a medical emergency, which has left you unable to communicate or to make your own decisions, you would need someone to make decisions for you.  It is important to discuss your preferences for medical treatment with this person while you are in good health.     Qualities of a surrogate decision maker:  Willing to take on this role and responsibility  Knows what you want for future medical care  Willing to follow your wishes even if they don't agree with them  Able to make difficult medical decisions under stressful circumstances    Advance Directives  These are legal documents you can create that will guide your healthcare team and decision maker(s) when needed. These documents can be stored in the electronic medical record.    Living Will - a legal document to guide your care if you have a terminal condition or a serious illness and are unable to communicate. States vary by statute in document names/types, but most forms may include one or more of the following:        -  Directions regarding life-prolonging treatments        -  Directions regarding artificially provided nutrition/hydration        -  Choosing a healthcare decision maker        -  Direction regarding organ/tissue  donation    Durable Power of  for Healthcare - this document names an -in-fact to make medical decisions for you, but it may also allow this person to make personal and financial decisions for you. Please seek the advice of an  if you need this type of document.    **Advance Directives are not required and no one may discriminate against you if you do not sign one.    Medical Orders  Many states allow specific forms/orders signed by your physician to record your wishes for medical treatment in your current state of health. This form, signed in personal communication with your physician, addresses resuscitation and other medical interventions that you may or may not want.      For more information or to schedule a time with a [facility] Advance Care Planning Facilitator contact: [employer] or call [phone] and someone will contact you directly.    Medicare Wellness  Personal Prevention Plan of Service     Date of Office Visit:    Encounter Provider:  Brando Webb MD  Place of Service:  [facility]  Patient Name: Bernard Matta  :  1950    As part of the Medicare Wellness portion of your visit today, we are providing you with this personalized preventive plan of services (PPPS). This plan is based upon recommendations of the [place] Preventive Services Task Force (USPSTF) and the Advisory Committee on Immunization Practices (ACIP).    This lists the preventive care services that should be considered, and provides dates of when you are due. Items listed as completed are up-to-date and do not require any further intervention.    Health Maintenance   Topic Date Due    COVID-19 Vaccine (6 - Moderna series) 2023    COLORECTAL CANCER SCREENING  04/15/2023    Pneumococcal Vaccine 65+ (3 - PPSV23 or PCV20) 2024 (Originally 2018)    TDAP/TD VACCINES (2 - Td or Tdap) 2024 (Originally 2023)     INFLUENZA VACCINE  10/01/2023    HEMOGLOBIN A1C  10/05/2023    DIABETIC EYE EXAM  01/23/2024    LIPID PANEL  04/05/2024    URINE MICROALBUMIN  04/05/2024    ANNUAL WELLNESS VISIT  08/25/2024    DIABETIC FOOT EXAM  08/25/2024    HEPATITIS C SCREENING  Completed    AAA SCREEN (ONE-TIME)  Completed    ZOSTER VACCINE  Addressed       No orders of the defined types were placed in this encounter.      No follow-ups on file.

## 2023-08-25 NOTE — PROGRESS NOTES
The ABCs of the Annual Wellness Visit  Subsequent Medicare Wellness Visit    Subjective        Bernard Matta is a 72 y.o. male who presents for a Subsequent Medicare Wellness Visit.    The following portions of the patient's history were reviewed and   updated as appropriate: allergies, current medications, past family history, past medical history, past social history, past surgical history, and problem list.    Review of Systems   Eyes:  Positive for visual disturbance.   Respiratory:  Negative for shortness of breath.    Cardiovascular:  Negative for chest pain and leg swelling.   Gastrointestinal:         Colon screening up-to-date   Endocrine: Negative for polydipsia, polyphagia and polyuria.   Genitourinary:         Chronic renal disease followed by nephrology   All other systems reviewed and are negative.     Compared to one year ago, the patient feels his physical   health is the same.    Compared to one year ago, the patient feels his mental   health is the same.    Recent Hospitalizations:  He was not admitted to the hospital during the last year.       Current Medical Providers:  Patient Care Team:  Brando Webb MD as PCP - General (Family Medicine)  Jim Cha OD (Optometry)  Anand Santoyo MD as Consulting Physician (Ophthalmology)  Deric Byers MD as Consulting Physician (Endocrinology)  Shaquille Mckenzie DO as Consulting Physician (Gastroenterology)    Outpatient Medications Prior to Visit   Medication Sig Dispense Refill    Accu-Chek FastClix Lancets misc Use 4 x daily ,   ICD10 code is E11.9 You dispensed the wrong this, patient wants acucheck fast click lancets 360 each 3    Alcohol Swabs (B-D SINGLE USE SWABS REGULAR) pads USE FOUR TIMES DAILY 400 each 11    amitriptyline (ELAVIL) 10 MG tablet TAKE 1 TABLET EVERY NIGHT AS NEEDED FOR SLEEP 90 tablet 3    aspirin 81 MG tablet Take 1 tablet by mouth Daily.      atorvastatin (LIPITOR) 40 MG tablet Take 1 tablet by  "mouth every night at bedtime. 90 tablet 3    Blood Glucose Monitoring Suppl w/Device kit accu chek albertina 1 each 1    cetirizine (zyrTEC) 10 MG tablet Take 1 tablet by mouth Daily.      cholecalciferol (VITAMIN D3) 400 UNITS tablet Take 1 tablet by mouth Daily.      cloNIDine (CATAPRES) 0.2 MG tablet Take 1 tablet by mouth 2 (Two) Times a Day. 180 tablet 3    Continuous Blood Gluc Sensor (Dexcom G6 Sensor) Every 10 (Ten) Days. 3 each 5    Continuous Blood Gluc Transmit (Dexcom G6 Transmitter) misc 1 each Every 3 (Three) Months. 1 each 2    dorzolamide-timolol (COSOPT) 22.3-6.8 MG/ML ophthalmic solution 1 drop 2 (Two) Times a Day.      Droplet Pen Needles 31G X 5 MM misc USE FOUR TIMES DAILY  AS  DIRECTED 400 each 3    epoetin shari-epbx (Retacrit) 85469 UNIT/ML injection Inject  under the skin into the appropriate area as directed Every 14 (Fourteen) Days.      ferrous sulfate 325 (65 FE) MG tablet 1 tablet. Takes 1 every other day .      fluticasone (FLONASE) 50 MCG/ACT nasal spray USE 2 SPRAYS IN EACH NOSTRIL EVERY DAY 48 g 5    furosemide (Lasix) 20 MG tablet Take 1 tablet by mouth Daily. 90 tablet 3    glucose blood (Accu-Chek Marry Plus) test strip TEST BLOOD SUGAR FOUR TIMES DAILY 400 each 6    insulin aspart (NovoLOG FlexPen) 100 UNIT/ML solution pen-injector sc pen USE UP TO 20 UNITS WITH MEALS. DISCARD PEN 28 DAYS AFTER OPENING 60 mL 3    Insulin Pen Needle 31G X 5 MM misc Droplet Pen Needle 31 gauge x 3/16\"      Lancet Devices (Lancing Device) misc acucheck fast click lancing device. Make certain you send the correct one. Talk to patient . States you sent the wrong one 1 each 11    latanoprost (XALATAN) 0.005 % ophthalmic solution 1 drop Every Night.      lisinopril (PRINIVIL,ZESTRIL) 10 MG tablet Take 1 tablet by mouth Every Night. 90 tablet 2    Multiple Vitamins-Minerals (MULTIVITAMIN WITH MINERALS) tablet tablet Take 1 tablet by mouth Daily.      tamsulosin (FLOMAX) 0.4 MG capsule 24 hr capsule TAKE 2 " "CAPSULES EVERY EVENING 180 capsule 2    Tresiba FlexTouch 100 UNIT/ML solution pen-injector injection INJECT 20 UNITS UNDER THE SKIN INTO THE APPROPRIATE AREA AS DIRECTED EVERY NIGHT. 30 mL 2    Glucagon (Baqsimi One Pack) 3 MG/DOSE powder 1 each into the nostril(s) as directed by provider As Needed (Hypoglycemia). Apply intranasal if hypoglycemia 2 each 11     No facility-administered medications prior to visit.       No opioid medication identified on active medication list. I have reviewed chart for other potential  high risk medication/s and harmful drug interactions in the elderly.        Aspirin is on active medication list. Aspirin use is indicated based on review of current medical condition/s. Pros and cons of this therapy have been discussed today. Benefits of this medication outweigh potential harm.  Patient has been encouraged to continue taking this medication.  .      Patient Active Problem List   Diagnosis    Anterior ischemic optic neuropathy    Hypertension    Type 2 diabetes mellitus    Hx of adenomatous colonic polyps    Iron deficiency anemia    Intestinal malabsorption, unspecified     Advance Care Planning  Advance Directive is on file.  ACP discussion was declined by the patient. Patient has an advance directive in EMR which is still valid.      Objective    Vitals:    08/25/23 0839   BP: 128/80   BP Location: Left arm   Patient Position: Sitting   Cuff Size: Adult   Pulse: 95   Resp: 16   Temp: 98.1 øF (36.7 øC)   TempSrc: Temporal   SpO2: 95%   Weight: 86.2 kg (190 lb)   Height: 172.7 cm (68\")   PainSc: 0-No pain     Estimated body mass index is 28.89 kg/mý as calculated from the following:    Height as of this encounter: 172.7 cm (68\").    Weight as of this encounter: 86.2 kg (190 lb).    BMI is >= 25 and <30. (Overweight) The following options were offered after discussion;: exercise counseling/recommendations      Physical Exam  Vitals and nursing note reviewed.   Constitutional:       " Appearance: Normal appearance.   HENT:      Right Ear: Tympanic membrane and ear canal normal.      Left Ear: Tympanic membrane and ear canal normal.   Eyes:      Extraocular Movements: Extraocular movements intact.      Pupils: Pupils are equal, round, and reactive to light.   Neck:      Vascular: No carotid bruit.   Cardiovascular:      Rate and Rhythm: Normal rate and regular rhythm.      Pulses: Normal pulses.           Dorsalis pedis pulses are 1+ on the right side and 1+ on the left side.        Posterior tibial pulses are 1+ on the right side and 1+ on the left side.      Heart sounds: Normal heart sounds.   Pulmonary:      Effort: Pulmonary effort is normal.      Breath sounds: Normal breath sounds.   Abdominal:      General: Abdomen is flat.      Palpations: Abdomen is soft. There is no mass.   Genitourinary:     Comments: Deferred because of age  Musculoskeletal:      Right lower leg: No edema.      Left lower leg: No edema.      Right foot: Normal range of motion. No deformity or bunion.      Left foot: Normal range of motion. No deformity or bunion.   Feet:      Right foot:      Skin integrity: Skin integrity normal.      Toenail Condition: Right toenails are normal.      Left foot:      Skin integrity: Skin integrity normal.      Toenail Condition: Left toenails are normal.   Lymphadenopathy:      Cervical: No cervical adenopathy.   Skin:     General: Skin is warm and dry.   Neurological:      General: No focal deficit present.      Mental Status: He is alert and oriented to person, place, and time.   Psychiatric:         Mood and Affect: Mood normal.         Behavior: Behavior normal.         Judgment: Judgment normal.       Does the patient have evidence of cognitive impairment?   No            HEALTH RISK ASSESSMENT    Smoking Status:  Social History     Tobacco Use   Smoking Status Former    Packs/day: 0.50    Years: 10.00    Pack years: 5.00    Types: Cigarettes    Quit date: 1/1/1979    Years  since quittin.6   Smokeless Tobacco Never       Alcohol Consumption:  Social History     Substance and Sexual Activity   Alcohol Use No       Fall Risk Screen:    STEFANI Fall Risk Assessment was completed, and patient is at LOW risk for falls.Assessment completed on:2023    Depression Screenin/25/2023     8:43 AM   PHQ-2/PHQ-9 Depression Screening   Little Interest or Pleasure in Doing Things 0-->not at all   Feeling Down, Depressed or Hopeless 0-->not at all   PHQ-9: Brief Depression Severity Measure Score 0       Health Habits and Functional and Cognitive Screenin/25/2023     8:44 AM   Functional & Cognitive Status   Do you have difficulty preparing food and eating? No   Do you have difficulty bathing yourself, getting dressed or grooming yourself? No   Do you have difficulty using the toilet? No   Do you have difficulty moving around from place to place? No   Do you have trouble with steps or getting out of a bed or a chair? No   Current Diet Unhealthy Diet   Dental Exam Up to date   Eye Exam Up to date   Exercise (times per week) 2 times per week   Current Exercises Include Yard Work   Do you need help using the phone?  No   Are you deaf or do you have serious difficulty hearing?  No   Do you need help to go to places out of walking distance? No   Do you need help shopping? No   Do you need help preparing meals?  No   Do you need help with housework?  No   Do you need help with laundry? No   Do you need help taking your medications? No   Do you need help managing money? No   Do you ever drive or ride in a car without wearing a seat belt? No   Have you felt unusual stress, anger or loneliness in the last month? No   Who do you live with? Spouse   If you need help, do you have trouble finding someone available to you? No   Have you been bothered in the last four weeks by sexual problems? No   Do you have difficulty concentrating, remembering or making decisions? No       Age-appropriate  Screening Schedule:  Refer to the list below for future screening recommendations based on patient's age, sex and/or medical conditions. Orders for these recommended tests are listed in the plan section. The patient has been provided with a written plan.    Health Maintenance   Topic Date Due    COVID-19 Vaccine (6 - Moderna series) 01/29/2023    COLORECTAL CANCER SCREENING  04/15/2023    Pneumococcal Vaccine 65+ (3 - PPSV23 or PCV20) 04/27/2024 (Originally 1/1/2018)    TDAP/TD VACCINES (2 - Td or Tdap) 08/25/2024 (Originally 5/8/2023)    INFLUENZA VACCINE  10/01/2023    HEMOGLOBIN A1C  10/05/2023    DIABETIC EYE EXAM  01/23/2024    LIPID PANEL  04/05/2024    URINE MICROALBUMIN  04/05/2024    ANNUAL WELLNESS VISIT  08/25/2024    DIABETIC FOOT EXAM  08/25/2024    HEPATITIS C SCREENING  Completed    AAA SCREEN (ONE-TIME)  Completed    ZOSTER VACCINE  Addressed            CMS Preventative Services Quick Reference  Risk Factors Identified During Encounter:    Fall Risk-High or Moderate: Information on Fall Prevention Shared in After Visit Summary    The above risks/problems have been discussed with the patient.  Pertinent information has been shared with the patient in the After Visit Summary.    Diagnoses and all orders for this visit:    1. Type 2 diabetes mellitus without complication, with long-term current use of insulin (Primary)  -     Hemoglobin A1c  -     POC Urinalysis Dipstick, Multipro    2. Special screening for malignant neoplasm of prostate  -     PSA Screen    3. Memory loss  -     Vitamin B12  -     Folate    4. Fatigue, unspecified type  -     TSH  -     T4, free  -     Cancel: CBC & Differential    5. Mixed hyperlipidemia  -     Cancel: Comprehensive Metabolic Panel  -     Lipid Panel    6. Anemia, unspecified type  -     Ferritin  -     Iron Profile    7. Vitamin D deficiency  -     Vitamin D,25-Hydroxy  -     Vitamin D,25-Hydroxy    8. Medicare annual wellness visit, subsequent    Plan  above-continue seeing nephrologist diabetologist and ophthalmologist  Follow Up:   Next Medicare Wellness visit to be scheduled in 1 year.      An After Visit Summary and PPPS were made available to the patient.    Everton Webb MD

## 2023-08-26 LAB
25(OH)D3+25(OH)D2 SERPL-MCNC: 48.4 NG/ML (ref 30–100)
CHOLEST SERPL-MCNC: 163 MG/DL (ref 0–200)
FERRITIN SERPL-MCNC: 266 NG/ML (ref 30–400)
FOLATE SERPL-MCNC: >20 NG/ML (ref 4.78–24.2)
HBA1C MFR BLD: 7.1 % (ref 4.8–5.6)
HDLC SERPL-MCNC: 39 MG/DL (ref 40–60)
IRON SATN MFR SERPL: 27 % (ref 20–50)
IRON SERPL-MCNC: 67 MCG/DL (ref 59–158)
LDLC SERPL CALC-MCNC: 106 MG/DL (ref 0–100)
PSA SERPL-MCNC: 0.67 NG/ML (ref 0–4)
T4 FREE SERPL-MCNC: 1.08 NG/DL (ref 0.93–1.7)
TIBC SERPL-MCNC: 247 MCG/DL
TRIGL SERPL-MCNC: 97 MG/DL (ref 0–150)
TSH SERPL DL<=0.005 MIU/L-ACNC: 2.11 UIU/ML (ref 0.27–4.2)
UIBC SERPL-MCNC: 180 MCG/DL (ref 112–346)
VIT B12 SERPL-MCNC: 678 PG/ML (ref 211–946)
VLDLC SERPL CALC-MCNC: 18 MG/DL (ref 5–40)

## 2023-09-13 ENCOUNTER — LAB (OUTPATIENT)
Dept: LAB | Facility: HOSPITAL | Age: 73
End: 2023-09-13
Payer: MEDICARE

## 2023-09-13 DIAGNOSIS — D63.1 ANEMIA SECONDARY TO RENAL FAILURE: Primary | ICD-10-CM

## 2023-09-13 DIAGNOSIS — N18.9 ANEMIA SECONDARY TO RENAL FAILURE: Primary | ICD-10-CM

## 2023-09-13 LAB
BASOPHILS # BLD AUTO: 0.03 10*3/MM3 (ref 0–0.2)
BASOPHILS NFR BLD AUTO: 0.5 % (ref 0–1.5)
DEPRECATED RDW RBC AUTO: 46.3 FL (ref 37–54)
EOSINOPHIL # BLD AUTO: 0.32 10*3/MM3 (ref 0–0.4)
EOSINOPHIL NFR BLD AUTO: 5.2 % (ref 0.3–6.2)
ERYTHROCYTE [DISTWIDTH] IN BLOOD BY AUTOMATED COUNT: 13.2 % (ref 12.3–15.4)
HCT VFR BLD AUTO: 32.3 % (ref 37.5–51)
HGB BLD-MCNC: 10.1 G/DL (ref 13–17.7)
HOLD SPECIMEN: NORMAL
IMM GRANULOCYTES # BLD AUTO: 0.02 10*3/MM3 (ref 0–0.05)
IMM GRANULOCYTES NFR BLD AUTO: 0.3 % (ref 0–0.5)
LYMPHOCYTES # BLD AUTO: 0.97 10*3/MM3 (ref 0.7–3.1)
LYMPHOCYTES NFR BLD AUTO: 15.6 % (ref 19.6–45.3)
MCH RBC QN AUTO: 30 PG (ref 26.6–33)
MCHC RBC AUTO-ENTMCNC: 31.3 G/DL (ref 31.5–35.7)
MCV RBC AUTO: 95.8 FL (ref 79–97)
MONOCYTES # BLD AUTO: 0.43 10*3/MM3 (ref 0.1–0.9)
MONOCYTES NFR BLD AUTO: 6.9 % (ref 5–12)
NEUTROPHILS NFR BLD AUTO: 4.43 10*3/MM3 (ref 1.7–7)
NEUTROPHILS NFR BLD AUTO: 71.5 % (ref 42.7–76)
NRBC BLD AUTO-RTO: 0 /100 WBC (ref 0–0.2)
PLATELET # BLD AUTO: 149 10*3/MM3 (ref 140–450)
PMV BLD AUTO: 8.9 FL (ref 6–12)
RBC # BLD AUTO: 3.37 10*6/MM3 (ref 4.14–5.8)
WBC NRBC COR # BLD: 6.2 10*3/MM3 (ref 3.4–10.8)

## 2023-09-13 PROCEDURE — 36415 COLL VENOUS BLD VENIPUNCTURE: CPT

## 2023-09-13 PROCEDURE — 85025 COMPLETE CBC W/AUTO DIFF WBC: CPT

## 2023-09-18 ENCOUNTER — OFFICE VISIT (OUTPATIENT)
Dept: ENDOCRINOLOGY | Facility: CLINIC | Age: 73
End: 2023-09-18
Payer: MEDICARE

## 2023-09-18 VITALS
BODY MASS INDEX: 28.79 KG/M2 | HEART RATE: 89 BPM | OXYGEN SATURATION: 97 % | SYSTOLIC BLOOD PRESSURE: 110 MMHG | DIASTOLIC BLOOD PRESSURE: 60 MMHG | HEIGHT: 68 IN | WEIGHT: 190 LBS

## 2023-09-18 DIAGNOSIS — N18.32 CHRONIC KIDNEY DISEASE (CKD) STAGE G3B/A2, MODERATELY DECREASED GLOMERULAR FILTRATION RATE (GFR) BETWEEN 30-44 ML/MIN/1.73 SQUARE METER AND ALBUMINURIA CREATININE RATIO BETWEEN 30-299 MG/G (CMS*: ICD-10-CM

## 2023-09-18 DIAGNOSIS — I15.2 HYPERTENSION ASSOCIATED WITH DIABETES: ICD-10-CM

## 2023-09-18 DIAGNOSIS — E78.2 MIXED DIABETIC HYPERLIPIDEMIA ASSOCIATED WITH TYPE 2 DIABETES MELLITUS: ICD-10-CM

## 2023-09-18 DIAGNOSIS — E11.59 HYPERTENSION ASSOCIATED WITH DIABETES: ICD-10-CM

## 2023-09-18 DIAGNOSIS — E11.69 MIXED DIABETIC HYPERLIPIDEMIA ASSOCIATED WITH TYPE 2 DIABETES MELLITUS: ICD-10-CM

## 2023-09-18 DIAGNOSIS — E11.9 WELL CONTROLLED TYPE 2 DIABETES MELLITUS: Primary | ICD-10-CM

## 2023-09-18 PROCEDURE — 3074F SYST BP LT 130 MM HG: CPT | Performed by: INTERNAL MEDICINE

## 2023-09-18 PROCEDURE — 3051F HG A1C>EQUAL 7.0%<8.0%: CPT | Performed by: INTERNAL MEDICINE

## 2023-09-18 PROCEDURE — 3078F DIAST BP <80 MM HG: CPT | Performed by: INTERNAL MEDICINE

## 2023-09-18 PROCEDURE — 99214 OFFICE O/P EST MOD 30 MIN: CPT | Performed by: INTERNAL MEDICINE

## 2023-09-18 PROCEDURE — 95251 CONT GLUC MNTR ANALYSIS I&R: CPT | Performed by: INTERNAL MEDICINE

## 2023-09-18 RX ORDER — EZETIMIBE 10 MG/1
10 TABLET ORAL DAILY
Qty: 90 TABLET | Refills: 3 | Status: SHIPPED | OUTPATIENT
Start: 2023-09-18 | End: 2024-09-17

## 2023-09-18 NOTE — PROGRESS NOTES
" Bernard Matta is a 72 y.o. 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   No deformity     Labs    Lab Results   Component Value Date    WBC 6.20 09/13/2023    HGB 10.1 (L) 09/13/2023    HCT 32.3 (L) 09/13/2023    MCV 95.8 09/13/2023     09/13/2023     Lab Results   Component Value Date    GLUCOSE 113 (H) 08/02/2023    BUN 27 (H) 08/02/2023    CREATININE 1.93 (H) 08/02/2023    EGFRIFNONA 33 (L) 09/08/2021    EGFRIFAFRI 38 (L) 09/08/2021    BCR 14.0 08/02/2023    K 4.7 08/02/2023    CO2 29.0 08/02/2023    CALCIUM 9.0 08/02/2023    PROTENTOTREF 6.6 01/25/2023    ALBUMIN 4.1 08/02/2023    LABIL2 1.3 01/25/2023    AST 24 08/02/2023    ALT 37 08/02/2023         Assessment & Plan       ICD-10-CM ICD-9-CM   1. Well controlled type 2 diabetes mellitus  E11.9 250.00   2. Chronic kidney disease (CKD) stage G3b/A2, moderately decreased glomerular filtration rate (GFR) between 30-44 mL/min/1.73 square meter and albuminuria creatinine ratio between  mg/g (CMS*  N18.32 585.3   3. Mixed diabetic hyperlipidemia associated with type 2 diabetes mellitus  E11.69 250.80    E78.2 272.2   4. Hypertension associated with diabetes  E11.59 250.80    I15.2 401.9         Type 2 diabetes        Lab Results   Component Value Date    HGBA1C 7.10 (H) 08/25/2023    HGBA1C 7.30 (H) 04/05/2023    HGBA1C 8.4 04/04/2023     Lab Results   Component Value Date    GLUCOSE 113 (H) 08/02/2023    BUN 27 (H) 08/02/2023    CREATININE 1.93 (H) 08/02/2023    EGFRIFNONA 33 (L) 09/08/2021    EGFRIFAFRI 38 (L) " 09/08/2021    BCR 14.0 08/02/2023    K 4.7 08/02/2023    CO2 29.0 08/02/2023    CALCIUM 9.0 08/02/2023    PROTENTOTREF 6.6 01/25/2023    ALBUMIN 4.1 08/02/2023    LABIL2 1.3 01/25/2023    AST 24 08/02/2023    ALT 37 08/02/2023    ANIONGAP 8.0 08/02/2023     Lab Results   Component Value Date    WBC 6.20 09/13/2023    HGB 10.1 (L) 09/13/2023    HCT 32.3 (L) 09/13/2023    MCV 95.8 09/13/2023     09/13/2023             Metformin 1000 mg po bid- stopped,  side effects     Trulicity 0.75 mg weekly---stopped-  side effects     Jardiance , side effects         Plan     Tresiba 20 , -15 - 12 - 10 ---- increase to 12 -10    Humalog , now he is learning to guesstimate , taking between 2 to 20 , mainly 8 to 12 --  Needs 10 to 14 -- 12 to 16        Ambulatory Glucose Profile Report    Days Analyzed : 2 week period ending on 09/18/23      Continuous Glucose Monitory Device:  Dexcom G6   In range 49%   No lows     Interpretation : Diabetes Type 2 well controlled                Lipid Management       Lab Results   Component Value Date    TRIG 97 08/25/2023    TRIG 163 (H) 04/05/2023    TRIG 85 07/25/2022     Lab Results   Component Value Date    HDL 39 (L) 08/25/2023    HDL 36 (L) 04/05/2023    HDL 43 07/25/2022     No components found for: LDLCALC  Lab Results   Component Value Date     (H) 08/25/2023    LDL 79 04/05/2023     07/25/2022     No results found for: LDLDIRECT    On lipitor 20 mg qhs - changed to 40 mg qhs   Add zetia        2. Hypertension     Blood Pressure Management:    Vitals:    09/18/23 1019   BP: 110/60   Pulse: 89   SpO2: 97%     Per Allan       -----------    Last Microalbumin-Proteinuria Assessment    Lab Results   Component Value Date    MALBCRERATIO 24.4 04/05/2023    MALBCRERATIO 24 09/08/2021    MALBCRERATIO 16.0 06/26/2019     ckd stage 3b/A2  Has had hyperkalemia , has used binding resins   April 2022 GFR 42 , UACR 32, K 4.8    On ace, can't tolerate sglt2 inhibitor     -----------      Neuropathy  No issues with feet at this time     Eye exam , 2022, high pressure but no retinopathy           Thyroid Health    Lab Results   Component Value Date    TSH 2.110 08/25/2023    TSH 1.740 04/05/2023    TSH 2.900 07/25/2022       Lab Results   Component Value Date    FREET4 1.08 08/25/2023    FREET4 1.28 07/25/2022    FREET4 1.22 07/22/2021        Iron Def anemia  HGB 7.9 in 2022, now 10.9     Receiving EPO   And had iron infusion     No bleeding , seeing GI, had scope in 2021 , polypectomy   Following w them                 This document has been electronically signed by Deric Abraham MD on September 18, 2023 10:55 CDT

## 2023-09-20 DIAGNOSIS — E11.9 TYPE 2 DIABETES MELLITUS WITHOUT COMPLICATION, WITH LONG-TERM CURRENT USE OF INSULIN: ICD-10-CM

## 2023-09-20 DIAGNOSIS — I10 PRIMARY HYPERTENSION: Primary | ICD-10-CM

## 2023-09-20 DIAGNOSIS — Z79.4 TYPE 2 DIABETES MELLITUS WITHOUT COMPLICATION, WITH LONG-TERM CURRENT USE OF INSULIN: ICD-10-CM

## 2023-09-20 RX ORDER — INSULIN ASPART 100 [IU]/ML
INJECTION, SOLUTION INTRAVENOUS; SUBCUTANEOUS
Qty: 60 ML | Refills: 3 | Status: SHIPPED | OUTPATIENT
Start: 2023-09-20

## 2023-09-20 RX ORDER — LISINOPRIL 10 MG/1
TABLET ORAL
Qty: 90 TABLET | Refills: 3 | Status: SHIPPED | OUTPATIENT
Start: 2023-09-20

## 2023-09-20 NOTE — TELEPHONE ENCOUNTER
Rx Refill Note  Requested Prescriptions     Pending Prescriptions Disp Refills    lisinopril (PRINIVIL,ZESTRIL) 10 MG tablet [Pharmacy Med Name: LISINOPRIL 10 MG Tablet] 90 tablet 2     Sig: TAKE 1 TABLET EVERY NIGHT      Last office visit with prescribing clinician: 8/25/2023   Last telemedicine visit with prescribing clinician: Visit date not found   Next office visit with prescribing clinician: 3/6/2024       {TIP  Please add Last Relevant Lab 8/25/23    Roxanne Yepez MA  09/20/23, 13:20 CDT

## 2023-09-21 ENCOUNTER — DOCUMENTATION (OUTPATIENT)
Dept: ENDOCRINOLOGY | Facility: CLINIC | Age: 73
End: 2023-09-21
Payer: MEDICARE

## 2023-10-02 ENCOUNTER — FLU SHOT (OUTPATIENT)
Dept: FAMILY MEDICINE CLINIC | Facility: CLINIC | Age: 73
End: 2023-10-02
Payer: MEDICARE

## 2023-10-02 DIAGNOSIS — Z23 NEED FOR INFLUENZA VACCINATION: Primary | ICD-10-CM

## 2023-10-02 PROCEDURE — G0008 ADMIN INFLUENZA VIRUS VAC: HCPCS | Performed by: FAMILY MEDICINE

## 2023-10-02 PROCEDURE — 90662 IIV NO PRSV INCREASED AG IM: CPT | Performed by: FAMILY MEDICINE

## 2023-10-25 RX ORDER — TAMSULOSIN HYDROCHLORIDE 0.4 MG/1
CAPSULE ORAL
Qty: 180 CAPSULE | Refills: 10 | Status: SHIPPED | OUTPATIENT
Start: 2023-10-25

## 2023-10-25 RX ORDER — FLUTICASONE PROPIONATE 50 MCG
SPRAY, SUSPENSION (ML) NASAL
Qty: 48 G | Refills: 10 | Status: SHIPPED | OUTPATIENT
Start: 2023-10-25

## 2023-10-25 NOTE — TELEPHONE ENCOUNTER
Rx Refill Note  Requested Prescriptions     Pending Prescriptions Disp Refills    tamsulosin (FLOMAX) 0.4 MG capsule 24 hr capsule [Pharmacy Med Name: TAMSULOSIN HYDROCHLORIDE 0.4 MG Capsule] 180 capsule 10     Sig: TAKE 2 CAPSULES EVERY EVENING    fluticasone (FLONASE) 50 MCG/ACT nasal spray [Pharmacy Med Name: FLUTICASONE PROPIONATE 50 MCG/ACT Suspension] 48 g 10     Sig: USE 2 SPRAYS IN EACH NOSTRIL EVERY DAY        Hannah Gallagher MA  10/25/23, 09:11 CDT

## 2023-11-01 ENCOUNTER — INFUSION (OUTPATIENT)
Dept: ONCOLOGY | Facility: HOSPITAL | Age: 73
End: 2023-11-01
Payer: MEDICARE

## 2023-11-01 ENCOUNTER — OFFICE VISIT (OUTPATIENT)
Dept: ONCOLOGY | Facility: CLINIC | Age: 73
End: 2023-11-01
Payer: MEDICARE

## 2023-11-01 ENCOUNTER — LAB (OUTPATIENT)
Dept: LAB | Facility: HOSPITAL | Age: 73
End: 2023-11-01
Payer: MEDICARE

## 2023-11-01 VITALS
OXYGEN SATURATION: 97 % | BODY MASS INDEX: 29.4 KG/M2 | HEIGHT: 68 IN | SYSTOLIC BLOOD PRESSURE: 138 MMHG | WEIGHT: 194 LBS | DIASTOLIC BLOOD PRESSURE: 80 MMHG | TEMPERATURE: 97.5 F | HEART RATE: 86 BPM | RESPIRATION RATE: 16 BRPM

## 2023-11-01 VITALS
TEMPERATURE: 97.5 F | OXYGEN SATURATION: 97 % | HEART RATE: 91 BPM | SYSTOLIC BLOOD PRESSURE: 133 MMHG | RESPIRATION RATE: 18 BRPM | DIASTOLIC BLOOD PRESSURE: 84 MMHG

## 2023-11-01 DIAGNOSIS — N18.32 STAGE 3B CHRONIC KIDNEY DISEASE: ICD-10-CM

## 2023-11-01 DIAGNOSIS — D50.9 IRON DEFICIENCY ANEMIA, UNSPECIFIED IRON DEFICIENCY ANEMIA TYPE: ICD-10-CM

## 2023-11-01 DIAGNOSIS — Z79.4 TYPE 2 DIABETES MELLITUS WITH STAGE 3B CHRONIC KIDNEY DISEASE, WITH LONG-TERM CURRENT USE OF INSULIN: ICD-10-CM

## 2023-11-01 DIAGNOSIS — D50.9 IRON DEFICIENCY ANEMIA, UNSPECIFIED IRON DEFICIENCY ANEMIA TYPE: Primary | ICD-10-CM

## 2023-11-01 DIAGNOSIS — D63.1 ANEMIA IN STAGE 3B CHRONIC KIDNEY DISEASE: Primary | ICD-10-CM

## 2023-11-01 DIAGNOSIS — E11.22 TYPE 2 DIABETES MELLITUS WITH STAGE 3B CHRONIC KIDNEY DISEASE, WITH LONG-TERM CURRENT USE OF INSULIN: ICD-10-CM

## 2023-11-01 DIAGNOSIS — N18.32 TYPE 2 DIABETES MELLITUS WITH STAGE 3B CHRONIC KIDNEY DISEASE, WITH LONG-TERM CURRENT USE OF INSULIN: ICD-10-CM

## 2023-11-01 DIAGNOSIS — N18.32 ANEMIA IN STAGE 3B CHRONIC KIDNEY DISEASE: Primary | ICD-10-CM

## 2023-11-01 LAB
ALBUMIN SERPL-MCNC: 3.9 G/DL (ref 3.5–5.2)
ALBUMIN/GLOB SERPL: 1.3 G/DL
ALP SERPL-CCNC: 110 U/L (ref 39–117)
ALT SERPL W P-5'-P-CCNC: 39 U/L (ref 1–41)
ANION GAP SERPL CALCULATED.3IONS-SCNC: 6 MMOL/L (ref 5–15)
AST SERPL-CCNC: 24 U/L (ref 1–40)
BASOPHILS # BLD AUTO: 0.05 10*3/MM3 (ref 0–0.2)
BASOPHILS NFR BLD AUTO: 0.7 % (ref 0–1.5)
BILIRUB SERPL-MCNC: 0.3 MG/DL (ref 0–1.2)
BUN SERPL-MCNC: 33 MG/DL (ref 8–23)
BUN/CREAT SERPL: 15.2 (ref 7–25)
CALCIUM SPEC-SCNC: 8.9 MG/DL (ref 8.6–10.5)
CHLORIDE SERPL-SCNC: 102 MMOL/L (ref 98–107)
CO2 SERPL-SCNC: 29 MMOL/L (ref 22–29)
CREAT SERPL-MCNC: 2.17 MG/DL (ref 0.76–1.27)
DEPRECATED RDW RBC AUTO: 45.8 FL (ref 37–54)
EGFRCR SERPLBLD CKD-EPI 2021: 31.2 ML/MIN/1.73
EOSINOPHIL # BLD AUTO: 0.43 10*3/MM3 (ref 0–0.4)
EOSINOPHIL NFR BLD AUTO: 5.6 % (ref 0.3–6.2)
ERYTHROCYTE [DISTWIDTH] IN BLOOD BY AUTOMATED COUNT: 13.3 % (ref 12.3–15.4)
FERRITIN SERPL-MCNC: 120.5 NG/ML (ref 30–400)
GLOBULIN UR ELPH-MCNC: 2.9 GM/DL
GLUCOSE SERPL-MCNC: 294 MG/DL (ref 65–99)
HCT VFR BLD AUTO: 29.8 % (ref 37.5–51)
HGB BLD-MCNC: 9.1 G/DL (ref 13–17.7)
HOLD SPECIMEN: NORMAL
IMM GRANULOCYTES # BLD AUTO: 0.02 10*3/MM3 (ref 0–0.05)
IMM GRANULOCYTES NFR BLD AUTO: 0.3 % (ref 0–0.5)
IRON 24H UR-MRATE: 37 MCG/DL (ref 59–158)
IRON SATN MFR SERPL: 14 % (ref 20–50)
LYMPHOCYTES # BLD AUTO: 1.02 10*3/MM3 (ref 0.7–3.1)
LYMPHOCYTES NFR BLD AUTO: 13.3 % (ref 19.6–45.3)
MCH RBC QN AUTO: 28.9 PG (ref 26.6–33)
MCHC RBC AUTO-ENTMCNC: 30.5 G/DL (ref 31.5–35.7)
MCV RBC AUTO: 94.6 FL (ref 79–97)
MONOCYTES # BLD AUTO: 0.33 10*3/MM3 (ref 0.1–0.9)
MONOCYTES NFR BLD AUTO: 4.3 % (ref 5–12)
NEUTROPHILS NFR BLD AUTO: 5.83 10*3/MM3 (ref 1.7–7)
NEUTROPHILS NFR BLD AUTO: 75.8 % (ref 42.7–76)
NRBC BLD AUTO-RTO: 0 /100 WBC (ref 0–0.2)
PLATELET # BLD AUTO: 183 10*3/MM3 (ref 140–450)
PMV BLD AUTO: 9 FL (ref 6–12)
POTASSIUM SERPL-SCNC: 5 MMOL/L (ref 3.5–5.2)
PROT SERPL-MCNC: 6.8 G/DL (ref 6–8.5)
RBC # BLD AUTO: 3.15 10*6/MM3 (ref 4.14–5.8)
SODIUM SERPL-SCNC: 137 MMOL/L (ref 136–145)
TIBC SERPL-MCNC: 262 MCG/DL (ref 298–536)
TRANSFERRIN SERPL-MCNC: 176 MG/DL (ref 200–360)
WBC NRBC COR # BLD: 7.68 10*3/MM3 (ref 3.4–10.8)

## 2023-11-01 PROCEDURE — 96372 THER/PROPH/DIAG INJ SC/IM: CPT

## 2023-11-01 PROCEDURE — 82728 ASSAY OF FERRITIN: CPT

## 2023-11-01 PROCEDURE — 36415 COLL VENOUS BLD VENIPUNCTURE: CPT

## 2023-11-01 PROCEDURE — 84466 ASSAY OF TRANSFERRIN: CPT

## 2023-11-01 PROCEDURE — 85025 COMPLETE CBC W/AUTO DIFF WBC: CPT

## 2023-11-01 PROCEDURE — 83540 ASSAY OF IRON: CPT

## 2023-11-01 PROCEDURE — 25010000002 EPOETIN ALFA PER 1000 UNITS: Performed by: NURSE PRACTITIONER

## 2023-11-01 PROCEDURE — 80053 COMPREHEN METABOLIC PANEL: CPT

## 2023-11-01 RX ORDER — NETARSUDIL 0.2 MG/ML
SOLUTION/ DROPS OPHTHALMIC; TOPICAL
COMMUNITY
Start: 2023-10-24

## 2023-11-01 RX ADMIN — ERYTHROPOIETIN 40000 UNITS: 40000 INJECTION, SOLUTION INTRAVENOUS; SUBCUTANEOUS at 12:38

## 2023-11-01 NOTE — PROGRESS NOTES
[facility] ONC [facility] HEMATOLOGY & ONCOLOGY [place]  [address]  [facility] [address] [phone]    Patient Name: Bernard Matta  Encounter Date: 11/01/2023   YOB: 1950  Patient Number: [id number]    PROGRESS NOTE    HISTORY OF PRESENT ILLNESS: Bernard Matta is a 73 y.o. male  followed by this office for iron deficiency anemia. He has health history significant for DM on insulin, HTN, Hyperlipidemia, iron deficiency, CKD. He received Inejctafer 750 mg on Feb 3, 2023.     INTERVAL HISTORY    Pt presents to clinic today for continued follow up. His last Retacrit was June 14, 2023. Hgb has been stable since then.      He had Injectafer infusion on April 14 and 21, 2023.        He has no acute complaint today.  He had labs drawn and results were reviewed with him.         LABS    Lab Results - Last 18 Months   Lab Units 11/01/23  0957 09/13/23  0957 08/02/23  1024 06/14/23  1303 05/03/23  1221 04/19/23  1227   HEMOGLOBIN g/dL 9.1* 10.1* 11.1* 10.5* 11.3* 11.0*   HEMATOCRIT % 29.8* 32.3* 36.0* 32.8* 36.0* 35.8*   MCV fL 94.6 95.8 96.8 93.4 91.4 90.9   WBC 10*3/mm3 7.68 6.20 6.44 6.37 6.19 6.77   RDW % 13.3 13.2 13.4 14.7 14.3 13.5   MPV fL 9.0 8.9 8.9 9.1 8.6 8.9   PLATELETS 10*3/mm3 183 149 159 152 143 154   IMM GRAN % % 0.3 0.3 0.3 0.3 0.3 0.3   NEUTROS ABS 10*3/mm3 5.83 4.43 4.37 4.17 4.13 4.45   LYMPHS ABS 10*3/mm3 1.02 0.97 1.11 1.21 1.13 1.39   MONOS ABS 10*3/mm3 0.33 0.43 0.44 0.49 0.52 0.52   EOS ABS 10*3/mm3 0.43* 0.32 0.44* 0.42* 0.35 0.35   BASOS ABS 10*3/mm3 0.05 0.03 0.06 0.06 0.04 0.04   IMMATURE GRANS (ABS) 10*3/mm3 0.02 0.02 0.02 0.02 0.02 0.02   NRBC /100 WBC 0.0 0.0 0.0 0.0 0.0 0.0       Lab Results - Last 18 Months   Lab Units 11/01/23  0957 08/02/23  1024 05/03/23  1221 04/19/23  1227 04/05/23  0855 02/22/23  0946   GLUCOSE mg/dL 294* 113* 121* 163* 282* 384*   SODIUM mmol/L 137 141 138 139 138 137   POTASSIUM mmol/L 5.0  4.7 4.5 4.5 4.6 4.5   CO2 mmol/L 29.0 29.0 27.0 28.0 27.0 27.0   CHLORIDE mmol/L 102 104 101 103 100 100   ANION GAP mmol/L 6.0 8.0 10.0 8.0 11.0 10.0   CREATININE mg/dL 2.17* 1.93* 1.98* 1.79* 1.92* 1.93*   BUN mg/dL 33* 27* 32* 28* 29* 31*   BUN / CREAT RATIO  15.2 14.0 16.2 15.6 15.1 16.1   CALCIUM mg/dL 8.9 9.0 8.6 8.5* 8.8 8.6   ALK PHOS U/L 110 105 104 91 92 95   TOTAL PROTEIN g/dL 6.8 6.8 6.8 6.8 6.6 6.8   ALT (SGPT) U/L 39 37 48* 49* 27 30   AST (SGOT) U/L 24 24 26 34 19 19   BILIRUBIN mg/dL 0.3 0.3 0.3 0.3 0.2 0.2   ALBUMIN g/dL 3.9 4.1 4.0 4.2 3.9 4.0   GLOBULIN gm/dL 2.9 2.7 2.8 2.6 2.7 2.8       Lab Results - Last 18 Months   Lab Units 01/25/23  1019   M-SPIKE g/dL Not Observed   KAPPA/LAMBDA RATIO, S  1.55   FREE LAMBDA LIGHT CHAINS mg/L 25.8   IG KAPPA FREE LIGHT CHAIN mg/L 40.0*   LDH U/L 183       Lab Results - Last 18 Months   Lab Units 11/01/23  0957 08/25/23  0759 08/02/23  1024 05/03/23  1221 04/05/23  0855 02/22/23  0946 01/25/23  1019 01/25/23  1019 07/25/22  0825   IRON mcg/dL 37*  --  53* 88 36* 55*  --  122  --    TIBC mcg/dL 262* 247 244* 226* 256* 274*   < > 337  --    IRON SATURATION %  --  27  --   --   --   --   --   --   --    IRON SATURATION (TSAT) % 14*  --  22 39 14* 20   < > 36  --    FERRITIN ng/mL 120.50 266.00 290.60 1,027.00* 59.42 256.20   < > 16.82*  --    TSH uIU/mL  --  2.110  --   --  1.740  --   --   --  2.900   FOLATE ng/mL  --  >20.00  --   --   --   --   --  >20.00 >20.00    < > = values in this interval not displayed.         PAST MEDICAL HISTORY:  ALLERGIES:  Allergies   Allergen Reactions    Jardiance [Empagliflozin] Other (See Comments)     Pt reports         CURRENT MEDICATIONS:  Outpatient Encounter Medications as of 11/1/2023   Medication Sig Dispense Refill    Accu-Chek FastClix Lancets misc Use 4 x daily ,   ICD10 code is E11.9 You dispensed the wrong this, patient wants acucheck fast click lancets 360 each 3    Alcohol Swabs (B-D SINGLE USE SWABS REGULAR)  "pads USE FOUR TIMES DAILY 400 each 11    amitriptyline (ELAVIL) 10 MG tablet TAKE 1 TABLET EVERY NIGHT AS NEEDED FOR SLEEP 90 tablet 3    aspirin 81 MG tablet Take 1 tablet by mouth Daily.      atorvastatin (LIPITOR) 40 MG tablet Take 1 tablet by mouth every night at bedtime. 90 tablet 3    Blood Glucose Monitoring Suppl w/Device kit accu chek albertina 1 each 1    cetirizine (zyrTEC) 10 MG tablet Take 1 tablet by mouth Daily.      cholecalciferol (VITAMIN D3) 400 UNITS tablet Take 1 tablet by mouth Daily.      cloNIDine (CATAPRES) 0.2 MG tablet Take 1 tablet by mouth 2 (Two) Times a Day. 180 tablet 3    Continuous Blood Gluc Sensor (Dexcom G6 Sensor) Every 10 (Ten) Days. 3 each 5    Continuous Blood Gluc Transmit (Dexcom G6 Transmitter) misc 1 each Every 3 (Three) Months. 1 each 2    dorzolamide-timolol (COSOPT) 22.3-6.8 MG/ML ophthalmic solution 1 drop 2 (Two) Times a Day.      Droplet Pen Needles 31G X 5 MM misc USE FOUR TIMES DAILY  AS  DIRECTED 400 each 3    epoetin shari-epbx (Retacrit) 93375 UNIT/ML injection Inject  under the skin into the appropriate area as directed Every 14 (Fourteen) Days.      ezetimibe (Zetia) 10 MG tablet Take 1 tablet by mouth Daily. 90 tablet 3    ferrous sulfate 325 (65 FE) MG tablet 1 tablet. Takes 1 every other day .      fluticasone (FLONASE) 50 MCG/ACT nasal spray USE 2 SPRAYS IN EACH NOSTRIL EVERY DAY 48 g 10    furosemide (Lasix) 20 MG tablet Take 1 tablet by mouth Daily. 90 tablet 3    glucose blood (Accu-Chek Marry Plus) test strip TEST BLOOD SUGAR FOUR TIMES DAILY 400 each 6    insulin aspart (NovoLOG FlexPen) 100 UNIT/ML solution pen-injector sc pen INJECT SUBCUTANEOUSLY UP TO 20 UNITS WITH MEALS. DISCARD PEN 28 DAYS AFTER OPENING 60 mL 3    Insulin Pen Needle 31G X 5 MM misc Droplet Pen Needle 31 gauge x 3/16\"      Lancet Devices (Lancing Device) misc acucheck fast click lancing device. Make certain you send the correct one. Talk to patient . States you sent the wrong one 1 " each 11    latanoprost (XALATAN) 0.005 % ophthalmic solution 1 drop Every Night.      lisinopril (PRINIVIL,ZESTRIL) 10 MG tablet TAKE 1 TABLET EVERY NIGHT 90 tablet 3    Multiple Vitamins-Minerals (MULTIVITAMIN WITH MINERALS) tablet tablet Take 1 tablet by mouth Daily.      Rhopressa 0.02 % solution       tamsulosin (FLOMAX) 0.4 MG capsule 24 hr capsule TAKE 2 CAPSULES EVERY EVENING 180 capsule 10    Tresiba FlexTouch 100 UNIT/ML solution pen-injector injection INJECT 20 UNITS UNDER THE SKIN INTO THE APPROPRIATE AREA AS DIRECTED EVERY NIGHT. 30 mL 2     No facility-administered encounter medications on file as of 11/1/2023.     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          Immunization History   Administered Date(s) Administered    COVID-19 (MODERNA) 1st,2nd,3rd Dose Monovalent 03/02/2021, 03/30/2021, 11/02/2021    COVID-19 (PFIZER) BIVALENT 12+YRS 09/29/2022    Covid-19 (Pfizer) Gray Cap Monovalent 07/28/2022    FLUAD TRI 65YR+ 09/09/2019    Flu Vaccine Split Quad 01/01/2013    Fluad Quad 65+ 09/09/2019, 09/09/2020    Fluzone (or Fluarix & Flulaval for VFC) >6mos 01/01/2013, 09/20/2022    Fluzone High Dose =>65 Years ([employer] ONLY) 09/14/2016, 10/02/2017, 10/08/2018    Fluzone High-Dose 65+yrs 10/19/2021, 10/02/2023    Pneumococcal Conjugate 13-Valent (PCV13) 06/11/2015    Pneumococcal Polysaccharide (PPSV23) 04/27/2012, 01/01/2013    Shingrix 06/21/2018, 01/07/2019    Tdap 05/08/2013     Last  Completed Mammogram       This patient has no relevant Health Maintenance data.              FAMILY HISTORY:  Family History   Problem Relation Age of Onset    No Known Problems Maternal Grandmother     No Known Problems Maternal Grandfather     No Known Problems Paternal Grandmother     No Known Problems Paternal Grandfather     No Known Problems Mother     No Known Problems Father     Colon cancer Neg Hx     Colon polyps Neg Hx     Esophageal cancer Neg Hx      SOCIAL HISTORY:  Social History     Socioeconomic History    Marital status:    Tobacco Use    Smoking status: Former     Packs/day: 0.50     Years: 10.00     Additional pack years: 0.00     Total pack years: 5.00     Types: Cigarettes     Quit date: 1979     Years since quittin.8    Smokeless tobacco: Never   Vaping Use    Vaping Use: Never used   Substance and Sexual Activity    Alcohol use: No    Drug use: No    Sexual activity: Defer       REVIEW OF SYSTEMS:  Review of Systems   Constitutional:  Positive for fatigue. Negative for activity change, appetite change, fever, unexpected weight gain and unexpected weight loss.   HENT:  Negative for dental problem, facial swelling, swollen glands and trouble swallowing.    Eyes:  Negative for double vision and discharge.        Wears glasses,   Blind in right eye   Respiratory:  Negative for cough and wheezing.    Cardiovascular:  Negative for chest pain, palpitations and leg swelling.   Gastrointestinal:  Negative for abdominal pain, blood in stool, nausea and vomiting.   Endocrine: Negative.    Genitourinary:  Negative for dysuria, frequency and hematuria.        Hesitancy has see Urology and is on Flomax      Musculoskeletal:  Negative for arthralgias, back pain and myalgias.   Skin:  Negative for rash, skin lesions and wound.   Allergic/Immunologic: Negative for immunocompromised state.   Neurological:  Positive for headache. Negative for speech difficulty, light-headedness, memory problem  "and confusion.   Hematological:  Negative for adenopathy.   Psychiatric/Behavioral:  Negative for self-injury, suicidal ideas and depressed mood. The patient is not nervous/anxious.        /80   Pulse 86   Temp 97.5 °F (36.4 °C)   Resp 16   Ht 172.7 cm (68\")   Wt 88 kg (194 lb)   SpO2 97%   BMI 29.50 kg/m²  Body surface area is 2.02 meters squared.    Pain Score    11/01/23 1059   PainSc: 0-No pain         Physical Exam  Constitutional:       Appearance: Normal appearance.   HENT:      Head: Normocephalic and atraumatic.   Cardiovascular:      Rate and Rhythm: Normal rate and regular rhythm.   Pulmonary:      Effort: Pulmonary effort is normal.      Breath sounds: Normal breath sounds.   Abdominal:      General: Bowel sounds are normal.      Palpations: Abdomen is soft.   Musculoskeletal:      Right lower leg: No edema.      Left lower leg: No edema.   Skin:     General: Skin is warm and dry.   Neurological:      Mental Status: He is alert and oriented to person, place, and time.   Psychiatric:         Attention and Perception: Attention normal.         Mood and Affect: Mood normal.         Judgment: Judgment normal.         Bernard Matta reports a pain score of 0.  Given his pain assessment as noted, treatment options were discussed and the following options were decided upon as a follow-up plan to address the patient's pain: continuation of current treatment plan for pain and tylenol . No intervention indicated today       ASSESSMENT / PLAN:    1. Anemia in stage 3b chronic kidney disease    2. Iron deficiency anemia, unspecified iron deficiency anemia type    3. Type 2 diabetes mellitus with stage 3b chronic kidney disease, with long-term current use of insulin         1.  Iron Deficiency   2. Stage IIIb Chronic Kidney Disease   3.  Intestinal malabsorption of iron  -His last Retacrit was June 14, 2023  -He had Injectafer infusion on April 14 and 21, 2023.      -Followed by Dr. Lemus, Nephrology "   -Labs today:  BUN 33, Creatinine 2.17, GFR 31.2, Hgb 9.1, Hct 29.8  - Iron 37, Ferritin 120, Sat 14%, TIBC 262  -As Ferritin is >100, Hgb < 10, GFR, < 60, Will continue Retacrit today   -Will order Injectafer weekly x 2     4.  Type 2 Diabetes   -Non fasting glucose 294  -Patient has CGM  -Taking Tresiba and Novolog   -Followed by Endocrinology       He [facility]ology [facility]      PLAN:  Pt will receive Retacrit today.   He will receive Injectafer the next two weeks.   RTC in 4 weeks to reeevaluate iron.    Care discussed with patient.  Understanding expressed.  Patient agreeable with plan.          ACP discussion was held with the patient during this visit. Patient has an advance directive in EMR which is still valid.       Leesa Anne, APRN  11/01/2023

## 2023-11-01 NOTE — LETTER
November 12, 2023       No Recipients    Patient: Bernard Matta   YOB: 1950   Date of Visit: 11/1/2023     Dear Brando Webb MD:       Thank you for referring Bernard Matta to me for evaluation. Below are the relevant portions of my assessment and plan of care.    If you have questions, please do not hesitate to call me. I look forward to following Bernard along with you.         Sincerely,        ALONSO Monzon        CC:   No Recipients    Leesa Anne APRN  11/12/23 1540  Sign when Signing Visit  [facility] HEMATOLOGY & ONCOLOGY [address]  [facility] [address]  [phone]    Patient Name: Bernard Matta  Encounter Date: 11/01/2023   YOB: 1950  Patient Number: [id number]    PROGRESS NOTE    HISTORY OF PRESENT ILLNESS: Bernard Matta is a 73 y.o. male  followed by this office for iron deficiency anemia. He has health history significant for DM on insulin, HTN, Hyperlipidemia, iron deficiency, CKD. He received Inejctafer 750 mg on Feb 3, 2023.     INTERVAL HISTORY    Pt presents to clinic today for continued follow up. His last Retacrit was June 14, 2023. Hgb has been stable since then.      He had Injectafer infusion on April 14 and 21, 2023.        He has no acute complaint today.  He had labs drawn and results were reviewed with him.         LABS    Lab Results - Last 18 Months   Lab Units 11/01/23  0957 09/13/23  0957 08/02/23  1024 06/14/23  1303 05/03/23  1221 04/19/23  1227   HEMOGLOBIN g/dL 9.1* 10.1* 11.1* 10.5* 11.3* 11.0*   HEMATOCRIT % 29.8* 32.3* 36.0* 32.8* 36.0* 35.8*   MCV fL 94.6 95.8 96.8 93.4 91.4 90.9   WBC 10*3/mm3 7.68 6.20 6.44 6.37 6.19 6.77   RDW % 13.3 13.2 13.4 14.7 14.3 13.5   MPV fL 9.0 8.9 8.9 9.1 8.6 8.9   PLATELETS 10*3/mm3 183 149 159 152 143 154   IMM GRAN % % 0.3 0.3 0.3 0.3 0.3 0.3   NEUTROS ABS 10*3/mm3 5.83 4.43 4.37 4.17 4.13 4.45   LYMPHS ABS  10*3/mm3 1.02 0.97 1.11 1.21 1.13 1.39   MONOS ABS 10*3/mm3 0.33 0.43 0.44 0.49 0.52 0.52   EOS ABS 10*3/mm3 0.43* 0.32 0.44* 0.42* 0.35 0.35   BASOS ABS 10*3/mm3 0.05 0.03 0.06 0.06 0.04 0.04   IMMATURE GRANS (ABS) 10*3/mm3 0.02 0.02 0.02 0.02 0.02 0.02   NRBC /100 WBC 0.0 0.0 0.0 0.0 0.0 0.0       Lab Results - Last 18 Months   Lab Units 11/01/23  0957 08/02/23  1024 05/03/23  1221 04/19/23  1227 04/05/23  0855 02/22/23  0946   GLUCOSE mg/dL 294* 113* 121* 163* 282* 384*   SODIUM mmol/L 137 141 138 139 138 137   POTASSIUM mmol/L 5.0 4.7 4.5 4.5 4.6 4.5   CO2 mmol/L 29.0 29.0 27.0 28.0 27.0 27.0   CHLORIDE mmol/L 102 104 101 103 100 100   ANION GAP mmol/L 6.0 8.0 10.0 8.0 11.0 10.0   CREATININE mg/dL 2.17* 1.93* 1.98* 1.79* 1.92* 1.93*   BUN mg/dL 33* 27* 32* 28* 29* 31*   BUN / CREAT RATIO  15.2 14.0 16.2 15.6 15.1 16.1   CALCIUM mg/dL 8.9 9.0 8.6 8.5* 8.8 8.6   ALK PHOS U/L 110 105 104 91 92 95   TOTAL PROTEIN g/dL 6.8 6.8 6.8 6.8 6.6 6.8   ALT (SGPT) U/L 39 37 48* 49* 27 30   AST (SGOT) U/L 24 24 26 34 19 19   BILIRUBIN mg/dL 0.3 0.3 0.3 0.3 0.2 0.2   ALBUMIN g/dL 3.9 4.1 4.0 4.2 3.9 4.0   GLOBULIN gm/dL 2.9 2.7 2.8 2.6 2.7 2.8       Lab Results - Last 18 Months   Lab Units 01/25/23  1019   M-SPIKE g/dL Not Observed   KAPPA/LAMBDA RATIO, S  1.55   FREE LAMBDA LIGHT CHAINS mg/L 25.8   IG KAPPA FREE LIGHT CHAIN mg/L 40.0*   LDH U/L 183       Lab Results - Last 18 Months   Lab Units 11/01/23  0957 08/25/23  0759 08/02/23  1024 05/03/23  1221 04/05/23  0855 02/22/23  0946 01/25/23  1019 01/25/23  1019 07/25/22  0825   IRON mcg/dL 37*  --  53* 88 36* 55*  --  122  --    TIBC mcg/dL 262* 247 244* 226* 256* 274*   < > 337  --    IRON SATURATION %  --  27  --   --   --   --   --   --   --    IRON SATURATION (TSAT) % 14*  --  22 39 14* 20   < > 36  --    FERRITIN ng/mL 120.50 266.00 290.60 1,027.00* 59.42 256.20   < > 16.82*  --    TSH uIU/mL  --  2.110  --   --  1.740  --   --   --  2.900   FOLATE ng/mL  --  >20.00  --    --   --   --   --  >20.00 >20.00    < > = values in this interval not displayed.         PAST MEDICAL HISTORY:  ALLERGIES:  Allergies   Allergen Reactions   • Jardiance [Empagliflozin] Other (See Comments)     Pt reports         CURRENT MEDICATIONS:  Outpatient Encounter Medications as of 11/1/2023   Medication Sig Dispense Refill   • Accu-Chek FastClix Lancets misc Use 4 x daily ,   ICD10 code is E11.9 You dispensed the wrong this, patient wants acucheck fast click lancets 360 each 3   • Alcohol Swabs (B-D SINGLE USE SWABS REGULAR) pads USE FOUR TIMES DAILY 400 each 11   • amitriptyline (ELAVIL) 10 MG tablet TAKE 1 TABLET EVERY NIGHT AS NEEDED FOR SLEEP 90 tablet 3   • aspirin 81 MG tablet Take 1 tablet by mouth Daily.     • atorvastatin (LIPITOR) 40 MG tablet Take 1 tablet by mouth every night at bedtime. 90 tablet 3   • Blood Glucose Monitoring Suppl w/Device kit accu chek albertina 1 each 1   • cetirizine (zyrTEC) 10 MG tablet Take 1 tablet by mouth Daily.     • cholecalciferol (VITAMIN D3) 400 UNITS tablet Take 1 tablet by mouth Daily.     • cloNIDine (CATAPRES) 0.2 MG tablet Take 1 tablet by mouth 2 (Two) Times a Day. 180 tablet 3   • Continuous Blood Gluc Sensor (Dexcom G6 Sensor) Every 10 (Ten) Days. 3 each 5   • Continuous Blood Gluc Transmit (Dexcom G6 Transmitter) misc 1 each Every 3 (Three) Months. 1 each 2   • dorzolamide-timolol (COSOPT) 22.3-6.8 MG/ML ophthalmic solution 1 drop 2 (Two) Times a Day.     • Droplet Pen Needles 31G X 5 MM misc USE FOUR TIMES DAILY  AS  DIRECTED 400 each 3   • epoetin shari-epbx (Retacrit) 43124 UNIT/ML injection Inject  under the skin into the appropriate area as directed Every 14 (Fourteen) Days.     • ezetimibe (Zetia) 10 MG tablet Take 1 tablet by mouth Daily. 90 tablet 3   • ferrous sulfate 325 (65 FE) MG tablet 1 tablet. Takes 1 every other day .     • fluticasone (FLONASE) 50 MCG/ACT nasal spray USE 2 SPRAYS IN EACH NOSTRIL EVERY DAY 48 g 10   • furosemide (Lasix) 20 MG  "tablet Take 1 tablet by mouth Daily. 90 tablet 3   • glucose blood (Accu-Chek Marry Plus) test strip TEST BLOOD SUGAR FOUR TIMES DAILY 400 each 6   • insulin aspart (NovoLOG FlexPen) 100 UNIT/ML solution pen-injector sc pen INJECT SUBCUTANEOUSLY UP TO 20 UNITS WITH MEALS. DISCARD PEN 28 DAYS AFTER OPENING 60 mL 3   • Insulin Pen Needle 31G X 5 MM misc Droplet Pen Needle 31 gauge x 3/16\"     • Lancet Devices (Lancing Device) misc acucheck fast click lancing device. Make certain you send the correct one. Talk to patient . States you sent the wrong one 1 each 11   • latanoprost (XALATAN) 0.005 % ophthalmic solution 1 drop Every Night.     • lisinopril (PRINIVIL,ZESTRIL) 10 MG tablet TAKE 1 TABLET EVERY NIGHT 90 tablet 3   • Multiple Vitamins-Minerals (MULTIVITAMIN WITH MINERALS) tablet tablet Take 1 tablet by mouth Daily.     • Rhopressa 0.02 % solution      • tamsulosin (FLOMAX) 0.4 MG capsule 24 hr capsule TAKE 2 CAPSULES EVERY EVENING 180 capsule 10   • Tresiba FlexTouch 100 UNIT/ML solution pen-injector injection INJECT 20 UNITS UNDER THE SKIN INTO THE APPROPRIATE AREA AS DIRECTED EVERY NIGHT. 30 mL 2     No facility-administered encounter medications on file as of 11/1/2023.     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          Immunization History   Administered Date(s) Administered   • COVID-19 (MODERNA) 1st,2nd,3rd Dose Monovalent 03/02/2021, 03/30/2021, 11/02/2021   • COVID-19 (PFIZER) " BIVALENT 12+YRS 2022   • Covid-19 (Pfizer) Gray Cap Monovalent 2022   • FLUAD TRI 65YR+ 2019   • Flu Vaccine Split Quad 2013   • Fluad Quad 65+ 2019, 2020   • Fluzone (or Fluarix & Flulaval for VFC) >6mos 2013, 2022   • Fluzone High Dose =>65 Years (Vaxcare ONLY) 2016, 10/02/2017, 10/08/2018   • Fluzone High-Dose 65+yrs 10/19/2021, 10/02/2023   • Pneumococcal Conjugate 13-Valent (PCV13) 2015   • Pneumococcal Polysaccharide (PPSV23) 2012, 2013   • Shingrix 2018, 2019   • Tdap 2013     Last Completed Mammogram       This patient has no relevant Health Maintenance data.              FAMILY HISTORY:  Family History   Problem Relation Age of Onset   • No Known Problems Maternal Grandmother    • No Known Problems Maternal Grandfather    • No Known Problems Paternal Grandmother    • No Known Problems Paternal Grandfather    • No Known Problems Mother    • No Known Problems Father    • Colon cancer Neg Hx    • Colon polyps Neg Hx    • Esophageal cancer Neg Hx      SOCIAL HISTORY:  Social History     Socioeconomic History   • Marital status:    Tobacco Use   • Smoking status: Former     Packs/day: 0.50     Years: 10.00     Additional pack years: 0.00     Total pack years: 5.00     Types: Cigarettes     Quit date: 1979     Years since quittin.8   • Smokeless tobacco: Never   Vaping Use   • Vaping Use: Never used   Substance and Sexual Activity   • Alcohol use: No   • Drug use: No   • Sexual activity: Defer       REVIEW OF SYSTEMS:  Review of Systems   Constitutional:  Positive for fatigue. Negative for activity change, appetite change, fever, unexpected weight gain and unexpected weight loss.   HENT:  Negative for dental problem, facial swelling, swollen glands and trouble swallowing.    Eyes:  Negative for double vision and discharge.        Wears glasses,   Blind in right eye   Respiratory:  Negative for cough and  "wheezing.    Cardiovascular:  Negative for chest pain, palpitations and leg swelling.   Gastrointestinal:  Negative for abdominal pain, blood in stool, nausea and vomiting.   Endocrine: Negative.    Genitourinary:  Negative for dysuria, frequency and hematuria.        Hesitancy has see Urology and is on Flomax      Musculoskeletal:  Negative for arthralgias, back pain and myalgias.   Skin:  Negative for rash, skin lesions and wound.   Allergic/Immunologic: Negative for immunocompromised state.   Neurological:  Positive for headache. Negative for speech difficulty, light-headedness, memory problem and confusion.   Hematological:  Negative for adenopathy.   Psychiatric/Behavioral:  Negative for self-injury, suicidal ideas and depressed mood. The patient is not nervous/anxious.        /80   Pulse 86   Temp 97.5 °F (36.4 °C)   Resp 16   Ht 172.7 cm (68\")   Wt 88 kg (194 lb)   SpO2 97%   BMI 29.50 kg/m²  Body surface area is 2.02 meters squared.    Pain Score    11/01/23 1059   PainSc: 0-No pain         Physical Exam  Constitutional:       Appearance: Normal appearance.   HENT:      Head: Normocephalic and atraumatic.   Cardiovascular:      Rate and Rhythm: Normal rate and regular rhythm.   Pulmonary:      Effort: Pulmonary effort is normal.      Breath sounds: Normal breath sounds.   Abdominal:      General: Bowel sounds are normal.      Palpations: Abdomen is soft.   Musculoskeletal:      Right lower leg: No edema.      Left lower leg: No edema.   Skin:     General: Skin is warm and dry.   Neurological:      Mental Status: He is alert and oriented to person, place, and time.   Psychiatric:         Attention and Perception: Attention normal.         Mood and Affect: Mood normal.         Judgment: Judgment normal.         Bernard Matta reports a pain score of 0.  Given his pain assessment as noted, treatment options were discussed and the following options were decided upon as a follow-up plan to address " the patient's pain: continuation of current treatment plan for pain and tylenol . No intervention indicated today       ASSESSMENT / PLAN:    1. Anemia in stage 3b chronic kidney disease    2. Iron deficiency anemia, unspecified iron deficiency anemia type    3. Type 2 diabetes mellitus with stage 3b chronic kidney disease, with long-term current use of insulin         1.  Iron Deficiency   2. Stage IIIb Chronic Kidney Disease   3.  Intestinal malabsorption of iron  -His last Retacrit was June 14, 2023  -He had Injectafer infusion on April 14 and 21, 2023.      -Followed by Dr. Lemus, Nephrology   -Labs today:  BUN 33, Creatinine 2.17, GFR 31.2, Hgb 9.1, Hct 29.8  - Iron 37, Ferritin 120, Sat 14%, TIBC 262  -As Ferritin is >100, Hgb < 10, GFR, < 60, Will continue Retacrit today   -Will order Injectafer weekly x 2     4.  Type 2 Diabetes   -Non fasting glucose 294  -Patient has CGM  -Taking Tresiba and Novolog   -Followed by Endocrinology       He sees cardiology at [facility]      PLAN:  Pt will receive Retacrit today.   He will receive Injectafer the next two weeks.   RTC in 4 weeks to reeevaluate iron.    Care discussed with patient.  Understanding expressed.  Patient agreeable with plan.          ACP discussion was held with the patient during this visit. Patient has an advance directive in EMR which is still valid.       Leesa Anne, APRN  11/01/2023

## 2023-11-02 ENCOUNTER — TELEPHONE (OUTPATIENT)
Dept: ONCOLOGY | Facility: CLINIC | Age: 73
End: 2023-11-02
Payer: MEDICARE

## 2023-11-02 RX ORDER — ACETAMINOPHEN 325 MG/1
650 TABLET ORAL ONCE
OUTPATIENT
Start: 2023-11-07 | End: 2023-11-07

## 2023-11-02 RX ORDER — FAMOTIDINE 10 MG/ML
20 INJECTION, SOLUTION INTRAVENOUS AS NEEDED
OUTPATIENT
Start: 2023-11-07

## 2023-11-02 RX ORDER — SODIUM CHLORIDE 9 MG/ML
250 INJECTION, SOLUTION INTRAVENOUS ONCE
OUTPATIENT
Start: 2023-11-14

## 2023-11-02 RX ORDER — SODIUM CHLORIDE 9 MG/ML
250 INJECTION, SOLUTION INTRAVENOUS ONCE
OUTPATIENT
Start: 2023-11-07

## 2023-11-02 RX ORDER — DIPHENHYDRAMINE HYDROCHLORIDE 50 MG/ML
50 INJECTION INTRAMUSCULAR; INTRAVENOUS AS NEEDED
OUTPATIENT
Start: 2023-11-07

## 2023-11-02 RX ORDER — DIPHENHYDRAMINE HCL 25 MG
25 TABLET ORAL ONCE
OUTPATIENT
Start: 2023-11-07 | End: 2023-11-07

## 2023-11-02 RX ORDER — ACETAMINOPHEN 325 MG/1
650 TABLET ORAL ONCE
OUTPATIENT
Start: 2023-11-14 | End: 2023-11-14

## 2023-11-02 RX ORDER — DIPHENHYDRAMINE HCL 25 MG
25 TABLET ORAL ONCE
OUTPATIENT
Start: 2023-11-14 | End: 2023-11-14

## 2023-11-02 RX ORDER — DIPHENHYDRAMINE HYDROCHLORIDE 50 MG/ML
50 INJECTION INTRAMUSCULAR; INTRAVENOUS AS NEEDED
OUTPATIENT
Start: 2023-11-14

## 2023-11-02 RX ORDER — FAMOTIDINE 10 MG/ML
20 INJECTION, SOLUTION INTRAVENOUS AS NEEDED
OUTPATIENT
Start: 2023-11-14

## 2023-11-07 ENCOUNTER — INFUSION (OUTPATIENT)
Dept: ONCOLOGY | Facility: HOSPITAL | Age: 73
End: 2023-11-07
Payer: MEDICARE

## 2023-11-07 ENCOUNTER — OFFICE VISIT (OUTPATIENT)
Dept: CARDIOLOGY CLINIC | Age: 73
End: 2023-11-07
Payer: MEDICARE

## 2023-11-07 VITALS
TEMPERATURE: 97.7 F | WEIGHT: 189.8 LBS | BODY MASS INDEX: 28.76 KG/M2 | DIASTOLIC BLOOD PRESSURE: 73 MMHG | HEIGHT: 68 IN | RESPIRATION RATE: 16 BRPM | SYSTOLIC BLOOD PRESSURE: 122 MMHG | HEART RATE: 77 BPM | OXYGEN SATURATION: 98 %

## 2023-11-07 VITALS
HEIGHT: 70 IN | HEART RATE: 74 BPM | SYSTOLIC BLOOD PRESSURE: 120 MMHG | DIASTOLIC BLOOD PRESSURE: 74 MMHG | WEIGHT: 190 LBS | BODY MASS INDEX: 27.2 KG/M2

## 2023-11-07 DIAGNOSIS — D50.9 IRON DEFICIENCY ANEMIA, UNSPECIFIED IRON DEFICIENCY ANEMIA TYPE: Primary | ICD-10-CM

## 2023-11-07 DIAGNOSIS — R06.09 DOE (DYSPNEA ON EXERTION): ICD-10-CM

## 2023-11-07 DIAGNOSIS — R94.31 ABNORMAL ELECTROCARDIOGRAM (ECG) (EKG): Primary | ICD-10-CM

## 2023-11-07 DIAGNOSIS — K90.9 INTESTINAL MALABSORPTION, UNSPECIFIED TYPE: ICD-10-CM

## 2023-11-07 PROCEDURE — 3074F SYST BP LT 130 MM HG: CPT | Performed by: INTERNAL MEDICINE

## 2023-11-07 PROCEDURE — 1123F ACP DISCUSS/DSCN MKR DOCD: CPT | Performed by: INTERNAL MEDICINE

## 2023-11-07 PROCEDURE — 3017F COLORECTAL CA SCREEN DOC REV: CPT | Performed by: INTERNAL MEDICINE

## 2023-11-07 PROCEDURE — 1036F TOBACCO NON-USER: CPT | Performed by: INTERNAL MEDICINE

## 2023-11-07 PROCEDURE — A9270 NON-COVERED ITEM OR SERVICE: HCPCS | Performed by: NURSE PRACTITIONER

## 2023-11-07 PROCEDURE — 25810000003 SODIUM CHLORIDE 0.9 % SOLUTION: Performed by: NURSE PRACTITIONER

## 2023-11-07 PROCEDURE — G8419 CALC BMI OUT NRM PARAM NOF/U: HCPCS | Performed by: INTERNAL MEDICINE

## 2023-11-07 PROCEDURE — 25010000002 FERRIC CARBOXYMALTOSE 750 MG/15ML SOLUTION 15 ML VIAL: Performed by: NURSE PRACTITIONER

## 2023-11-07 PROCEDURE — 63710000001 DIPHENHYDRAMINE PER 50 MG: Performed by: NURSE PRACTITIONER

## 2023-11-07 PROCEDURE — 63710000001 ACETAMINOPHEN 325 MG TABLET: Performed by: NURSE PRACTITIONER

## 2023-11-07 PROCEDURE — 96365 THER/PROPH/DIAG IV INF INIT: CPT

## 2023-11-07 PROCEDURE — 93000 ELECTROCARDIOGRAM COMPLETE: CPT | Performed by: INTERNAL MEDICINE

## 2023-11-07 PROCEDURE — 99214 OFFICE O/P EST MOD 30 MIN: CPT | Performed by: INTERNAL MEDICINE

## 2023-11-07 PROCEDURE — 3078F DIAST BP <80 MM HG: CPT | Performed by: INTERNAL MEDICINE

## 2023-11-07 PROCEDURE — G8484 FLU IMMUNIZE NO ADMIN: HCPCS | Performed by: INTERNAL MEDICINE

## 2023-11-07 PROCEDURE — G8427 DOCREV CUR MEDS BY ELIG CLIN: HCPCS | Performed by: INTERNAL MEDICINE

## 2023-11-07 RX ORDER — ACETAMINOPHEN 325 MG/1
650 TABLET ORAL ONCE
Status: COMPLETED | OUTPATIENT
Start: 2023-11-07 | End: 2023-11-07

## 2023-11-07 RX ORDER — DIPHENHYDRAMINE HYDROCHLORIDE 50 MG/ML
50 INJECTION INTRAMUSCULAR; INTRAVENOUS AS NEEDED
Status: DISCONTINUED | OUTPATIENT
Start: 2023-11-07 | End: 2023-11-07 | Stop reason: HOSPADM

## 2023-11-07 RX ORDER — NETARSUDIL 0.2 MG/ML
SOLUTION/ DROPS OPHTHALMIC; TOPICAL
COMMUNITY

## 2023-11-07 RX ORDER — EPOETIN ALFA-EPBX 40000 [IU]/ML
INJECTION, SOLUTION INTRAVENOUS; SUBCUTANEOUS PRN
COMMUNITY

## 2023-11-07 RX ORDER — INSULIN ASPART 100 [IU]/ML
20 INJECTION, SOLUTION INTRAVENOUS; SUBCUTANEOUS 3 TIMES DAILY
COMMUNITY

## 2023-11-07 RX ORDER — FAMOTIDINE 10 MG/ML
20 INJECTION, SOLUTION INTRAVENOUS AS NEEDED
Status: DISCONTINUED | OUTPATIENT
Start: 2023-11-07 | End: 2023-11-07 | Stop reason: HOSPADM

## 2023-11-07 RX ORDER — MULTIVIT-MIN/IRON FUM/FOLIC AC 7.5 MG-4
1 TABLET ORAL DAILY
COMMUNITY

## 2023-11-07 RX ORDER — FERROUS SULFATE 325(65) MG
325 TABLET ORAL
COMMUNITY

## 2023-11-07 RX ORDER — DIPHENHYDRAMINE HCL 25 MG
25 CAPSULE ORAL ONCE
Status: COMPLETED | OUTPATIENT
Start: 2023-11-07 | End: 2023-11-07

## 2023-11-07 RX ORDER — SODIUM CHLORIDE 9 MG/ML
250 INJECTION, SOLUTION INTRAVENOUS ONCE
Status: COMPLETED | OUTPATIENT
Start: 2023-11-07 | End: 2023-11-07

## 2023-11-07 RX ORDER — EZETIMIBE 10 MG/1
10 TABLET ORAL DAILY
COMMUNITY

## 2023-11-07 RX ADMIN — FERRIC CARBOXYMALTOSE INJECTION 750 MG: 50 INJECTION, SOLUTION INTRAVENOUS at 08:46

## 2023-11-07 RX ADMIN — SODIUM CHLORIDE 250 ML: 9 INJECTION, SOLUTION INTRAVENOUS at 08:29

## 2023-11-07 RX ADMIN — ACETAMINOPHEN 650 MG: 325 TABLET, FILM COATED ORAL at 08:30

## 2023-11-07 RX ADMIN — DIPHENHYDRAMINE HYDROCHLORIDE 25 MG: 25 CAPSULE ORAL at 08:30

## 2023-11-07 ASSESSMENT — ENCOUNTER SYMPTOMS
DIARRHEA: 0
EYE PAIN: 0
NAUSEA: 0
EYE DISCHARGE: 0
SHORTNESS OF BREATH: 0
ABDOMINAL PAIN: 0
WHEEZING: 0
BLOOD IN STOOL: 0
COUGH: 0
ABDOMINAL DISTENTION: 0
APNEA: 0
EYE REDNESS: 0
CHEST TIGHTNESS: 0
VOMITING: 0
FACIAL SWELLING: 0
SORE THROAT: 0
CONSTIPATION: 0

## 2023-11-14 ENCOUNTER — HOSPITAL ENCOUNTER (OUTPATIENT)
Dept: NON INVASIVE DIAGNOSTICS | Age: 73
Discharge: HOME | End: 2023-11-14
Attending: INTERNAL MEDICINE
Payer: MEDICARE

## 2023-11-14 ENCOUNTER — INFUSION (OUTPATIENT)
Dept: ONCOLOGY | Facility: HOSPITAL | Age: 73
End: 2023-11-14
Payer: MEDICARE

## 2023-11-14 VITALS
BODY MASS INDEX: 28.29 KG/M2 | HEIGHT: 69 IN | SYSTOLIC BLOOD PRESSURE: 96 MMHG | TEMPERATURE: 97.3 F | DIASTOLIC BLOOD PRESSURE: 58 MMHG | RESPIRATION RATE: 16 BRPM | HEART RATE: 67 BPM | OXYGEN SATURATION: 98 % | WEIGHT: 191 LBS

## 2023-11-14 DIAGNOSIS — R06.09 DOE (DYSPNEA ON EXERTION): ICD-10-CM

## 2023-11-14 DIAGNOSIS — K90.9 INTESTINAL MALABSORPTION, UNSPECIFIED TYPE: ICD-10-CM

## 2023-11-14 DIAGNOSIS — D50.9 IRON DEFICIENCY ANEMIA, UNSPECIFIED IRON DEFICIENCY ANEMIA TYPE: Primary | ICD-10-CM

## 2023-11-14 PROCEDURE — 25010000002 FERRIC CARBOXYMALTOSE 750 MG/15ML SOLUTION 15 ML VIAL: Performed by: NURSE PRACTITIONER

## 2023-11-14 PROCEDURE — 63710000001 DIPHENHYDRAMINE PER 50 MG: Performed by: NURSE PRACTITIONER

## 2023-11-14 PROCEDURE — 6360000004 HC RX CONTRAST MEDICATION: Performed by: INTERNAL MEDICINE

## 2023-11-14 PROCEDURE — 63710000001 ACETAMINOPHEN 325 MG TABLET: Performed by: NURSE PRACTITIONER

## 2023-11-14 PROCEDURE — 25810000003 SODIUM CHLORIDE 0.9 % SOLUTION: Performed by: NURSE PRACTITIONER

## 2023-11-14 PROCEDURE — C8929 TTE W OR WO FOL WCON,DOPPLER: HCPCS

## 2023-11-14 PROCEDURE — A9270 NON-COVERED ITEM OR SERVICE: HCPCS | Performed by: NURSE PRACTITIONER

## 2023-11-14 PROCEDURE — 96365 THER/PROPH/DIAG IV INF INIT: CPT

## 2023-11-14 RX ORDER — FAMOTIDINE 10 MG/ML
20 INJECTION, SOLUTION INTRAVENOUS AS NEEDED
Status: DISCONTINUED | OUTPATIENT
Start: 2023-11-14 | End: 2023-11-14 | Stop reason: HOSPADM

## 2023-11-14 RX ORDER — DIPHENHYDRAMINE HYDROCHLORIDE 50 MG/ML
50 INJECTION INTRAMUSCULAR; INTRAVENOUS AS NEEDED
Status: DISCONTINUED | OUTPATIENT
Start: 2023-11-14 | End: 2023-11-14 | Stop reason: HOSPADM

## 2023-11-14 RX ORDER — ACETAMINOPHEN 325 MG/1
650 TABLET ORAL ONCE
Status: COMPLETED | OUTPATIENT
Start: 2023-11-14 | End: 2023-11-14

## 2023-11-14 RX ORDER — DIPHENHYDRAMINE HCL 25 MG
25 CAPSULE ORAL ONCE
Status: COMPLETED | OUTPATIENT
Start: 2023-11-14 | End: 2023-11-14

## 2023-11-14 RX ORDER — SODIUM CHLORIDE 9 MG/ML
250 INJECTION, SOLUTION INTRAVENOUS ONCE
Status: COMPLETED | OUTPATIENT
Start: 2023-11-14 | End: 2023-11-14

## 2023-11-14 RX ADMIN — PERFLUTREN 1.5 ML: 6.52 INJECTION, SUSPENSION INTRAVENOUS at 14:50

## 2023-11-14 RX ADMIN — FERRIC CARBOXYMALTOSE INJECTION 750 MG: 50 INJECTION, SOLUTION INTRAVENOUS at 08:46

## 2023-11-14 RX ADMIN — ACETAMINOPHEN 650 MG: 325 TABLET, FILM COATED ORAL at 08:39

## 2023-11-14 RX ADMIN — DIPHENHYDRAMINE HYDROCHLORIDE 25 MG: 25 CAPSULE ORAL at 08:39

## 2023-11-14 RX ADMIN — SODIUM CHLORIDE 250 ML: 9 INJECTION, SOLUTION INTRAVENOUS at 08:40

## 2023-11-16 ENCOUNTER — CLINICAL SUPPORT (OUTPATIENT)
Dept: FAMILY MEDICINE CLINIC | Facility: CLINIC | Age: 73
End: 2023-11-16
Payer: MEDICARE

## 2023-11-16 DIAGNOSIS — Z23 NEED FOR COVID-19 VACCINE: Primary | ICD-10-CM

## 2023-11-17 ENCOUNTER — TELEPHONE (OUTPATIENT)
Dept: CARDIOLOGY CLINIC | Age: 73
End: 2023-11-17

## 2023-11-20 NOTE — TELEPHONE ENCOUNTER
LOU NORTON   11/20/2023 11:02 AM CST Back to Top      Called patient and went over results. He voiced understanding. Mroeno Bain MD   11/20/2023 10:55 AM CST       Echocardiogram without any definite source for patient's symptoms of shortness of breath and dyspnea on exertion. Ejection fraction is normal.  No major valve problems. No evidence for cardiomyopathy. Resting intracardiac pressures normal.  Estimated stroke-volume index and cardiac index normal.  Overall findings are reassuring.

## 2023-11-29 ENCOUNTER — OFFICE VISIT (OUTPATIENT)
Dept: ONCOLOGY | Facility: CLINIC | Age: 73
End: 2023-11-29
Payer: MEDICARE

## 2023-11-29 ENCOUNTER — INFUSION (OUTPATIENT)
Dept: ONCOLOGY | Facility: HOSPITAL | Age: 73
End: 2023-11-29
Payer: MEDICARE

## 2023-11-29 ENCOUNTER — LAB (OUTPATIENT)
Dept: LAB | Facility: HOSPITAL | Age: 73
End: 2023-11-29
Payer: MEDICARE

## 2023-11-29 VITALS
RESPIRATION RATE: 16 BRPM | DIASTOLIC BLOOD PRESSURE: 76 MMHG | TEMPERATURE: 97.4 F | BODY MASS INDEX: 28.14 KG/M2 | OXYGEN SATURATION: 100 % | HEART RATE: 77 BPM | HEIGHT: 69 IN | WEIGHT: 190 LBS | SYSTOLIC BLOOD PRESSURE: 151 MMHG

## 2023-11-29 VITALS
TEMPERATURE: 97.8 F | BODY MASS INDEX: 28.17 KG/M2 | DIASTOLIC BLOOD PRESSURE: 78 MMHG | HEART RATE: 87 BPM | HEIGHT: 69 IN | OXYGEN SATURATION: 96 % | SYSTOLIC BLOOD PRESSURE: 132 MMHG | RESPIRATION RATE: 16 BRPM | WEIGHT: 190.2 LBS

## 2023-11-29 DIAGNOSIS — K90.9 INTESTINAL MALABSORPTION, UNSPECIFIED TYPE: ICD-10-CM

## 2023-11-29 DIAGNOSIS — Z79.4 TYPE 2 DIABETES MELLITUS WITH STAGE 3B CHRONIC KIDNEY DISEASE, WITH LONG-TERM CURRENT USE OF INSULIN: ICD-10-CM

## 2023-11-29 DIAGNOSIS — N18.32 TYPE 2 DIABETES MELLITUS WITH STAGE 3B CHRONIC KIDNEY DISEASE, WITH LONG-TERM CURRENT USE OF INSULIN: ICD-10-CM

## 2023-11-29 DIAGNOSIS — D63.1 ANEMIA IN STAGE 3B CHRONIC KIDNEY DISEASE: ICD-10-CM

## 2023-11-29 DIAGNOSIS — N18.32 ANEMIA IN STAGE 3B CHRONIC KIDNEY DISEASE: ICD-10-CM

## 2023-11-29 DIAGNOSIS — D50.9 IRON DEFICIENCY ANEMIA, UNSPECIFIED IRON DEFICIENCY ANEMIA TYPE: ICD-10-CM

## 2023-11-29 DIAGNOSIS — D50.9 IRON DEFICIENCY ANEMIA, UNSPECIFIED IRON DEFICIENCY ANEMIA TYPE: Primary | ICD-10-CM

## 2023-11-29 DIAGNOSIS — N18.32 STAGE 3B CHRONIC KIDNEY DISEASE (CKD): Primary | ICD-10-CM

## 2023-11-29 DIAGNOSIS — E11.22 TYPE 2 DIABETES MELLITUS WITH STAGE 3B CHRONIC KIDNEY DISEASE, WITH LONG-TERM CURRENT USE OF INSULIN: ICD-10-CM

## 2023-11-29 LAB
ALBUMIN SERPL-MCNC: 3.8 G/DL (ref 3.5–5.2)
ALBUMIN/GLOB SERPL: 1.5 G/DL
ALP SERPL-CCNC: 107 U/L (ref 39–117)
ALT SERPL W P-5'-P-CCNC: 41 U/L (ref 1–41)
ANION GAP SERPL CALCULATED.3IONS-SCNC: 5 MMOL/L (ref 5–15)
AST SERPL-CCNC: 23 U/L (ref 1–40)
BASOPHILS # BLD AUTO: 0.06 10*3/MM3 (ref 0–0.2)
BASOPHILS NFR BLD AUTO: 1 % (ref 0–1.5)
BILIRUB SERPL-MCNC: 0.3 MG/DL (ref 0–1.2)
BUN SERPL-MCNC: 25 MG/DL (ref 8–23)
BUN/CREAT SERPL: 14 (ref 7–25)
CALCIUM SPEC-SCNC: 8.3 MG/DL (ref 8.6–10.5)
CHLORIDE SERPL-SCNC: 105 MMOL/L (ref 98–107)
CO2 SERPL-SCNC: 28 MMOL/L (ref 22–29)
CREAT SERPL-MCNC: 1.78 MG/DL (ref 0.76–1.27)
DEPRECATED RDW RBC AUTO: 53 FL (ref 37–54)
EGFRCR SERPLBLD CKD-EPI 2021: 39.3 ML/MIN/1.73
EOSINOPHIL # BLD AUTO: 0.31 10*3/MM3 (ref 0–0.4)
EOSINOPHIL NFR BLD AUTO: 5.4 % (ref 0.3–6.2)
ERYTHROCYTE [DISTWIDTH] IN BLOOD BY AUTOMATED COUNT: 14.5 % (ref 12.3–15.4)
FERRITIN SERPL-MCNC: 797.1 NG/ML (ref 30–400)
GLOBULIN UR ELPH-MCNC: 2.6 GM/DL
GLUCOSE SERPL-MCNC: 218 MG/DL (ref 65–99)
HCT VFR BLD AUTO: 32.3 % (ref 37.5–51)
HGB BLD-MCNC: 9.8 G/DL (ref 13–17.7)
HOLD SPECIMEN: NORMAL
IMM GRANULOCYTES # BLD AUTO: 0.02 10*3/MM3 (ref 0–0.05)
IMM GRANULOCYTES NFR BLD AUTO: 0.3 % (ref 0–0.5)
IRON 24H UR-MRATE: 84 MCG/DL (ref 59–158)
IRON SATN MFR SERPL: 39 % (ref 20–50)
LYMPHOCYTES # BLD AUTO: 0.96 10*3/MM3 (ref 0.7–3.1)
LYMPHOCYTES NFR BLD AUTO: 16.6 % (ref 19.6–45.3)
MCH RBC QN AUTO: 30.2 PG (ref 26.6–33)
MCHC RBC AUTO-ENTMCNC: 30.3 G/DL (ref 31.5–35.7)
MCV RBC AUTO: 99.4 FL (ref 79–97)
MONOCYTES # BLD AUTO: 0.41 10*3/MM3 (ref 0.1–0.9)
MONOCYTES NFR BLD AUTO: 7.1 % (ref 5–12)
NEUTROPHILS NFR BLD AUTO: 4.01 10*3/MM3 (ref 1.7–7)
NEUTROPHILS NFR BLD AUTO: 69.6 % (ref 42.7–76)
NRBC BLD AUTO-RTO: 0 /100 WBC (ref 0–0.2)
PLATELET # BLD AUTO: 179 10*3/MM3 (ref 140–450)
PMV BLD AUTO: 8.8 FL (ref 6–12)
POTASSIUM SERPL-SCNC: 4.6 MMOL/L (ref 3.5–5.2)
PROT SERPL-MCNC: 6.4 G/DL (ref 6–8.5)
RBC # BLD AUTO: 3.25 10*6/MM3 (ref 4.14–5.8)
SODIUM SERPL-SCNC: 138 MMOL/L (ref 136–145)
TIBC SERPL-MCNC: 216 MCG/DL (ref 298–536)
TRANSFERRIN SERPL-MCNC: 145 MG/DL (ref 200–360)
WBC NRBC COR # BLD AUTO: 5.77 10*3/MM3 (ref 3.4–10.8)

## 2023-11-29 PROCEDURE — 82728 ASSAY OF FERRITIN: CPT

## 2023-11-29 PROCEDURE — 85025 COMPLETE CBC W/AUTO DIFF WBC: CPT

## 2023-11-29 PROCEDURE — 83540 ASSAY OF IRON: CPT

## 2023-11-29 PROCEDURE — 80053 COMPREHEN METABOLIC PANEL: CPT

## 2023-11-29 PROCEDURE — 36415 COLL VENOUS BLD VENIPUNCTURE: CPT

## 2023-11-29 PROCEDURE — 84466 ASSAY OF TRANSFERRIN: CPT

## 2023-11-29 PROCEDURE — 25010000002 EPOETIN ALFA PER 1000 UNITS: Performed by: NURSE PRACTITIONER

## 2023-11-29 PROCEDURE — 96372 THER/PROPH/DIAG INJ SC/IM: CPT

## 2023-11-29 RX ADMIN — ERYTHROPOIETIN 40000 UNITS: 40000 INJECTION, SOLUTION INTRAVENOUS; SUBCUTANEOUS at 13:03

## 2023-11-29 NOTE — PROGRESS NOTES
[facility] HEMATOLOGY & ONCOLOGY [place]  [address]  [facility] [address] [phone]    Patient Name: Bernard Matta  Encounter Date: 11/29/2023   YOB: 1950  Patient Number: [id number]    PROGRESS NOTE    HISTORY OF PRESENT ILLNESS: Bernard Matta is a 73 y.o. male  followed by this office for iron deficiency anemia. He has health history significant for DM on insulin, HTN, Hyperlipidemia, iron deficiency, CKD. He received Inejctafer 750 mg on Feb 3, 2023.     INTERVAL HISTORY    Pt presents to clinic today for continued follow up. His last Retacrit was November 1, 2023.     He had Injectafer infusions on April 14 and 21, November 7 and 14, 2023.     He has no acute complaint today.  He had labs drawn and results were reviewed with him.         LABS    Lab Results - Last 18 Months   Lab Units 11/29/23  1100 11/01/23  0957 09/13/23  0957 08/02/23  1024 06/14/23  1303 05/03/23  1221   HEMOGLOBIN g/dL 9.8* 9.1* 10.1* 11.1* 10.5* 11.3*   HEMATOCRIT % 32.3* 29.8* 32.3* 36.0* 32.8* 36.0*   MCV fL 99.4* 94.6 95.8 96.8 93.4 91.4   WBC 10*3/mm3 5.77 7.68 6.20 6.44 6.37 6.19   RDW % 14.5 13.3 13.2 13.4 14.7 14.3   MPV fL 8.8 9.0 8.9 8.9 9.1 8.6   PLATELETS 10*3/mm3 179 183 149 159 152 143   IMM GRAN % % 0.3 0.3 0.3 0.3 0.3 0.3   NEUTROS ABS 10*3/mm3 4.01 5.83 4.43 4.37 4.17 4.13   LYMPHS ABS 10*3/mm3 0.96 1.02 0.97 1.11 1.21 1.13   MONOS ABS 10*3/mm3 0.41 0.33 0.43 0.44 0.49 0.52   EOS ABS 10*3/mm3 0.31 0.43* 0.32 0.44* 0.42* 0.35   BASOS ABS 10*3/mm3 0.06 0.05 0.03 0.06 0.06 0.04   IMMATURE GRANS (ABS) 10*3/mm3 0.02 0.02 0.02 0.02 0.02 0.02   NRBC /100 WBC 0.0 0.0 0.0 0.0 0.0 0.0       Lab Results - Last 18 Months   Lab Units 11/29/23  1100 11/01/23  0957 08/02/23  1024 05/03/23  1221 04/19/23  1227 04/05/23  0855   GLUCOSE mg/dL 218* 294* 113* 121* 163* 282*   SODIUM mmol/L 138 137 141 138 139 138   POTASSIUM mmol/L 4.6 5.0 4.7 4.5 4.5  4.6   CO2 mmol/L 28.0 29.0 29.0 27.0 28.0 27.0   CHLORIDE mmol/L 105 102 104 101 103 100   ANION GAP mmol/L 5.0 6.0 8.0 10.0 8.0 11.0   CREATININE mg/dL 1.78* 2.17* 1.93* 1.98* 1.79* 1.92*   BUN mg/dL 25* 33* 27* 32* 28* 29*   BUN / CREAT RATIO  14.0 15.2 14.0 16.2 15.6 15.1   CALCIUM mg/dL 8.3* 8.9 9.0 8.6 8.5* 8.8   ALK PHOS U/L 107 110 105 104 91 92   TOTAL PROTEIN g/dL 6.4 6.8 6.8 6.8 6.8 6.6   ALT (SGPT) U/L 41 39 37 48* 49* 27   AST (SGOT) U/L 23 24 24 26 34 19   BILIRUBIN mg/dL 0.3 0.3 0.3 0.3 0.3 0.2   ALBUMIN g/dL 3.8 3.9 4.1 4.0 4.2 3.9   GLOBULIN gm/dL 2.6 2.9 2.7 2.8 2.6 2.7       Lab Results - Last 18 Months   Lab Units 01/25/23  1019   M-SPIKE g/dL Not Observed   KAPPA/LAMBDA RATIO, S  1.55   FREE LAMBDA LIGHT CHAINS mg/L 25.8   IG KAPPA FREE LIGHT CHAIN mg/L 40.0*   LDH U/L 183       Lab Results - Last 18 Months   Lab Units 11/29/23  1100 11/01/23  0957 08/25/23  0759 08/02/23  1024 05/03/23  1221 04/05/23  0855 02/22/23  0946 01/25/23  1019 01/25/23  1019 07/25/22  0825   IRON mcg/dL 84 37*  --  53* 88 36* 55*   < > 122  --    TIBC mcg/dL 216* 262* 247 244* 226* 256* 274*   < > 337  --    IRON SATURATION %  --   --  27  --   --   --   --   --   --   --    IRON SATURATION (TSAT) % 39 14*  --  22 39 14* 20   < > 36  --    FERRITIN ng/mL 797.10* 120.50 266.00 290.60 1,027.00* 59.42 256.20   < > 16.82*  --    TSH uIU/mL  --   --  2.110  --   --  1.740  --   --   --  2.900   FOLATE ng/mL  --   --  >20.00  --   --   --   --   --  >20.00 >20.00    < > = values in this interval not displayed.         PAST MEDICAL HISTORY:  ALLERGIES:  Allergies   Allergen Reactions    Jardiance [Empagliflozin] Other (See Comments)     Pt reports         CURRENT MEDICATIONS:  Outpatient Encounter Medications as of 11/29/2023   Medication Sig Dispense Refill    Accu-Chek FastClix Lancets misc Use 4 x daily ,   ICD10 code is E11.9 You dispensed the wrong this, patient wants acucheck fast click lancets 360 each 3    Alcohol  "Swabs (B-D SINGLE USE SWABS REGULAR) pads USE FOUR TIMES DAILY 400 each 11    amitriptyline (ELAVIL) 10 MG tablet TAKE 1 TABLET EVERY NIGHT AS NEEDED FOR SLEEP 90 tablet 3    aspirin 81 MG tablet Take 1 tablet by mouth Daily.      atorvastatin (LIPITOR) 40 MG tablet Take 1 tablet by mouth every night at bedtime. 90 tablet 3    Blood Glucose Monitoring Suppl w/Device kit accu chek albertina 1 each 1    cetirizine (zyrTEC) 10 MG tablet Take 1 tablet by mouth Daily.      cholecalciferol (VITAMIN D3) 400 UNITS tablet Take 1 tablet by mouth Daily.      Continuous Blood Gluc Sensor (Dexcom G6 Sensor) Every 10 (Ten) Days. 3 each 5    Continuous Blood Gluc Transmit (Dexcom G6 Transmitter) misc 1 each Every 3 (Three) Months. 1 each 2    dorzolamide-timolol (COSOPT) 22.3-6.8 MG/ML ophthalmic solution 1 drop 2 (Two) Times a Day.      Droplet Pen Needles 31G X 5 MM misc USE FOUR TIMES DAILY  AS  DIRECTED 400 each 3    epoetin shari-epbx (Retacrit) 59136 UNIT/ML injection Inject  under the skin into the appropriate area as directed Every 14 (Fourteen) Days.      ezetimibe (Zetia) 10 MG tablet Take 1 tablet by mouth Daily. 90 tablet 3    ferrous sulfate 325 (65 FE) MG tablet 1 tablet. Takes 1 every other day .      fluticasone (FLONASE) 50 MCG/ACT nasal spray USE 2 SPRAYS IN EACH NOSTRIL EVERY DAY 48 g 10    furosemide (Lasix) 20 MG tablet Take 1 tablet by mouth Daily. 90 tablet 3    glucose blood (Accu-Chek Marry Plus) test strip TEST BLOOD SUGAR FOUR TIMES DAILY 400 each 6    insulin aspart (NovoLOG FlexPen) 100 UNIT/ML solution pen-injector sc pen INJECT SUBCUTANEOUSLY UP TO 20 UNITS WITH MEALS. DISCARD PEN 28 DAYS AFTER OPENING 60 mL 3    Insulin Pen Needle 31G X 5 MM misc Droplet Pen Needle 31 gauge x 3/16\"      Lancet Devices (Lancing Device) misc acucheck fast click lancing device. Make certain you send the correct one. Talk to patient . States you sent the wrong one 1 each 11    latanoprost (XALATAN) 0.005 % ophthalmic solution " 1 drop Every Night.      lisinopril (PRINIVIL,ZESTRIL) 10 MG tablet TAKE 1 TABLET EVERY NIGHT 90 tablet 3    Multiple Vitamins-Minerals (MULTIVITAMIN WITH MINERALS) tablet tablet Take 1 tablet by mouth Daily.      Rhopressa 0.02 % solution       tamsulosin (FLOMAX) 0.4 MG capsule 24 hr capsule TAKE 2 CAPSULES EVERY EVENING 180 capsule 10    Tresiba FlexTouch 100 UNIT/ML solution pen-injector injection INJECT 20 UNITS UNDER THE SKIN INTO THE APPROPRIATE AREA AS DIRECTED EVERY NIGHT. 30 mL 2    [DISCONTINUED] cloNIDine (CATAPRES) 0.2 MG tablet Take 1 tablet by mouth 2 (Two) Times a Day. 180 tablet 3     No facility-administered encounter medications on file as of 11/29/2023.     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          Immunization History   Administered Date(s) Administered    COVID-19 (MODERNA) 1st,2nd,3rd Dose Monovalent 03/02/2021, 03/30/2021, 11/02/2021    COVID-19 (PFIZER) BIVALENT 12+YRS 09/29/2022    COVID-19 F23 (PFIZER) 12YRS+ (COMIRNATY) 11/16/2023    Covid-19 (Pfizer) Gray Cap Monovalent 07/28/2022    FLUAD TRI 65YR+ 09/09/2019    Flu Vaccine Split Quad 01/01/2013    Fluad Quad 65+ 09/09/2019, 09/09/2020    Fluzone (or Fluarix & Flulaval for VFC) >6mos 01/01/2013, 09/20/2022    Fluzone High Dose =>65 Years ([employer] ONLY) 09/14/2016, 10/02/2017, 10/08/2018    Fluzone High-Dose 65+yrs 10/19/2021, 10/02/2023    Pneumococcal Conjugate 13-Valent (PCV13) 06/11/2015    Pneumococcal Polysaccharide (PPSV23) 04/27/2012, 01/01/2013    Shingrix  2018, 2019    Tdap 2013     Last Completed Mammogram       This patient has no relevant Health Maintenance data.              FAMILY HISTORY:  Family History   Problem Relation Age of Onset    No Known Problems Maternal Grandmother     No Known Problems Maternal Grandfather     No Known Problems Paternal Grandmother     No Known Problems Paternal Grandfather     No Known Problems Mother     No Known Problems Father     Colon cancer Neg Hx     Colon polyps Neg Hx     Esophageal cancer Neg Hx      SOCIAL HISTORY:  Social History     Socioeconomic History    Marital status:    Tobacco Use    Smoking status: Former     Packs/day: 0.50     Years: 10.00     Additional pack years: 0.00     Total pack years: 5.00     Types: Cigarettes     Quit date: 1979     Years since quittin.9    Smokeless tobacco: Never   Vaping Use    Vaping Use: Never used   Substance and Sexual Activity    Alcohol use: No    Drug use: No    Sexual activity: Defer       REVIEW OF SYSTEMS:  Review of Systems   Constitutional:  Positive for fatigue. Negative for activity change, appetite change, fever, unexpected weight gain and unexpected weight loss.   HENT:  Negative for dental problem, facial swelling, swollen glands and trouble swallowing.    Eyes:  Negative for double vision and discharge.        Wears glasses,   Blind in right eye   Respiratory:  Negative for cough and wheezing.    Cardiovascular:  Negative for chest pain, palpitations and leg swelling.   Gastrointestinal:  Negative for abdominal pain, blood in stool, nausea and vomiting.   Endocrine: Negative.    Genitourinary:  Negative for dysuria, frequency and hematuria.        Hesitancy has see Urology and is on Flomax      Musculoskeletal:  Negative for arthralgias, back pain and myalgias.   Skin:  Negative for rash, skin lesions and wound.   Allergic/Immunologic: Negative for immunocompromised state.   Neurological:  Positive for headache. Negative for  "speech difficulty, light-headedness, memory problem and confusion.   Hematological:  Negative for adenopathy.   Psychiatric/Behavioral:  Negative for self-injury, suicidal ideas and depressed mood. The patient is not nervous/anxious.        /78   Pulse 87   Temp 97.8 °F (36.6 °C)   Resp 16   Ht 175.3 cm (69\")   Wt 86.3 kg (190 lb 3.2 oz)   SpO2 96%   BMI 28.09 kg/m²  Body surface area is 2.02 meters squared.    Pain Score    11/29/23 1111   PainSc: 0-No pain           Physical Exam  Constitutional:       Appearance: Normal appearance.   HENT:      Head: Normocephalic and atraumatic.   Cardiovascular:      Rate and Rhythm: Normal rate and regular rhythm.   Pulmonary:      Effort: Pulmonary effort is normal.      Breath sounds: Normal breath sounds.   Abdominal:      General: Bowel sounds are normal.      Palpations: Abdomen is soft.   Musculoskeletal:      Right lower leg: No edema.      Left lower leg: No edema.   Skin:     General: Skin is warm and dry.   Neurological:      Mental Status: He is alert and oriented to person, place, and time.   Psychiatric:         Attention and Perception: Attention normal.         Mood and Affect: Mood normal.         Judgment: Judgment normal.         Bernard Matta reports a pain score of 0.  Given his pain assessment as noted, treatment options were discussed and the following options were decided upon as a follow-up plan to address the patient's pain: continuation of current treatment plan for pain and tylenol . No intervention indicated today       ASSESSMENT / PLAN:    1. Iron deficiency anemia, unspecified iron deficiency anemia type    2. Anemia in stage 3b chronic kidney disease    3. Type 2 diabetes mellitus with stage 3b chronic kidney disease, with long-term current use of insulin    4. Intestinal malabsorption, unspecified type           1.  Iron Deficiency   2. Stage IIIb Chronic Kidney Disease   3.  Intestinal malabsorption of iron  -His last Retacrit " was November 1, 2023  -He had Injectafer infusion on April 14 and 21, November 7 and 14, 2023  -Followed by Dr. Lemus, Nephrology   -Labs today:  BUN 25, Creatinine 1.78, GFR 39, Hgb 9.8, Hct 32.3, Iron 84, Ferritin 797, Sat 39%, TIBC 216  -Will continue Retacrit today r/t CKD as long as the following parameters are met:   -GFR < 60   -Ferritin >/= 100 and/or Sat >/= 20%   -Hgb < 11 or Hct < 33       4.  Type 2 Diabetes   -Non fasting glucose 218  -Patient has CGM  -Taking Tresiba and Novolog   -Followed by Endocrinology       He [facility]ology [facility]      PLAN:  Pt will receive Retacrit today.   Continue biweekly Retacrit r/t CKD as long as the following parameters are met:   -GFR < 60   -Ferritin >/= 100 and/or Sat >/= 20%   -Hgb < 11 or Hct < 33  -RTC in 3 months    Care discussed with patient.  Understanding expressed.  Patient agreeable with plan.          ACP discussion was held with the patient during this visit. Patient has an advance directive in EMR which is still valid.       Leesa Anne, APRN  11/29/2023

## 2023-12-03 DIAGNOSIS — I10 PRIMARY HYPERTENSION: ICD-10-CM

## 2023-12-04 RX ORDER — CLONIDINE HYDROCHLORIDE 0.2 MG/1
0.2 TABLET ORAL 2 TIMES DAILY
Qty: 180 TABLET | Refills: 3 | Status: SHIPPED | OUTPATIENT
Start: 2023-12-04

## 2023-12-04 NOTE — TELEPHONE ENCOUNTER
Rx Refill Note  Requested Prescriptions     Pending Prescriptions Disp Refills    cloNIDine (CATAPRES) 0.2 MG tablet [Pharmacy Med Name: CLONIDINE HYDROCHLORIDE 0.2 MG Tablet] 180 tablet 3     Sig: TAKE 1 TABLET TWICE DAILY      Last office visit with prescribing clinician: 8/25/2023   Last telemedicine visit with prescribing clinician: Visit date not found   Next office visit with prescribing clinician: 3/6/2024       {TIP  Please add Last Relevant Lab 11/29/23    Roxanne Yepez MA  12/04/23, 08:50 CST

## 2023-12-05 ENCOUNTER — OFFICE VISIT (OUTPATIENT)
Dept: FAMILY MEDICINE CLINIC | Facility: CLINIC | Age: 73
End: 2023-12-05
Payer: MEDICARE

## 2023-12-05 VITALS
OXYGEN SATURATION: 98 % | TEMPERATURE: 97.5 F | SYSTOLIC BLOOD PRESSURE: 180 MMHG | HEIGHT: 69 IN | HEART RATE: 101 BPM | WEIGHT: 189.6 LBS | BODY MASS INDEX: 28.08 KG/M2 | DIASTOLIC BLOOD PRESSURE: 100 MMHG

## 2023-12-05 DIAGNOSIS — L03.032 CELLULITIS OF GREAT TOE OF LEFT FOOT: Primary | ICD-10-CM

## 2023-12-05 RX ORDER — CEPHALEXIN 250 MG/1
250 CAPSULE ORAL 3 TIMES DAILY
Qty: 21 CAPSULE | Refills: 0 | Status: SHIPPED | OUTPATIENT
Start: 2023-12-05 | End: 2023-12-12

## 2023-12-05 RX ORDER — CEFTRIAXONE SODIUM 250 MG/1
250 INJECTION, POWDER, FOR SOLUTION INTRAMUSCULAR; INTRAVENOUS ONCE
Status: COMPLETED | OUTPATIENT
Start: 2023-12-05 | End: 2023-12-05

## 2023-12-05 RX ADMIN — CEFTRIAXONE SODIUM 250 MG: 250 INJECTION, POWDER, FOR SOLUTION INTRAMUSCULAR; INTRAVENOUS at 15:06

## 2023-12-05 NOTE — PROGRESS NOTES
Procedure   Nail Removal    Date/Time: 12/5/2023 3:06 PM    Performed by: Brando Webb MD  Authorized by: Brando Webb MD  Location: left foot  Anesthesia method: none.    Anesthesia:  Local anesthetic: None.    Sedation:  Patient sedated: no    Amount removed: 1/5  Side: medial  Wedge excision of skin of nail fold: no  Nail bed sutured: no  Nail matrix removed: none  Removed nail replaced and anchored: no  Dressing: antibiotic ointment and gauze roll  Patient tolerance: patient tolerated the procedure well with no immediate complications  Comments: Ingrown toenail medial aspect left great-Shailesh removed and dressing applied

## 2023-12-13 ENCOUNTER — INFUSION (OUTPATIENT)
Dept: ONCOLOGY | Facility: HOSPITAL | Age: 73
End: 2023-12-13
Payer: MEDICARE

## 2023-12-13 ENCOUNTER — LAB (OUTPATIENT)
Dept: LAB | Facility: HOSPITAL | Age: 73
End: 2023-12-13
Payer: MEDICARE

## 2023-12-13 VITALS
DIASTOLIC BLOOD PRESSURE: 74 MMHG | RESPIRATION RATE: 17 BRPM | HEART RATE: 75 BPM | HEIGHT: 70 IN | WEIGHT: 187 LBS | SYSTOLIC BLOOD PRESSURE: 142 MMHG | BODY MASS INDEX: 26.77 KG/M2 | OXYGEN SATURATION: 98 % | TEMPERATURE: 98.1 F

## 2023-12-13 DIAGNOSIS — D50.9 IRON DEFICIENCY ANEMIA, UNSPECIFIED IRON DEFICIENCY ANEMIA TYPE: Primary | ICD-10-CM

## 2023-12-13 DIAGNOSIS — D50.9 IRON DEFICIENCY ANEMIA, UNSPECIFIED IRON DEFICIENCY ANEMIA TYPE: ICD-10-CM

## 2023-12-13 LAB
ALBUMIN SERPL-MCNC: 3.7 G/DL (ref 3.5–5.2)
ALBUMIN/GLOB SERPL: 1.4 G/DL
ALP SERPL-CCNC: 110 U/L (ref 39–117)
ALT SERPL W P-5'-P-CCNC: 32 U/L (ref 1–41)
ANION GAP SERPL CALCULATED.3IONS-SCNC: 8 MMOL/L (ref 5–15)
AST SERPL-CCNC: 23 U/L (ref 1–40)
BASOPHILS # BLD AUTO: 0.05 10*3/MM3 (ref 0–0.2)
BASOPHILS NFR BLD AUTO: 0.9 % (ref 0–1.5)
BILIRUB SERPL-MCNC: 0.4 MG/DL (ref 0–1.2)
BUN SERPL-MCNC: 26 MG/DL (ref 8–23)
BUN/CREAT SERPL: 13.5 (ref 7–25)
CALCIUM SPEC-SCNC: 8.3 MG/DL (ref 8.6–10.5)
CHLORIDE SERPL-SCNC: 99 MMOL/L (ref 98–107)
CO2 SERPL-SCNC: 28 MMOL/L (ref 22–29)
CREAT SERPL-MCNC: 1.92 MG/DL (ref 0.76–1.27)
DEPRECATED RDW RBC AUTO: 53 FL (ref 37–54)
EGFRCR SERPLBLD CKD-EPI 2021: 35.9 ML/MIN/1.73
EOSINOPHIL # BLD AUTO: 0.38 10*3/MM3 (ref 0–0.4)
EOSINOPHIL NFR BLD AUTO: 7 % (ref 0.3–6.2)
ERYTHROCYTE [DISTWIDTH] IN BLOOD BY AUTOMATED COUNT: 14.6 % (ref 12.3–15.4)
GLOBULIN UR ELPH-MCNC: 2.6 GM/DL
GLUCOSE SERPL-MCNC: 433 MG/DL (ref 65–99)
HCT VFR BLD AUTO: 34.7 % (ref 37.5–51)
HGB BLD-MCNC: 10.5 G/DL (ref 13–17.7)
IMM GRANULOCYTES # BLD AUTO: 0.01 10*3/MM3 (ref 0–0.05)
IMM GRANULOCYTES NFR BLD AUTO: 0.2 % (ref 0–0.5)
LYMPHOCYTES # BLD AUTO: 0.96 10*3/MM3 (ref 0.7–3.1)
LYMPHOCYTES NFR BLD AUTO: 17.6 % (ref 19.6–45.3)
MCH RBC QN AUTO: 29.8 PG (ref 26.6–33)
MCHC RBC AUTO-ENTMCNC: 30.3 G/DL (ref 31.5–35.7)
MCV RBC AUTO: 98.6 FL (ref 79–97)
MONOCYTES # BLD AUTO: 0.41 10*3/MM3 (ref 0.1–0.9)
MONOCYTES NFR BLD AUTO: 7.5 % (ref 5–12)
NEUTROPHILS NFR BLD AUTO: 3.63 10*3/MM3 (ref 1.7–7)
NEUTROPHILS NFR BLD AUTO: 66.8 % (ref 42.7–76)
NRBC BLD AUTO-RTO: 0 /100 WBC (ref 0–0.2)
PLATELET # BLD AUTO: 131 10*3/MM3 (ref 140–450)
PMV BLD AUTO: 9.5 FL (ref 6–12)
POTASSIUM SERPL-SCNC: 5.3 MMOL/L (ref 3.5–5.2)
PROT SERPL-MCNC: 6.3 G/DL (ref 6–8.5)
RBC # BLD AUTO: 3.52 10*6/MM3 (ref 4.14–5.8)
SODIUM SERPL-SCNC: 135 MMOL/L (ref 136–145)
WBC NRBC COR # BLD AUTO: 5.44 10*3/MM3 (ref 3.4–10.8)

## 2023-12-13 PROCEDURE — 25010000002 EPOETIN ALFA PER 1000 UNITS: Performed by: NURSE PRACTITIONER

## 2023-12-13 PROCEDURE — 36415 COLL VENOUS BLD VENIPUNCTURE: CPT

## 2023-12-13 PROCEDURE — 80053 COMPREHEN METABOLIC PANEL: CPT

## 2023-12-13 PROCEDURE — 96372 THER/PROPH/DIAG INJ SC/IM: CPT

## 2023-12-13 PROCEDURE — 85025 COMPLETE CBC W/AUTO DIFF WBC: CPT

## 2023-12-13 RX ADMIN — ERYTHROPOIETIN 40000 UNITS: 40000 INJECTION, SOLUTION INTRAVENOUS; SUBCUTANEOUS at 12:51

## 2023-12-27 ENCOUNTER — LAB (OUTPATIENT)
Dept: LAB | Facility: HOSPITAL | Age: 73
End: 2023-12-27
Payer: MEDICARE

## 2023-12-27 ENCOUNTER — INFUSION (OUTPATIENT)
Dept: ONCOLOGY | Facility: HOSPITAL | Age: 73
End: 2023-12-27
Payer: MEDICARE

## 2023-12-27 VITALS
WEIGHT: 185 LBS | SYSTOLIC BLOOD PRESSURE: 158 MMHG | HEART RATE: 76 BPM | DIASTOLIC BLOOD PRESSURE: 82 MMHG | OXYGEN SATURATION: 98 % | RESPIRATION RATE: 16 BRPM | HEIGHT: 69 IN | TEMPERATURE: 97.6 F | BODY MASS INDEX: 27.4 KG/M2

## 2023-12-27 DIAGNOSIS — D50.9 IRON DEFICIENCY ANEMIA, UNSPECIFIED IRON DEFICIENCY ANEMIA TYPE: ICD-10-CM

## 2023-12-27 DIAGNOSIS — D50.9 IRON DEFICIENCY ANEMIA, UNSPECIFIED IRON DEFICIENCY ANEMIA TYPE: Primary | ICD-10-CM

## 2023-12-27 LAB
ALBUMIN SERPL-MCNC: 3.9 G/DL (ref 3.5–5.2)
ALBUMIN/GLOB SERPL: 1.4 G/DL
ALP SERPL-CCNC: 118 U/L (ref 39–117)
ALT SERPL W P-5'-P-CCNC: 37 U/L (ref 1–41)
ANION GAP SERPL CALCULATED.3IONS-SCNC: 9 MMOL/L (ref 5–15)
AST SERPL-CCNC: 22 U/L (ref 1–40)
BASOPHILS # BLD AUTO: 0.03 10*3/MM3 (ref 0–0.2)
BASOPHILS NFR BLD AUTO: 0.5 % (ref 0–1.5)
BILIRUB SERPL-MCNC: 0.3 MG/DL (ref 0–1.2)
BUN SERPL-MCNC: 29 MG/DL (ref 8–23)
BUN/CREAT SERPL: 15 (ref 7–25)
CALCIUM SPEC-SCNC: 8.3 MG/DL (ref 8.6–10.5)
CHLORIDE SERPL-SCNC: 99 MMOL/L (ref 98–107)
CO2 SERPL-SCNC: 28 MMOL/L (ref 22–29)
CREAT SERPL-MCNC: 1.93 MG/DL (ref 0.76–1.27)
DEPRECATED RDW RBC AUTO: 52.9 FL (ref 37–54)
EGFRCR SERPLBLD CKD-EPI 2021: 35.7 ML/MIN/1.73
EOSINOPHIL # BLD AUTO: 0.4 10*3/MM3 (ref 0–0.4)
EOSINOPHIL NFR BLD AUTO: 7 % (ref 0.3–6.2)
ERYTHROCYTE [DISTWIDTH] IN BLOOD BY AUTOMATED COUNT: 14.7 % (ref 12.3–15.4)
GLOBULIN UR ELPH-MCNC: 2.7 GM/DL
GLUCOSE SERPL-MCNC: 392 MG/DL (ref 65–99)
HCT VFR BLD AUTO: 40.3 % (ref 37.5–51)
HGB BLD-MCNC: 12.2 G/DL (ref 13–17.7)
IMM GRANULOCYTES # BLD AUTO: 0.01 10*3/MM3 (ref 0–0.05)
IMM GRANULOCYTES NFR BLD AUTO: 0.2 % (ref 0–0.5)
LYMPHOCYTES # BLD AUTO: 0.94 10*3/MM3 (ref 0.7–3.1)
LYMPHOCYTES NFR BLD AUTO: 16.5 % (ref 19.6–45.3)
MCH RBC QN AUTO: 29.8 PG (ref 26.6–33)
MCHC RBC AUTO-ENTMCNC: 30.3 G/DL (ref 31.5–35.7)
MCV RBC AUTO: 98.5 FL (ref 79–97)
MONOCYTES # BLD AUTO: 0.41 10*3/MM3 (ref 0.1–0.9)
MONOCYTES NFR BLD AUTO: 7.2 % (ref 5–12)
NEUTROPHILS NFR BLD AUTO: 3.9 10*3/MM3 (ref 1.7–7)
NEUTROPHILS NFR BLD AUTO: 68.6 % (ref 42.7–76)
NRBC BLD AUTO-RTO: 0 /100 WBC (ref 0–0.2)
PLATELET # BLD AUTO: 151 10*3/MM3 (ref 140–450)
PMV BLD AUTO: 9.3 FL (ref 6–12)
POTASSIUM SERPL-SCNC: 5 MMOL/L (ref 3.5–5.2)
PROT SERPL-MCNC: 6.6 G/DL (ref 6–8.5)
RBC # BLD AUTO: 4.09 10*6/MM3 (ref 4.14–5.8)
SODIUM SERPL-SCNC: 136 MMOL/L (ref 136–145)
WBC NRBC COR # BLD AUTO: 5.69 10*3/MM3 (ref 3.4–10.8)

## 2023-12-27 PROCEDURE — 80053 COMPREHEN METABOLIC PANEL: CPT

## 2023-12-27 PROCEDURE — 85025 COMPLETE CBC W/AUTO DIFF WBC: CPT

## 2023-12-27 PROCEDURE — G0463 HOSPITAL OUTPT CLINIC VISIT: HCPCS

## 2023-12-27 PROCEDURE — 36415 COLL VENOUS BLD VENIPUNCTURE: CPT

## 2023-12-27 NOTE — PROGRESS NOTES
Patient's labs reviewed and does not qualify for Retacrit, HGB 12.2, patient notified of elevated blood glucose 392 and stated he has sliding scale Novolog.    Pt reports that he lives in a private house with daughter and grandson with ~5STE; bedroom and bathroom on first floor. Prior to hospital admission pt reports that he was ambulating using rolling walker independently; however when questioned about aide ambulating with him; pt reports that yes he is sometimes with him. Pt has a HHA 5 days a week from the hours of 9AM-5PM and on the weekends from 9AM-1PM. Pt reports a few recent falls; does not remember how long ago.    Pt left comfortable on stretcher, NAD, all lines intact, all precautions maintained, with call bell in reach, and transporter ready to move pt upstairs to room. Pt reports that he lives in a private house with daughter and grandson with ~5STE; bedroom on 2nd floor; however has a stair lift. Prior to hospital admission pt reports that he was ambulating using rolling walker independently; however when questioned about aide ambulating with him; pt reports that yes he is sometimes with him. Pt has a HHA 5 days a week from the hours of 9AM-5PM and on the weekends from 9AM-1PM. Pt reports a few recent falls; does not remember how long ago.    Pt left comfortable on stretcher, NAD, all lines intact, all precautions maintained, with call bell in reach, and transporter ready to move pt upstairs to room.

## 2024-01-10 ENCOUNTER — LAB (OUTPATIENT)
Dept: LAB | Facility: HOSPITAL | Age: 74
End: 2024-01-10
Payer: MEDICARE

## 2024-01-10 ENCOUNTER — OFFICE VISIT (OUTPATIENT)
Dept: CARDIOLOGY | Facility: CLINIC | Age: 74
End: 2024-01-10
Payer: MEDICARE

## 2024-01-10 ENCOUNTER — INFUSION (OUTPATIENT)
Dept: ONCOLOGY | Facility: HOSPITAL | Age: 74
End: 2024-01-10
Payer: MEDICARE

## 2024-01-10 VITALS
OXYGEN SATURATION: 98 % | HEART RATE: 77 BPM | SYSTOLIC BLOOD PRESSURE: 120 MMHG | DIASTOLIC BLOOD PRESSURE: 72 MMHG | BODY MASS INDEX: 27.85 KG/M2 | HEIGHT: 69 IN | WEIGHT: 188 LBS

## 2024-01-10 VITALS
RESPIRATION RATE: 18 BRPM | HEART RATE: 78 BPM | TEMPERATURE: 97.4 F | SYSTOLIC BLOOD PRESSURE: 138 MMHG | DIASTOLIC BLOOD PRESSURE: 79 MMHG | HEIGHT: 69 IN | OXYGEN SATURATION: 98 % | WEIGHT: 187.4 LBS | BODY MASS INDEX: 27.76 KG/M2

## 2024-01-10 DIAGNOSIS — R09.89 RIGHT CAROTID BRUIT: ICD-10-CM

## 2024-01-10 DIAGNOSIS — E78.2 MIXED HYPERLIPIDEMIA: ICD-10-CM

## 2024-01-10 DIAGNOSIS — D50.9 IRON DEFICIENCY ANEMIA, UNSPECIFIED IRON DEFICIENCY ANEMIA TYPE: ICD-10-CM

## 2024-01-10 DIAGNOSIS — D50.9 IRON DEFICIENCY ANEMIA, UNSPECIFIED IRON DEFICIENCY ANEMIA TYPE: Primary | ICD-10-CM

## 2024-01-10 DIAGNOSIS — I10 PRIMARY HYPERTENSION: ICD-10-CM

## 2024-01-10 DIAGNOSIS — I45.2 RBBB (RIGHT BUNDLE BRANCH BLOCK WITH LEFT ANTERIOR FASCICULAR BLOCK): ICD-10-CM

## 2024-01-10 DIAGNOSIS — R06.09 DOE (DYSPNEA ON EXERTION): Primary | ICD-10-CM

## 2024-01-10 LAB
ALBUMIN SERPL-MCNC: 3.9 G/DL (ref 3.5–5.2)
ALBUMIN/GLOB SERPL: 1.5 G/DL
ALP SERPL-CCNC: 122 U/L (ref 39–117)
ALT SERPL W P-5'-P-CCNC: 44 U/L (ref 1–41)
ANION GAP SERPL CALCULATED.3IONS-SCNC: 7 MMOL/L (ref 5–15)
AST SERPL-CCNC: 30 U/L (ref 1–40)
BASOPHILS # BLD AUTO: 0.06 10*3/MM3 (ref 0–0.2)
BASOPHILS NFR BLD AUTO: 1 % (ref 0–1.5)
BILIRUB SERPL-MCNC: 0.3 MG/DL (ref 0–1.2)
BUN SERPL-MCNC: 31 MG/DL (ref 8–23)
BUN/CREAT SERPL: 18.6 (ref 7–25)
CALCIUM SPEC-SCNC: 8.5 MG/DL (ref 8.6–10.5)
CHLORIDE SERPL-SCNC: 100 MMOL/L (ref 98–107)
CO2 SERPL-SCNC: 29 MMOL/L (ref 22–29)
CREAT SERPL-MCNC: 1.67 MG/DL (ref 0.76–1.27)
DEPRECATED RDW RBC AUTO: 47.4 FL (ref 37–54)
EGFRCR SERPLBLD CKD-EPI 2021: 42.4 ML/MIN/1.73
EOSINOPHIL # BLD AUTO: 0.4 10*3/MM3 (ref 0–0.4)
EOSINOPHIL NFR BLD AUTO: 6.4 % (ref 0.3–6.2)
ERYTHROCYTE [DISTWIDTH] IN BLOOD BY AUTOMATED COUNT: 13.4 % (ref 12.3–15.4)
GLOBULIN UR ELPH-MCNC: 2.6 GM/DL
GLUCOSE SERPL-MCNC: 311 MG/DL (ref 65–99)
HCT VFR BLD AUTO: 38.7 % (ref 37.5–51)
HGB BLD-MCNC: 11.8 G/DL (ref 13–17.7)
IMM GRANULOCYTES # BLD AUTO: 0.01 10*3/MM3 (ref 0–0.05)
IMM GRANULOCYTES NFR BLD AUTO: 0.2 % (ref 0–0.5)
LYMPHOCYTES # BLD AUTO: 1.28 10*3/MM3 (ref 0.7–3.1)
LYMPHOCYTES NFR BLD AUTO: 20.6 % (ref 19.6–45.3)
MCH RBC QN AUTO: 29.2 PG (ref 26.6–33)
MCHC RBC AUTO-ENTMCNC: 30.5 G/DL (ref 31.5–35.7)
MCV RBC AUTO: 95.8 FL (ref 79–97)
MONOCYTES # BLD AUTO: 0.44 10*3/MM3 (ref 0.1–0.9)
MONOCYTES NFR BLD AUTO: 7.1 % (ref 5–12)
NEUTROPHILS NFR BLD AUTO: 4.02 10*3/MM3 (ref 1.7–7)
NEUTROPHILS NFR BLD AUTO: 64.7 % (ref 42.7–76)
NRBC BLD AUTO-RTO: 0 /100 WBC (ref 0–0.2)
PLATELET # BLD AUTO: 155 10*3/MM3 (ref 140–450)
PMV BLD AUTO: 9.4 FL (ref 6–12)
POTASSIUM SERPL-SCNC: 5.4 MMOL/L (ref 3.5–5.2)
PROT SERPL-MCNC: 6.5 G/DL (ref 6–8.5)
RBC # BLD AUTO: 4.04 10*6/MM3 (ref 4.14–5.8)
SODIUM SERPL-SCNC: 136 MMOL/L (ref 136–145)
WBC NRBC COR # BLD AUTO: 6.21 10*3/MM3 (ref 3.4–10.8)

## 2024-01-10 PROCEDURE — 99204 OFFICE O/P NEW MOD 45 MIN: CPT | Performed by: INTERNAL MEDICINE

## 2024-01-10 PROCEDURE — 3074F SYST BP LT 130 MM HG: CPT | Performed by: INTERNAL MEDICINE

## 2024-01-10 PROCEDURE — 85025 COMPLETE CBC W/AUTO DIFF WBC: CPT

## 2024-01-10 PROCEDURE — G0463 HOSPITAL OUTPT CLINIC VISIT: HCPCS

## 2024-01-10 PROCEDURE — 3078F DIAST BP <80 MM HG: CPT | Performed by: INTERNAL MEDICINE

## 2024-01-10 PROCEDURE — 80053 COMPREHEN METABOLIC PANEL: CPT

## 2024-01-10 PROCEDURE — 36415 COLL VENOUS BLD VENIPUNCTURE: CPT

## 2024-01-10 PROCEDURE — 1159F MED LIST DOCD IN RCRD: CPT | Performed by: INTERNAL MEDICINE

## 2024-01-10 PROCEDURE — 93000 ELECTROCARDIOGRAM COMPLETE: CPT | Performed by: INTERNAL MEDICINE

## 2024-01-10 PROCEDURE — 1160F RVW MEDS BY RX/DR IN RCRD: CPT | Performed by: INTERNAL MEDICINE

## 2024-01-10 NOTE — PROGRESS NOTES
"Chief Complaint  Establish Care (NEW PT: SELF REFERRAL: PREVIOUSLY SEEN AT [facility] CARDIOLOGY FOR RBBB ON EKG AND JAMA-RECENT 2D ECHO 11-14-23)    Subjective      Bernard Matta presents to [facility] CARDIOLOGY  History of Present Illness    Bernard Matta is a 73-year-old male with chronic right bundle branch block and left anterior fascicular block who is self-referred for cardiology evaluation and follow-up.  The patient formerly saw Dr. Dalton Carey of [facility] who has now retired.  He has been followed for a right bundle branch block with left axis deviation and hypertension.  It does not appear that he has had any other type of cardiac disease.    Of note is the fact that the patient has insulin-dependent diabetes mellitus with chronic renal insufficiency (creatinine 2.2) and iron deficiency anemia with requirement of periodic iron infusions.  He also has hypertension and hyperlipidemia.    In this setting, the patient is doing fairly well.  He does note some exertional dyspnea however.  He has no anginal type chest discomfort.  There is no PND or orthopnea.  He has had no palpitations, syncope or near syncope.    The patient is  and is retired.  He is a former smoker but has been quit for many years.      Objective   Vital Signs:  /72 (BP Location: Left arm, Patient Position: Sitting, Cuff Size: Adult)   Pulse 77   Ht 175.3 cm (69.02\")   Wt 85.3 kg (188 lb)   SpO2 98%   BMI 27.75 kg/m²   Estimated body mass index is 27.75 kg/m² as calculated from the following:    Height as of this encounter: 175.3 cm (69.02\").    Weight as of this encounter: 85.3 kg (188 lb).          Physical Exam    This is a 73-year-old male awake, alert and oriented in no apparent distress.  He is accompanied by his wife.  HEENT.  Normocephalic.  There is no scleral icterus.  Neck: There are bilateral bruits versus radiated systolic murmur.  The right carotid than the left and is louder than I " would expect where this simply a transmitted murmur.  There is no noted jugular venous distention or thyromegaly.  Lungs: Normal respiratory effort.  Clear to auscultation.  Heart: Regular rhythm with normal S1 and S2.  There is a grade 2/6 systolic ejection type murmur heard best at the right upper sternal border as well as along the left sternal border.  Abdomen: No masses tenderness or organomegaly.  Extremities: No cyanosis or pretibial edema.  Pulses are intact.  Neurologic: No obvious focal abnormalities.      Result Review :            ECG 12 Lead    Date/Time: 1/10/2024 12:10 PM  Performed by: Edu Villalobos MD    Authorized by: Edu Villalobos MD  Comparison: not compared with previous ECG   Rhythm: sinus rhythm  Rate: normal  Conduction: right bundle branch block, left anterior fascicular block and bifascicular block  ST Segments: ST segments normal  T Waves: T waves normal  QRS axis: left  Other: no other findings    Clinical impression: abnormal EKG            Assessment and Plan   Diagnoses and all orders for this visit:    1. JAMA (dyspnea on exertion) (Primary)  -     Adult Stress Echo W/ Cont or Stress Agent if Necessary Per Protocol; Future  -     ECG 12 Lead  -     US Carotid Bilateral; Future    2. RBBB (right bundle branch block with left anterior fascicular block)  -     US Carotid Bilateral; Future    3. Primary hypertension  -     Adult Stress Echo W/ Cont or Stress Agent if Necessary Per Protocol; Future  -     ECG 12 Lead  -     US Carotid Bilateral; Future    4. Mixed hyperlipidemia  -     Adult Stress Echo W/ Cont or Stress Agent if Necessary Per Protocol; Future  -     ECG 12 Lead  -     US Carotid Bilateral; Future    5. Right carotid bruit  -     US Carotid Bilateral; Future    1.  Dyspnea on exertion.  The patient notes this has been present for quite some time but may be gradually worsening.  Due to his longstanding diabetes with other comorbidities his angina  "\"warning system\" may be deficient.  He has additional risk factors of hypertension and hyperlipidemia as well as remote tobacco use.  Accordingly, I am ordering an ischemic study in the form of a stress echocardiogram for further cardiac risk stratification.    2.  Bifascicular block.  The patient has had no syncope or presyncope.  There is no suggestion of higher degree block or significant bradycardia.  Nothing further need be done at this time.    3.  Hypertension.  Blood pressure appears very well-controlled on his current medical regimen which consists of clonidine 0.2 mg nightly and lisinopril 10 mg daily.  We will continue these medications.    4.  Hyperlipidemia.  The patient is on high intensity statin therapy which we will continue.  In August, his LDL was 106 and total cholesterol was 163.  He will be due for a recheck in a month or 2.    5.  Right carotid bruit.  I believe that the this is indeed a carotid bruit, as it is louder than the murmur itself.  We are ordering a carotid ultrasound for further evaluation of this.    All questions are answered.  He is encouraged to contact me for any further concerns.  I made a return visit appointment for 6 months.    I appreciate the opportunity to participate in the care of your patients.       There are no Patient Instructions on file for this visit.       Follow Up   Return in about 6 months (around 7/10/2024) for Next scheduled follow up.  Patient was given instructions and counseling regarding his condition or for health maintenance advice. Please see specific information pulled into the AVS if appropriate.       "

## 2024-01-10 NOTE — PROGRESS NOTES
Per ALONSO Chapman/SAM Wills, please have patient see PCP re: CMP results. Patient informed and he and wife voiced understanding.

## 2024-01-10 NOTE — PROGRESS NOTES
No procrit indicated as Hgb 11.8 and HCT 38.7. message sent to Leesa Anne regarding CMP.  VERA Guy

## 2024-01-11 ENCOUNTER — TELEPHONE (OUTPATIENT)
Dept: FAMILY MEDICINE CLINIC | Facility: CLINIC | Age: 74
End: 2024-01-11

## 2024-01-11 NOTE — TELEPHONE ENCOUNTER
Caller: Constantine Matta    Relationship: Emergency Contact    Best call back number: [phone]    Who are you requesting to speak with     CLINICAL STAFF    Do you know the name of the person who called:     CONSTANTINE    What was the call regarding:     PATIENT SAW ANOTHER PROVIDER WITHIN [facility] AND HAD LABS PERFORMED. THAT OTHER PROVIDER ADVISED TO EITHER SEE DR. ROSADO OR ADVISE HIM OF THAT READING.    CALLED IN AND ASKED IF DR ROSADO WANTS PATIENT TO COME IN OR IF HE CAN READ RESULTS AND ADVISE NEXT STEPS.    Is it okay if the provider responds through [facility]:     NO    PLEASE CALL AND ADVISE

## 2024-01-25 ENCOUNTER — NURSE TRIAGE (OUTPATIENT)
Dept: CALL CENTER | Facility: HOSPITAL | Age: 74
End: 2024-01-25
Payer: MEDICARE

## 2024-01-25 DIAGNOSIS — U07.1 COVID-19 AFFECTING CHILDBIRTH: Primary | ICD-10-CM

## 2024-01-25 RX ORDER — NIRMATRELVIR AND RITONAVIR 150-100 MG
2 KIT ORAL 2 TIMES DAILY
Qty: 20 TABLET | Refills: 0 | Status: CANCELLED | OUTPATIENT
Start: 2024-01-25 | End: 2024-01-30

## 2024-01-25 NOTE — TELEPHONE ENCOUNTER
Calling back to say Dr. Smith pt's nephrologist has given the OK for him to have renal dose of paxlovid. ALONSO Smith contacted and approves renal dose paxlovid to be sent to [facility] pharmacy. Patient to not take his atorvastatin during and for 5 days after last dose of paxlovid   Nirmatrelvir & Ritonavir, 300mg/100mg, (PAXLOVID) 20 x 150 MG & 10 x 100MG tablet therapy pack tablet [[id number]] (Order [id number])  Order  Date: 1/25/2024 Department: [facility] Ordering/Authorizing: Brando Webb MD     Medication  Nirmatrelvir & Ritonavir, 300mg/100mg, (PAXLOVID) 20 x 150 MG & 10 x 100MG tablet therapy pack tablet [[id number]]  Order History  Outpatient  Date/Time Action Taken User Additional Information   01/25/24 1641 Sign Taina Wiley RN Ordering Mode: Per protocol: cosign required     Outpatient Morphine Milligram Equivalents Per Day  Expand All  Collapse All  No orders with morphine equivalence     Nirmatrelvir & Ritonavir, 300mg/100mg, (PAXLOVID) 20 x 150 MG & 10 x 100MG tablet therapy pack tablet [[id number]]    Order Details  Dose: 2 tablet Route: Oral Frequency: 2 Times Daily   Dispense Quantity: 20 tablet Refills: 0    Note to Pharmacy: DO NOT use in patients < 12 years or in patients with eGFR < 30. Dose adjustment to 2 tablets BID required for patients with eGFR >/= 30 - < 60.         Sig: Take 2 tablets by mouth 2 (Two) Times a Day for 5 days.         Start Date: 01/25/24 End Date: 01/30/24 after 10 doses   Written Date: 01/25/24 Expiration Date: 01/24/25       Order Questions    Question Answer Comment   I have reviewed the patient's medication list prior to prescribing Paxlovid due to the risk of serious adverse reactions secondary to drug interactions. I will consult with pharmacy, if needed Patient's medication list reviewed          Providers    Ordering Provider and Authorizing Provider:  Brando Webb MD  [address] [facility] [address]  Phone:  [phone]  NPI: [id number]        Ordering User: Taina Wiley RN    Cosigner: Brando Webb MD Signed: --      Pharmacy    [id number] - [place], [place] - [address] [place] [address] - [phone]  - [phone] FX  [address] [place] [address]  Phone: [phone]  Fax: [phone]      Caller notified that prescription has been sent in and not to take atorvastatin while taking paxlovid and for 5 days after finishing.  Voices understanding.

## 2024-01-25 NOTE — TELEPHONE ENCOUNTER
His kidney function over the past month has only been high enough to even take the reduced dose on one occasion. Dr. Toscano once him to call nephrology and see if he is ok to have it.

## 2024-01-25 NOTE — TELEPHONE ENCOUNTER
Wife called stating patient has tested positive for covid after she has been dx here in office.  Dr. Webb told them if he tested positive he would need paxlovid.  Patient sees nephrologist and will need lower dose.

## 2024-01-25 NOTE — TELEPHONE ENCOUNTER
Reason for Disposition  • [1] Caller has URGENT medicine question about med that PCP or specialist prescribed AND [2] triager unable to answer question    Additional Information  • Negative: [1] Intentional drug overdose AND [2] suicidal thoughts or ideas  • Negative: Drug overdose and triager unable to answer question  • Negative: Caller requesting a renewal or refill of a medicine patient is currently taking  • Negative: Caller requesting information unrelated to medicine  • Negative: Caller requesting information about COVID-19 Vaccine  • Negative: Caller requesting information about Emergency Contraception  • Negative: Caller requesting information about Combined Birth Control Pills  • Negative: Caller requesting information about Progestin Birth Control Pills  • Negative: Caller requesting information about Post-Op pain or medicines  • Negative: Caller requesting a prescription antibiotic (such as Penicillin) for Strep throat and has a positive culture result  • Negative: Caller requesting a prescription anti-viral med (such as Tamiflu) and has influenza (flu) symptoms  • Negative: Immunization reaction suspected  • Negative: Rash while taking a medicine or within 3 days of stopping it  • Negative: [1] Asthma and [2] having symptoms of asthma (cough, wheezing, etc.)  • Negative: [1] Symptom of illness (e.g., headache, abdominal pain, earache, vomiting) AND [2] more than mild  • Negative: Breastfeeding questions about mother's medicines and diet  • Negative: MORE THAN A DOUBLE DOSE of a prescription or over-the-counter (OTC) drug  • Negative: [1] DOUBLE DOSE (an extra dose or lesser amount) of prescription drug AND [2] any symptoms (e.g., dizziness, nausea, pain, sleepiness)  • Negative: [1] DOUBLE DOSE (an extra dose or lesser amount) of over-the-counter (OTC) drug AND [2] any symptoms (e.g., dizziness, nausea, pain, sleepiness)  • Negative: Took another person's prescription drug  • Negative: [1] DOUBLE DOSE  "(an extra dose or lesser amount) of prescription drug AND [2] NO symptoms  (Exception: A double dose of antibiotics.)  • Negative: Diabetes drug error or overdose (e.g., took wrong type of insulin or took extra dose)  • Negative: [1] Prescription not at pharmacy AND [2] was prescribed by PCP recently (Exception: Triager has access to EMR and prescription is recorded there. Go to Home Care and confirm for pharmacy.)  • Negative: [1] Pharmacy calling with prescription question AND [2] triager unable to answer question    Answer Assessment - Initial Assessment Questions  1. NAME of MEDICINE: \"What medicine(s) are you calling about?\"      paxlovid  2. QUESTION: \"What is your question?\" (e.g., double dose of medicine, side effect)      Dr. Smith ok'd me to have the renal dose  3. PRESCRIBER: \"Who prescribed the medicine?\" Reason: if prescribed by specialist, call should be referred to that group.     Need it sent in  4. SYMPTOMS: \"Do you have any symptoms?\" If Yes, ask: \"What symptoms are you having?\"  \"How bad are the symptoms (e.g., mild, moderate, severe)      yes  5. PREGNANCY:  \"Is there any chance that you are pregnant?\" \"When was your last menstrual period?\"      na    Protocols used: Medication Question Call-ADULT-AH    "

## 2024-02-05 ENCOUNTER — TELEPHONE (OUTPATIENT)
Dept: FAMILY MEDICINE CLINIC | Facility: CLINIC | Age: 74
End: 2024-02-05
Payer: MEDICARE

## 2024-02-05 DIAGNOSIS — R05.1 ACUTE COUGH: Primary | ICD-10-CM

## 2024-02-05 NOTE — TELEPHONE ENCOUNTER
Caller: Nury Matta     Relationship: [unfilled]     Best call back number: [phone]     What is your medical concern? A LOT OF CONGESTION, FATIGUE. FINISHED ALL HIS PAXLOVID BUT STILL HAS THE SAME SYMPTOMS. NO IMPROVEMENT     How long has this issue been going on? 2 WEEKS     Is your provider already aware of this issue? YES    Have you been treated for this issue? YES

## 2024-02-06 ENCOUNTER — APPOINTMENT (OUTPATIENT)
Dept: CT IMAGING | Facility: HOSPITAL | Age: 74
DRG: 329 | End: 2024-02-06
Payer: MEDICARE

## 2024-02-06 ENCOUNTER — HOSPITAL ENCOUNTER (INPATIENT)
Facility: HOSPITAL | Age: 74
LOS: 10 days | Discharge: HOME | DRG: 329 | End: 2024-02-16
Attending: FAMILY MEDICINE | Admitting: SURGERY
Payer: MEDICARE

## 2024-02-06 ENCOUNTER — APPOINTMENT (OUTPATIENT)
Dept: GENERAL RADIOLOGY | Facility: HOSPITAL | Age: 74
DRG: 329 | End: 2024-02-06
Payer: MEDICARE

## 2024-02-06 ENCOUNTER — ANESTHESIA (OUTPATIENT)
Dept: PERIOP | Facility: HOSPITAL | Age: 74
End: 2024-02-06
Payer: MEDICARE

## 2024-02-06 ENCOUNTER — ANESTHESIA EVENT (OUTPATIENT)
Dept: PERIOP | Facility: HOSPITAL | Age: 74
End: 2024-02-06
Payer: MEDICARE

## 2024-02-06 DIAGNOSIS — Z74.09 IMPAIRED MOBILITY: ICD-10-CM

## 2024-02-06 DIAGNOSIS — K63.1 BOWEL PERFORATION: ICD-10-CM

## 2024-02-06 DIAGNOSIS — K65.9 PERITONITIS: ICD-10-CM

## 2024-02-06 DIAGNOSIS — K66.8 PNEUMOPERITONEUM: ICD-10-CM

## 2024-02-06 LAB
ALBUMIN SERPL-MCNC: 4.1 G/DL (ref 3.5–5.2)
ALBUMIN/GLOB SERPL: 1.2 G/DL
ALP SERPL-CCNC: 108 U/L (ref 39–117)
ALT SERPL W P-5'-P-CCNC: 34 U/L (ref 1–41)
ANION GAP SERPL CALCULATED.3IONS-SCNC: 15 MMOL/L (ref 5–15)
AST SERPL-CCNC: 18 U/L (ref 1–40)
BACTERIA UR QL AUTO: NORMAL /HPF
BASOPHILS # BLD AUTO: 0.04 10*3/MM3 (ref 0–0.2)
BASOPHILS NFR BLD AUTO: 0.2 % (ref 0–1.5)
BILIRUB SERPL-MCNC: 0.5 MG/DL (ref 0–1.2)
BILIRUB UR QL STRIP: NEGATIVE
BUN SERPL-MCNC: 50 MG/DL (ref 8–23)
BUN/CREAT SERPL: 21.2 (ref 7–25)
CALCIUM SPEC-SCNC: 9.6 MG/DL (ref 8.6–10.5)
CHLORIDE SERPL-SCNC: 92 MMOL/L (ref 98–107)
CLARITY UR: CLEAR
CO2 SERPL-SCNC: 23 MMOL/L (ref 22–29)
COLOR UR: YELLOW
CREAT SERPL-MCNC: 2.36 MG/DL (ref 0.76–1.27)
D-LACTATE SERPL-SCNC: 2.4 MMOL/L (ref 0.5–2)
D-LACTATE SERPL-SCNC: 2.9 MMOL/L (ref 0.5–2)
DEPRECATED RDW RBC AUTO: 47.2 FL (ref 37–54)
EGFRCR SERPLBLD CKD-EPI 2021: 28 ML/MIN/1.73
EOSINOPHIL # BLD AUTO: 0 10*3/MM3 (ref 0–0.4)
EOSINOPHIL NFR BLD AUTO: 0 % (ref 0.3–6.2)
ERYTHROCYTE [DISTWIDTH] IN BLOOD BY AUTOMATED COUNT: 13.8 % (ref 12.3–15.4)
FLUAV RNA RESP QL NAA+PROBE: NOT DETECTED
FLUBV RNA RESP QL NAA+PROBE: NOT DETECTED
GEN 5 2HR TROPONIN T REFLEX: 58 NG/L
GLOBULIN UR ELPH-MCNC: 3.5 GM/DL
GLUCOSE BLDC GLUCOMTR-MCNC: 339 MG/DL (ref 70–130)
GLUCOSE BLDC GLUCOMTR-MCNC: 361 MG/DL (ref 70–130)
GLUCOSE BLDC GLUCOMTR-MCNC: 394 MG/DL (ref 70–130)
GLUCOSE SERPL-MCNC: 465 MG/DL (ref 65–99)
GLUCOSE UR STRIP-MCNC: ABNORMAL MG/DL
HCT VFR BLD AUTO: 37.5 % (ref 37.5–51)
HGB BLD-MCNC: 11.9 G/DL (ref 13–17.7)
HGB UR QL STRIP.AUTO: ABNORMAL
HOLD SPECIMEN: NORMAL
IMM GRANULOCYTES # BLD AUTO: 0.38 10*3/MM3 (ref 0–0.05)
IMM GRANULOCYTES NFR BLD AUTO: 1.5 % (ref 0–0.5)
KETONES UR QL STRIP: ABNORMAL
LEUKOCYTE ESTERASE UR QL STRIP.AUTO: NEGATIVE
LIPASE SERPL-CCNC: 9 U/L (ref 13–60)
LYMPHOCYTES # BLD AUTO: 0.92 10*3/MM3 (ref 0.7–3.1)
LYMPHOCYTES NFR BLD AUTO: 3.6 % (ref 19.6–45.3)
MAGNESIUM SERPL-MCNC: 1.6 MG/DL (ref 1.6–2.4)
MCH RBC QN AUTO: 29.5 PG (ref 26.6–33)
MCHC RBC AUTO-ENTMCNC: 31.7 G/DL (ref 31.5–35.7)
MCV RBC AUTO: 93.1 FL (ref 79–97)
MONOCYTES # BLD AUTO: 1.05 10*3/MM3 (ref 0.1–0.9)
MONOCYTES NFR BLD AUTO: 4.1 % (ref 5–12)
NEUTROPHILS NFR BLD AUTO: 23.19 10*3/MM3 (ref 1.7–7)
NEUTROPHILS NFR BLD AUTO: 90.6 % (ref 42.7–76)
NITRITE UR QL STRIP: NEGATIVE
NRBC BLD AUTO-RTO: 0 /100 WBC (ref 0–0.2)
PH UR STRIP.AUTO: <=5 [PH] (ref 5–8)
PLATELET # BLD AUTO: 202 10*3/MM3 (ref 140–450)
PMV BLD AUTO: 9.5 FL (ref 6–12)
POTASSIUM SERPL-SCNC: 5.4 MMOL/L (ref 3.5–5.2)
PROT SERPL-MCNC: 7.6 G/DL (ref 6–8.5)
PROT UR QL STRIP: ABNORMAL
RBC # BLD AUTO: 4.03 10*6/MM3 (ref 4.14–5.8)
RBC # UR STRIP: NORMAL /HPF
REF LAB TEST METHOD: NORMAL
RSV RNA RESP QL NAA+PROBE: NOT DETECTED
SARS-COV-2 RNA RESP QL NAA+PROBE: DETECTED
SODIUM SERPL-SCNC: 130 MMOL/L (ref 136–145)
SP GR UR STRIP: 1.02 (ref 1–1.03)
SQUAMOUS #/AREA URNS HPF: NORMAL /HPF
TROPONIN T DELTA: 2 NG/L
TROPONIN T SERPL HS-MCNC: 56 NG/L
UROBILINOGEN UR QL STRIP: ABNORMAL
WBC # UR STRIP: NORMAL /HPF
WBC NRBC COR # BLD AUTO: 25.58 10*3/MM3 (ref 3.4–10.8)
WHOLE BLOOD HOLD COAG: NORMAL
WHOLE BLOOD HOLD SPECIMEN: NORMAL

## 2024-02-06 PROCEDURE — 83690 ASSAY OF LIPASE: CPT | Performed by: FAMILY MEDICINE

## 2024-02-06 PROCEDURE — 71045 X-RAY EXAM CHEST 1 VIEW: CPT

## 2024-02-06 PROCEDURE — 0DBH0ZZ EXCISION OF CECUM, OPEN APPROACH: ICD-10-PCS | Performed by: SURGERY

## 2024-02-06 PROCEDURE — 63710000001 INSULIN REGULAR HUMAN PER 5 UNITS: Performed by: FAMILY MEDICINE

## 2024-02-06 PROCEDURE — 25010000002 VASOPRESSIN 20 UNIT/ML SOLUTION

## 2024-02-06 PROCEDURE — 87040 BLOOD CULTURE FOR BACTERIA: CPT | Performed by: FAMILY MEDICINE

## 2024-02-06 PROCEDURE — 25810000003 SODIUM CHLORIDE 0.9 % SOLUTION

## 2024-02-06 PROCEDURE — 25010000002 PIPERACILLIN SOD-TAZOBACTAM PER 1 G: Performed by: FAMILY MEDICINE

## 2024-02-06 PROCEDURE — 25810000003 SODIUM CHLORIDE 0.9 % SOLUTION: Performed by: FAMILY MEDICINE

## 2024-02-06 PROCEDURE — 74018 RADEX ABDOMEN 1 VIEW: CPT

## 2024-02-06 PROCEDURE — 25010000002 FENTANYL CITRATE (PF) 100 MCG/2ML SOLUTION

## 2024-02-06 PROCEDURE — 0DBB0ZZ EXCISION OF ILEUM, OPEN APPROACH: ICD-10-PCS | Performed by: SURGERY

## 2024-02-06 PROCEDURE — 87070 CULTURE OTHR SPECIMN AEROBIC: CPT | Performed by: SURGERY

## 2024-02-06 PROCEDURE — 99223 1ST HOSP IP/OBS HIGH 75: CPT | Performed by: SURGERY

## 2024-02-06 PROCEDURE — 88361 TUMOR IMMUNOHISTOCHEM/COMPUT: CPT

## 2024-02-06 PROCEDURE — 81001 URINALYSIS AUTO W/SCOPE: CPT | Performed by: FAMILY MEDICINE

## 2024-02-06 PROCEDURE — 93010 ELECTROCARDIOGRAM REPORT: CPT | Performed by: INTERNAL MEDICINE

## 2024-02-06 PROCEDURE — 87076 CULTURE ANAEROBE IDENT EACH: CPT | Performed by: FAMILY MEDICINE

## 2024-02-06 PROCEDURE — 25810000003 SODIUM CHLORIDE 0.9 % SOLUTION: Performed by: SURGERY

## 2024-02-06 PROCEDURE — 25010000002 INDOCYANINE GREEN 25 MG RECONSTITUTED SOLUTION

## 2024-02-06 PROCEDURE — 36415 COLL VENOUS BLD VENIPUNCTURE: CPT

## 2024-02-06 PROCEDURE — 87637 SARSCOV2&INF A&B&RSV AMP PRB: CPT

## 2024-02-06 PROCEDURE — 25010000002 ONDANSETRON PER 1 MG: Performed by: FAMILY MEDICINE

## 2024-02-06 PROCEDURE — 25010000002 PROPOFOL 10 MG/ML EMULSION

## 2024-02-06 PROCEDURE — 82948 REAGENT STRIP/BLOOD GLUCOSE: CPT

## 2024-02-06 PROCEDURE — 84484 ASSAY OF TROPONIN QUANT: CPT | Performed by: FAMILY MEDICINE

## 2024-02-06 PROCEDURE — 25010000002 ONDANSETRON PER 1 MG

## 2024-02-06 PROCEDURE — 93005 ELECTROCARDIOGRAM TRACING: CPT | Performed by: FAMILY MEDICINE

## 2024-02-06 PROCEDURE — 25010000002 ALBUMIN HUMAN 5% PER 50 ML: Performed by: NURSE ANESTHETIST, CERTIFIED REGISTERED

## 2024-02-06 PROCEDURE — 25010000002 BUPIVACAINE (PF) 0.5 % SOLUTION 10 ML VIAL: Performed by: SURGERY

## 2024-02-06 PROCEDURE — 74176 CT ABD & PELVIS W/O CONTRAST: CPT

## 2024-02-06 PROCEDURE — 25010000002 PIPERACILLIN SOD-TAZOBACTAM PER 1 G: Performed by: SURGERY

## 2024-02-06 PROCEDURE — P9041 ALBUMIN (HUMAN),5%, 50ML: HCPCS | Performed by: NURSE ANESTHETIST, CERTIFIED REGISTERED

## 2024-02-06 PROCEDURE — 25010000002 SUGAMMADEX 200 MG/2ML SOLUTION

## 2024-02-06 PROCEDURE — 85025 COMPLETE CBC W/AUTO DIFF WBC: CPT | Performed by: FAMILY MEDICINE

## 2024-02-06 PROCEDURE — 80053 COMPREHEN METABOLIC PANEL: CPT | Performed by: FAMILY MEDICINE

## 2024-02-06 PROCEDURE — 0DJD4ZZ INSPECTION OF LOWER INTESTINAL TRACT, PERCUTANEOUS ENDOSCOPIC APPROACH: ICD-10-PCS | Performed by: SURGERY

## 2024-02-06 PROCEDURE — 83735 ASSAY OF MAGNESIUM: CPT | Performed by: FAMILY MEDICINE

## 2024-02-06 PROCEDURE — 87075 CULTR BACTERIA EXCEPT BLOOD: CPT | Performed by: SURGERY

## 2024-02-06 PROCEDURE — 88309 TISSUE EXAM BY PATHOLOGIST: CPT | Performed by: SURGERY

## 2024-02-06 PROCEDURE — 44120 REMOVAL OF SMALL INTESTINE: CPT | Performed by: SURGERY

## 2024-02-06 PROCEDURE — 83605 ASSAY OF LACTIC ACID: CPT | Performed by: FAMILY MEDICINE

## 2024-02-06 PROCEDURE — 99285 EMERGENCY DEPT VISIT HI MDM: CPT

## 2024-02-06 PROCEDURE — 87185 SC STD ENZYME DETCJ PER NZM: CPT | Performed by: FAMILY MEDICINE

## 2024-02-06 PROCEDURE — 25010000002 HYDROMORPHONE 1 MG/ML SOLUTION

## 2024-02-06 PROCEDURE — 73523 X-RAY EXAM HIPS BI 5/> VIEWS: CPT

## 2024-02-06 PROCEDURE — 81301 MICROSATELLITE INSTABILITY: CPT

## 2024-02-06 PROCEDURE — 87205 SMEAR GRAM STAIN: CPT | Performed by: SURGERY

## 2024-02-06 DEVICE — STPLR LNR CUT PROX 75MM BLU TLC75: Type: IMPLANTABLE DEVICE | Site: ABDOMEN | Status: FUNCTIONAL

## 2024-02-06 DEVICE — RELOAD STPLR LNR CUT PROX 75MM BLU TCR75: Type: IMPLANTABLE DEVICE | Site: ABDOMEN | Status: FUNCTIONAL

## 2024-02-06 RX ORDER — NALOXONE HCL 0.4 MG/ML
0.04 VIAL (ML) INJECTION AS NEEDED
Status: DISCONTINUED | OUTPATIENT
Start: 2024-02-06 | End: 2024-02-06 | Stop reason: HOSPADM

## 2024-02-06 RX ORDER — LIDOCAINE HYDROCHLORIDE 20 MG/ML
INJECTION, SOLUTION EPIDURAL; INFILTRATION; INTRACAUDAL; PERINEURAL AS NEEDED
Status: DISCONTINUED | OUTPATIENT
Start: 2024-02-06 | End: 2024-02-06 | Stop reason: SURG

## 2024-02-06 RX ORDER — FENTANYL CITRATE 50 UG/ML
25 INJECTION, SOLUTION INTRAMUSCULAR; INTRAVENOUS
Status: DISCONTINUED | OUTPATIENT
Start: 2024-02-06 | End: 2024-02-06 | Stop reason: HOSPADM

## 2024-02-06 RX ORDER — PROPOFOL 10 MG/ML
VIAL (ML) INTRAVENOUS AS NEEDED
Status: DISCONTINUED | OUTPATIENT
Start: 2024-02-06 | End: 2024-02-06 | Stop reason: SURG

## 2024-02-06 RX ORDER — BUPIVACAINE HCL/0.9 % NACL/PF 0.125 %
PLASTIC BAG, INJECTION (ML) EPIDURAL AS NEEDED
Status: DISCONTINUED | OUTPATIENT
Start: 2024-02-06 | End: 2024-02-06 | Stop reason: SURG

## 2024-02-06 RX ORDER — OXYCODONE AND ACETAMINOPHEN 10; 325 MG/1; MG/1
1 TABLET ORAL ONCE AS NEEDED
Status: DISCONTINUED | OUTPATIENT
Start: 2024-02-06 | End: 2024-02-06 | Stop reason: HOSPADM

## 2024-02-06 RX ORDER — ONDANSETRON 2 MG/ML
4 INJECTION INTRAMUSCULAR; INTRAVENOUS ONCE
Status: COMPLETED | OUTPATIENT
Start: 2024-02-06 | End: 2024-02-06

## 2024-02-06 RX ORDER — FENTANYL CITRATE 50 UG/ML
INJECTION, SOLUTION INTRAMUSCULAR; INTRAVENOUS AS NEEDED
Status: DISCONTINUED | OUTPATIENT
Start: 2024-02-06 | End: 2024-02-06 | Stop reason: SURG

## 2024-02-06 RX ORDER — ONDANSETRON 2 MG/ML
4 INJECTION INTRAMUSCULAR; INTRAVENOUS
Status: DISCONTINUED | OUTPATIENT
Start: 2024-02-06 | End: 2024-02-06 | Stop reason: HOSPADM

## 2024-02-06 RX ORDER — HYDROMORPHONE HYDROCHLORIDE 1 MG/ML
0.5 INJECTION, SOLUTION INTRAMUSCULAR; INTRAVENOUS; SUBCUTANEOUS
Status: DISCONTINUED | OUTPATIENT
Start: 2024-02-06 | End: 2024-02-06 | Stop reason: HOSPADM

## 2024-02-06 RX ORDER — ALBUMIN, HUMAN INJ 5% 5 %
SOLUTION INTRAVENOUS CONTINUOUS PRN
Status: DISCONTINUED | OUTPATIENT
Start: 2024-02-06 | End: 2024-02-06 | Stop reason: SURG

## 2024-02-06 RX ORDER — SODIUM CHLORIDE 9 MG/ML
INJECTION, SOLUTION INTRAVENOUS CONTINUOUS PRN
Status: DISCONTINUED | OUTPATIENT
Start: 2024-02-06 | End: 2024-02-06 | Stop reason: SURG

## 2024-02-06 RX ORDER — DROPERIDOL 2.5 MG/ML
0.62 INJECTION, SOLUTION INTRAMUSCULAR; INTRAVENOUS ONCE AS NEEDED
Status: DISCONTINUED | OUTPATIENT
Start: 2024-02-06 | End: 2024-02-06 | Stop reason: HOSPADM

## 2024-02-06 RX ORDER — SODIUM CHLORIDE 9 MG/ML
50 INJECTION, SOLUTION INTRAVENOUS CONTINUOUS
Status: DISCONTINUED | OUTPATIENT
Start: 2024-02-06 | End: 2024-02-10

## 2024-02-06 RX ORDER — INDOCYANINE GREEN AND WATER 25 MG
KIT INJECTION AS NEEDED
Status: DISCONTINUED | OUTPATIENT
Start: 2024-02-06 | End: 2024-02-06 | Stop reason: SURG

## 2024-02-06 RX ORDER — ROCURONIUM BROMIDE 10 MG/ML
INJECTION, SOLUTION INTRAVENOUS AS NEEDED
Status: DISCONTINUED | OUTPATIENT
Start: 2024-02-06 | End: 2024-02-06 | Stop reason: SURG

## 2024-02-06 RX ORDER — FAMOTIDINE 10 MG/ML
20 INJECTION, SOLUTION INTRAVENOUS ONCE
Status: COMPLETED | OUTPATIENT
Start: 2024-02-06 | End: 2024-02-06

## 2024-02-06 RX ORDER — SODIUM CHLORIDE 9 MG/ML
INJECTION, SOLUTION INTRAVENOUS CONTINUOUS PRN
Status: COMPLETED | OUTPATIENT
Start: 2024-02-06 | End: 2024-02-06

## 2024-02-06 RX ORDER — FLUMAZENIL 0.1 MG/ML
0.2 INJECTION INTRAVENOUS AS NEEDED
Status: DISCONTINUED | OUTPATIENT
Start: 2024-02-06 | End: 2024-02-06 | Stop reason: HOSPADM

## 2024-02-06 RX ORDER — IBUPROFEN 600 MG/1
600 TABLET ORAL ONCE AS NEEDED
Status: DISCONTINUED | OUTPATIENT
Start: 2024-02-06 | End: 2024-02-06 | Stop reason: HOSPADM

## 2024-02-06 RX ORDER — LABETALOL HYDROCHLORIDE 5 MG/ML
5 INJECTION, SOLUTION INTRAVENOUS
Status: DISCONTINUED | OUTPATIENT
Start: 2024-02-06 | End: 2024-02-06 | Stop reason: HOSPADM

## 2024-02-06 RX ORDER — ONDANSETRON 2 MG/ML
INJECTION INTRAMUSCULAR; INTRAVENOUS AS NEEDED
Status: DISCONTINUED | OUTPATIENT
Start: 2024-02-06 | End: 2024-02-06 | Stop reason: SURG

## 2024-02-06 RX ADMIN — SODIUM CHLORIDE: 9 INJECTION, SOLUTION INTRAVENOUS at 19:23

## 2024-02-06 RX ADMIN — SODIUM CHLORIDE 1000 ML: 9 INJECTION, SOLUTION INTRAVENOUS at 15:04

## 2024-02-06 RX ADMIN — SODIUM CHLORIDE: 9 INJECTION, SOLUTION INTRAVENOUS at 21:24

## 2024-02-06 RX ADMIN — FENTANYL CITRATE 50 MCG: 50 INJECTION, SOLUTION INTRAMUSCULAR; INTRAVENOUS at 19:14

## 2024-02-06 RX ADMIN — ROCURONIUM BROMIDE 50 MG: 10 SOLUTION INTRAVENOUS at 19:14

## 2024-02-06 RX ADMIN — PROPOFOL 200 MG: 10 INJECTION, EMULSION INTRAVENOUS at 19:14

## 2024-02-06 RX ADMIN — ALBUMIN (HUMAN): 12.5 INJECTION, SOLUTION INTRAVENOUS at 19:29

## 2024-02-06 RX ADMIN — HYDROMORPHONE HYDROCHLORIDE 0.5 MG: 1 INJECTION, SOLUTION INTRAMUSCULAR; INTRAVENOUS; SUBCUTANEOUS at 21:52

## 2024-02-06 RX ADMIN — INDOCYANINE GREEN AND WATER 7.5 MG: KIT at 20:43

## 2024-02-06 RX ADMIN — SUGAMMADEX 200 MG: 100 INJECTION, SOLUTION INTRAVENOUS at 21:49

## 2024-02-06 RX ADMIN — SODIUM CHLORIDE 125 ML/HR: 9 INJECTION, SOLUTION INTRAVENOUS at 23:28

## 2024-02-06 RX ADMIN — INDOCYANINE GREEN AND WATER 1 MG: KIT at 21:15

## 2024-02-06 RX ADMIN — LIDOCAINE HYDROCHLORIDE 100 MG: 20 INJECTION, SOLUTION EPIDURAL; INFILTRATION; INTRACAUDAL; PERINEURAL at 19:14

## 2024-02-06 RX ADMIN — PIPERACILLIN SODIUM AND TAZOBACTAM SODIUM 3.38 G: 3; .375 INJECTION, POWDER, LYOPHILIZED, FOR SOLUTION INTRAVENOUS at 23:28

## 2024-02-06 RX ADMIN — Medication 200 MCG: at 21:58

## 2024-02-06 RX ADMIN — SODIUM CHLORIDE: 9 INJECTION, SOLUTION INTRAVENOUS at 19:39

## 2024-02-06 RX ADMIN — SODIUM CHLORIDE 125 ML/HR: 9 INJECTION, SOLUTION INTRAVENOUS at 16:53

## 2024-02-06 RX ADMIN — ONDANSETRON 4 MG: 2 INJECTION INTRAMUSCULAR; INTRAVENOUS at 21:41

## 2024-02-06 RX ADMIN — Medication 100 MCG: at 19:26

## 2024-02-06 RX ADMIN — FAMOTIDINE 20 MG: 10 INJECTION INTRAVENOUS at 15:32

## 2024-02-06 RX ADMIN — ONDANSETRON 4 MG: 2 INJECTION INTRAMUSCULAR; INTRAVENOUS at 15:32

## 2024-02-06 RX ADMIN — ROCURONIUM BROMIDE 20 MG: 10 SOLUTION INTRAVENOUS at 20:32

## 2024-02-06 RX ADMIN — PIPERACILLIN SODIUM AND TAZOBACTAM SODIUM 3.38 G: 3; .375 INJECTION, POWDER, LYOPHILIZED, FOR SOLUTION INTRAVENOUS at 16:53

## 2024-02-06 RX ADMIN — INSULIN HUMAN 14 UNITS: 100 INJECTION, SOLUTION PARENTERAL at 15:45

## 2024-02-06 RX ADMIN — FENTANYL CITRATE 50 MCG: 50 INJECTION, SOLUTION INTRAMUSCULAR; INTRAVENOUS at 20:00

## 2024-02-06 NOTE — ED PROVIDER NOTES
"HPI:     Patient is a 73-year-old white male who presents to the emergency room with a complaint of worsening abdominal pain.  Patient has some nausea and vomiting but no diarrhea.  Patient states the vomitus without blood.  Patient states he noticed his abdomen seems to be more \"distended\" than normal.  Patient also with groin soreness.  Patient was diagnosed with COVID-19 2 weeks ago as well as his wife.  However now his wife is negative and he has been positive.      REVIEW OF SYSTEMS  CONSTITUTIONAL: Positive for general weakness and fatigue   EYES:  No complaints of discharge   ENT: No complaints of sore throat or ear pain  CARDIOVASCULAR:  No complaints of chest pain, palpitations, or swelling  RESPIRATORY:  No complaints of cough or shortness of breath  GI: Positive for abdominal pain with nausea and vomiting but no diarrhea.  Positive for abdominal distention.    MUSCULOSKELETAL:  No complaints of back pain  SKIN:  No complaints of rash  NEUROLOGIC:  No complaints of headache, focal weakness, or sensory changes  ENDOCRINE:  No complaints of polyuria or polydipsia  LYMPHATIC:  No complaints of swollen glands  GENITOURINARY: No complaints of urinary frequency or hematuria      PAST MEDICAL HISTORY  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     Hyperlipidemia     Hypertension     Ischemic optic neuropathy of both eyes        FAMILY HISTORY  Family History   Problem Relation Age of Onset    No Known Problems Maternal Grandmother     No Known Problems Maternal Grandfather     No Known Problems Paternal Grandmother     No Known Problems Paternal Grandfather     No Known Problems Mother     No Known Problems Father     Colon cancer Neg Hx     Colon polyps Neg Hx     Esophageal cancer Neg Hx        SOCIAL HISTORY  Social History     Socioeconomic History    Marital status:    Tobacco Use    Smoking status: Former     Packs/day: 0.50     " Years: 10.00     Additional pack years: 0.00     Total pack years: 5.00     Types: Cigarettes     Quit date: 1979     Years since quittin.1     Passive exposure: Past    Smokeless tobacco: Never   Vaping Use    Vaping Use: Never used   Substance and Sexual Activity    Alcohol use: No    Drug use: No    Sexual activity: Defer       IMMUNIZATION HISTORY  Deferred to primary care physician.    SURGICAL HISTORY  Past Surgical History:   Procedure Laterality Date    COLONOSCOPY  2019    [facility]    COLONOSCOPY N/A 2020    Procedure: COLONOSCOPY WITH ANESTHESIA;  Surgeon: Shaquille Mckenzie DO;  Location:  [facility];  Service: Gastroenterology;  Laterality: N/A;  preop; hx of polyps   postop; polyps   PCP Brando Webb     COLONOSCOPY N/A 2021    Procedure: COLONOSCOPY WITH ANESTHESIA;  Surgeon: Shaquille Mckenzie DO;  Location:  [facility];  Service: Gastroenterology;  Laterality: N/A;  pre hx adenomatous colon polyp  post normal  Jon, Brando Gupta MD       CURRENT MEDICATIONS    Current Facility-Administered Medications:     piperacillin-tazobactam (ZOSYN) 3.375 g IVPB in 100 mL NS MBP (CD), 3.375 g, Intravenous, Once, Loi Alvarez Jr., MD, 3.375 g at 24 1653    sodium chloride 0.9 % infusion, 125 mL/hr, Intravenous, Continuous, Loi Alvarez Jr., MD, Last Rate: 125 mL/hr at 24 1653, 125 mL/hr at 24 1653    Current Outpatient Medications:     Accu-Chek FastClix Lancets misc, Use 4 x daily ,   ICD10 code is E11.9 You dispensed the wrong this, patient wants acucheck fast click lancets, Disp: 360 each, Rfl: 3    Alcohol Swabs (B-D SINGLE USE SWABS REGULAR) pads, USE FOUR TIMES DAILY, Disp: 400 each, Rfl: 11    amitriptyline (ELAVIL) 10 MG tablet, TAKE 1 TABLET EVERY NIGHT AS NEEDED FOR SLEEP, Disp: 90 tablet, Rfl: 3    aspirin 81 MG tablet, Take 1 tablet by mouth Daily., Disp: , Rfl:     atorvastatin (LIPITOR) 40 MG tablet, Take 1 tablet by mouth every  "night at bedtime., Disp: 90 tablet, Rfl: 3    Blood Glucose Monitoring Suppl w/Device kit, accu chek albertina, Disp: 1 each, Rfl: 1    cetirizine (zyrTEC) 10 MG tablet, Take 1 tablet by mouth Daily., Disp: , Rfl:     cholecalciferol (VITAMIN D3) 400 UNITS tablet, Take 1 tablet by mouth Daily., Disp: , Rfl:     cloNIDine (CATAPRES) 0.2 MG tablet, TAKE 1 TABLET TWICE DAILY, Disp: 180 tablet, Rfl: 3    Continuous Blood Gluc Sensor (Dexcom G6 Sensor), Every 10 (Ten) Days., Disp: 3 each, Rfl: 5    Continuous Blood Gluc Transmit (Dexcom G6 Transmitter) misc, 1 each Every 3 (Three) Months., Disp: 1 each, Rfl: 2    dorzolamide-timolol (COSOPT) 22.3-6.8 MG/ML ophthalmic solution, 1 drop 2 (Two) Times a Day., Disp: , Rfl:     Droplet Pen Needles 31G X 5 MM misc, USE FOUR TIMES DAILY  AS  DIRECTED, Disp: 400 each, Rfl: 3    epoetin shari-epbx (Retacrit) 02581 UNIT/ML injection, Inject  under the skin into the appropriate area as directed Every 14 (Fourteen) Days., Disp: , Rfl:     ezetimibe (Zetia) 10 MG tablet, Take 1 tablet by mouth Daily., Disp: 90 tablet, Rfl: 3    ferrous sulfate 325 (65 FE) MG tablet, 1 tablet. Takes 1 every other day ., Disp: , Rfl:     fluticasone (FLONASE) 50 MCG/ACT nasal spray, USE 2 SPRAYS IN EACH NOSTRIL EVERY DAY, Disp: 48 g, Rfl: 10    furosemide (Lasix) 20 MG tablet, Take 1 tablet by mouth Daily., Disp: 90 tablet, Rfl: 3    glucose blood (Accu-Chek Marry Plus) test strip, TEST BLOOD SUGAR FOUR TIMES DAILY, Disp: 400 each, Rfl: 6    insulin aspart (NovoLOG FlexPen) 100 UNIT/ML solution pen-injector sc pen, INJECT SUBCUTANEOUSLY UP TO 20 UNITS WITH MEALS. DISCARD PEN 28 DAYS AFTER OPENING, Disp: 60 mL, Rfl: 3    Insulin Pen Needle 31G X 5 MM misc, Droplet Pen Needle 31 gauge x 3/16\", Disp: , Rfl:     Lancet Devices (Lancing Device) misc, acucheck fast click lancing device. Make certain you send the correct one. Talk to patient . States you sent the wrong one, Disp: 1 each, Rfl: 11    latanoprost " "(XALATAN) 0.005 % ophthalmic solution, 1 drop Every Night., Disp: , Rfl:     lisinopril (PRINIVIL,ZESTRIL) 10 MG tablet, TAKE 1 TABLET EVERY NIGHT, Disp: 90 tablet, Rfl: 3    Multiple Vitamins-Minerals (MULTIVITAMIN WITH MINERALS) tablet tablet, Take 1 tablet by mouth Daily., Disp: , Rfl:     Rhopressa 0.02 % solution, , Disp: , Rfl:     tamsulosin (FLOMAX) 0.4 MG capsule 24 hr capsule, TAKE 2 CAPSULES EVERY EVENING, Disp: 180 capsule, Rfl: 10    Tresiba FlexTouch 100 UNIT/ML solution pen-injector injection, INJECT 20 UNITS UNDER THE SKIN INTO THE APPROPRIATE AREA AS DIRECTED EVERY NIGHT., Disp: 30 mL, Rfl: 2    ALLERGIES  Allergies   Allergen Reactions    Jardiance [Empagliflozin] Other (See Comments)     Pt reports         ABDOMINAL EXAM    VITAL SIGNS:   /74 (BP Location: Right arm, Patient Position: Sitting)   Pulse 116   Temp 98.4 °F (36.9 °C) (Oral)   Resp 20   Ht 175.3 cm (69\")   Wt 83 kg (183 lb)   SpO2 97%   BMI 27.02 kg/m²     Constitutional: Patient is alert and in no distress.  Patient with moderate abdominal discomfort.    ENT: There is a normal pharynx with no acute erythema or exudate and oral mucosa is moist.  Nose is clear with no drainage.  Tympanic membranes intact and non-erythemic    Cardiovascular: S1-S2 regular rate and rhythm no murmurs rubs or gallops pulses are equal bilaterally and there is no pitting edema    Respiratory: Patient is clear to auscultation bilaterally with no wheezing or rhonchi.  Chest wall is nontender.  There is no external lesions on the chest.  There is no crepitance    Gastrointestinal: Diffuse tenderness of the abdomen but greatest in the right lower quadrant of the abdomen.  There is tender tenderness to percussion in these area.  There is no rebound but mild guarding is noted.    Genitourinary: Patient is voiding appropriately.    Integument: No acute lesions noted color appears to be normal    [place] Coma Scale: Total score 15    Neurological: " Patient is alert and oriented x4 in no acute findings noted.  Speech is fluent and cognition is normal.  No evidence of acute CVA.  Cranial nerves II through XII intact.  Patient with normal motor function as well as reflexes and sensation    Psychiatric: Normal affect and mood.            RADIOLOGY/PROCEDURES        CT Abdomen Pelvis Without Contrast   Final Result   1. There is an abnormal bowel gas pattern with multiple moderately   distended loops of small bowel with mild bowel wall thickening. The   right and transverse colon are also moderately distended. The more   distal colon is decompressed. Within the right lower quadrant posterior   to the cecum there is a complex collection which is not definitely   contiguous with any adjacent GI structure suspicious for localized   perforation with abscess formation. There is surrounding inflammatory   changes. The appendix is not clearly identified and this could represent   a ruptured appendicitis. I do not see any documentation of previous   appendectomy by electronic medical record. There is free air beneath the   right diaphragm and within the perihepatic space..   2. Free fluid within the lower right paracolic gutter, central pelvis   and lower pelvis. This fluid demonstrates some complexity.   3. Small hiatal hernia.   4. The prostate gland is enlarged. There is a bladder diverticulum   emanating from both the left and right lateral bladder wall. A mild   element of chronic bladder outlet obstruction is not excluded.. I spoke   with Dr. Verdin in the emergency room at 4:20 p.m.               This report was signed and finalized on 2/6/2024 4:23 PM by Dr. Fredy Sheldon MD.          XR Hips Bilateral With or Without Pelvis 5 View   Final Result   1.  Lower lumbar spondylosis.   2.  2. Unremarkable hips.           This report was signed and finalized on 2/6/2024 3:21 PM by Jesus Moore.          XR Chest 1 View    (Results Pending)          FUTURE  APPOINTMENTS     Future Appointments   Date Time Provider Department Center   2/7/2024 12:40 PM  [facility]  [facility]   2/7/2024  1:00 PM TX ROOM [facility] OP INFU ONC [facility]   2/15/2024  9:00 AM [facility] US [facility] CART 1  [facility]   2/15/2024 10:00 AM [facility] STRESS LAB 3  [facility]I [facility]   2/21/2024  1:00 PM TX ROOM [facility] OP INFU ONC  [facility]   2/28/2024  1:15 PM Leesa Anne APRN [facility] ONC [facility]   3/6/2024  8:45 AM Brando Webb MD [facility] PC [facility]   7/10/2024  9:45 AM Edu Villalobos MD [facility] CD  [facility]   9/4/2024  7:45 AM Brando Webb MD [facility] PC [facility]            COURSE & MEDICAL DECISION MAKING       Patient's partial differential diagnosis can include:    Acute Appendicitis, Gastritis, gastroenteritis, colitis, enteritis, volvulus, intussusception, partial bowel obstruction, bowel obstruction,mesenteric adenitis, mesenteric ischemia, constipation, irritable bowel, diverticulitis, diverticulosis, Crohn's disease, ulcerative colitis, celiac disease esophageal spasms, gastroparesis, GERD, peptic ulcer disease, perforated esophagus, perforated peptic ulcer, , AAA, and others    Patient's troponin noted to be elevated as well as his renal insufficiency is mildly elevated compared to his normal baseline creatinine.  Patient's glucose is 465.  Will give regular insulin IV 14 units.  Will await further repeat troponin.  Patient will likely need to stay in the hospital for observation.  Reason being for the hyperglycemia as well as elevated troponin.  Patient did not have another Troponin to compare to.    CT scan of the patient's abdomen is concerning for bowel perforation.  Will give Zosyn IV and consult immediately general surgery.\\    Discussed case with general surgeon Dr. Park who will admit the patient to his surgical service.        Patient's level of risk: Moderate       CRITICAL CARE    CRITICAL CARE: No    CRITICAL CARE TIME:  None      Recent Results (from the past 24 hour(s))   Green Top (Gel)    Collection Time: 02/06/24  1:50 PM   Result Value Ref Range    Extra Tube Hold for add-ons.    Lavender Top    Collection Time: 02/06/24  1:50 PM   Result Value Ref Range    Extra Tube hold for add-on    Red Top    Collection Time: 02/06/24  1:50 PM   Result Value Ref Range    Extra Tube Hold for add-ons.    Light Blue Top    Collection Time: 02/06/24  1:50 PM   Result Value Ref Range    Extra Tube Hold for add-ons.    Comprehensive Metabolic Panel    Collection Time: 02/06/24  1:50 PM    Specimen: Blood   Result Value Ref Range    Glucose 465 (C) 65 - 99 mg/dL    BUN 50 (H) 8 - 23 mg/dL    Creatinine 2.36 (H) 0.76 - 1.27 mg/dL    Sodium 130 (L) 136 - 145 mmol/L    Potassium 5.4 (H) 3.5 - 5.2 mmol/L    Chloride 92 (L) 98 - 107 mmol/L    CO2 23.0 22.0 - 29.0 mmol/L    Calcium 9.6 8.6 - 10.5 mg/dL    Total Protein 7.6 6.0 - 8.5 g/dL    Albumin 4.1 3.5 - 5.2 g/dL    ALT (SGPT) 34 1 - 41 U/L    AST (SGOT) 18 1 - 40 U/L    Alkaline Phosphatase 108 39 - 117 U/L    Total Bilirubin 0.5 0.0 - 1.2 mg/dL    Globulin 3.5 gm/dL    A/G Ratio 1.2 g/dL    BUN/Creatinine Ratio 21.2 7.0 - 25.0    Anion Gap 15.0 5.0 - 15.0 mmol/L    eGFR 28.0 (L) >60.0 mL/min/1.73   Magnesium    Collection Time: 02/06/24  1:50 PM    Specimen: Blood   Result Value Ref Range    Magnesium 1.6 1.6 - 2.4 mg/dL   Lipase    Collection Time: 02/06/24  1:50 PM    Specimen: Blood   Result Value Ref Range    Lipase 9 (L) 13 - 60 U/L   High Sensitivity Troponin T    Collection Time: 02/06/24  1:50 PM    Specimen: Blood   Result Value Ref Range    HS Troponin T 56 (C) <22 ng/L   CBC Auto Differential    Collection Time: 02/06/24  1:50 PM    Specimen: Blood   Result Value Ref Range    WBC 25.58 (H) 3.40 - 10.80 10*3/mm3    RBC 4.03 (L) 4.14 - 5.80 10*6/mm3    Hemoglobin 11.9 (L) 13.0 - 17.7 g/dL    Hematocrit 37.5 37.5 - 51.0 %    MCV 93.1 79.0 - 97.0 fL    MCH 29.5 26.6 - 33.0 pg    MCHC 31.7  31.5 - 35.7 g/dL    RDW 13.8 12.3 - 15.4 %    RDW-SD 47.2 37.0 - 54.0 fl    MPV 9.5 6.0 - 12.0 fL    Platelets 202 140 - 450 10*3/mm3    Neutrophil % 90.6 (H) 42.7 - 76.0 %    Lymphocyte % 3.6 (L) 19.6 - 45.3 %    Monocyte % 4.1 (L) 5.0 - 12.0 %    Eosinophil % 0.0 (L) 0.3 - 6.2 %    Basophil % 0.2 0.0 - 1.5 %    Immature Grans % 1.5 (H) 0.0 - 0.5 %    Neutrophils, Absolute 23.19 (H) 1.70 - 7.00 10*3/mm3    Lymphocytes, Absolute 0.92 0.70 - 3.10 10*3/mm3    Monocytes, Absolute 1.05 (H) 0.10 - 0.90 10*3/mm3    Eosinophils, Absolute 0.00 0.00 - 0.40 10*3/mm3    Basophils, Absolute 0.04 0.00 - 0.20 10*3/mm3    Immature Grans, Absolute 0.38 (H) 0.00 - 0.05 10*3/mm3    nRBC 0.0 0.0 - 0.2 /100 WBC   Lactic Acid, Plasma    Collection Time: 02/06/24  1:50 PM    Specimen: Blood   Result Value Ref Range    Lactate 2.4 (C) 0.5 - 2.0 mmol/L   COVID-19, FLU A/B, RSV PCR 1 HR TAT - Swab, Nasopharynx    Collection Time: 02/06/24  1:52 PM    Specimen: Nasopharynx; Swab   Result Value Ref Range    COVID19 Detected (C) Not Detected - Ref. Range    Influenza A PCR Not Detected Not Detected    Influenza B PCR Not Detected Not Detected    RSV, PCR Not Detected Not Detected   [place] Urine - Urine, Clean Catch    Collection Time: 02/06/24  2:15 PM    Specimen: Urine, Clean Catch   Result Value Ref Range    Extra Tube     Urinalysis With Culture If Indicated - Urine, Clean Catch    Collection Time: 02/06/24  2:15 PM    Specimen: Urine, Clean Catch   Result Value Ref Range    Color, UA Yellow Yellow, Straw    Appearance, UA Clear Clear    pH, UA <=5.0 5.0 - 8.0    Specific Gravity, UA 1.022 1.005 - 1.030    Glucose, UA >=1000 mg/dL (3+) (A) Negative    Ketones, UA Trace (A) Negative    Bilirubin, UA Negative Negative    Blood, UA Trace (A) Negative    Protein, UA 30 mg/dL (1+) (A) Negative    Leuk Esterase, UA Negative Negative    Nitrite, UA Negative Negative    Urobilinogen, UA 0.2 E.U./dL 0.2 - 1.0 E.U./dL   Urinalysis, Microscopic Only  - Urine, Clean Catch    Collection Time: 02/06/24  2:15 PM    Specimen: Urine, Clean Catch   Result Value Ref Range    RBC, UA None Seen None Seen, 0-2 /HPF    WBC, UA 0-2 None Seen, 0-2 /HPF    Bacteria, UA None Seen None Seen /HPF    Squamous Epithelial Cells, UA 0-2 None Seen, 0-2 /HPF    Methodology Manual Light Microscopy    POC Glucose Once    Collection Time: 02/06/24  4:48 PM    Specimen: Blood   Result Value Ref Range    Glucose 394 (H) 70 - 130 mg/dL          Old charts were reviewed per [facility] EMR.  Pertinent details are summarized above.  All laboratory, radiologic, and EKG studies that were performed in the Emergency Department were a necessary part of the evaluation needed to exclude unstable or  emergent medical conditions.     Patient was hemodynamically and neurologically stable in the ED.   Pertinent studies were reviewed as above.     The patient received:  Medications   sodium chloride 0.9 % infusion (125 mL/hr Intravenous New Bag 2/6/24 1653)   piperacillin-tazobactam (ZOSYN) 3.375 g IVPB in 100 mL NS MBP (CD) (3.375 g Intravenous New Bag 2/6/24 1653)   famotidine (PEPCID) injection 20 mg (20 mg Intravenous Given 2/6/24 1532)   ondansetron (ZOFRAN) injection 4 mg (4 mg Intravenous Given 2/6/24 1532)   sodium chloride 0.9 % bolus 1,000 mL (1,000 mL Intravenous New Bag 2/6/24 1504)   insulin regular (humuLIN R,novoLIN R) injection 14 Units (14 Units Intravenous Given 2/6/24 1545)            Diagnoses that have been ruled out:   None   Diagnoses that are still under consideration:   None   Final diagnoses:   Peritonitis   Pneumoperitoneum   Bowel perforation        FINAL IMPRESSION   Diagnosis Plan   1. Bowel perforation        2. Peritonitis        3. Pneumoperitoneum              Loi Alvarez Jr, MD        ED Disposition       ED Disposition   Decision to Admit    Condition   --    Comment   --                 Dragon disclaimer:  Part of this note may be an electronic transcription/translation  of spoken language to printed text using the Dragon Dictation System.     I have reviewed the patient’s prescription history via a prescription monitoring program.  This information is consistent with my knowledge of the patient’s controlled substance use history.        Loi Alvarez Jr., MD  02/06/24 1917

## 2024-02-07 PROBLEM — N18.32 STAGE 3B CHRONIC KIDNEY DISEASE (CKD): Status: ACTIVE | Noted: 2024-02-07

## 2024-02-07 PROBLEM — U07.1 COVID-19 VIRUS DETECTED: Status: ACTIVE | Noted: 2024-02-07

## 2024-02-07 PROBLEM — E87.5 HYPERKALEMIA: Status: ACTIVE | Noted: 2024-02-07

## 2024-02-07 LAB
ANION GAP SERPL CALCULATED.3IONS-SCNC: 14 MMOL/L (ref 5–15)
ANION GAP SERPL CALCULATED.3IONS-SCNC: 14 MMOL/L (ref 5–15)
ANION GAP SERPL CALCULATED.3IONS-SCNC: 19 MMOL/L (ref 5–15)
ANISOCYTOSIS BLD QL: ABNORMAL
BASOPHILS # BLD MANUAL: 0.07 10*3/MM3 (ref 0–0.2)
BASOPHILS NFR BLD MANUAL: 1 % (ref 0–1.5)
BUN SERPL-MCNC: 53 MG/DL (ref 8–23)
BUN SERPL-MCNC: 53 MG/DL (ref 8–23)
BUN SERPL-MCNC: 54 MG/DL (ref 8–23)
BUN/CREAT SERPL: 21.2 (ref 7–25)
BUN/CREAT SERPL: 21.5 (ref 7–25)
BUN/CREAT SERPL: 23.3 (ref 7–25)
BURR CELLS BLD QL SMEAR: ABNORMAL
CALCIUM SPEC-SCNC: 8.1 MG/DL (ref 8.6–10.5)
CALCIUM SPEC-SCNC: 8.1 MG/DL (ref 8.6–10.5)
CALCIUM SPEC-SCNC: 8.7 MG/DL (ref 8.6–10.5)
CHLORIDE SERPL-SCNC: 104 MMOL/L (ref 98–107)
CHLORIDE SERPL-SCNC: 105 MMOL/L (ref 98–107)
CHLORIDE SERPL-SCNC: 106 MMOL/L (ref 98–107)
CO2 SERPL-SCNC: 14 MMOL/L (ref 22–29)
CO2 SERPL-SCNC: 16 MMOL/L (ref 22–29)
CO2 SERPL-SCNC: 18 MMOL/L (ref 22–29)
CREAT SERPL-MCNC: 2.27 MG/DL (ref 0.76–1.27)
CREAT SERPL-MCNC: 2.46 MG/DL (ref 0.76–1.27)
CREAT SERPL-MCNC: 2.55 MG/DL (ref 0.76–1.27)
D-LACTATE SERPL-SCNC: 1.2 MMOL/L (ref 0.5–2)
DEPRECATED RDW RBC AUTO: 49.7 FL (ref 37–54)
EGFRCR SERPLBLD CKD-EPI 2021: 25.5 ML/MIN/1.73
EGFRCR SERPLBLD CKD-EPI 2021: 26.6 ML/MIN/1.73
EGFRCR SERPLBLD CKD-EPI 2021: 29.3 ML/MIN/1.73
ERYTHROCYTE [DISTWIDTH] IN BLOOD BY AUTOMATED COUNT: 14 % (ref 12.3–15.4)
GLUCOSE BLDC GLUCOMTR-MCNC: 161 MG/DL (ref 70–130)
GLUCOSE BLDC GLUCOMTR-MCNC: 185 MG/DL (ref 70–130)
GLUCOSE BLDC GLUCOMTR-MCNC: 221 MG/DL (ref 70–130)
GLUCOSE BLDC GLUCOMTR-MCNC: 240 MG/DL (ref 70–130)
GLUCOSE BLDC GLUCOMTR-MCNC: 254 MG/DL (ref 70–130)
GLUCOSE BLDC GLUCOMTR-MCNC: 256 MG/DL (ref 70–130)
GLUCOSE BLDC GLUCOMTR-MCNC: 277 MG/DL (ref 70–130)
GLUCOSE BLDC GLUCOMTR-MCNC: 300 MG/DL (ref 70–130)
GLUCOSE BLDC GLUCOMTR-MCNC: 334 MG/DL (ref 70–130)
GLUCOSE BLDC GLUCOMTR-MCNC: 338 MG/DL (ref 70–130)
GLUCOSE SERPL-MCNC: 295 MG/DL (ref 65–99)
GLUCOSE SERPL-MCNC: 372 MG/DL (ref 65–99)
GLUCOSE SERPL-MCNC: 379 MG/DL (ref 65–99)
HBA1C MFR BLD: 8.4 % (ref 4.8–5.6)
HCT VFR BLD AUTO: 33 % (ref 37.5–51)
HGB BLD-MCNC: 10.2 G/DL (ref 13–17.7)
LYMPHOCYTES # BLD MANUAL: 0.51 10*3/MM3 (ref 0.7–3.1)
LYMPHOCYTES NFR BLD MANUAL: 1 % (ref 5–12)
MCH RBC QN AUTO: 29.6 PG (ref 26.6–33)
MCHC RBC AUTO-ENTMCNC: 30.9 G/DL (ref 31.5–35.7)
MCV RBC AUTO: 95.7 FL (ref 79–97)
MONOCYTES # BLD: 0.07 10*3/MM3 (ref 0.1–0.9)
NEUTROPHILS # BLD AUTO: 6.68 10*3/MM3 (ref 1.7–7)
NEUTROPHILS NFR BLD MANUAL: 69 % (ref 42.7–76)
NEUTS BAND NFR BLD MANUAL: 22 % (ref 0–5)
PLAT MORPH BLD: NORMAL
PLATELET # BLD AUTO: 159 10*3/MM3 (ref 140–450)
PMV BLD AUTO: 9.3 FL (ref 6–12)
POIKILOCYTOSIS BLD QL SMEAR: ABNORMAL
POTASSIUM SERPL-SCNC: 5 MMOL/L (ref 3.5–5.2)
POTASSIUM SERPL-SCNC: 5.5 MMOL/L (ref 3.5–5.2)
POTASSIUM SERPL-SCNC: 5.6 MMOL/L (ref 3.5–5.2)
RBC # BLD AUTO: 3.45 10*6/MM3 (ref 4.14–5.8)
SODIUM SERPL-SCNC: 135 MMOL/L (ref 136–145)
SODIUM SERPL-SCNC: 137 MMOL/L (ref 136–145)
SODIUM SERPL-SCNC: 138 MMOL/L (ref 136–145)
VARIANT LYMPHS NFR BLD MANUAL: 2 % (ref 0–5)
VARIANT LYMPHS NFR BLD MANUAL: 5 % (ref 19.6–45.3)
WBC MORPH BLD: NORMAL
WBC NRBC COR # BLD AUTO: 7.34 10*3/MM3 (ref 3.4–10.8)

## 2024-02-07 PROCEDURE — 25810000003 SODIUM CHLORIDE 0.9 % SOLUTION: Performed by: SURGERY

## 2024-02-07 PROCEDURE — 82948 REAGENT STRIP/BLOOD GLUCOSE: CPT

## 2024-02-07 PROCEDURE — 25010000002 MORPHINE PER 10 MG: Performed by: SURGERY

## 2024-02-07 PROCEDURE — 0 INSULIN REGULAR HUMAN PER 5 UNITS: Performed by: SURGERY

## 2024-02-07 PROCEDURE — 25010000002 PIPERACILLIN SOD-TAZOBACTAM PER 1 G: Performed by: SURGERY

## 2024-02-07 PROCEDURE — 83605 ASSAY OF LACTIC ACID: CPT | Performed by: FAMILY MEDICINE

## 2024-02-07 PROCEDURE — 85007 BL SMEAR W/DIFF WBC COUNT: CPT | Performed by: SURGERY

## 2024-02-07 PROCEDURE — 80048 BASIC METABOLIC PNL TOTAL CA: CPT | Performed by: SURGERY

## 2024-02-07 PROCEDURE — 25010000002 HYDROMORPHONE PER 4 MG: Performed by: SURGERY

## 2024-02-07 PROCEDURE — 99024 POSTOP FOLLOW-UP VISIT: CPT | Performed by: SURGERY

## 2024-02-07 PROCEDURE — 25010000002 LABETALOL 5 MG/ML SOLUTION: Performed by: INTERNAL MEDICINE

## 2024-02-07 PROCEDURE — 36415 COLL VENOUS BLD VENIPUNCTURE: CPT | Performed by: FAMILY MEDICINE

## 2024-02-07 PROCEDURE — 85025 COMPLETE CBC W/AUTO DIFF WBC: CPT | Performed by: SURGERY

## 2024-02-07 PROCEDURE — 80048 BASIC METABOLIC PNL TOTAL CA: CPT | Performed by: INTERNAL MEDICINE

## 2024-02-07 PROCEDURE — 83036 HEMOGLOBIN GLYCOSYLATED A1C: CPT | Performed by: INTERNAL MEDICINE

## 2024-02-07 RX ORDER — SODIUM CHLORIDE 0.9 % (FLUSH) 0.9 %
10 SYRINGE (ML) INJECTION AS NEEDED
Status: DISCONTINUED | OUTPATIENT
Start: 2024-02-07 | End: 2024-02-07

## 2024-02-07 RX ORDER — TAMSULOSIN HYDROCHLORIDE 0.4 MG/1
2 CAPSULE ORAL NIGHTLY
COMMUNITY

## 2024-02-07 RX ORDER — FLUTICASONE PROPIONATE 50 MCG
2 SPRAY, SUSPENSION (ML) NASAL DAILY
COMMUNITY

## 2024-02-07 RX ORDER — CLONIDINE HYDROCHLORIDE 0.2 MG/1
0.2 TABLET ORAL 2 TIMES DAILY
COMMUNITY

## 2024-02-07 RX ORDER — NICOTINE POLACRILEX 4 MG
15 LOZENGE BUCCAL
Status: DISCONTINUED | OUTPATIENT
Start: 2024-02-07 | End: 2024-02-07

## 2024-02-07 RX ORDER — LABETALOL HYDROCHLORIDE 5 MG/ML
10 INJECTION, SOLUTION INTRAVENOUS EVERY 6 HOURS PRN
Status: DISCONTINUED | OUTPATIENT
Start: 2024-02-07 | End: 2024-02-08

## 2024-02-07 RX ORDER — HEPARIN SODIUM 5000 [USP'U]/ML
5000 INJECTION, SOLUTION INTRAVENOUS; SUBCUTANEOUS EVERY 8 HOURS SCHEDULED
Status: DISCONTINUED | OUTPATIENT
Start: 2024-02-08 | End: 2024-02-16 | Stop reason: HOSPADM

## 2024-02-07 RX ORDER — AMITRIPTYLINE HYDROCHLORIDE 10 MG/1
10 TABLET, FILM COATED ORAL NIGHTLY PRN
COMMUNITY

## 2024-02-07 RX ORDER — MORPHINE SULFATE 2 MG/ML
2 INJECTION, SOLUTION INTRAMUSCULAR; INTRAVENOUS
Status: DISPENSED | OUTPATIENT
Start: 2024-02-07 | End: 2024-02-12

## 2024-02-07 RX ORDER — LISINOPRIL 10 MG/1
10 TABLET ORAL NIGHTLY
COMMUNITY

## 2024-02-07 RX ORDER — SODIUM CHLORIDE 9 MG/ML
40 INJECTION, SOLUTION INTRAVENOUS AS NEEDED
Status: DISCONTINUED | OUTPATIENT
Start: 2024-02-07 | End: 2024-02-07

## 2024-02-07 RX ORDER — MORPHINE SULFATE 2 MG/ML
2 INJECTION, SOLUTION INTRAMUSCULAR; INTRAVENOUS
Status: DISCONTINUED | OUTPATIENT
Start: 2024-02-07 | End: 2024-02-07

## 2024-02-07 RX ORDER — DEXTROSE MONOHYDRATE 25 G/50ML
10-50 INJECTION, SOLUTION INTRAVENOUS
Status: DISCONTINUED | OUTPATIENT
Start: 2024-02-07 | End: 2024-02-09

## 2024-02-07 RX ORDER — SODIUM CHLORIDE 0.9 % (FLUSH) 0.9 %
10 SYRINGE (ML) INJECTION AS NEEDED
Status: DISCONTINUED | OUTPATIENT
Start: 2024-02-07 | End: 2024-02-09

## 2024-02-07 RX ORDER — NICOTINE POLACRILEX 4 MG
15 LOZENGE BUCCAL
Status: DISCONTINUED | OUTPATIENT
Start: 2024-02-07 | End: 2024-02-09

## 2024-02-07 RX ORDER — DEXTROSE MONOHYDRATE 25 G/50ML
10-50 INJECTION, SOLUTION INTRAVENOUS
Status: DISCONTINUED | OUTPATIENT
Start: 2024-02-07 | End: 2024-02-07

## 2024-02-07 RX ORDER — SODIUM CHLORIDE 0.9 % (FLUSH) 0.9 %
10 SYRINGE (ML) INJECTION EVERY 12 HOURS SCHEDULED
Status: DISCONTINUED | OUTPATIENT
Start: 2024-02-07 | End: 2024-02-09

## 2024-02-07 RX ORDER — HYDROMORPHONE HYDROCHLORIDE 1 MG/ML
0.5 INJECTION, SOLUTION INTRAMUSCULAR; INTRAVENOUS; SUBCUTANEOUS
Status: DISPENSED | OUTPATIENT
Start: 2024-02-07 | End: 2024-02-12

## 2024-02-07 RX ORDER — SODIUM CHLORIDE 9 MG/ML
40 INJECTION, SOLUTION INTRAVENOUS AS NEEDED
Status: DISCONTINUED | OUTPATIENT
Start: 2024-02-07 | End: 2024-02-09

## 2024-02-07 RX ORDER — SODIUM CHLORIDE 0.9 % (FLUSH) 0.9 %
10 SYRINGE (ML) INJECTION EVERY 12 HOURS SCHEDULED
Status: DISCONTINUED | OUTPATIENT
Start: 2024-02-07 | End: 2024-02-07

## 2024-02-07 RX ADMIN — PIPERACILLIN SODIUM AND TAZOBACTAM SODIUM 3.38 G: 3; .375 INJECTION, POWDER, LYOPHILIZED, FOR SOLUTION INTRAVENOUS at 23:00

## 2024-02-07 RX ADMIN — LABETALOL HYDROCHLORIDE 10 MG: 5 INJECTION INTRAVENOUS at 19:46

## 2024-02-07 RX ADMIN — Medication 10 ML: at 13:16

## 2024-02-07 RX ADMIN — PIPERACILLIN SODIUM AND TAZOBACTAM SODIUM 3.38 G: 3; .375 INJECTION, POWDER, LYOPHILIZED, FOR SOLUTION INTRAVENOUS at 07:59

## 2024-02-07 RX ADMIN — MORPHINE SULFATE 2 MG: 2 INJECTION, SOLUTION INTRAMUSCULAR; INTRAVENOUS at 13:06

## 2024-02-07 RX ADMIN — SODIUM CHLORIDE 125 ML/HR: 9 INJECTION, SOLUTION INTRAVENOUS at 07:59

## 2024-02-07 RX ADMIN — Medication 10 ML: at 20:27

## 2024-02-07 RX ADMIN — HYDROMORPHONE HYDROCHLORIDE 0.5 MG: 1 INJECTION, SOLUTION INTRAMUSCULAR; INTRAVENOUS; SUBCUTANEOUS at 22:56

## 2024-02-07 RX ADMIN — PIPERACILLIN SODIUM AND TAZOBACTAM SODIUM 3.38 G: 3; .375 INJECTION, POWDER, LYOPHILIZED, FOR SOLUTION INTRAVENOUS at 15:48

## 2024-02-07 RX ADMIN — MORPHINE SULFATE 2 MG: 2 INJECTION, SOLUTION INTRAMUSCULAR; INTRAVENOUS at 05:00

## 2024-02-07 RX ADMIN — SODIUM CHLORIDE 1000 ML: 9 INJECTION, SOLUTION INTRAVENOUS at 14:52

## 2024-02-07 RX ADMIN — Medication 10 ML: at 14:52

## 2024-02-07 RX ADMIN — SODIUM CHLORIDE 2.7 UNITS/HR: 9 INJECTION, SOLUTION INTRAVENOUS at 13:52

## 2024-02-07 NOTE — H&P
[facility]   HISTORY AND PHYSICAL    Patient Name: Bernard Matta  : 1950  MRN: [id number]  Primary Care Physician:  Brando Webb MD  Date of admission: 2024    Subjective   Subjective     Chief Complaint: abdominal pain    HPI:    Bernard Matta is a 73 y.o. male who presents with one day hx of abdominal pain. Reports originally periumbilical pain, which has migrated to all quadrants. Reports associated nausea/vomiting. No chest pains/SOB.     Review of Systems   All systems were reviewed and negative except for: GI    Personal History     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     Hyperlipidemia     Hypertension     Ischemic optic neuropathy of both eyes        Past Surgical History:   Procedure Laterality Date    COLONOSCOPY      [facility]    COLONOSCOPY N/A 2020    Procedure: COLONOSCOPY WITH ANESTHESIA;  Surgeon: Shaquille Mckenzie DO;  Location: [facility] ENDOSCOPY;  Service: Gastroenterology;  Laterality: N/A;  preop; hx of polyps   postop; polyps   PCP Brando Webb     COLONOSCOPY N/A 2021    Procedure: COLONOSCOPY WITH ANESTHESIA;  Surgeon: Shaquille Mckenzie DO;  Location: [facility] ENDOSCOPY;  Service: Gastroenterology;  Laterality: N/A;  pre hx adenomatous colon polyp  post normal  Brando Webb MD       Family History: family history includes No Known Problems in his father, maternal grandfather, maternal grandmother, mother, paternal grandfather, and paternal grandmother. Otherwise pertinent FHx was reviewed and not pertinent to current issue.    Social History:  reports that he quit smoking about 45 years ago. His smoking use included cigarettes. He has a 5.00 pack-year smoking history. He has been exposed to tobacco smoke. He has never used smokeless tobacco. He reports that he does not drink alcohol and does not use drugs.    Home Medications:  Accu-Chek FastClix Lancets, B-D  SINGLE USE SWABS REGULAR, Blood Glucose Monitoring Suppl, Dexcom G6 Sensor, Dexcom G6 Transmitter, Insulin Pen Needle, Lancing Device, Netarsudil Dimesylate, amitriptyline, aspirin, atorvastatin, cetirizine, cholecalciferol, cloNIDine, dorzolamide-timolol, epoetin shari-epbx, ezetimibe, ferrous sulfate, fluticasone, furosemide, glucose blood, insulin aspart, insulin degludec, latanoprost, lisinopril, multivitamin with minerals, and tamsulosin    Allergies:  Allergies   Allergen Reactions    Jardiance [Empagliflozin] Other (See Comments)     Pt reports         Objective   Objective     Vitals:   Temp:  [98.4 °F (36.9 °C)] 98.4 °F (36.9 °C)  Heart Rate:  [104-117] 116  Resp:  [20] 20  BP: (128-161)/(74-98) 161/82  Physical Exam    Constitutional: Awake, alert   Eyes: PERRLA, sclerae anicteric, no conjunctival injection   HENT: NCAT, mucous membranes moist   Neck: Supple, no thyromegaly, no lymphadenopathy, trachea midline   Respiratory: Clear to auscultation bilaterally, nonlabored respirations    Cardiovascular: RRR, no murmurs, rubs, or gallops, palpable pedal pulses bilaterally   Gastrointestinal: soft, TTP in all quadrants, +distention, +rebound   Musculoskeletal: No bilateral ankle edema, no clubbing or cyanosis to extremities   Psychiatric: Appropriate affect, cooperative   Neurologic: Oriented x 3, strength symmetric in all extremities, Cranial Nerves grossly intact to confrontation, speech clear   Skin: No rashes     Result Review    Result Review:  I have personally reviewed the results from the time of this admission to 2/6/2024 18:40 CST and agree with these findings:  [x]  Laboratory list / accordion  []  Microbiology  [x]  Radiology  []  EKG/Telemetry   []  Cardiology/Vascular   []  Pathology  []  Old records  []  Other:      Assessment & Plan   Assessment / Plan     Brief Patient Summary:  Bernard Matta is a 73 y.o. male who presents with peritonitis, elevated leukocytosis and CT concerning for  perforated viscus/ perforated appendicitis    Active Hospital Problems:  Active Hospital Problems    Diagnosis     **Bowel perforation      Plan:   Will consent for diagnostic laparoscopy, possible bowel resection, laparotomy  Will plan for ICU admission postoperatively given hx of CHF, RBBB  NPO  IV antibiotics      DVT prophylaxis:  No DVT prophylaxis order currently exists.        CODE STATUS:       Admission Status:  I believe this patient meets inpatient status.    Hemant Park MD

## 2024-02-07 NOTE — ANESTHESIA PROCEDURE NOTES
Airway  Date/Time: 2/6/2024 7:20 PM  Airway not difficult    General Information and Staff    Patient location during procedure: OR  CRNA/CAA: Nasrin Garcia CRNA    Indications and Patient Condition  Indications for airway management: airway protection    Preoxygenated: yes  Mask difficulty assessment: 2 - vent by mask + OA or adjuvant +/- NMBA    Final Airway Details  Final airway type: endotracheal airway      Successful airway: ETT  Cuffed: yes   Successful intubation technique: video laryngoscopy and RSI  Facilitating devices/methods: intubating stylet and cricoid pressure  Endotracheal tube insertion site: oral  Blade: [place]  Blade size: 4  ETT size (mm): 7.5  Cormack-Lehane Classification: grade IIa - partial view of glottis  Placement verified by: chest auscultation   Cuff volume (mL): 7  Measured from: gums  ETT/EBT to gums (cm): 21  Number of attempts at approach: 1  Assessment: lips, teeth, and gum same as pre-op and atraumatic intubation

## 2024-02-07 NOTE — ANESTHESIA POSTPROCEDURE EVALUATION
"Patient: Bernard Matta    Procedure Summary       Date: 02/06/24 Room / Location:  [facility] OR  /  [facility]    Anesthesia Start: 1911 Anesthesia Stop: 2212    Procedure: DIAGNOSTIC LAPAROSCOPY CONVERTED TO LAPAROTOMY, ILIOCECTECTOMY (Abdomen) Diagnosis:     Surgeons: Hemant Park MD Provider: Nasrin Garcia CRNA    Anesthesia Type: general ASA Status: 3 - Emergent            Anesthesia Type: general    Vitals  Vitals Value Taken Time   /79 02/06/24 2230   Temp 98.5 °F (36.9 °C) 02/06/24 2235   Pulse 112 02/06/24 2235   Resp 15 02/06/24 2235   SpO2 98 % 02/06/24 2235           Post Anesthesia Care and Evaluation    PONV Status: none  Comments: Patient d/c from PACU prior to anes eval based on Arlene score.  Please see RN notes for details of d/c criteria.    Blood pressure 155/79, pulse (!) 126, temperature 98.6 °F (37 °C), temperature source Axillary, resp. rate 23, height 175.3 cm (69\"), weight 64.9 kg (143 lb 1.3 oz), SpO2 98%.        "

## 2024-02-07 NOTE — PROGRESS NOTES
[facility]   Progress Note    Patient Name: Bernard Matta  : 1950  MRN: [id number]  Primary Care Physician:  Brando Webb MD  Date of admission: 2024    Subjective   Subjective    NAEO. Pain well controlled        Objective     Vitals:   Temp:  [98 °F (36.7 °C)-98.9 °F (37.2 °C)] 98.2 °F (36.8 °C)  Heart Rate:  [104-131] 128  Resp:  [14-23] 23  BP: (107-171)/(58-98) 151/97  Flow (L/min):  [3-10] 3  Physical Exam    Constitutional: Awake, alert   Eyes: PERRLA, sclerae anicteric, no conjunctival injection   HENT: NCAT, mucous membranes moist   Neck: Supple, no thyromegaly, no lymphadenopathy, trachea midline   Respiratory: Clear to auscultation bilaterally, nonlabored respirations    Cardiovascular: RRR, no murmurs, rubs, or gallops, palpable pedal pulses bilaterally   Gastrointestinal: soft, approp TTP, ND, drain serosanguinous   Musculoskeletal: No bilateral ankle edema, no clubbing or cyanosis to extremities   Psychiatric: Appropriate affect, cooperative   Neurologic: Oriented x 3, strength symmetric in all extremities, Cranial Nerves grossly intact to confrontation, speech clear   Skin: No rashes     Result Review    Result Review:  I have personally reviewed the results from the time of this admission to 2024 13:56 CST and agree with these findings:  [x]  Laboratory list / accordion  []  Microbiology  []  Radiology  []  EKG/Telemetry   []  Cardiology/Vascular   []  Pathology  []  Old records  []  Other:      Assessment & Plan   Assessment / Plan     Brief Patient Summary:  Bernard Matta is a 73 y.o. male who presents s/p ileocectomy for cecal perforation    Active Hospital Problems:  Active Hospital Problems    Diagnosis     **Bowel perforation     Stage 3b chronic kidney disease (CKD)     Hyperkalemia     COVID-19 virus detected     Iron deficiency anemia     Hypertension     Type 2 diabetes mellitus      Plan:   NGT to LCS  IVF  NPO  IV antibiotics  D/c moreno  Medicine consult  for hyperglycemia and covid  Pain management    DVT prophylaxis:  Mechanical DVT prophylaxis orders are present.        CODE STATUS:   Code Status (Patient has no pulse and is not breathing): CPR (Attempt to Resuscitate)  Medical Interventions (Patient has pulse or is breathing): Full Support        MD Hemant Angel MD  General Surgery  02/07/24  13:56 CST

## 2024-02-07 NOTE — PLAN OF CARE
Goal Outcome Evaluation: Midline dressing has slight bleeding noted, but has remained unchanged throughout shift. 60 mL out of SONNY. 450 mL urine output since arrival to ICU. Morphine ordered for pain control. Given once this shift. Patient has been sinus tach.     Problem: Pain Chronic (Persistent) (Comorbidity Management)  Goal: Acceptable Pain Control and Functional Ability  Intervention: Develop Pain Management Plan  Recent Flowsheet Documentation  Taken 2/7/2024 0500 by Kiko Tracy RN  Pain Management Interventions:   see MAR   quiet environment facilitated   pillow support provided   position adjusted

## 2024-02-07 NOTE — OP NOTE
Operative Note    LAPAROTOMY EXPLORATORY      Bernard Matta  2/6/2024    Pre-op Diagnosis:   Perforated viscus       Post-Op Diagnosis Codes:   Cecal perforation    Procedure/CPT® Codes:      Procedure(s):  DIAGNOSTIC LAPAROSCOPY CONVERTED TO LAPAROTOMY, ILIOCECTECTOMY    Surgeon(s):  Hemant Park MD    Anesthesia: General    Staff:   Circulator: Kendal Armenta RN  Scrub Person: Jose Luis Indu; Patricia Swartz         Estimated Blood Loss: minimal    Urine Voided: * No values recorded between 2/6/2024  7:11 PM and 2/6/2024 10:04 PM *    Specimens:                          Drains:   Closed/Suction Drain 1 LUQ Bulb 15 Fr. (Active)   Site Description Unable to view 02/06/24 2208   Dressing Status Clean;Dry;Intact 02/06/24 2208   Drainage Appearance Serosanguineous 02/06/24 2208   Status To bulb suction 02/06/24 2208       NG/OG Tube Nasogastric 18 Fr Right nostril (Active)   Site Assessment Clean;Dry;Intact 02/06/24 2208   Securement taped to nostril center 02/06/24 2208   Secured at (cm) 65 02/06/24 2208   Status Suction-low intermittent 02/06/24 2208       Urethral Catheter Silicone 16 Fr. (Active)   Daily Indications Selected surgeries ( tract, abdomen) 02/06/24 2208   Site Assessment Clean 02/06/24 2208   Collection Container Standard drainage bag 02/06/24 2208   Securement Method Securing device 02/06/24 2208       Findings: hole noted at the posterior cecum        Complications: none          Hemant Park MD     Date: 2/6/2024  Time: 22:20 CST      Procedure Description: The patient was taken to the OR, given IV sedation and ETT. The abdomen was prepped and draped in a sterile fashion. Veress access was obtained followed by placement of three 5mm ports. The abdomen was inspected and purulence was seen along the right paracolic gutter and in the pelvis. An attempt was made to find the appendix and the cecum was mobilized. The appendix could not be found laparoscopically, so a decision was made to  convert to open. A midline incision was made and taken down to the peritoneum. The abdomen was entered under direct vision. The ports were removed. The abdomen was inspected and at the point of coalescence of the taenia, there was a hole at the expected appendiceal base. The abdomen was inspected in all four quadrants, but the tip of the appendix could not be located. A decision was made to perform an ileocectomy. The cecum was transected ~4cm distal to the ileocecal valve, using the GIA75 blue load stapler. The small bowel was transected around 5cm from the ileocecal valve in a similar manner. The specimen was sent off the field. The abdomen was irrigated. An enterotomy was made on the ileal and cecal ends and a common channel was created with the KIM stapler. The anastomosis was then closed with another fire of the KIM blue load stapler. A drain was placed in the pelvis. The abdomen was re-inspected from ligament of treitz to the rectum and no further abnormalities were found. The fascia was closed with 1-Ethibond in an interrupted fashion. The skin was stapled closed. The patient tolerated the procedure well. Counts were correct x 2.     Hemant Park MD  General Surgery  02/06/24  22:20 CST    Hemant Park MD  General Surgery  02/06/24  22:20 CST

## 2024-02-07 NOTE — ANESTHESIA PROCEDURE NOTES
Peripheral IV    Line placed for Fluids/Medication Admin.  Preanesthetic Checklist  Completed: patient identified, IV checked, site marked, risks and benefits discussed, surgical consent, monitors and equipment checked, pre-op evaluation and timeout performed  Peripheral IV Prep   Patient position: supine   Prep: ChloraPrep  Patient monitoring: cardiac monitor, continuous pulse ox and heart rate  Peripheral IV Procedure   Laterality:right  Location:  Antecubital  Catheter size: 16 G          Post Assessment   Dressing Type: tape and transparent.    IV Dressing/Site: clean, dry and intact

## 2024-02-07 NOTE — PLAN OF CARE
Problem: Adult Inpatient Plan of Care  Goal: Absence of Hospital-Acquired Illness or Injury  Intervention: Identify and Manage Fall Risk  Recent Flowsheet Documentation  Taken 2/7/2024 1700 by Tony Ayala RN  Safety Promotion/Fall Prevention:   safety round/check completed   room organization consistent  Taken 2/7/2024 1600 by Tony Ayala RN  Safety Promotion/Fall Prevention:   safety round/check completed   room organization consistent  Taken 2/7/2024 1500 by Tony Ayala RN  Safety Promotion/Fall Prevention:   safety round/check completed   room organization consistent  Taken 2/7/2024 1400 by Tony Ayala RN  Safety Promotion/Fall Prevention:   safety round/check completed   room organization consistent  Taken 2/7/2024 1300 by Tony Ayala RN  Safety Promotion/Fall Prevention:   safety round/check completed   room organization consistent  Intervention: Prevent Skin Injury  Recent Flowsheet Documentation  Taken 2/7/2024 1700 by Tony Ayala RN  Body Position: position changed independently  Taken 2/7/2024 1600 by Tony Ayala RN  Body Position: position changed independently  Skin Protection:   skin-to-skin areas padded   skin-to-device areas padded   adhesive use limited  Taken 2/7/2024 1400 by Tony Ayala RN  Body Position: position changed independently  Intervention: Prevent and Manage VTE (Venous Thromboembolism) Risk  Recent Flowsheet Documentation  Taken 2/7/2024 1700 by Tony Ayala RN  Activity Management: bedrest  Taken 2/7/2024 1600 by Tony Ayala RN  Activity Management: bedrest  VTE Prevention/Management:   bilateral   sequential compression devices on  Range of Motion: active ROM (range of motion) encouraged  Taken 2/7/2024 1500 by Tony Ayala RN  Activity Management: bedrest  Taken 2/7/2024 1400 by Tony Ayala RN  Activity Management: bedrest  Taken 2/7/2024 1300 by Tony Ayala RN  Activity Management: bedrest  Intervention: Prevent Infection  Recent  Flowsheet Documentation  Taken 2/7/2024 1700 by Tony Ayala RN  Infection Prevention: single patient room provided  Taken 2/7/2024 1600 by Tnoy Ayala RN  Infection Prevention: single patient room provided  Taken 2/7/2024 1500 by Tony Ayala RN  Infection Prevention: single patient room provided  Taken 2/7/2024 1400 by Tony Ayala RN  Infection Prevention: single patient room provided  Taken 2/7/2024 1300 by Tony Ayala RN  Infection Prevention: single patient room provided  Goal: Optimal Comfort and Wellbeing  Intervention: Provide Person-Centered Care  Recent Flowsheet Documentation  Taken 2/7/2024 1600 by Tony Ayala RN  Trust Relationship/Rapport:   care explained   choices provided     Problem: Skin Injury Risk Increased  Goal: Skin Health and Integrity  Intervention: Optimize Skin Protection  Recent Flowsheet Documentation  Taken 2/7/2024 1700 by Tony Ayala RN  Head of Bed (HOB) Positioning: HOB elevated  Taken 2/7/2024 1600 by Tony Ayala RN  Pressure Reduction Techniques:   weight shift assistance provided   frequent weight shift encouraged  Head of Bed (HOB) Positioning: HOB elevated  Pressure Reduction Devices: pressure-redistributing mattress utilized  Skin Protection:   skin-to-skin areas padded   skin-to-device areas padded   adhesive use limited  Taken 2/7/2024 1400 by Tony Ayala RN  Head of Bed (HOB) Positioning: HOB elevated     Problem: Asthma Comorbidity  Goal: Maintenance of Asthma Control  Intervention: Maintain Asthma Symptom Control  Recent Flowsheet Documentation  Taken 2/7/2024 1700 by Tony Ayala RN  Medication Review/Management: medications reviewed  Taken 2/7/2024 1600 by Tony Ayala RN  Medication Review/Management: medications reviewed  Taken 2/7/2024 1500 by Tony Ayala RN  Medication Review/Management: medications reviewed  Taken 2/7/2024 1400 by Tony Ayala RN  Medication Review/Management: medications reviewed  Taken 2/7/2024 1300  by Tony Ayala RN  Medication Review/Management: medications reviewed     Problem: Behavioral Health Comorbidity  Goal: Maintenance of Behavioral Health Symptom Control  Intervention: Maintain Behavioral Health Symptom Control  Recent Flowsheet Documentation  Taken 2/7/2024 1700 by Tony Ayala RN  Medication Review/Management: medications reviewed  Taken 2/7/2024 1600 by Tony Ayala RN  Medication Review/Management: medications reviewed  Taken 2/7/2024 1500 by Tony Ayala RN  Medication Review/Management: medications reviewed  Taken 2/7/2024 1400 by Tony Ayala RN  Medication Review/Management: medications reviewed  Taken 2/7/2024 1300 by Tony Ayala RN  Medication Review/Management: medications reviewed     Problem: COPD (Chronic Obstructive Pulmonary Disease) Comorbidity  Goal: Maintenance of COPD Symptom Control  Intervention: Maintain COPD-Symptom Control  Recent Flowsheet Documentation  Taken 2/7/2024 1700 by Tony Ayala RN  Medication Review/Management: medications reviewed  Taken 2/7/2024 1600 by Tony Ayala RN  Medication Review/Management: medications reviewed  Taken 2/7/2024 1500 by Tony Ayala RN  Medication Review/Management: medications reviewed  Taken 2/7/2024 1400 by Tony Ayala RN  Medication Review/Management: medications reviewed  Taken 2/7/2024 1300 by Tony Ayala RN  Medication Review/Management: medications reviewed     Problem: Diabetes Comorbidity  Goal: Blood Glucose Level Within Targeted Range  Intervention: Monitor and Manage Glycemia  Recent Flowsheet Documentation  Taken 2/7/2024 1600 by Tony Ayala RN  Glycemic Management:   insulin infusion adjusted   blood glucose monitored     Problem: Heart Failure Comorbidity  Goal: Maintenance of Heart Failure Symptom Control  Intervention: Maintain Heart Failure-Management  Recent Flowsheet Documentation  Taken 2/7/2024 1700 by Tony Ayala RN  Medication Review/Management: medications  reviewed  Taken 2/7/2024 1600 by Tony Ayala RN  Medication Review/Management: medications reviewed  Taken 2/7/2024 1500 by Tony Ayala RN  Medication Review/Management: medications reviewed  Taken 2/7/2024 1400 by Tony Ayala RN  Medication Review/Management: medications reviewed  Taken 2/7/2024 1300 by Tony Ayala RN  Medication Review/Management: medications reviewed     Problem: Hypertension Comorbidity  Goal: Blood Pressure in Desired Range  Intervention: Maintain Blood Pressure Management  Recent Flowsheet Documentation  Taken 2/7/2024 1700 by Tony Ayala RN  Medication Review/Management: medications reviewed  Taken 2/7/2024 1600 by Tony Ayala RN  Medication Review/Management: medications reviewed  Taken 2/7/2024 1500 by Tony Ayala RN  Medication Review/Management: medications reviewed  Taken 2/7/2024 1400 by Tony Ayala RN  Medication Review/Management: medications reviewed  Taken 2/7/2024 1300 by Tony Ayala RN  Medication Review/Management: medications reviewed     Problem: Osteoarthritis Comorbidity  Goal: Maintenance of Osteoarthritis Symptom Control  Intervention: Maintain Osteoarthritis Symptom Control  Recent Flowsheet Documentation  Taken 2/7/2024 1700 by Tony Ayala RN  Activity Management: bedrest  Medication Review/Management: medications reviewed  Taken 2/7/2024 1600 by Tony Ayala RN  Activity Management: bedrest  Medication Review/Management: medications reviewed  Taken 2/7/2024 1500 by Tony Ayala RN  Activity Management: bedrest  Medication Review/Management: medications reviewed  Taken 2/7/2024 1400 by Tony Ayala RN  Activity Management: bedrest  Medication Review/Management: medications reviewed  Taken 2/7/2024 1300 by Tony Ayala RN  Activity Management: bedrest  Medication Review/Management: medications reviewed     Problem: Pain Chronic (Persistent) (Comorbidity Management)  Goal: Acceptable Pain Control and Functional  Ability  Intervention: Manage Persistent Pain  Recent Flowsheet Documentation  Taken 2/7/2024 1700 by Tony Ayala RN  Medication Review/Management: medications reviewed  Taken 2/7/2024 1600 by Tony Ayala RN  Medication Review/Management: medications reviewed  Taken 2/7/2024 1500 by Tony Ayala RN  Medication Review/Management: medications reviewed  Taken 2/7/2024 1400 by Tony Ayala RN  Medication Review/Management: medications reviewed  Taken 2/7/2024 1300 by Tony Ayala RN  Medication Review/Management: medications reviewed     Problem: Fall Injury Risk  Goal: Absence of Fall and Fall-Related Injury  Intervention: Identify and Manage Contributors  Recent Flowsheet Documentation  Taken 2/7/2024 1700 by Tony Ayala RN  Medication Review/Management: medications reviewed  Taken 2/7/2024 1600 by Tony Ayala RN  Medication Review/Management: medications reviewed  Taken 2/7/2024 1500 by Tony Ayala RN  Medication Review/Management: medications reviewed  Taken 2/7/2024 1400 by Tony Ayala RN  Medication Review/Management: medications reviewed  Taken 2/7/2024 1300 by Tony Ayala RN  Medication Review/Management: medications reviewed  Intervention: Promote Injury-Free Environment  Recent Flowsheet Documentation  Taken 2/7/2024 1700 by Tony Ayala RN  Safety Promotion/Fall Prevention:   safety round/check completed   room organization consistent  Taken 2/7/2024 1600 by Tony Aayla RN  Safety Promotion/Fall Prevention:   safety round/check completed   room organization consistent  Taken 2/7/2024 1500 by Tony Ayala RN  Safety Promotion/Fall Prevention:   safety round/check completed   room organization consistent  Taken 2/7/2024 1400 by Tony Ayala RN  Safety Promotion/Fall Prevention:   safety round/check completed   room organization consistent  Taken 2/7/2024 1300 by Tony Ayala RN  Safety Promotion/Fall Prevention:   safety round/check completed   room  organization consistent     Problem: Glycemic Control Impaired (Sepsis/Septic Shock)  Goal: Blood Glucose Level Within Desired Range  Intervention: Optimize Glycemic Control  Recent Flowsheet Documentation  Taken 2/7/2024 1600 by Tony Ayala RN  Glycemic Management:   insulin infusion adjusted   blood glucose monitored     Problem: Infection Progression (Sepsis/Septic Shock)  Goal: Absence of Infection Signs and Symptoms  Intervention: Initiate Sepsis Management  Recent Flowsheet Documentation  Taken 2/7/2024 1700 by Tony Ayala RN  Infection Prevention: single patient room provided  Taken 2/7/2024 1600 by Tony Ayala RN  Infection Prevention: single patient room provided  Taken 2/7/2024 1500 by Tony Ayala RN  Infection Prevention: single patient room provided  Taken 2/7/2024 1400 by Tony Ayala RN  Infection Prevention: single patient room provided  Taken 2/7/2024 1300 by Tony Ayala RN  Infection Prevention: single patient room provided  Intervention: Promote Recovery  Recent Flowsheet Documentation  Taken 2/7/2024 1700 by Tony Ayala RN  Activity Management: bedrest  Taken 2/7/2024 1600 by Tony Ayala RN  Activity Management: bedrest  Taken 2/7/2024 1500 by Tony Ayala RN  Activity Management: bedrest  Taken 2/7/2024 1400 by Tony Ayala RN  Activity Management: bedrest  Taken 2/7/2024 1300 by Tony Ayala RN  Activity Management: bedrest   Goal Outcome Evaluation:    Pt medicated for pain x 1; Pt started on insulin drip; ST on monitor, otherwise vitals stable. NG with minimal drainage; SONNY with 30ml; F/C. Pt safety maintained throughout shift.

## 2024-02-07 NOTE — PAYOR COMM NOTE
"REF:    [id number]    [facility]  [place],  [phone]  OR   FAX   [phone]     Bernard Matta (73 y.o. Male)       Date of Birth   1950    Social Security Number       Address   [address] [place] [address]    Home Phone   [phone]    MRN   [id number]       Buddhism   Druze    Marital Status                               Admission Date   2/6/24    Admission Type   Emergency    Admitting Provider   Hemant Park MD    Attending Provider   Hemant Park MD    Department, Room/Bed   [facility], I002/1       Discharge Date       Discharge Disposition       Discharge Destination                                 Attending Provider: Hemant Park MD    Allergies: Jardiance [Empagliflozin]    Isolation: Enh Drop/Con   Infection: COVID (confirmed) (02/06/24)   Code Status: CPR    Ht: 175.3 cm (69\")   Wt: 64.9 kg (143 lb 1.3 oz)    Admission Cmt: None   Principal Problem: Bowel perforation [K63.1]                   Active Insurance as of 2/6/2024       Primary Coverage       Payor Plan Insurance Group Employer/Plan Group    HUMANA MEDICARE REPLACEMENT HU[employer]NA MED ADV GROUP [id number]       Payor Plan Address Payor Plan Phone Number Payor Plan Fax Number Effective Dates    [address] [address] [phone]  1/1/2019 - None Entered    [facility] [id number]         Subscriber Name Subscriber Birth Date Member ID       BERNARD MATTA 1950 [id number]                     Emergency Contacts        (Rel.) Home Phone Work Phone Mobile Phone    Nury Matta (Spouse) -- -- [phone]          2/6/2024 2/6/2024 Event Details User   10:27 Patient arrival  Garrett Smith RN   10:31 HPI HPI (Adult)  Stated Reason for Visit: Pt tested positive for Covid about 2 weeks ago and has continued to feel fatigued and weak, pt started having abd pain and vomiting yesterday and states that he stomach is sore, pt states that he has kidney disease " "and has not been voiding like he should. Spouse states that he has DM and his BG has been running high on average 350mg/dL  History Obtained From: patient Garrett Smith RN   10:31:23 Trigger for Triage Start  Garrett Smith RN   10:31:23 Triage Started  Garrett Smith RN   10:31:23 Chief Complaints Updated Weakness - Generalized Garrett Smith RN   10:34 Vitals Assessment  Garrett Smith RN   10:34 Vital Signs Vital Signs  Temp: 98.4 °F (36.9 °C)  Temp src: Oral  Heart Rate: 116  Heart Rate Source: Monitor  Resp: 20  Resp Rate Source: Visual  BP: 128/74  BP Location: Right arm  BP Method: Automatic  Patient Position: Sitting  BMI (Calculated): 27  Oxygen Therapy  SpO2: 97 %  Device (Oxygen Therapy): room air  Vitals Timer  Restart Vitals Timer: Yes  Height and Weight  Height: 175.3 cm (69\")  Height Method: Stated  Weight: 83 kg (183 lb)  Weight Method: Standing scale  Other flowsheet entries  Ideal Body Weight k.7 Garrett Smith RN     10:34 Pain Pain (Adult)  (0-10) Pain Rating: Rest: 8 Garrett Smith RN     15:04 Medication New Bag sodium chloride 0.9 % bolus 1,000 mL - Dose: 1,000 mL ; Rate: 2,000 mL/hr ; Route: Intravenous ; Line: Peripheral IV 24 1349 Left Antecubital ; Scheduled Time: 1437 Yanira West RN     5:32 Medication Given famotidine (PEPCID) injection 20 mg - Dose: 20 mg ; Route: Intravenous ; Line: Peripheral IV 24 1349 Left Antecubital ; Scheduled Time: 1435 Yanira West RN   15:32 Medication Given ondansetron (ZOFRAN) injection 4 mg - Dose: 4 mg ; Route: Intravenous ; Line: Peripheral IV 24 1349 Left Antecubital ; Scheduled Time: 1435 Yanira West RN     16:53 Medication New Bag sodium chloride 0.9 % infusion - Dose: 125 mL/hr ; Rate: 125 mL/hr ; Route: Intravenous ; Line: Peripheral IV 24 1349 Left Antecubital ; Scheduled Time: 1435 West, Yanira, RN   16:53 Medication New Bag piperacillin-tazobactam (ZOSYN) 3.375 g IVPB in 100 mL " NS MBP (CD) - Dose: 3.375 g ; Route: Intravenous ; Line: Peripheral IV 24 1349 Left Antecubital ; Scheduled Time: 1640 Yanira West RN     19:11 Anesthesia Start The patient was reevaluated immediately before moderate or deep sedation use and before anesthesia induction.  Agusto Hoffman CRNA     19:11 Patient admitted To department [facility] OR Kendal Armenta RN Morgan, Carlin, RN   Registered Nurse  Nursing     Plan of Care     Signed     Date of Service: 24  Creation Time: 24 0516     Signed         Goal Outcome Evaluation: Midline dressing has slight bleeding noted, but has remained unchanged throughout shift. 60 mL out of SONNY. 450 mL urine output since arrival to ICU. Morphine ordered for pain control. Given once this shift. Patient has been sinus tach.      Problem: Pain Chronic (Persistent) (Comorbidity Management)  Goal: Acceptable Pain Control and Functional Ability  Intervention: Develop Pain Management Plan  Recent Flowsheet Documentation  Taken 2024 0500 by Kiko Tracy RN  Pain Management Interventions:   see MAR   quiet environment facilitated   pillow support provided   position adjusted                   History & Physical        Hemant Park MD at 24 1839           [facility]   HISTORY AND PHYSICAL    Patient Name: Bernard Matta  : 1950  MRN: [id number]  Primary Care Physician:  Brando Webb MD  Date of admission: 2024    Subjective  Subjective     Chief Complaint: abdominal pain    HPI:    Bernard Matta is a 73 y.o. male who presents with one day hx of abdominal pain. Reports originally periumbilical pain, which has migrated to all quadrants. Reports associated nausea/vomiting. No chest pains/SOB.     Review of Systems   All systems were reviewed and negative except for: GI    Personal History     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      Hyperlipidemia     Hypertension     Ischemic optic neuropathy of both eyes        Past Surgical History:   Procedure Laterality Date    COLONOSCOPY  2019    [place]    COLONOSCOPY N/A 8/11/2020    Procedure: COLONOSCOPY WITH ANESTHESIA;  Surgeon: Shaquille Mckenzie DO;  Location:  [facility];  Service: Gastroenterology;  Laterality: N/A;  preop; hx of polyps   postop; polyps   PCP Brando Webb     COLONOSCOPY N/A 8/12/2021    Procedure: COLONOSCOPY WITH ANESTHESIA;  Surgeon: Shaquille Mckenzie DO;  Location:  [facility];  Service: Gastroenterology;  Laterality: N/A;  pre hx adenomatous colon polyp  post normal  Brando Webb MD       Family History: family history includes No Known Problems in his father, maternal grandfather, maternal grandmother, mother, paternal grandfather, and paternal grandmother. Otherwise pertinent FHx was reviewed and not pertinent to current issue.    Social History:  reports that he quit smoking about 45 years ago. His smoking use included cigarettes. He has a 5.00 pack-year smoking history. He has been exposed to tobacco smoke. He has never used smokeless tobacco. He reports that he does not drink alcohol and does not use drugs.    Home Medications:  Accu-Chek FastClix Lancets, B-D SINGLE USE SWABS REGULAR, Blood Glucose Monitoring Suppl, Dexcom G6 Sensor, Dexcom G6 Transmitter, Insulin Pen Needle, Lancing Device, Netarsudil Dimesylate, amitriptyline, aspirin, atorvastatin, cetirizine, cholecalciferol, cloNIDine, dorzolamide-timolol, epoetin shari-epbx, ezetimibe, ferrous sulfate, fluticasone, furosemide, glucose blood, insulin aspart, insulin degludec, latanoprost, lisinopril, multivitamin with minerals, and tamsulosin    Allergies:  Allergies   Allergen Reactions    Jardiance [Empagliflozin] Other (See Comments)     Pt reports         Objective  Objective     Vitals:   Temp:  [98.4 °F (36.9 °C)] 98.4 °F (36.9 °C)  Heart Rate:  [104-117] 116  Resp:  [20] 20  BP:  (128-161)/(74-98) 161/82  Physical Exam    Constitutional: Awake, alert   Eyes: PERRLA, sclerae anicteric, no conjunctival injection   HENT: NCAT, mucous membranes moist   Neck: Supple, no thyromegaly, no lymphadenopathy, trachea midline   Respiratory: Clear to auscultation bilaterally, nonlabored respirations    Cardiovascular: RRR, no murmurs, rubs, or gallops, palpable pedal pulses bilaterally   Gastrointestinal: soft, TTP in all quadrants, +distention, +rebound   Musculoskeletal: No bilateral ankle edema, no clubbing or cyanosis to extremities   Psychiatric: Appropriate affect, cooperative   Neurologic: Oriented x 3, strength symmetric in all extremities, Cranial Nerves grossly intact to confrontation, speech clear   Skin: No rashes     Result Review   Result Review:  I have personally reviewed the results from the time of this admission to 2/6/2024 18:40 CST and agree with these findings:  [x]  Laboratory list / accordion  []  Microbiology  [x]  Radiology  []  EKG/Telemetry   []  Cardiology/Vascular   []  Pathology  []  Old records  []  Other:      Assessment & Plan  Assessment / Plan     Brief Patient Summary:  Bernard Matta is a 73 y.o. male who presents with peritonitis, elevated leukocytosis and CT concerning for perforated viscus/ perforated appendicitis    Active Hospital Problems:  Active Hospital Problems    Diagnosis     **Bowel perforation      Plan:   Will consent for diagnostic laparoscopy, possible bowel resection, laparotomy  Will plan for ICU admission postoperatively given hx of CHF, RBBB  NPO  IV antibiotics      DVT prophylaxis:  No DVT prophylaxis order currently exists.        CODE STATUS:       Admission Status:  I believe this patient meets inpatient status.    Hemant Park MD             Electronically signed by Hemant Park MD at 02/06/24 1844          Emergency Department Notes        Loi Alvarez Jr., MD at 02/06/24 1420          HPI:     Patient is a 73-year-old white  "male who presents to the emergency room with a complaint of worsening abdominal pain.  Patient has some nausea and vomiting but no diarrhea.  Patient states the vomitus without blood.  Patient states he noticed his abdomen seems to be more \"distended\" than normal.  Patient also with groin soreness.  Patient was diagnosed with COVID-19 2 weeks ago as well as his wife.  However now his wife is negative and he has been positive.      REVIEW OF SYSTEMS  CONSTITUTIONAL: Positive for general weakness and fatigue   EYES:  No complaints of discharge   ENT: No complaints of sore throat or ear pain  CARDIOVASCULAR:  No complaints of chest pain, palpitations, or swelling  RESPIRATORY:  No complaints of cough or shortness of breath  GI: Positive for abdominal pain with nausea and vomiting but no diarrhea.  Positive for abdominal distention.    MUSCULOSKELETAL:  No complaints of back pain  SKIN:  No complaints of rash  NEUROLOGIC:  No complaints of headache, focal weakness, or sensory changes  ENDOCRINE:  No complaints of polyuria or polydipsia  LYMPHATIC:  No complaints of swollen glands  GENITOURINARY: No complaints of urinary frequency or hematuria      PAST MEDICAL HISTORY  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     Hyperlipidemia     Hypertension     Ischemic optic neuropathy of both eyes        FAMILY HISTORY  Family History   Problem Relation Age of Onset    No Known Problems Maternal Grandmother     No Known Problems Maternal Grandfather     No Known Problems Paternal Grandmother     No Known Problems Paternal Grandfather     No Known Problems Mother     No Known Problems Father     Colon cancer Neg Hx     Colon polyps Neg Hx     Esophageal cancer Neg Hx        SOCIAL HISTORY  Social History     Socioeconomic History    Marital status:    Tobacco Use    Smoking status: Former     Packs/day: 0.50     Years: 10.00     Additional pack years: " 0.00     Total pack years: 5.00     Types: Cigarettes     Quit date: 1979     Years since quittin.1     Passive exposure: Past    Smokeless tobacco: Never   Vaping Use    Vaping Use: Never used   Substance and Sexual Activity    Alcohol use: No    Drug use: No    Sexual activity: Defer       IMMUNIZATION HISTORY  Deferred to primary care physician.    SURGICAL HISTORY  Past Surgical History:   Procedure Laterality Date    COLONOSCOPY  2019    [place]    COLONOSCOPY N/A 2020    Procedure: COLONOSCOPY WITH ANESTHESIA;  Surgeon: Shaquille Mckenzie DO;  Location:  [facility];  Service: Gastroenterology;  Laterality: N/A;  preop; hx of polyps   postop; polyps   PCP Brando Webb     COLONOSCOPY N/A 2021    Procedure: COLONOSCOPY WITH ANESTHESIA;  Surgeon: Shaquille Mckenzie DO;  Location: [facility] ENDOSCOPY;  Service: Gastroenterology;  Laterality: N/A;  pre hx adenomatous colon polyp  post normal  Jon, Brando Gupta MD       CURRENT MEDICATIONS    Current Facility-Administered Medications:     piperacillin-tazobactam (ZOSYN) 3.375 g IVPB in 100 mL NS MBP (CD), 3.375 g, Intravenous, Once, Loi Alvarez Jr., MD, 3.375 g at 24 1653    sodium chloride 0.9 % infusion, 125 mL/hr, Intravenous, Continuous, Loi Alvarez Jr., MD, Last Rate: 125 mL/hr at 24 1653, 125 mL/hr at 24 1653    Current Outpatient Medications:     Accu-Chek FastClix Lancets misc, Use 4 x daily ,   ICD10 code is E11.9 You dispensed the wrong this, patient wants acucheck fast click lancets, Disp: 360 each, Rfl: 3    Alcohol Swabs (B-D SINGLE USE SWABS REGULAR) pads, USE FOUR TIMES DAILY, Disp: 400 each, Rfl: 11    amitriptyline (ELAVIL) 10 MG tablet, TAKE 1 TABLET EVERY NIGHT AS NEEDED FOR SLEEP, Disp: 90 tablet, Rfl: 3    aspirin 81 MG tablet, Take 1 tablet by mouth Daily., Disp: , Rfl:     atorvastatin (LIPITOR) 40 MG tablet, Take 1 tablet by mouth every night at bedtime., Disp: 90 tablet, Rfl:  "3    Blood Glucose Monitoring Suppl w/Device kit, accu chek albertina, Disp: 1 each, Rfl: 1    cetirizine (zyrTEC) 10 MG tablet, Take 1 tablet by mouth Daily., Disp: , Rfl:     cholecalciferol (VITAMIN D3) 400 UNITS tablet, Take 1 tablet by mouth Daily., Disp: , Rfl:     cloNIDine (CATAPRES) 0.2 MG tablet, TAKE 1 TABLET TWICE DAILY, Disp: 180 tablet, Rfl: 3    Continuous Blood Gluc Sensor (Dexcom G6 Sensor), Every 10 (Ten) Days., Disp: 3 each, Rfl: 5    Continuous Blood Gluc Transmit (Dexcom G6 Transmitter) misc, 1 each Every 3 (Three) Months., Disp: 1 each, Rfl: 2    dorzolamide-timolol (COSOPT) 22.3-6.8 MG/ML ophthalmic solution, 1 drop 2 (Two) Times a Day., Disp: , Rfl:     Droplet Pen Needles 31G X 5 MM misc, USE FOUR TIMES DAILY  AS  DIRECTED, Disp: 400 each, Rfl: 3    epoetin shari-epbx (Retacrit) 45383 UNIT/ML injection, Inject  under the skin into the appropriate area as directed Every 14 (Fourteen) Days., Disp: , Rfl:     ezetimibe (Zetia) 10 MG tablet, Take 1 tablet by mouth Daily., Disp: 90 tablet, Rfl: 3    ferrous sulfate 325 (65 FE) MG tablet, 1 tablet. Takes 1 every other day ., Disp: , Rfl:     fluticasone (FLONASE) 50 MCG/ACT nasal spray, USE 2 SPRAYS IN EACH NOSTRIL EVERY DAY, Disp: 48 g, Rfl: 10    furosemide (Lasix) 20 MG tablet, Take 1 tablet by mouth Daily., Disp: 90 tablet, Rfl: 3    glucose blood (Accu-Chek Marry Plus) test strip, TEST BLOOD SUGAR FOUR TIMES DAILY, Disp: 400 each, Rfl: 6    insulin aspart (NovoLOG FlexPen) 100 UNIT/ML solution pen-injector sc pen, INJECT SUBCUTANEOUSLY UP TO 20 UNITS WITH MEALS. DISCARD PEN 28 DAYS AFTER OPENING, Disp: 60 mL, Rfl: 3    Insulin Pen Needle 31G X 5 MM misc, Droplet Pen Needle 31 gauge x 3/16\", Disp: , Rfl:     Lancet Devices (Lancing Device) misc, acucheck fast click lancing device. Make certain you send the correct one. Talk to patient . States you sent the wrong one, Disp: 1 each, Rfl: 11    latanoprost (XALATAN) 0.005 % ophthalmic solution, 1 drop " "Every Night., Disp: , Rfl:     lisinopril (PRINIVIL,ZESTRIL) 10 MG tablet, TAKE 1 TABLET EVERY NIGHT, Disp: 90 tablet, Rfl: 3    Multiple Vitamins-Minerals (MULTIVITAMIN WITH MINERALS) tablet tablet, Take 1 tablet by mouth Daily., Disp: , Rfl:     Rhopressa 0.02 % solution, , Disp: , Rfl:     tamsulosin (FLOMAX) 0.4 MG capsule 24 hr capsule, TAKE 2 CAPSULES EVERY EVENING, Disp: 180 capsule, Rfl: 10    Tresiba FlexTouch 100 UNIT/ML solution pen-injector injection, INJECT 20 UNITS UNDER THE SKIN INTO THE APPROPRIATE AREA AS DIRECTED EVERY NIGHT., Disp: 30 mL, Rfl: 2    ALLERGIES  Allergies   Allergen Reactions    Jardiance [Empagliflozin] Other (See Comments)     Pt reports         ABDOMINAL EXAM    VITAL SIGNS:   /74 (BP Location: Right arm, Patient Position: Sitting)   Pulse 116   Temp 98.4 °F (36.9 °C) (Oral)   Resp 20   Ht 175.3 cm (69\")   Wt 83 kg (183 lb)   SpO2 97%   BMI 27.02 kg/m²     Constitutional: Patient is alert and in no distress.  Patient with moderate abdominal discomfort.    ENT: There is a normal pharynx with no acute erythema or exudate and oral mucosa is moist.  Nose is clear with no drainage.  Tympanic membranes intact and non-erythemic    Cardiovascular: S1-S2 regular rate and rhythm no murmurs rubs or gallops pulses are equal bilaterally and there is no pitting edema    Respiratory: Patient is clear to auscultation bilaterally with no wheezing or rhonchi.  Chest wall is nontender.  There is no external lesions on the chest.  There is no crepitance    Gastrointestinal: Diffuse tenderness of the abdomen but greatest in the right lower quadrant of the abdomen.  There is tender tenderness to percussion in these area.  There is no rebound but mild guarding is noted.    Genitourinary: Patient is voiding appropriately.    Integument: No acute lesions noted color appears to be normal    [place] Coma Scale: Total score 15    Neurological: Patient is alert and oriented x4 in no acute " findings noted.  Speech is fluent and cognition is normal.  No evidence of acute CVA.  Cranial nerves II through XII intact.  Patient with normal motor function as well as reflexes and sensation    Psychiatric: Normal affect and mood.            RADIOLOGY/PROCEDURES        CT Abdomen Pelvis Without Contrast   Final Result   1. There is an abnormal bowel gas pattern with multiple moderately   distended loops of small bowel with mild bowel wall thickening. The   right and transverse colon are also moderately distended. The more   distal colon is decompressed. Within the right lower quadrant posterior   to the cecum there is a complex collection which is not definitely   contiguous with any adjacent GI structure suspicious for localized   perforation with abscess formation. There is surrounding inflammatory   changes. The appendix is not clearly identified and this could represent   a ruptured appendicitis. I do not see any documentation of previous   appendectomy by electronic medical record. There is free air beneath the   right diaphragm and within the perihepatic space..   2. Free fluid within the lower right paracolic gutter, central pelvis   and lower pelvis. This fluid demonstrates some complexity.   3. Small hiatal hernia.   4. The prostate gland is enlarged. There is a bladder diverticulum   emanating from both the left and right lateral bladder wall. A mild   element of chronic bladder outlet obstruction is not excluded.. I spoke   with Dr. Verdin in the emergency room at 4:20 p.m.               This report was signed and finalized on 2/6/2024 4:23 PM by Dr. Fredy Sheldon MD.          XR Hips Bilateral With or Without Pelvis 5 View   Final Result   1.  Lower lumbar spondylosis.   2.  2. Unremarkable hips.           This report was signed and finalized on 2/6/2024 3:21 PM by Jesus Moore.          XR Chest 1 View    (Results Pending)          FUTURE APPOINTMENTS     Future Appointments   Date Time  Provider Department Center   2/7/2024 12:40 PM  [facility]  [facility] CCL[facility]   2/7/2024  1:00 PM TX ROOM [facility] OP INFU ONC [facility] OIONC [facility]   2/15/2024  9:00 AM PAD US NIVAS CART 1  [facility] NIVAS [facility]   2/15/2024 10:00 AM [facility] STRESS LAB 3 [facility] CARDI [facility]   2/21/2024  1:00 PM TX ROOM [facility] OP INFU ONC [facility] O[facility]C [facility]   2/28/2024  1:15 PM Leesa Anne APRN [facility] ONC [facility]   3/6/2024  8:45 AM Brando Webb MD [facility] PC PRIN [facility]   7/10/2024  9:45 AM Edu Villalobos MD [facility] CD  PAD   9/4/2024  7:45 AM Brando Webb MD [facility] PC PRIN PAD            COURSE & MEDICAL DECISION MAKING       Patient's partial differential diagnosis can include:    Acute Appendicitis, Gastritis, gastroenteritis, colitis, enteritis, volvulus, intussusception, partial bowel obstruction, bowel obstruction,mesenteric adenitis, mesenteric ischemia, constipation, irritable bowel, diverticulitis, diverticulosis, Crohn's disease, ulcerative colitis, celiac disease esophageal spasms, gastroparesis, GERD, peptic ulcer disease, perforated esophagus, perforated peptic ulcer, , AAA, and others    Patient's troponin noted to be elevated as well as his renal insufficiency is mildly elevated compared to his normal baseline creatinine.  Patient's glucose is 465.  Will give regular insulin IV 14 units.  Will await further repeat troponin.  Patient will likely need to stay in the hospital for observation.  Reason being for the hyperglycemia as well as elevated troponin.  Patient did not have another Troponin to compare to.    CT scan of the patient's abdomen is concerning for bowel perforation.  Will give Zosyn IV and consult immediately general surgery.\\    Discussed case with general surgeon Dr. Park who will admit the patient to his surgical service.        Patient's level of risk: Moderate       CRITICAL CARE    CRITICAL CARE: No    CRITICAL CARE TIME: None      Recent Results (from the past 24 hour(s))   Green Top  (Gel)    Collection Time: 02/06/24  1:50 PM   Result Value Ref Range    Extra Tube Hold for add-ons.    Lavender Top    Collection Time: 02/06/24  1:50 PM   Result Value Ref Range    Extra Tube hold for add-on    Red Top    Collection Time: 02/06/24  1:50 PM   Result Value Ref Range    Extra Tube Hold for add-ons.    Light Blue Top    Collection Time: 02/06/24  1:50 PM   Result Value Ref Range    Extra Tube Hold for add-ons.    Comprehensive Metabolic Panel    Collection Time: 02/06/24  1:50 PM    Specimen: Blood   Result Value Ref Range    Glucose 465 (C) 65 - 99 mg/dL    BUN 50 (H) 8 - 23 mg/dL    Creatinine 2.36 (H) 0.76 - 1.27 mg/dL    Sodium 130 (L) 136 - 145 mmol/L    Potassium 5.4 (H) 3.5 - 5.2 mmol/L    Chloride 92 (L) 98 - 107 mmol/L    CO2 23.0 22.0 - 29.0 mmol/L    Calcium 9.6 8.6 - 10.5 mg/dL    Total Protein 7.6 6.0 - 8.5 g/dL    Albumin 4.1 3.5 - 5.2 g/dL    ALT (SGPT) 34 1 - 41 U/L    AST (SGOT) 18 1 - 40 U/L    Alkaline Phosphatase 108 39 - 117 U/L    Total Bilirubin 0.5 0.0 - 1.2 mg/dL    Globulin 3.5 gm/dL    A/G Ratio 1.2 g/dL    BUN/Creatinine Ratio 21.2 7.0 - 25.0    Anion Gap 15.0 5.0 - 15.0 mmol/L    eGFR 28.0 (L) >60.0 mL/min/1.73   Magnesium    Collection Time: 02/06/24  1:50 PM    Specimen: Blood   Result Value Ref Range    Magnesium 1.6 1.6 - 2.4 mg/dL   Lipase    Collection Time: 02/06/24  1:50 PM    Specimen: Blood   Result Value Ref Range    Lipase 9 (L) 13 - 60 U/L   High Sensitivity Troponin T    Collection Time: 02/06/24  1:50 PM    Specimen: Blood   Result Value Ref Range    HS Troponin T 56 (C) <22 ng/L   CBC Auto Differential    Collection Time: 02/06/24  1:50 PM    Specimen: Blood   Result Value Ref Range    WBC 25.58 (H) 3.40 - 10.80 10*3/mm3    RBC 4.03 (L) 4.14 - 5.80 10*6/mm3    Hemoglobin 11.9 (L) 13.0 - 17.7 g/dL    Hematocrit 37.5 37.5 - 51.0 %    MCV 93.1 79.0 - 97.0 fL    MCH 29.5 26.6 - 33.0 pg    MCHC 31.7 31.5 - 35.7 g/dL    RDW 13.8 12.3 - 15.4 %    RDW-SD 47.2 37.0  - 54.0 fl    MPV 9.5 6.0 - 12.0 fL    Platelets 202 140 - 450 10*3/mm3    Neutrophil % 90.6 (H) 42.7 - 76.0 %    Lymphocyte % 3.6 (L) 19.6 - 45.3 %    Monocyte % 4.1 (L) 5.0 - 12.0 %    Eosinophil % 0.0 (L) 0.3 - 6.2 %    Basophil % 0.2 0.0 - 1.5 %    Immature Grans % 1.5 (H) 0.0 - 0.5 %    Neutrophils, Absolute 23.19 (H) 1.70 - 7.00 10*3/mm3    Lymphocytes, Absolute 0.92 0.70 - 3.10 10*3/mm3    Monocytes, Absolute 1.05 (H) 0.10 - 0.90 10*3/mm3    Eosinophils, Absolute 0.00 0.00 - 0.40 10*3/mm3    Basophils, Absolute 0.04 0.00 - 0.20 10*3/mm3    Immature Grans, Absolute 0.38 (H) 0.00 - 0.05 10*3/mm3    nRBC 0.0 0.0 - 0.2 /100 WBC   Lactic Acid, Plasma    Collection Time: 02/06/24  1:50 PM    Specimen: Blood   Result Value Ref Range    Lactate 2.4 (C) 0.5 - 2.0 mmol/L   COVID-19, FLU A/B, RSV PCR 1 HR TAT - Swab, Nasopharynx    Collection Time: 02/06/24  1:52 PM    Specimen: Nasopharynx; Swab   Result Value Ref Range    COVID19 Detected (C) Not Detected - Ref. Range    Influenza A PCR Not Detected Not Detected    Influenza B PCR Not Detected Not Detected    RSV, PCR Not Detected Not Detected   [place] Urine - Urine, Clean Catch    Collection Time: 02/06/24  2:15 PM    Specimen: Urine, Clean Catch   Result Value Ref Range    Extra Tube     Urinalysis With Culture If Indicated - Urine, Clean Catch    Collection Time: 02/06/24  2:15 PM    Specimen: Urine, Clean Catch   Result Value Ref Range    Color, UA Yellow Yellow, Straw    Appearance, UA Clear Clear    pH, UA <=5.0 5.0 - 8.0    Specific Gravity, UA 1.022 1.005 - 1.030    Glucose, UA >=1000 mg/dL (3+) (A) Negative    Ketones, UA Trace (A) Negative    Bilirubin, UA Negative Negative    Blood, UA Trace (A) Negative    Protein, UA 30 mg/dL (1+) (A) Negative    Leuk Esterase, UA Negative Negative    Nitrite, UA Negative Negative    Urobilinogen, UA 0.2 E.U./dL 0.2 - 1.0 E.U./dL   Urinalysis, Microscopic Only - Urine, Clean Catch    Collection Time: 02/06/24  2:15 PM     Specimen: Urine, Clean Catch   Result Value Ref Range    RBC, UA None Seen None Seen, 0-2 /HPF    WBC, UA 0-2 None Seen, 0-2 /HPF    Bacteria, UA None Seen None Seen /HPF    Squamous Epithelial Cells, UA 0-2 None Seen, 0-2 /HPF    Methodology Manual Light Microscopy    POC Glucose Once    Collection Time: 02/06/24  4:48 PM    Specimen: Blood   Result Value Ref Range    Glucose 394 (H) 70 - 130 mg/dL          Old charts were reviewed per [facility] EMR.  Pertinent details are summarized above.  All laboratory, radiologic, and EKG studies that were performed in the Emergency Department were a necessary part of the evaluation needed to exclude unstable or  emergent medical conditions.     Patient was hemodynamically and neurologically stable in the ED.   Pertinent studies were reviewed as above.     The patient received:  Medications   sodium chloride 0.9 % infusion (125 mL/hr Intravenous New Bag 2/6/24 1653)   piperacillin-tazobactam (ZOSYN) 3.375 g IVPB in 100 mL NS MBP (CD) (3.375 g Intravenous New Bag 2/6/24 1653)   famotidine (PEPCID) injection 20 mg (20 mg Intravenous Given 2/6/24 1532)   ondansetron (ZOFRAN) injection 4 mg (4 mg Intravenous Given 2/6/24 1532)   sodium chloride 0.9 % bolus 1,000 mL (1,000 mL Intravenous New Bag 2/6/24 1504)   insulin regular (humuLIN R,novoLIN R) injection 14 Units (14 Units Intravenous Given 2/6/24 1545)            Diagnoses that have been ruled out:   None   Diagnoses that are still under consideration:   None   Final diagnoses:   Peritonitis   Pneumoperitoneum   Bowel perforation        FINAL IMPRESSION   Diagnosis Plan   1. Bowel perforation        2. Peritonitis        3. Pneumoperitoneum              Loi Alvarez Jr, MD        ED Disposition       ED Disposition   Decision to Admit    Condition   --    Comment   --                 Dragon disclaimer:  Part of this note may be an electronic transcription/translation of spoken language to printed text using the Dragon Dictation  System.     I have reviewed the patient’s prescription history via a prescription monitoring program.  This information is consistent with my knowledge of the patient’s controlled substance use history.        Loi Alvarez Jr., MD  02/06/24 1917      Electronically signed by Loi Alvarez Jr., MD at 02/06/24 1917       Vital Signs (last 2 days)       Date/Time Temp Temp src Pulse Resp BP Patient Position SpO2    02/07/24 0758 98.2 (36.8) Axillary -- -- -- -- --    02/07/24 0700 -- -- 128 -- 171/80 -- 99    02/07/24 0600 -- -- 126 -- 155/79 -- 98    02/07/24 0500 -- -- 131 23 164/82 -- 99    02/07/24 0400 98.6 (37) Axillary 124 16 167/84 Lying 98    02/07/24 0300 -- -- 124 17 160/80 -- 98    02/07/24 0200 -- -- 124 16 155/82 -- 99    02/07/24 0100 -- -- 128 16 157/78 -- 99    02/07/24 0000 98 (36.7) Axillary 125 19 158/73 Lying 100    02/06/24 2345 -- -- 124 -- 160/80 -- 100    02/06/24 2330 -- -- 123 -- 156/78 -- 100    02/06/24 2315 -- -- 122 -- 146/77 -- 100    02/06/24 2300 -- -- 119 16 139/85 -- 99    02/06/24 2235 98.5 (36.9) Temporal 112 15 -- -- 98    02/06/24 2230 -- -- 111 15 136/79 -- 96    02/06/24 2225 -- -- 110 -- -- -- 93    02/06/24 2220 -- -- 106 15 133/89 -- 100    02/06/24 2215 -- -- 106 15 134/74 -- 100    02/06/24 2210 -- -- 106 14 115/66 -- 99    02/06/24 2208 98.9 (37.2) Temporal 105 14 107/58 Lying 100    02/06/24 1831 -- -- 116 -- 161/82 -- 97    02/06/24 1816 -- -- 111 -- 143/98 -- 97    02/06/24 1801 -- -- 104 -- 130/78 -- 97    02/06/24 1746 -- -- 107 -- 144/76 -- 97    02/06/24 1731 -- -- 117 -- 159/86 -- 100    02/06/24 1034 98.4 (36.9) Oral 116 20 128/74 Sitting 97          Oxygen Therapy (last 2 days)       Date/Time SpO2 Device (Oxygen Therapy) Flow (L/min) Oxygen Concentration (%) ETCO2 (mmHg)    02/07/24 0700 99 -- -- -- --    02/07/24 0600 98 -- -- -- --    02/07/24 0500 99 -- -- -- --    02/07/24 0400 98 nasal cannula 3 -- --    02/07/24 0300 98 -- -- -- --     02/07/24 0200 99 -- -- -- --    02/07/24 0100 99 -- -- -- --    02/07/24 0000 100 nasal cannula 3 -- --    02/06/24 2345 100 -- -- -- --    02/06/24 2330 100 -- -- -- --    02/06/24 2315 100 -- -- -- --    02/06/24 2300 99 -- -- -- --    02/06/24 2235 98 -- -- -- --    02/06/24 2230 96 -- -- -- --    02/06/24 2225 93 nasal cannula 3 -- --    02/06/24 2220 100 room air -- -- --    02/06/24 2215 100 -- -- -- --    02/06/24 2210 99 -- -- -- --    02/06/24 2208 100 simple face mask 10 -- --    02/06/24 1831 97 -- -- -- --    02/06/24 1816 97 -- -- -- --    02/06/24 1801 97 -- -- -- --    02/06/24 1746 97 -- -- -- --    02/06/24 1731 100 -- -- -- --    02/06/24 1034 97 room air -- -- --          Intake & Output (last 2 days)         02/05 0701  02/06 0700 02/06 0701  02/07 0700 02/07 0701  02/08 0700    I.V. (mL/kg)  3970.8 (61.2) 1056 (16.3)    IV Piggyback  350     Total Intake(mL/kg)  4320.8 (66.6) 1056 (16.3)    Urine (mL/kg/hr)  1100 300 (1)    Emesis/NG output  0     Drains  100     Total Output  1200 300    Net  +3120.8 +756                 Facility-Administered Medications as of 2/7/2024   Medication Dose Route Frequency Provider Last Rate Last Admin    [COMPLETED] famotidine (PEPCID) injection 20 mg  20 mg Intravenous Once Loi Alvarez Jr., MD   20 mg at 02/06/24 1532    HYDROmorphone (DILAUDID) injection 0.5 mg  0.5 mg Intravenous Q2H PRN Hemant Park MD        [COMPLETED] insulin regular (humuLIN R,novoLIN R) injection 14 Units  14 Units Intravenous Once Loi Alvarez Jr., MD   14 Units at 02/06/24 1545    Morphine sulfate (PF) injection 2 mg  2 mg Intravenous Q2H PRN Hemant Park MD        [COMPLETED] ondansetron (ZOFRAN) injection 4 mg  4 mg Intravenous Once Loi Alvarez Jr., MD   4 mg at 02/06/24 1532    [COMPLETED] piperacillin-tazobactam (ZOSYN) 3.375 g IVPB in 100 mL NS MBP (CD)  3.375 g Intravenous Once Loi Alvarez Jr., MD   3.375 g at 02/06/24 1653    [COMPLETED]  piperacillin-tazobactam (ZOSYN) 3.375 g IVPB in 100 mL NS MBP (CD)  3.375 g Intravenous Once Hemant Park MD   3.375 g at 02/06/24 2328    piperacillin-tazobactam (ZOSYN) 3.375 g IVPB in 100 mL NS MBP (CD)  3.375 g Intravenous Q8H Hemant Park MD   3.375 g at 02/07/24 0759    [COMPLETED] sodium chloride 0.9 % bolus 1,000 mL  1,000 mL Intravenous Once Loi Alvarez Jr., MD 2,000 mL/hr at 02/06/24 1504 1,000 mL at 02/06/24 1504    sodium chloride 0.9 % infusion  125 mL/hr Intravenous Continuous Hemant Park  mL/hr at 02/07/24 0759 125 mL/hr at 02/07/24 0759    [COMPLETED] sodium chloride 0.9 % infusion    Continuous PRN Hemant Park MD   1,000 mL at 02/06/24 1946     Orders (last 48 hrs)        Start     Ordered    02/08/24 0600  CBC & Differential  Morning Draw         02/07/24 0636    02/08/24 0600  Basic Metabolic Panel  Morning Draw         02/07/24 0636    02/07/24 0807  Manual Differential  Once         02/07/24 0806    02/07/24 0745  piperacillin-tazobactam (ZOSYN) 3.375 g IVPB in 100 mL NS MBP (CD)  Every 8 Hours         02/06/24 2246    02/07/24 0641  Inpatient Internal Medicine Consult  Once        Provider:  Brando Webb MD    02/07/24 0640    02/07/24 0638  Morphine sulfate (PF) injection 2 mg  Every 2 Hours PRN         02/07/24 0638    02/07/24 0638  HYDROmorphone (DILAUDID) injection 0.5 mg  Every 2 Hours PRN         02/07/24 0638    02/07/24 0638  CBC & Differential  STAT         02/07/24 0638    02/07/24 0638  Basic Metabolic Panel  STAT         02/07/24 0638    02/07/24 0638  CBC Auto Differential  PROCEDURE ONCE         02/07/24 0638    02/07/24 0637  Code Status and Medical Interventions:  Continuous         02/07/24 0636    02/07/24 0637  Place Sequential Compression Device  Once         02/07/24 0636    02/07/24 0637  Maintain Sequential Compression Device  Continuous         02/07/24 0636    02/07/24 0455  Morphine sulfate (PF) injection 2 mg  Every 2 Hours  PRN,   Status:  Discontinued         02/07/24 0455    02/07/24 0023  Inpatient Case Management  Consult  Once        Provider:  (Not yet assigned)    02/07/24 0022    02/06/24 2345  piperacillin-tazobactam (ZOSYN) 3.375 g IVPB in 100 mL NS MBP (CD)  Once         02/06/24 2246 02/06/24 2301  XR Abdomen KUB  1 Time Imaging         02/06/24 2301 02/06/24 2247  Oxygen Therapy- Blow by - Humidified  Continuous         02/06/24 2246 02/06/24 2247  Pulse Oximetry, Continuous  Continuous         02/06/24 2246 02/06/24 2220  POC Glucose Once  PROCEDURE ONCE        Comments: Complete no more than 45 minutes prior to patient eating      02/06/24 2208 02/06/24 2201  POC Glucose STAT  STAT,   Status:  Canceled        Comments: Post op Glucose Check on All Diabetic Patients, Notify Anesthesia if Blood Sugar is Less Than 80 mg/dL or Greater Than 250mg/dL      02/06/24 2201 02/06/24 2201  Vital signs every 5 minutes for 15 minutes, every 15 minutes thereafter.  Once,   Status:  Canceled         02/06/24 2201 02/06/24 2201  Call Anesthesiologist for additional IV Fluid bolus for Hypotension/Tachycardia  Continuous,   Status:  Canceled         02/06/24 2201 02/06/24 2201  Notify Anesthesia of Any Acute Changes in Patient Condition  Until Discontinued,   Status:  Canceled         02/06/24 2201 02/06/24 2201  Notify Anesthesia for Unrelieved Pain  Until Discontinued,   Status:  Canceled         02/06/24 2201 02/06/24 2201  Once DC criteria to floor met, follow surgeon's orders.  Until Discontinued,   Status:  Canceled         02/06/24 2201 02/06/24 2201  Discharge patient from PACU when discharge criteria is met.  Until Discontinued,   Status:  Canceled         02/06/24 2201 02/06/24 2200  Apply warming blanket  As Needed,   Status:  Canceled      Comments: For a recorded temp of <36.9 C    02/06/24 2201 02/06/24 2200  ibuprofen (ADVIL,MOTRIN) tablet 600 mg  Once As Needed,    Status:  Discontinued         02/06/24 2201 02/06/24 2200  oxyCODONE-acetaminophen (PERCOCET)  MG per tablet 1 tablet  Once As Needed,   Status:  Discontinued         02/06/24 2201 02/06/24 2200  HYDROmorphone (DILAUDID) injection 0.5 mg  Every 10 Minutes PRN,   Status:  Discontinued         02/06/24 2201 02/06/24 2200  fentaNYL citrate (PF) (SUBLIMAZE) injection 25 mcg  Every 5 Minutes PRN,   Status:  Discontinued         02/06/24 2201 02/06/24 2200  naloxone (NARCAN) injection 0.04 mg  As Needed,   Status:  Discontinued         02/06/24 2201 02/06/24 2200  flumazenil (ROMAZICON) injection 0.2 mg  As Needed,   Status:  Discontinued         02/06/24 2201 02/06/24 2200  ondansetron (ZOFRAN) injection 4 mg  Every 15 Minutes PRN,   Status:  Discontinued         02/06/24 2201 02/06/24 2200  droperidol (INAPSINE) injection 0.625 mg  Once As Needed,   Status:  Discontinued        See Hyperspace for full Linked Orders Report.    02/06/24 2201 02/06/24 2200  droperidol (INAPSINE) injection 0.625 mg  Once As Needed,   Status:  Discontinued        See Hyperspace for full Linked Orders Report.    02/06/24 2201 02/06/24 2200  labetalol (NORMODYNE,TRANDATE) injection 5 mg  Every 5 Minutes PRN,   Status:  Discontinued         02/06/24 2201 02/06/24 2200  atropine sulfate injection 0.5 mg  Once As Needed,   Status:  Discontinued         02/06/24 2201 02/06/24 2111  Tissue Pathology Exam  RELEASE UPON ORDERING         02/06/24 2111 02/1948  STAT Lactic Acid, Reflex  PROCEDURE ONCE         02/06/24 1717    02/06/24 1946  bupivacaine (PF) 0.5 % 20 mL, lidocaine 1% - EPINEPHrine 1:[id number] 20 mL mixture  As Needed,   Status:  Discontinued         02/06/24 1946 02/06/24 1946  sodium chloride 0.9 % infusion  Continuous PRN         02/06/24 1947 02/06/24 1942  Anaerobic Culture - Body Fluid, Abdominal Wall  RELEASE UPON ORDERING         02/06/24 1942 02/06/24 1942  Gram Stain - No  Culture  RELEASE UPON ORDERING,   Status:  Canceled         02/06/24 1942    02/06/24 1942  Body Fluid Culture - Body Fluid, Abdominal Wall  RELEASE UPON ORDERING         02/06/24 1942    02/06/24 1900  POC Glucose Once  PROCEDURE ONCE        Comments: Complete no more than 45 minutes prior to patient eating      02/06/24 1848    02/06/24 1839  Obtain Informed Consent  Once         02/06/24 1839    02/06/24 1823  Inpatient Admission  Once         02/06/24 1822    02/06/24 1823  Cardiac Monitoring  Continuous        Comments: Follow Standing Orders As Outlined in Process Instructions (Open Order Report to View Full Instructions)    02/06/24 1822    02/06/24 1708  ECG 12 Lead Pre-Op / Pre-Procedure  Once         02/06/24 1707    02/06/24 1708  XR Chest 1 View  1 Time Imaging         02/06/24 1707    02/06/24 1703  POC Glucose STAT  STAT        Comments: Complete no more than 45 minutes prior to patient eating      02/06/24 1702    02/06/24 1659  POC Glucose Once  PROCEDURE ONCE        Comments: Complete no more than 45 minutes prior to patient eating      02/06/24 1648    02/06/24 1650  STAT Lactic Acid, Reflex  PROCEDURE ONCE         02/06/24 1637    02/06/24 1640  piperacillin-tazobactam (ZOSYN) 3.375 g IVPB in 100 mL NS MBP (CD)  Once         02/06/24 1624    02/06/24 1625  Lactic Acid, Plasma  Once         02/06/24 1624    02/06/24 1625  Blood Culture - Blood, Arm, Left  Once        See Hyperspace for full Linked Orders Report.    02/06/24 1624    02/06/24 1625  Blood Culture - Blood, Arm, Left  Once        See Hyperspace for full Linked Orders Report.    02/06/24 1624    02/06/24 1625  NPO Diet NPO Type: Strict NPO  Diet Effective Now         02/06/24 1624    02/06/24 1550  High Sensitivity Troponin T 2Hr  PROCEDURE ONCE         02/06/24 1504    02/06/24 1547  insulin regular (humuLIN R,novoLIN R) injection 14 Units  Once         02/06/24 1531    02/06/24 1522  CT Abdomen Pelvis Without Contrast  1 Time Imaging          02/06/24 1419    02/06/24 1450  Urinalysis, Microscopic Only - Urine, Clean Catch  Once         02/06/24 1449    02/06/24 1437  sodium chloride 0.9 % bolus 1,000 mL  Once         02/06/24 1421    02/06/24 1435  famotidine (PEPCID) injection 20 mg  Once         02/06/24 1419    02/06/24 1435  ondansetron (ZOFRAN) injection 4 mg  Once         02/06/24 1419    02/06/24 1435  sodium chloride 0.9 % infusion  Continuous         02/06/24 1419    02/06/24 1434  CBC Auto Differential  Once         02/06/24 1433    02/06/24 1424  High Sensitivity Troponin T  Once         02/06/24 1423    02/06/24 1423  Lipase  Once         02/06/24 1422    02/06/24 1423  Urinalysis With Culture If Indicated - Urine, Clean Catch  Once         02/06/24 1422    02/06/24 1423  XR Hips Bilateral With or Without Pelvis 5 View  1 Time Imaging         02/06/24 1423    02/06/24 1420  CT Abdomen Pelvis With Contrast  1 Time Imaging,   Status:  Canceled         02/06/24 1419    02/06/24 1419  CBC & Differential  Once         02/06/24 1419    02/06/24 1419  Comprehensive Metabolic Panel  Once         02/06/24 1419    02/06/24 1419  Magnesium  Once         02/06/24 1419    02/06/24 1351  [place] Urine - Urine, Clean Catch  Once         02/06/24 1350    02/06/24 1350  [place] Draw  Once         02/06/24 1350    02/06/24 1350  COVID PRE-OP / PRE-PROCEDURE SCREENING ORDER (NO ISOLATION) - Swab, Nasopharynx  Once         02/06/24 1350    02/06/24 1350  Green Top (Gel)  PROCEDURE ONCE         02/06/24 1350    02/06/24 1350  Lavender Top  PROCEDURE ONCE         02/06/24 1350    02/06/24 1350  Red Top  PROCEDURE ONCE         02/06/24 1350    02/06/24 1350  [place] Top  PROCEDURE ONCE         02/06/24 1350    02/06/24 1350  Light Blue Top  PROCEDURE ONCE         02/06/24 1350    02/06/24 1350  COVID-19, FLU A/B, RSV PCR 1 HR TAT - Swab, Nasopharynx  PROCEDURE ONCE         02/06/24 1350                     Operative/Procedure Notes (last 48 hours)         Hemant Park MD at 02/06/24 1931          Operative Note    LAPAROTOMY EXPLORATORY      Bernard Matta  2/6/2024    Pre-op Diagnosis:   Perforated viscus       Post-Op Diagnosis Codes:   Cecal perforation    Procedure/CPT® Codes:      Procedure(s):  DIAGNOSTIC LAPAROSCOPY CONVERTED TO LAPAROTOMY, ILIOCECTECTOMY    Surgeon(s):  Hemant Park MD    Anesthesia: General    Staff:   Circulator: Kendal Armenta RN  Scrub Person: Indu Amaya; Patricia Swartz         Estimated Blood Loss: minimal    Urine Voided: * No values recorded between 2/6/2024  7:11 PM and 2/6/2024 10:04 PM *    Specimens:                          Drains:   Closed/Suction Drain 1 LUQ Bulb 15 Fr. (Active)   Site Description Unable to view 02/06/24 2208   Dressing Status Clean;Dry;Intact 02/06/24 2208   Drainage Appearance Serosanguineous 02/06/24 2208   Status To bulb suction 02/06/24 2208       NG/OG Tube Nasogastric 18 Fr Right nostril (Active)   Site Assessment Clean;Dry;Intact 02/06/24 2208   Securement taped to nostril center 02/06/24 2208   Secured at (cm) 65 02/06/24 2208   Status Suction-low intermittent 02/06/24 2208       Urethral Catheter Silicone 16 Fr. (Active)   Daily Indications Selected surgeries ( tract, abdomen) 02/06/24 2208   Site Assessment Clean 02/06/24 2208   Collection Container Standard drainage bag 02/06/24 2208   Securement Method Securing device 02/06/24 2208       Findings: hole noted at the posterior cecum        Complications: none          Hemant Park MD     Date: 2/6/2024  Time: 22:20 CST      Procedure Description: The patient was taken to the OR, given IV sedation and ETT. The abdomen was prepped and draped in a sterile fashion. Veress access was obtained followed by placement of three 5mm ports. The abdomen was inspected and purulence was seen along the right paracolic gutter and in the pelvis. An attempt was made to find the appendix and the cecum was mobilized. The appendix could not be found  laparoscopically, so a decision was made to convert to open. A midline incision was made and taken down to the peritoneum. The abdomen was entered under direct vision. The ports were removed. The abdomen was inspected and at the point of coalescence of the taenia, there was a hole at the expected appendiceal base. The abdomen was inspected in all four quadrants, but the tip of the appendix could not be located. A decision was made to perform an ileocectomy. The cecum was transected ~4cm distal to the ileocecal valve, using the GIA75 blue load stapler. The small bowel was transected around 5cm from the ileocecal valve in a similar manner. The specimen was sent off the field. The abdomen was irrigated. An enterotomy was made on the ileal and cecal ends and a common channel was created with the KIM stapler. The anastomosis was then closed with another fire of the KIM blue load stapler. A drain was placed in the pelvis. The abdomen was re-inspected from ligament of treitz to the rectum and no further abnormalities were found. The fascia was closed with 1-Ethibond in an interrupted fashion. The skin was stapled closed. The patient tolerated the procedure well. Counts were correct x 2.     Hemant Park MD  General Surgery  02/06/24  22:20 CST    Hemant Park MD  General Surgery  02/06/24  22:20 CST        Electronically signed by Hemant Park MD at 02/06/24 0470

## 2024-02-07 NOTE — ANESTHESIA PREPROCEDURE EVALUATION
Anesthesia Evaluation     Patient summary reviewed   no history of anesthetic complications:   NPO Solid Status: > 8 hours  NPO Liquid Status: > 4 hours           Airway   Mallampati: I  TM distance: >3 FB  Neck ROM: full  No difficulty expected  Dental    (+) lower dentures and upper dentures    Pulmonary    (+) a smoker (quit 40 years ago) Former,  Cardiovascular   Exercise tolerance: good (4-7 METS)    (+) hypertension, hyperlipidemia  (-) CAD      Neuro/Psych  (-) seizures, TIA, CVA    ROS Comment: Anterior ischemic optic neuropathy  GI/Hepatic/Renal/Endo    (+) diabetes mellitus type 2 using insulin  (-) liver disease, no renal disease    Musculoskeletal     Abdominal    Substance History      OB/GYN          Other                      Anesthesia Plan    ASA 3 - emergent     general   Rapid sequence  intravenous induction     Anesthetic plan, risks, benefits, and alternatives have been provided, discussed and informed consent has been obtained with: patient.

## 2024-02-08 LAB
ANION GAP SERPL CALCULATED.3IONS-SCNC: 13 MMOL/L (ref 5–15)
ANION GAP SERPL CALCULATED.3IONS-SCNC: 13 MMOL/L (ref 5–15)
ANION GAP SERPL CALCULATED.3IONS-SCNC: 14 MMOL/L (ref 5–15)
ANION GAP SERPL CALCULATED.3IONS-SCNC: 9 MMOL/L (ref 5–15)
BASOPHILS # BLD MANUAL: 0.14 10*3/MM3 (ref 0–0.2)
BASOPHILS NFR BLD MANUAL: 2 % (ref 0–1.5)
BUN SERPL-MCNC: 46 MG/DL (ref 8–23)
BUN SERPL-MCNC: 49 MG/DL (ref 8–23)
BUN SERPL-MCNC: 50 MG/DL (ref 8–23)
BUN SERPL-MCNC: 51 MG/DL (ref 8–23)
BUN/CREAT SERPL: 20 (ref 7–25)
BUN/CREAT SERPL: 20.5 (ref 7–25)
BUN/CREAT SERPL: 20.5 (ref 7–25)
BUN/CREAT SERPL: 20.9 (ref 7–25)
BURR CELLS BLD QL SMEAR: ABNORMAL
CALCIUM SPEC-SCNC: 8.6 MG/DL (ref 8.6–10.5)
CALCIUM SPEC-SCNC: 8.9 MG/DL (ref 8.6–10.5)
CALCIUM SPEC-SCNC: 8.9 MG/DL (ref 8.6–10.5)
CALCIUM SPEC-SCNC: 9.1 MG/DL (ref 8.6–10.5)
CHLORIDE SERPL-SCNC: 111 MMOL/L (ref 98–107)
CHLORIDE SERPL-SCNC: 112 MMOL/L (ref 98–107)
CO2 SERPL-SCNC: 18 MMOL/L (ref 22–29)
CO2 SERPL-SCNC: 19 MMOL/L (ref 22–29)
CO2 SERPL-SCNC: 20 MMOL/L (ref 22–29)
CO2 SERPL-SCNC: 21 MMOL/L (ref 22–29)
CREAT SERPL-MCNC: 2.3 MG/DL (ref 0.76–1.27)
CREAT SERPL-MCNC: 2.35 MG/DL (ref 0.76–1.27)
CREAT SERPL-MCNC: 2.44 MG/DL (ref 0.76–1.27)
CREAT SERPL-MCNC: 2.49 MG/DL (ref 0.76–1.27)
DEPRECATED RDW RBC AUTO: 50.2 FL (ref 37–54)
EGFRCR SERPLBLD CKD-EPI 2021: 26.3 ML/MIN/1.73
EGFRCR SERPLBLD CKD-EPI 2021: 26.9 ML/MIN/1.73
EGFRCR SERPLBLD CKD-EPI 2021: 28.2 ML/MIN/1.73
EGFRCR SERPLBLD CKD-EPI 2021: 28.9 ML/MIN/1.73
EOSINOPHIL # BLD MANUAL: 0.21 10*3/MM3 (ref 0–0.4)
EOSINOPHIL NFR BLD MANUAL: 3 % (ref 0.3–6.2)
ERYTHROCYTE [DISTWIDTH] IN BLOOD BY AUTOMATED COUNT: 14.5 % (ref 12.3–15.4)
GLUCOSE BLDC GLUCOMTR-MCNC: 100 MG/DL (ref 70–130)
GLUCOSE BLDC GLUCOMTR-MCNC: 102 MG/DL (ref 70–130)
GLUCOSE BLDC GLUCOMTR-MCNC: 104 MG/DL (ref 70–130)
GLUCOSE BLDC GLUCOMTR-MCNC: 106 MG/DL (ref 70–130)
GLUCOSE BLDC GLUCOMTR-MCNC: 115 MG/DL (ref 70–130)
GLUCOSE BLDC GLUCOMTR-MCNC: 121 MG/DL (ref 70–130)
GLUCOSE BLDC GLUCOMTR-MCNC: 123 MG/DL (ref 70–130)
GLUCOSE BLDC GLUCOMTR-MCNC: 136 MG/DL (ref 70–130)
GLUCOSE BLDC GLUCOMTR-MCNC: 138 MG/DL (ref 70–130)
GLUCOSE BLDC GLUCOMTR-MCNC: 148 MG/DL (ref 70–130)
GLUCOSE BLDC GLUCOMTR-MCNC: 164 MG/DL (ref 70–130)
GLUCOSE BLDC GLUCOMTR-MCNC: 227 MG/DL (ref 70–130)
GLUCOSE BLDC GLUCOMTR-MCNC: 239 MG/DL (ref 70–130)
GLUCOSE BLDC GLUCOMTR-MCNC: 243 MG/DL (ref 70–130)
GLUCOSE BLDC GLUCOMTR-MCNC: 251 MG/DL (ref 70–130)
GLUCOSE SERPL-MCNC: 121 MG/DL (ref 65–99)
GLUCOSE SERPL-MCNC: 135 MG/DL (ref 65–99)
GLUCOSE SERPL-MCNC: 148 MG/DL (ref 65–99)
GLUCOSE SERPL-MCNC: 217 MG/DL (ref 65–99)
HCT VFR BLD AUTO: 33.3 % (ref 37.5–51)
HGB BLD-MCNC: 10.2 G/DL (ref 13–17.7)
LYMPHOCYTES # BLD MANUAL: 0.43 10*3/MM3 (ref 0.7–3.1)
LYMPHOCYTES NFR BLD MANUAL: 6 % (ref 5–12)
MAGNESIUM SERPL-MCNC: 1.8 MG/DL (ref 1.6–2.4)
MCH RBC QN AUTO: 29.2 PG (ref 26.6–33)
MCHC RBC AUTO-ENTMCNC: 30.6 G/DL (ref 31.5–35.7)
MCV RBC AUTO: 95.4 FL (ref 79–97)
MONOCYTES # BLD: 0.43 10*3/MM3 (ref 0.1–0.9)
NEUTROPHILS # BLD AUTO: 5.91 10*3/MM3 (ref 1.7–7)
NEUTROPHILS NFR BLD MANUAL: 73 % (ref 42.7–76)
NEUTS BAND NFR BLD MANUAL: 10 % (ref 0–5)
PHOSPHATE SERPL-MCNC: 2.4 MG/DL (ref 2.5–4.5)
PLAT MORPH BLD: NORMAL
PLATELET # BLD AUTO: 213 10*3/MM3 (ref 140–450)
PMV BLD AUTO: 9.2 FL (ref 6–12)
POIKILOCYTOSIS BLD QL SMEAR: ABNORMAL
POLYCHROMASIA BLD QL SMEAR: ABNORMAL
POTASSIUM SERPL-SCNC: 4.2 MMOL/L (ref 3.5–5.2)
POTASSIUM SERPL-SCNC: 4.3 MMOL/L (ref 3.5–5.2)
POTASSIUM SERPL-SCNC: 4.5 MMOL/L (ref 3.5–5.2)
POTASSIUM SERPL-SCNC: 4.5 MMOL/L (ref 3.5–5.2)
QT INTERVAL: 346 MS
QTC INTERVAL: 486 MS
RBC # BLD AUTO: 3.49 10*6/MM3 (ref 4.14–5.8)
SODIUM SERPL-SCNC: 141 MMOL/L (ref 136–145)
SODIUM SERPL-SCNC: 143 MMOL/L (ref 136–145)
SODIUM SERPL-SCNC: 144 MMOL/L (ref 136–145)
SODIUM SERPL-SCNC: 144 MMOL/L (ref 136–145)
VARIANT LYMPHS NFR BLD MANUAL: 6 % (ref 19.6–45.3)
WBC MORPH BLD: NORMAL
WBC NRBC COR # BLD AUTO: 7.12 10*3/MM3 (ref 3.4–10.8)

## 2024-02-08 PROCEDURE — 85007 BL SMEAR W/DIFF WBC COUNT: CPT | Performed by: SURGERY

## 2024-02-08 PROCEDURE — 25010000002 VANCOMYCIN 1 G RECONSTITUTED SOLUTION 1 EACH VIAL: Performed by: HOSPITALIST

## 2024-02-08 PROCEDURE — 25010000002 PIPERACILLIN SOD-TAZOBACTAM PER 1 G: Performed by: SURGERY

## 2024-02-08 PROCEDURE — 82948 REAGENT STRIP/BLOOD GLUCOSE: CPT

## 2024-02-08 PROCEDURE — 25010000002 HYDRALAZINE PER 20 MG: Performed by: INTERNAL MEDICINE

## 2024-02-08 PROCEDURE — 83735 ASSAY OF MAGNESIUM: CPT | Performed by: INTERNAL MEDICINE

## 2024-02-08 PROCEDURE — 84100 ASSAY OF PHOSPHORUS: CPT | Performed by: INTERNAL MEDICINE

## 2024-02-08 PROCEDURE — 25010000002 LABETALOL 5 MG/ML SOLUTION: Performed by: INTERNAL MEDICINE

## 2024-02-08 PROCEDURE — 80048 BASIC METABOLIC PNL TOTAL CA: CPT | Performed by: INTERNAL MEDICINE

## 2024-02-08 PROCEDURE — 0 INSULIN REGULAR HUMAN PER 5 UNITS: Performed by: INTERNAL MEDICINE

## 2024-02-08 PROCEDURE — 25810000003 SODIUM CHLORIDE 0.9 % SOLUTION 250 ML FLEX CONT: Performed by: HOSPITALIST

## 2024-02-08 PROCEDURE — 25010000002 NICARDIPINE 2.5 MG/ML SOLUTION: Performed by: INTERNAL MEDICINE

## 2024-02-08 PROCEDURE — 25010000002 HEPARIN (PORCINE) PER 1000 UNITS: Performed by: SURGERY

## 2024-02-08 PROCEDURE — 25810000003 SODIUM CHLORIDE 0.9 % SOLUTION: Performed by: SURGERY

## 2024-02-08 PROCEDURE — 25810000003 SODIUM CHLORIDE 0.9 % SOLUTION: Performed by: INTERNAL MEDICINE

## 2024-02-08 PROCEDURE — 99024 POSTOP FOLLOW-UP VISIT: CPT | Performed by: SURGERY

## 2024-02-08 PROCEDURE — 85025 COMPLETE CBC W/AUTO DIFF WBC: CPT | Performed by: SURGERY

## 2024-02-08 RX ORDER — HYDRALAZINE HYDROCHLORIDE 20 MG/ML
10 INJECTION INTRAMUSCULAR; INTRAVENOUS EVERY 6 HOURS PRN
Status: DISCONTINUED | OUTPATIENT
Start: 2024-02-08 | End: 2024-02-15

## 2024-02-08 RX ORDER — CHLORHEXIDINE GLUCONATE 500 MG/1
1 CLOTH TOPICAL EVERY 24 HOURS
Status: DISCONTINUED | OUTPATIENT
Start: 2024-02-09 | End: 2024-02-14

## 2024-02-08 RX ORDER — CHLORHEXIDINE GLUCONATE 500 MG/1
1 CLOTH TOPICAL ONCE
Status: COMPLETED | OUTPATIENT
Start: 2024-02-08 | End: 2024-02-08

## 2024-02-08 RX ORDER — LABETALOL HYDROCHLORIDE 5 MG/ML
20 INJECTION, SOLUTION INTRAVENOUS EVERY 6 HOURS PRN
Status: DISCONTINUED | OUTPATIENT
Start: 2024-02-08 | End: 2024-02-16 | Stop reason: HOSPADM

## 2024-02-08 RX ADMIN — PIPERACILLIN SODIUM AND TAZOBACTAM SODIUM 3.38 G: 3; .375 INJECTION, POWDER, LYOPHILIZED, FOR SOLUTION INTRAVENOUS at 08:28

## 2024-02-08 RX ADMIN — PIPERACILLIN SODIUM AND TAZOBACTAM SODIUM 3.38 G: 3; .375 INJECTION, POWDER, LYOPHILIZED, FOR SOLUTION INTRAVENOUS at 23:55

## 2024-02-08 RX ADMIN — SODIUM CHLORIDE 1.2 UNITS/HR: 9 INJECTION, SOLUTION INTRAVENOUS at 11:41

## 2024-02-08 RX ADMIN — SODIUM CHLORIDE 125 ML/HR: 9 INJECTION, SOLUTION INTRAVENOUS at 08:28

## 2024-02-08 RX ADMIN — LABETALOL HYDROCHLORIDE 10 MG: 5 INJECTION INTRAVENOUS at 05:41

## 2024-02-08 RX ADMIN — SODIUM CHLORIDE 125 ML/HR: 9 INJECTION, SOLUTION INTRAVENOUS at 23:57

## 2024-02-08 RX ADMIN — HEPARIN SODIUM 5000 UNITS: 5000 INJECTION INTRAVENOUS; SUBCUTANEOUS at 15:24

## 2024-02-08 RX ADMIN — LABETALOL HYDROCHLORIDE 20 MG: 5 INJECTION INTRAVENOUS at 12:07

## 2024-02-08 RX ADMIN — Medication 10 ML: at 21:23

## 2024-02-08 RX ADMIN — CHLORHEXIDINE GLUCONATE 1 APPLICATION: 500 CLOTH TOPICAL at 16:32

## 2024-02-08 RX ADMIN — HEPARIN SODIUM 5000 UNITS: 5000 INJECTION INTRAVENOUS; SUBCUTANEOUS at 05:41

## 2024-02-08 RX ADMIN — HYDRALAZINE HYDROCHLORIDE 10 MG: 20 INJECTION INTRAMUSCULAR; INTRAVENOUS at 10:37

## 2024-02-08 RX ADMIN — NICARDIPINE HYDROCHLORIDE 5 MG/HR: 25 INJECTION, SOLUTION INTRAVENOUS at 15:32

## 2024-02-08 RX ADMIN — Medication 10 ML: at 08:28

## 2024-02-08 RX ADMIN — NICARDIPINE HYDROCHLORIDE 2.5 MG/HR: 25 INJECTION, SOLUTION INTRAVENOUS at 22:23

## 2024-02-08 RX ADMIN — PIPERACILLIN SODIUM AND TAZOBACTAM SODIUM 3.38 G: 3; .375 INJECTION, POWDER, LYOPHILIZED, FOR SOLUTION INTRAVENOUS at 15:24

## 2024-02-08 RX ADMIN — VANCOMYCIN HYDROCHLORIDE 1000 MG: 1 INJECTION, POWDER, LYOPHILIZED, FOR SOLUTION INTRAVENOUS at 03:19

## 2024-02-08 RX ADMIN — HEPARIN SODIUM 5000 UNITS: 5000 INJECTION INTRAVENOUS; SUBCUTANEOUS at 21:22

## 2024-02-08 RX ADMIN — Medication 1 APPLICATION: at 16:32

## 2024-02-08 NOTE — PROGRESS NOTES
[facility] Medicine Services  INPATIENT PROGRESS NOTE    Patient Name: Bernard Matta  Date of Admission: 2/6/2024  Today's Date: 02/08/24  Length of Stay: 2  Primary Care Physician: Brando Webb MD    Subjective   Chief Complaint: f/u hyperglycemia    HPI   Patient seen sitting up in chair at bedside.  Family there.  He feels better today.  Not much help from his NG tube this a.m.  Denies nausea.  Denies chest pain.  Denies shortness of breath.  On room air.  Afebrile overnight.  Glucose has been much better overnight on insulin drip.  However his blood pressures been significantly high despite PRNs.        Review of Systems   All pertinent negatives and positives are as above. All other systems have been reviewed and are negative unless otherwise stated.     Objective    Temp:  [98.1 °F (36.7 °C)-98.7 °F (37.1 °C)] 98.2 °F (36.8 °C)  Heart Rate:  [105-129] 121  Resp:  [15-22] 18  BP: (143-188)/(70-97) 184/95  Physical Exam  GEN: Awake, alert, interactive, in NAD  HEENT:  PERRLA, EOMI, Anicteric, +NGT   Lungs: diminished, no wheezing or rhonchi  Heart: increased rate, regular rhythm, +S1/s2, no rub  ABD: soft,  no guarding/rebound, + drain  Extremities: atraumatic, no cyanosis, no pitting edema  Skin: no rashes or lesions  Neuro: AAOx3, no focal deficits  Psych: normal mood & affect      Results Review:  I have reviewed the labs, radiology results, and diagnostic studies.    Laboratory Data:   Results from last 7 days   Lab Units 02/08/24  0547 02/07/24  0750 02/06/24  1350   WBC 10*3/mm3 7.12 7.34 25.58*   HEMOGLOBIN g/dL 10.2* 10.2* 11.9*   HEMATOCRIT % 33.3* 33.0* 37.5   PLATELETS 10*3/mm3 213 159 202        Results from last 7 days   Lab Units 02/08/24  0547 02/08/24  0006 02/07/24  1815 02/07/24  0750 02/06/24  1350   SODIUM mmol/L 143 141 138   < > 130*   POTASSIUM mmol/L 4.5 4.2 5.0   < > 5.4*   CHLORIDE mmol/L 111* 111* 106   < > 92*   CO2 mmol/L 19.0* 21.0*  18.0*   < > 23.0   BUN mg/dL 50* 51* 53*   < > 50*   CREATININE mg/dL 2.44* 2.49* 2.46*   < > 2.36*   CALCIUM mg/dL 9.1 8.6 8.7   < > 9.6   BILIRUBIN mg/dL  --   --   --   --  0.5   ALK PHOS U/L  --   --   --   --  108   ALT (SGPT) U/L  --   --   --   --  34   AST (SGOT) U/L  --   --   --   --  18   GLUCOSE mg/dL 121* 135* 295*   < > 465*    < > = values in this interval not displayed.       Culture Data:   Blood Culture   Date Value Ref Range Status   02/06/2024 No growth at 24 hours  Preliminary   02/06/2024 Abnormal Stain (C)  Preliminary       Radiology Data:   Imaging Results (Last 24 Hours)       ** No results found for the last 24 hours. **            I have reviewed the patient's current medications.     Assessment/Plan   Assessment  Active Hospital Problems    Diagnosis     **Bowel perforation     Stage 3b chronic kidney disease (CKD)     Hyperkalemia     COVID-19 virus detected     Iron deficiency anemia     Hypertension     Type 2 diabetes mellitus        Treatment Plan  #1 bowel perforation -status post OR.  On Zosyn.  Postop care per surgery     #2 DM2 with hyperglycemia -doing better on insulin drip.  Will need to convert to subcutaneous, will likely plan on this tomorrow if he continues to do well.      #3 hyperkalemia - improved     #4 CKD 3b -typically GFR is low to mid 30s.  He is 28.2. Mild worsening from baseline. Continue to iv fluid/day.  Avoid nephrotoxic meds.     #5 chronic anemia -in the setting of his renal disease and iron deficiency.  Monitor closely.  No obvious signs of bleeding.     #6 hypertension -increase labetalol.  Will add hydralazine as a backup.  May need Cardene drip if not improving.  Will try him back on oral meds when able.    #7 COVID-19 -patient first tested positive back on 1/25. He is status post Paxlovid. On room air. Out of isolation.     Medical Decision Making  Number and Complexity of problems: 1 acute, 2 acute on chronic, multiple chronic  Differential Diagnosis:  as above    Conditions and Status        Doing a little better than yesterday, bp stil up, not at goal     MDM Data  External documents reviewed: none  Cardiac tracing (EKG, telemetry) interpretation: sinus tach on monitor  Radiology interpretation: no new images  Labs reviewed: as above  Any tests that were considered but not ordered: none     Decision rules/scores evaluated (example [id number], Wells, etc): none     Discussed with: patient, nursing staff     Care Planning  Shared decision making: Patient apprised of current labs, vitals, imaging and treatment plan.  They are agreeable with proceeding with plans as discussed.    Code status and discussions: full code    Disposition  Social Determinants of Health that impact treatment or disposition: none    As per primary team, likely here several days yet    Electronically signed by Pancho Handley DO, 02/08/24, 10:07 CST.

## 2024-02-08 NOTE — PLAN OF CARE
Goal Outcome Evaluation: Positive blood cultures reported to night shift hospitalist, Dr. Roy. 1 g Vanc x one dose given per Dr. Roy. Heart rate has remained ST, Labetalol x 2 for high blood pressure. Dilaudid x 1 for pain. Gordillo pulled at 2100 last night. Patient has voided since. No measurable output from NG. 45 mL serous drainage from SONNY. Up to chair this morning with no complications. Insulin gtt currently @ 1.1.     Problem: Adult Inpatient Plan of Care  Goal: Absence of Hospital-Acquired Illness or Injury  Intervention: Prevent Skin Injury  Recent Flowsheet Documentation  Taken 2/8/2024 0600 by Kiko Tracy RN  Body Position: (up in chair)   legs elevated   other (see comments)  Taken 2/8/2024 0400 by Kiko Tracy RN  Body Position: (up in chair)   legs elevated   other (see comments)  Skin Protection:   adhesive use limited   incontinence pads utilized   skin-to-device areas padded   skin-to-skin areas padded   tubing/devices free from skin contact   transparent dressing maintained  Taken 2/8/2024 0200 by Kiko Tracy RN  Body Position: position changed independently  Taken 2/8/2024 0000 by Kiko Tracy RN  Body Position: position changed independently  Skin Protection:   adhesive use limited   incontinence pads utilized   skin-to-device areas padded   skin-to-skin areas padded   transparent dressing maintained   tubing/devices free from skin contact  Taken 2/7/2024 2200 by Kiko Tracy RN  Body Position: position changed independently  Taken 2/7/2024 2000 by Kiko Tracy RN  Body Position: position changed independently  Skin Protection:   adhesive use limited   incontinence pads utilized   skin-to-device areas padded   skin-to-skin areas padded   transparent dressing maintained   tubing/devices free from skin contact

## 2024-02-08 NOTE — PROGRESS NOTES
[facility]   Progress Note    Patient Name: Bernard Matta  : 1950  MRN: [id number]  Primary Care Physician:  Brando Webb MD  Date of admission: 2024    Subjective   Subjective   Pain well controlled.         Objective     Vitals:   Temp:  [98.1 °F (36.7 °C)-98.7 °F (37.1 °C)] 98.2 °F (36.8 °C)  Heart Rate:  [104-127] 115  Resp:  [15-22] 18  BP: (143-194)/() 179/89  Physical Exam    Constitutional: Awake, alert   Eyes: PERRLA, sclerae anicteric, no conjunctival injection   HENT: NCAT, mucous membranes moist   Neck: Supple, no thyromegaly, no lymphadenopathy, trachea midline   Respiratory: Clear to auscultation bilaterally, nonlabored respirations    Cardiovascular: RRR, no murmurs, rubs, or gallops, palpable pedal pulses bilaterally   Gastrointestinal: soft, approp TTP, ND, drain serosanguinous   Musculoskeletal: No bilateral ankle edema, no clubbing or cyanosis to extremities   Psychiatric: Appropriate affect, cooperative   Neurologic: Oriented x 3, strength symmetric in all extremities, Cranial Nerves grossly intact to confrontation, speech clear   Skin: No rashes     Result Review    Result Review:  I have personally reviewed the results from the time of this admission to 2024 15:28 CST and agree with these findings:  [x]  Laboratory list / accordion  []  Microbiology  []  Radiology  []  EKG/Telemetry   []  Cardiology/Vascular   []  Pathology  []  Old records  []  Other:      Assessment & Plan   Assessment / Plan     Brief Patient Summary:  Bernard Matta is a 73 y.o. male who presents s/p ileocectomy for cecal perforation    Active Hospital Problems:  Active Hospital Problems    Diagnosis     **Bowel perforation     Stage 3b chronic kidney disease (CKD)     Hyperkalemia     COVID-19 virus detected     Iron deficiency anemia     Hypertension     Type 2 diabetes mellitus      Plan:   NGT to LCS  IVF  NPO  IV antibiotics  Appreciate Medicine consult for hyperglycemia and  covid  Pain management    DVT prophylaxis:  Medical and mechanical DVT prophylaxis orders are present.        CODE STATUS:   Code Status (Patient has no pulse and is not breathing): CPR (Attempt to Resuscitate)  Medical Interventions (Patient has pulse or is breathing): Full Support        MD Hemant Angel MD  General Surgery  02/08/24  13:56 CST

## 2024-02-08 NOTE — PROGRESS NOTES
Notified by Nursing - 1 of 2 blood cultures is + for gram + bacilli at 36 hours - suspect contaminant - he is already on Zosyn - will give 1 dose of IV Vanco and await further identification.

## 2024-02-09 LAB
ALBUMIN SERPL-MCNC: 3.1 G/DL (ref 3.5–5.2)
ALBUMIN/GLOB SERPL: 0.9 G/DL
ALP SERPL-CCNC: 82 U/L (ref 39–117)
ALT SERPL W P-5'-P-CCNC: 49 U/L (ref 1–41)
ANION GAP SERPL CALCULATED.3IONS-SCNC: 13 MMOL/L (ref 5–15)
ANION GAP SERPL CALCULATED.3IONS-SCNC: 14 MMOL/L (ref 5–15)
ANION GAP SERPL CALCULATED.3IONS-SCNC: 14 MMOL/L (ref 5–15)
ANION GAP SERPL CALCULATED.3IONS-SCNC: 16 MMOL/L (ref 5–15)
AST SERPL-CCNC: 32 U/L (ref 1–40)
BASOPHILS # BLD AUTO: 0.03 10*3/MM3 (ref 0–0.2)
BASOPHILS NFR BLD AUTO: 0.3 % (ref 0–1.5)
BILIRUB SERPL-MCNC: 0.4 MG/DL (ref 0–1.2)
BUN SERPL-MCNC: 42 MG/DL (ref 8–23)
BUN SERPL-MCNC: 42 MG/DL (ref 8–23)
BUN SERPL-MCNC: 45 MG/DL (ref 8–23)
BUN SERPL-MCNC: 46 MG/DL (ref 8–23)
BUN/CREAT SERPL: 20.1 (ref 7–25)
BUN/CREAT SERPL: 20.1 (ref 7–25)
BUN/CREAT SERPL: 21 (ref 7–25)
BUN/CREAT SERPL: 21.6 (ref 7–25)
CALCIUM SPEC-SCNC: 9 MG/DL (ref 8.6–10.5)
CALCIUM SPEC-SCNC: 9 MG/DL (ref 8.6–10.5)
CALCIUM SPEC-SCNC: 9.1 MG/DL (ref 8.6–10.5)
CALCIUM SPEC-SCNC: 9.1 MG/DL (ref 8.6–10.5)
CHLORIDE SERPL-SCNC: 112 MMOL/L (ref 98–107)
CHLORIDE SERPL-SCNC: 112 MMOL/L (ref 98–107)
CHLORIDE SERPL-SCNC: 114 MMOL/L (ref 98–107)
CHLORIDE SERPL-SCNC: 114 MMOL/L (ref 98–107)
CO2 SERPL-SCNC: 17 MMOL/L (ref 22–29)
CO2 SERPL-SCNC: 19 MMOL/L (ref 22–29)
CREAT SERPL-MCNC: 2.08 MG/DL (ref 0.76–1.27)
CREAT SERPL-MCNC: 2.09 MG/DL (ref 0.76–1.27)
CREAT SERPL-MCNC: 2.09 MG/DL (ref 0.76–1.27)
CREAT SERPL-MCNC: 2.19 MG/DL (ref 0.76–1.27)
DEPRECATED RDW RBC AUTO: 51.8 FL (ref 37–54)
EGFRCR SERPLBLD CKD-EPI 2021: 30.6 ML/MIN/1.73
EGFRCR SERPLBLD CKD-EPI 2021: 32.4 ML/MIN/1.73
EGFRCR SERPLBLD CKD-EPI 2021: 32.4 ML/MIN/1.73
EGFRCR SERPLBLD CKD-EPI 2021: 32.6 ML/MIN/1.73
EOSINOPHIL # BLD AUTO: 0.14 10*3/MM3 (ref 0–0.4)
EOSINOPHIL NFR BLD AUTO: 1.6 % (ref 0.3–6.2)
ERYTHROCYTE [DISTWIDTH] IN BLOOD BY AUTOMATED COUNT: 14.9 % (ref 12.3–15.4)
GLOBULIN UR ELPH-MCNC: 3.6 GM/DL
GLUCOSE BLDC GLUCOMTR-MCNC: 146 MG/DL (ref 70–130)
GLUCOSE BLDC GLUCOMTR-MCNC: 151 MG/DL (ref 70–130)
GLUCOSE BLDC GLUCOMTR-MCNC: 163 MG/DL (ref 70–130)
GLUCOSE BLDC GLUCOMTR-MCNC: 164 MG/DL (ref 70–130)
GLUCOSE BLDC GLUCOMTR-MCNC: 167 MG/DL (ref 70–130)
GLUCOSE BLDC GLUCOMTR-MCNC: 172 MG/DL (ref 70–130)
GLUCOSE BLDC GLUCOMTR-MCNC: 177 MG/DL (ref 70–130)
GLUCOSE BLDC GLUCOMTR-MCNC: 180 MG/DL (ref 70–130)
GLUCOSE BLDC GLUCOMTR-MCNC: 207 MG/DL (ref 70–130)
GLUCOSE BLDC GLUCOMTR-MCNC: 211 MG/DL (ref 70–130)
GLUCOSE BLDC GLUCOMTR-MCNC: 226 MG/DL (ref 70–130)
GLUCOSE BLDC GLUCOMTR-MCNC: 258 MG/DL (ref 70–130)
GLUCOSE SERPL-MCNC: 181 MG/DL (ref 65–99)
GLUCOSE SERPL-MCNC: 181 MG/DL (ref 65–99)
GLUCOSE SERPL-MCNC: 207 MG/DL (ref 65–99)
GLUCOSE SERPL-MCNC: 265 MG/DL (ref 65–99)
HCT VFR BLD AUTO: 31 % (ref 37.5–51)
HGB BLD-MCNC: 9.6 G/DL (ref 13–17.7)
IMM GRANULOCYTES # BLD AUTO: 0.03 10*3/MM3 (ref 0–0.05)
IMM GRANULOCYTES NFR BLD AUTO: 0.3 % (ref 0–0.5)
LYMPHOCYTES # BLD AUTO: 0.57 10*3/MM3 (ref 0.7–3.1)
LYMPHOCYTES NFR BLD AUTO: 6.3 % (ref 19.6–45.3)
MAGNESIUM SERPL-MCNC: 1.8 MG/DL (ref 1.6–2.4)
MCH RBC QN AUTO: 29.3 PG (ref 26.6–33)
MCHC RBC AUTO-ENTMCNC: 31 G/DL (ref 31.5–35.7)
MCV RBC AUTO: 94.5 FL (ref 79–97)
MONOCYTES # BLD AUTO: 0.72 10*3/MM3 (ref 0.1–0.9)
MONOCYTES NFR BLD AUTO: 8 % (ref 5–12)
NEUTROPHILS NFR BLD AUTO: 7.52 10*3/MM3 (ref 1.7–7)
NEUTROPHILS NFR BLD AUTO: 83.5 % (ref 42.7–76)
NRBC BLD AUTO-RTO: 0 /100 WBC (ref 0–0.2)
PLATELET # BLD AUTO: 206 10*3/MM3 (ref 140–450)
PMV BLD AUTO: 9.2 FL (ref 6–12)
POTASSIUM SERPL-SCNC: 3.9 MMOL/L (ref 3.5–5.2)
POTASSIUM SERPL-SCNC: 4.1 MMOL/L (ref 3.5–5.2)
POTASSIUM SERPL-SCNC: 4.1 MMOL/L (ref 3.5–5.2)
POTASSIUM SERPL-SCNC: 4.6 MMOL/L (ref 3.5–5.2)
PROT SERPL-MCNC: 6.7 G/DL (ref 6–8.5)
RBC # BLD AUTO: 3.28 10*6/MM3 (ref 4.14–5.8)
SODIUM SERPL-SCNC: 144 MMOL/L (ref 136–145)
SODIUM SERPL-SCNC: 145 MMOL/L (ref 136–145)
WBC NRBC COR # BLD AUTO: 9.01 10*3/MM3 (ref 3.4–10.8)

## 2024-02-09 PROCEDURE — 63710000001 INSULIN DETEMIR PER 5 UNITS: Performed by: INTERNAL MEDICINE

## 2024-02-09 PROCEDURE — 82948 REAGENT STRIP/BLOOD GLUCOSE: CPT

## 2024-02-09 PROCEDURE — 25010000002 NICARDIPINE 2.5 MG/ML SOLUTION: Performed by: INTERNAL MEDICINE

## 2024-02-09 PROCEDURE — 63710000001 INSULIN LISPRO (HUMAN) PER 5 UNITS: Performed by: INTERNAL MEDICINE

## 2024-02-09 PROCEDURE — 25010000002 PIPERACILLIN SOD-TAZOBACTAM PER 1 G: Performed by: SURGERY

## 2024-02-09 PROCEDURE — 25010000002 HEPARIN (PORCINE) PER 1000 UNITS: Performed by: SURGERY

## 2024-02-09 PROCEDURE — 25810000003 SODIUM CHLORIDE 0.9 % SOLUTION: Performed by: INTERNAL MEDICINE

## 2024-02-09 PROCEDURE — 85025 COMPLETE CBC W/AUTO DIFF WBC: CPT | Performed by: SURGERY

## 2024-02-09 PROCEDURE — 99024 POSTOP FOLLOW-UP VISIT: CPT | Performed by: SURGERY

## 2024-02-09 PROCEDURE — 25010000002 LABETALOL 5 MG/ML SOLUTION: Performed by: INTERNAL MEDICINE

## 2024-02-09 PROCEDURE — 83735 ASSAY OF MAGNESIUM: CPT | Performed by: INTERNAL MEDICINE

## 2024-02-09 PROCEDURE — 80053 COMPREHEN METABOLIC PANEL: CPT | Performed by: INTERNAL MEDICINE

## 2024-02-09 RX ORDER — SODIUM BICARBONATE 650 MG/1
650 TABLET ORAL DAILY
Status: DISCONTINUED | OUTPATIENT
Start: 2024-02-09 | End: 2024-02-11

## 2024-02-09 RX ORDER — TAMSULOSIN HYDROCHLORIDE 0.4 MG/1
0.8 CAPSULE ORAL NIGHTLY
Status: DISCONTINUED | OUTPATIENT
Start: 2024-02-09 | End: 2024-02-11 | Stop reason: CLARIF

## 2024-02-09 RX ORDER — NICOTINE POLACRILEX 4 MG
15 LOZENGE BUCCAL
Status: DISCONTINUED | OUTPATIENT
Start: 2024-02-09 | End: 2024-02-16 | Stop reason: HOSPADM

## 2024-02-09 RX ORDER — LISINOPRIL 10 MG/1
10 TABLET ORAL DAILY
Status: DISCONTINUED | OUTPATIENT
Start: 2024-02-09 | End: 2024-02-11

## 2024-02-09 RX ORDER — ASPIRIN 81 MG/1
81 TABLET ORAL DAILY
Status: DISCONTINUED | OUTPATIENT
Start: 2024-02-10 | End: 2024-02-11

## 2024-02-09 RX ORDER — OLANZAPINE 5 MG/1
5 TABLET, ORALLY DISINTEGRATING ORAL ONCE
Status: COMPLETED | OUTPATIENT
Start: 2024-02-09 | End: 2024-02-09

## 2024-02-09 RX ORDER — CLONIDINE HYDROCHLORIDE 0.2 MG/1
0.2 TABLET ORAL 2 TIMES DAILY
Status: DISCONTINUED | OUTPATIENT
Start: 2024-02-09 | End: 2024-02-11

## 2024-02-09 RX ORDER — LATANOPROST 50 UG/ML
1 SOLUTION/ DROPS OPHTHALMIC NIGHTLY
Status: DISCONTINUED | OUTPATIENT
Start: 2024-02-09 | End: 2024-02-16 | Stop reason: HOSPADM

## 2024-02-09 RX ORDER — DEXTROSE MONOHYDRATE 25 G/50ML
25 INJECTION, SOLUTION INTRAVENOUS
Status: DISCONTINUED | OUTPATIENT
Start: 2024-02-09 | End: 2024-02-16 | Stop reason: HOSPADM

## 2024-02-09 RX ORDER — ATORVASTATIN CALCIUM 40 MG/1
40 TABLET, FILM COATED ORAL NIGHTLY
Status: DISCONTINUED | OUTPATIENT
Start: 2024-02-09 | End: 2024-02-11

## 2024-02-09 RX ORDER — DORZOLAMIDE HYDROCHLORIDE AND TIMOLOL MALEATE 20; 5 MG/ML; MG/ML
SOLUTION/ DROPS OPHTHALMIC 2 TIMES DAILY
Status: DISCONTINUED | OUTPATIENT
Start: 2024-02-09 | End: 2024-02-16 | Stop reason: HOSPADM

## 2024-02-09 RX ORDER — INSULIN LISPRO 100 [IU]/ML
2-7 INJECTION, SOLUTION INTRAVENOUS; SUBCUTANEOUS
Status: DISCONTINUED | OUTPATIENT
Start: 2024-02-09 | End: 2024-02-11

## 2024-02-09 RX ADMIN — LATANOPROST 1 DROP: 50 SOLUTION/ DROPS OPHTHALMIC at 20:38

## 2024-02-09 RX ADMIN — TIMOLOL MALEATE: 5 SOLUTION/ DROPS OPHTHALMIC at 11:30

## 2024-02-09 RX ADMIN — INSULIN LISPRO 2 UNITS: 100 INJECTION, SOLUTION INTRAVENOUS; SUBCUTANEOUS at 20:37

## 2024-02-09 RX ADMIN — CHLORHEXIDINE GLUCONATE 1 APPLICATION: 500 CLOTH TOPICAL at 03:10

## 2024-02-09 RX ADMIN — ATORVASTATIN CALCIUM 40 MG: 40 TABLET ORAL at 20:37

## 2024-02-09 RX ADMIN — NICARDIPINE HYDROCHLORIDE 7.5 MG/HR: 25 INJECTION, SOLUTION INTRAVENOUS at 09:58

## 2024-02-09 RX ADMIN — LABETALOL HYDROCHLORIDE 20 MG: 5 INJECTION INTRAVENOUS at 16:05

## 2024-02-09 RX ADMIN — HEPARIN SODIUM 5000 UNITS: 5000 INJECTION INTRAVENOUS; SUBCUTANEOUS at 15:15

## 2024-02-09 RX ADMIN — Medication 1 APPLICATION: at 20:37

## 2024-02-09 RX ADMIN — HEPARIN SODIUM 5000 UNITS: 5000 INJECTION INTRAVENOUS; SUBCUTANEOUS at 22:35

## 2024-02-09 RX ADMIN — CLONIDINE HYDROCHLORIDE 0.2 MG: 0.2 TABLET ORAL at 20:37

## 2024-02-09 RX ADMIN — LISINOPRIL 10 MG: 10 TABLET ORAL at 11:29

## 2024-02-09 RX ADMIN — OLANZAPINE 5 MG: 5 TABLET, ORALLY DISINTEGRATING ORAL at 22:35

## 2024-02-09 RX ADMIN — PIPERACILLIN SODIUM AND TAZOBACTAM SODIUM 3.38 G: 3; .375 INJECTION, POWDER, LYOPHILIZED, FOR SOLUTION INTRAVENOUS at 06:30

## 2024-02-09 RX ADMIN — INSULIN DETEMIR 15 UNITS: 100 INJECTION, SOLUTION SUBCUTANEOUS at 11:29

## 2024-02-09 RX ADMIN — PIPERACILLIN SODIUM AND TAZOBACTAM SODIUM 3.38 G: 3; .375 INJECTION, POWDER, LYOPHILIZED, FOR SOLUTION INTRAVENOUS at 15:14

## 2024-02-09 RX ADMIN — Medication 10 ML: at 08:18

## 2024-02-09 RX ADMIN — CLONIDINE HYDROCHLORIDE 0.2 MG: 0.2 TABLET ORAL at 11:29

## 2024-02-09 RX ADMIN — TAMSULOSIN HYDROCHLORIDE 0.8 MG: 0.4 CAPSULE ORAL at 20:37

## 2024-02-09 RX ADMIN — INSULIN LISPRO 3 UNITS: 100 INJECTION, SOLUTION INTRAVENOUS; SUBCUTANEOUS at 17:04

## 2024-02-09 RX ADMIN — OLANZAPINE 5 MG: 5 TABLET, ORALLY DISINTEGRATING ORAL at 20:37

## 2024-02-09 RX ADMIN — INSULIN LISPRO 4 UNITS: 100 INJECTION, SOLUTION INTRAVENOUS; SUBCUTANEOUS at 11:29

## 2024-02-09 RX ADMIN — TIMOLOL MALEATE: 5 SOLUTION/ DROPS OPHTHALMIC at 20:38

## 2024-02-09 RX ADMIN — Medication 1 APPLICATION: at 08:18

## 2024-02-09 RX ADMIN — HEPARIN SODIUM 5000 UNITS: 5000 INJECTION INTRAVENOUS; SUBCUTANEOUS at 05:37

## 2024-02-09 RX ADMIN — SODIUM BICARBONATE 650 MG: 650 TABLET ORAL at 15:14

## 2024-02-09 NOTE — CASE MANAGEMENT/SOCIAL WORK
Continued Stay Note   [facility]     Patient Name: Bernard Matta  MRN: [id number]  Today's Date: 2/9/2024    Admit Date: 2/6/2024    Plan: Home   Discharge Plan       Row Name 02/09/24 1045       Plan    Plan Home    Plan Comments Patient resides at home with his spouse.  Patient was independent prior to admission.  Patient plans to return home upon discharge.  SW following.                   Discharge Codes    No documentation.                       NEHAL Hopkins

## 2024-02-09 NOTE — PLAN OF CARE
Problem: Adult Inpatient Plan of Care  Goal: Plan of Care Review  Outcome: Ongoing, Progressing  Goal: Patient-Specific Goal (Individualized)  Outcome: Ongoing, Progressing  Goal: Absence of Hospital-Acquired Illness or Injury  Outcome: Ongoing, Progressing  Intervention: Identify and Manage Fall Risk  Recent Flowsheet Documentation  Taken 2/9/2024 0500 by Candis Santos RN  Safety Promotion/Fall Prevention:   safety round/check completed   fall prevention program maintained  Taken 2/9/2024 0400 by Candis Santos RN  Safety Promotion/Fall Prevention:   safety round/check completed   fall prevention program maintained  Taken 2/9/2024 0300 by Candis Santos RN  Safety Promotion/Fall Prevention:   safety round/check completed   fall prevention program maintained  Taken 2/9/2024 0200 by Candis Santos RN  Safety Promotion/Fall Prevention:   safety round/check completed   fall prevention program maintained  Taken 2/9/2024 0100 by Candis Santos RN  Safety Promotion/Fall Prevention:   safety round/check completed   fall prevention program maintained  Taken 2/9/2024 0000 by Candis Santos RN  Safety Promotion/Fall Prevention:   safety round/check completed   fall prevention program maintained  Taken 2/8/2024 2300 by Candis Santos RN  Safety Promotion/Fall Prevention:   safety round/check completed   fall prevention program maintained  Taken 2/8/2024 2200 by Candis Santos RN  Safety Promotion/Fall Prevention:   safety round/check completed   fall prevention program maintained  Taken 2/8/2024 2100 by Candis Santos RN  Safety Promotion/Fall Prevention:   safety round/check completed   fall prevention program maintained  Taken 2/8/2024 2000 by Candis aSntos RN  Safety Promotion/Fall Prevention:   safety round/check completed   fall prevention program maintained  Taken 2/8/2024 1900 by Candis Santos RN  Safety Promotion/Fall Prevention:   safety round/check completed   fall prevention program  maintained  Intervention: Prevent Skin Injury  Recent Flowsheet Documentation  Taken 2/9/2024 0400 by Candis Santos RN  Body Position:   supine   30 degrees  Taken 2/9/2024 0200 by Candis Santos RN  Body Position: (up in chair) other (see comments)  Taken 2/9/2024 0000 by Candis Santos RN  Body Position:   supine   30 degrees  Taken 2/8/2024 2200 by Candis Santos RN  Body Position: (up in chair) other (see comments)  Taken 2/8/2024 2000 by Candis Santos RN  Body Position:   supine   30 degrees  Intervention: Prevent and Manage VTE (Venous Thromboembolism) Risk  Recent Flowsheet Documentation  Taken 2/9/2024 0400 by Candis Santos RN  Activity Management: up in chair  VTE Prevention/Management:   bilateral   sequential compression devices on  Range of Motion: active ROM (range of motion) encouraged  Taken 2/9/2024 0200 by Candis Santos RN  Activity Management: up in chair  Taken 2/9/2024 0000 by Candis Santos RN  Activity Management: up in chair  VTE Prevention/Management:   bilateral   sequential compression devices on  Range of Motion: active ROM (range of motion) encouraged  Taken 2/8/2024 2200 by Candis Santos RN  Activity Management: up in chair  Taken 2/8/2024 2000 by Candis Santos RN  Activity Management: up in chair  VTE Prevention/Management:   bilateral   sequential compression devices on  Range of Motion: active ROM (range of motion) encouraged  Intervention: Prevent Infection  Recent Flowsheet Documentation  Taken 2/9/2024 0500 by Candis Santos RN  Infection Prevention: single patient room provided  Taken 2/9/2024 0400 by Candis Santos RN  Infection Prevention: single patient room provided  Taken 2/9/2024 0300 by Candis Santos RN  Infection Prevention: single patient room provided  Taken 2/9/2024 0200 by Candis Santos RN  Infection Prevention: single patient room provided  Taken 2/9/2024 0100 by Candis Santos RN  Infection Prevention: single patient room provided  Taken  2/9/2024 0000 by Candis Santos RN  Infection Prevention: single patient room provided  Taken 2/8/2024 2300 by Candis Santos RN  Infection Prevention: single patient room provided  Taken 2/8/2024 2200 by Candis Santos RN  Infection Prevention: single patient room provided  Taken 2/8/2024 2100 by Candis Santos RN  Infection Prevention: single patient room provided  Taken 2/8/2024 2000 by Candis Santos RN  Infection Prevention: single patient room provided  Taken 2/8/2024 1900 by Candis Santos RN  Infection Prevention: single patient room provided  Goal: Optimal Comfort and Wellbeing  Outcome: Ongoing, Progressing  Intervention: Provide Person-Centered Care  Recent Flowsheet Documentation  Taken 2/9/2024 0400 by Candis Santos RN  Trust Relationship/Rapport:   care explained   emotional support provided  Taken 2/9/2024 0000 by Candis Santos RN  Trust Relationship/Rapport:   care explained   emotional support provided  Taken 2/8/2024 2000 by Candis Santos RN  Trust Relationship/Rapport:   care explained   emotional support provided  Goal: Readiness for Transition of Care  Outcome: Ongoing, Progressing     Problem: Skin Injury Risk Increased  Goal: Skin Health and Integrity  Outcome: Ongoing, Progressing  Intervention: Optimize Skin Protection  Recent Flowsheet Documentation  Taken 2/9/2024 0400 by Candis Santos RN  Head of Bed (HOB) Positioning: HOB at 20-30 degrees  Taken 2/9/2024 0200 by Candis Santos RN  Head of Bed (HOB) Positioning: HOB at 60-90 degrees  Taken 2/9/2024 0000 by Candis Santos RN  Head of Bed (HOB) Positioning: HOB at 20-30 degrees  Taken 2/8/2024 2200 by Candis Santos RN  Head of Bed (HOB) Positioning: HOB at 20-30 degrees  Taken 2/8/2024 2000 by Candis Santos RN  Head of Bed (HOB) Positioning: HOB at 20-30 degrees     Problem: Asthma Comorbidity  Goal: Maintenance of Asthma Control  Outcome: Ongoing, Progressing  Intervention: Maintain Asthma Symptom Control  Recent  Flowsheet Documentation  Taken 2/9/2024 0500 by Candis Santos RN  Medication Review/Management: medications reviewed  Taken 2/9/2024 0400 by Candis Santos RN  Medication Review/Management: medications reviewed  Taken 2/9/2024 0300 by Candis Santos RN  Medication Review/Management: medications reviewed  Taken 2/9/2024 0200 by Candis Santos RN  Medication Review/Management: medications reviewed  Taken 2/9/2024 0100 by Candis Santos RN  Medication Review/Management: medications reviewed  Taken 2/9/2024 0000 by Candis Santos RN  Medication Review/Management: medications reviewed  Taken 2/8/2024 2300 by Candis Santos RN  Medication Review/Management: medications reviewed  Taken 2/8/2024 2200 by Candis Santos RN  Medication Review/Management: medications reviewed  Taken 2/8/2024 2100 by Candis Santos RN  Medication Review/Management: medications reviewed  Taken 2/8/2024 2000 by Candis Santos RN  Medication Review/Management: medications reviewed  Taken 2/8/2024 1900 by Candis Santos RN  Medication Review/Management: medications reviewed     Problem: Behavioral Health Comorbidity  Goal: Maintenance of Behavioral Health Symptom Control  Outcome: Ongoing, Progressing  Intervention: Maintain Behavioral Health Symptom Control  Recent Flowsheet Documentation  Taken 2/9/2024 0500 by Candis Santos RN  Medication Review/Management: medications reviewed  Taken 2/9/2024 0400 by Candis Santos RN  Medication Review/Management: medications reviewed  Taken 2/9/2024 0300 by Candis Santos RN  Medication Review/Management: medications reviewed  Taken 2/9/2024 0200 by Candis Santos RN  Medication Review/Management: medications reviewed  Taken 2/9/2024 0100 by Candis Santos RN  Medication Review/Management: medications reviewed  Taken 2/9/2024 0000 by Candis Santos RN  Medication Review/Management: medications reviewed  Taken 2/8/2024 2300 by Candis Santos RN  Medication Review/Management: medications  reviewed  Taken 2/8/2024 2200 by Candis Santos RN  Medication Review/Management: medications reviewed  Taken 2/8/2024 2100 by Candis Santos RN  Medication Review/Management: medications reviewed  Taken 2/8/2024 2000 by Candis Santos RN  Medication Review/Management: medications reviewed  Taken 2/8/2024 1900 by Candis Santos RN  Medication Review/Management: medications reviewed     Problem: COPD (Chronic Obstructive Pulmonary Disease) Comorbidity  Goal: Maintenance of COPD Symptom Control  Outcome: Ongoing, Progressing  Intervention: Maintain COPD-Symptom Control  Recent Flowsheet Documentation  Taken 2/9/2024 0500 by Candis Santos RN  Medication Review/Management: medications reviewed  Taken 2/9/2024 0400 by Candis Santos RN  Medication Review/Management: medications reviewed  Taken 2/9/2024 0300 by Candis Santos RN  Medication Review/Management: medications reviewed  Taken 2/9/2024 0200 by Candis Santos RN  Medication Review/Management: medications reviewed  Taken 2/9/2024 0100 by Candis Santos RN  Medication Review/Management: medications reviewed  Taken 2/9/2024 0000 by Candis Santos RN  Medication Review/Management: medications reviewed  Taken 2/8/2024 2300 by Candis Santos RN  Medication Review/Management: medications reviewed  Taken 2/8/2024 2200 by Candis Santos RN  Medication Review/Management: medications reviewed  Taken 2/8/2024 2100 by Candis Santos RN  Medication Review/Management: medications reviewed  Taken 2/8/2024 2000 by Candis Santos RN  Medication Review/Management: medications reviewed  Taken 2/8/2024 1900 by Candis Santos RN  Medication Review/Management: medications reviewed     Problem: Diabetes Comorbidity  Goal: Blood Glucose Level Within Targeted Range  Outcome: Ongoing, Progressing     Problem: Heart Failure Comorbidity  Goal: Maintenance of Heart Failure Symptom Control  Outcome: Ongoing, Progressing  Intervention: Maintain Heart Failure-Management  Recent  Flowsheet Documentation  Taken 2/9/2024 0500 by Candis Santos RN  Medication Review/Management: medications reviewed  Taken 2/9/2024 0400 by Candis Santos RN  Medication Review/Management: medications reviewed  Taken 2/9/2024 0300 by Candis Santos RN  Medication Review/Management: medications reviewed  Taken 2/9/2024 0200 by Candis Santos RN  Medication Review/Management: medications reviewed  Taken 2/9/2024 0100 by Candis Santos RN  Medication Review/Management: medications reviewed  Taken 2/9/2024 0000 by Candis Santos RN  Medication Review/Management: medications reviewed  Taken 2/8/2024 2300 by Candis Santos RN  Medication Review/Management: medications reviewed  Taken 2/8/2024 2200 by Candis Satnos RN  Medication Review/Management: medications reviewed  Taken 2/8/2024 2100 by Candis Santos RN  Medication Review/Management: medications reviewed  Taken 2/8/2024 2000 by Candis Santos RN  Medication Review/Management: medications reviewed  Taken 2/8/2024 1900 by Candis Santos RN  Medication Review/Management: medications reviewed     Problem: Hypertension Comorbidity  Goal: Blood Pressure in Desired Range  Outcome: Ongoing, Progressing  Intervention: Maintain Blood Pressure Management  Recent Flowsheet Documentation  Taken 2/9/2024 0500 by Candis Santos RN  Medication Review/Management: medications reviewed  Taken 2/9/2024 0400 by Candis Santos RN  Medication Review/Management: medications reviewed  Taken 2/9/2024 0300 by Candis Santos RN  Medication Review/Management: medications reviewed  Taken 2/9/2024 0200 by Candis Santos RN  Medication Review/Management: medications reviewed  Taken 2/9/2024 0100 by Candis Santos RN  Medication Review/Management: medications reviewed  Taken 2/9/2024 0000 by Candis Santos RN  Medication Review/Management: medications reviewed  Taken 2/8/2024 2300 by Candis Santos RN  Medication Review/Management: medications reviewed  Taken 2/8/2024 2200 by  Candis Santos RN  Medication Review/Management: medications reviewed  Taken 2/8/2024 2100 by Candis Santos RN  Medication Review/Management: medications reviewed  Taken 2/8/2024 2000 by Candis Santos RN  Medication Review/Management: medications reviewed  Taken 2/8/2024 1900 by Candis Santos RN  Medication Review/Management: medications reviewed     Problem: Obstructive Sleep Apnea Risk or Actual Comorbidity Management  Goal: Unobstructed Breathing During Sleep  Outcome: Ongoing, Progressing     Problem: Osteoarthritis Comorbidity  Goal: Maintenance of Osteoarthritis Symptom Control  Outcome: Ongoing, Progressing  Intervention: Maintain Osteoarthritis Symptom Control  Recent Flowsheet Documentation  Taken 2/9/2024 0500 by Candis Santos RN  Medication Review/Management: medications reviewed  Taken 2/9/2024 0400 by Candis Santos RN  Activity Management: up in chair  Medication Review/Management: medications reviewed  Taken 2/9/2024 0300 by Candis Santos RN  Medication Review/Management: medications reviewed  Taken 2/9/2024 0200 by Candis Santos RN  Activity Management: up in chair  Medication Review/Management: medications reviewed  Taken 2/9/2024 0100 by Candis Santos RN  Medication Review/Management: medications reviewed  Taken 2/9/2024 0000 by Candis Santos RN  Activity Management: up in chair  Medication Review/Management: medications reviewed  Taken 2/8/2024 2300 by Candis Santos RN  Medication Review/Management: medications reviewed  Taken 2/8/2024 2200 by Candis Santos RN  Activity Management: up in chair  Medication Review/Management: medications reviewed  Taken 2/8/2024 2100 by Candis Santos RN  Medication Review/Management: medications reviewed  Taken 2/8/2024 2000 by Candis Santos RN  Activity Management: up in chair  Medication Review/Management: medications reviewed  Taken 2/8/2024 1900 by Candis Santos RN  Medication Review/Management: medications reviewed     Problem: Pain  Chronic (Persistent) (Comorbidity Management)  Goal: Acceptable Pain Control and Functional Ability  Outcome: Ongoing, Progressing  Intervention: Manage Persistent Pain  Recent Flowsheet Documentation  Taken 2/9/2024 0500 by Candis Santos RN  Medication Review/Management: medications reviewed  Taken 2/9/2024 0400 by Candis Santos RN  Medication Review/Management: medications reviewed  Taken 2/9/2024 0300 by Candis Santos RN  Medication Review/Management: medications reviewed  Taken 2/9/2024 0200 by Candis Santos RN  Medication Review/Management: medications reviewed  Taken 2/9/2024 0100 by Candis Santos RN  Medication Review/Management: medications reviewed  Taken 2/9/2024 0000 by Candis Santos RN  Medication Review/Management: medications reviewed  Taken 2/8/2024 2300 by Candis Santos RN  Medication Review/Management: medications reviewed  Taken 2/8/2024 2200 by Candis Santos RN  Medication Review/Management: medications reviewed  Taken 2/8/2024 2100 by Candis Santos RN  Medication Review/Management: medications reviewed  Taken 2/8/2024 2000 by Candis Santos RN  Medication Review/Management: medications reviewed  Taken 2/8/2024 1900 by Candis Santos RN  Medication Review/Management: medications reviewed  Intervention: Optimize Psychosocial Wellbeing  Recent Flowsheet Documentation  Taken 2/9/2024 0400 by Candis Santos RN  Diversional Activities: television  Family/Support System Care: support provided  Taken 2/9/2024 0000 by Candis Santos RN  Diversional Activities: television  Family/Support System Care: support provided  Taken 2/8/2024 2000 by Candis Santos RN  Diversional Activities: television  Family/Support System Care: support provided     Problem: Seizure Disorder Comorbidity  Goal: Maintenance of Seizure Control  Outcome: Ongoing, Progressing     Problem: Fall Injury Risk  Goal: Absence of Fall and Fall-Related Injury  Outcome: Ongoing, Progressing  Intervention: Identify and  Manage Contributors  Recent Flowsheet Documentation  Taken 2/9/2024 0500 by Candis Santos RN  Medication Review/Management: medications reviewed  Taken 2/9/2024 0400 by Candis Santos RN  Medication Review/Management: medications reviewed  Taken 2/9/2024 0300 by Candis Santos RN  Medication Review/Management: medications reviewed  Taken 2/9/2024 0200 by Candis Santos RN  Medication Review/Management: medications reviewed  Taken 2/9/2024 0100 by Candis Santos RN  Medication Review/Management: medications reviewed  Taken 2/9/2024 0000 by Candis Santos RN  Medication Review/Management: medications reviewed  Taken 2/8/2024 2300 by Candis Santos RN  Medication Review/Management: medications reviewed  Taken 2/8/2024 2200 by Candis Santos RN  Medication Review/Management: medications reviewed  Taken 2/8/2024 2100 by Candis Santos RN  Medication Review/Management: medications reviewed  Taken 2/8/2024 2000 by Candis Santos RN  Medication Review/Management: medications reviewed  Taken 2/8/2024 1900 by Candis Santos RN  Medication Review/Management: medications reviewed  Intervention: Promote Injury-Free Environment  Recent Flowsheet Documentation  Taken 2/9/2024 0500 by Candis Santos RN  Safety Promotion/Fall Prevention:   safety round/check completed   fall prevention program maintained  Taken 2/9/2024 0400 by Candis Santos RN  Safety Promotion/Fall Prevention:   safety round/check completed   fall prevention program maintained  Taken 2/9/2024 0300 by Candis Santos RN  Safety Promotion/Fall Prevention:   safety round/check completed   fall prevention program maintained  Taken 2/9/2024 0200 by Candis Santos RN  Safety Promotion/Fall Prevention:   safety round/check completed   fall prevention program maintained  Taken 2/9/2024 0100 by Candis Santos RN  Safety Promotion/Fall Prevention:   safety round/check completed   fall prevention program maintained  Taken 2/9/2024 0000 by Candis Santos  RN  Safety Promotion/Fall Prevention:   safety round/check completed   fall prevention program maintained  Taken 2/8/2024 2300 by Candis Santos RN  Safety Promotion/Fall Prevention:   safety round/check completed   fall prevention program maintained  Taken 2/8/2024 2200 by Candis Santos RN  Safety Promotion/Fall Prevention:   safety round/check completed   fall prevention program maintained  Taken 2/8/2024 2100 by Candis Santos RN  Safety Promotion/Fall Prevention:   safety round/check completed   fall prevention program maintained  Taken 2/8/2024 2000 by Candis Santos RN  Safety Promotion/Fall Prevention:   safety round/check completed   fall prevention program maintained  Taken 2/8/2024 1900 by Candis Santos RN  Safety Promotion/Fall Prevention:   safety round/check completed   fall prevention program maintained     Problem: Adjustment to Illness (Sepsis/Septic Shock)  Goal: Optimal Coping  Outcome: Ongoing, Progressing  Intervention: Optimize Psychosocial Adjustment to Illness  Recent Flowsheet Documentation  Taken 2/9/2024 0400 by Candis Santos RN  Family/Support System Care: support provided  Taken 2/9/2024 0000 by Candis Santos RN  Family/Support System Care: support provided  Taken 2/8/2024 2000 by Candis Santos RN  Family/Support System Care: support provided     Problem: Bleeding (Sepsis/Septic Shock)  Goal: Absence of Bleeding  Outcome: Ongoing, Progressing     Problem: Glycemic Control Impaired (Sepsis/Septic Shock)  Goal: Blood Glucose Level Within Desired Range  Outcome: Ongoing, Progressing     Problem: Infection Progression (Sepsis/Septic Shock)  Goal: Absence of Infection Signs and Symptoms  Outcome: Ongoing, Progressing  Intervention: Initiate Sepsis Management  Recent Flowsheet Documentation  Taken 2/9/2024 0500 by Candis Santos RN  Infection Prevention: single patient room provided  Taken 2/9/2024 0400 by Candis Santos RN  Infection Prevention: single patient room  provided  Taken 2/9/2024 0300 by Candis Santos RN  Infection Prevention: single patient room provided  Taken 2/9/2024 0200 by Candis Santos RN  Infection Prevention: single patient room provided  Taken 2/9/2024 0100 by Candis Santos RN  Infection Prevention: single patient room provided  Taken 2/9/2024 0000 by Candis Santos RN  Infection Prevention: single patient room provided  Taken 2/8/2024 2300 by Candis Santos RN  Infection Prevention: single patient room provided  Taken 2/8/2024 2200 by Candis Santos RN  Infection Prevention: single patient room provided  Taken 2/8/2024 2100 by Candis Santos RN  Infection Prevention: single patient room provided  Taken 2/8/2024 2000 by Candis Santos RN  Infection Prevention: single patient room provided  Taken 2/8/2024 1900 by Candis Santos RN  Infection Prevention: single patient room provided  Intervention: Promote Recovery  Recent Flowsheet Documentation  Taken 2/9/2024 0400 by Candis Santos RN  Activity Management: up in chair  Taken 2/9/2024 0200 by Candis Santos RN  Activity Management: up in chair  Taken 2/9/2024 0000 by Candis Santos RN  Activity Management: up in chair  Taken 2/8/2024 2200 by Candis Santos RN  Activity Management: up in chair  Taken 2/8/2024 2000 by Candis Santos RN  Activity Management: up in chair     Problem: Nutrition Impaired (Sepsis/Septic Shock)  Goal: Optimal Nutrition Intake  Outcome: Ongoing, Progressing   Goal Outcome Evaluation:

## 2024-02-09 NOTE — PROGRESS NOTES
[facility]   Progress Note    Patient Name: Bernard Matta  : 1950  MRN: [id number]  Primary Care Physician:  Brando Webb MD  Date of admission: 2024    Subjective   Subjective   States that he is feeling better. No n/v.        Objective     Vitals:   Temp:  [98.3 °F (36.8 °C)-98.9 °F (37.2 °C)] 98.6 °F (37 °C)  Heart Rate:  [104-150] 127  BP: (134-194)/() 169/92  Physical Exam    Constitutional: Awake, alert   Eyes: PERRLA, sclerae anicteric, no conjunctival injection   HENT: NCAT, mucous membranes moist   Neck: Supple, no thyromegaly, no lymphadenopathy, trachea midline   Respiratory: Clear to auscultation bilaterally, nonlabored respirations    Cardiovascular: RRR, no murmurs, rubs, or gallops, palpable pedal pulses bilaterally   Gastrointestinal: soft, approp TTP, ND, drain serosanguinous   Musculoskeletal: No bilateral ankle edema, no clubbing or cyanosis to extremities   Psychiatric: Appropriate affect, cooperative   Neurologic: Oriented x 3, strength symmetric in all extremities, Cranial Nerves grossly intact to confrontation, speech clear   Skin: No rashes     Result Review    Result Review:  I have personally reviewed the results from the time of this admission to 2024 06:47 CST and agree with these findings:  [x]  Laboratory list / accordion  []  Microbiology  []  Radiology  []  EKG/Telemetry   []  Cardiology/Vascular   []  Pathology  []  Old records  []  Other:      Assessment & Plan   Assessment / Plan     Brief Patient Summary:  Bernard Matta is a 73 y.o. male who presents s/p ileocectomy for cecal perforation    Active Hospital Problems:  Active Hospital Problems    Diagnosis     **Bowel perforation     Stage 3b chronic kidney disease (CKD)     Hyperkalemia     COVID-19 virus detected     Iron deficiency anemia     Hypertension     Type 2 diabetes mellitus      Plan:   D/c NGT, start sips +chips. Restart home meds  IVF  IV antibiotics  Pain management    DVT  prophylaxis:  Medical and mechanical DVT prophylaxis orders are present.        CODE STATUS:   Code Status (Patient has no pulse and is not breathing): CPR (Attempt to Resuscitate)  Medical Interventions (Patient has pulse or is breathing): Full Support        MD Hemant Angel MD  General Surgery  02/09/24  13:56 CST

## 2024-02-09 NOTE — PLAN OF CARE
Goal Outcome Evaluation:           Progress: no change               Pt is intermittently confused. Up in chair this shift, voids per urinal x1 assist. Chair alarm on. IVF decreased. NG pulled per MD order, water and ice chips given pt tolerated well. Cardene off PRN meds given see vitals and MAR. Safety maintained, weight shifting.

## 2024-02-09 NOTE — PROGRESS NOTES
[facility]  INPATIENT PROGRESS NOTE    Patient Name: Bernard Matta  Date of Admission: 2/6/2024  Today's Date: 02/09/24  Length of Stay: 3  Primary Care Physician: Brando Webb MD    Subjective   Chief Complaint: f/u hyperglycemia    HPI   Patient seen sitting in chair at bedside with spouse.  NG tube is out.  He has no nausea.  Feels better.  Has not yet had anything to drink however.  Denies chest pain.  No fevers.  Blood pressure is doing better but still on Cardene drip.  Apparently last evening he had a few hallucinations.  Resolved.  His mental status is good this morning.        Review of Systems   All pertinent negatives and positives are as above. All other systems have been reviewed and are negative unless otherwise stated.     Objective    Temp:  [97.6 °F (36.4 °C)-98.9 °F (37.2 °C)] 97.6 °F (36.4 °C)  Heart Rate:  [104-150] 130  BP: (134-190)/() 161/90  Physical Exam  GEN: Awake, alert, interactive, in NAD  HEENT:  PERRLA, EOMI, Anicteric  Lungs: diminished, no wheezing or rhonchi  Heart: increased rate, regular rhythm, +S1/s2, no rub  ABD: distended but soft,  no guarding/rebound  Extremities: atraumatic, no cyanosis, no pitting edema  Skin: no rashes or lesions  Neuro: AAOx3, no focal deficits  Psych: normal mood & affect      Results Review:  I have reviewed the labs, radiology results, and diagnostic studies.    Laboratory Data:   Results from last 7 days   Lab Units 02/09/24  0656 02/08/24  0547 02/07/24  0750   WBC 10*3/mm3 9.01 7.12 7.34   HEMOGLOBIN g/dL 9.6* 10.2* 10.2*   HEMATOCRIT % 31.0* 33.3* 33.0*   PLATELETS 10*3/mm3 206 213 159        Results from last 7 days   Lab Units 02/09/24  0654 02/09/24  0002 02/08/24  1823 02/07/24  0750 02/06/24  1350   SODIUM mmol/L 145  145 144 144   < > 130*   POTASSIUM mmol/L 4.1  4.1 3.9 4.3   < > 5.4*   CHLORIDE mmol/L 114*  114* 112* 112*   < > 92*   CO2 mmol/L 17.0*  17.0* 19.0*  18.0*   < > 23.0   BUN mg/dL 42*  42* 46* 46*   < > 50*   CREATININE mg/dL 2.09*  2.09* 2.19* 2.30*   < > 2.36*   CALCIUM mg/dL 9.0  9.0 9.1 8.9   < > 9.6   BILIRUBIN mg/dL 0.4  --   --   --  0.5   ALK PHOS U/L 82  --   --   --  108   ALT (SGPT) U/L 49*  --   --   --  34   AST (SGOT) U/L 32  --   --   --  18   GLUCOSE mg/dL 181*  181* 207* 217*   < > 465*    < > = values in this interval not displayed.       Culture Data:   Blood Culture   Date Value Ref Range Status   02/06/2024 No growth at 24 hours  Preliminary   02/06/2024 Abnormal Stain (C)  Preliminary       Radiology Data:   Imaging Results (Last 24 Hours)       ** No results found for the last 24 hours. **            I have reviewed the patient's current medications.     Assessment/Plan   Assessment  Active Hospital Problems    Diagnosis     **Bowel perforation     Stage 3b chronic kidney disease (CKD)     Hyperkalemia     COVID-19 virus detected     Iron deficiency anemia     Hypertension     Type 2 diabetes mellitus        Treatment Plan  #1 bowel perforation -status post OR.  On Zosyn.  Postop care per surgery     #2 DM2 with hyperglycemia -has been doing well on insulin drip.  His NG tube out and completing today.  Will go ahead and start Levemir sliding scale.  Monitor for adjustments.  Hypoglycemia protocol.     #3 hyperkalemia - improved     #4 CKD 3b -typically GFR is low to mid 30s.  He is essentially back to baseline today at 32.6.     #5 chronic anemia -in the setting of his renal disease and iron deficiency.  Monitor closely.  No obvious signs of bleeding.     #6 hypertension -doing okay on Cardene drip.  Will resume home lisinopril and clonidine and try to wean off drip.  As needed labetalol and hydralazine in place.    #7 COVID-19 -patient first tested positive back on 1/25. He is status post Paxlovid. On room air. Out of isolation.     Medical Decision Making  Number and Complexity of problems: 1 acute, 2 acute on chronic, multiple  chronic  Differential Diagnosis: as above    Conditions and Status       Continuing to improve slowly.  NG tube out today.  Not yet at goal.     [facility] Data  External documents reviewed: none  Cardiac tracing (EKG, telemetry) interpretation: sinus tach on monitor  Radiology interpretation: no new images  Labs reviewed: as above  Any tests that were considered but not ordered: none     Decision rules/scores evaluated (example [id number], Wells, etc): none     Discussed with: patient, nursing staff     Care Planning  Shared decision making: Patient apprised of current labs, vitals, imaging and treatment plan.  They are agreeable with proceeding with plans as discussed.    Code status and discussions: full code    Disposition  Social Determinants of Health that impact treatment or disposition: none    Dispo as per primary team    Electronically signed by Pancho Handley DO, 02/09/24, 10:39 CST.

## 2024-02-10 ENCOUNTER — APPOINTMENT (OUTPATIENT)
Dept: CT IMAGING | Facility: HOSPITAL | Age: 74
DRG: 329 | End: 2024-02-10
Payer: MEDICARE

## 2024-02-10 LAB
ALBUMIN SERPL-MCNC: 3.1 G/DL (ref 3.5–5.2)
ALBUMIN/GLOB SERPL: 0.9 G/DL
ALP SERPL-CCNC: 80 U/L (ref 39–117)
ALT SERPL W P-5'-P-CCNC: 51 U/L (ref 1–41)
ANION GAP SERPL CALCULATED.3IONS-SCNC: 13 MMOL/L (ref 5–15)
AST SERPL-CCNC: 35 U/L (ref 1–40)
BACTERIA FLD CULT: NORMAL
BACTERIA SPEC AEROBE CULT: ABNORMAL
BASOPHILS # BLD AUTO: 0.02 10*3/MM3 (ref 0–0.2)
BASOPHILS NFR BLD AUTO: 0.3 % (ref 0–1.5)
BILIRUB SERPL-MCNC: 0.4 MG/DL (ref 0–1.2)
BUN SERPL-MCNC: 44 MG/DL (ref 8–23)
BUN/CREAT SERPL: 21 (ref 7–25)
CALCIUM SPEC-SCNC: 8.8 MG/DL (ref 8.6–10.5)
CHLORIDE SERPL-SCNC: 116 MMOL/L (ref 98–107)
CO2 SERPL-SCNC: 20 MMOL/L (ref 22–29)
CREAT SERPL-MCNC: 2.1 MG/DL (ref 0.76–1.27)
CYTO UR: NORMAL
DEPRECATED RDW RBC AUTO: 51.8 FL (ref 37–54)
EGFRCR SERPLBLD CKD-EPI 2021: 32.2 ML/MIN/1.73
EOSINOPHIL # BLD AUTO: 0.24 10*3/MM3 (ref 0–0.4)
EOSINOPHIL NFR BLD AUTO: 3.6 % (ref 0.3–6.2)
ERYTHROCYTE [DISTWIDTH] IN BLOOD BY AUTOMATED COUNT: 14.9 % (ref 12.3–15.4)
GLOBULIN UR ELPH-MCNC: 3.3 GM/DL
GLUCOSE BLDC GLUCOMTR-MCNC: 104 MG/DL (ref 70–130)
GLUCOSE BLDC GLUCOMTR-MCNC: 206 MG/DL (ref 70–130)
GLUCOSE BLDC GLUCOMTR-MCNC: 212 MG/DL (ref 70–130)
GLUCOSE BLDC GLUCOMTR-MCNC: 86 MG/DL (ref 70–130)
GLUCOSE BLDC GLUCOMTR-MCNC: 92 MG/DL (ref 70–130)
GLUCOSE SERPL-MCNC: 92 MG/DL (ref 65–99)
GRAM STN SPEC: ABNORMAL
GRAM STN SPEC: NORMAL
GRAM STN SPEC: NORMAL
HCT VFR BLD AUTO: 29 % (ref 37.5–51)
HGB BLD-MCNC: 8.9 G/DL (ref 13–17.7)
IMM GRANULOCYTES # BLD AUTO: 0.02 10*3/MM3 (ref 0–0.05)
IMM GRANULOCYTES NFR BLD AUTO: 0.3 % (ref 0–0.5)
ISOLATED FROM: ABNORMAL
LAB AP CASE REPORT: NORMAL
LAB AP DIAGNOSIS COMMENT: NORMAL
LAB AP SYNOPTIC CHECKLIST: NORMAL
LYMPHOCYTES # BLD AUTO: 0.77 10*3/MM3 (ref 0.7–3.1)
LYMPHOCYTES NFR BLD AUTO: 11.7 % (ref 19.6–45.3)
Lab: NORMAL
MCH RBC QN AUTO: 28.9 PG (ref 26.6–33)
MCHC RBC AUTO-ENTMCNC: 30.7 G/DL (ref 31.5–35.7)
MCV RBC AUTO: 94.2 FL (ref 79–97)
MONOCYTES # BLD AUTO: 0.71 10*3/MM3 (ref 0.1–0.9)
MONOCYTES NFR BLD AUTO: 10.8 % (ref 5–12)
NEUTROPHILS NFR BLD AUTO: 4.82 10*3/MM3 (ref 1.7–7)
NEUTROPHILS NFR BLD AUTO: 73.3 % (ref 42.7–76)
NRBC BLD AUTO-RTO: 0 /100 WBC (ref 0–0.2)
PATH REPORT.FINAL DX SPEC: NORMAL
PATH REPORT.GROSS SPEC: NORMAL
PLATELET # BLD AUTO: 189 10*3/MM3 (ref 140–450)
PMV BLD AUTO: 9 FL (ref 6–12)
POTASSIUM SERPL-SCNC: 3.7 MMOL/L (ref 3.5–5.2)
PROT SERPL-MCNC: 6.4 G/DL (ref 6–8.5)
RBC # BLD AUTO: 3.08 10*6/MM3 (ref 4.14–5.8)
SODIUM SERPL-SCNC: 149 MMOL/L (ref 136–145)
WBC NRBC COR # BLD AUTO: 6.58 10*3/MM3 (ref 3.4–10.8)

## 2024-02-10 PROCEDURE — 80053 COMPREHEN METABOLIC PANEL: CPT | Performed by: INTERNAL MEDICINE

## 2024-02-10 PROCEDURE — 82948 REAGENT STRIP/BLOOD GLUCOSE: CPT

## 2024-02-10 PROCEDURE — 70450 CT HEAD/BRAIN W/O DYE: CPT

## 2024-02-10 PROCEDURE — 85025 COMPLETE CBC W/AUTO DIFF WBC: CPT | Performed by: SURGERY

## 2024-02-10 PROCEDURE — 25810000003 SODIUM CHLORIDE 0.9 % SOLUTION: Performed by: INTERNAL MEDICINE

## 2024-02-10 PROCEDURE — 63710000001 INSULIN LISPRO (HUMAN) PER 5 UNITS: Performed by: INTERNAL MEDICINE

## 2024-02-10 PROCEDURE — 25010000002 LORAZEPAM PER 2 MG: Performed by: INTERNAL MEDICINE

## 2024-02-10 PROCEDURE — 25010000002 HALOPERIDOL LACTATE PER 5 MG: Performed by: HOSPITALIST

## 2024-02-10 PROCEDURE — 25010000002 HYDRALAZINE PER 20 MG: Performed by: INTERNAL MEDICINE

## 2024-02-10 PROCEDURE — 25010000002 LABETALOL 5 MG/ML SOLUTION: Performed by: INTERNAL MEDICINE

## 2024-02-10 PROCEDURE — 87040 BLOOD CULTURE FOR BACTERIA: CPT | Performed by: INTERNAL MEDICINE

## 2024-02-10 PROCEDURE — 25010000002 HEPARIN (PORCINE) PER 1000 UNITS: Performed by: SURGERY

## 2024-02-10 PROCEDURE — 25010000002 NICARDIPINE 2.5 MG/ML SOLUTION: Performed by: INTERNAL MEDICINE

## 2024-02-10 PROCEDURE — 25010000002 PIPERACILLIN SOD-TAZOBACTAM PER 1 G: Performed by: SURGERY

## 2024-02-10 RX ORDER — DEXTROSE AND SODIUM CHLORIDE 5; .45 G/100ML; G/100ML
75 INJECTION, SOLUTION INTRAVENOUS CONTINUOUS
Status: DISCONTINUED | OUTPATIENT
Start: 2024-02-10 | End: 2024-02-11

## 2024-02-10 RX ORDER — LORAZEPAM 2 MG/ML
1 INJECTION INTRAMUSCULAR EVERY 4 HOURS PRN
Status: DISCONTINUED | OUTPATIENT
Start: 2024-02-10 | End: 2024-02-13

## 2024-02-10 RX ORDER — HALOPERIDOL 5 MG/ML
2 INJECTION INTRAMUSCULAR ONCE
Status: COMPLETED | OUTPATIENT
Start: 2024-02-10 | End: 2024-02-10

## 2024-02-10 RX ADMIN — INSULIN LISPRO 3 UNITS: 100 INJECTION, SOLUTION INTRAVENOUS; SUBCUTANEOUS at 22:40

## 2024-02-10 RX ADMIN — PIPERACILLIN SODIUM AND TAZOBACTAM SODIUM 3.38 G: 3; .375 INJECTION, POWDER, LYOPHILIZED, FOR SOLUTION INTRAVENOUS at 00:04

## 2024-02-10 RX ADMIN — Medication 1 APPLICATION: at 22:36

## 2024-02-10 RX ADMIN — HALOPERIDOL LACTATE 2 MG: 5 INJECTION, SOLUTION INTRAMUSCULAR at 03:34

## 2024-02-10 RX ADMIN — DEXTROSE AND SODIUM CHLORIDE 75 ML/HR: 5; 450 INJECTION, SOLUTION INTRAVENOUS at 12:30

## 2024-02-10 RX ADMIN — PIPERACILLIN SODIUM AND TAZOBACTAM SODIUM 3.38 G: 3; .375 INJECTION, POWDER, LYOPHILIZED, FOR SOLUTION INTRAVENOUS at 22:41

## 2024-02-10 RX ADMIN — NICARDIPINE HYDROCHLORIDE 10 MG/HR: 25 INJECTION, SOLUTION INTRAVENOUS at 12:34

## 2024-02-10 RX ADMIN — TIMOLOL MALEATE: 5 SOLUTION/ DROPS OPHTHALMIC at 12:27

## 2024-02-10 RX ADMIN — HEPARIN SODIUM 5000 UNITS: 5000 INJECTION INTRAVENOUS; SUBCUTANEOUS at 22:36

## 2024-02-10 RX ADMIN — LABETALOL HYDROCHLORIDE 20 MG: 5 INJECTION INTRAVENOUS at 08:29

## 2024-02-10 RX ADMIN — CHLORHEXIDINE GLUCONATE 1 APPLICATION: 500 CLOTH TOPICAL at 03:34

## 2024-02-10 RX ADMIN — PIPERACILLIN SODIUM AND TAZOBACTAM SODIUM 3.38 G: 3; .375 INJECTION, POWDER, LYOPHILIZED, FOR SOLUTION INTRAVENOUS at 16:12

## 2024-02-10 RX ADMIN — NICARDIPINE HYDROCHLORIDE 5 MG/HR: 25 INJECTION, SOLUTION INTRAVENOUS at 09:20

## 2024-02-10 RX ADMIN — HYDRALAZINE HYDROCHLORIDE 10 MG: 20 INJECTION INTRAMUSCULAR; INTRAVENOUS at 08:03

## 2024-02-10 RX ADMIN — INSULIN LISPRO 3 UNITS: 100 INJECTION, SOLUTION INTRAVENOUS; SUBCUTANEOUS at 17:21

## 2024-02-10 RX ADMIN — TIMOLOL MALEATE: 5 SOLUTION/ DROPS OPHTHALMIC at 22:35

## 2024-02-10 RX ADMIN — HEPARIN SODIUM 5000 UNITS: 5000 INJECTION INTRAVENOUS; SUBCUTANEOUS at 06:31

## 2024-02-10 RX ADMIN — LABETALOL HYDROCHLORIDE 20 MG: 5 INJECTION INTRAVENOUS at 02:37

## 2024-02-10 RX ADMIN — HEPARIN SODIUM 5000 UNITS: 5000 INJECTION INTRAVENOUS; SUBCUTANEOUS at 16:15

## 2024-02-10 RX ADMIN — PIPERACILLIN SODIUM AND TAZOBACTAM SODIUM 3.38 G: 3; .375 INJECTION, POWDER, LYOPHILIZED, FOR SOLUTION INTRAVENOUS at 07:25

## 2024-02-10 RX ADMIN — Medication 1 APPLICATION: at 16:15

## 2024-02-10 RX ADMIN — LORAZEPAM 1 MG: 2 INJECTION INTRAMUSCULAR; INTRAVENOUS at 18:30

## 2024-02-10 RX ADMIN — HALOPERIDOL LACTATE 2 MG: 5 INJECTION, SOLUTION INTRAMUSCULAR at 04:25

## 2024-02-10 RX ADMIN — HYOSCYAMINE SULFATE: 16 SOLUTION at 12:29

## 2024-02-10 RX ADMIN — LATANOPROST 1 DROP: 50 SOLUTION/ DROPS OPHTHALMIC at 22:37

## 2024-02-10 RX ADMIN — LABETALOL HYDROCHLORIDE 20 MG: 5 INJECTION INTRAVENOUS at 23:34

## 2024-02-10 NOTE — PLAN OF CARE
Goal Outcome Evaluation:   Pt oriented only to self at this time. Oriented x4 at beginning of shift. Pt began to hallucinate and tried many times to get out of bed. Pt pulled out SONNY drain, Dr. Avitia notified. Zyprexa given x2. Did not calm pt down. Vest restraint put on while pt in chair. Discussed with Hospitalist. In and out cath performed. Bladder scan said 409, 575 out. Labetalol given x1. Pt will not remain still. Haldol 2 mg given x2. HR went up to 170s. Bed alarm in use. Safety maintained.

## 2024-02-10 NOTE — PROGRESS NOTES
[facility]   Progress Note    Patient Name: Bernard Matta  : 1950  MRN: [id number]  Primary Care Physician:  Brando Webb MD  Date of admission: 2024    Subjective   Subjective   Presents this am with more confusion.  States he feels he needs to have a BM.  No n/v. Pulled SONNY out yesterday due to confusion.          Objective     Vitals:   Temp:  [98.2 °F (36.8 °C)-98.7 °F (37.1 °C)] 98.7 °F (37.1 °C)  Heart Rate:  [] 120  BP: (137-206)/() 206/84  Physical Exam    Constitutional: Awake, alert   HENT: NCAT, mucous membranes moist   Neck: Supple, no thyromegaly, no lymphadenopathy, trachea midline   Respiratory: Clear to auscultation bilaterally, nonlabored respirations    Cardiovascular: RRR, no murmurs, rubs, or gallops, palpable pedal pulses bilaterally   Gastrointestinal: soft, approp TTP, ND + BS   Musculoskeletal: No bilateral ankle edema, no clubbing or cyanosis to extremities   Psychiatric: confused    Result Review    Result Review:  I have personally reviewed the results from the time of this admission to 2/10/2024 09:00 CST and agree with these findings:  [x]  Laboratory list / accordion  []  Microbiology  []  Radiology  []  EKG/Telemetry   []  Cardiology/Vascular   []  Pathology  []  Old records  []  Other:      Assessment & Plan   Assessment / Plan     Brief Patient Summary:  Bernard Matta is a 73 y.o. male who presents s/p ileocectomy for cecal perforation    Active Hospital Problems:  Active Hospital Problems    Diagnosis     **Bowel perforation     Stage 3b chronic kidney disease (CKD)     Hyperkalemia     COVID-19 virus detected     Iron deficiency anemia     Hypertension     Type 2 diabetes mellitus      Plan: Yesterday patient was started on sips +chips. Restart home meds however he now has more confusion and is concerned of protecting his airway at this time due his mental status.  Will continue n.p.o. status until resolution or improvement of his mental  status.    IVF  IV antibiotics and hold pain medication pain management    DVT prophylaxis:  Medical and mechanical DVT prophylaxis orders are present.        CODE STATUS:   Code Status (Patient has no pulse and is not breathing): CPR (Attempt to Resuscitate)  Medical Interventions (Patient has pulse or is breathing): Full Support    Dr. Avitia  General Surgery  02/10/24  13:56 CST

## 2024-02-10 NOTE — PROGRESS NOTES
[facility] Medicine Services  INPATIENT PROGRESS NOTE    Patient Name: Bernard Matta  Date of Admission: 2/6/2024  Today's Date: 02/10/24  Length of Stay: 4  Primary Care Physician: Brando Webb MD    Subjective   Chief Complaint: f/u hyperglycemia    HPI   Patient seen in bed.  Spouse at bedside.  Apparently last night he became confused again.  Was pulling at things and had to be restrained after receiving Haldol.  This morning he is more calm but he is still confused.  He knows is 2024 but cannot tell me the month or the year.  He is mumbling a lot.  He seems somewhat paranoid.  Wife tells me he has no memory issues.  She states he does not drink alcohol.  Otherwise blood pressure is back up this morning and nursing has been unable to give pills due to mental status.  Afebrile overnight.        Review of Systems   All pertinent negatives and positives are as above. All other systems have been reviewed and are negative unless otherwise stated.     Objective    Temp:  [98.2 °F (36.8 °C)-98.7 °F (37.1 °C)] 98.7 °F (37.1 °C)  Heart Rate:  [] 120  BP: (137-206)/() 206/84  Physical Exam  GEN: Awake, alert, interactive, in NAD  HEENT:  PERRLA, EOMI, Anicteric  Lungs: diminished, no wheezing or rhonchi  Heart: increased rate, regular rhythm, +S1/s2, no rub  ABD: distended but soft,  no guarding/rebound  Extremities: atraumatic, no cyanosis, no pitting edema  Skin: no rashes or lesions  Neuro: AAOx3, no focal deficits  Psych: normal mood & affect      Results Review:  I have reviewed the labs, radiology results, and diagnostic studies.    Laboratory Data:   Results from last 7 days   Lab Units 02/10/24  0245 02/09/24  0656 02/08/24  0547   WBC 10*3/mm3 6.58 9.01 7.12   HEMOGLOBIN g/dL 8.9* 9.6* 10.2*   HEMATOCRIT % 29.0* 31.0* 33.3*   PLATELETS 10*3/mm3 189 206 213        Results from last 7 days   Lab Units 02/10/24  0245 02/09/24  1118 02/09/24  0654  02/07/24  0750 02/06/24  1350   SODIUM mmol/L 149* 145 145  145   < > 130*   POTASSIUM mmol/L 3.7 4.6 4.1  4.1   < > 5.4*   CHLORIDE mmol/L 116* 112* 114*  114*   < > 92*   CO2 mmol/L 20.0* 17.0* 17.0*  17.0*   < > 23.0   BUN mg/dL 44* 45* 42*  42*   < > 50*   CREATININE mg/dL 2.10* 2.08* 2.09*  2.09*   < > 2.36*   CALCIUM mg/dL 8.8 9.1 9.0  9.0   < > 9.6   BILIRUBIN mg/dL 0.4  --  0.4  --  0.5   ALK PHOS U/L 80  --  82  --  108   ALT (SGPT) U/L 51*  --  49*  --  34   AST (SGOT) U/L 35  --  32  --  18   GLUCOSE mg/dL 92 265* 181*  181*   < > 465*    < > = values in this interval not displayed.       Culture Data:   Blood Culture   Date Value Ref Range Status   02/06/2024 No growth at 24 hours  Preliminary   02/06/2024 Abnormal Stain (C)  Preliminary       Radiology Data:   Imaging Results (Last 24 Hours)       ** No results found for the last 24 hours. **            I have reviewed the patient's current medications.     Assessment/Plan   Assessment  Active Hospital Problems    Diagnosis     **Bowel perforation     Stage 3b chronic kidney disease (CKD)     Hyperkalemia     COVID-19 virus detected     Iron deficiency anemia     Hypertension     Type 2 diabetes mellitus        Treatment Plan  #1 bowel perforation -status post OR.  On Zosyn.  Postop care per surgery     #2 DM2 with hyperglycemia -transitioned off insulin drip yesterday.  Sugar stable this a.m. but is not eating.  Will avoid further long-acting for now and use sliding scale.     #3 hyperkalemia - improved     #4 CKD 3b -typically GFR is low to mid 30s.  He is essentially back to baseline today at 32.6.  Continue gentle IVF while taking p.o.     #5 chronic anemia -in the setting of his renal disease and iron deficiency.  Monitor closely.  No obvious signs of bleeding.     #6 hypertension -was transitioned off Cardene drip yesterday and was doing okay.  Overnight unable to take his meds and he is hypertensive.  PRNs not keeping them down.  Will  resume Cardene drip.    #7 COVID-19 -patient first tested positive back on 1/25. He is status post Paxlovid. On room air. Out of isolation.     #8 agitation/delirium -2 nights ago patient had some hallucinations and then last night acutely worsened with agitation and required some Haldol.  He is now restrained.  Initially felt he was probably having some more delirium.  Has been fairly tachycardic for several days however.  He looks a little tremulous and paranoid today.  Wife states he does not drink but he is acting much like alcohol withdrawal.  She also states he has no memory issues and is very sharp.  No history of dementia.  For now we will continue with some Haldol as needed may also try a low bit of Ativan intermittently in case this is secondary withdrawal.    Medical Decision Making  Number and Complexity of problems: 1 acute, 2 acute on chronic, multiple chronic  Differential Diagnosis: as above    Conditions and Status       Worsening mental status, needing to restart cardene gtt     MDM Data  External documents reviewed: none  Cardiac tracing (EKG, telemetry) interpretation: sinus tach on monitor  Radiology interpretation: no new images  Labs reviewed: as above  Any tests that were considered but not ordered: none     Decision rules/scores evaluated (example [id number], Wells, etc): none     Discussed with: patient, nursing staff     Care Planning  Shared decision making: Patient apprised of current labs, vitals, imaging and treatment plan.  They are agreeable with proceeding with plans as discussed.    Code status and discussions: full code    Disposition  Social Determinants of Health that impact treatment or disposition: none    Dispo as per primary team, would keep in unit today given needs    Electronically signed by Pancho Handley DO, 02/10/24, 09:40 CST.

## 2024-02-11 ENCOUNTER — APPOINTMENT (OUTPATIENT)
Dept: GENERAL RADIOLOGY | Facility: HOSPITAL | Age: 74
DRG: 329 | End: 2024-02-11
Payer: MEDICARE

## 2024-02-11 LAB
ALBUMIN SERPL-MCNC: 3.1 G/DL (ref 3.5–5.2)
ALBUMIN/GLOB SERPL: 1 G/DL
ALP SERPL-CCNC: 79 U/L (ref 39–117)
ALT SERPL W P-5'-P-CCNC: 49 U/L (ref 1–41)
ANION GAP SERPL CALCULATED.3IONS-SCNC: 16 MMOL/L (ref 5–15)
AST SERPL-CCNC: 30 U/L (ref 1–40)
BASOPHILS # BLD AUTO: 0.03 10*3/MM3 (ref 0–0.2)
BASOPHILS NFR BLD AUTO: 0.5 % (ref 0–1.5)
BILIRUB SERPL-MCNC: 0.3 MG/DL (ref 0–1.2)
BUN SERPL-MCNC: 39 MG/DL (ref 8–23)
BUN/CREAT SERPL: 18.7 (ref 7–25)
CALCIUM SPEC-SCNC: 8.6 MG/DL (ref 8.6–10.5)
CHLORIDE SERPL-SCNC: 114 MMOL/L (ref 98–107)
CO2 SERPL-SCNC: 19 MMOL/L (ref 22–29)
CREAT SERPL-MCNC: 2.09 MG/DL (ref 0.76–1.27)
DEPRECATED RDW RBC AUTO: 54.3 FL (ref 37–54)
EGFRCR SERPLBLD CKD-EPI 2021: 32.4 ML/MIN/1.73
EOSINOPHIL # BLD AUTO: 0.2 10*3/MM3 (ref 0–0.4)
EOSINOPHIL NFR BLD AUTO: 3.5 % (ref 0.3–6.2)
ERYTHROCYTE [DISTWIDTH] IN BLOOD BY AUTOMATED COUNT: 15.2 % (ref 12.3–15.4)
GLOBULIN UR ELPH-MCNC: 3.2 GM/DL
GLUCOSE BLDC GLUCOMTR-MCNC: 258 MG/DL (ref 70–130)
GLUCOSE BLDC GLUCOMTR-MCNC: 264 MG/DL (ref 70–130)
GLUCOSE BLDC GLUCOMTR-MCNC: 270 MG/DL (ref 70–130)
GLUCOSE SERPL-MCNC: 237 MG/DL (ref 65–99)
HCT VFR BLD AUTO: 29.6 % (ref 37.5–51)
HGB BLD-MCNC: 8.8 G/DL (ref 13–17.7)
IMM GRANULOCYTES # BLD AUTO: 0.03 10*3/MM3 (ref 0–0.05)
IMM GRANULOCYTES NFR BLD AUTO: 0.5 % (ref 0–0.5)
LYMPHOCYTES # BLD AUTO: 0.73 10*3/MM3 (ref 0.7–3.1)
LYMPHOCYTES NFR BLD AUTO: 12.9 % (ref 19.6–45.3)
MCH RBC QN AUTO: 28.8 PG (ref 26.6–33)
MCHC RBC AUTO-ENTMCNC: 29.7 G/DL (ref 31.5–35.7)
MCV RBC AUTO: 96.7 FL (ref 79–97)
MONOCYTES # BLD AUTO: 0.65 10*3/MM3 (ref 0.1–0.9)
MONOCYTES NFR BLD AUTO: 11.5 % (ref 5–12)
NEUTROPHILS NFR BLD AUTO: 4.03 10*3/MM3 (ref 1.7–7)
NEUTROPHILS NFR BLD AUTO: 71.1 % (ref 42.7–76)
NRBC BLD AUTO-RTO: 0 /100 WBC (ref 0–0.2)
PLATELET # BLD AUTO: 181 10*3/MM3 (ref 140–450)
PMV BLD AUTO: 8.9 FL (ref 6–12)
POTASSIUM SERPL-SCNC: 3.5 MMOL/L (ref 3.5–5.2)
PROT SERPL-MCNC: 6.3 G/DL (ref 6–8.5)
RBC # BLD AUTO: 3.06 10*6/MM3 (ref 4.14–5.8)
SODIUM SERPL-SCNC: 149 MMOL/L (ref 136–145)
WBC NRBC COR # BLD AUTO: 5.67 10*3/MM3 (ref 3.4–10.8)

## 2024-02-11 PROCEDURE — 63710000001 INSULIN LISPRO (HUMAN) PER 5 UNITS: Performed by: INTERNAL MEDICINE

## 2024-02-11 PROCEDURE — 25010000002 LABETALOL 5 MG/ML SOLUTION: Performed by: INTERNAL MEDICINE

## 2024-02-11 PROCEDURE — 82948 REAGENT STRIP/BLOOD GLUCOSE: CPT

## 2024-02-11 PROCEDURE — 25010000002 HEPARIN (PORCINE) PER 1000 UNITS: Performed by: SURGERY

## 2024-02-11 PROCEDURE — 25010000002 PIPERACILLIN SOD-TAZOBACTAM PER 1 G: Performed by: SURGERY

## 2024-02-11 PROCEDURE — 25010000002 HYDRALAZINE PER 20 MG: Performed by: INTERNAL MEDICINE

## 2024-02-11 PROCEDURE — 74018 RADEX ABDOMEN 1 VIEW: CPT

## 2024-02-11 PROCEDURE — 63710000001 INSULIN REGULAR HUMAN PER 5 UNITS: Performed by: INTERNAL MEDICINE

## 2024-02-11 PROCEDURE — 85025 COMPLETE CBC W/AUTO DIFF WBC: CPT | Performed by: INTERNAL MEDICINE

## 2024-02-11 PROCEDURE — 80053 COMPREHEN METABOLIC PANEL: CPT | Performed by: INTERNAL MEDICINE

## 2024-02-11 PROCEDURE — 25010000002 LORAZEPAM PER 2 MG: Performed by: INTERNAL MEDICINE

## 2024-02-11 PROCEDURE — 63710000001 INSULIN DETEMIR PER 5 UNITS: Performed by: INTERNAL MEDICINE

## 2024-02-11 RX ORDER — ATORVASTATIN CALCIUM 40 MG/1
40 TABLET, FILM COATED ORAL NIGHTLY
Status: DISCONTINUED | OUTPATIENT
Start: 2024-02-11 | End: 2024-02-14

## 2024-02-11 RX ORDER — AMITRIPTYLINE HYDROCHLORIDE 10 MG/1
10 TABLET, FILM COATED ORAL NIGHTLY PRN
Status: DISCONTINUED | OUTPATIENT
Start: 2024-02-11 | End: 2024-02-12

## 2024-02-11 RX ORDER — TERAZOSIN 1 MG/1
1 CAPSULE ORAL 2 TIMES DAILY
Status: DISCONTINUED | OUTPATIENT
Start: 2024-02-11 | End: 2024-02-11

## 2024-02-11 RX ORDER — SODIUM BICARBONATE 650 MG/1
650 TABLET ORAL DAILY
Status: DISCONTINUED | OUTPATIENT
Start: 2024-02-11 | End: 2024-02-14

## 2024-02-11 RX ORDER — LISINOPRIL 10 MG/1
10 TABLET ORAL DAILY
Status: DISCONTINUED | OUTPATIENT
Start: 2024-02-11 | End: 2024-02-14

## 2024-02-11 RX ORDER — ASPIRIN 81 MG/1
81 TABLET, CHEWABLE ORAL DAILY
Status: DISCONTINUED | OUTPATIENT
Start: 2024-02-11 | End: 2024-02-14

## 2024-02-11 RX ORDER — TERAZOSIN 1 MG/1
1 CAPSULE ORAL 2 TIMES DAILY
Status: DISCONTINUED | OUTPATIENT
Start: 2024-02-11 | End: 2024-02-14

## 2024-02-11 RX ORDER — CLONIDINE HYDROCHLORIDE 0.2 MG/1
0.2 TABLET ORAL 2 TIMES DAILY
Status: DISCONTINUED | OUTPATIENT
Start: 2024-02-11 | End: 2024-02-14

## 2024-02-11 RX ORDER — AMLODIPINE BESYLATE 10 MG/1
10 TABLET ORAL
Status: DISCONTINUED | OUTPATIENT
Start: 2024-02-11 | End: 2024-02-14

## 2024-02-11 RX ADMIN — CLONIDINE HYDROCHLORIDE 0.2 MG: 0.2 TABLET ORAL at 09:57

## 2024-02-11 RX ADMIN — DEXTROSE AND SODIUM CHLORIDE 75 ML/HR: 5; 450 INJECTION, SOLUTION INTRAVENOUS at 06:59

## 2024-02-11 RX ADMIN — SODIUM BICARBONATE 650 MG: 650 TABLET ORAL at 09:57

## 2024-02-11 RX ADMIN — LORAZEPAM 1 MG: 2 INJECTION INTRAMUSCULAR; INTRAVENOUS at 06:22

## 2024-02-11 RX ADMIN — TERAZOSIN HYDROCHLORIDE 1 MG: 1 CAPSULE ORAL at 20:36

## 2024-02-11 RX ADMIN — HEPARIN SODIUM 5000 UNITS: 5000 INJECTION INTRAVENOUS; SUBCUTANEOUS at 06:22

## 2024-02-11 RX ADMIN — INSULIN HUMAN 4 UNITS: 100 INJECTION, SOLUTION PARENTERAL at 13:08

## 2024-02-11 RX ADMIN — INSULIN DETEMIR 12 UNITS: 100 INJECTION, SOLUTION SUBCUTANEOUS at 13:08

## 2024-02-11 RX ADMIN — PIPERACILLIN SODIUM AND TAZOBACTAM SODIUM 3.38 G: 3; .375 INJECTION, POWDER, LYOPHILIZED, FOR SOLUTION INTRAVENOUS at 08:09

## 2024-02-11 RX ADMIN — HEPARIN SODIUM 5000 UNITS: 5000 INJECTION INTRAVENOUS; SUBCUTANEOUS at 13:09

## 2024-02-11 RX ADMIN — LISINOPRIL 10 MG: 10 TABLET ORAL at 09:57

## 2024-02-11 RX ADMIN — LATANOPROST 1 DROP: 50 SOLUTION/ DROPS OPHTHALMIC at 20:16

## 2024-02-11 RX ADMIN — ATORVASTATIN CALCIUM 40 MG: 40 TABLET ORAL at 20:12

## 2024-02-11 RX ADMIN — TIMOLOL MALEATE: 5 SOLUTION/ DROPS OPHTHALMIC at 20:15

## 2024-02-11 RX ADMIN — ASPIRIN 81 MG: 81 TABLET, CHEWABLE ORAL at 13:15

## 2024-02-11 RX ADMIN — CLONIDINE HYDROCHLORIDE 0.2 MG: 0.2 TABLET ORAL at 20:12

## 2024-02-11 RX ADMIN — INSULIN LISPRO 4 UNITS: 100 INJECTION, SOLUTION INTRAVENOUS; SUBCUTANEOUS at 08:09

## 2024-02-11 RX ADMIN — CHLORHEXIDINE GLUCONATE 1 APPLICATION: 500 CLOTH TOPICAL at 06:22

## 2024-02-11 RX ADMIN — HYOSCYAMINE SULFATE: 16 SOLUTION at 12:39

## 2024-02-11 RX ADMIN — AMLODIPINE BESYLATE 10 MG: 10 TABLET ORAL at 14:50

## 2024-02-11 RX ADMIN — AMITRIPTYLINE HYDROCHLORIDE 10 MG: 10 TABLET, FILM COATED ORAL at 20:36

## 2024-02-11 RX ADMIN — TIMOLOL MALEATE: 5 SOLUTION/ DROPS OPHTHALMIC at 09:59

## 2024-02-11 RX ADMIN — INSULIN HUMAN 4 UNITS: 100 INJECTION, SOLUTION PARENTERAL at 17:57

## 2024-02-11 RX ADMIN — Medication 1 APPLICATION: at 20:12

## 2024-02-11 RX ADMIN — HYDRALAZINE HYDROCHLORIDE 10 MG: 20 INJECTION INTRAMUSCULAR; INTRAVENOUS at 13:44

## 2024-02-11 RX ADMIN — Medication 1 APPLICATION: at 08:10

## 2024-02-11 RX ADMIN — HEPARIN SODIUM 5000 UNITS: 5000 INJECTION INTRAVENOUS; SUBCUTANEOUS at 21:59

## 2024-02-11 RX ADMIN — LABETALOL HYDROCHLORIDE 20 MG: 5 INJECTION INTRAVENOUS at 20:13

## 2024-02-11 RX ADMIN — LABETALOL HYDROCHLORIDE 20 MG: 5 INJECTION INTRAVENOUS at 08:09

## 2024-02-11 NOTE — PROGRESS NOTES
[facility]   Progress Note    Patient Name: Bernard Matta  : 1950  MRN: [id number]  Primary Care Physician:  Brando Webb MD  Date of admission: 2024    Subjective   Subjective   Presents this am with confusion.  Patient required sedative last night to deal with his agitation.      Objective     Vitals:   Temp:  [98.2 °F (36.8 °C)-99.6 °F (37.6 °C)] 98.2 °F (36.8 °C)  Heart Rate:  [] 111  Resp:  [16-20] 16  BP: (122-191)/(60-85) 191/85  Patient saturations are 100%            Physical Exam    Constitutional: Awake, alert   Gastrointestinal: soft, approp TTP, ND + BS, no wounds clean dry and intact    Musculoskeletal: No bilateral ankle edema, no clubbing or cyanosis to extremities   Psychiatric: confused    Result Review    Result Review:  I have personally reviewed the results from the time of this admission to 2024 10:43 CST and agree with these findings:  [x]  Laboratory list / accordion  []  Microbiology  []  Radiology  []  EKG/Telemetry   []  Cardiology/Vascular   []  Pathology  []  Old records  []  Other:      Assessment & Plan   Assessment / Plan     Brief Patient Summary:  Bernard Matta is a 73 y.o. male who presents s/p ileocectomy for cecal perforation    Active Hospital Problems:  Active Hospital Problems    Diagnosis     **Bowel perforation     Stage 3b chronic kidney disease (CKD)     Hyperkalemia     COVID-19 virus detected     Iron deficiency anemia     Hypertension     Type 2 diabetes mellitus      Plan: Yesterday patient was started on sips +chips.  Due to his confusion or concerns of not protecting his airway this was stopped and feeding tube placed.    Continued daily dressing changes and appropriate nutritional supplementation.    DVT prophylaxis:  Medical and mechanical DVT prophylaxis orders are present.        CODE STATUS:   Code Status (Patient has no pulse and is not breathing): CPR (Attempt to Resuscitate)  Medical Interventions (Patient has pulse or  is breathing): Full Support    Dr. Avitia  General Surgery  02/11/24  13:56 CST

## 2024-02-11 NOTE — PLAN OF CARE
Goal Outcome Evaluation:  Plan of Care Reviewed With: caregiver        Progress: no change  Outcome Evaluation: Ntn assessment completed. Consult received for enteral ntn. Initiate Novasource Renal at 15 ml/hr x 22 hrs, increase by 10 ml/hr every 12 hrs as tolerated until goal rate of 40 ml/hr achieved. Routine water flush of 50 ml/hr.  Clear liquid diet. Con to follow for plan of care.

## 2024-02-11 NOTE — PROGRESS NOTES
[facility] Medicine Services  INPATIENT PROGRESS NOTE    Patient Name: Bernard Matta  Date of Admission: 2/6/2024  Today's Date: 02/11/24  Length of Stay: 5  Primary Care Physician: Brando Webb MD    Subjective   Chief Complaint: f/u hyperglycemia    HPI   Patient again became agitated last night.  Received Ativan a couple times.  He is more drowsy this morning.  Head CT was done last evening and nonacute.  He is moving all extremities and nonfocal this a.m. does not talking very much.  Afebrile.        Review of Systems   Unable to assess due to patient factors    Objective    Temp:  [98.2 °F (36.8 °C)-99.6 °F (37.6 °C)] 98.2 °F (36.8 °C)  Heart Rate:  [] 111  Resp:  [16-20] 16  BP: (122-192)/(60-98) 191/85  Physical Exam  GEN: Awake, alert, interactive, in NAD  HEENT:  PERRLA, EOMI, Anicteric  Lungs: diminished, no wheezing or rhonchi  Heart: increased rate, regular rhythm, +S1/s2, no rub  ABD: Less distended today, still soft,  no guarding/rebound  Extremities: atraumatic, no cyanosis, no pitting edema  Skin: no rashes or lesions  Neuro: AAOx1, no obvious focal deficits  Psych: unable to assess today      Results Review:  I have reviewed the labs, radiology results, and diagnostic studies.    Laboratory Data:   Results from last 7 days   Lab Units 02/11/24  0253 02/10/24  0245 02/09/24  0656   WBC 10*3/mm3 5.67 6.58 9.01   HEMOGLOBIN g/dL 8.8* 8.9* 9.6*   HEMATOCRIT % 29.6* 29.0* 31.0*   PLATELETS 10*3/mm3 181 189 206        Results from last 7 days   Lab Units 02/11/24  0253 02/10/24  0245 02/09/24  1118 02/09/24  0654   SODIUM mmol/L 149* 149* 145 145  145   POTASSIUM mmol/L 3.5 3.7 4.6 4.1  4.1   CHLORIDE mmol/L 114* 116* 112* 114*  114*   CO2 mmol/L 19.0* 20.0* 17.0* 17.0*  17.0*   BUN mg/dL 39* 44* 45* 42*  42*   CREATININE mg/dL 2.09* 2.10* 2.08* 2.09*  2.09*   CALCIUM mg/dL 8.6 8.8 9.1 9.0  9.0   BILIRUBIN mg/dL 0.3 0.4  --  0.4   ALK PHOS U/L  79 80  --  82   ALT (SGPT) U/L 49* 51*  --  49*   AST (SGOT) U/L 30 35  --  32   GLUCOSE mg/dL 237* 92 265* 181*  181*       Culture Data:   Blood Culture   Date Value Ref Range Status   02/06/2024 No growth at 24 hours  Preliminary   02/06/2024 Abnormal Stain (C)  Preliminary       Radiology Data:   Imaging Results (Last 24 Hours)       Procedure Component Value Units Date/Time    CT Head Without Contrast [[id number]] Collected: 02/11/24 0807     Updated: 02/11/24 0813    Narrative:      EXAM: CT HEAD WO CONTRAST-      DATE: 2/10/2024 10:13 PM     HISTORY: Mental status change, unknown cause; K63.1-Perforation of  intestine (nontraumatic); K65.9-Peritonitis, unspecified; K66.8-Other  specified disorders of peritoneum       COMPARISON: None available.     DOSE LENGTH PRODUCT: 701.01 mGy.cm  Automated exposure control was also  utilized to decrease patient radiation dose.     TECHNIQUE: Unenhanced CT images obtained from vertex to skull base with  multiplanar reformats.     FINDINGS:  There is no acute intracranial hemorrhage, midline shift, mass effect,  or hydrocephalus. There is no CT evidence for acute infarct.     There are chronic changes with volume loss and chronic small vessel  ischemic change of the periventricular white matter.      SOFT TISSUES: The scalp soft tissues are unremarkable.        SINUS: There is mucosal thickening at the right frontal sinus and  ethmoid air cells and left sphenoid sinus. Mastoid air cells are clear.     ORBITS: The visualized orbits and globes are unremarkable.          Impression:      1. Chronic changes and no acute intracranial findings.   2. Paranasal sinus mucosal thickening.     These findings are in agreement with the critical and emergent findings  from the [facility] preliminary report.     This report was signed and finalized on 2/11/2024 8:09 AM by Jesus Moore.       XR Abdomen KUB [[id number]] Collected: 02/11/24 0804     Updated: 02/11/24 0808    Narrative:       EXAM: XR ABDOMEN KUB-      DATE: 2/10/2024 11:12 PM     HISTORY: NG; K63.1-Perforation of intestine (nontraumatic);  K65.9-Peritonitis, unspecified; K66.8-Other specified disorders of  peritoneum       COMPARISON: 2/6/2024.     TECHNIQUE:   Frontal view of the abdomen. 1 image.     FINDINGS:    NG tube is been removed. A feeding tube has been placed. The tip is in  the proximal stomach. There are prominent air-filled loops of small  bowel measuring 3.2 cm and mild distention of the stomach with air. Lung  bases visualized are grossly clear.          Impression:         1. Feeding tube tip in the proximal stomach mild distention of the  stomach with air and mildly distended loops of small bowel. This may be  due to ileus.     This report was signed and finalized on 2/11/2024 8:05 AM by Jesus Moore.               I have reviewed the patient's current medications.     Assessment/Plan   Assessment  Active Hospital Problems    Diagnosis     **Bowel perforation     Stage 3b chronic kidney disease (CKD)     Hyperkalemia     COVID-19 virus detected     Iron deficiency anemia     Hypertension     Type 2 diabetes mellitus        Treatment Plan  #1 bowel perforation - status post OR.  On Zosyn.  Postop care per surgery     #2 DM2 with hyperglycemia -was on insulin drip and transitioned off.  Sugars trended down when he was not eating but now starting tube feeds.  Sugars coming back up.  Resume Levemir.  Sliding scale.  Hypoglycemia protocol.     #3 hyperkalemia - improved     #4 CKD 3b -typically GFR is low to mid 30s.  He is essentially back to baseline last 48 hours.  Was on gentle fluids but now getting tube feeds.  Monitor sodium.     #5 chronic anemia -in the setting of his renal disease and iron deficiency.  Monitor closely.  No obvious signs of bleeding.     #6 hypertension -ended up back on Cardene drip yesterday when he could not take his meds.  Now has NG tube.  Give meds via NG tube.  Wean off drip.    #7  COVID-19 -patient first tested positive back on 1/25. He is status post Paxlovid. On room air. Out of isolation.     #8 agitation/delirium -3 nights ago patient had some hallucinations and then last 2 days acutely worsened with agitation and required some Haldol and ended up restrained.  Initially felt he was probably having some more delirium.  Has been fairly tachycardic for several days however.  He has been a little more tremulous and appears more paranoid.  Wife states he does not drink but he is acting much like alcohol withdrawal.  She also states he has no memory issues and is very sharp.  No history of dementia.  For now we will continue with some Ativan as needed for agitation.  Haldol can be used as well.    Medical Decision Making  Number and Complexity of problems: 1 acute, 2 acute on chronic, multiple chronic  Differential Diagnosis: as above    Conditions and Status       About the same as yesterday.  Not at goal.  Monitor blood pressure.     MDM Data  External documents reviewed: none  Cardiac tracing (EKG, telemetry) interpretation: sinus tach on monitor  Radiology interpretation: no new images  Labs reviewed: as above  Any tests that were considered but not ordered: none     Decision rules/scores evaluated (example [id number], Wells, etc): none     Discussed with: Patient's spouse, nursing staff     Care Planning  Shared decision making: Patient apprised of current labs, vitals, imaging and treatment plan.  They are agreeable with proceeding with plans as discussed.    Code status and discussions: full code    Disposition  Social Determinants of Health that impact treatment or disposition: none    Dispo as per primary team, would keep in unit today given needs    Electronically signed by Pancho Handley DO, 02/11/24, 08:31 CST.

## 2024-02-11 NOTE — PLAN OF CARE
On initial assessment, pt was minimally responsive to stimuli, not following commands. MD notified, head CT obtained.   Responsiveness improved throughout the shift. Pt now opens eyes to voice and follows some commands. Incomprehensive speech still noted. Pt stated his own name x1 this AM.     NG placed for med admin per MD butler. Secured @ 60.     ST, intermittent HTN. Labetalol x1. On room air    Ativan x1 for agitation.    UOP good, several large bowel movements.     Safety maintained, plan of care ongoing

## 2024-02-12 LAB
ALBUMIN SERPL-MCNC: 3.2 G/DL (ref 3.5–5.2)
ALBUMIN/GLOB SERPL: 0.9 G/DL
ALP SERPL-CCNC: 90 U/L (ref 39–117)
ALT SERPL W P-5'-P-CCNC: 56 U/L (ref 1–41)
ANION GAP SERPL CALCULATED.3IONS-SCNC: 15 MMOL/L (ref 5–15)
AST SERPL-CCNC: 35 U/L (ref 1–40)
BACTERIA SPEC AEROBE CULT: ABNORMAL
BACTERIA SPEC ANAEROBE CULT: NORMAL
BASOPHILS # BLD AUTO: 0.03 10*3/MM3 (ref 0–0.2)
BASOPHILS NFR BLD AUTO: 0.5 % (ref 0–1.5)
BILIRUB SERPL-MCNC: 0.3 MG/DL (ref 0–1.2)
BUN SERPL-MCNC: 35 MG/DL (ref 8–23)
BUN/CREAT SERPL: 19.9 (ref 7–25)
CALCIUM SPEC-SCNC: 8.7 MG/DL (ref 8.6–10.5)
CHLORIDE SERPL-SCNC: 115 MMOL/L (ref 98–107)
CO2 SERPL-SCNC: 20 MMOL/L (ref 22–29)
CREAT SERPL-MCNC: 1.76 MG/DL (ref 0.76–1.27)
DEPRECATED RDW RBC AUTO: 52.2 FL (ref 37–54)
EGFRCR SERPLBLD CKD-EPI 2021: 39.8 ML/MIN/1.73
EOSINOPHIL # BLD AUTO: 0.48 10*3/MM3 (ref 0–0.4)
EOSINOPHIL NFR BLD AUTO: 8.3 % (ref 0.3–6.2)
ERYTHROCYTE [DISTWIDTH] IN BLOOD BY AUTOMATED COUNT: 14.9 % (ref 12.3–15.4)
GLOBULIN UR ELPH-MCNC: 3.5 GM/DL
GLUCOSE BLDC GLUCOMTR-MCNC: 135 MG/DL (ref 70–130)
GLUCOSE BLDC GLUCOMTR-MCNC: 170 MG/DL (ref 70–130)
GLUCOSE BLDC GLUCOMTR-MCNC: 180 MG/DL (ref 70–130)
GLUCOSE BLDC GLUCOMTR-MCNC: 188 MG/DL (ref 70–130)
GLUCOSE SERPL-MCNC: 156 MG/DL (ref 65–99)
GRAM STN SPEC: ABNORMAL
HCT VFR BLD AUTO: 32.1 % (ref 37.5–51)
HGB BLD-MCNC: 9.7 G/DL (ref 13–17.7)
IMM GRANULOCYTES # BLD AUTO: 0.09 10*3/MM3 (ref 0–0.05)
IMM GRANULOCYTES NFR BLD AUTO: 1.6 % (ref 0–0.5)
ISOLATED FROM: ABNORMAL
LYMPHOCYTES # BLD AUTO: 0.81 10*3/MM3 (ref 0.7–3.1)
LYMPHOCYTES NFR BLD AUTO: 14.1 % (ref 19.6–45.3)
MAGNESIUM SERPL-MCNC: 1.8 MG/DL (ref 1.6–2.4)
MCH RBC QN AUTO: 28.6 PG (ref 26.6–33)
MCHC RBC AUTO-ENTMCNC: 30.2 G/DL (ref 31.5–35.7)
MCV RBC AUTO: 94.7 FL (ref 79–97)
MONOCYTES # BLD AUTO: 0.52 10*3/MM3 (ref 0.1–0.9)
MONOCYTES NFR BLD AUTO: 9 % (ref 5–12)
NEUTROPHILS NFR BLD AUTO: 3.83 10*3/MM3 (ref 1.7–7)
NEUTROPHILS NFR BLD AUTO: 66.5 % (ref 42.7–76)
NRBC BLD AUTO-RTO: 0 /100 WBC (ref 0–0.2)
PHOSPHATE SERPL-MCNC: 2 MG/DL (ref 2.5–4.5)
PHOSPHATE SERPL-MCNC: 2 MG/DL (ref 2.5–4.5)
PLATELET # BLD AUTO: 215 10*3/MM3 (ref 140–450)
PMV BLD AUTO: 9.1 FL (ref 6–12)
POTASSIUM SERPL-SCNC: 3.3 MMOL/L (ref 3.5–5.2)
POTASSIUM SERPL-SCNC: 3.7 MMOL/L (ref 3.5–5.2)
PROT SERPL-MCNC: 6.7 G/DL (ref 6–8.5)
RBC # BLD AUTO: 3.39 10*6/MM3 (ref 4.14–5.8)
SODIUM SERPL-SCNC: 150 MMOL/L (ref 136–145)
WBC NRBC COR # BLD AUTO: 5.76 10*3/MM3 (ref 3.4–10.8)

## 2024-02-12 PROCEDURE — 83735 ASSAY OF MAGNESIUM: CPT | Performed by: INTERNAL MEDICINE

## 2024-02-12 PROCEDURE — 94761 N-INVAS EAR/PLS OXIMETRY MLT: CPT

## 2024-02-12 PROCEDURE — 82948 REAGENT STRIP/BLOOD GLUCOSE: CPT

## 2024-02-12 PROCEDURE — 85025 COMPLETE CBC W/AUTO DIFF WBC: CPT | Performed by: INTERNAL MEDICINE

## 2024-02-12 PROCEDURE — 25010000002 HEPARIN (PORCINE) PER 1000 UNITS: Performed by: SURGERY

## 2024-02-12 PROCEDURE — 63710000001 INSULIN REGULAR HUMAN PER 5 UNITS: Performed by: INTERNAL MEDICINE

## 2024-02-12 PROCEDURE — 80053 COMPREHEN METABOLIC PANEL: CPT | Performed by: INTERNAL MEDICINE

## 2024-02-12 PROCEDURE — 84100 ASSAY OF PHOSPHORUS: CPT | Performed by: HOSPITALIST

## 2024-02-12 PROCEDURE — 94799 UNLISTED PULMONARY SVC/PX: CPT

## 2024-02-12 PROCEDURE — 84132 ASSAY OF SERUM POTASSIUM: CPT | Performed by: HOSPITALIST

## 2024-02-12 PROCEDURE — 25810000003 SODIUM CHLORIDE 0.9 % SOLUTION 250 ML FLEX CONT: Performed by: INTERNAL MEDICINE

## 2024-02-12 PROCEDURE — 84100 ASSAY OF PHOSPHORUS: CPT | Performed by: INTERNAL MEDICINE

## 2024-02-12 PROCEDURE — 25010000002 LABETALOL 5 MG/ML SOLUTION: Performed by: INTERNAL MEDICINE

## 2024-02-12 PROCEDURE — 25010000002 HYDRALAZINE PER 20 MG: Performed by: INTERNAL MEDICINE

## 2024-02-12 PROCEDURE — 63710000001 INSULIN DETEMIR PER 5 UNITS: Performed by: INTERNAL MEDICINE

## 2024-02-12 RX ORDER — QUETIAPINE FUMARATE 25 MG/1
25 TABLET, FILM COATED ORAL NIGHTLY
Status: DISCONTINUED | OUTPATIENT
Start: 2024-02-12 | End: 2024-02-14

## 2024-02-12 RX ORDER — AMITRIPTYLINE HYDROCHLORIDE 10 MG/1
10 TABLET, FILM COATED ORAL NIGHTLY PRN
Status: DISCONTINUED | OUTPATIENT
Start: 2024-02-12 | End: 2024-02-16 | Stop reason: HOSPADM

## 2024-02-12 RX ORDER — POTASSIUM CHLORIDE 1.5 G/1.58G
40 POWDER, FOR SOLUTION ORAL EVERY 4 HOURS
Status: COMPLETED | OUTPATIENT
Start: 2024-02-12 | End: 2024-02-12

## 2024-02-12 RX ADMIN — INSULIN DETEMIR 12 UNITS: 100 INJECTION, SOLUTION SUBCUTANEOUS at 08:06

## 2024-02-12 RX ADMIN — Medication 1 APPLICATION: at 20:37

## 2024-02-12 RX ADMIN — TIMOLOL MALEATE: 5 SOLUTION/ DROPS OPHTHALMIC at 20:39

## 2024-02-12 RX ADMIN — SODIUM BICARBONATE 650 MG: 650 TABLET ORAL at 08:06

## 2024-02-12 RX ADMIN — QUETIAPINE FUMARATE 25 MG: 25 TABLET, FILM COATED ORAL at 20:38

## 2024-02-12 RX ADMIN — TIMOLOL MALEATE: 5 SOLUTION/ DROPS OPHTHALMIC at 08:06

## 2024-02-12 RX ADMIN — HEPARIN SODIUM 5000 UNITS: 5000 INJECTION INTRAVENOUS; SUBCUTANEOUS at 21:59

## 2024-02-12 RX ADMIN — LISINOPRIL 10 MG: 10 TABLET ORAL at 08:06

## 2024-02-12 RX ADMIN — CLONIDINE HYDROCHLORIDE 0.2 MG: 0.2 TABLET ORAL at 20:38

## 2024-02-12 RX ADMIN — TERAZOSIN HYDROCHLORIDE 1 MG: 1 CAPSULE ORAL at 20:38

## 2024-02-12 RX ADMIN — TERAZOSIN HYDROCHLORIDE 1 MG: 1 CAPSULE ORAL at 08:07

## 2024-02-12 RX ADMIN — HYOSCYAMINE SULFATE: 16 SOLUTION at 15:17

## 2024-02-12 RX ADMIN — LATANOPROST 1 DROP: 50 SOLUTION/ DROPS OPHTHALMIC at 20:40

## 2024-02-12 RX ADMIN — ATORVASTATIN CALCIUM 40 MG: 40 TABLET ORAL at 20:38

## 2024-02-12 RX ADMIN — POTASSIUM CHLORIDE 40 MEQ: 1.5 POWDER, FOR SOLUTION ORAL at 07:37

## 2024-02-12 RX ADMIN — AMLODIPINE BESYLATE 10 MG: 10 TABLET ORAL at 08:06

## 2024-02-12 RX ADMIN — ASPIRIN 81 MG: 81 TABLET, CHEWABLE ORAL at 08:06

## 2024-02-12 RX ADMIN — POTASSIUM CHLORIDE 40 MEQ: 1.5 POWDER, FOR SOLUTION ORAL at 11:41

## 2024-02-12 RX ADMIN — POTASSIUM & SODIUM PHOSPHATES POWDER PACK 280-160-250 MG 2 PACKET: 280-160-250 PACK at 07:38

## 2024-02-12 RX ADMIN — HEPARIN SODIUM 5000 UNITS: 5000 INJECTION INTRAVENOUS; SUBCUTANEOUS at 15:09

## 2024-02-12 RX ADMIN — POTASSIUM PHOSPHATE, MONOBASIC AND POTASSIUM PHOSPHATE, DIBASIC 15 MMOL: 224; 236 INJECTION, SOLUTION, CONCENTRATE INTRAVENOUS at 18:08

## 2024-02-12 RX ADMIN — Medication 1 APPLICATION: at 08:06

## 2024-02-12 RX ADMIN — CLONIDINE HYDROCHLORIDE 0.2 MG: 0.2 TABLET ORAL at 08:07

## 2024-02-12 RX ADMIN — HEPARIN SODIUM 5000 UNITS: 5000 INJECTION INTRAVENOUS; SUBCUTANEOUS at 06:23

## 2024-02-12 RX ADMIN — INSULIN HUMAN 2 UNITS: 100 INJECTION, SOLUTION PARENTERAL at 06:39

## 2024-02-12 RX ADMIN — LABETALOL HYDROCHLORIDE 20 MG: 5 INJECTION INTRAVENOUS at 19:48

## 2024-02-12 RX ADMIN — INSULIN HUMAN 2 UNITS: 100 INJECTION, SOLUTION PARENTERAL at 11:40

## 2024-02-12 RX ADMIN — HYDRALAZINE HYDROCHLORIDE 10 MG: 20 INJECTION INTRAMUSCULAR; INTRAVENOUS at 05:07

## 2024-02-12 RX ADMIN — CHLORHEXIDINE GLUCONATE 1 APPLICATION: 500 CLOTH TOPICAL at 05:58

## 2024-02-12 RX ADMIN — INSULIN HUMAN 2 UNITS: 100 INJECTION, SOLUTION PARENTERAL at 18:03

## 2024-02-12 RX ADMIN — LABETALOL HYDROCHLORIDE 20 MG: 5 INJECTION INTRAVENOUS at 02:39

## 2024-02-12 NOTE — PLAN OF CARE
Pt was disorient to place/situation along with illogical and incomprehensive speech on initial assessment.   Elavil administered- speech became more clear and logical after. Pt is still disorient to place/situation but is aware he had been having abd pain previously and was needing to seek medical attention. Situation explained to patient. He is also following all commands now. No sedatives administered.    Hypertensive- labetolol x2, hydralazine x1.     TF infusing, tolerating well.     Safety maintained.

## 2024-02-12 NOTE — PLAN OF CARE
Goal Outcome Evaluation:  Plan of Care Reviewed With: spouse        Progress: no change         Problem: Adult Inpatient Plan of Care  Goal: Plan of Care Review  Outcome: Ongoing, Progressing  Flowsheets (Taken 2/11/2024 1819)  Progress: no change  Plan of Care Reviewed With: spouse  Goal: Patient-Specific Goal (Individualized)  Outcome: Ongoing, Progressing  Goal: Absence of Hospital-Acquired Illness or Injury  Outcome: Ongoing, Progressing  Intervention: Identify and Manage Fall Risk  Recent Flowsheet Documentation  Taken 2/11/2024 1800 by Alessnadra Ayala RN  Safety Promotion/Fall Prevention: safety round/check completed  Taken 2/11/2024 1700 by Alessandra Ayala RN  Safety Promotion/Fall Prevention: safety round/check completed  Taken 2/11/2024 1600 by Alessandra Ayala RN  Safety Promotion/Fall Prevention: safety round/check completed  Taken 2/11/2024 1500 by Alessandra Ayala RN  Safety Promotion/Fall Prevention: safety round/check completed  Taken 2/11/2024 1400 by Alessandra Ayala RN  Safety Promotion/Fall Prevention: safety round/check completed  Taken 2/11/2024 1305 by Alessandra Ayala RN  Safety Promotion/Fall Prevention: safety round/check completed  Taken 2/11/2024 1200 by Alessandra Ayala RN  Safety Promotion/Fall Prevention: safety round/check completed  Taken 2/11/2024 1100 by Alessandra Ayala RN  Safety Promotion/Fall Prevention: safety round/check completed  Taken 2/11/2024 1000 by Alessandra Ayala RN  Safety Promotion/Fall Prevention: safety round/check completed  Taken 2/11/2024 0900 by Alessandra Ayala RN  Safety Promotion/Fall Prevention: safety round/check completed  Taken 2/11/2024 0800 by Alessandra Ayala RN  Safety Promotion/Fall Prevention: safety round/check completed  Taken 2/11/2024 0700 by Alessandra Ayala RN  Safety Promotion/Fall Prevention: safety round/check completed  Intervention: Prevent Skin Injury  Recent Flowsheet Documentation  Taken 2/11/2024 1800 by Alessandra Ayala RN  Body Position:    turned   right   lower extremity elevated   upper extremity elevated  Taken 2/11/2024 1600 by Alessandra Ayala RN  Body Position:   turned   supine   lower extremity elevated   upper extremity elevated  Skin Protection:   tubing/devices free from skin contact   incontinence pads utilized  Taken 2/11/2024 1400 by Alessandra Ayala RN  Body Position:   turned   left   lower extremity elevated   upper extremity elevated  Taken 2/11/2024 1200 by Alessandra Ayala RN  Body Position:   turned   supine   lower extremity elevated   upper extremity elevated  Skin Protection:   tubing/devices free from skin contact   incontinence pads utilized  Taken 2/11/2024 1000 by Alessandra Ayala RN  Body Position:   turned   right   lower extremity elevated   upper extremity elevated  Taken 2/11/2024 0800 by Alessandra Ayala RN  Body Position:   turned   supine   lower extremity elevated   upper extremity elevated  Skin Protection:   incontinence pads utilized   tubing/devices free from skin contact  Intervention: Prevent and Manage VTE (Venous Thromboembolism) Risk  Recent Flowsheet Documentation  Taken 2/11/2024 1600 by Alessandra Ayala RN  Activity Management: bedrest  Taken 2/11/2024 1200 by Alessandra Ayala RN  Activity Management: bedrest  Taken 2/11/2024 0800 by Alessandra Ayala RN  Activity Management: bedrest  VTE Prevention/Management:   bilateral   sequential compression devices off  Goal: Optimal Comfort and Wellbeing  Outcome: Ongoing, Progressing  Goal: Readiness for Transition of Care  Outcome: Ongoing, Progressing     Problem: Skin Injury Risk Increased  Goal: Skin Health and Integrity  Outcome: Ongoing, Progressing  Intervention: Optimize Skin Protection  Recent Flowsheet Documentation  Taken 2/11/2024 1800 by Alessandra Ayala RN  Head of Bed (HOB) Positioning: HOB at 30 degrees  Taken 2/11/2024 1600 by Alessandra Ayala RN  Pressure Reduction Techniques:   weight shift assistance provided   heels elevated off bed  Head of Bed (HOB)  Positioning: hospitals at 30 degrees  Pressure Reduction Devices: pressure-redistributing mattress utilized  Skin Protection:   tubing/devices free from skin contact   incontinence pads utilized  Taken 2/11/2024 1400 by Alessandra Ayala RN  Head of Bed (hospitals) Positioning: HOB at 30 degrees  Taken 2/11/2024 1200 by Alessandra Ayala RN  Pressure Reduction Techniques:   weight shift assistance provided   heels elevated off bed  Head of Bed (hospitals) Positioning: hospitals at 30 degrees  Pressure Reduction Devices: pressure-redistributing mattress utilized  Skin Protection:   tubing/devices free from skin contact   incontinence pads utilized  Taken 2/11/2024 1000 by Alessandra Ayala RN  Head of Bed (hospitals) Positioning: HOB at 30 degrees  Taken 2/11/2024 0800 by Alessandra Ayala RN  Pressure Reduction Techniques:   heels elevated off bed   weight shift assistance provided  Head of Bed (hospitals) Positioning: hospitals at 30 degrees  Pressure Reduction Devices: pressure-redistributing mattress utilized  Skin Protection:   incontinence pads utilized   tubing/devices free from skin contact     Problem: Asthma Comorbidity  Goal: Maintenance of Asthma Control  Outcome: Ongoing, Progressing     Problem: Behavioral Health Comorbidity  Goal: Maintenance of Behavioral Health Symptom Control  Outcome: Ongoing, Progressing     Problem: COPD (Chronic Obstructive Pulmonary Disease) Comorbidity  Goal: Maintenance of COPD Symptom Control  Outcome: Ongoing, Progressing     Problem: Diabetes Comorbidity  Goal: Blood Glucose Level Within Targeted Range  Outcome: Ongoing, Progressing     Problem: Heart Failure Comorbidity  Goal: Maintenance of Heart Failure Symptom Control  Outcome: Ongoing, Progressing     Problem: Hypertension Comorbidity  Goal: Blood Pressure in Desired Range  Outcome: Ongoing, Progressing     Problem: Obstructive Sleep Apnea Risk or Actual Comorbidity Management  Goal: Unobstructed Breathing During Sleep  Outcome: Ongoing, Progressing     Problem:  Osteoarthritis Comorbidity  Goal: Maintenance of Osteoarthritis Symptom Control  Outcome: Ongoing, Progressing  Intervention: Maintain Osteoarthritis Symptom Control  Recent Flowsheet Documentation  Taken 2/11/2024 1600 by Alessandra Ayala RN  Activity Management: bedrest  Taken 2/11/2024 1200 by Alessandra Ayala RN  Activity Management: bedrest  Taken 2/11/2024 0800 by Alessandra Ayala RN  Activity Management: bedrest     Problem: Pain Chronic (Persistent) (Comorbidity Management)  Goal: Acceptable Pain Control and Functional Ability  Outcome: Ongoing, Progressing  Intervention: Optimize Psychosocial Wellbeing  Recent Flowsheet Documentation  Taken 2/11/2024 1800 by Alessandra Ayala RN  Diversional Activities: television  Taken 2/11/2024 1600 by Alessandra Ayala RN  Diversional Activities: television  Taken 2/11/2024 1400 by Alessandra Ayala RN  Diversional Activities: television  Taken 2/11/2024 1200 by Alessandra Ayala RN  Diversional Activities: television  Taken 2/11/2024 1000 by Alessandra Ayala RN  Diversional Activities: television  Taken 2/11/2024 0800 by Alessandra Ayala RN  Diversional Activities: television     Problem: Seizure Disorder Comorbidity  Goal: Maintenance of Seizure Control  Outcome: Ongoing, Progressing     Problem: Fall Injury Risk  Goal: Absence of Fall and Fall-Related Injury  Outcome: Ongoing, Progressing  Intervention: Promote Injury-Free Environment  Recent Flowsheet Documentation  Taken 2/11/2024 1800 by Alessandra Ayala RN  Safety Promotion/Fall Prevention: safety round/check completed  Taken 2/11/2024 1700 by Alessandra Ayala RN  Safety Promotion/Fall Prevention: safety round/check completed  Taken 2/11/2024 1600 by Alessandra Ayala RN  Safety Promotion/Fall Prevention: safety round/check completed  Taken 2/11/2024 1500 by Alessandra Ayala RN  Safety Promotion/Fall Prevention: safety round/check completed  Taken 2/11/2024 1400 by Alessandra Ayala RN  Safety Promotion/Fall Prevention: safety  round/check completed  Taken 2/11/2024 1305 by Alessandra Ayala RN  Safety Promotion/Fall Prevention: safety round/check completed  Taken 2/11/2024 1200 by Alessandra Ayala RN  Safety Promotion/Fall Prevention: safety round/check completed  Taken 2/11/2024 1100 by Alessandra Ayala RN  Safety Promotion/Fall Prevention: safety round/check completed  Taken 2/11/2024 1000 by Alessandra Ayala RN  Safety Promotion/Fall Prevention: safety round/check completed  Taken 2/11/2024 0900 by Alessandra Ayala RN  Safety Promotion/Fall Prevention: safety round/check completed  Taken 2/11/2024 0800 by Alessandra Ayala RN  Safety Promotion/Fall Prevention: safety round/check completed  Taken 2/11/2024 0700 by Alessandra Ayala RN  Safety Promotion/Fall Prevention: safety round/check completed     Problem: Adjustment to Illness (Sepsis/Septic Shock)  Goal: Optimal Coping  Outcome: Ongoing, Progressing     Problem: Bleeding (Sepsis/Septic Shock)  Goal: Absence of Bleeding  Outcome: Ongoing, Progressing     Problem: Glycemic Control Impaired (Sepsis/Septic Shock)  Goal: Blood Glucose Level Within Desired Range  Outcome: Ongoing, Progressing     Problem: Infection Progression (Sepsis/Septic Shock)  Goal: Absence of Infection Signs and Symptoms  Outcome: Ongoing, Progressing  Intervention: Promote Recovery  Recent Flowsheet Documentation  Taken 2/11/2024 1600 by Alessandra Ayala RN  Activity Management: bedrest  Taken 2/11/2024 1200 by Alessandra Ayala RN  Activity Management: bedrest  Taken 2/11/2024 0800 by Alessandra Ayala RN  Activity Management: bedrest     Problem: Nutrition Impaired (Sepsis/Septic Shock)  Goal: Optimal Nutrition Intake  Outcome: Ongoing, Progressing     Problem: Restraint, Nonviolent  Goal: Absence of Harm or Injury  Outcome: Ongoing, Progressing  Intervention: Implement Least Restrictive Safety Strategies  Recent Flowsheet Documentation  Taken 2/11/2024 1800 by Alessandra Ayala RN  Medical Device Protection:   torso covered    tubing secured  Less Restrictive Alternative:   bed alarm in use   calming techniques promoted   medication offered   safety enhancements provided  De-Escalation Techniques: stimulation decreased  Diversional Activities: television  Taken 2/11/2024 1600 by Alessandra Ayala RN  Medical Device Protection:   torso covered   tubing secured  Less Restrictive Alternative:   bed alarm in use   calming techniques promoted   medication offered   safety enhancements provided  De-Escalation Techniques: stimulation decreased  Diversional Activities: television  Taken 2/11/2024 1400 by Alessandra Ayala RN  Medical Device Protection:   torso covered   tubing secured  Less Restrictive Alternative:   bed alarm in use   calming techniques promoted   medication offered   safety enhancements provided  De-Escalation Techniques: stimulation decreased  Diversional Activities: television  Taken 2/11/2024 1200 by Alessandra Ayala RN  Medical Device Protection:   torso covered   tubing secured  Less Restrictive Alternative:   bed alarm in use   calming techniques promoted   medication offered   safety enhancements provided  De-Escalation Techniques: stimulation decreased  Diversional Activities: television  Taken 2/11/2024 1000 by Alessandra Ayala RN  Medical Device Protection:   torso covered   tubing secured  Less Restrictive Alternative:   bed alarm in use   calming techniques promoted   medication offered   safety enhancements provided  De-Escalation Techniques:   reoriented   stimulation decreased  Diversional Activities: television  Taken 2/11/2024 0800 by Alessandra Ayala RN  Medical Device Protection:   torso covered   tubing secured  Less Restrictive Alternative:   bed alarm in use   calming techniques promoted   medication offered   safety enhancements provided  De-Escalation Techniques:   reoriented   stimulation decreased  Diversional Activities: television  Intervention: Protect Skin and Joint Integrity  Recent Flowsheet  Documentation  Taken 2/11/2024 1800 by Alessandra Ayala RN  Body Position:   turned   right   lower extremity elevated   upper extremity elevated  Taken 2/11/2024 1600 by Alessandra Ayala RN  Body Position:   turned   supine   lower extremity elevated   upper extremity elevated  Taken 2/11/2024 1400 by Alessandra Ayala RN  Body Position:   turned   left   lower extremity elevated   upper extremity elevated  Taken 2/11/2024 1200 by Alessandra Ayala RN  Body Position:   turned   supine   lower extremity elevated   upper extremity elevated  Taken 2/11/2024 1000 by Alessandra Ayala RN  Body Position:   turned   right   lower extremity elevated   upper extremity elevated  Taken 2/11/2024 0800 by Alessandra Ayala RN  Body Position:   turned   supine   lower extremity elevated   upper extremity elevated     Problem: Aspiration (Enteral Nutrition)  Goal: Absence of Aspiration Signs and Symptoms  Outcome: Ongoing, Progressing  Intervention: Minimize Aspiration Risk  Recent Flowsheet Documentation  Taken 2/11/2024 1800 by Alessandra Ayala RN  Head of Bed ([facility]) Positioning: HOB at 30 degrees  Oral Care: swabbed with antiseptic solution  Taken 2/11/2024 1600 by Alessandra Ayala RN  Head of Bed ([facility]) Positioning: HOB at 30 degrees  Oral Care: swabbed with antiseptic solution  Taken 2/11/2024 1400 by Alessandra Ayala RN  Head of Bed ([facility]) Positioning: HOB at 30 degrees  Oral Care: swabbed with antiseptic solution  Taken 2/11/2024 1200 by Alessandra Ayala RN  Head of Bed ([facility]) Positioning: HOB at 30 degrees  Oral Care: swabbed with antiseptic solution  Taken 2/11/2024 1000 by Alessandra Ayala RN  Head of Bed ([facility]) Positioning: HOB at 30 degrees  Oral Care: swabbed with antiseptic solution  Taken 2/11/2024 0800 by Alessandra Ayala RN  Head of Bed ([facility]) Positioning: HOB at 30 degrees  Oral Care: swabbed with antiseptic solution     Problem: Device-Related Complication Risk (Enteral Nutrition)  Goal: Safe, Effective Therapy Delivery  Outcome:  Ongoing, Progressing     Problem: Feeding Intolerance (Enteral Nutrition)  Goal: Feeding Tolerance  Outcome: Ongoing, Progressing

## 2024-02-12 NOTE — PROGRESS NOTES
1         [facility] Medicine Services  INPATIENT PROGRESS NOTE    Patient Name: Bernard Matta  Date of Admission: 2/6/2024  Today's Date: 02/12/24  Length of Stay: 6  Primary Care Physician: Brando Webb MD    Subjective   Chief Complaint: Follow-up  HPI   Day 6 of hospitalization  First day of encounter  Hospitalist service consulted on February 7 for hyperglycemia  Record indicates that patient has history of diabetes mellitus (8.4%), hypertension, hyperlipidemia and ischemic optic neuropathy of both eyes.  Patient recently had COVID infection on January 25    Reason for admission: Concern for perforation and abscess intra-abdominally status post laparotomy and ileocecectomy by Dr. Park on February 6    Had some drowsiness yesterday morning and reportedly received Ativan for agitation the night before.  Head CT reportedly nonacute.    Diagnostic studies today showed:  Slightly worsening hyponatremia (150)  Improving creatinine at 1.76 (2.09)---> patient's creatinine as high as 2.  5 5  Hypokalemia at 3.3 with normal magnesium.  Has had previous hyperkalemia (5.6)  Normocytic anemia with hemoglobin of 9.7 (8.8)  Blood culture no growth to date  She has had bandemia with normal white count.  On admission February 6, she tested positive for COVID  Blood culture as of February 6 showed abnormal staining on aerobic bottle gram-positive bacilli.  Mentioned to be Clostridium septic:.  Follow-up surveillance culture from February 10 showed no growth to date.    Patient has no current active order for antibiotic from listed medications below.  Patient though had Zosyn from February 6 through February 11 totaling 15 doses.    Patient also had a one-time dose of vancomycin on February 8.      amLODIPine, 10 mg, Nasogastric, Q24H  aspirin, 81 mg, Nasogastric, Daily  atorvastatin, 40 mg, Nasogastric, Nightly  Chlorhexidine Gluconate Cloth, 1 Application, Topical, Q24H  cloNIDine,  "0.2 mg, Nasogastric, BID  dorzolamide (TRUSOPT) 2 % 1 drop, timolol (TIMOPTIC) 0.5 % 1 drop for Cosopt 22.3-6.8 mg/mL, , Both Eyes, BID  heparin (porcine), 5,000 Units, Subcutaneous, Q8H  insulin detemir, 12 Units, Subcutaneous, Daily  insulin regular, 2-7 Units, Subcutaneous, Q6H  latanoprost, 1 drop, Both Eyes, Nightly  lisinopril, 10 mg, Nasogastric, Daily  mupirocin, 1 application , Each Nare, BID  potassium chloride, 40 mEq, Nasogastric, Q4H  sodium bicarbonate, 650 mg, Nasogastric, Daily  sodium hypochlorite, , Topical, Q24H  terazosin, 1 mg, Nasogastric, BID      So far patient is afebrile      Blood pressure is in the hypertensive side.  From home medication she takes:  Lasix 20 mg  Lisinopril 10 mg  Clonidine 0.2 mg twice daily.      Wife (Nury) is at bedside.  \"What ever was given to him the night before helped him sleep.  He has not slept for a week.  The sleep he had helped him.\"    Patient unable to give historical information  Patient readily goes back to sleep      Review of Systems   Unobtainable from patient      Objective    Temp:  [97.6 °F (36.4 °C)-98.5 °F (36.9 °C)] 97.9 °F (36.6 °C)  Heart Rate:  [] 111  Resp:  [11-16] 16  BP: (120-184)/() 156/80  Physical Exam  Patient spontaneously opens his eyes but readily close this as well  He was nonverbal to me at the time of visit  Wife at bedside  He has tube feeding going at 15 mL/h through Dobbhoff tube  No distress  Sinus tachycardia with rate of 106  Lungs are clear to auscultation with adequate air exchange  S1-S2 regular rate and rhythm  Postoperative abdomen with clean bandage  Diminish bowel sounds.  I only heard bowel sounds on the left lower quadrant  No gross edema  Warm dry skin  Good capillary refill  Flat affect      Results Review:  I have reviewed the labs, radiology results, and diagnostic studies.    Laboratory Data:   Results from last 7 days   Lab Units 02/12/24  0212 02/11/24  0253 02/10/24  0245   WBC 10*3/mm3 5.76 " 5.67 6.58   HEMOGLOBIN g/dL 9.7* 8.8* 8.9*   HEMATOCRIT % 32.1* 29.6* 29.0*   PLATELETS 10*3/mm3 215 181 189        Results from last 7 days   Lab Units 02/12/24  0212 02/11/24  0253 02/10/24  0245   SODIUM mmol/L 150* 149* 149*   POTASSIUM mmol/L 3.3* 3.5 3.7   CHLORIDE mmol/L 115* 114* 116*   CO2 mmol/L 20.0* 19.0* 20.0*   BUN mg/dL 35* 39* 44*   CREATININE mg/dL 1.76* 2.09* 2.10*   CALCIUM mg/dL 8.7 8.6 8.8   BILIRUBIN mg/dL 0.3 0.3 0.4   ALK PHOS U/L 90 79 80   ALT (SGPT) U/L 56* 49* 51*   AST (SGOT) U/L 35 30 35   GLUCOSE mg/dL 156* 237* 92       Culture Data:   Blood Culture   Date Value Ref Range Status   02/10/2024 No growth at 24 hours  Preliminary   02/06/2024 Abnormal Stain (C)  Preliminary   02/06/2024 Clostridium septicum (C)  Final     Comment:     Beta lactamase negative         Radiology Data:   Imaging Results (Last 24 Hours)       ** No results found for the last 24 hours. **            I have reviewed the patient's current medications.     Assessment/Plan   Assessment  Active Hospital Problems    Diagnosis     **Bowel perforation     Stage 3b chronic kidney disease (CKD)     Hyperkalemia     COVID-19 virus detected     Iron deficiency anemia     Hypertension     Type 2 diabetes mellitus          Medical Decision Making  Number and Complexity of problems:   Problem list  Hyperglycemia in the setting of diabetes with A1c of 8.4%  Hypernatremia  Encephalopathy could be related to acute illness, worsening hypernatremia, lack of bleeding.  Mild hypokalemia  Acute on chronic kidney injury with probable baseline creatinine anywhere from 1.78-1.93.  Bacteremia (Clostridium septicum)-received 15 doses of Zosyn.  Cecal perforation status post diagnostic laparoscopy converted to laparotomy with ILEOCECTETOMY on February 6 by Dr. Park  Sinus tachycardia-could be related to volume depletion/possibly third space loss  Hypertension  Anemia.  Transfuse as needed if hemoglobin less than 7 or with symptoms of  anemia with signs of bleeding.        Treatment Plan  Started yesterday on sips plus chips as per primary servic but due to his confusion for concerns of not protecting his airway, this was stopped.  Hypotonic IV fluid vs increasing fluid through his dht  Cautiously replace potassium  Follow stability of renal function    Noted that lisinopril was added already as of February 11.  Terazosin likewise will help address blood pressure (previously on tamsulosin)  Noted clonidine also been started while amlodipine was added  Monitor blood pressure      40 mg of potassium x 2 doses noted to have been ordered.  Monitor, check chemistry    Since patient has tube feeding ongoing with water flushes of 50 mL/h, consider increasing water flushes to address hypernatremia      Currently no need for diuretic which patient uses for home medication  I did not find any echocardiogram on record either.    Discussed with pharmacist regarding bacteremia and Zosyn.  Checking adequate coverage of Zosyn on Clostridium septicum.  This has adequate coverage for such.  So far follow-up culture from the 10th had not shown any growth and clinically appears that patient is improving without leukocytosis or left shift.      Consider Seroquel via Dobbhoff tube during the night  Currently has tube feeding.  Will use Dobbhoff as route.    Monitor sugar.  Patient on Levemir and sliding scale insulin.  As needed hypoglycemic protocol.  Blood sugar range anywhere from 156-270 yesterday.    Medications reviewed.  amLODIPine, 10 mg, Nasogastric, Q24H  aspirin, 81 mg, Nasogastric, Daily  atorvastatin, 40 mg, Nasogastric, Nightly  Chlorhexidine Gluconate Cloth, 1 Application, Topical, Q24H  cloNIDine, 0.2 mg, Nasogastric, BID  dorzolamide (TRUSOPT) 2 % 1 drop, timolol (TIMOPTIC) 0.5 % 1 drop for Cosopt 22.3-6.8 mg/mL, , Both Eyes, BID  heparin (porcine), 5,000 Units, Subcutaneous, Q8H  insulin detemir, 12 Units, Subcutaneous, Daily  insulin regular, 2-7  Units, Subcutaneous, Q6H  latanoprost, 1 drop, Both Eyes, Nightly  lisinopril, 10 mg, Nasogastric, Daily  mupirocin, 1 application , Each Nare, BID  potassium chloride, 40 mEq, Nasogastric, Q4H  QUEtiapine, 25 mg, Nasogastric, Nightly  sodium bicarbonate, 650 mg, Nasogastric, Daily  sodium hypochlorite, , Topical, Q24H  terazosin, 1 mg, Nasogastric, BID        Conditions and Status  fair     MDM Data  External documents reviewed: None  Cardiac tracing (EKG, telemetry) interpretation: Sinus tachycardia at 106  Radiology interpretation: Abdominal x-ray yesterday described a feeding tube at the proximal stomach mild distention of the stomach with air and mildly distended loops of small bowel which may be due to he will use    Labs reviewed: Yes  Any tests that were considered but not ordered: None     Decision rules/scores evaluated (example [id number], Wells, etc): None     Discussed with: Patient's wife, pharmacist, nursing staff     Care Planning  Shared decision making: Family and primary service  Code status and discussions: Full code    Disposition  Social Determinants of Health that impact treatment or disposition: None identified at this time  I expect the patient to be discharged to (TBD)  Electronically signed by Jasmeet Iraheta MD, 02/12/24, 08:20 CST.

## 2024-02-12 NOTE — CASE MANAGEMENT/SOCIAL WORK
Continued Stay Note   [facility]     Patient Name: Bernard Matta  MRN: [id number]  Today's Date: 2/12/2024    Admit Date: 2/6/2024    Plan: Pending   Discharge Plan       Row Name 02/12/24 1132       Plan    Plan Pending    Plan Comments Events noted, patient with NG feedings now.  Patient was residing with his spouse prior to admit.  Patient may need some type of rehab placement upon discharge.  SW following.                   Discharge Codes    No documentation.                       NEHAL Hopkins

## 2024-02-13 LAB
ALBUMIN SERPL-MCNC: 3 G/DL (ref 3.5–5.2)
ALBUMIN/GLOB SERPL: 0.9 G/DL
ALP SERPL-CCNC: 94 U/L (ref 39–117)
ALT SERPL W P-5'-P-CCNC: 68 U/L (ref 1–41)
ANION GAP SERPL CALCULATED.3IONS-SCNC: 11 MMOL/L (ref 5–15)
AST SERPL-CCNC: 46 U/L (ref 1–40)
BILIRUB SERPL-MCNC: 0.2 MG/DL (ref 0–1.2)
BUN SERPL-MCNC: 32 MG/DL (ref 8–23)
BUN/CREAT SERPL: 20 (ref 7–25)
CALCIUM SPEC-SCNC: 8.8 MG/DL (ref 8.6–10.5)
CHLORIDE SERPL-SCNC: 115 MMOL/L (ref 98–107)
CO2 SERPL-SCNC: 21 MMOL/L (ref 22–29)
CREAT SERPL-MCNC: 1.6 MG/DL (ref 0.76–1.27)
EGFRCR SERPLBLD CKD-EPI 2021: 44.7 ML/MIN/1.73
GLOBULIN UR ELPH-MCNC: 3.2 GM/DL
GLUCOSE BLDC GLUCOMTR-MCNC: 151 MG/DL (ref 70–130)
GLUCOSE BLDC GLUCOMTR-MCNC: 243 MG/DL (ref 70–130)
GLUCOSE BLDC GLUCOMTR-MCNC: 246 MG/DL (ref 70–130)
GLUCOSE BLDC GLUCOMTR-MCNC: 267 MG/DL (ref 70–130)
GLUCOSE BLDC GLUCOMTR-MCNC: 296 MG/DL (ref 70–130)
GLUCOSE SERPL-MCNC: 191 MG/DL (ref 65–99)
POTASSIUM SERPL-SCNC: 3.7 MMOL/L (ref 3.5–5.2)
PROT SERPL-MCNC: 6.2 G/DL (ref 6–8.5)
SODIUM SERPL-SCNC: 147 MMOL/L (ref 136–145)

## 2024-02-13 PROCEDURE — 25010000002 HYDRALAZINE PER 20 MG: Performed by: INTERNAL MEDICINE

## 2024-02-13 PROCEDURE — 25010000002 LORAZEPAM PER 2 MG: Performed by: INTERNAL MEDICINE

## 2024-02-13 PROCEDURE — 82948 REAGENT STRIP/BLOOD GLUCOSE: CPT

## 2024-02-13 PROCEDURE — 63710000001 INSULIN REGULAR HUMAN PER 5 UNITS: Performed by: INTERNAL MEDICINE

## 2024-02-13 PROCEDURE — 25010000002 LABETALOL 5 MG/ML SOLUTION: Performed by: INTERNAL MEDICINE

## 2024-02-13 PROCEDURE — 25010000002 HEPARIN (PORCINE) PER 1000 UNITS: Performed by: SURGERY

## 2024-02-13 PROCEDURE — 80053 COMPREHEN METABOLIC PANEL: CPT | Performed by: INTERNAL MEDICINE

## 2024-02-13 PROCEDURE — 63710000001 INSULIN DETEMIR PER 5 UNITS: Performed by: INTERNAL MEDICINE

## 2024-02-13 RX ADMIN — LORAZEPAM 1 MG: 2 INJECTION INTRAMUSCULAR; INTRAVENOUS at 04:23

## 2024-02-13 RX ADMIN — LABETALOL HYDROCHLORIDE 20 MG: 5 INJECTION INTRAVENOUS at 01:46

## 2024-02-13 RX ADMIN — HEPARIN SODIUM 5000 UNITS: 5000 INJECTION INTRAVENOUS; SUBCUTANEOUS at 13:56

## 2024-02-13 RX ADMIN — AMITRIPTYLINE HYDROCHLORIDE 10 MG: 10 TABLET, FILM COATED ORAL at 19:41

## 2024-02-13 RX ADMIN — HEPARIN SODIUM 5000 UNITS: 5000 INJECTION INTRAVENOUS; SUBCUTANEOUS at 21:52

## 2024-02-13 RX ADMIN — Medication 1 APPLICATION: at 09:07

## 2024-02-13 RX ADMIN — INSULIN DETEMIR 12 UNITS: 100 INJECTION, SOLUTION SUBCUTANEOUS at 09:24

## 2024-02-13 RX ADMIN — TIMOLOL MALEATE: 5 SOLUTION/ DROPS OPHTHALMIC at 20:33

## 2024-02-13 RX ADMIN — TERAZOSIN HYDROCHLORIDE 1 MG: 1 CAPSULE ORAL at 09:07

## 2024-02-13 RX ADMIN — CHLORHEXIDINE GLUCONATE 1 APPLICATION: 500 CLOTH TOPICAL at 04:00

## 2024-02-13 RX ADMIN — INSULIN HUMAN 4 UNITS: 100 INJECTION, SOLUTION PARENTERAL at 12:03

## 2024-02-13 RX ADMIN — HEPARIN SODIUM 5000 UNITS: 5000 INJECTION INTRAVENOUS; SUBCUTANEOUS at 05:37

## 2024-02-13 RX ADMIN — LORAZEPAM 1 MG: 2 INJECTION INTRAMUSCULAR; INTRAVENOUS at 09:21

## 2024-02-13 RX ADMIN — INSULIN HUMAN 3 UNITS: 100 INJECTION, SOLUTION PARENTERAL at 18:28

## 2024-02-13 RX ADMIN — LATANOPROST 1 DROP: 50 SOLUTION/ DROPS OPHTHALMIC at 20:33

## 2024-02-13 RX ADMIN — TIMOLOL MALEATE: 5 SOLUTION/ DROPS OPHTHALMIC at 09:08

## 2024-02-13 RX ADMIN — CLONIDINE HYDROCHLORIDE 0.2 MG: 0.2 TABLET ORAL at 20:34

## 2024-02-13 RX ADMIN — LISINOPRIL 10 MG: 10 TABLET ORAL at 09:07

## 2024-02-13 RX ADMIN — CLONIDINE HYDROCHLORIDE 0.2 MG: 0.2 TABLET ORAL at 09:07

## 2024-02-13 RX ADMIN — INSULIN HUMAN 2 UNITS: 100 INJECTION, SOLUTION PARENTERAL at 00:02

## 2024-02-13 RX ADMIN — ATORVASTATIN CALCIUM 40 MG: 40 TABLET ORAL at 20:34

## 2024-02-13 RX ADMIN — AMLODIPINE BESYLATE 10 MG: 10 TABLET ORAL at 09:07

## 2024-02-13 RX ADMIN — HYOSCYAMINE SULFATE: 16 SOLUTION at 09:08

## 2024-02-13 RX ADMIN — INSULIN HUMAN 3 UNITS: 100 INJECTION, SOLUTION PARENTERAL at 05:36

## 2024-02-13 RX ADMIN — ASPIRIN 81 MG: 81 TABLET, CHEWABLE ORAL at 09:07

## 2024-02-13 RX ADMIN — QUETIAPINE FUMARATE 25 MG: 25 TABLET, FILM COATED ORAL at 20:33

## 2024-02-13 RX ADMIN — HYDRALAZINE HYDROCHLORIDE 10 MG: 20 INJECTION INTRAMUSCULAR; INTRAVENOUS at 01:16

## 2024-02-13 RX ADMIN — TERAZOSIN HYDROCHLORIDE 1 MG: 1 CAPSULE ORAL at 20:34

## 2024-02-13 RX ADMIN — SODIUM BICARBONATE 650 MG: 650 TABLET ORAL at 09:07

## 2024-02-13 NOTE — CASE MANAGEMENT/SOCIAL WORK
Continued Stay Note  NIKI Shelby     Patient Name: Bernard Matta  MRN: [id number]  Today's Date: 2/13/2024    Admit Date: 2/6/2024    Plan: Pending   Discharge Plan       Row Name 02/13/24 1413       Plan    Plan Pending    Plan Comments SS consult received regarding DC planning rehab vs home with HH.  Patient currently NPO, patient's nutrition plan would need to be resolved before SNF can consider accepting.  Would also need recommendations from PT/OT to start SNF placement as patient's insurance requires prior approval.  SW following closely to determine appropriate level of care upon discharge.                   Discharge Codes    No documentation.                       NEHAL Hopkins

## 2024-02-13 NOTE — PLAN OF CARE
Problem: Adult Inpatient Plan of Care  Goal: Plan of Care Review  Outcome: Ongoing, Progressing  Goal: Patient-Specific Goal (Individualized)  Outcome: Ongoing, Progressing  Goal: Absence of Hospital-Acquired Illness or Injury  Outcome: Ongoing, Progressing  Intervention: Identify and Manage Fall Risk  Recent Flowsheet Documentation  Taken 2/13/2024 0600 by Becky Ratliff RN  Safety Promotion/Fall Prevention: safety round/check completed  Taken 2/13/2024 0500 by Becky Ratliff RN  Safety Promotion/Fall Prevention: safety round/check completed  Taken 2/13/2024 0400 by Becky Ratliff RN  Safety Promotion/Fall Prevention: safety round/check completed  Taken 2/13/2024 0300 by Becky Ratliff RN  Safety Promotion/Fall Prevention: safety round/check completed  Taken 2/13/2024 0200 by Becky Ratliff RN  Safety Promotion/Fall Prevention: safety round/check completed  Taken 2/13/2024 0100 by Becky Ratliff RN  Safety Promotion/Fall Prevention: safety round/check completed  Taken 2/13/2024 0000 by Becky Ratliff RN  Safety Promotion/Fall Prevention: safety round/check completed  Taken 2/12/2024 2300 by Becky Ratliff RN  Safety Promotion/Fall Prevention: safety round/check completed  Taken 2/12/2024 2200 by Becky Ratliff RN  Safety Promotion/Fall Prevention: safety round/check completed  Taken 2/12/2024 2100 by Becky Ratliff RN  Safety Promotion/Fall Prevention: safety round/check completed  Taken 2/12/2024 2000 by Becky Ratliff RN  Safety Promotion/Fall Prevention: safety round/check completed  Taken 2/12/2024 1900 by Becky Ratliff RN  Safety Promotion/Fall Prevention: safety round/check completed  Intervention: Prevent Skin Injury  Recent Flowsheet Documentation  Taken 2/13/2024 0200 by Becky Ratliff RN  Body Position:   turned   supine  Taken 2/13/2024 0000 by Becky Ratliff RN  Body Position:   turned   left  Taken 2/12/2024 2200 by Becky Ratliff RN  Body Position:   turned   right  Taken 2/12/2024 2000 by Gaudencio  Becky MORALES, RN  Body Position:   tilted   left  Intervention: Prevent and Manage VTE (Venous Thromboembolism) Risk  Recent Flowsheet Documentation  Taken 2/13/2024 0600 by Becky Ratliff RN  Activity Management: bedrest  Taken 2/13/2024 0400 by Becky Ratliff RN  Activity Management: bedrest  Taken 2/13/2024 0300 by Becky Ratliff RN  Activity Management: bedrest  Taken 2/13/2024 0200 by Becky Ratliff RN  Activity Management: bedrest  Taken 2/13/2024 0000 by Becky Ratliff RN  Activity Management: bedrest  Taken 2/12/2024 2200 by Becky Ratliff, VERA  Activity Management: bedrest  Taken 2/12/2024 2000 by Becky Ratliff RN  Activity Management: bedrest  Taken 2/12/2024 1900 by Becky Ratliff RN  Activity Management: bedrest  Goal: Optimal Comfort and Wellbeing  Outcome: Ongoing, Progressing  Goal: Readiness for Transition of Care  Outcome: Ongoing, Progressing     Problem: Skin Injury Risk Increased  Goal: Skin Health and Integrity  Outcome: Ongoing, Progressing     Problem: Asthma Comorbidity  Goal: Maintenance of Asthma Control  Outcome: Ongoing, Progressing     Problem: Behavioral Health Comorbidity  Goal: Maintenance of Behavioral Health Symptom Control  Outcome: Ongoing, Progressing     Problem: COPD (Chronic Obstructive Pulmonary Disease) Comorbidity  Goal: Maintenance of COPD Symptom Control  Outcome: Ongoing, Progressing     Problem: Diabetes Comorbidity  Goal: Blood Glucose Level Within Targeted Range  Outcome: Ongoing, Progressing     Problem: Heart Failure Comorbidity  Goal: Maintenance of Heart Failure Symptom Control  Outcome: Ongoing, Progressing     Problem: Hypertension Comorbidity  Goal: Blood Pressure in Desired Range  Outcome: Ongoing, Progressing     Problem: Obstructive Sleep Apnea Risk or Actual Comorbidity Management  Goal: Unobstructed Breathing During Sleep  Outcome: Ongoing, Progressing     Problem: Osteoarthritis Comorbidity  Goal: Maintenance of Osteoarthritis Symptom Control  Outcome: Ongoing,  Progressing  Intervention: Maintain Osteoarthritis Symptom Control  Recent Flowsheet Documentation  Taken 2/13/2024 0600 by Becky Ratliff RN  Activity Management: bedrest  Taken 2/13/2024 0400 by Becky Ratliff RN  Activity Management: bedrest  Taken 2/13/2024 0300 by Becky Ratliff RN  Activity Management: bedrest  Taken 2/13/2024 0200 by Becky Ratliff RN  Activity Management: bedrest  Taken 2/13/2024 0000 by Becky Ratliff RN  Activity Management: bedrest  Taken 2/12/2024 2200 by Becky Ratliff RN  Activity Management: bedrest  Taken 2/12/2024 2000 by Becky Ratliff RN  Activity Management: bedrest  Taken 2/12/2024 1900 by Becky Ratliff RN  Activity Management: bedrest     Problem: Pain Chronic (Persistent) (Comorbidity Management)  Goal: Acceptable Pain Control and Functional Ability  Outcome: Ongoing, Progressing  Intervention: Optimize Psychosocial Wellbeing  Recent Flowsheet Documentation  Taken 2/13/2024 0600 by Becky Ratliff RN  Diversional Activities: television  Taken 2/13/2024 0400 by Becky Ratliff RN  Diversional Activities: television  Taken 2/13/2024 0200 by Becky Ratliff RN  Diversional Activities: television  Taken 2/13/2024 0000 by Becky Ratliff RN  Diversional Activities: television  Taken 2/12/2024 2200 by Becky Ratliff RN  Diversional Activities: television  Taken 2/12/2024 2000 by Becky Ratliff RN  Diversional Activities: television     Problem: Seizure Disorder Comorbidity  Goal: Maintenance of Seizure Control  Outcome: Ongoing, Progressing     Problem: Fall Injury Risk  Goal: Absence of Fall and Fall-Related Injury  Outcome: Ongoing, Progressing  Intervention: Promote Injury-Free Environment  Recent Flowsheet Documentation  Taken 2/13/2024 0600 by Becky Ratliff RN  Safety Promotion/Fall Prevention: safety round/check completed  Taken 2/13/2024 0500 by Becky Ratliff RN  Safety Promotion/Fall Prevention: safety round/check completed  Taken 2/13/2024 0400 by Becky Ratliff RN  Safety  Promotion/Fall Prevention: safety round/check completed  Taken 2/13/2024 0300 by Becky Ratliff RN  Safety Promotion/Fall Prevention: safety round/check completed  Taken 2/13/2024 0200 by Becky Ratliff RN  Safety Promotion/Fall Prevention: safety round/check completed  Taken 2/13/2024 0100 by Becky Ratliff RN  Safety Promotion/Fall Prevention: safety round/check completed  Taken 2/13/2024 0000 by Becky Ratliff RN  Safety Promotion/Fall Prevention: safety round/check completed  Taken 2/12/2024 2300 by Becky Ratliff RN  Safety Promotion/Fall Prevention: safety round/check completed  Taken 2/12/2024 2200 by Becky Ratliff RN  Safety Promotion/Fall Prevention: safety round/check completed  Taken 2/12/2024 2100 by Becky Ratliff RN  Safety Promotion/Fall Prevention: safety round/check completed  Taken 2/12/2024 2000 by Becky Ratliff RN  Safety Promotion/Fall Prevention: safety round/check completed  Taken 2/12/2024 1900 by Becky Ratliff RN  Safety Promotion/Fall Prevention: safety round/check completed     Problem: Adjustment to Illness (Sepsis/Septic Shock)  Goal: Optimal Coping  Outcome: Ongoing, Progressing     Problem: Bleeding (Sepsis/Septic Shock)  Goal: Absence of Bleeding  Outcome: Ongoing, Progressing  Intervention: Monitor and Manage Bleeding  Recent Flowsheet Documentation  Taken 2/12/2024 2000 by Becky Ratliff RN  Bleeding Management: dressing monitored     Problem: Glycemic Control Impaired (Sepsis/Septic Shock)  Goal: Blood Glucose Level Within Desired Range  Outcome: Ongoing, Progressing     Problem: Infection Progression (Sepsis/Septic Shock)  Goal: Absence of Infection Signs and Symptoms  Outcome: Ongoing, Progressing  Intervention: Promote Recovery  Recent Flowsheet Documentation  Taken 2/13/2024 0600 by Becky Ratliff RN  Activity Management: bedrest  Taken 2/13/2024 0400 by Becky Ratliff RN  Activity Management: bedrest  Taken 2/13/2024 0300 by Becky Ratliff RN  Activity Management:  bedrest  Taken 2/13/2024 0200 by Becky Ratliff RN  Activity Management: bedrest  Taken 2/13/2024 0000 by Becky Ratliff RN  Activity Management: bedrest  Taken 2/12/2024 2200 by Becky Ratliff RN  Activity Management: bedrest  Taken 2/12/2024 2000 by Becky Ratliff RN  Activity Management: bedrest  Taken 2/12/2024 1900 by Becky Ratliff RN  Activity Management: bedrest     Problem: Nutrition Impaired (Sepsis/Septic Shock)  Goal: Optimal Nutrition Intake  Outcome: Ongoing, Progressing     Problem: Restraint, Nonviolent  Goal: Absence of Harm or Injury  Outcome: Ongoing, Progressing  Intervention: Implement Least Restrictive Safety Strategies  Recent Flowsheet Documentation  Taken 2/13/2024 0600 by Becky Ratliff RN  Medical Device Protection:   IV pole/bag removed from visual field   torso covered   tubing secured  Less Restrictive Alternative:   bed alarm in use   environment adjusted   calming techniques promoted  De-Escalation Techniques: stimulation decreased  Diversional Activities: television  Taken 2/13/2024 0400 by Becky Ratliff RN  Medical Device Protection:   IV pole/bag removed from visual field   torso covered   tubing secured  Less Restrictive Alternative:   bed alarm in use   environment adjusted   calming techniques promoted  De-Escalation Techniques: stimulation decreased  Diversional Activities: television  Taken 2/13/2024 0200 by Becky Ratliff RN  Medical Device Protection:   IV pole/bag removed from visual field   torso covered   tubing secured  Less Restrictive Alternative:   bed alarm in use   environment adjusted   calming techniques promoted  De-Escalation Techniques: stimulation decreased  Diversional Activities: television  Taken 2/13/2024 0000 by Becky Ratliff RN  Medical Device Protection:   IV pole/bag removed from visual field   torso covered   tubing secured  Less Restrictive Alternative:   bed alarm in use   environment adjusted   calming techniques promoted  De-Escalation  Techniques: stimulation decreased  Diversional Activities: television  Taken 2/12/2024 2200 by Becky Ratliff, VERA  Medical Device Protection:   IV pole/bag removed from visual field   tubing secured   torso covered  Less Restrictive Alternative:   bed alarm in use   calming techniques promoted  De-Escalation Techniques:   stimulation decreased   verbally redirected  Diversional Activities: television  Taken 2/12/2024 2000 by Becky Ratliff, RN  Medical Device Protection:   IV pole/bag removed from visual field   tubing secured   torso covered  Less Restrictive Alternative: family presence promoted  De-Escalation Techniques: verbally redirected  Diversional Activities: television  Intervention: Protect Skin and Joint Integrity  Recent Flowsheet Documentation  Taken 2/13/2024 0200 by Becky Ratliff, RN  Body Position:   turned   supine  Taken 2/13/2024 0000 by Becky Ratliff RN  Body Position:   turned   left  Taken 2/12/2024 2200 by Becky Ratliff, RN  Body Position:   turned   right  Taken 2/12/2024 2000 by Becky Ratliff RN  Body Position:   tilted   left     Problem: Aspiration (Enteral Nutrition)  Goal: Absence of Aspiration Signs and Symptoms  Outcome: Ongoing, Progressing  Intervention: Minimize Aspiration Risk  Recent Flowsheet Documentation  Taken 2/13/2024 0400 by Becky Ratliff, VERA  Oral Care:   swabbed with antiseptic solution   teeth brushed - suction toothbrush     Problem: Device-Related Complication Risk (Enteral Nutrition)  Goal: Safe, Effective Therapy Delivery  Outcome: Ongoing, Progressing     Problem: Feeding Intolerance (Enteral Nutrition)  Goal: Feeding Tolerance  Outcome: Ongoing, Progressing   Goal Outcome Evaluation:

## 2024-02-13 NOTE — PROGRESS NOTES
[facility]   Progress Note    Patient Name: Bernard Matta  : 1950  MRN: [id number]  Primary Care Physician:  Brando Webb MD  Date of admission: 2024    Subjective   Subjective   Presents this am with confusion.  Patient required sedative agitation.  Still concerns of confusion.  Not following commands or eating.      Objective     Vitals:   Temp:  [97.6 °F (36.4 °C)-100.2 °F (37.9 °C)] 97.6 °F (36.4 °C)  Heart Rate:  [] 76  Resp:  [11-18] 11  BP: ()/() 112/65  Patient saturations are 100%            Physical Exam    Constitutional: Awake, alert   Gastrointestinal: soft, approp TTP, ND + BS, no wounds clean dry and intact    Musculoskeletal: No bilateral ankle edema, no clubbing or cyanosis to extremities   Psychiatric: confused    Result Review    Result Review:  I have personally reviewed the results from the time of this admission to 2024 13:31 CST and agree with these findings:  [x]  Laboratory list / accordion  []  Microbiology  []  Radiology  []  EKG/Telemetry   []  Cardiology/Vascular   []  Pathology  []  Old records  []  Other:      Assessment & Plan   Assessment / Plan     Brief Patient Summary:  Bernard Matta is a 73 y.o. male who presents s/p ileocectomy for cecal perforation    Active Hospital Problems:  Active Hospital Problems    Diagnosis     **Bowel perforation     Stage 3b chronic kidney disease (CKD)     Hyperkalemia     COVID-19 virus detected     Iron deficiency anemia     Hypertension     Type 2 diabetes mellitus      Plan: Yesterday patient was started on sips +chips.  Continue with able to clear and protect airway.  Continued daily dressing changes and appropriate nutritional supplementation.  Continue nutritional support.  Recommend  for long planning potentially rehab or home PT and OT.  DVT prophylaxis:  Medical and mechanical DVT prophylaxis orders are present.        CODE STATUS:   Code Status (Patient has no pulse and is  not breathing): CPR (Attempt to Resuscitate)  Medical Interventions (Patient has pulse or is breathing): Full Support    Dr. Avitia  General Surgery  02/13/24  13:56 CST    Patient has remained stable and recommend consideration to transfer to floor in am.

## 2024-02-13 NOTE — PROGRESS NOTES
1         [facility] Medicine Services  INPATIENT PROGRESS NOTE    Patient Name: Bernard Matta  Date of Admission: 2/6/2024  Today's Date: 02/13/24  Length of Stay: 7  Primary Care Physician: Brando Webb MD    Subjective   Chief Complaint: Follow-up  HPI   Patient was nonverbal to me  Case discussed with nurse at bedside  Tube feeding noted to have been increased  Blood pressure mostly in the 150s.  Blood sugar noted to be elevated.        Review of Systems   Patient was nonverbal to me during the time of visit.      Objective    Temp:  [97.5 °F (36.4 °C)-98.9 °F (37.2 °C)] 97.5 °F (36.4 °C)  Heart Rate:  [] 91  Resp:  [11-26] 26  BP: ()/() 149/78  Physical Exam  No distress  Nonverbal  Spontaneous movement of eyes and extremities  Tube feeding going at 40 mL/h  Lungs are clear to auscultation with adequate air exchange  S1-S2 regular rate and rhythm  Postoperative abdomen with clean bandage  Diminish bowel sounds.  I only heard bowel sounds on the left lower quadrant  No gross edema  Warm dry skin  Good capillary refill  Flat affect      Results Review:  I have reviewed the labs, radiology results, and diagnostic studies.    Laboratory Data:   Results from last 7 days   Lab Units 02/12/24  0212 02/11/24  0253 02/10/24  0245   WBC 10*3/mm3 5.76 5.67 6.58   HEMOGLOBIN g/dL 9.7* 8.8* 8.9*   HEMATOCRIT % 32.1* 29.6* 29.0*   PLATELETS 10*3/mm3 215 181 189        Results from last 7 days   Lab Units 02/13/24  0312 02/12/24  1614 02/12/24  0212 02/11/24  0253   SODIUM mmol/L 147*  --  150* 149*   POTASSIUM mmol/L 3.7 3.7 3.3* 3.5   CHLORIDE mmol/L 115*  --  115* 114*   CO2 mmol/L 21.0*  --  20.0* 19.0*   BUN mg/dL 32*  --  35* 39*   CREATININE mg/dL 1.60*  --  1.76* 2.09*   CALCIUM mg/dL 8.8  --  8.7 8.6   BILIRUBIN mg/dL 0.2  --  0.3 0.3   ALK PHOS U/L 94  --  90 79   ALT (SGPT) U/L 68*  --  56* 49*   AST (SGOT) U/L 46*  --  35 30   GLUCOSE mg/dL 191*  --   156* 237*       Culture Data:   Blood Culture   Date Value Ref Range Status   02/10/2024 No growth at 3 days  Preliminary       Radiology Data:   Imaging Results (Last 24 Hours)       ** No results found for the last 24 hours. **            I have reviewed the patient's current medications.     Assessment/Plan   Assessment  Active Hospital Problems    Diagnosis     **Bowel perforation     Stage 3b chronic kidney disease (CKD)     Hyperkalemia     COVID-19 virus detected     Iron deficiency anemia     Hypertension     Type 2 diabetes mellitus        Medical Decision Making  Number and Complexity of problems:   Problem list  Hyperglycemia in the setting of diabetes with A1c of 8.4%  Hypernatremia  Encephalopathy could be related to acute illness, worsening hypernatremia, lack of bleeding.  Mild hypokalemia  Acute on chronic kidney injury with probable baseline creatinine anywhere from 1.78-1.93.  Bacteremia (Clostridium septicum)-received 15 doses of Zosyn.  Cecal perforation status post diagnostic laparoscopy converted to laparotomy with ILEOCECTETOMY on February 6 by Dr. Park  Sinus tachycardia-could be related to volume depletion/possibly third space loss  Hypertension  Anemia.  Transfuse as needed if hemoglobin less than 7 or with symptoms of anemia with signs of bleeding.           Treatment Plan  Started yesterday on sips plus chips as per primary servic but due to his confusion for concerns of not protecting his airway, this was stopped.  Hypotonic IV fluid vs increasing fluid through his dht  Cautiously replace potassium  Follow stability of renal function     Noted that lisinopril was added already as of February 11.  Terazosin likewise will help address blood pressure (previously on tamsulosin)  Noted clonidine also been started while amlodipine was added  Monitor blood pressure        40 mg of potassium x 2 doses noted to have been ordered.  Monitor, check chemistry     Since patient has tube feeding  ongoing with water flushes of 50 mL/h, consider increasing water flushes to address hypernatremia        Currently no need for diuretic which patient uses for home medication  I did not find any echocardiogram on record either.     Discussed with pharmacist regarding bacteremia and Zosyn.  Checking adequate coverage of Zosyn on Clostridium septicum.  This has adequate coverage for such.  So far follow-up culture from the 10th had not shown any growth and clinically appears that patient is improving without leukocytosis or left shift.        Consider Seroquel via Dobbhoff tube during the night  Currently has tube feeding.  Will use Dobbhoff as route.     Monitor sugar.  As needed hypoglycemic protocol.  Blood sugar range anywhere from 1  today.  Will increase Levemir we will continue on sliding scale insulin patient on tube feeding rate of 15 mL/h.         Medications reviewed.    amLODIPine, 10 mg, Nasogastric, Q24H  aspirin, 81 mg, Nasogastric, Daily  atorvastatin, 40 mg, Nasogastric, Nightly  Chlorhexidine Gluconate Cloth, 1 Application, Topical, Q24H  cloNIDine, 0.2 mg, Nasogastric, BID  dorzolamide (TRUSOPT) 2 % 1 drop, timolol (TIMOPTIC) 0.5 % 1 drop for Cosopt 22.3-6.8 mg/mL, , Both Eyes, BID  heparin (porcine), 5,000 Units, Subcutaneous, Q8H  insulin detemir, 12 Units, Subcutaneous, Daily  insulin regular, 2-7 Units, Subcutaneous, Q6H  latanoprost, 1 drop, Both Eyes, Nightly  lisinopril, 10 mg, Nasogastric, Daily  QUEtiapine, 25 mg, Nasogastric, Nightly  sodium bicarbonate, 650 mg, Nasogastric, Daily  sodium hypochlorite, , Topical, Q24H  terazosin, 1 mg, Nasogastric, BID        Today, February 13  I increased the Levemir while we continue to monitor sugar and apply as needed hypoglycemic protocol  Will continue on sliding scale insulin  Noted that serum sodium has improved since the increase in water flushes yesterday.  Noted that the tube feeding had been increased as well to 40 mL/h  Blood pressure  is elevated  : Patient on terazosin, lisinopril, clonidine and amlodipine  Recent blood pressure reading is mostly in the 150s with highest in the 170s  Patient on lisinopril but creatinine improving.  Will continue to monitor while on this medication.  Hypokalemia resolved  Quetiapine appears to have been helpful in terms of agitation as per discussion with nurse.  Will monitor for any signs of respiratory discretion and consider ABG.     Conditions and Status  fair     MDM Data  External documents reviewed: None  Cardiac tracing (EKG, telemetry) interpretation: Sinus tachycardia at 106  Radiology interpretation: Abdominal x-ray yesterday described a feeding tube at the proximal stomach mild distention of the stomach with air and mildly distended loops of small bowel which may be due to he will use     Labs reviewed: Yes  Any tests that were considered but not ordered: None     Decision rules/scores evaluated (example [id number], Wells, etc): None     Discussed with: Patient's wife, pharmacist, nursing staff     Care Planning  Shared decision making: Family and primary service  Code status and discussions: Full code     Disposition  Social Determinants of Health that impact treatment or disposition: None identified at this time  I expect the patient to be discharged to (TBD)    Electronically signed by Jasmeet Iraheta MD, 02/13/24, 17:49 CST.

## 2024-02-13 NOTE — PLAN OF CARE
Goal Outcome Evaluation:  Plan of Care Reviewed With: caregiver        Progress: no change  Outcome Evaluation: Ntn follow up. Pt remains NPO. Pt received 41% of enteral ntn goal volume over the past two days. Pt tolerating enteral ntn with minimal residuals noted. Cont Novasource Renal at 40 ml/hr, water flush at 60 ml/hr due to hypernatremia.Cont to follow for plan of care.

## 2024-02-14 LAB
ALBUMIN SERPL-MCNC: 3 G/DL (ref 3.5–5.2)
ALBUMIN/GLOB SERPL: 0.9 G/DL
ALP SERPL-CCNC: 94 U/L (ref 39–117)
ALT SERPL W P-5'-P-CCNC: 68 U/L (ref 1–41)
ANION GAP SERPL CALCULATED.3IONS-SCNC: 11 MMOL/L (ref 5–15)
AST SERPL-CCNC: 36 U/L (ref 1–40)
BASOPHILS # BLD AUTO: 0.04 10*3/MM3 (ref 0–0.2)
BASOPHILS NFR BLD AUTO: 0.5 % (ref 0–1.5)
BILIRUB SERPL-MCNC: 0.2 MG/DL (ref 0–1.2)
BUN SERPL-MCNC: 35 MG/DL (ref 8–23)
BUN/CREAT SERPL: 21.1 (ref 7–25)
CALCIUM SPEC-SCNC: 8.7 MG/DL (ref 8.6–10.5)
CHLORIDE SERPL-SCNC: 109 MMOL/L (ref 98–107)
CO2 SERPL-SCNC: 24 MMOL/L (ref 22–29)
CREAT SERPL-MCNC: 1.66 MG/DL (ref 0.76–1.27)
DEPRECATED RDW RBC AUTO: 51.9 FL (ref 37–54)
EGFRCR SERPLBLD CKD-EPI 2021: 42.7 ML/MIN/1.73
EOSINOPHIL # BLD AUTO: 0.36 10*3/MM3 (ref 0–0.4)
EOSINOPHIL NFR BLD AUTO: 4.7 % (ref 0.3–6.2)
ERYTHROCYTE [DISTWIDTH] IN BLOOD BY AUTOMATED COUNT: 14.8 % (ref 12.3–15.4)
GLOBULIN UR ELPH-MCNC: 3.2 GM/DL
GLUCOSE BLDC GLUCOMTR-MCNC: 190 MG/DL (ref 70–130)
GLUCOSE BLDC GLUCOMTR-MCNC: 231 MG/DL (ref 70–130)
GLUCOSE BLDC GLUCOMTR-MCNC: 255 MG/DL (ref 70–130)
GLUCOSE BLDC GLUCOMTR-MCNC: 262 MG/DL (ref 70–130)
GLUCOSE BLDC GLUCOMTR-MCNC: 283 MG/DL (ref 70–130)
GLUCOSE SERPL-MCNC: 267 MG/DL (ref 65–99)
HCT VFR BLD AUTO: 30.3 % (ref 37.5–51)
HGB BLD-MCNC: 9.2 G/DL (ref 13–17.7)
IMM GRANULOCYTES # BLD AUTO: 0.14 10*3/MM3 (ref 0–0.05)
IMM GRANULOCYTES NFR BLD AUTO: 1.8 % (ref 0–0.5)
LYMPHOCYTES # BLD AUTO: 1.04 10*3/MM3 (ref 0.7–3.1)
LYMPHOCYTES NFR BLD AUTO: 13.7 % (ref 19.6–45.3)
MAGNESIUM SERPL-MCNC: 1.8 MG/DL (ref 1.6–2.4)
MCH RBC QN AUTO: 29.1 PG (ref 26.6–33)
MCHC RBC AUTO-ENTMCNC: 30.4 G/DL (ref 31.5–35.7)
MCV RBC AUTO: 95.9 FL (ref 79–97)
MONOCYTES # BLD AUTO: 0.53 10*3/MM3 (ref 0.1–0.9)
MONOCYTES NFR BLD AUTO: 7 % (ref 5–12)
NEUTROPHILS NFR BLD AUTO: 5.5 10*3/MM3 (ref 1.7–7)
NEUTROPHILS NFR BLD AUTO: 72.3 % (ref 42.7–76)
NRBC BLD AUTO-RTO: 0 /100 WBC (ref 0–0.2)
PLATELET # BLD AUTO: 218 10*3/MM3 (ref 140–450)
PMV BLD AUTO: 9.9 FL (ref 6–12)
POTASSIUM SERPL-SCNC: 3.5 MMOL/L (ref 3.5–5.2)
PROT SERPL-MCNC: 6.2 G/DL (ref 6–8.5)
RBC # BLD AUTO: 3.16 10*6/MM3 (ref 4.14–5.8)
SODIUM SERPL-SCNC: 144 MMOL/L (ref 136–145)
WBC NRBC COR # BLD AUTO: 7.61 10*3/MM3 (ref 3.4–10.8)

## 2024-02-14 PROCEDURE — 63710000001 INSULIN DETEMIR PER 5 UNITS: Performed by: INTERNAL MEDICINE

## 2024-02-14 PROCEDURE — 83735 ASSAY OF MAGNESIUM: CPT | Performed by: INTERNAL MEDICINE

## 2024-02-14 PROCEDURE — 97162 PT EVAL MOD COMPLEX 30 MIN: CPT

## 2024-02-14 PROCEDURE — 25010000002 HEPARIN (PORCINE) PER 1000 UNITS: Performed by: SURGERY

## 2024-02-14 PROCEDURE — 85025 COMPLETE CBC W/AUTO DIFF WBC: CPT | Performed by: INTERNAL MEDICINE

## 2024-02-14 PROCEDURE — 63710000001 INSULIN REGULAR HUMAN PER 5 UNITS: Performed by: INTERNAL MEDICINE

## 2024-02-14 PROCEDURE — 80053 COMPREHEN METABOLIC PANEL: CPT | Performed by: INTERNAL MEDICINE

## 2024-02-14 PROCEDURE — 82948 REAGENT STRIP/BLOOD GLUCOSE: CPT

## 2024-02-14 RX ORDER — QUETIAPINE FUMARATE 25 MG/1
25 TABLET, FILM COATED ORAL NIGHTLY
Status: DISCONTINUED | OUTPATIENT
Start: 2024-02-15 | End: 2024-02-15

## 2024-02-14 RX ORDER — CLONIDINE HYDROCHLORIDE 0.2 MG/1
0.2 TABLET ORAL 2 TIMES DAILY
Status: DISCONTINUED | OUTPATIENT
Start: 2024-02-15 | End: 2024-02-15

## 2024-02-14 RX ORDER — CLONIDINE HYDROCHLORIDE 0.2 MG/1
0.2 TABLET ORAL 2 TIMES DAILY
Status: DISCONTINUED | OUTPATIENT
Start: 2024-02-14 | End: 2024-02-14

## 2024-02-14 RX ORDER — ATORVASTATIN CALCIUM 40 MG/1
40 TABLET, FILM COATED ORAL NIGHTLY
Status: DISCONTINUED | OUTPATIENT
Start: 2024-02-15 | End: 2024-02-16 | Stop reason: HOSPADM

## 2024-02-14 RX ORDER — TERAZOSIN 1 MG/1
1 CAPSULE ORAL 2 TIMES DAILY
Status: DISCONTINUED | OUTPATIENT
Start: 2024-02-15 | End: 2024-02-15

## 2024-02-14 RX ORDER — ASPIRIN 81 MG/1
81 TABLET, CHEWABLE ORAL DAILY
Status: DISCONTINUED | OUTPATIENT
Start: 2024-02-15 | End: 2024-02-16 | Stop reason: HOSPADM

## 2024-02-14 RX ORDER — LISINOPRIL 10 MG/1
10 TABLET ORAL DAILY
Status: DISCONTINUED | OUTPATIENT
Start: 2024-02-15 | End: 2024-02-15

## 2024-02-14 RX ORDER — SODIUM BICARBONATE 650 MG/1
650 TABLET ORAL DAILY
Status: DISCONTINUED | OUTPATIENT
Start: 2024-02-15 | End: 2024-02-16 | Stop reason: HOSPADM

## 2024-02-14 RX ORDER — AMLODIPINE BESYLATE 10 MG/1
10 TABLET ORAL
Status: DISCONTINUED | OUTPATIENT
Start: 2024-02-15 | End: 2024-02-16 | Stop reason: HOSPADM

## 2024-02-14 RX ADMIN — TIMOLOL MALEATE: 5 SOLUTION/ DROPS OPHTHALMIC at 09:03

## 2024-02-14 RX ADMIN — TERAZOSIN HYDROCHLORIDE 1 MG: 1 CAPSULE ORAL at 22:37

## 2024-02-14 RX ADMIN — LISINOPRIL 10 MG: 10 TABLET ORAL at 09:01

## 2024-02-14 RX ADMIN — CLONIDINE HYDROCHLORIDE 0.2 MG: 0.2 TABLET ORAL at 22:37

## 2024-02-14 RX ADMIN — CLONIDINE HYDROCHLORIDE 0.2 MG: 0.2 TABLET ORAL at 09:01

## 2024-02-14 RX ADMIN — HYOSCYAMINE SULFATE: 16 SOLUTION at 09:03

## 2024-02-14 RX ADMIN — HEPARIN SODIUM 5000 UNITS: 5000 INJECTION INTRAVENOUS; SUBCUTANEOUS at 06:00

## 2024-02-14 RX ADMIN — HEPARIN SODIUM 5000 UNITS: 5000 INJECTION INTRAVENOUS; SUBCUTANEOUS at 22:38

## 2024-02-14 RX ADMIN — ATORVASTATIN CALCIUM 40 MG: 40 TABLET ORAL at 22:37

## 2024-02-14 RX ADMIN — INSULIN HUMAN 2 UNITS: 100 INJECTION, SOLUTION PARENTERAL at 17:23

## 2024-02-14 RX ADMIN — ASPIRIN 81 MG: 81 TABLET, CHEWABLE ORAL at 09:01

## 2024-02-14 RX ADMIN — TERAZOSIN HYDROCHLORIDE 1 MG: 1 CAPSULE ORAL at 09:00

## 2024-02-14 RX ADMIN — INSULIN DETEMIR 25 UNITS: 100 INJECTION, SOLUTION SUBCUTANEOUS at 09:17

## 2024-02-14 RX ADMIN — QUETIAPINE FUMARATE 25 MG: 25 TABLET, FILM COATED ORAL at 22:37

## 2024-02-14 RX ADMIN — INSULIN HUMAN 4 UNITS: 100 INJECTION, SOLUTION PARENTERAL at 11:48

## 2024-02-14 RX ADMIN — TIMOLOL MALEATE: 5 SOLUTION/ DROPS OPHTHALMIC at 22:39

## 2024-02-14 RX ADMIN — AMITRIPTYLINE HYDROCHLORIDE 10 MG: 10 TABLET, FILM COATED ORAL at 22:37

## 2024-02-14 RX ADMIN — INSULIN HUMAN 4 UNITS: 100 INJECTION, SOLUTION PARENTERAL at 06:00

## 2024-02-14 RX ADMIN — CHLORHEXIDINE GLUCONATE 1 APPLICATION: 500 CLOTH TOPICAL at 04:00

## 2024-02-14 RX ADMIN — SODIUM BICARBONATE 650 MG: 650 TABLET ORAL at 09:01

## 2024-02-14 RX ADMIN — HEPARIN SODIUM 5000 UNITS: 5000 INJECTION INTRAVENOUS; SUBCUTANEOUS at 14:01

## 2024-02-14 RX ADMIN — INSULIN HUMAN 3 UNITS: 100 INJECTION, SOLUTION PARENTERAL at 00:16

## 2024-02-14 RX ADMIN — AMLODIPINE BESYLATE 10 MG: 10 TABLET ORAL at 09:01

## 2024-02-14 NOTE — PLAN OF CARE
"Goal Outcome Evaluation:  Plan of Care Reviewed With: spouse, patient           Outcome Evaluation: PT evaluation completed on this date. PT A&Ox4. Pt in chair, accompanied by wife upon PT arrival. Pt was accurate historian at this time. Pt was SBA for sitting balance in chair. Pt demo'd AROM that was WFL in BLE. Pt demo'd 3/5 strength in BLE grossly. Pt was able to complete sit<>stand t/f w/ CGA. Once standing, pt was able to take one step before major LOB. Pt now req'd maxA from PT for dynamic standing balance and modA for static standing balance. Pt stated that his legs 'felt like jell-o\" and that he all of a sudden felt really dizzy. PT held further ambulation until dizziness subsided. While standing, pt was able to complete 4 reps of standing marches before fatiguing. Pt was able to take a few backwards steps and a few side steps back to the chair. Pt returned to chair, NG tube connected, and call light within reach. Skilled PT warranted to improve balance, fxl mobility, endurance, and BLE strength. PT D/C recommendation: subacute rehab pending progress                               "

## 2024-02-14 NOTE — PLAN OF CARE
Goal Outcome Evaluation:         Pt's neuro status waxed and waned throughout the day. At times he was oriented to person, place, and time. However, he would occasionally only be oriented to person and time. RA. SBP remained less than 160 throughout the day. ST. Safety maintained.

## 2024-02-14 NOTE — THERAPY EVALUATION
Patient Name: Bernard Matta  : 1950    MRN: [id number]                              Today's Date: 2024       Admit Date: 2024    Visit Dx:     ICD-10-CM ICD-9-CM   1. Bowel perforation  K63.1 569.83   2. Peritonitis  K65.9 567.9   3. Pneumoperitoneum  K66.8 568.89   4. Impaired mobility [Z74.09]  Z74.09 799.89     Patient Active Problem List   Diagnosis    Anterior ischemic optic neuropathy    Hypertension    Type 2 diabetes mellitus    Hx of adenomatous colonic polyps    Iron deficiency anemia    Intestinal malabsorption, unspecified    Bowel perforation    Stage 3b chronic kidney disease (CKD)    Hyperkalemia    COVID-19 virus detected     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     Hyperlipidemia     Hypertension     Ischemic optic neuropathy of both eyes      Past Surgical History:   Procedure Laterality Date    COLONOSCOPY      [place]    COLONOSCOPY N/A 2020    Procedure: COLONOSCOPY WITH ANESTHESIA;  Surgeon: Shaquille Mckenzie DO;  Location:  [facility];  Service: Gastroenterology;  Laterality: N/A;  preop; hx of polyps   postop; polyps   PCP Brando Webb     COLONOSCOPY N/A 2021    Procedure: COLONOSCOPY WITH ANESTHESIA;  Surgeon: Shaquille Mckenzie DO;  Location:  [facility];  Service: Gastroenterology;  Laterality: N/A;  pre hx adenomatous colon polyp  post normal  Brando Webb MD    EXPLORATORY LAPAROTOMY N/A 2024    Procedure: DIAGNOSTIC LAPAROSCOPY CONVERTED TO LAPAROTOMY, ILIOCECTECTOMY;  Surgeon: Hemant Park MD;  Location: [facility] OR;  Service: General;  Laterality: N/A;      General Information       Row Name 24 0907          Physical Therapy Time and Intention    Document Type evaluation  Pt admitted to ICU d/t bowel perforation. Pt has had intermittent bouts of confusion. Prior hx: DM 2, HTN, hyperkalmeia, anemia.  DIAGNOSTIC LAPAROSCOPY CONVERTED TO  LAPAROTOMY, ILIOCECTECTOMY  -DAVIN     Mode of Treatment physical therapy  -DAVIN (r) CW (t) DAVIN (c)       Row Name 02/14/24 0907          General Information    Patient Profile Reviewed yes  -DAVIN (r) CW (t) DAVIN (c)     Prior Level of Function independent:;all household mobility;community mobility;gait;transfer;ADL's;feeding;grooming;dressing;bathing;cooking;cleaning;dependent:;driving  -DAVIN (r) CW (t) DAVIN (c)     Existing Precautions/Restrictions NPO;fall  NG tube  -DAVIN     Barriers to Rehab medically complex;physical barrier  -DAVIN (r) CW (t) DAVIN (c)       Row Name 02/14/24 0907          Living Environment    People in Home spouse  -DAVIN (r) CW (t) DAVIN (c)       Row Name 02/14/24 0907          Home Main Entrance    Number of Stairs, Main Entrance three  -DAVIN (r) CW (t) DAVIN (c)     Stair Railings, Main Entrance railing on right side (ascending)  -DAVIN (r) CW (t) DAVIN (c)       Row Name 02/14/24 0907          Stairs Within Home, Primary    Number of Stairs, Within Home, Primary none  -DAVIN (r) CW (t) DAVIN (c)       Row Name 02/14/24 0907          Cognition    Orientation Status (Cognition) oriented x 4  -DAVIN (r) CW (t) DAVIN (c)       Row Name 02/14/24 0907          Safety Issues, Functional Mobility    Safety Issues Affecting Function (Mobility) friction/shear risk;impulsivity;insight into deficits/self-awareness;safety precautions follow-through/compliance  -DAVIN (r) CW (t) DAVIN (c)     Impairments Affecting Function (Mobility) balance;cognition;endurance/activity tolerance;pain  -DAVIN (r) CW (t) DAVIN (c)               User Key  (r) = Recorded By, (t) = Taken By, (c) = Cosigned By      Initials Name Provider Type    DAVIN Rafa Diaz, PT DPT Physical Therapist    Ash Escamilla PT Student PT Student                   Mobility       Row Name 02/14/24 0907          Sit-Stand Transfer    Sit-Stand [place] (Transfers) contact guard;1 person assist  -DAVIN (r) CW (t) DAVIN (c)       Row Name 02/14/24 0907          Gait/Stairs (Locomotion)    [place]  Level (Gait) maximum assist (25% patient effort);1 person assist  -DAVIN (r) CW (t) DAVIN (c)     Patient was able to Ambulate --  -DAVIN     Distance in Feet (Gait) 5ft  -DAVIN (r) CW (t) DAVIN (c)     Deviations/Abnormal Patterns (Gait) antalgic;base of support, narrow;festinating/shuffling  -DAVIN (r) CW (t) DAVIN (c)     [place] Level (Stairs) not tested  -DAVIN (r) CW (t) DAVIN (c)               User Key  (r) = Recorded By, (t) = Taken By, (c) = Cosigned By      Initials Name Provider Type    Rafa Del Castillo, PT DPT Physical Therapist    Ash Escamilla PT Student PT Student                   Obj/Interventions       Row Name 02/14/24 0907          Range of Motion Comprehensive    General Range of Motion bilateral lower extremity ROM WFL  -DAVIN (r) CW (t) DAVIN (c)       Row Name 02/14/24 0907          Strength Comprehensive (MMT)    General Manual Muscle Testing (MMT) Assessment lower extremity strength deficits identified  -DAVIN (r) CW (t) DAVIN (c)     Comment, General Manual Muscle Testing (MMT) Assessment 3/5 BLE strength grossly  -DAVIN (r) CW (t) DAVIN (c)       Row Name 02/14/24 0907          Balance    Balance Assessment sitting static balance;sitting dynamic balance;sit to stand dynamic balance;standing static balance;standing dynamic balance  -DAVIN (r) CW (t) DAVIN (c)     Static Sitting Balance standby assist  -DAVIN (r) CW (t) DAVIN (c)     Dynamic Sitting Balance standby assist  -DAVIN (r) CW (t) DAVIN (c)     Position, Sitting Balance unsupported;sitting in chair  -DAVIN (r) CW (t) DAVIN (c)     Sit to Stand Dynamic Balance contact guard  -DAVIN (r) CW (t) DAVIN (c)     Static Standing Balance moderate assist;1-person assist  -DAVIN (r) CW (t) DAVIN (c)     Dynamic Standing Balance maximum assist;1-person assist  -DAVIN (r) CW (t) DAVIN (c)     Position/Device Used, Standing Balance supported  -DAVIN (r) CW (t) DAVIN (c)       Row Name 02/14/24 0907          Sensory Assessment (Somatosensory)    Sensory Assessment (Somatosensory) LE sensation intact  -DAVIN (r) CW (t) DAVIN (c)                User Key  (r) = Recorded By, (t) = Taken By, (c) = Cosigned By      Initials Name Provider Type    Rafa Del Castillo, PT DPT Physical Therapist    Ash Escamilla, [place] Student PT Student                   Goals/Plan       Row Name 02/14/24 0907          Bed Mobility Goal 1 (PT)    Activity/Assistive Device (Bed Mobility Goal 1, PT) sit to supine;supine to sit  -DAVIN     [place] Level/Cues Needed (Bed Mobility Goal 1, PT) standby assist  -DAVIN     Time Frame (Bed Mobility Goal 1, PT) long term goal (LTG);10 days  -DAVIN     Progress/Outcomes (Bed Mobility Goal 1, PT) new goal  -DAVIN       Row Name 02/14/24 0907          Transfer Goal 1 (PT)    Activity/Assistive Device (Transfer Goal 1, PT) sit-to-stand/stand-to-sit;bed-to-chair/chair-to-bed  -DAVIN (r) CW (t) DAVIN (c)     [place] Level/Cues Needed (Transfer Goal 1, PT) standby assist  -DAVIN (r) CW (t) DAVIN (c)     Time Frame (Transfer Goal 1, PT) long term goal (LTG);10 days  -DAVIN (r) CW (t) DAVIN (c)     Progress/Outcome (Transfer Goal 1, PT) new goal  -DAVIN (r) CW (t) DAVIN (c)       Row Name 02/14/24 0907          Gait Training Goal 1 (PT)    Activity/Assistive Device (Gait Training Goal 1, PT) gait (walking locomotion);decrease fall risk;diminish gait deviation;forward stepping;improve balance and speed;increase endurance/gait distance;increase energy conservation  -DAVIN (r) CW (t) DAVIN (c)     [place] Level (Gait Training Goal 1, PT) standby assist  -DAVIN (r) CW (t) DAVIN (c)     Distance (Gait Training Goal 1, PT) 25ft  -DAVIN (r) CW (t) DAVIN (c)     Time Frame (Gait Training Goal 1, PT) long term goal (LTG);10 days  -DAVIN (r) CW (t) DAVIN (c)     Progress/Outcome (Gait Training Goal 1, PT) new goal  -DAVIN (r) CW (t) DAVIN (c)       Row Name 02/14/24 0907          Problem Specific Goal 1 (PT)    Problem Specific Goal 1 (PT) Pt will be able to stand for 2 minutes, without RB's, before fatiguing  -DAVIN (r) CW (t) DAVIN (c)     Time Frame (Problem Specific Goal 1, PT) long-term goal (LTG)  " -DAVIN (r) CW (t) DAVIN (c)     Progress/Outcome (Problem Specific Goal 1, PT) new goal  -DAVIN (r) CW (t) DAVIN (c)       Row Name 02/14/24 0907          Patient Education Goal (PT)    Activity (Patient Education Goal, PT) --  -DAVIN     [place]/Cues/Accuracy (Memory Goal 2, PT) --  -DVAIN     Time Frame (Patient Education Goal, PT) --  -DAVIN     Progress/Outcome (Patient Education Goal, PT) --  -DAVIN       Row Name 02/14/24 0907          Therapy Assessment/Plan (PT)    Planned Therapy Interventions (PT) balance training;bed mobility training;gait training;home exercise program;patient/family education;strengthening;transfer training;postural re-education  -DAVIN               User Key  (r) = Recorded By, (t) = Taken By, (c) = Cosigned By      Initials Name Provider Type    Rafa Del Castillo, PT DPT Physical Therapist    Ash Escamilla, PT Student PT Student                   Clinical Impression       Row Name 02/14/24 0907          Pain    Pretreatment Pain Rating 0/10 - no pain  -DAVIN (r) CW (t) DAVIN (c)     Posttreatment Pain Rating 0/10 - no pain  -DAVIN (r) CW (t) DAVIN (c)     Pain Intervention(s) Medication (See MAR);Repositioned;Ambulation/increased activity  -DAVIN (r) CW (t) DAVIN (c)     Additional Documentation Pain Scale: Numbers Pre/Post-Treatment (Group)  -DAVIN (r) CW (t) DAIVN (c)       Row Name 02/14/24 0907          Plan of Care Review    Plan of Care Reviewed With spouse;patient  -DAVIN (r) CW (t) DAVIN (c)     Outcome Evaluation PT evaluation completed on this date. PT A&Ox4. Pt in chair, accompanied by wife upon PT arrival. Pt was accurate historian at this time. Pt was SBA for sitting balance in chair. Pt demo'd AROM that was WFL in BLE. Pt demo'd 3/5 strength in BLE grossly. Pt was able to complete sit<>stand t/f w/ CGA. Once standing, pt was able to take one step before major LOB. Pt now req'd maxA from PT for dynamic standing balance and modA for static standing balance. Pt stated that his legs 'felt like jell-o\" and that he all of " a sudden felt really dizzy. PT held further ambulation until dizziness subsided. While standing, pt was able to complete 4 reps of standing marches before fatiguing. Pt was able to take a few backwards steps and a few side steps back to the chair. Pt returned to chair, NG tube connected, and call light within reach. Skilled PT warranted to improve balance, fxl mobility, endurance, and BLE strength. PT D/C recommendation: subacute rehab pending progress  -DAVIN (r) CW (t) DAVIN (c)       Row Name 02/14/24 0907          Therapy Assessment/Plan (PT)    Patient/Family Therapy Goals Statement (PT) To get stronger  -DAVIN (r) CW (t) DAVIN (c)     Rehab Potential (PT) good, to achieve stated therapy goals  -DAVIN (r) CW (t) DAVIN (c)     Criteria for Skilled Interventions Met (PT) yes;meets criteria;skilled treatment is necessary  -DAVIN (r) CW (t) DAVIN (c)     Therapy Frequency (PT) 2 times/day  -DAVIN (r) CW (t) DAVIN (c)     Predicted Duration of Therapy Intervention (PT) Until D/C or goals met  -DAVIN (r) CW (t) DAVIN (c)       Row Name 02/14/24 0907          Vital Signs    O2 Delivery Pre Treatment room air  -DAVIN (r) CW (t) DAVIN (c)     O2 Delivery Intra Treatment room air  -DAVIN (r) CW (t) DAVIN (c)     O2 Delivery Post Treatment room air  -DAVIN (r) CW (t) DAVIN (c)     Pre Patient Position Sitting  -DAVIN (r) CW (t) DAVIN (c)     Intra Patient Position Standing  -DAVIN (r) CW (t) DAIVN (c)     Post Patient Position Sitting  -DAVIN (r) CW (t) DAVIN (c)       Row Name 02/14/24 0907          Positioning and Restraints    Pre-Treatment Position sitting in chair/recliner  -DAVIN (r) CW (t) DAVIN (c)     Post Treatment Position chair  -DAVIN (r) CW (t) DAVIN (c)     In Chair notified nsg;sitting;call light within reach;exit alarm on;with family/caregiver;encouraged to call for assist  -DAVIN (r) CW (t) DAVIN (c)               User Key  (r) = Recorded By, (t) = Taken By, (c) = Cosigned By      Initials Name Provider Type    Rafa Del Castillo, PT DPT Physical Therapist    Ash Escamilla, PT Student PT  Student                   Outcome Measures       Row Name 02/14/24 0907 02/14/24 0805       How much help from another person do you currently need...    Turning from your back to your side while in flat bed without using bedrails? 3  -DAVIN (r) CW (t) DAVIN (c) 2  -CR    Moving from lying on back to sitting on the side of a flat bed without bedrails? 3  -DAVIN (r) CW (t) DAVIN (c) 2  -CR    Moving to and from a bed to a chair (including a wheelchair)? 2  -DAVIN (r) CW (t) DAVIN (c) 3  -CR    Standing up from a chair using your arms (e.g., wheelchair, bedside chair)? 2  -DAVIN (r) CW (t) DAVIN (c) 3  -CR    Climbing 3-5 steps with a railing? 1  -DAVIN (r) CW (t) DAVIN (c) 3  -CR    To walk in hospital room? 2  -DAVIN (r) CW (t) DAVIN (c) 3  -CR    AM-PAC 6 Clicks Score (PT) 13  -DAVIN (r) CW (t) 16  -CR    Highest Level of Mobility Goal 4 --> Transfer to chair/commode  -DAVIN (r) CW (t) 5 --> Static standing  -CR      Row Name 02/14/24 0907          Functional Assessment    Outcome Measure Options AM-PAC 6 Clicks Basic Mobility (PT)  -DAVIN (r) CW (t) DAVIN (c)               User Key  (r) = Recorded By, (t) = Taken By, (c) = Cosigned By      Initials Name Provider Type    Rafa Del Castillo, PT DPT Physical Therapist    Devora Baum, RN Registered Nurse    Ash Escamilla, PT Student PT Student                                 Physical Therapy Education       Title: PT OT SLP Therapies (Done)       Topic: Physical Therapy (Done)       Point: Mobility training (Done)       Learning Progress Summary             Patient Acceptance, E, VU by PRABHA at 2/14/2024 1025    Comment: Pt educated on abdominal precautions. Pt educated on proper body mechanics and safety precautions upon sit<>stand and stand<>sit t/f's. Pt edcuated on POC. Pt educated on energy conservation techniques. Pt educated on signs and symptoms of dizziness/ftg.                         Point: Home exercise program (Done)       Learning Progress Summary             Patient Acceptance, E, VU by PRABHA  "at 2/14/2024 1025    Comment: Pt educated on abdominal precautions. Pt educated on proper body mechanics and safety precautions upon sit<>stand and stand<>sit t/f's. Pt edcuated on POC. Pt educated on energy conservation techniques. Pt educated on signs and symptoms of dizziness/ftg.                         Point: Body mechanics (Done)       Learning Progress Summary             Patient Acceptance, E, VU by CW at 2/14/2024 1025    Comment: Pt educated on abdominal precautions. Pt educated on proper body mechanics and safety precautions upon sit<>stand and stand<>sit t/f's. Pt edcuated on POC. Pt educated on energy conservation techniques. Pt educated on signs and symptoms of dizziness/ftg.                         Point: Precautions (Done)       Learning Progress Summary             Patient Acceptance, E, VU by CW at 2/14/2024 1025    Comment: Pt educated on abdominal precautions. Pt educated on proper body mechanics and safety precautions upon sit<>stand and stand<>sit t/f's. Pt edcuated on POC. Pt educated on energy conservation techniques. Pt educated on signs and symptoms of dizziness/ftg.                                         User Key       Initials Effective Dates Name Provider Type Discipline     12/14/23 -  Ash Sahu, PT Student PT Student PT                  PT Recommendation and Plan     Plan of Care Reviewed With: spouse, patient  Outcome Evaluation: PT evaluation completed on this date. PT A&Ox4. Pt in chair, accompanied by wife upon PT arrival. Pt was accurate historian at this time. Pt was SBA for sitting balance in chair. Pt demo'd AROM that was WFL in BLE. Pt demo'd 3/5 strength in BLE grossly. Pt was able to complete sit<>stand t/f w/ CGA. Once standing, pt was able to take one step before major LOB. Pt now req'd maxA from PT for dynamic standing balance and modA for static standing balance. Pt stated that his legs 'felt like jell-o\" and that he all of a sudden felt really dizzy. PT held " further ambulation until dizziness subsided. While standing, pt was able to complete 4 reps of standing marches before fatiguing. Pt was able to take a few backwards steps and a few side steps back to the chair. Pt returned to chair, NG tube connected, and call light within reach. Skilled PT warranted to improve balance, fxl mobility, endurance, and BLE strength. PT D/C recommendation: subacute rehab pending progress     Time Calculation:         PT Charges       Row Name 02/14/24 0907             Time Calculation    Start Time 0907  -DAVIN (r) CW (t) DAVIN (c)      Stop Time 1005  -DAVIN (r) CW (t) DAVIN (c)      Time Calculation (min) 58 min  -DAVIN (r) CW (t)      PT Received On 02/14/24  -DAVIN (r) CW (t) DAVIN (c)      PT Goal Re-Cert Due Date 02/24/24  -DAVIN (r) CW (t) DAVIN (c)         Untimed Charges    PT Eval/Re-eval Minutes 58  -DAVIN (r) CW (t) DAVIN (c)         Total Minutes    Untimed Charges Total Minutes 58  -DAVIN (r) CW (t)       Total Minutes 58  -DAVIN (r) CW (t)                User Key  (r) = Recorded By, (t) = Taken By, (c) = Cosigned By      Initials Name Provider Type    Rafa Del Castillo, PT DPT Physical Therapist    Ash Escamilla, PT Student PT Student                      PT G-Codes  Outcome Measure Options: AM-PAC 6 Clicks Basic Mobility (PT)  AM-PAC 6 Clicks Score (PT): 13       Ash aShu PT Student  2/14/2024

## 2024-02-14 NOTE — PROGRESS NOTES
[facility]   Progress Note    Patient Name: Bernard Matta  : 1950  MRN: [id number]  Primary Care Physician:  Brando Webb MD  Date of admission: 2024    Subjective   Subjective   Presents this am with significant improvement of mental status.  Patient tolerating TF and now clears.      Objective     Vitals:   Temp:  [96.9 °F (36.1 °C)-97.8 °F (36.6 °C)] 96.9 °F (36.1 °C)  Heart Rate:  [] 82  Resp:  [17-26] 17  BP: ()/(49-91) 91/54  Patient saturations are 100%            Physical Exam    Constitutional: Awake, alert   Gastrointestinal: soft, approp TTP, ND + BS, clean dry and dressed         Result Review    Result Review:  I have personally reviewed the results from the time of this admission to 2024 14:30 CST and agree with these findings:  [x]  Laboratory list / accordion  []  Microbiology  []  Radiology  []  EKG/Telemetry   []  Cardiology/Vascular   []  Pathology  []  Old records  []  Other:      Assessment & Plan   Assessment / Plan     Brief Patient Summary:  Bernard Matta is a 73 y.o. male who presents s/p ileocectomy for cecal perforation    Active Hospital Problems:  Active Hospital Problems    Diagnosis     **Bowel perforation     Stage 3b chronic kidney disease (CKD)     Hyperkalemia     COVID-19 virus detected     Iron deficiency anemia     Hypertension     Type 2 diabetes mellitus      Plan: Patient doing well.  Transfer to [facility]  Start clears remove nasal feeding tube, long planning potentially rehab or home PT and OT.    DVT prophylaxis:  Medical and mechanical DVT prophylaxis orders are present.        CODE STATUS:   Code Status (Patient has no pulse and is not breathing): CPR (Attempt to Resuscitate)  Medical Interventions (Patient has pulse or is breathing): Full Support    Dr. Avitia  General Surgery  24  13:56 CST

## 2024-02-14 NOTE — PROGRESS NOTES
1         [facility] Medicine Services  INPATIENT PROGRESS NOTE    Patient Name: Bernard Matta  Date of Admission: 2/6/2024  Today's Date: 02/14/24  Length of Stay: 8  Primary Care Physician: Brando Webb MD    Subjective   Chief Complaint: Follow-up  [facility]   Hospital service consulted on February 7 for hyperglycemia  Blood sugar been anywhere from 231-296.  Patient on tube feeding  I increased Levemir yesterday.  Patient is hemodynamically stable.  No new labs today.  Will request this given hypernatremia, elevated creatinine that is trending down, elevated liver function tests.  Patient been off restraints  Elavil reportedly given last night.  Couple of days ago patient was started on Seroquel.    Other than a discomfort on his nose, he has not complained of anything else to me at the time of visit.  States that he needs to urinate.  He has a external catheter.  He has good output with a positive fluid balance of 185.    Review of Systems   All pertinent negatives and positives are as above. All other systems have been reviewed and are negative unless otherwise stated.     Objective    Temp:  [97.1 °F (36.2 °C)-98.9 °F (37.2 °C)] 97.6 °F (36.4 °C)  Heart Rate:  [] 88  Resp:  [11-26] 26  BP: ()/(53-95) 132/66  Physical Exam  Patient more interactive today  Tube feeding through Dobbhoff tube going at a rate of 40 mL/h  No distress  Not requiring any oxygen  Sinus tachycardia at 108 in the monitor.  S1-S2 regular rate and rhythm  Adequate air exchange, diminished breath sounds but no obvious crackles or wheezes  Distended abdomen  Presence of bandage on abdomen  No gross cyanosis  Mild puffiness of face  Warm dry skin with good capillary refill    Results Review:  I have reviewed the labs, radiology results, and diagnostic studies.    Laboratory Data:   Results from last 7 days   Lab Units 02/12/24  0212 02/11/24  0253 02/10/24  0245   WBC 10*3/mm3 5.76 5.67 6.58    HEMOGLOBIN g/dL 9.7* 8.8* 8.9*   HEMATOCRIT % 32.1* 29.6* 29.0*   PLATELETS 10*3/mm3 215 181 189        Results from last 7 days   Lab Units 02/13/24  0312 02/12/24  1614 02/12/24  0212 02/11/24  0253   SODIUM mmol/L 147*  --  150* 149*   POTASSIUM mmol/L 3.7 3.7 3.3* 3.5   CHLORIDE mmol/L 115*  --  115* 114*   CO2 mmol/L 21.0*  --  20.0* 19.0*   BUN mg/dL 32*  --  35* 39*   CREATININE mg/dL 1.60*  --  1.76* 2.09*   CALCIUM mg/dL 8.8  --  8.7 8.6   BILIRUBIN mg/dL 0.2  --  0.3 0.3   ALK PHOS U/L 94  --  90 79   ALT (SGPT) U/L 68*  --  56* 49*   AST (SGOT) U/L 46*  --  35 30   GLUCOSE mg/dL 191*  --  156* 237*       Culture Data:   Blood Culture   Date Value Ref Range Status   02/10/2024 No growth at 3 days  Preliminary       Radiology Data:   Imaging Results (Last 24 Hours)       ** No results found for the last 24 hours. **            I have reviewed the patient's current medications.     Assessment/Plan   Assessment  Active Hospital Problems    Diagnosis     **Bowel perforation     Stage 3b chronic kidney disease (CKD)     Hyperkalemia     COVID-19 virus detected     Iron deficiency anemia     Hypertension     Type 2 diabetes mellitus            Medical Decision Making  Number and Complexity of problems:   Problem list  Hyperglycemia in the setting of diabetes with A1c of 8.4%  Hypernatremia  Encephalopathy could be related to acute illness, worsening hypernatremia, lack of bleeding.  Mild hypokalemia  Acute on chronic kidney injury with probable baseline creatinine anywhere from 1.78-1.93.  Bacteremia (Clostridium septicum)-received 15 doses of Zosyn.  Cecal perforation status post diagnostic laparoscopy converted to laparotomy with ILEOCECTETOMY on February 6 by Dr. Park  Sinus tachycardia-could be related to volume depletion/possibly third space loss  Hypertension  Anemia.  Transfuse as needed if hemoglobin less than 7 or with symptoms of anemia with signs of bleeding.      Treatment Plan  Calculated  insulin coverage yesterday was 12 units.  Increase yesterday to 18 units Levemir (from 15 units); I will increase the Levemir to 25 today.  Will continue on sliding scale insulin with coverage and as needed hypoglycemic protocol.  Blood pressure better controlled.  Continue present management  Repeat labs today as mentioned above  Postoperative care per primary service  Tube feeding ongoing and tolerating    Medications reviewed.  amLODIPine, 10 mg, Nasogastric, Q24H  aspirin, 81 mg, Nasogastric, Daily  atorvastatin, 40 mg, Nasogastric, Nightly  Chlorhexidine Gluconate Cloth, 1 Application, Topical, Q24H  cloNIDine, 0.2 mg, Nasogastric, BID  dorzolamide (TRUSOPT) 2 % 1 drop, timolol (TIMOPTIC) 0.5 % 1 drop for Cosopt 22.3-6.8 mg/mL, , Both Eyes, BID  heparin (porcine), 5,000 Units, Subcutaneous, Q8H  insulin detemir, 25 Units, Subcutaneous, Daily  insulin regular, 2-7 Units, Subcutaneous, Q6H  latanoprost, 1 drop, Both Eyes, Nightly  lisinopril, 10 mg, Nasogastric, Daily  QUEtiapine, 25 mg, Nasogastric, Nightly  sodium bicarbonate, 650 mg, Nasogastric, Daily  sodium hypochlorite, , Topical, Q24H  terazosin, 1 mg, Nasogastric, BID        Conditions and Status  fair, slowly improving     MDM Data  External documents reviewed: None  Cardiac tracing (EKG, telemetry) interpretation: Sinus tachycardia at 106  Radiology interpretation: Abdominal x-ray yesterday described a feeding tube at the proximal stomach mild distention of the stomach with air and mildly distended loops of small bowel which may be due to he will use     Labs reviewed: Yes  Any tests that were considered but not ordered: None     Decision rules/scores evaluated (example [id number], Wells, etc): None     Discussed with: Patient, nursing staff     Care Planning  Shared decision making: Family and primary service  Code status and discussions: Full code     Disposition  Social Determinants of Health that impact treatment or disposition: None identified  at this time  I expect the patient to be discharged to (TBD)    Electronically signed by Jasmeet Iraheta MD, 02/14/24, 08:14 CST.

## 2024-02-14 NOTE — PLAN OF CARE
Goal Outcome Evaluation:   Patient A&O x4 since 0000. UOP adequate. Restraints off for the nights. Elavil given.

## 2024-02-15 LAB
ALBUMIN SERPL-MCNC: 2.9 G/DL (ref 3.5–5.2)
ALBUMIN/GLOB SERPL: 0.9 G/DL
ALP SERPL-CCNC: 94 U/L (ref 39–117)
ALT SERPL W P-5'-P-CCNC: 61 U/L (ref 1–41)
ANION GAP SERPL CALCULATED.3IONS-SCNC: 10 MMOL/L (ref 5–15)
AST SERPL-CCNC: 36 U/L (ref 1–40)
BACTERIA SPEC AEROBE CULT: NORMAL
BILIRUB SERPL-MCNC: 0.2 MG/DL (ref 0–1.2)
BUN SERPL-MCNC: 40 MG/DL (ref 8–23)
BUN/CREAT SERPL: 20.7 (ref 7–25)
CALCIUM SPEC-SCNC: 8.6 MG/DL (ref 8.6–10.5)
CHLORIDE SERPL-SCNC: 110 MMOL/L (ref 98–107)
CO2 SERPL-SCNC: 24 MMOL/L (ref 22–29)
CREAT SERPL-MCNC: 1.93 MG/DL (ref 0.76–1.27)
EGFRCR SERPLBLD CKD-EPI 2021: 35.7 ML/MIN/1.73
GLOBULIN UR ELPH-MCNC: 3.1 GM/DL
GLUCOSE BLDC GLUCOMTR-MCNC: 116 MG/DL (ref 70–130)
GLUCOSE BLDC GLUCOMTR-MCNC: 186 MG/DL (ref 70–130)
GLUCOSE BLDC GLUCOMTR-MCNC: 229 MG/DL (ref 70–130)
GLUCOSE BLDC GLUCOMTR-MCNC: 252 MG/DL (ref 70–130)
GLUCOSE BLDC GLUCOMTR-MCNC: 72 MG/DL (ref 70–130)
GLUCOSE BLDC GLUCOMTR-MCNC: 80 MG/DL (ref 70–130)
GLUCOSE SERPL-MCNC: 70 MG/DL (ref 65–99)
POTASSIUM SERPL-SCNC: 3.3 MMOL/L (ref 3.5–5.2)
POTASSIUM SERPL-SCNC: 4.9 MMOL/L (ref 3.5–5.2)
PROT SERPL-MCNC: 6 G/DL (ref 6–8.5)
SODIUM SERPL-SCNC: 144 MMOL/L (ref 136–145)

## 2024-02-15 PROCEDURE — 25810000003 LACTATED RINGERS PER 1000 ML: Performed by: INTERNAL MEDICINE

## 2024-02-15 PROCEDURE — 99024 POSTOP FOLLOW-UP VISIT: CPT | Performed by: NURSE PRACTITIONER

## 2024-02-15 PROCEDURE — 80053 COMPREHEN METABOLIC PANEL: CPT | Performed by: SURGERY

## 2024-02-15 PROCEDURE — 82948 REAGENT STRIP/BLOOD GLUCOSE: CPT

## 2024-02-15 PROCEDURE — 97110 THERAPEUTIC EXERCISES: CPT

## 2024-02-15 PROCEDURE — 63710000001 INSULIN REGULAR HUMAN PER 5 UNITS: Performed by: SURGERY

## 2024-02-15 PROCEDURE — 25010000002 HEPARIN (PORCINE) PER 1000 UNITS: Performed by: SURGERY

## 2024-02-15 PROCEDURE — 84132 ASSAY OF SERUM POTASSIUM: CPT | Performed by: SURGERY

## 2024-02-15 PROCEDURE — 99222 1ST HOSP IP/OBS MODERATE 55: CPT | Performed by: UROLOGY

## 2024-02-15 PROCEDURE — 97116 GAIT TRAINING THERAPY: CPT

## 2024-02-15 RX ORDER — POTASSIUM CHLORIDE 750 MG/1
40 CAPSULE, EXTENDED RELEASE ORAL EVERY 4 HOURS
Status: COMPLETED | OUTPATIENT
Start: 2024-02-15 | End: 2024-02-15

## 2024-02-15 RX ORDER — TAMSULOSIN HYDROCHLORIDE 0.4 MG/1
0.8 CAPSULE ORAL DAILY
Status: DISCONTINUED | OUTPATIENT
Start: 2024-02-15 | End: 2024-02-16 | Stop reason: HOSPADM

## 2024-02-15 RX ORDER — CLONIDINE HYDROCHLORIDE 0.1 MG/1
0.1 TABLET ORAL EVERY 8 HOURS PRN
Status: DISCONTINUED | OUTPATIENT
Start: 2024-02-15 | End: 2024-02-16 | Stop reason: HOSPADM

## 2024-02-15 RX ORDER — SODIUM CHLORIDE, SODIUM LACTATE, POTASSIUM CHLORIDE, CALCIUM CHLORIDE 600; 310; 30; 20 MG/100ML; MG/100ML; MG/100ML; MG/100ML
50 INJECTION, SOLUTION INTRAVENOUS CONTINUOUS
Status: DISCONTINUED | OUTPATIENT
Start: 2024-02-15 | End: 2024-02-16

## 2024-02-15 RX ADMIN — LATANOPROST 1 DROP: 50 SOLUTION/ DROPS OPHTHALMIC at 00:07

## 2024-02-15 RX ADMIN — LISINOPRIL 10 MG: 10 TABLET ORAL at 08:43

## 2024-02-15 RX ADMIN — ASPIRIN 81 MG: 81 TABLET, CHEWABLE ORAL at 08:43

## 2024-02-15 RX ADMIN — HEPARIN SODIUM 5000 UNITS: 5000 INJECTION INTRAVENOUS; SUBCUTANEOUS at 15:16

## 2024-02-15 RX ADMIN — AMITRIPTYLINE HYDROCHLORIDE 10 MG: 10 TABLET, FILM COATED ORAL at 23:19

## 2024-02-15 RX ADMIN — POTASSIUM CHLORIDE 40 MEQ: 10 CAPSULE, COATED, EXTENDED RELEASE ORAL at 11:53

## 2024-02-15 RX ADMIN — AMLODIPINE BESYLATE 10 MG: 10 TABLET ORAL at 08:43

## 2024-02-15 RX ADMIN — CLONIDINE HYDROCHLORIDE 0.2 MG: 0.2 TABLET ORAL at 08:43

## 2024-02-15 RX ADMIN — INSULIN HUMAN 4 UNITS: 100 INJECTION, SOLUTION PARENTERAL at 23:19

## 2024-02-15 RX ADMIN — POTASSIUM CHLORIDE 40 MEQ: 10 CAPSULE, COATED, EXTENDED RELEASE ORAL at 08:42

## 2024-02-15 RX ADMIN — TIMOLOL MALEATE: 5 SOLUTION/ DROPS OPHTHALMIC at 08:44

## 2024-02-15 RX ADMIN — LATANOPROST 1 DROP: 50 SOLUTION/ DROPS OPHTHALMIC at 23:01

## 2024-02-15 RX ADMIN — HYOSCYAMINE SULFATE: 16 SOLUTION at 08:44

## 2024-02-15 RX ADMIN — HEPARIN SODIUM 5000 UNITS: 5000 INJECTION INTRAVENOUS; SUBCUTANEOUS at 22:42

## 2024-02-15 RX ADMIN — TERAZOSIN HYDROCHLORIDE 1 MG: 1 CAPSULE ORAL at 08:43

## 2024-02-15 RX ADMIN — ATORVASTATIN CALCIUM 40 MG: 40 TABLET, FILM COATED ORAL at 22:42

## 2024-02-15 RX ADMIN — INSULIN HUMAN 2 UNITS: 100 INJECTION, SOLUTION PARENTERAL at 11:53

## 2024-02-15 RX ADMIN — SODIUM BICARBONATE 650 MG: 650 TABLET ORAL at 08:43

## 2024-02-15 RX ADMIN — SODIUM CHLORIDE, POTASSIUM CHLORIDE, SODIUM LACTATE AND CALCIUM CHLORIDE 50 ML/HR: 600; 310; 30; 20 INJECTION, SOLUTION INTRAVENOUS at 17:39

## 2024-02-15 RX ADMIN — TAMSULOSIN HYDROCHLORIDE 0.8 MG: 0.4 CAPSULE ORAL at 17:36

## 2024-02-15 RX ADMIN — TIMOLOL MALEATE: 5 SOLUTION/ DROPS OPHTHALMIC at 22:42

## 2024-02-15 RX ADMIN — HEPARIN SODIUM 5000 UNITS: 5000 INJECTION INTRAVENOUS; SUBCUTANEOUS at 07:00

## 2024-02-15 RX ADMIN — INSULIN HUMAN 3 UNITS: 100 INJECTION, SOLUTION PARENTERAL at 17:55

## 2024-02-15 NOTE — CONSULTS
Urology    Mr. Matta is 73 y.o. male    REASON FOR CONSULT/CHIEF COMPLAINT: Retention    HPI  Asked to see this patient with urinary retention.  This occurred in the context of being status post a laparotomy and colon resection for bowel perforation.  Patient does have some baseline BPH with lower urinary tract symptoms.  Patient usually sees Dr. Wyatt in our practice.  He had evaluation including cystoscopy which revealed some BPH.  Patient currently takes 0.8 mg tamsulosin at home.  That has not been resumed.  He is currently on terazosin 1 mg twice daily.    The following portions of the patient's history were reviewed and updated as appropriate: allergies, current medications, past family history, past medical history, past social history, past surgical history and problem list.    Review of Systems    Medications Prior to Admission   Medication Sig Dispense Refill Last Dose    amitriptyline (ELAVIL) 10 MG tablet Take 1 tablet by mouth At Night As Needed for Sleep.   Past Week    aspirin 81 MG tablet Take 1 tablet by mouth Daily.   2/6/2024    atorvastatin (LIPITOR) 40 MG tablet Take 1 tablet by mouth every night at bedtime. 90 tablet 3 2/5/2024    cetirizine (zyrTEC) 10 MG tablet Take 1 tablet by mouth Daily.   Past Week    cholecalciferol (VITAMIN D3) 400 UNITS tablet Take 1 tablet by mouth Daily.   2/6/2024    cloNIDine (CATAPRES) 0.2 MG tablet Take 1 tablet by mouth 2 (Two) Times a Day.   2/6/2024    dorzolamide-timolol (COSOPT) 22.3-6.8 MG/ML ophthalmic solution Administer 1 drop to both eyes 2 (Two) Times a Day.   2/6/2024    epoetin shari-epbx (Retacrit) 63586 UNIT/ML injection Inject  under the skin into the appropriate area as directed Every 14 (Fourteen) Days.   1/10/2024    ezetimibe (Zetia) 10 MG tablet Take 1 tablet by mouth Daily. 90 tablet 3 2/6/2024    ferrous sulfate 325 (65 FE) MG tablet Take 1 tablet by mouth Every Other Day. Takes 1 every other day .   2/6/2024    fluticasone (FLONASE) 50  MCG/ACT nasal spray 2 sprays into the nostril(s) as directed by provider Daily.   2/5/2024    furosemide (Lasix) 20 MG tablet Take 1 tablet by mouth Daily. 90 tablet 3 Patient Taking Differently    insulin aspart (novoLOG FLEXPEN) 100 UNIT/ML solution pen-injector sc pen Inject 0-20 Units under the skin into the appropriate area as directed 3 (Three) Times a Day With Meals. Sliding scale   2/5/2024    insulin degludec (TRESIBA FLEXTOUCH) 100 UNIT/ML solution pen-injector injection Inject 20 Units under the skin into the appropriate area as directed Every Night.   2/5/2024    latanoprost (XALATAN) 0.005 % ophthalmic solution Administer 1 drop to both eyes Every Night.   2/5/2024    lisinopril (PRINIVIL,ZESTRIL) 10 MG tablet Take 1 tablet by mouth Every Night.   2/5/2024    Multiple Vitamins-Minerals (MULTIVITAMIN WITH MINERALS) tablet tablet Take 1 tablet by mouth Daily.   2/6/2024    Rhopressa 0.02 % solution Apply 1 drop to eye(s) as directed by provider Every Night.   2/5/2024    tamsulosin (FLOMAX) 0.4 MG capsule 24 hr capsule Take 2 capsules by mouth Every Night.   2/5/2024         Current Facility-Administered Medications:     amitriptyline (ELAVIL) tablet 10 mg, 10 mg, Nasogastric, Nightly PRN, Dakota Avitia MD, 10 mg at 02/14/24 2237    amLODIPine (NORVASC) tablet 10 mg, 10 mg, Oral, Q24H, Hemant Park MD, 10 mg at 02/15/24 0843    aspirin chewable tablet 81 mg, 81 mg, Oral, Daily, Hemant Park MD, 81 mg at 02/15/24 0843    atorvastatin (LIPITOR) tablet 40 mg, 40 mg, Oral, Nightly, Hemant Park MD    Calcium Replacement - Follow Nurse / BPA Driven Protocol, , Does not apply, PRSadi UREÑA Anthony K, MD    cloNIDine (CATAPRES) tablet 0.2 mg, 0.2 mg, Oral, BID, Hemant Park MD, 0.2 mg at 02/15/24 0843    dextrose (D50W) (25 g/50 mL) IV injection 25 g, 25 g, Intravenous, Q15 Min PRN, Dakota Avitia MD    dextrose (GLUTOSE) oral gel 15 g, 15 g, Oral, Q15 Min PRN, Dakota Avitia MD     dorzolamide (TRUSOPT) 2 % 1 drop, timolol (TIMOPTIC) 0.5 % 1 drop for Cosopt 22.3-6.8 mg/mL, , Both Eyes, BID, Dakota Avitia MD, Given at 02/15/24 0844    glucagon (GLUCAGEN) injection 1 mg, 1 mg, Intramuscular, Q15 Min PRN, Dakota Avitia MD    heparin (porcine) 5000 UNIT/ML injection 5,000 Units, 5,000 Units, Subcutaneous, Q8H, Dakota Avitia MD, 5,000 Units at 02/15/24 0700    hydrALAZINE (APRESOLINE) injection 10 mg, 10 mg, Intravenous, Q6H PRN, Dakota Avitia MD, 10 mg at 02/13/24 0116    insulin detemir (LEVEMIR) injection 25 Units, 25 Units, Subcutaneous, Daily, Dakota Avitia MD, 25 Units at 02/14/24 0917    insulin regular (humuLIN R,novoLIN R) injection 2-7 Units, 2-7 Units, Subcutaneous, Q6H, Dakota Avitia MD, 2 Units at 02/15/24 1153    labetalol (NORMODYNE,TRANDATE) injection 20 mg, 20 mg, Intravenous, Q6H PRN, Dakota Avitia MD, 20 mg at 02/13/24 0146    latanoprost (XALATAN) 0.005 % ophthalmic solution 1 drop, 1 drop, Both Eyes, Nightly, Dakota Avitia MD, 1 drop at 02/15/24 0007    lisinopril (PRINIVIL,ZESTRIL) tablet 10 mg, 10 mg, Oral, Daily, Hemant Park MD, 10 mg at 02/15/24 0843    Magnesium Standard Dose Replacement - Follow Nurse / BPA Driven Protocol, , Does not apply, Sadi MENARD Anthony K, MD    Phosphorus Replacement - Follow Nurse / BPA Driven Protocol, , Does not apply, Sadi MENARD Anthony K, MD    Potassium Replacement - Follow Nurse / BPA Driven Protocol, , Does not apply, Sadi MENARD Anthony K, MD    QUEtiapine (SEROquel) tablet 25 mg, 25 mg, Oral, Nightly, Hemant Park MD    sodium bicarbonate tablet 650 mg, 650 mg, Oral, Daily, Hemant Park MD, 650 mg at 02/15/24 0843    sodium hypochlorite (HYSEPT) 0.25 % topical solution, , Topical, Q24H, Dakota Avitia MD, Given at 02/15/24 0844    terazosin (HYTRIN) capsule 1 mg, 1 mg, Oral, BID, Hemant Park MD, 1 mg at 02/15/24 0843    Past Medical History:   Diagnosis Date    Diabetes mellitus      "Erectile disorder due to 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     Hyperlipidemia     Hypertension     Ischemic optic neuropathy of both eyes        Past Surgical History:   Procedure Laterality Date    COLONOSCOPY      [place]    COLONOSCOPY N/A 2020    Procedure: COLONOSCOPY WITH ANESTHESIA;  Surgeon: Shaquille Mckenzie DO;  Location: [facility] ENDOSCOPY;  Service: Gastroenterology;  Laterality: N/A;  preop; hx of polyps   postop; polyps   PCP Brando Webb     COLONOSCOPY N/A 2021    Procedure: COLONOSCOPY WITH ANESTHESIA;  Surgeon: Shaquille Mckenzie DO;  Location:  [facility] ENDOSCOPY;  Service: Gastroenterology;  Laterality: N/A;  pre hx adenomatous colon polyp  post normal  Brando Webb MD    EXPLORATORY LAPAROTOMY N/A 2024    Procedure: DIAGNOSTIC LAPAROSCOPY CONVERTED TO LAPAROTOMY, ILIOCECTECTOMY;  Surgeon: Hemant Park MD;  Location: [facility] OR;  Service: General;  Laterality: N/A;       Social History     Socioeconomic History    Marital status:    Tobacco Use    Smoking status: Former     Packs/day: 0.50     Years: 10.00     Additional pack years: 0.00     Total pack years: 5.00     Types: Cigarettes     Quit date: 1979     Years since quittin.1     Passive exposure: Past    Smokeless tobacco: Never   Vaping Use    Vaping Use: Never used   Substance and Sexual Activity    Alcohol use: No    Drug use: No    Sexual activity: Defer       Family History   Problem Relation Age of Onset    No Known Problems Maternal Grandmother     No Known Problems Maternal Grandfather     No Known Problems Paternal Grandmother     No Known Problems Paternal Grandfather     No Known Problems Mother     No Known Problems Father     Colon cancer Neg Hx     Colon polyps Neg Hx     Esophageal cancer Neg Hx        /61 (BP Location: Left arm, Patient Position: Lying)   Pulse 66   Temp 97.5 °F (36.4 °C) (Oral)   Resp 16   Ht 175.3 cm (69\")   Wt 65.3 kg (144 lb)   SpO2 97%   BMI 21.27 kg/m² " "    Physical Exam  Constitutional:   Temp:  [97.4 °F (36.3 °C)-98.9 °F (37.2 °C)] 97.6 °F (36.4 °C)  Heart Rate:  [66-99] 74  Resp:  [16] 16  BP: (106-146)/(55-70) 127/59  ] Well developed, well nourished, no distress  Respiratory:   Effort unlabored; Movements symmetric  GI:   No mass or hernia noted, not distended or tender   No enlargement of spleen or liver noted  Skin:   No pallor or cyanosis; No obvious rash  Psych:   Alert, Oriented x 3       Lab Results   Component Value Date    GLUCOSE 70 02/15/2024    BUN 40 (H) 02/15/2024    CREATININE 1.93 (H) 02/15/2024    EGFRIFNONA 33 (L) 09/08/2021    EGFRIFAFRI 38 (L) 09/08/2021    BCR 20.7 02/15/2024    CO2 24.0 02/15/2024    CALCIUM 8.6 02/15/2024    PROTENTOTREF 6.6 01/25/2023    ALBUMIN 2.9 (L) 02/15/2024    LABIL2 1.3 01/25/2023    AST 36 02/15/2024    ALT 61 (H) 02/15/2024     Lab Results   Component Value Date    GLUCOSE 70 02/15/2024    CALCIUM 8.6 02/15/2024     02/15/2024    K 3.3 (L) 02/15/2024    CO2 24.0 02/15/2024     (H) 02/15/2024    BUN 40 (H) 02/15/2024    CREATININE 1.93 (H) 02/15/2024    EGFRIFAFRI 38 (L) 09/08/2021    EGFRIFNONA 33 (L) 09/08/2021    BCR 20.7 02/15/2024    ANIONGAP 10.0 02/15/2024     Lab Results   Component Value Date    WBC 7.61 02/14/2024    HGB 9.2 (L) 02/14/2024    HCT 30.3 (L) 02/14/2024    MCV 95.9 02/14/2024     02/14/2024     Lab Results   Component Value Date    PSA 0.673 08/25/2023    PSA 0.791 07/25/2022    PSA 0.886 07/22/2021     No results found for: \"URINECX\"  Brief Urine Lab Results  (Last result in the past 365 days)        Color   Clarity   Blood   Leuk Est   Nitrite   Protein   CREAT   Urine HCG        02/06/24 1415 Yellow   Clear   Trace   Negative   Negative   30 mg/dL (1+)                   CT Abdomen Pelvis Without Contrast (02/06/2024 15:52)   Reviewed report and the images.  He has an enlarged prostate with bladder diverticula that are noted.  There is no hydronephrosis. " /DLS        Assessment and Plan  Urinary retention  BPH with obstruction  Bladder diverticula  Chronic Kidney disease, stage 3b    I would recommend resuming his tamsulosin 0.8 mg daily.  I would discontinue the terazosin since recommended dose for BPH would be 5 mg or 10 mg daily.  This dose would be more likely to cause orthostasis.  This is likely all multifactorial given his obvious BPH with obstruction as demonstrated by previous cystoscopy and a bladder that has multiple diverticula.  At this point I would continue intermittent catheterization up to 3 times daily.  I suspect he has higher volumes in his bladder when he voids.  This may allow better detrusor contraction if he is more distended when catheterized.  If unable to void in a couple days it is reasonable to send him home with a catheter and follow-up as outpatient in our clinic with Reji Wolfe PA-C or ALONSO Morgan.      (Please note that portions of this note were completed with a voice recognition program.)  bAiel Mercado MD  02/15/24  12:01 CST

## 2024-02-15 NOTE — PLAN OF CARE
Patient A&O X4 and follows commands. VVS. Patient was in the chair most of the day. NG tube pulled and tolerating clear liquid diet.  Patient did not void nurse bladder scanned and straight cath obtaining 600 ml's of urine. Afebrile and patient safety maintained. Patient is waiting on a floor bed and wife at bedside.     Problem: Adult Inpatient Plan of Care  Goal: Plan of Care Review  Outcome: Ongoing, Progressing  Goal: Patient-Specific Goal (Individualized)  Outcome: Ongoing, Progressing  Goal: Absence of Hospital-Acquired Illness or Injury  Outcome: Ongoing, Progressing  Intervention: Identify and Manage Fall Risk  Recent Flowsheet Documentation  Taken 2/14/2024 1702 by Dveora Nunn RN  Safety Promotion/Fall Prevention: safety round/check completed  Taken 2/14/2024 1605 by Devora Nunn RN  Safety Promotion/Fall Prevention: safety round/check completed  Taken 2/14/2024 1502 by Devora Nunn RN  Safety Promotion/Fall Prevention: safety round/check completed  Taken 2/14/2024 1402 by Devora Nunn RN  Safety Promotion/Fall Prevention: safety round/check completed  Taken 2/14/2024 1302 by Devora Nunn RN  Safety Promotion/Fall Prevention: safety round/check completed  Taken 2/14/2024 1205 by Devora Nunn RN  Safety Promotion/Fall Prevention: safety round/check completed  Taken 2/14/2024 1102 by Devora Nunn RN  Safety Promotion/Fall Prevention: safety round/check completed  Taken 2/14/2024 1002 by Devora Nunn RN  Safety Promotion/Fall Prevention: safety round/check completed  Taken 2/14/2024 0902 by Devora Nunn RN  Safety Promotion/Fall Prevention: safety round/check completed  Taken 2/14/2024 0805 by Devora Nunn RN  Safety Promotion/Fall Prevention: safety round/check completed  Taken 2/14/2024 0702 by Devora Nunn RN  Safety Promotion/Fall Prevention: safety round/check completed  Intervention: Prevent Skin Injury  Recent Flowsheet  Documentation  Taken 2/14/2024 1605 by Devora Nunn RN  Body Position: supine  Skin Protection:   adhesive use limited   tubing/devices free from skin contact   skin-to-device areas padded   skin-to-skin areas padded   incontinence pads utilized  Taken 2/14/2024 1402 by Devora Nunn RN  Body Position:   weight shifting   right  Taken 2/14/2024 1205 by Devora Nunn RN  Body Position:   weight shifting   left  Skin Protection:   adhesive use limited   tubing/devices free from skin contact   skin-to-device areas padded   skin-to-skin areas padded   incontinence pads utilized  Taken 2/14/2024 1002 by Devora Nunn RN  Body Position:   turned   weight shifting  Taken 2/14/2024 0805 by Devora Nunn RN  Body Position: supine  Skin Protection:   adhesive use limited   tubing/devices free from skin contact   skin-to-skin areas padded   skin-to-device areas padded   incontinence pads utilized  Intervention: Prevent and Manage VTE (Venous Thromboembolism) Risk  Recent Flowsheet Documentation  Taken 2/14/2024 1605 by Devora Nunn RN  Activity Management: up in chair  Range of Motion: ROM (range of motion) performed  Taken 2/14/2024 1402 by Devora Nunn RN  Activity Management: up in chair  Taken 2/14/2024 1205 by Devora Nunn RN  Activity Management: up in chair  VTE Prevention/Management:   bilateral   sequential compression devices on  Range of Motion: ROM (range of motion) performed  Taken 2/14/2024 1002 by Devora Nunn RN  Activity Management: up in chair  Taken 2/14/2024 0902 by Devora Nunn RN  Activity Management: up in chair  Taken 2/14/2024 0805 by Devora Nunn RN  Activity Management: bedrest  VTE Prevention/Management:   bilateral   sequential compression devices on  Range of Motion: ROM (range of motion) performed  Goal: Optimal Comfort and Wellbeing  Outcome: Ongoing, Progressing  Intervention: Provide Person-Centered Care  Recent Flowsheet  Documentation  Taken 2/14/2024 1605 by Devora Nunn RN  Trust Relationship/Rapport:   care explained   choices provided  Taken 2/14/2024 1205 by Devora Nunn RN  Trust Relationship/Rapport:   care explained   choices provided  Taken 2/14/2024 0805 by Devora Nunn RN  Trust Relationship/Rapport:   care explained   choices provided  Goal: Readiness for Transition of Care  Outcome: Ongoing, Progressing     Problem: Skin Injury Risk Increased  Goal: Skin Health and Integrity  Outcome: Ongoing, Progressing  Intervention: Optimize Skin Protection  Recent Flowsheet Documentation  Taken 2/14/2024 1605 by Devora Nunn RN  Pressure Reduction Techniques:   weight shift assistance provided   heels elevated off bed  Head of Bed (HOB) Positioning: HOB at 60-90 degrees  Pressure Reduction Devices: pressure-redistributing mattress utilized  Skin Protection:   adhesive use limited   tubing/devices free from skin contact   skin-to-device areas padded   skin-to-skin areas padded   incontinence pads utilized  Taken 2/14/2024 1402 by Devora Nunn RN  Head of Bed (HOB) Positioning: HOB at 60-90 degrees  Taken 2/14/2024 1205 by Devora Nunn RN  Pressure Reduction Techniques:   weight shift assistance provided   heels elevated off bed  Head of Bed (HOB) Positioning: HOB at 60-90 degrees  Pressure Reduction Devices: pressure-redistributing mattress utilized  Skin Protection:   adhesive use limited   tubing/devices free from skin contact   skin-to-device areas padded   skin-to-skin areas padded   incontinence pads utilized  Taken 2/14/2024 1002 by Devora Nunn RN  Head of Bed (HOB) Positioning: HOB at 60-90 degrees  Taken 2/14/2024 0805 by Devora Nunn RN  Pressure Reduction Techniques:   weight shift assistance provided   heels elevated off bed  Head of Bed (HOB) Positioning: HOB at 45 degrees  Pressure Reduction Devices: pressure-redistributing mattress utilized  Skin Protection:   adhesive  use limited   tubing/devices free from skin contact   skin-to-skin areas padded   skin-to-device areas padded   incontinence pads utilized     Problem: Asthma Comorbidity  Goal: Maintenance of Asthma Control  Outcome: Ongoing, Progressing     Problem: Behavioral Health Comorbidity  Goal: Maintenance of Behavioral Health Symptom Control  Outcome: Ongoing, Progressing     Problem: COPD (Chronic Obstructive Pulmonary Disease) Comorbidity  Goal: Maintenance of COPD Symptom Control  Outcome: Ongoing, Progressing     Problem: Diabetes Comorbidity  Goal: Blood Glucose Level Within Targeted Range  Outcome: Ongoing, Progressing  Intervention: Monitor and Manage Glycemia  Recent Flowsheet Documentation  Taken 2/14/2024 0805 by Devora Nunn RN  Glycemic Management: blood glucose monitored     Problem: Heart Failure Comorbidity  Goal: Maintenance of Heart Failure Symptom Control  Outcome: Ongoing, Progressing     Problem: Hypertension Comorbidity  Goal: Blood Pressure in Desired Range  Outcome: Ongoing, Progressing     Problem: Obstructive Sleep Apnea Risk or Actual Comorbidity Management  Goal: Unobstructed Breathing During Sleep  Outcome: Ongoing, Progressing     Problem: Osteoarthritis Comorbidity  Goal: Maintenance of Osteoarthritis Symptom Control  Outcome: Ongoing, Progressing  Intervention: Maintain Osteoarthritis Symptom Control  Recent Flowsheet Documentation  Taken 2/14/2024 1605 by Devora Nunn RN  Activity Management: up in chair  Taken 2/14/2024 1402 by Devora Nunn RN  Activity Management: up in chair  Taken 2/14/2024 1205 by Devora Nunn, RN  Activity Management: up in chair  Taken 2/14/2024 1002 by Devora Nunn RN  Activity Management: up in chair  Taken 2/14/2024 0902 by Devora Nunn RN  Activity Management: up in chair  Taken 2/14/2024 0805 by Devora Nunn RN  Activity Management: bedrest     Problem: Pain Chronic (Persistent) (Comorbidity Management)  Goal: Acceptable  Pain Control and Functional Ability  Outcome: Ongoing, Progressing  Intervention: Optimize Psychosocial Wellbeing  Recent Flowsheet Documentation  Taken 2/14/2024 1605 by Devora Nunn RN  Diversional Activities: television  Family/Support System Care: support provided  Taken 2/14/2024 1205 by Devora Nunn RN  Diversional Activities: television  Family/Support System Care: support provided  Taken 2/14/2024 0805 by Devora Nunn RN  Diversional Activities: television  Family/Support System Care: support provided     Problem: Seizure Disorder Comorbidity  Goal: Maintenance of Seizure Control  Outcome: Ongoing, Progressing     Problem: Fall Injury Risk  Goal: Absence of Fall and Fall-Related Injury  Outcome: Ongoing, Progressing  Intervention: Promote Injury-Free Environment  Recent Flowsheet Documentation  Taken 2/14/2024 1702 by Devora Nunn RN  Safety Promotion/Fall Prevention: safety round/check completed  Taken 2/14/2024 1605 by Devora Nunn RN  Safety Promotion/Fall Prevention: safety round/check completed  Taken 2/14/2024 1502 by Devora Nunn RN  Safety Promotion/Fall Prevention: safety round/check completed  Taken 2/14/2024 1402 by Devora Nunn RN  Safety Promotion/Fall Prevention: safety round/check completed  Taken 2/14/2024 1302 by Devora Nunn RN  Safety Promotion/Fall Prevention: safety round/check completed  Taken 2/14/2024 1205 by Devora Nunn RN  Safety Promotion/Fall Prevention: safety round/check completed  Taken 2/14/2024 1102 by Devora Nunn RN  Safety Promotion/Fall Prevention: safety round/check completed  Taken 2/14/2024 1002 by Devora Nunn RN  Safety Promotion/Fall Prevention: safety round/check completed  Taken 2/14/2024 0902 by Devora Nunn RN  Safety Promotion/Fall Prevention: safety round/check completed  Taken 2/14/2024 0805 by Devora Nunn RN  Safety Promotion/Fall Prevention: safety round/check completed  Taken  2/14/2024 0702 by Devora Nunn RN  Safety Promotion/Fall Prevention: safety round/check completed     Problem: Adjustment to Illness (Sepsis/Septic Shock)  Goal: Optimal Coping  Outcome: Ongoing, Progressing  Intervention: Optimize Psychosocial Adjustment to Illness  Recent Flowsheet Documentation  Taken 2/14/2024 1605 by Devora Nunn RN  Family/Support System Care: support provided  Taken 2/14/2024 1205 by Devora Nunn RN  Family/Support System Care: support provided  Taken 2/14/2024 0805 by Devora Nunn RN  Family/Support System Care: support provided     Problem: Bleeding (Sepsis/Septic Shock)  Goal: Absence of Bleeding  Outcome: Ongoing, Progressing     Problem: Glycemic Control Impaired (Sepsis/Septic Shock)  Goal: Blood Glucose Level Within Desired Range  Outcome: Ongoing, Progressing  Intervention: Optimize Glycemic Control  Recent Flowsheet Documentation  Taken 2/14/2024 0805 by Devora Nunn RN  Glycemic Management: blood glucose monitored     Problem: Infection Progression (Sepsis/Septic Shock)  Goal: Absence of Infection Signs and Symptoms  Outcome: Ongoing, Progressing  Intervention: Promote Recovery  Recent Flowsheet Documentation  Taken 2/14/2024 1605 by Devora Nunn RN  Activity Management: up in chair  Taken 2/14/2024 1402 by Devora Nunn RN  Activity Management: up in chair  Taken 2/14/2024 1205 by Devora Nunn RN  Activity Management: up in chair  Taken 2/14/2024 1002 by Devora Nunn RN  Activity Management: up in chair  Taken 2/14/2024 0902 by Devora Nunn RN  Activity Management: up in chair  Taken 2/14/2024 0805 by Devora Nunn RN  Activity Management: bedrest     Problem: Nutrition Impaired (Sepsis/Septic Shock)  Goal: Optimal Nutrition Intake  Outcome: Ongoing, Progressing     Problem: Restraint, Nonviolent  Goal: Absence of Harm or Injury  Outcome: Ongoing, Progressing  Intervention: Implement Least Restrictive Safety  Strategies  Recent Flowsheet Documentation  Taken 2/14/2024 1605 by Devora Nunn RN  Diversional Activities: television  Taken 2/14/2024 1205 by Devora Nunn RN  Diversional Activities: television  Taken 2/14/2024 0805 by Devora Nunn RN  Diversional Activities: television  Intervention: Protect Dignity, Rights, and Personal Wellbeing  Recent Flowsheet Documentation  Taken 2/14/2024 1605 by Devora Nunn RN  Trust Relationship/Rapport:   care explained   choices provided  Taken 2/14/2024 1205 by Devora Nunn RN  Trust Relationship/Rapport:   care explained   choices provided  Taken 2/14/2024 0805 by Devora Nunn RN  Trust Relationship/Rapport:   care explained   choices provided  Intervention: Protect Skin and Joint Integrity  Recent Flowsheet Documentation  Taken 2/14/2024 1605 by Devora Nunn RN  Body Position: supine  Range of Motion: ROM (range of motion) performed  Taken 2/14/2024 1402 by Devora Nunn RN  Body Position:   weight shifting   right  Taken 2/14/2024 1205 by Devora Nunn RN  Body Position:   weight shifting   left  Range of Motion: ROM (range of motion) performed  Taken 2/14/2024 1002 by Devora Nunn RN  Body Position:   turned   weight shifting  Taken 2/14/2024 0805 by Devora Nunn RN  Body Position: supine  Range of Motion: ROM (range of motion) performed     Problem: Aspiration (Enteral Nutrition)  Goal: Absence of Aspiration Signs and Symptoms  Outcome: Ongoing, Progressing  Intervention: Minimize Aspiration Risk  Recent Flowsheet Documentation  Taken 2/14/2024 1605 by Devora Nunn RN  Head of Bed (HOB) Positioning: HOB at 60-90 degrees  Taken 2/14/2024 1402 by Devora Nunn RN  Head of Bed (HOB) Positioning: HOB at 60-90 degrees  Taken 2/14/2024 1205 by Devora Nunn RN  Head of Bed (HOB) Positioning: HOB at 60-90 degrees  Taken 2/14/2024 1002 by Devora Nunn RN  Head of Bed (HOB) Positioning: HOB at 60-90  degrees  Taken 2/14/2024 0805 by Devora Nunn, RN  Head of Bed (HOB) Positioning: HOB at 45 degrees     Problem: Device-Related Complication Risk (Enteral Nutrition)  Goal: Safe, Effective Therapy Delivery  Outcome: Ongoing, Progressing     Problem: Feeding Intolerance (Enteral Nutrition)  Goal: Feeding Tolerance  Outcome: Ongoing, Progressing   Goal Outcome Evaluation:

## 2024-02-15 NOTE — CASE MANAGEMENT/SOCIAL WORK
"Continued Stay Note  [facility]     Patient Name: Bernard Matta  MRN: [id number]  Today's Date: 2/15/2024    Admit Date: 2/6/2024    Plan: Home   Discharge Plan       Row Name 02/15/24 1102       Plan    Plan Home    Patient/Family in Agreement with Plan yes    Plan Comments Spoke with pt and spouse about d/c plans. They do not want rehab or home health at this time. 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 Will follow.                   Discharge Codes    No documentation.                 Expected Discharge Date and Time       Expected Discharge Date Expected Discharge Time    Feb 16, 2024               MARTY Proctor    "

## 2024-02-15 NOTE — PROGRESS NOTES
"Patient Care Team:  Brando Webb MD as PCP - General (Family Medicine)  Jim Cha OD (Optometry)  Anand Santoyo MD as Consulting Physician (Ophthalmology)  Deric Byers MD as Consulting Physician (Endocrinology)  Shaquille Mckenzie DO as Consulting Physician (Gastroenterology)    Tata     Bernard Matta is POD  9 days  from  diagnostic laparoscopy converted to laparotomy iliocectectomy.  He states he is much improved today.  He does admit trouble voiding and has had to have In-N-Out caths and has been unable to go himself in about 24 hours.  He states that he denies nausea and vomiting at this time and is tolerating his diet well. He admits chronic issues with BPH and follows Dr Wyatt with urology and takes flomax daily, he states he has not had that since his admission to hospital.        Review of Systems:     Review of Systems   Review of Systems   Constitutional: Negative.    Respiratory: Negative.     Cardiovascular: Negative.    Gastrointestinal: Negative.    Endocrine: Negative.    Musculoskeletal: Negative.    Psychiatric/Behavioral: Negative.          Objective     Vital Signs  /69 (BP Location: Left arm, Patient Position: Sitting)   Pulse 87   Temp 97.8 °F (36.6 °C) (Oral)   Resp 16   Ht 175.3 cm (69\")   Wt 65.3 kg (144 lb)   SpO2 96%   BMI 21.27 kg/m²     Physical Exam:    HEENT:  WNL  Respiratory: appears well, vitals normal, no respiratory distress, acyanotic, normal RR, chest clear, no wheezing, crepitations, rhonchi, normal symmetric air entry  Cardiovascular: Regular rate and rhythm, S1, S2 normal, no murmur, click, rub or gallop  GI: distended, non tender. Healing surgical incision- staples present with packing. No s/s of infection.   Musculoskeletal:  stable  Neurologic: alert, oriented, normal speech, no focal findings or movement disorder noted     Results Review:    Labs reviewed  Lab Results (last 24 hours)       Procedure Component Value " Units Date/Time    POC Glucose Once [[id number]]  (Abnormal) Collected: 02/15/24 1736    Specimen: Blood Updated: 02/15/24 1747     Glucose 229 mg/dL      Comment: : [id number] Purvis CasandraMeter ID: [id number]       Blood Culture With OMEGA - Blood, Hand, Right [[id number]]  (Normal) Collected: 02/10/24 1651    Specimen: Blood from Hand, Right Updated: 02/15/24 1731     Blood Culture No growth at 5 days    Potassium [[id number]]  (Normal) Collected: 02/15/24 1615    Specimen: Blood Updated: 02/15/24 1638     Potassium 4.9 mmol/L     POC Glucose Once [[id number]]  (Abnormal) Collected: 02/15/24 1142    Specimen: Blood Updated: 02/15/24 1153     Glucose 186 mg/dL      Comment: : [id number] Purvis CasandraMeter ID: [id number]       POC Glucose Once [[id number]]  (Normal) Collected: 02/15/24 0821    Specimen: Blood Updated: 02/15/24 0832     Glucose 80 mg/dL      Comment: : [id number] Purvis CasandraMeter ID: [id number]       POC Glucose Once [[id number]]  (Normal) Collected: 02/15/24 0622    Specimen: Blood Updated: 02/15/24 0633     Glucose 72 mg/dL      Comment: : [id number] exactEarth LtdynMeter ID: [id number]       Comprehensive Metabolic Panel [[id number]]  (Abnormal) Collected: 02/15/24 0534    Specimen: Blood Updated: 02/15/24 0631     Glucose 70 mg/dL      BUN 40 mg/dL      Creatinine 1.93 mg/dL      Sodium 144 mmol/L      Potassium 3.3 mmol/L      Chloride 110 mmol/L      CO2 24.0 mmol/L      Calcium 8.6 mg/dL      Total Protein 6.0 g/dL      Albumin 2.9 g/dL      ALT (SGPT) 61 U/L      AST (SGOT) 36 U/L      Alkaline Phosphatase 94 U/L      Total Bilirubin 0.2 mg/dL      Globulin 3.1 gm/dL      A/G Ratio 0.9 g/dL      BUN/Creatinine Ratio 20.7     Anion Gap 10.0 mmol/L      eGFR 35.7 mL/min/1.73     Narrative:      GFR Normal >60  Chronic Kidney Disease <60  Kidney Failure <15    The GFR formula is only valid for adults with stable renal function between ages 18 and 70.    POC Glucose Once  [[id number]]  (Normal) Collected: 02/15/24 0014    Specimen: Blood Updated: 02/15/24 0025     Glucose 116 mg/dL      Comment: : [id number] Chon AndersonMeter ID: [id number]                 Medication Reviewed:   Current Facility-Administered Medications   Medication Dose Route Frequency Provider Last Rate Last Admin    amitriptyline (ELAVIL) tablet 10 mg  10 mg Nasogastric Nightly PRN Dakota Avitia MD   10 mg at 02/14/24 2237    amLODIPine (NORVASC) tablet 10 mg  10 mg Oral Q24H Hemant Park MD   10 mg at 02/15/24 0843    aspirin chewable tablet 81 mg  81 mg Oral Daily Hemant Park MD   81 mg at 02/15/24 0843    atorvastatin (LIPITOR) tablet 40 mg  40 mg Oral Nightly Hemant Park MD        Calcium Replacement - Follow Nurse / BPA Driven Protocol   Does not apply PRN Dakota Avitia MD        cloNIDine (CATAPRES) tablet 0.1 mg  0.1 mg Oral Q8H PRN Jasmeet Iraheta MD        dextrose (D50W) (25 g/50 mL) IV injection 25 g  25 g Intravenous Q15 Min PRN Dakota Avitia MD        dextrose (GLUTOSE) oral gel 15 g  15 g Oral Q15 Min PRN Dakota Avitia MD        dorzolamide (TRUSOPT) 2 % 1 drop, timolol (TIMOPTIC) 0.5 % 1 drop for Cosopt 22.3-6.8 mg/mL   Both Eyes BID Dakota Avitia MD   Given at 02/15/24 0844    glucagon (GLUCAGEN) injection 1 mg  1 mg Intramuscular Q15 Min PRN Dakota Avitia MD        heparin (porcine) 5000 UNIT/ML injection 5,000 Units  5,000 Units Subcutaneous Q8H Dakota Avitia MD   5,000 Units at 02/15/24 1516    insulin detemir (LEVEMIR) injection 25 Units  25 Units Subcutaneous Daily Dakota Avitia MD   25 Units at 02/14/24 0917    insulin regular (humuLIN R,novoLIN R) injection 2-7 Units  2-7 Units Subcutaneous Q6H Dakota Avitia MD   3 Units at 02/15/24 1755    labetalol (NORMODYNE,TRANDATE) injection 20 mg  20 mg Intravenous Q6H PRN Dakota Avitia MD   20 mg at 02/13/24 0146    lactated ringers infusion  50 mL/hr Intravenous Continuous  Jasmeet Iraheta MD 50 mL/hr at 02/15/24 1739 50 mL/hr at 02/15/24 1739    latanoprost (XALATAN) 0.005 % ophthalmic solution 1 drop  1 drop Both Eyes Nightly Dakota Avitia MD   1 drop at 02/15/24 0007    Magnesium Standard Dose Replacement - Follow Nurse / BPA Driven Protocol   Does not apply PRN Dakota Avitia MD        Phosphorus Replacement - Follow Nurse / BPA Driven Protocol   Does not apply PRN Dakota Avitia MD        Potassium Replacement - Follow Nurse / BPA Driven Protocol   Does not apply PRN Dakota Avitia MD        sennosides (SENOKOT) 8.8 MG/5ML syrup 10 mL  10 mL Oral Nightly Hemant Park MD        sodium bicarbonate tablet 650 mg  650 mg Oral Daily Hemant Park MD   650 mg at 02/15/24 0843    sodium hypochlorite (HYSEPT) 0.25 % topical solution   Topical Q24H Dakota Avitia MD   Given at 02/15/24 0844    tamsulosin (FLOMAX) 24 hr capsule 0.8 mg  0.8 mg Oral Daily Abiel Mercado MD   0.8 mg at 02/15/24 1736       Assessment & Plan  POD  9 days  from a diagnostic laparoscopy converted to laparotomy iliocectectomy.  Overall patient is improving.  Urology will be consulted due to lack of urination on his own and having to in and out cath to void.  Will advance diet today.      Alcira Stephenson, APRN  02/15/24  21:09 CST

## 2024-02-15 NOTE — PROGRESS NOTES
[facility]   Progress Note    Patient Name: Bernard Matta  : 1950  MRN: [id number]  Primary Care Physician:  Brando Webb MD  Date of admission: 2024    Subjective   Subjective   Presents this am with significant improvement of mental status.  Patient tolerating p.o. without difficulty.  Acute issues is his voiding.  Requiring straight cath at times.    Objective     Vitals:   Temp:  [97.4 °F (36.3 °C)-98.9 °F (37.2 °C)] 97.5 °F (36.4 °C)  Heart Rate:  [66-99] 66  Resp:  [16] 16  BP: (106-146)/(55-73) 106/61  Patient saturations are 100%            Physical Exam    Constitutional: Awake, alert   Gastrointestinal: soft, approp TTP, ND + BS, clean dry and dressed and packed        Result Review    Result Review:  I have personally reviewed the results from the time of this admission to 2/15/2024 14:31 CST and agree with these findings:  [x]  Laboratory list / accordion  []  Microbiology  []  Radiology  []  EKG/Telemetry   []  Cardiology/Vascular   []  Pathology  []  Old records  []  Other:      Assessment & Plan   Assessment / Plan     Brief Patient Summary:  Bernard Matta is a 73 y.o. male who presents s/p ileocectomy for cecal perforation    Active Hospital Problems:  Active Hospital Problems    Diagnosis     **Bowel perforation     Stage 3b chronic kidney disease (CKD)     Hyperkalemia     COVID-19 virus detected     Iron deficiency anemia     Hypertension     Type 2 diabetes mellitus      Plan: Patient doing well.  Patient would like to return home without PT and OT or rehab.  Patient was seen by urology.  Will advance his diet, assess in a.m. and possible discharge home soon.          DVT prophylaxis:  Medical and mechanical DVT prophylaxis orders are present.        CODE STATUS:   Code Status (Patient has no pulse and is not breathing): CPR (Attempt to Resuscitate)  Medical Interventions (Patient has pulse or is breathing): Full Support    Dr. Avitia  General Surgery  02/15/24  13:56  CST

## 2024-02-15 NOTE — PLAN OF CARE
Goal Outcome Evaluation:  Plan of Care Reviewed With: patient        Progress: improving  Outcome Evaluation: Pt was in bed, c/o weakness and fatigue.  Able to transfer supine to sitting with CGA/min assist.  Transfered sit to stand with min assist.  Amb 20' x 2 with HHA, min assist.  Pt had LOB when turning that required min/mod assist to correct.  Pt fatigues quickly.  Will continue to work with pt to increase strength and improve mobility.

## 2024-02-15 NOTE — PLAN OF CARE
Goal Outcome Evaluation:  Plan of Care Reviewed With: patient        Progress: no change     Pt A&O X4 upon assessment. Pt arrived this shift from unit. Up with x1 assist. Unable to void throughout night, bladder scan 428 I/O cath performed with 375mL urine returned, pt tolerated well. Dressing in place to abdomen incision. Heparin for VTE prevention. Q6 accuchecks with SS coverage. VSS safety maintained.

## 2024-02-15 NOTE — PROGRESS NOTES
1         [facility]  INPATIENT PROGRESS NOTE    Patient Name: Bernard Matta  Date of Admission: 2/6/2024  Today's Date: 02/15/24  Length of Stay: 9  Primary Care Physician: Brando Webb MD    Subjective   Chief Complaint: f/u  HPI     Patient looks a lot better  Wife applying lotion on his back  No distress  She told me that Dr. Avitia is looking at advancing further his diet.  They are also anticipating that he will be discharged soon.      Noted creatinine had increased to 1.93 with potassium at 3.3  He has not had significant hypotension except for a single episode yesterday at 1:30 PM (87/53)  He might be really on a higher side of normal that his current antihypertensive medication.  He is on lisinopril.  I will discontinue lisinopril due to worsening renal status.    Review of Systems   All pertinent negatives and positives are as above. All other systems have been reviewed and are negative unless otherwise stated.     Objective    Temp:  [97.4 °F (36.3 °C)-98.9 °F (37.2 °C)] 97.6 °F (36.4 °C)  Heart Rate:  [66-99] 74  Resp:  [16] 16  BP: (106-146)/(55-70) 127/59  Physical Exam  I almost did not recognize the patient as he looks significantly better.  He is well-groomed  He is seated on the recliner.  Normal respiratory effort  Lungs are clear  S1-S2 regular rate and rhythm  He is more interactive and appropriate and affect  Warm dry skin  Good capillary refill      Results Review:  I have reviewed the labs, radiology results, and diagnostic studies.    Laboratory Data:   Results from last 7 days   Lab Units 02/14/24  0844 02/12/24  0212 02/11/24  0253   WBC 10*3/mm3 7.61 5.76 5.67   HEMOGLOBIN g/dL 9.2* 9.7* 8.8*   HEMATOCRIT % 30.3* 32.1* 29.6*   PLATELETS 10*3/mm3 218 215 181        Results from last 7 days   Lab Units 02/15/24  1615 02/15/24  0534 02/14/24  0844 02/13/24  0312   SODIUM mmol/L  --  144 144 147*   POTASSIUM mmol/L 4.9 3.3* 3.5  3.7   CHLORIDE mmol/L  --  110* 109* 115*   CO2 mmol/L  --  24.0 24.0 21.0*   BUN mg/dL  --  40* 35* 32*   CREATININE mg/dL  --  1.93* 1.66* 1.60*   CALCIUM mg/dL  --  8.6 8.7 8.8   BILIRUBIN mg/dL  --  0.2 0.2 0.2   ALK PHOS U/L  --  94 94 94   ALT (SGPT) U/L  --  61* 68* 68*   AST (SGOT) U/L  --  36 36 46*   GLUCOSE mg/dL  --  70 267* 191*       Culture Data:   Blood Culture   Date Value Ref Range Status   02/10/2024 No growth at 4 days  Preliminary       Radiology Data:   Imaging Results (Last 24 Hours)       ** No results found for the last 24 hours. **            I have reviewed the patient's current medications.     Assessment/Plan   Assessment  Active Hospital Problems    Diagnosis     **Bowel perforation     Stage 3b chronic kidney disease (CKD)     Hyperkalemia     COVID-19 virus detected     Iron deficiency anemia     Hypertension     Type 2 diabetes mellitus          Medical Decision Making  Number and Complexity of problems:   Hyperglycemia in the setting of diabetes with A1c of 8.4%  Hypernatremia - resolved   Encephalopathy could be related to acute illness, worsening hypernatremia, lack of bleeding.  Mild hypokalemia  Acute on chronic kidney injury with probable baseline creatinine anywhere from 1.78-1.93.  Bacteremia (Clostridium septicum)-received 15 doses of Zosyn.  Cecal perforation status post diagnostic laparoscopy converted to laparotomy with ILEOCECTETOMY on February 6 by Dr. Park  Sinus tachycardia-could be related to volume depletion/possibly third space loss  Hypertension  Anemia.  Transfuse as needed if hemoglobin less than 7 or with symptoms of anemia with signs of bleeding.    Patient's creatinine had gone up to 1.93.  It seems like his creatinine from December was around 1.9 also.  It is hard to really tell what his baseline because it has been as high as 2.27.  In general this probably is his baseline creatinine and CKD stage IIIb.        Treatment Plan  DC lisinopril. Monitor bp  -  plan to keep bp a little higher  Change clonidine to as needed for systolic blood pressure greater than 180  Follow-up renal function  Encouraged to increase fluid intake or give IV fluid as patient is just on full liquid diet  Conditions and Status    Doing better    MDM Data  External documents reviewed: -  Cardiac tracing (EKG, telemetry) interpretation: -  Radiology interpretation: nothing new  Labs reviewed: yes  Any tests that were considered but not ordered: none     Decision rules/scores evaluated (example [id number], Wells, etc):   none     Discussed with: patient and wife     Care Planning  Shared decision making: patient with family and primary service  Code status and discussions: full code    Disposition  Social Determinants of Health that impact treatment or disposition: none identified   I expect the patient to be discharged to (TBD)    Electronically signed by Jasmeet Iraheta MD, 02/15/24, 17:20 CST.

## 2024-02-15 NOTE — THERAPY TREATMENT NOTE
Acute Care - Physical Therapy Treatment Note  [facility]     Patient Name: Bernard Matta  : 1950  MRN: [id number]  Today's Date: 2/15/2024      Visit Dx:     ICD-10-CM ICD-9-CM   1. Bowel perforation  K63.1 569.83   2. Peritonitis  K65.9 567.9   3. Pneumoperitoneum  K66.8 568.89   4. Impaired mobility [Z74.09]  Z74.09 799.89     Patient Active Problem List   Diagnosis    Anterior ischemic optic neuropathy    Hypertension    Type 2 diabetes mellitus    Hx of adenomatous colonic polyps    Iron deficiency anemia    Intestinal malabsorption, unspecified    Bowel perforation    Stage 3b chronic kidney disease (CKD)    Hyperkalemia    COVID-19 virus detected     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     Hyperlipidemia     Hypertension     Ischemic optic neuropathy of both eyes      Past Surgical History:   Procedure Laterality Date    COLONOSCOPY      [place]    COLONOSCOPY N/A 2020    Procedure: COLONOSCOPY WITH ANESTHESIA;  Surgeon: Shaquille Mckenzie DO;  Location: [facility] ENDOSCOPY;  Service: Gastroenterology;  Laterality: N/A;  preop; hx of polyps   postop; polyps   PCP Brando Webb     COLONOSCOPY N/A 2021    Procedure: COLONOSCOPY WITH ANESTHESIA;  Surgeon: Shaquille Mckenzie DO;  Location: [facility] ENDOSCOPY;  Service: Gastroenterology;  Laterality: N/A;  pre hx adenomatous colon polyp  post normal  Brando Webb MD    EXPLORATORY LAPAROTOMY N/A 2024    Procedure: DIAGNOSTIC LAPAROSCOPY CONVERTED TO LAPAROTOMY, ILIOCECTECTOMY;  Surgeon: Hemant Park MD;  Location: [facility] OR;  Service: General;  Laterality: N/A;     PT Assessment (last 12 hours)       PT Evaluation and Treatment       Row Name 02/15/24 1120          Physical Therapy Time and Intention    Subjective Information complains of;weakness;fatigue  -LAURA     Document Type therapy note (daily note)  -LAURA     Mode of Treatment physical therapy   -LAURA       Row Name 02/15/24 1120          General Information    Patient/Family/Caregiver Comments/Observations wife present  -LAURA     Existing Precautions/Restrictions fall  -LAURA       Row Name 02/15/24 1120          Pain    Pretreatment Pain Rating 0/10 - no pain  -LAURA     Posttreatment Pain Rating 0/10 - no pain  -LAURA       Row Name 02/15/24 1120          Bed Mobility    Bed Mobility supine-sit;sit-supine  -LAURA     Supine-Sit Waterman (Bed Mobility) verbal cues;minimum assist (75% patient effort)  -LAURA       Row Name 02/15/24 1120          Transfers    Transfers sit-stand transfer;stand-sit transfer  -LAURA       Row Name 02/15/24 1120          Sit-Stand Transfer    Sit-Stand Waterman (Transfers) verbal cues;minimum assist (75% patient effort)  -LAURA       Row Name 02/15/24 1120          Stand-Sit Transfer    Stand-Sit Waterman (Transfers) verbal cues;minimum assist (75% patient effort)  -       Row Name 02/15/24 1120          Gait/Stairs (Locomotion)    Waterman Level (Gait) verbal cues;minimum assist (75% patient effort)  -     Assistive Device (Gait) --  HHA  -LAURA     Distance in Feet (Gait) 20' x 2  -LAURA     Deviations/Abnormal Patterns (Gait) gait speed decreased;stride length decreased  -LAURA     Bilateral Gait Deviations forward flexed posture;heel strike decreased  -LAURA     Comment, (Gait/Stairs) pt had LOB when turning aroud required min/mod assist to correct  -       Row Name 02/15/24 1120          Motor Skills    Comments, Therapeutic Exercise sitting AROM BLE X 20  -LAURA     Additional Documentation Comments, Therapeutic Exercise (Row)  -       Row Name             Wound 02/06/24 abdomen Incision    Wound - Properties Group Placement Date: 02/06/24  -CW Location: abdomen  -CW, Multiple port sites  Primary Wound Type: Incision  -CW    Retired Wound - Properties Group Placement Date: 02/06/24  -CW Location: abdomen  -CW, Multiple port sites  Primary Wound Type: Incision  -CW    Retired Wound -  Properties Group Date first assessed: 02/06/24  -CW Location: abdomen  -CW, Multiple port sites  Primary Wound Type: Incision  -CW      Row Name             Wound 02/06/24 2035 midline abdomen Incision    Wound - Properties Group Placement Date: 02/06/24  -CW Placement Time: 2035 -CW Orientation: midline  -CW Location: abdomen  -CW Primary Wound Type: Incision  -CW    Retired Wound - Properties Group Placement Date: 02/06/24  -CW Placement Time: 2035 -CW Orientation: midline  -CW Location: abdomen  -CW Primary Wound Type: Incision  -CW    Retired Wound - Properties Group Date first assessed: 02/06/24  -CW Time first assessed: 2035 -CW Location: abdomen  -CW Primary Wound Type: Incision  -CW      Row Name 02/15/24 1120          Positioning and Restraints    Pre-Treatment Position in bed  -LAURA     Post Treatment Position chair  -LAURA     In Chair sitting;call light within reach;encouraged to call for assist;with family/caregiver  -LAURA               User Key  (r) = Recorded By, (t) = Taken By, (c) = Cosigned By      Initials Name Provider Type    Omid Garnica PTA Physical Therapist Assistant    CW Kendal Armenta, RN Registered Nurse                    Physical Therapy Education       Title: PT OT SLP Therapies (Done)       Topic: Physical Therapy (Done)       Point: Mobility training (Done)       Learning Progress Summary             Patient Acceptance, E,TB, VU by CR at 2/14/2024 1621    Acceptance, E, VU by CW at 2/14/2024 1025    Comment: Pt educated on abdominal precautions. Pt educated on proper body mechanics and safety precautions upon sit<>stand and stand<>sit t/f's. Pt edcuated on POC. Pt educated on energy conservation techniques. Pt educated on signs and symptoms of dizziness/ftg.   Family Acceptance, E,TB, VU by CR at 2/14/2024 1621                         Point: Home exercise program (Done)       Learning Progress Summary             Patient Acceptance, E,TB, VU by CR at 2/14/2024 1621     Acceptance, E, VU by CW at 2/14/2024 1025    Comment: Pt educated on abdominal precautions. Pt educated on proper body mechanics and safety precautions upon sit<>stand and stand<>sit t/f's. Pt edcuated on POC. Pt educated on energy conservation techniques. Pt educated on signs and symptoms of dizziness/ftg.   Family Acceptance, E,TB, VU by CR at 2/14/2024 1621                         Point: Body mechanics (Done)       Learning Progress Summary             Patient Acceptance, E,TB, VU by CR at 2/14/2024 1621    Acceptance, E, VU by CW at 2/14/2024 1025    Comment: Pt educated on abdominal precautions. Pt educated on proper body mechanics and safety precautions upon sit<>stand and stand<>sit t/f's. Pt edcuated on POC. Pt educated on energy conservation techniques. Pt educated on signs and symptoms of dizziness/ftg.   Family Acceptance, E,TB, VU by CR at 2/14/2024 1621                         Point: Precautions (Done)       Learning Progress Summary             Patient Acceptance, E,TB, VU by CR at 2/14/2024 1621    Acceptance, E, VU by CW at 2/14/2024 1025    Comment: Pt educated on abdominal precautions. Pt educated on proper body mechanics and safety precautions upon sit<>stand and stand<>sit t/f's. Pt edcuated on POC. Pt educated on energy conservation techniques. Pt educated on signs and symptoms of dizziness/ftg.   Family Acceptance, E,TB, VU by CR at 2/14/2024 1621                                         User Key       Initials Effective Dates Name Provider Type Discipline    CR 03/03/23 -  Devora Nunn, RN Registered Nurse Nurse    CW 12/14/23 -  Ash Sahu, PT Student PT Student PT                  PT Recommendation and Plan     Plan of Care Reviewed With: patient  Progress: improving  Outcome Evaluation: Pt was in bed, c/o weakness and fatigue.  Able to transfer supine to sitting with CGA/min assist.  Transfered sit to stand with min assist.  Amb 20' x 2 with HHA, min assist.  Pt had LOB when turning  that required min/mod assist to correct.  Pt fatigues quickly.  Will continue to work with pt to increase strength and improve mobility.   Outcome Measures       Row Name 02/15/24 1120             How much help from another person do you currently need...    Turning from your back to your side while in flat bed without using bedrails? 3  -LAURA      Moving from lying on back to sitting on the side of a flat bed without bedrails? 3  -LAURA      Moving to and from a bed to a chair (including a wheelchair)? 3  -LAURA      Standing up from a chair using your arms (e.g., wheelchair, bedside chair)? 3  -LAURA      Climbing 3-5 steps with a railing? 1  -LAURA      To walk in hospital room? 3  -LAURA      AM-PAC 6 Clicks Score (PT) 16  -LAURA      Highest Level of Mobility Goal 5 --> Static standing  -LAURA         Functional Assessment    Outcome Measure Options AM-PAC 6 Clicks Basic Mobility (PT)  -LAURA                User Key  (r) = Recorded By, (t) = Taken By, (c) = Cosigned By      Initials Name Provider Type    Omid Garnica PTA Physical Therapist Assistant                     Time Calculation:    PT Charges       Row Name 02/15/24 1120             Time Calculation    Start Time 1120  -LAURA      Stop Time 1145  -LAURA      Time Calculation (min) 25 min  -LAURA      PT Received On 02/15/24  -LAURA         Time Calculation- PT    Total Timed Code Minutes- PT 25 minute(s)  -LAURA         Timed Charges    52921 - PT Therapeutic Exercise Minutes 10  -LAURA      18593 - Gait Training Minutes  15  -LAURA         Total Minutes    Timed Charges Total Minutes 25  -LAURA       Total Minutes 25  -LAURA                User Key  (r) = Recorded By, (t) = Taken By, (c) = Cosigned By      Initials Name Provider Type    Omid Garnica PTA Physical Therapist Assistant                  Therapy Charges for Today       Code Description Service Date Service Provider Modifiers Qty    69733789655 HC GAIT TRAINING EA 15 MIN 2/15/2024 Omid Schneider PTA GP 1    90679525244 HC  PT THER PROC EA 15 MIN 2/15/2024 Omid Schneider, PTA GP 1            PT G-Codes  Outcome Measure Options: AM-PAC 6 Clicks Basic Mobility (PT)  AM-PAC 6 Clicks Score (PT): 16    Omid Schneider, PTA  2/15/2024

## 2024-02-16 ENCOUNTER — READMISSION MANAGEMENT (OUTPATIENT)
Dept: CALL CENTER | Facility: HOSPITAL | Age: 74
End: 2024-02-16
Payer: MEDICARE

## 2024-02-16 VITALS
HEART RATE: 105 BPM | BODY MASS INDEX: 27.92 KG/M2 | RESPIRATION RATE: 16 BRPM | HEIGHT: 69 IN | DIASTOLIC BLOOD PRESSURE: 62 MMHG | TEMPERATURE: 97.6 F | OXYGEN SATURATION: 97 % | WEIGHT: 188.49 LBS | SYSTOLIC BLOOD PRESSURE: 130 MMHG

## 2024-02-16 LAB
ALBUMIN SERPL-MCNC: 2.9 G/DL (ref 3.5–5.2)
ALBUMIN/GLOB SERPL: 0.9 G/DL
ALP SERPL-CCNC: 104 U/L (ref 39–117)
ALT SERPL W P-5'-P-CCNC: 63 U/L (ref 1–41)
ANION GAP SERPL CALCULATED.3IONS-SCNC: 13 MMOL/L (ref 5–15)
AST SERPL-CCNC: 35 U/L (ref 1–40)
BILIRUB SERPL-MCNC: 0.3 MG/DL (ref 0–1.2)
BUN SERPL-MCNC: 38 MG/DL (ref 8–23)
BUN/CREAT SERPL: 19.5 (ref 7–25)
CALCIUM SPEC-SCNC: 8.7 MG/DL (ref 8.6–10.5)
CHLORIDE SERPL-SCNC: 104 MMOL/L (ref 98–107)
CO2 SERPL-SCNC: 20 MMOL/L (ref 22–29)
CREAT SERPL-MCNC: 1.95 MG/DL (ref 0.76–1.27)
EGFRCR SERPLBLD CKD-EPI 2021: 35.2 ML/MIN/1.73
GLOBULIN UR ELPH-MCNC: 3.2 GM/DL
GLUCOSE BLDC GLUCOMTR-MCNC: 259 MG/DL (ref 70–130)
GLUCOSE BLDC GLUCOMTR-MCNC: 280 MG/DL (ref 70–130)
GLUCOSE BLDC GLUCOMTR-MCNC: 290 MG/DL (ref 70–130)
GLUCOSE SERPL-MCNC: 290 MG/DL (ref 65–99)
POTASSIUM SERPL-SCNC: 4.8 MMOL/L (ref 3.5–5.2)
PROT SERPL-MCNC: 6.1 G/DL (ref 6–8.5)
SODIUM SERPL-SCNC: 137 MMOL/L (ref 136–145)

## 2024-02-16 PROCEDURE — 99024 POSTOP FOLLOW-UP VISIT: CPT | Performed by: NURSE PRACTITIONER

## 2024-02-16 PROCEDURE — 82948 REAGENT STRIP/BLOOD GLUCOSE: CPT

## 2024-02-16 PROCEDURE — 97110 THERAPEUTIC EXERCISES: CPT

## 2024-02-16 PROCEDURE — 80053 COMPREHEN METABOLIC PANEL: CPT | Performed by: SURGERY

## 2024-02-16 PROCEDURE — 25010000002 HEPARIN (PORCINE) PER 1000 UNITS: Performed by: SURGERY

## 2024-02-16 PROCEDURE — 97116 GAIT TRAINING THERAPY: CPT

## 2024-02-16 PROCEDURE — 63710000001 INSULIN DETEMIR PER 5 UNITS: Performed by: SURGERY

## 2024-02-16 PROCEDURE — 99231 SBSQ HOSP IP/OBS SF/LOW 25: CPT | Performed by: UROLOGY

## 2024-02-16 PROCEDURE — 63710000001 INSULIN REGULAR HUMAN PER 5 UNITS: Performed by: SURGERY

## 2024-02-16 RX ORDER — TRAMADOL HYDROCHLORIDE 50 MG/1
50 TABLET ORAL EVERY 8 HOURS PRN
Qty: 30 TABLET | Refills: 0 | Status: SHIPPED | OUTPATIENT
Start: 2024-02-16

## 2024-02-16 RX ORDER — ONDANSETRON 4 MG/1
4 TABLET, ORALLY DISINTEGRATING ORAL EVERY 8 HOURS PRN
Qty: 30 TABLET | Refills: 1 | Status: SHIPPED | OUTPATIENT
Start: 2024-02-16

## 2024-02-16 RX ORDER — LISINOPRIL 2.5 MG/1
2.5 TABLET ORAL
Status: DISCONTINUED | OUTPATIENT
Start: 2024-02-16 | End: 2024-02-16 | Stop reason: HOSPADM

## 2024-02-16 RX ORDER — SIMETHICONE 80 MG
40 TABLET,CHEWABLE ORAL EVERY 6 HOURS PRN
Qty: 30 TABLET | Refills: 1 | Status: SHIPPED | OUTPATIENT
Start: 2024-02-16

## 2024-02-16 RX ADMIN — HEPARIN SODIUM 5000 UNITS: 5000 INJECTION INTRAVENOUS; SUBCUTANEOUS at 06:30

## 2024-02-16 RX ADMIN — LISINOPRIL 2.5 MG: 2.5 TABLET ORAL at 10:30

## 2024-02-16 RX ADMIN — TIMOLOL MALEATE: 5 SOLUTION/ DROPS OPHTHALMIC at 09:12

## 2024-02-16 RX ADMIN — INSULIN DETEMIR 25 UNITS: 100 INJECTION, SOLUTION SUBCUTANEOUS at 09:19

## 2024-02-16 RX ADMIN — INSULIN HUMAN 4 UNITS: 100 INJECTION, SOLUTION PARENTERAL at 06:30

## 2024-02-16 RX ADMIN — TAMSULOSIN HYDROCHLORIDE 0.8 MG: 0.4 CAPSULE ORAL at 09:10

## 2024-02-16 RX ADMIN — INSULIN HUMAN 4 UNITS: 100 INJECTION, SOLUTION PARENTERAL at 11:54

## 2024-02-16 RX ADMIN — AMLODIPINE BESYLATE 10 MG: 10 TABLET ORAL at 09:11

## 2024-02-16 RX ADMIN — ASPIRIN 81 MG: 81 TABLET, CHEWABLE ORAL at 09:11

## 2024-02-16 RX ADMIN — HYOSCYAMINE SULFATE: 16 SOLUTION at 09:11

## 2024-02-16 RX ADMIN — SODIUM BICARBONATE 650 MG: 650 TABLET ORAL at 09:11

## 2024-02-16 NOTE — PROGRESS NOTES
"Patient Care Team:  Brando Webb MD as PCP - General (Family Medicine)  Jim Cha OD (Optometry)  Anand Santoyo MD as Consulting Physician (Ophthalmology)  Deric Byers MD as Consulting Physician (Endocrinology)  Shaquille Mckenzie DO as Consulting Physician (Gastroenterology)    Subjective     Bernard Matta is POD  10 days  from  diagnostic laparoscopy converted to laparotomy iliocectectomy. .       Review of Systems:     Review of Systems   Review of Systems   Constitutional: Negative.    Respiratory: Negative.     Cardiovascular: Negative.    Gastrointestinal: Negative.  Positive for abdominal distention.   Endocrine: Negative.    Musculoskeletal: Negative.    Psychiatric/Behavioral: Negative.          Objective     Vital Signs  /69 (BP Location: Right arm, Patient Position: Sitting)   Pulse 109   Temp 98 °F (36.7 °C) (Oral)   Resp 16   Ht 175.3 cm (69\")   Wt 85.5 kg (188 lb 7.9 oz)   SpO2 97%   BMI 27.84 kg/m²     Physical Exam:    HEENT:  WNL  Respiratory: appears well, vitals normal, no respiratory distress, acyanotic, normal RR, chest clear, no wheezing, crepitations, rhonchi, normal symmetric air entry  Cardiovascular: Regular rate and rhythm, S1, S2 normal, no murmur, click, rub or gallop and murmur noted  GI: Soft, non-tender, normal bowel sounds; no bruits, organomegaly or masses.  Musculoskeletal: inspection - no abnormality  Neurologic: alert, oriented, normal speech, no focal findings or movement disorder noted     Results Review:    Labs reviewed  Lab Results (last 24 hours)       Procedure Component Value Units Date/Time    POC Glucose Once [[id number]]  (Abnormal) Collected: 02/16/24 0817    Specimen: Blood Updated: 02/16/24 0829     Glucose 280 mg/dL      Comment: : [id number] RaymondMeter ID: [id number]       POC Glucose Once [[id number]]  (Abnormal) Collected: 02/16/24 0624    Specimen: Blood Updated: 02/16/24 0637     Glucose 290 mg/dL     "  Comment: : [email or web address] TeresaMeter ID: [id number]       Comprehensive Metabolic Panel [[id number]]  (Abnormal) Collected: 02/16/24 0533    Specimen: Blood Updated: 02/16/24 0618     Glucose 290 mg/dL      BUN 38 mg/dL      Creatinine 1.95 mg/dL      Sodium 137 mmol/L      Potassium 4.8 mmol/L      Chloride 104 mmol/L      CO2 20.0 mmol/L      Calcium 8.7 mg/dL      Total Protein 6.1 g/dL      Albumin 2.9 g/dL      ALT (SGPT) 63 U/L      AST (SGOT) 35 U/L      Alkaline Phosphatase 104 U/L      Total Bilirubin 0.3 mg/dL      Globulin 3.2 gm/dL      A/G Ratio 0.9 g/dL      BUN/Creatinine Ratio 19.5     Anion Gap 13.0 mmol/L      eGFR 35.2 mL/min/1.73     Narrative:      GFR Normal >60  Chronic Kidney Disease <60  Kidney Failure <15    The GFR formula is only valid for adults with stable renal function between ages 18 and 70.    POC Glucose Once [[id number]]  (Abnormal) Collected: 02/15/24 2311    Specimen: Blood Updated: 02/15/24 2323     Glucose 252 mg/dL      Comment: : [email or web address] mywaves TeresaMeter ID: [id number]       POC Glucose Once [[id number]]  (Abnormal) Collected: 02/15/24 1736    Specimen: Blood Updated: 02/15/24 1747     Glucose 229 mg/dL      Comment: : [id number] Purvis 51edj ID: [id number]       Blood Culture With OMEGA - Blood, Hand, Right [[id number]]  (Normal) Collected: 02/10/24 1651    Specimen: Blood from Hand, Right Updated: 02/15/24 1731     Blood Culture No growth at 5 days    Potassium [[id number]]  (Normal) Collected: 02/15/24 1615    Specimen: Blood Updated: 02/15/24 1638     Potassium 4.9 mmol/L     POC Glucose Once [[id number]]  (Abnormal) Collected: 02/15/24 1142    Specimen: Blood Updated: 02/15/24 1153     Glucose 186 mg/dL      Comment: : [id number] Purvis Storyworks OnDemandeter ID: [id number]                 Medication Reviewed:   Current Facility-Administered Medications   Medication Dose Route Frequency Provider Last Rate Last Admin    amitriptyline (ELAVIL) tablet 10  mg  10 mg Nasogastric Nightly PRN Dakota Avitia MD   10 mg at 02/15/24 2319    amLODIPine (NORVASC) tablet 10 mg  10 mg Oral Q24H Hemant Park MD   10 mg at 02/16/24 0911    aspirin chewable tablet 81 mg  81 mg Oral Daily Hemant Park MD   81 mg at 02/16/24 0911    atorvastatin (LIPITOR) tablet 40 mg  40 mg Oral Nightly Hemant Park MD   40 mg at 02/15/24 2242    Calcium Replacement - Follow Nurse / BPA Driven Protocol   Does not apply PRN Dakota Avitia MD        cloNIDine (CATAPRES) tablet 0.1 mg  0.1 mg Oral Q8H PRN Jasmeet Iraheta MD        dextrose (D50W) (25 g/50 mL) IV injection 25 g  25 g Intravenous Q15 Min PRN Dakota Avitia MD        dextrose (GLUTOSE) oral gel 15 g  15 g Oral Q15 Min PRN Dakota Avitia MD        dorzolamide (TRUSOPT) 2 % 1 drop, timolol (TIMOPTIC) 0.5 % 1 drop for Cosopt 22.3-6.8 mg/mL   Both Eyes BID Dakota Avitia MD   Given at 02/16/24 0912    glucagon (GLUCAGEN) injection 1 mg  1 mg Intramuscular Q15 Min PRN Dakota Avitia MD        heparin (porcine) 5000 UNIT/ML injection 5,000 Units  5,000 Units Subcutaneous Q8H Dakota Avitia MD   5,000 Units at 02/16/24 0630    insulin detemir (LEVEMIR) injection 25 Units  25 Units Subcutaneous Daily Dakota Avitia MD   25 Units at 02/16/24 0919    insulin regular (humuLIN R,novoLIN R) injection 2-7 Units  2-7 Units Subcutaneous Q6H Dakota Avitia MD   4 Units at 02/16/24 0630    labetalol (NORMODYNE,TRANDATE) injection 20 mg  20 mg Intravenous Q6H PRN Dakota Avitia MD   20 mg at 02/13/24 0146    latanoprost (XALATAN) 0.005 % ophthalmic solution 1 drop  1 drop Both Eyes Nightly Dakota Avitia MD   1 drop at 02/15/24 2301    lisinopril (PRINIVIL,ZESTRIL) tablet 2.5 mg  2.5 mg Oral Q24H Jasmeet Iraheta MD        Magnesium Standard Dose Replacement - Follow Nurse / BPA Driven Protocol   Does not apply Dakota Weems MD        Phosphorus Replacement - Follow Nurse / BPA Driven  Protocol   Does not apply Dakota Weems MD        Potassium Replacement - Follow Nurse / BPA Driven Protocol   Does not apply PRDakota Jaramillo MD        sennosides (SENOKOT) 8.8 MG/5ML syrup 10 mL  10 mL Oral Nightly Hemant Park MD        sodium bicarbonate tablet 650 mg  650 mg Oral Daily Hemant Park MD   650 mg at 02/16/24 0911    sodium hypochlorite (HYSEPT) 0.25 % topical solution   Topical Q24H Dakota Avitia MD   Given at 02/16/24 0911    tamsulosin (FLOMAX) 24 hr capsule 0.8 mg  0.8 mg Oral Daily Abiel Mercado MD   0.8 mg at 02/16/24 0910       Assessment & Plan  POD  10 days  from  diagnostic laparoscopy converted to laparotomy iliocectectomy. .    Voiding well, states he voided on his own x4 throughout the night. He admits minimal pain- urology consult yesterday, Dr Mercado.  He is tolerating his diet well.  He states that he had a small BM this a.m.  Plans are for potential discharge today.  I will discuss with Dr. Avitia to determine if this is appropriate.  Patient and wife verbalized understanding.    Alcira Stephenson, ALONSO  02/16/24  09:27 CST

## 2024-02-16 NOTE — DISCHARGE SUMMARY
"  Date of Discharge:  2/16/2024    Discharge Diagnosis: Bowel perforation [K63.1]  Colon perforation [K63.1]    Presenting Problem/History of Present Illness  Bowel perforation [K63.1]  Colon perforation [K63.1]       Hospital Course  Patient is a 73 y.o. male presented with status post exploratory laparotomy due to bowel perforation.  Patient's hospital course required ICU care.  During his ICU care the patient became more stable however his mental status had changed and decreased.  He was not following commands and was confused.  He subsequently had improvement of his mental status on postoperative day 8.  As he continued to follow recommendations and clearing his airway he was started on clears.  He subsequently continued to tolerate clears.  He was transferred to the floor in stable condition.  Physical therapy continue to work with the patient and his obstacle encountered was voiding.  Urology consulted and recommendations made.  On February 16, 2024 patient obtained normal voiding patterns without the need of Gordillo catheter insertions.  He continued to tolerate his diet and had bowel movements throughout his stay.  His strength improved.  He was discharged home in a stable condition with recommendations of physical activity by PT.  Wife was instructed how to manage wounds and packet daily which she was in agreement to performing.    Procedures Performed  Procedure(s):  DIAGNOSTIC LAPAROSCOPY CONVERTED TO LAPAROTOMY, ILIOCECTECTOMY       Consults:   Consults       Date and Time Order Name Status Description    2/15/2024 11:25 AM Inpatient Urology Consult      2/7/2024 12:40 PM Inpatient Hospitalist Consult Completed     2/7/2024  6:40 AM Inpatient Internal Medicine Consult                Condition on Discharge:  Stable    Vital Signs  /62 (BP Location: Right arm, Patient Position: Sitting)   Pulse 105   Temp 97.6 °F (36.4 °C) (Oral)   Resp 16   Ht 175.3 cm (69\")   Wt 85.5 kg (188 lb 7.9 oz)   SpO2 " 97%   BMI 27.84 kg/m²     Physical Exam:  General Appearance alert, appears stated age, and cooperative  Lungs clear to auscultation, respirations regular, respirations even, and respirations unlabored  Abdomen normal bowel sounds, no masses, no hepatomegaly, no splenomegaly, soft non-tender, no guarding, and no rebound tenderness    Discharge Disposition  Home or Self Care    Discharge Medications     Discharge Medications        New Medications        Instructions Start Date   ondansetron ODT 4 MG disintegrating tablet  Commonly known as: ZOFRAN-ODT   4 mg, Translingual, Every 8 Hours PRN      simethicone 80 MG chewable tablet  Commonly known as: Gas-X   40 mg, Oral, Every 6 Hours PRN      traMADol 50 MG tablet  Commonly known as: Ultram   50 mg, Oral, Every 8 Hours PRN             Continue These Medications        Instructions Start Date   amitriptyline 10 MG tablet  Commonly known as: ELAVIL   10 mg, Oral, Nightly PRN      aspirin 81 MG tablet   81 mg, Oral, Daily      atorvastatin 40 MG tablet  Commonly known as: LIPITOR   40 mg, Oral, Every Night at Bedtime      cetirizine 10 MG tablet  Commonly known as: zyrTEC   10 mg, Oral, Daily      cholecalciferol 10 MCG (400 UNIT) tablet  Commonly known as: VITAMIN D3   400 Units, Oral, Daily      cloNIDine 0.2 MG tablet  Commonly known as: CATAPRES   0.2 mg, Oral, 2 Times Daily      dorzolamide-timolol 2-0.5 % ophthalmic solution  Commonly known as: COSOPT   1 drop, Both Eyes, 2 Times Daily      ezetimibe 10 MG tablet  Commonly known as: Zetia   10 mg, Oral, Daily      ferrous sulfate 325 (65 FE) MG tablet   325 mg, Oral, Every Other Day, Takes 1 every other day .      fluticasone 50 MCG/ACT nasal spray  Commonly known as: FLONASE   2 sprays, Nasal, Daily      furosemide 20 MG tablet  Commonly known as: Lasix   20 mg, Oral, Daily      insulin aspart 100 UNIT/ML solution pen-injector sc pen  Commonly known as: novoLOG FLEXPEN   0-20 Units, Subcutaneous, 3 Times Daily  With Meals, Sliding scale       insulin degludec 100 UNIT/ML solution pen-injector injection  Commonly known as: TRESIBA FLEXTOUCH   20 Units, Subcutaneous, Nightly      latanoprost 0.005 % ophthalmic solution  Commonly known as: XALATAN   1 drop, Both Eyes, Nightly      lisinopril 10 MG tablet  Commonly known as: PRINIVIL,ZESTRIL   10 mg, Oral, Nightly      multivitamin with minerals tablet tablet   1 tablet, Oral, Daily      Retacrit 05276 UNIT/ML injection  Generic drug: epoetin shari-epbx   Subcutaneous, Every 14 Days      Rhopressa 0.02 % solution  Generic drug: Netarsudil Dimesylate   Apply 1 drop to eye(s) as directed by provider Every Night.      tamsulosin 0.4 MG capsule 24 hr capsule  Commonly known as: FLOMAX   2 capsules, Oral, Nightly               Discharge Diet:     Activity at Discharge:     Follow-up Appointments  Future Appointments   Date Time Provider Department Center   2/21/2024  1:00 PM TX ROOM  [facility] OP INFU ONC  PAD OIONC PAD   2/28/2024  1:15 PM Leesa Anne APRN [facility] ONC [facility] PAD   3/6/2024  8:45 AM Brando Webb MD [facility] PC PRIN PAD   3/19/2024 10:00 AM PAD STRESS LAB 1  [facility] CARDI PAD   3/19/2024 11:00 AM PAD US NIVAS CART 2  PAD US PAD   7/10/2024  9:45 AM Edu Villalobos MD [facility] CD  PAD   9/4/2024  7:45 AM Brando Webb MD [facility] PC PRIN PAD         Test Results Pending at Discharge       Dakota Avitia MD  02/16/24  12:17 CST

## 2024-02-16 NOTE — PROGRESS NOTES
Urology  Length of Stay: 10  Patient Care Team:  Brando Webb MD as PCP - General (Family Medicine)  Jim Cha OD (Optometry)  Anand Santoyo MD as Consulting Physician (Ophthalmology)  Deric Byers MD as Consulting Physician (Endocrinology)  Shaquille Mckenzie DO as Consulting Physician (Gastroenterology)    Chief Complaint: Urinary retention    Subjective     Interval History:   Patient was able to start voiding on his own today.  This occurred after started taking 0.8 mg tamsulosin and improving from a medical standpoint.    Review of Systems:   Review of Systems    Objective       Intake/Output Summary (Last 24 hours) at 2/16/2024 1322  Last data filed at 2/16/2024 1002  Gross per 24 hour   Intake 720 ml   Output 608 ml   Net 112 ml       [REMOVED] External Urinary Catheter-Output (mL): 0 mL  [REMOVED] Urethral Catheter Silicone 16 Fr.-Output (mL): 425 mL    [REMOVED] NG/OG Tube Nasogastric 18 Fr Right nostril-Output (mL): 100 mL      Physical Exam:  Temp:  [97.6 °F (36.4 °C)-98.5 °F (36.9 °C)] 97.6 °F (36.4 °C)  Heart Rate:  [] 105  Resp:  [16-18] 16  BP: (119-143)/(59-69) 130/62         Results Review:       I reviewed the patient's new clinical results.  Lab Results (last 24 hours)       Procedure Component Value Units Date/Time    POC Glucose Once [[id number]]  (Abnormal) Collected: 02/16/24 1149    Specimen: Blood Updated: 02/16/24 1221     Glucose 259 mg/dL      Comment: : [id number] Kendra PawClinicgeMeter ID: [id number]       POC Glucose Once [[id number]]  (Abnormal) Collected: 02/16/24 0817    Specimen: Blood Updated: 02/16/24 0829     Glucose 280 mg/dL      Comment: : [id number] Kendra FernándezgeMeter ID: [id number]       POC Glucose Once [[id number]]  (Abnormal) Collected: 02/16/24 0624    Specimen: Blood Updated: 02/16/24 0637     Glucose 290 mg/dL      Comment: : [email or web address] Dick TeresaMeter ID: [id number]       Comprehensive Metabolic Panel [[id number]]   (Abnormal) Collected: 02/16/24 0533    Specimen: Blood Updated: 02/16/24 0618     Glucose 290 mg/dL      BUN 38 mg/dL      Creatinine 1.95 mg/dL      Sodium 137 mmol/L      Potassium 4.8 mmol/L      Chloride 104 mmol/L      CO2 20.0 mmol/L      Calcium 8.7 mg/dL      Total Protein 6.1 g/dL      Albumin 2.9 g/dL      ALT (SGPT) 63 U/L      AST (SGOT) 35 U/L      Alkaline Phosphatase 104 U/L      Total Bilirubin 0.3 mg/dL      Globulin 3.2 gm/dL      A/G Ratio 0.9 g/dL      BUN/Creatinine Ratio 19.5     Anion Gap 13.0 mmol/L      eGFR 35.2 mL/min/1.73     Narrative:      GFR Normal >60  Chronic Kidney Disease <60  Kidney Failure <15    The GFR formula is only valid for adults with stable renal function between ages 18 and 70.    POC Glucose Once [[id number]]  (Abnormal) Collected: 02/15/24 2311    Specimen: Blood Updated: 02/15/24 2323     Glucose 252 mg/dL      Comment: : [email or web address] Dick TeresaMeter ID: [id number]       POC Glucose Once [[id number]]  (Abnormal) Collected: 02/15/24 1736    Specimen: Blood Updated: 02/15/24 1747     Glucose 229 mg/dL      Comment: : [id number] Yaniv CasandraMeter ID: [id number]       Blood Culture With OMEGA - Blood, Hand, Right [[id number]]  (Normal) Collected: 02/10/24 1651    Specimen: Blood from Hand, Right Updated: 02/15/24 1731     Blood Culture No growth at 5 days    Potassium [[id number]]  (Normal) Collected: 02/15/24 1615    Specimen: Blood Updated: 02/15/24 1638     Potassium 4.9 mmol/L           Imaging Results (Last 24 Hours)       ** No results found for the last 24 hours. **            Medication Review:     Current Facility-Administered Medications:     amitriptyline (ELAVIL) tablet 10 mg, 10 mg, Nasogastric, Nightly PRN, Dakota Avitia MD, 10 mg at 02/15/24 2319    amLODIPine (NORVASC) tablet 10 mg, 10 mg, Oral, Q24H, Hemant Park MD, 10 mg at 02/16/24 0911    aspirin chewable tablet 81 mg, 81 mg, Oral, Daily, Hemant Park MD, 81 mg at 02/16/24 0911     atorvastatin (LIPITOR) tablet 40 mg, 40 mg, Oral, Nightly, Hemant Park MD, 40 mg at 02/15/24 2242    Calcium Replacement - Follow Nurse / BPA Driven Protocol, , Does not apply, PRN, Dakota Avitia MD    cloNIDine (CATAPRES) tablet 0.1 mg, 0.1 mg, Oral, Q8H PRN, Jasmeet Iraheta MD    dextrose (D50W) (25 g/50 mL) IV injection 25 g, 25 g, Intravenous, Q15 Min PRN, Dakota Avitia MD    dextrose (GLUTOSE) oral gel 15 g, 15 g, Oral, Q15 Min PRN, Dakota Avitia MD    dorzolamide (TRUSOPT) 2 % 1 drop, timolol (TIMOPTIC) 0.5 % 1 drop for Cosopt 22.3-6.8 mg/mL, , Both Eyes, BID, Dakota Avitia MD, Given at 02/16/24 0912    glucagon (GLUCAGEN) injection 1 mg, 1 mg, Intramuscular, Q15 Min PRN, Dakota Avitia MD    heparin (porcine) 5000 UNIT/ML injection 5,000 Units, 5,000 Units, Subcutaneous, Q8H, Dakota Avitia MD, 5,000 Units at 02/16/24 0630    insulin detemir (LEVEMIR) injection 25 Units, 25 Units, Subcutaneous, Daily, Dakota Avitia MD, 25 Units at 02/16/24 0919    insulin regular (humuLIN R,novoLIN R) injection 2-7 Units, 2-7 Units, Subcutaneous, Q6H, Dakota Avitia MD, 4 Units at 02/16/24 1154    labetalol (NORMODYNE,TRANDATE) injection 20 mg, 20 mg, Intravenous, Q6H PRN, Dakota Avitia MD, 20 mg at 02/13/24 0146    latanoprost (XALATAN) 0.005 % ophthalmic solution 1 drop, 1 drop, Both Eyes, Nightly, Dakota Avitia MD, 1 drop at 02/15/24 2301    lisinopril (PRINIVIL,ZESTRIL) tablet 2.5 mg, 2.5 mg, Oral, Q24H, Jasmeet Iraheta MD, 2.5 mg at 02/16/24 1030    Magnesium Standard Dose Replacement - Follow Nurse / BPA Driven Protocol, , Does not apply, Sadi MENARD Anthony K, MD    Phosphorus Replacement - Follow Nurse / BPA Driven Protocol, , Does not apply, Sadi MENARD Anthony K, MD    Potassium Replacement - Follow Nurse / BPA Driven Protocol, , Does not apply, Sadi MENARD Anthony K, MD    sennosides (SENOKOT) 8.8 MG/5ML syrup 10 mL, 10 mL, Oral, Nightly, Xavier  MD Hemant    sodium bicarbonate tablet 650 mg, 650 mg, Oral, Daily, Hemant Park MD, 650 mg at 02/16/24 0911    sodium hypochlorite (HYSEPT) 0.25 % topical solution, , Topical, Q24H, Dakota Avitia MD, Given at 02/16/24 0911    tamsulosin (FLOMAX) 24 hr capsule 0.8 mg, 0.8 mg, Oral, Daily, Abiel Mercado MD, 0.8 mg at 02/16/24 0910    Assessment/Plan:   Urinary retention-resolved    -He should go back on his 0.8 mg tamsulosin at home.  -Will follow-up with midlevel provider in our office in 3 to 4 weeks.          (Please note that portions of this note were completed with a voice recognition program.)  Abiel Mercado MD  02/16/24  13:22 CST

## 2024-02-16 NOTE — THERAPY TREATMENT NOTE
Acute Care - Physical Therapy Treatment Note  [facility]     Patient Name: Bernard Matta  : 1950  MRN: [id number]  Today's Date: 2024      Visit Dx:     ICD-10-CM ICD-9-CM   1. Bowel perforation  K63.1 569.83   2. Peritonitis  K65.9 567.9   3. Pneumoperitoneum  K66.8 568.89   4. Impaired mobility [Z74.09]  Z74.09 799.89     Patient Active Problem List   Diagnosis    Anterior ischemic optic neuropathy    Hypertension    Type 2 diabetes mellitus    Hx of adenomatous colonic polyps    Iron deficiency anemia    Intestinal malabsorption, unspecified    Bowel perforation    Stage 3b chronic kidney disease (CKD)    Hyperkalemia    COVID-19 virus detected     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     Hyperlipidemia     Hypertension     Ischemic optic neuropathy of both eyes      Past Surgical History:   Procedure Laterality Date    COLONOSCOPY      [place]    COLONOSCOPY N/A 2020    Procedure: COLONOSCOPY WITH ANESTHESIA;  Surgeon: Shaquille Mckenzie DO;  Location: [facility] ENDOSCOPY;  Service: Gastroenterology;  Laterality: N/A;  preop; hx of polyps   postop; polyps   PCP Brando Webb     COLONOSCOPY N/A 2021    Procedure: COLONOSCOPY WITH ANESTHESIA;  Surgeon: Shaquille Mckenzie DO;  Location: [facility] ENDOSCOPY;  Service: Gastroenterology;  Laterality: N/A;  pre hx adenomatous colon polyp  post normal  Brando Webb MD    EXPLORATORY LAPAROTOMY N/A 2024    Procedure: DIAGNOSTIC LAPAROSCOPY CONVERTED TO LAPAROTOMY, ILIOCECTECTOMY;  Surgeon: Hemant Park MD;  Location: [facility] OR;  Service: General;  Laterality: N/A;     PT Assessment (last 12 hours)       PT Evaluation and Treatment       Row Name 24 1046          Physical Therapy Time and Intention    Subjective Information complains of;weakness;fatigue  -LAURA     Document Type therapy note (daily note)  -LAURA     Mode of Treatment physical therapy   -LAURA       Row Name 02/16/24 1046          General Information    Existing Precautions/Restrictions fall  -LAURA       Row Name 02/16/24 1046          Pain    Pretreatment Pain Rating 0/10 - no pain  -LAURA     Posttreatment Pain Rating 0/10 - no pain  -LAURA       Row Name 02/16/24 1046          Bed Mobility    Comment, (Bed Mobility) chair  -LAURA       Row Name 02/16/24 1046          Sit-Stand Transfer    Sit-Stand Otis (Transfers) verbal cues;contact guard  -LAURA       Row Name 02/16/24 1046          Stand-Sit Transfer    Stand-Sit Otis (Transfers) verbal cues;contact guard  -LAURA       Row Name 02/16/24 1046          Gait/Stairs (Locomotion)    Otis Level (Gait) verbal cues;minimum assist (75% patient effort)  -LAURA     Distance in Feet (Gait) 120  -LAURA     Deviations/Abnormal Patterns (Gait) gait speed decreased;stride length decreased  -LAURA     Bilateral Gait Deviations forward flexed posture  -LAURA     Comment, (Gait/Stairs) pt had 3 LOB with amb, especially with turns, required min assist to correct.  Educated pt and family to use RWX especially out of the house for amb  -LAURA       Row Name 02/16/24 1046          Motor Skills    Comments, Therapeutic Exercise standing AROM BLE X 10 holding the rail  -LAURA       Row Name             Wound 02/06/24 abdomen Incision    Wound - Properties Group Placement Date: 02/06/24  -CW Location: abdomen  -CW, Multiple port sites  Primary Wound Type: Incision  -CW    Retired Wound - Properties Group Placement Date: 02/06/24  -CW Location: abdomen  -CW, Multiple port sites  Primary Wound Type: Incision  -CW    Retired Wound - Properties Group Date first assessed: 02/06/24  -CW Location: abdomen  -CW, Multiple port sites  Primary Wound Type: Incision  -CW      Row Name             Wound 02/06/24 2035 midline abdomen Incision    Wound - Properties Group Placement Date: 02/06/24  -CW Placement Time: 2035 -CW Orientation: midline  -CW Location: abdomen  -CW Primary Wound Type:  Incision  -CW    Retired Wound - Properties Group Placement Date: 02/06/24  -CW Placement Time: 2035 -CW Orientation: midline  -CW Location: abdomen  -CW Primary Wound Type: Incision  -CW    Retired Wound - Properties Group Date first assessed: 02/06/24  -CW Time first assessed: 2035 -CW Location: abdomen  -CW Primary Wound Type: Incision  -CW      Row Name 02/16/24 1046          Positioning and Restraints    Pre-Treatment Position sitting in chair/recliner  -LAURA     Post Treatment Position chair  -LAURA     In Chair sitting;call light within reach;encouraged to call for assist;exit alarm on;with family/caregiver  -LAURA               User Key  (r) = Recorded By, (t) = Taken By, (c) = Cosigned By      Initials Name Provider Type    Omid Garnica PTA Physical Therapist Assistant    Kendal Paige, RN Registered Nurse                    Physical Therapy Education       Title: PT OT SLP Therapies (Done)       Topic: Physical Therapy (Done)       Point: Mobility training (Done)       Learning Progress Summary             Patient Acceptance, E,TB, VU by CR at 2/14/2024 1621    Acceptance, E, VU by CW at 2/14/2024 1025    Comment: Pt educated on abdominal precautions. Pt educated on proper body mechanics and safety precautions upon sit<>stand and stand<>sit t/f's. Pt edcuated on POC. Pt educated on energy conservation techniques. Pt educated on signs and symptoms of dizziness/ftg.   Family Acceptance, E,TB, VU by CR at 2/14/2024 1621                         Point: Home exercise program (Done)       Learning Progress Summary             Patient Acceptance, E,TB, VU by CR at 2/14/2024 1621    Acceptance, E, VU by CW at 2/14/2024 1025    Comment: Pt educated on abdominal precautions. Pt educated on proper body mechanics and safety precautions upon sit<>stand and stand<>sit t/f's. Pt edcuated on POC. Pt educated on energy conservation techniques. Pt educated on signs and symptoms of dizziness/ftg.   Family Acceptance,  E,TB, VU by CR at 2/14/2024 1621                         Point: Body mechanics (Done)       Learning Progress Summary             Patient Acceptance, E,TB, VU by CR at 2/14/2024 1621    Acceptance, E, VU by CW at 2/14/2024 1025    Comment: Pt educated on abdominal precautions. Pt educated on proper body mechanics and safety precautions upon sit<>stand and stand<>sit t/f's. Pt edcuated on POC. Pt educated on energy conservation techniques. Pt educated on signs and symptoms of dizziness/ftg.   Family Acceptance, E,TB, VU by CR at 2/14/2024 1621                         Point: Precautions (Done)       Learning Progress Summary             Patient Acceptance, E,TB, VU by CR at 2/14/2024 1621    Acceptance, E, VU by CW at 2/14/2024 1025    Comment: Pt educated on abdominal precautions. Pt educated on proper body mechanics and safety precautions upon sit<>stand and stand<>sit t/f's. Pt edcuated on POC. Pt educated on energy conservation techniques. Pt educated on signs and symptoms of dizziness/ftg.   Family Acceptance, E,TB, VU by CR at 2/14/2024 1621                                         User Key       Initials Effective Dates Name Provider Type Discipline    CR 03/03/23 -  Devora Nunn, RN Registered Nurse Nurse    CW 12/14/23 -  Ash Sahu, PT Student PT Student PT                  PT Recommendation and Plan     Plan of Care Reviewed With: patient  Progress: improving  Outcome Evaluation: Pt was up in the chair, stated to be feeling better.  Pt was able to transfer sit to stand with CGA.  Amb 100' with min assist, pt had 3 LOB that required min assist to correct.  Pt would benefit from RWX at home to increase safety with amb.   Outcome Measures       Row Name 02/16/24 1046 02/15/24 1120          How much help from another person do you currently need...    Turning from your back to your side while in flat bed without using bedrails? 3  -LAURA 3  -LAURA     Moving from lying on back to sitting on the side of a flat  bed without bedrails? 3  -LAURA 3  -LAURA     Moving to and from a bed to a chair (including a wheelchair)? 3  -LAURA 3  -LAURA     Standing up from a chair using your arms (e.g., wheelchair, bedside chair)? 3  -LAURA 3  -LAURA     Climbing 3-5 steps with a railing? 2  -LAURA 1  -LAURA     To walk in hospital room? 3  -LAURA 3  -LAURA     AM-PAC 6 Clicks Score (PT) 17  -LAURA 16  -LAURA     Highest Level of Mobility Goal 5 --> Static standing  -LAURA 5 --> Static standing  -LAURA        Functional Assessment    Outcome Measure Options AM-PAC 6 Clicks Basic Mobility (PT)  -LAURA AM-PAC 6 Clicks Basic Mobility (PT)  -LAURA               User Key  (r) = Recorded By, (t) = Taken By, (c) = Cosigned By      Initials Name Provider Type    Omid Garnica PTA Physical Therapist Assistant                     Time Calculation:    PT Charges       Row Name 02/16/24 1046             Time Calculation    Start Time 1046  -LAURA      Stop Time 1112  -LAURA      Time Calculation (min) 26 min  -LAURA      PT Received On 02/16/24  -LAURA         Time Calculation- PT    Total Timed Code Minutes- PT 26 minute(s)  -LAURA         Timed Charges    57141 - PT Therapeutic Exercise Minutes 10  -LAURA      23183 - Gait Training Minutes  16  -LAURA         Total Minutes    Timed Charges Total Minutes 26  -LAURA       Total Minutes 26  -LAURA                User Key  (r) = Recorded By, (t) = Taken By, (c) = Cosigned By      Initials Name Provider Type    Omid Garnica PTA Physical Therapist Assistant                  Therapy Charges for Today       Code Description Service Date Service Provider Modifiers Qty    49367000702 HC GAIT TRAINING EA 15 MIN 2/15/2024 Omid Schneider, PTA GP 1    99793288260 HC PT THER PROC EA 15 MIN 2/15/2024 Omid Schneider, PTA GP 1    35570661486 HC GAIT TRAINING EA 15 MIN 2/16/2024 Omid Schneider, PTA GP 1    77331709388 HC PT THER PROC EA 15 MIN 2/16/2024 Omid Schneider, PTA GP 1            PT G-Codes  Outcome Measure Options: AM-PAC 6 Clicks Basic Mobility  (PT)  AM-PAC 6 Clicks Score (PT): 17    Omid Schneider, PTA  2/16/2024

## 2024-02-16 NOTE — PROGRESS NOTES
1         [facility] Medicine Services  INPATIENT PROGRESS NOTE    Patient Name: Bernard Matta  Date of Admission: 2/6/2024  Today's Date: 02/16/24  Length of Stay: 10  Primary Care Physician: Brando Webb MD    Subjective   Chief Complaint: Follow-up  HPI   No new event  Doing better  Tolerating diet.  He was eating scrambled egg and toast when I saw him      Review of Systems   All pertinent negatives and positives are as above. All other systems have been reviewed and are negative unless otherwise stated.     Objective    Temp:  [97.5 °F (36.4 °C)-98.5 °F (36.9 °C)] 98 °F (36.7 °C)  Heart Rate:  [] 109  Resp:  [16-18] 16  BP: (106-143)/(59-69) 129/69  Physical Exam  Eating breakfast  Hemodynamically stable  He is seated on the recliner.  Normal respiratory effort  Lungs are clear  S1-S2 regular rate and rhythm  He is more interactive and appropriate and affect  Warm dry skin  Good capillary refill      Results Review:  I have reviewed the labs, radiology results, and diagnostic studies.    Laboratory Data:   Results from last 7 days   Lab Units 02/14/24  0844 02/12/24  0212 02/11/24  0253   WBC 10*3/mm3 7.61 5.76 5.67   HEMOGLOBIN g/dL 9.2* 9.7* 8.8*   HEMATOCRIT % 30.3* 32.1* 29.6*   PLATELETS 10*3/mm3 218 215 181        Results from last 7 days   Lab Units 02/16/24  0533 02/15/24  1615 02/15/24  0534 02/14/24  0844   SODIUM mmol/L 137  --  144 144   POTASSIUM mmol/L 4.8 4.9 3.3* 3.5   CHLORIDE mmol/L 104  --  110* 109*   CO2 mmol/L 20.0*  --  24.0 24.0   BUN mg/dL 38*  --  40* 35*   CREATININE mg/dL 1.95*  --  1.93* 1.66*   CALCIUM mg/dL 8.7  --  8.6 8.7   BILIRUBIN mg/dL 0.3  --  0.2 0.2   ALK PHOS U/L 104  --  94 94   ALT (SGPT) U/L 63*  --  61* 68*   AST (SGOT) U/L 35  --  36 36   GLUCOSE mg/dL 290*  --  70 267*       Culture Data:   Blood Culture   Date Value Ref Range Status   02/10/2024 No growth at 5 days  Final       Radiology Data:   Imaging Results  (Last 24 Hours)       ** No results found for the last 24 hours. **            I have reviewed the patient's current medications.     Assessment/Plan   Assessment  Active Hospital Problems    Diagnosis     **Bowel perforation     Stage 3b chronic kidney disease (CKD)     Hyperkalemia     COVID-19 virus detected     Iron deficiency anemia     Hypertension     Type 2 diabetes mellitus        Medical Decision Making  Number and Complexity of problems:   Hyperglycemia in the setting of diabetes with A1c of 8.4%  Hypernatremia - resolved   Encephalopathy could be related to acute illness, worsening hypernatremia, lack of bleeding.  Mild hypokalemia  Acute on chronic kidney injury with probable baseline creatinine anywhere from 1.78-1.93.  Bacteremia (Clostridium septicum)-received 15 doses of Zosyn.  Cecal perforation status post diagnostic laparoscopy converted to laparotomy with ILEOCECTETOMY on February 6 by Dr. Park  Sinus tachycardia-could be related to volume depletion/possibly third space loss  Hypertension  Anemia.  Transfuse as needed if hemoglobin less than 7 or with symptoms of anemia with signs of bleeding.     Patient's creatinine had gone up to 1.93.  It seems like his creatinine from December was around 1.9 also.  It is hard to really tell what his baseline because it has been as high as 2.27.  In general this probably is his baseline creatinine and CKD stage IIIb.     Today, February 16:  Blood pressure remained stable and no significant change from yesterday despite discontinuing lisinopril.  Patient placed on IV fluid  Creatinine slightly elevated but looking through record as mentioned above creatinine anywhere from 1.78-1.93.  It has been as high as 2.  This probably his baseline   I do not think that patient will require fluids any longer.  I think it is not going to be unreasonable to restart on a lower dose ACE inhibitor given chronic kidney disease and diabetes.  Not sure whether patient has  albuminuria but this if present could be another indication for use of ACE inhibitor in a diabetic.  He will need to follow-up with primary care provider with basic metabolic panel  Overall he is doing better daily.  Discussed with NP Alcira with general surgery and nurse Cindy.  Monitor heart rate.  Patient has reported sinus tachycardia which patient just came out of the bathroom.             Conditions and Status    Doing better     MDM Data  External documents reviewed: -  Cardiac tracing (EKG, telemetry) interpretation: -  Radiology interpretation: nothing new  Labs reviewed: yes  Any tests that were considered but not ordered: none     Decision rules/scores evaluated (example [id number], Wells, etc):   none     Discussed with: patient and wife     Care Planning  Shared decision making: patient with family and primary service  Code status and discussions: full code     Disposition  Social Determinants of Health that impact treatment or disposition: none identified   I expect the patient to be discharged to (TBD)--Per attending    Electronically signed by Jasmeet Iraheta MD, 02/16/24, 09:21 CST.

## 2024-02-16 NOTE — PLAN OF CARE
Goal Outcome Evaluation:           Progress: improving    Pt A&O X4 upon assessment.  Up with x1 assist. Able to void this shift to urinal with incontinency. Dressing in place to abdomen incision. Heparin for VTE prevention. Q6 accuchecks with SS coverage. VSS safety maintained.

## 2024-02-16 NOTE — THERAPY DISCHARGE NOTE
Acute Care - Physical Therapy Discharge Summary  [facility]       Patient Name: Bernard Matta  : 1950  MRN: [id number]    Today's Date: 2024                 Admit Date: 2024      PT Recommendation and Plan    Visit Dx:    ICD-10-CM ICD-9-CM   1. Peritonitis  K65.9 567.9   2. Pneumoperitoneum  K66.8 568.89   3. Bowel perforation  K63.1 569.83   4. Impaired mobility [Z74.09]  Z74.09 799.89        Outcome Measures       Row Name 24 1046 02/15/24 1120          How much help from another person do you currently need...    Turning from your back to your side while in flat bed without using bedrails? 3  -LAURA 3  -LAURA     Moving from lying on back to sitting on the side of a flat bed without bedrails? 3  -LAURA 3  -LAURA     Moving to and from a bed to a chair (including a wheelchair)? 3  -LAURA 3  -LAURA     Standing up from a chair using your arms (e.g., wheelchair, bedside chair)? 3  -LAURA 3  -LAURA     Climbing 3-5 steps with a railing? 2  -LAURA 1  -LAURA     To walk in hospital room? 3  -LAURA 3  -LAURA     AM-PAC 6 Clicks Score (PT) 17  -LAURA 16  -LAURA     Highest Level of Mobility Goal 5 --> Static standing  -LAURA 5 --> Static standing  -LAURA        Functional Assessment    Outcome Measure Options AM-PAC 6 Clicks Basic Mobility (PT)  -LAURA AM-PAC 6 Clicks Basic Mobility (PT)  -LAURA               User Key  (r) = Recorded By, (t) = Taken By, (c) = Cosigned By      Initials Name Provider Type    Omid Garnica, PTA Physical Therapist Assistant                     PT Charges       Row Name 24 1046             Time Calculation    Start Time 1046  -LAURA      Stop Time 1112  -LAURA      Time Calculation (min) 26 min  -LAURA      PT Received On 24  -LAURA         Time Calculation- PT    Total Timed Code Minutes- PT 26 minute(s)  -LAURA         Timed Charges    93638 - PT Therapeutic Exercise Minutes 10  -LAURA      45321 - Gait Training Minutes  16  -LAURA         Total Minutes    Timed Charges Total Minutes 26  -LAURA       Total Minutes 26  -LAURA                 User Key  (r) = Recorded By, (t) = Taken By, (c) = Cosigned By      Initials Name Provider Type    Omid Garnica PTA Physical Therapist Assistant                     PT Rehab Goals       Row Name 02/16/24 4619             Bed Mobility Goal 1 (PT)    Activity/Assistive Device (Bed Mobility Goal 1, PT) sit to supine;supine to sit  -NW      Stokes Level/Cues Needed (Bed Mobility Goal 1, PT) standby assist  -NW      Time Frame (Bed Mobility Goal 1, PT) long term goal (LTG);10 days  -NW      Progress/Outcomes (Bed Mobility Goal 1, PT) goal not met  -NW         Transfer Goal 1 (PT)    Activity/Assistive Device (Transfer Goal 1, PT) sit-to-stand/stand-to-sit;bed-to-chair/chair-to-bed  -NW      Stokes Level/Cues Needed (Transfer Goal 1, PT) standby assist  -NW      Time Frame (Transfer Goal 1, PT) long term goal (LTG);10 days  -NW      Progress/Outcome (Transfer Goal 1, PT) goal not met  -NW         Gait Training Goal 1 (PT)    Activity/Assistive Device (Gait Training Goal 1, PT) gait (walking locomotion);decrease fall risk;diminish gait deviation;forward stepping;improve balance and speed;increase endurance/gait distance;increase energy conservation  -NW      Stokes Level (Gait Training Goal 1, PT) standby assist  -NW      Distance (Gait Training Goal 1, PT) 25ft  -NW      Time Frame (Gait Training Goal 1, PT) long term goal (LTG);10 days  -NW      Progress/Outcome (Gait Training Goal 1, PT) goal not met  -NW         Problem Specific Goal 1 (PT)    Problem Specific Goal 1 (PT) Pt will be able to stand for 2 minutes, without RB's, before fatiguing  -NW      Time Frame (Problem Specific Goal 1, PT) long-term goal (LTG)  -NW      Progress/Outcome (Problem Specific Goal 1, PT) goal not met  -NW                User Key  (r) = Recorded By, (t) = Taken By, (c) = Cosigned By      Initials Name Provider Type Discipline    NW Estella Armenta PTA Physical Therapist Assistant PT                         PT Discharge Summary  Anticipated Discharge Disposition (PT): home  Reason for Discharge: Discharge from facility  Outcomes Achieved: Refer to plan of care for updates on goals achieved  Discharge Destination: Home      Esetlla Armenta, PTA   2/16/2024

## 2024-02-16 NOTE — CASE MANAGEMENT/SOCIAL WORK
Continued Stay Note  [facility]     Patient Name: Bernard Matta  MRN: [id number]  Today's Date: 2/16/2024    Admit Date: 2/6/2024    Plan: Home   Discharge Plan       Row Name 02/16/24 1405       Plan    Plan Home    Patient/Family in Agreement with Plan yes    Provided Post Acute Provider List? Yes    Post Acute Provider List DME Supplier    Provided Post Acute Provider Quality & Resource List? Yes    Delivered To Patient;Support Person    Method of Delivery In person    Final Discharge Disposition Code 01 - home or self-care    Final Note Pt is being d/c'ed today. He has orders for a RW. Discussed options with pt and spouse. He lives in [place] so orders sent to [facility] at [phone]. They will deliver to pt's home.                   Discharge Codes    No documentation.                 Expected Discharge Date and Time       Expected Discharge Date Expected Discharge Time    Feb 16, 2024               MARTY [facility]

## 2024-02-16 NOTE — PLAN OF CARE
Goal Outcome Evaluation:  Plan of Care Reviewed With: patient        Progress: improving  Outcome Evaluation: Pt was up in the chair, stated to be feeling better.  Pt was able to transfer sit to stand with CGA.  Amb 100' with min assist, pt had 3 LOB that required min assist to correct.  Pt would benefit from RWX at home to increase safety with amb.

## 2024-02-16 NOTE — NURSING NOTE
Notified Dr Iraheta (in person on 3C) of patient's HR per telemetry ranging from 111-130's (sinus tach) this AM.  Asymptomatic.

## 2024-02-16 NOTE — PLAN OF CARE
Goal Outcome Evaluation:           Progress: improving    VSS.  Goals met.  Patient and spouse verbalize understanding of all discharge instructions and wound care.  Good supply of wound care supplies given to patient.  RA.  Denies pain.  Safety maintained.

## 2024-02-17 DIAGNOSIS — C18.0 CECAL CANCER: Primary | ICD-10-CM

## 2024-02-17 NOTE — PAYOR COMM NOTE
"DC HOME 2-16-24    Bernard Matta (73 y.o. Male)       Date of Birth   1950    Social Security Number       Address   [address] [place] [address]    Home Phone   [phone]    MRN   [id number]       Rastafarian   Yazidism    Marital Status                               Admission Date   2/6/24    Admission Type   Emergency    Admitting Provider   Hemant Park MD    Attending Provider       Department, Room/Bed   [facility] 3C, 360/1       Discharge Date   2/16/2024    Discharge Disposition   Home or Self Care    Discharge Destination                                 Attending Provider: (none)   Allergies: Jardiance [Empagliflozin]    Isolation: None   Infection: COVID (History) (02/07/24)   Code Status: Prior    Ht: 175.3 cm (69\")   Wt: 85.5 kg (188 lb 7.9 oz)    Admission Cmt: None   Principal Problem: Bowel perforation [K63.1]                   Active Insurance as of 2/6/2024       Primary Coverage       Payor Plan Insurance Group Employer/Plan Group    HU[employer]NA MEDICARE REPLACEMENT HU[employer] MED ADV GROUP [id number]       Payor Plan Address Payor Plan Phone Number Payor Plan Fax Number Effective Dates    [address] [address] [phone]  1/1/2019 - None Entered    [facility] [id number]         Subscriber Name Subscriber Birth Date Member ID       BERNARD MATTA 1950 [id number]                     Emergency Contacts        (Rel.) Home Phone Work Phone Mobile Phone    PaxtonNury (Spouse) -- -- [phone]              Discharge Summary    No notes of this type exist for this encounter.       "

## 2024-02-19 ENCOUNTER — TRANSITIONAL CARE MANAGEMENT TELEPHONE ENCOUNTER (OUTPATIENT)
Dept: CALL CENTER | Facility: HOSPITAL | Age: 74
End: 2024-02-19
Payer: MEDICARE

## 2024-02-19 NOTE — OUTREACH NOTE
Call Center TCM Note      Flowsheet Row Responses   [facility] patient discharged from? [facility]   Does the patient have one of the following disease processes/diagnoses(primary or secondary)? General Surgery   TCM attempt successful? Yes   Call start time 1504   Call end time 1506   Discharge diagnosis Bowel perforation-Diagnostic lap converted to iliocectectomy   Meds reviewed with patient/caregiver? Yes   Is the patient having any side effects they believe may be caused by any medication additions or changes? No   Does the patient have all medications related to this admission filled (includes all antibiotics, pain medications, etc.) Yes   Is the patient taking all medications as directed (includes completed medication regime)? Yes   Does the patient have an appointment with their PCP within 7-14 days of discharge? Yes   Has home health visited the patient within 72 hours of discharge? N/A   What DME was ordered? RW has been ordered   Has all DME been delivered? No   Psychosocial issues? No   Did the patient receive a copy of their discharge instructions? Yes   Nursing interventions Reviewed instructions with patient   What is the patient's perception of their health status since discharge? Improving   Nursing interventions Nurse provided patient education   Is the patient /caregiver able to teach back basic post-op care? Drive as instructed by MD in discharge instructions, Take showers only when approved by MD-sponge bathe until then, No tub bath, swimming, or hot tub until instructed by MD, Keep incision areas clean,dry and protected, Lifting as instructed by MD in discharge instructions   Is the patient/caregiver able to teach back signs and symptoms of incisional infection? Increased redness, swelling or pain at the incisonal site, Increased drainage or bleeding, Incisional warmth, Pus or odor from incision, Fever   Is the patient/caregiver able to teach back steps to recovery at home? Set small,  achievable goals for return to baseline health, Rest and rebuild strength, gradually increase activity, Eat a well-balance diet   If the patient is a current smoker, are they able to teach back resources for cessation? Not a smoker   Is the patient/caregiver able to teach back the hierarchy of who to call/visit for symptoms/problems? PCP, Specialist, Home health nurse, Urgent Care, ED, 911 Yes   TCM call completed? Yes   Call end time 1506   Would this patient benefit from a Referral to [facility]? No   Is the patient interested in additional calls from an ambulatory ? No            Leti Rendon LPN    2/19/2024, 15:06 CST

## 2024-02-19 NOTE — OUTREACH NOTE
Call Center TCM Note      Flowsheet Row Responses   [facility] facility patient discharged from? [facility]   Does the patient have one of the following disease processes/diagnoses(primary or secondary)? General Surgery   TCM attempt successful? No   Unsuccessful attempts Attempt 1            Leti Rendon LPN    2/19/2024, 13:21 CST

## 2024-02-20 NOTE — PROGRESS NOTES
Subjective    Mr. Matta is 73 y.o. male    Chief Complaint: Urinary hesitancy    History of Present Illness  Patient is a 73-year-old gentleman here for hospital follow-up he has a history of BPH with lower urinary tract symptoms and has been seen by both myself and Dr. Wyatt in the past.  He developed urinary retention when he was in the hospital after undergoing a laparotomy and colon resection.  Catheters removed before he was discharged he is currently taking 0.8 mg tamsulosin and patient subjectively feels like he is back to his old baseline has not had to have her catheter replaced since.  Patient denies any UTI since last seen.  He did have a cystoscopy April 2021 by Dr. Wyatt and at that time UroLift was recommended.  But he has opted to just treat with medication.      The following portions of the patient's history were reviewed and updated as appropriate: allergies, current medications, past family history, past medical history, past social history, past surgical history and problem list.    Review of Systems   Constitutional:  Negative for chills and fever.   Gastrointestinal:  Negative for abdominal pain, anal bleeding and blood in stool.   Genitourinary:  Negative for dysuria and hematuria.         Current Outpatient Medications:     amitriptyline (ELAVIL) 10 MG tablet, Take 1 tablet by mouth At Night As Needed for Sleep., Disp: , Rfl:     aspirin 81 MG tablet, Take 1 tablet by mouth Daily., Disp: , Rfl:     atorvastatin (LIPITOR) 40 MG tablet, Take 1 tablet by mouth every night at bedtime., Disp: 90 tablet, Rfl: 3    cetirizine (zyrTEC) 10 MG tablet, Take 1 tablet by mouth Daily., Disp: , Rfl:     cholecalciferol (VITAMIN D3) 400 UNITS tablet, Take 1 tablet by mouth Daily., Disp: , Rfl:     cloNIDine (CATAPRES) 0.2 MG tablet, Take 1 tablet by mouth 2 (Two) Times a Day., Disp: , Rfl:     dorzolamide-timolol (COSOPT) 22.3-6.8 MG/ML ophthalmic solution, Administer 1 drop to both eyes 2 (Two) Times a  Day., Disp: , Rfl:     epoetin shari-epbx (Retacrit) 07065 UNIT/ML injection, Inject  under the skin into the appropriate area as directed Every 14 (Fourteen) Days., Disp: , Rfl:     ezetimibe (Zetia) 10 MG tablet, Take 1 tablet by mouth Daily., Disp: 90 tablet, Rfl: 3    fluticasone (FLONASE) 50 MCG/ACT nasal spray, 2 sprays into the nostril(s) as directed by provider Daily., Disp: , Rfl:     furosemide (Lasix) 20 MG tablet, Take 1 tablet by mouth Daily., Disp: 90 tablet, Rfl: 3    insulin aspart (novoLOG FLEXPEN) 100 UNIT/ML solution pen-injector sc pen, Inject 0-20 Units under the skin into the appropriate area as directed 3 (Three) Times a Day With Meals. Sliding scale, Disp: , Rfl:     insulin degludec (TRESIBA FLEXTOUCH) 100 UNIT/ML solution pen-injector injection, Inject 30 Units under the skin into the appropriate area as directed Every Night., Disp: , Rfl:     latanoprost (XALATAN) 0.005 % ophthalmic solution, Administer 1 drop to both eyes Every Night., Disp: , Rfl:     lisinopril (PRINIVIL,ZESTRIL) 10 MG tablet, Take 1 tablet by mouth Every Night., Disp: , Rfl:     Multiple Vitamins-Minerals (MULTIVITAMIN WITH MINERALS) tablet tablet, Take 1 tablet by mouth Daily., Disp: , Rfl:     ondansetron ODT (ZOFRAN-ODT) 4 MG disintegrating tablet, Place 1 tablet on the tongue Every 8 (Eight) Hours As Needed for Nausea or Vomiting., Disp: 30 tablet, Rfl: 1    Rhopressa 0.02 % solution, Apply 1 drop to eye(s) as directed by provider Every Night., Disp: , Rfl:     simethicone (Gas-X) 80 MG chewable tablet, Chew 0.5 tablets Every 6 (Six) Hours As Needed for Flatulence (belching)., Disp: 30 tablet, Rfl: 1    tamsulosin (FLOMAX) 0.4 MG capsule 24 hr capsule, Take 2 capsules by mouth Every Night., Disp: , Rfl:     traMADol (Ultram) 50 MG tablet, Take 1 tablet by mouth Every 8 (Eight) Hours As Needed for Moderate Pain., Disp: 30 tablet, Rfl: 0    Past Medical History:   Diagnosis Date    Diabetes mellitus     Erectile  "disorder due to 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     Hyperlipidemia     Hypertension     Ischemic optic neuropathy of both eyes        Past Surgical History:   Procedure Laterality Date    COLONOSCOPY      [place]    COLONOSCOPY N/A 2020    Procedure: COLONOSCOPY WITH ANESTHESIA;  Surgeon: Shaquille Mckenzie DO;  Location: [facility] ENDOSCOPY;  Service: Gastroenterology;  Laterality: N/A;  preop; hx of polyps   postop; polyps   PCP Brando Webb     COLONOSCOPY N/A 2021    Procedure: COLONOSCOPY WITH ANESTHESIA;  Surgeon: Shaquille Mckenzie DO;  Location: [facility] ENDOSCOPY;  Service: Gastroenterology;  Laterality: N/A;  pre hx adenomatous colon polyp  post normal  Brando Webb MD    EXPLORATORY LAPAROTOMY N/A 2024    Procedure: DIAGNOSTIC LAPAROSCOPY CONVERTED TO LAPAROTOMY, ILIOCECTECTOMY;  Surgeon: Hemant Park MD;  Location: [facility] OR;  Service: General;  Laterality: N/A;       Social History     Socioeconomic History    Marital status:    Tobacco Use    Smoking status: Former     Current packs/day: 0.00     Average packs/day: 0.5 packs/day for 10.0 years (5.0 ttl pk-yrs)     Types: Cigarettes     Start date: 1969     Quit date: 1979     Years since quittin.2     Passive exposure: Past    Smokeless tobacco: Never   Vaping Use    Vaping status: Never Used   Substance and Sexual Activity    Alcohol use: No    Drug use: No    Sexual activity: Defer       Family History   Problem Relation Age of Onset    No Known Problems Maternal Grandmother     No Known Problems Maternal Grandfather     No Known Problems Paternal Grandmother     No Known Problems Paternal Grandfather     No Known Problems Mother     No Known Problems Father     Colon cancer Neg Hx     Colon polyps Neg Hx     Esophageal cancer Neg Hx        Objective    Temp 97 °F (36.1 °C)   Ht 175.3 cm (69\")   Wt 81.6 kg (180 lb)   BMI 26.58 kg/m²     Physical Exam  Vitals reviewed.   Constitutional:       General: He " is not in acute distress.     Appearance: Normal appearance. He is not toxic-appearing.   HENT:      Head: Normocephalic and atraumatic.   Pulmonary:      Effort: Pulmonary effort is normal.   Skin:     Coloration: Skin is not pale.   Neurological:      Mental Status: He is alert.   Psychiatric:         Mood and Affect: Mood normal.         Behavior: Behavior normal.             Results for orders placed or performed in visit on 03/08/24   POC Urinalysis Dipstick, Multipro    Specimen: Urine   Result Value Ref Range    Color Yellow Yellow, Straw, Dark Yellow, Delisa    Clarity, UA Clear Clear    Glucose, UA Negative Negative mg/dL    Bilirubin Negative Negative    Ketones, UA Negative Negative    Specific Gravity  1.010 1.005 - 1.030    Blood, UA Negative Negative    pH, Urine 6.0 5.0 - 8.0    Protein, POC Negative Negative mg/dL    Urobilinogen, UA 0.2 E.U./dL Normal, 0.2 E.U./dL    Nitrite, UA Negative Negative    Leukocytes Trace (A) Negative     Bladder Scan interpretation  Estimation of residual urine via abdominal ultrasound  Residual Urine: 262 ml  Indication: Urinary Hesitancy  Position: Supine  Examination: Incremental scanning of the suprapubic area using 3 MHz transducer using copious amounts of acoustic gel.   Findings: An anechoic area was demonstrated which represented the bladder, with measurement of residual urine as noted. I inspected this myself. In that the residual urine was stable or insignificant, no treatment will be necessary at this time.     Assessment and Plan    Diagnoses and all orders for this visit:    1. Urinary hesitancy (Primary)  -     POC Urinalysis Dipstick, Multipro    2. BPH with obstruction/lower urinary tract symptoms    Since catheter was removed at the hospital he is now back to his old previous baseline although he does have symptoms he is on 2 tamsulosin daily which she will continue.  Did have a cystoscopy in 2021 and would have been a candidate at that time for UroLift  however patient does not wish to pursue any surgical intervention at this time and is content with how he is voiding.    Patient will return in 3 months.

## 2024-02-21 ENCOUNTER — OFFICE VISIT (OUTPATIENT)
Dept: FAMILY MEDICINE CLINIC | Facility: CLINIC | Age: 74
End: 2024-02-21
Payer: MEDICARE

## 2024-02-21 VITALS
DIASTOLIC BLOOD PRESSURE: 66 MMHG | HEIGHT: 69 IN | SYSTOLIC BLOOD PRESSURE: 110 MMHG | OXYGEN SATURATION: 98 % | RESPIRATION RATE: 16 BRPM | TEMPERATURE: 98 F | HEART RATE: 72 BPM | WEIGHT: 177.4 LBS | BODY MASS INDEX: 26.28 KG/M2

## 2024-02-21 DIAGNOSIS — R53.83 FATIGUE, UNSPECIFIED TYPE: Primary | ICD-10-CM

## 2024-02-21 DIAGNOSIS — K63.1 INTESTINAL PERFORATION: ICD-10-CM

## 2024-02-21 DIAGNOSIS — E11.9 TYPE 2 DIABETES MELLITUS WITHOUT COMPLICATION, WITH LONG-TERM CURRENT USE OF INSULIN: ICD-10-CM

## 2024-02-21 DIAGNOSIS — R10.84 GENERALIZED ABDOMINAL PAIN: ICD-10-CM

## 2024-02-21 DIAGNOSIS — Z79.4 TYPE 2 DIABETES MELLITUS WITHOUT COMPLICATION, WITH LONG-TERM CURRENT USE OF INSULIN: ICD-10-CM

## 2024-02-21 NOTE — PROGRESS NOTES
Transitional Care Follow Up Visit  Subjective     Bernard Matta is a 73 y.o. male who presents for a transitional care management visit.    Within 48 business hours after discharge our office contacted him via telephone to coordinate his care and needs.      I reviewed and discussed the details of that call along with the discharge summary, hospital problems, inpatient lab results, inpatient diagnostic studies, and consultation reports with Bernard.     Current outpatient and discharge medications have been reconciled for the patient.  Reviewed by: Brando Webb MD          2/16/2024     5:09 PM   Date of TCM Phone Call   Hospital [place]   Date of Admission 2/6/2024   Date of Discharge 2/16/2024   Discharge Disposition Home or Self Care     Risk for Readmission (LACE) Score: 11 (2/16/2024  5:00 AM)      History of Present Illness  73-year-old diabetic recently hospitalized with a perforated intestine     Course During Hospital Stay: Patient hospitalized at [facility] with a perforated intestine from February 6 through February 16     The following portions of the patient's history were reviewed and updated as appropriate: allergies, current medications, past family history, past medical history, past social history, past surgical history, and problem list.    Review of Systems   Respiratory:  Negative for shortness of breath.    Cardiovascular:  Positive for leg swelling. Negative for chest pain.   Gastrointestinal:  Negative for abdominal distention and abdominal pain.        Incisions are partially open-iodoform packing   Endocrine: Negative for polydipsia, polyphagia and polyuria.   Hematological:         History of iron deficiency anemia       Objective   Physical Exam  Vitals and nursing note reviewed.   Constitutional:       Appearance: Normal appearance.   Eyes:      Extraocular Movements: Extraocular movements intact.      Pupils: Pupils are equal, round, and reactive to light.   Cardiovascular:       Rate and Rhythm: Normal rate and regular rhythm.   Pulmonary:      Effort: Pulmonary effort is normal.      Breath sounds: Normal breath sounds.   Abdominal:      General: Bowel sounds are normal.      Palpations: Abdomen is soft.      Comments: About half of the staples are out-upper and lower incisional gaping with packing-continue   Musculoskeletal:      Right lower leg: No edema.      Left lower leg: Edema present.   Skin:     General: Skin is warm.   Neurological:      General: No focal deficit present.      Mental Status: He is alert and oriented to person, place, and time.   Psychiatric:         Mood and Affect: Mood normal.         Behavior: Behavior normal.         Thought Content: Thought content normal.         Judgment: Judgment normal.         Assessment & Plan   Problems Addressed this Visit          Endocrine and Metabolic    Type 2 diabetes mellitus    Relevant Orders    Hemoglobin A1c     Other Visit Diagnoses       Fatigue, unspecified type    -  Primary    Intestinal perforation        Generalized abdominal pain        Relevant Orders    Comprehensive Metabolic Panel    CBC & Differential          Diagnoses         Codes Comments    Fatigue, unspecified type    -  Primary ICD-10-CM: R53.83  ICD-9-CM: 780.79     Intestinal perforation     ICD-10-CM: K63.1  ICD-9-CM: 569.83     Generalized abdominal pain     ICD-10-CM: R10.84  ICD-9-CM: 789.07     Type 2 diabetes mellitus without complication, with long-term current use of insulin     ICD-10-CM: E11.9, Z79.4  ICD-9-CM: 250.00, V58.67                Plan call sugars daily to adjust sliding scale-increase long-acting to 20 units-sliding scale-100 to 250--10 units  250 to 350--20 units greater than 350--30 units of regular insulin

## 2024-02-22 LAB
ALBUMIN SERPL-MCNC: 3.2 G/DL (ref 3.5–5.2)
ALBUMIN/GLOB SERPL: 1.2 G/DL
ALP SERPL-CCNC: 132 U/L (ref 39–117)
ALT SERPL-CCNC: 76 U/L (ref 1–41)
AST SERPL-CCNC: 41 U/L (ref 1–40)
BASOPHILS # BLD AUTO: 0.07 10*3/MM3 (ref 0–0.2)
BASOPHILS NFR BLD AUTO: 0.7 % (ref 0–1.5)
BILIRUB SERPL-MCNC: 0.2 MG/DL (ref 0–1.2)
BUN SERPL-MCNC: 35 MG/DL (ref 8–23)
BUN/CREAT SERPL: 16.1 (ref 7–25)
CALCIUM SERPL-MCNC: 8.4 MG/DL (ref 8.6–10.5)
CHLORIDE SERPL-SCNC: 96 MMOL/L (ref 98–107)
CO2 SERPL-SCNC: 24.2 MMOL/L (ref 22–29)
CREAT SERPL-MCNC: 2.17 MG/DL (ref 0.76–1.27)
EGFRCR SERPLBLD CKD-EPI 2021: 31 ML/MIN/1.73
EOSINOPHIL # BLD AUTO: 0.2 10*3/MM3 (ref 0–0.4)
EOSINOPHIL NFR BLD AUTO: 2.1 % (ref 0.3–6.2)
ERYTHROCYTE [DISTWIDTH] IN BLOOD BY AUTOMATED COUNT: 13.9 % (ref 12.3–15.4)
GLOBULIN SER CALC-MCNC: 2.7 GM/DL
GLUCOSE SERPL-MCNC: 365 MG/DL (ref 65–99)
HBA1C MFR BLD: 8.9 % (ref 4.8–5.6)
HCT VFR BLD AUTO: 26 % (ref 37.5–51)
HGB BLD-MCNC: 8.1 G/DL (ref 13–17.7)
IMM GRANULOCYTES # BLD AUTO: 0.04 10*3/MM3 (ref 0–0.05)
IMM GRANULOCYTES NFR BLD AUTO: 0.4 % (ref 0–0.5)
LYMPHOCYTES # BLD AUTO: 1.14 10*3/MM3 (ref 0.7–3.1)
LYMPHOCYTES NFR BLD AUTO: 11.9 % (ref 19.6–45.3)
MCH RBC QN AUTO: 29.6 PG (ref 26.6–33)
MCHC RBC AUTO-ENTMCNC: 31.2 G/DL (ref 31.5–35.7)
MCV RBC AUTO: 94.9 FL (ref 79–97)
MONOCYTES # BLD AUTO: 0.77 10*3/MM3 (ref 0.1–0.9)
MONOCYTES NFR BLD AUTO: 8.1 % (ref 5–12)
NEUTROPHILS # BLD AUTO: 7.32 10*3/MM3 (ref 1.7–7)
NEUTROPHILS NFR BLD AUTO: 76.8 % (ref 42.7–76)
NRBC BLD AUTO-RTO: 0 /100 WBC (ref 0–0.2)
PLATELET # BLD AUTO: 396 10*3/MM3 (ref 140–450)
POTASSIUM SERPL-SCNC: 5.4 MMOL/L (ref 3.5–5.2)
PROT SERPL-MCNC: 5.9 G/DL (ref 6–8.5)
RBC # BLD AUTO: 2.74 10*6/MM3 (ref 4.14–5.8)
SODIUM SERPL-SCNC: 133 MMOL/L (ref 136–145)
WBC # BLD AUTO: 9.54 10*3/MM3 (ref 3.4–10.8)

## 2024-02-22 NOTE — PROGRESS NOTES
[facility] ONC [facility] HEMATOLOGY & ONCOLOGY  [address]  [place] [address] [phone]    Patient Name: Bernard Matta  Encounter Date: 02/28/2024  YOB: 1950  Patient Number: [id number]      REASON FOR FOLLOW-UP: Bernard Matta is a pleasant 73 y.o.  male who is seen regarding further management suggestions for colon cancer.  He is also seen for anemia from iron deficiency and chronic kidney disease stage IIIb.  GFR 31 mL/min on 2/21/2024. He is on epoetin alpha and intravenous iron as needed.  Latest Injectafer was given 11/14/2023. He is seen with spouse, Nury.  History obtained from the patient.  He is a reliable historian.        DIAGNOSTIC ABNORMALITIES: Colon cancer.  Screening colonoscopy 8/12/2021 was negative.  He presented with diffuse abdominal pain, nausea and vomiting.  CT abdomen pelvis 2/6/2024.There is an abnormal bowel gas pattern with multiple moderately  distended loops of small bowel with mild bowel wall thickening. The right and transverse colon are also moderately distended. The more distal colon is decompressed. Within the right lower quadrant posterior to the cecum there is a complex collection which is not definitely contiguous with any adjacent GI structure suspicious for localized perforation with abscess formation. There is surrounding inflammatory changes. The appendix is not clearly identified and this could represent a ruptured appendicitis. I do not see any documentation of previous appendectomy by electronic medical record. There is free air beneath the right diaphragm and within the perihepatic space.  Free fluid within the lower right paracolic gutter, central pelvis and lower pelvis. This fluid demonstrates some complexity. Small hiatal hernia. The prostate gland is enlarged. There is a bladder diverticulum emanating from both the left and right lateral bladder wall. A mild element of chronic  bladder outlet obstruction is not excluded.  Chest x-ray 2/6/2024.Radiodense nodule in the right upper lobe-favor granuloma.  No acute infiltrate. Free air demonstrated on CT imaging is not as well delineated by plain film radiograph.  CT head on 2/10/2024.Chronic changes and no acute intracranial findings.  Paranasal sinus mucosal thickening.  Pathology report 2/10/2024.  Cecum, ileocecectomy:  A. Moderately differentiated adenocarcinoma arising in the cecal pit (1.9 cm).  B. Tumor invades through the visceral peritoneum resulting in macroscopic perforation.  C. The proximal, distal and radial adventitial soft tissue margins are free of tumor.  D. Acute peritonitis.  E. Pericolic cyst-like structure with hyalinized fibrous wall and acute and chronic inflammation and hemorrhage in  adjacent fibroadipose tissue.  F. Nine of 9 pericolic lymph nodes are negative for metastatic carcinoma.  G. Detached segment of viable bowel compatible with anastomotic tissue with large intestine and small intestinal tissue  seen, negative for malignancy.  AJCC stage: pT4a pN0            PREVIOUS INTERVENTIONS:  Laparotomy and ileocecectomy on 2/6/2024.  Positive for perforation in the posterior cecum.          Oncology/Hematology History    No history exists.       PAST MEDICAL HISTORY:  ALLERGIES:  Allergies   Allergen Reactions    Jardiance [Empagliflozin] Other (See Comments)     Pt reports       CURRENT MEDICATIONS:  Outpatient Encounter Medications as of 2/28/2024   Medication Sig Dispense Refill    amitriptyline (ELAVIL) 10 MG tablet Take 1 tablet by mouth At Night As Needed for Sleep.      aspirin 81 MG tablet Take 1 tablet by mouth Daily.      atorvastatin (LIPITOR) 40 MG tablet Take 1 tablet by mouth every night at bedtime. 90 tablet 3    cetirizine (zyrTEC) 10 MG tablet Take 1 tablet by mouth Daily.      cholecalciferol (VITAMIN D3) 400 UNITS tablet Take 1 tablet by mouth Daily.      cloNIDine (CATAPRES) 0.2 MG tablet Take 1  tablet by mouth 2 (Two) Times a Day.      dorzolamide-timolol (COSOPT) 22.3-6.8 MG/ML ophthalmic solution Administer 1 drop to both eyes 2 (Two) Times a Day.      epoetin shari-epbx (Retacrit) 82488 UNIT/ML injection Inject  under the skin into the appropriate area as directed Every 14 (Fourteen) Days.      ezetimibe (Zetia) 10 MG tablet Take 1 tablet by mouth Daily. 90 tablet 3    ferrous sulfate 325 (65 FE) MG tablet Take 1 tablet by mouth Every Other Day. Takes 1 every other day .      fluticasone (FLONASE) 50 MCG/ACT nasal spray 2 sprays into the nostril(s) as directed by provider Daily.      furosemide (Lasix) 20 MG tablet Take 1 tablet by mouth Daily. 90 tablet 3    insulin aspart (novoLOG FLEXPEN) 100 UNIT/ML solution pen-injector sc pen Inject 0-20 Units under the skin into the appropriate area as directed 3 (Three) Times a Day With Meals. Sliding scale      insulin degludec (TRESIBA FLEXTOUCH) 100 UNIT/ML solution pen-injector injection Inject 30 Units under the skin into the appropriate area as directed Every Night.      latanoprost (XALATAN) 0.005 % ophthalmic solution Administer 1 drop to both eyes Every Night.      lisinopril (PRINIVIL,ZESTRIL) 10 MG tablet Take 1 tablet by mouth Every Night.      Multiple Vitamins-Minerals (MULTIVITAMIN WITH MINERALS) tablet tablet Take 1 tablet by mouth Daily.      ondansetron ODT (ZOFRAN-ODT) 4 MG disintegrating tablet Place 1 tablet on the tongue Every 8 (Eight) Hours As Needed for Nausea or Vomiting. 30 tablet 1    Rhopressa 0.02 % solution Apply 1 drop to eye(s) as directed by provider Every Night.      simethicone (Gas-X) 80 MG chewable tablet Chew 0.5 tablets Every 6 (Six) Hours As Needed for Flatulence (belching). 30 tablet 1    tamsulosin (FLOMAX) 0.4 MG capsule 24 hr capsule Take 2 capsules by mouth Every Night.      traMADol (Ultram) 50 MG tablet Take 1 tablet by mouth Every 8 (Eight) Hours As Needed for Moderate Pain. 30 tablet 0     No facility-administered  encounter medications on file as of 2/28/2024.     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    Hypertension I10    Type 2 diabetes mellitus E11.9    Hx of adenomatous colonic polyps Z86.010    Iron deficiency anemia D50.9    Intestinal malabsorption, unspecified K90.9    Bowel perforation K63.1    Stage 3b chronic kidney disease (CKD) N18.32    Hyperkalemia E87.5    COVID-19 virus detected U07.1     SURGERIES:  Past Surgical History:   Procedure Laterality Date    COLONOSCOPY  2019    [facility]    COLONOSCOPY N/A 8/11/2020    Procedure: COLONOSCOPY WITH ANESTHESIA;  Surgeon: Shaquille Mckenzie DO;  Location:  [facility];  Service: Gastroenterology;  Laterality: N/A;  preop; hx of polyps   postop; polyps   PCP Brando Webb     COLONOSCOPY N/A 8/12/2021    Procedure: COLONOSCOPY WITH ANESTHESIA;  Surgeon: Shaquille Mckenzie DO;  Location:  [facility];  Service: Gastroenterology;  Laterality: N/A;  pre hx adenomatous colon polyp  post normal  Brando Webb MD    EXPLORATORY LAPAROTOMY N/A 2/6/2024    Procedure: DIAGNOSTIC LAPAROSCOPY CONVERTED TO LAPAROTOMY, ILIOCECTECTOMY;  Surgeon: Hemant Park MD;  Location: [facility] OR;  Service: General;  Laterality: N/A;     HEALTH MAINTENANCE ITEMS:  Health Maintenance Due   Topic Date Due    COLONOSCOPY  08/12/2022       <no information>  Last Completed Colonoscopy       This patient has no relevant Health Maintenance data.          Immunization History   Administered Date(s) Administered    COVID-19 (MODERNA) 1st,2nd,3rd Dose Monovalent 03/02/2021, 03/30/2021, 11/02/2021    COVID-19 (PFIZER) BIVALENT 12+YRS 09/29/2022    COVID-19 F23 (PFIZER) 12YRS+ (COMIRNATY) 11/16/2023    Covid-19 (Pfizer) Gray Cap Monovalent 07/28/2022    FLUAD TRI 65YR+ 09/09/2019    Flu Vaccine Split Quad 01/01/2013    Fluad Quad 65+ 09/09/2019, 09/09/2020    Fluzone (or Fluarix & Flulaval for VFC) >6mos 01/01/2013,  "2022    Fluzone High Dose =>65 Years ([employer] ONLY) 2016, 10/02/2017, 10/08/2018    Fluzone High-Dose 65+yrs 10/19/2021, 10/02/2023    Pneumococcal Conjugate 13-Valent (PCV13) 2015    Pneumococcal Polysaccharide (PPSV23) 2012, 2013    Shingrix 2018, 2019    Tdap 2013     Last Completed Mammogram       This patient has no relevant Health Maintenance data.              FAMILY HISTORY:  Family History   Problem Relation Age of Onset    No Known Problems Maternal Grandmother     No Known Problems Maternal Grandfather     No Known Problems Paternal Grandmother     No Known Problems Paternal Grandfather     No Known Problems Mother     No Known Problems Father     Colon cancer Neg Hx     Colon polyps Neg Hx     Esophageal cancer Neg Hx      SOCIAL HISTORY:  Social History     Socioeconomic History    Marital status:    Tobacco Use    Smoking status: Former     Packs/day: 0.50     Years: 10.00     Additional pack years: 0.00     Total pack years: 5.00     Types: Cigarettes     Quit date: 1979     Years since quittin.1     Passive exposure: Past    Smokeless tobacco: Never   Vaping Use    Vaping Use: Never used   Substance and Sexual Activity    Alcohol use: No    Drug use: No    Sexual activity: Defer       REVIEW OF SYSTEMS:    Review of Systems   Constitutional:  Positive for fatigue. Negative for chills and fever.        \"Very tired.\" He needs to sit most of the daytime.     HENT:  Negative for congestion and mouth sores.    Eyes:  Negative for discharge, redness and visual disturbance.   Respiratory:  Negative for shortness of breath and wheezing.    Cardiovascular:  Negative for chest pain and leg swelling.   Gastrointestinal:  Negative for constipation, diarrhea, nausea and vomiting.        Dressing, laparotomy wound. \"It's healing.\"   Endocrine: Negative for polydipsia and polyphagia.   Genitourinary:  Negative for dysuria and flank pain. " "  Musculoskeletal:  Negative for gait problem and joint swelling.   Skin:  Positive for pallor.   Allergic/Immunologic: Negative for food allergies.   Neurological:  Negative for dizziness, speech difficulty and weakness.   Hematological:  Negative for adenopathy. Does not bruise/bleed easily.   Psychiatric/Behavioral:  Negative for agitation, confusion and hallucinations.        VITAL SIGNS: /60   Pulse 93   Temp 97.8 °F (36.6 °C)   Resp 18   Ht 175.3 cm (69\")   Wt 81.2 kg (179 lb)   SpO2 96%   BMI 26.43 kg/m²  Lost 11 pounds since surgery.  Pain Score    02/28/24 1054   PainSc: 0-No pain       PHYSICAL EXAMINATION:     Physical Exam  Vitals reviewed.   Constitutional:       Appearance: He is ill-appearing.   HENT:      Head: Normocephalic and atraumatic.   Eyes:      General: No scleral icterus.  Cardiovascular:      Rate and Rhythm: Normal rate.   Pulmonary:      Effort: No respiratory distress.      Breath sounds: No wheezing.   Abdominal:      General: Bowel sounds are normal.      Palpations: Abdomen is soft.      Tenderness: There is no abdominal tenderness.   Musculoskeletal:         General: No swelling.      Cervical back: Neck supple.   Skin:     General: Skin is warm.      Coloration: Skin is pale.   Neurological:      Mental Status: He is oriented to person, place, and time.   Psychiatric:         Mood and Affect: Mood normal.         Behavior: Behavior normal.         Thought Content: Thought content normal.         Judgment: Judgment normal.         LABS    Lab Results - Last 18 Months   Lab Units 02/21/24  1029 02/14/24  0844 02/12/24  0212 02/11/24  0253 02/10/24  0245 02/09/24  0656 02/08/24  0547 02/07/24  0750 02/06/24  1350 02/06/24  1350   HEMOGLOBIN g/dL 8.1* 9.2* 9.7* 8.8* 8.9* 9.6* 10.2* 10.2*  --  11.9*   HEMATOCRIT % 26.0* 30.3* 32.1* 29.6* 29.0* 31.0* 33.3* 33.0*  --  37.5   MCV fL 94.9 95.9 94.7 96.7 94.2 94.5 95.4 95.7  --  93.1   WBC 10*3/mm3 9.54 7.61 5.76 5.67 6.58 9.01 " 7.12 7.34  --  25.58*   RDW % 13.9 14.8 14.9 15.2 14.9 14.9 14.5 14.0  --  13.8   MPV fL  --  9.9 9.1 8.9 9.0 9.2 9.2 9.3  --  9.5   PLATELETS 10*3/mm3 396 218 215 181 189 206 213 159  --  202   IMM GRAN % %  --  1.8* 1.6* 0.5 0.3 0.3  --   --   --  1.5*   NEUTROS ABS 10*3/mm3 7.32* 5.50 3.83 4.03 4.82 7.52* 5.91 6.68   < > 23.19*   LYMPHS ABS 10*3/mm3 1.14 1.04 0.81 0.73 0.77 0.57*  --   --   --  0.92   MONOS ABS 10*3/mm3 0.77 0.53 0.52 0.65 0.71 0.72  --   --   --  1.05*   EOS ABS 10*3/mm3 0.20 0.36 0.48* 0.20 0.24 0.14 0.21  --   --  0.00   BASOS ABS 10*3/mm3 0.07 0.04 0.03 0.03 0.02 0.03 0.14 0.07   < > 0.04   IMMATURE GRANS (ABS) 10*3/mm3  --  0.14* 0.09* 0.03 0.02 0.03  --   --   --  0.38*   NRBC /100 WBC 0.0 0.0 0.0 0.0 0.0 0.0  --   --   --  0.0   NEUTROPHIL % %  --   --   --   --   --   --  73.0 69.0  --   --    MONOCYTES % %  --   --   --   --   --   --  6.0 1.0*  --   --    BASOPHIL % %  --   --   --   --   --   --  2.0* 1.0  --   --    ATYP LYMPH % %  --   --   --   --   --   --   --  2.0  --   --    ANISOCYTOSIS   --   --   --   --   --   --   --  Slight/1+  --   --     < > = values in this interval not displayed.       Lab Results - Last 18 Months   Lab Units 02/21/24  1029 02/16/24  0533 02/15/24  1615 02/15/24  0534 02/14/24  0844 02/13/24  0312 02/12/24  1614 02/12/24  0212 02/11/24  0253   GLUCOSE mg/dL 365* 290*  --  70 267* 191*  --  156* 237*   SODIUM mmol/L 133* 137  --  144 144 147*  --  150* 149*   POTASSIUM mmol/L 5.4* 4.8 4.9 3.3* 3.5 3.7   < > 3.3* 3.5   CO2 mmol/L 24.2 20.0*  --  24.0 24.0 21.0*  --  20.0* 19.0*   CHLORIDE mmol/L 96* 104  --  110* 109* 115*  --  115* 114*   ANION GAP mmol/L  --  13.0  --  10.0 11.0 11.0  --  15.0 16.0*   CREATININE mg/dL 2.17* 1.95*  --  1.93* 1.66* 1.60*  --  1.76* 2.09*   BUN mg/dL 35* 38*  --  40* 35* 32*  --  35* 39*   BUN / CREAT RATIO  16.1 19.5  --  20.7 21.1 20.0  --  19.9 18.7   CALCIUM mg/dL 8.4* 8.7  --  8.6 8.7 8.8  --  8.7 8.6   ALK PHOS U/L  132* 104  --  94 94 94  --  90 79   TOTAL PROTEIN g/dL  --  6.1  --  6.0 6.2 6.2  --  6.7 6.3   ALT (SGPT) U/L 76* 63*  --  61* 68* 68*  --  56* 49*   AST (SGOT) U/L 41* 35  --  36 36 46*  --  35 30   BILIRUBIN mg/dL 0.2 0.3  --  0.2 0.2 0.2  --  0.3 0.3   ALBUMIN g/dL 3.2* 2.9*  --  2.9* 3.0* 3.0*  --  3.2* 3.1*   GLOBULIN gm/dL  --  3.2  --  3.1 3.2 3.2  --  3.5 3.2    < > = values in this interval not displayed.       Lab Results - Last 18 Months   Lab Units 01/25/23  1019   M-SPIKE g/dL Not Observed   KAPPA/LAMBDA RATIO, S  1.55   FREE LAMBDA LIGHT CHAINS mg/L 25.8   IG KAPPA FREE LIGHT CHAIN mg/L 40.0*   LDH U/L 183       Lab Results - Last 18 Months   Lab Units 11/29/23  1100 11/01/23  0957 08/25/23  0759 08/02/23  1024 05/03/23  1221 04/05/23  0855 02/22/23  0946 01/25/23  1019   IRON mcg/dL 84 37*  --  53* 88 36* 55* 122   TIBC mcg/dL 216* 262* 247 244* 226* 256* 274* 337   IRON SATURATION %  --   --  27  --   --   --   --   --    IRON SATURATION (TSAT) % 39 14*  --  22 39 14* 20 36   FERRITIN ng/mL 797.10* 120.50 266.00 290.60 1,027.00* 59.42 256.20 16.82*   TSH uIU/mL  --   --  2.110  --   --  1.740  --   --    FOLATE ng/mL  --   --  >20.00  --   --   --   --  >20.00       Bernard Matta reports a pain score of 0.          ASSESSMENT:  Adenocarcinoma involving the cecum.  High risk.  Less than 12 lymph nodes examined.  No lymphovascular or perineural invasion.  AJCC stage: IIb (pT4a, pN0, cM0, G2).  Tumor size 1.9 cm.  Zo status, 0/9 positive lymph node.  Treatment status: Pending adjuvant capecitabine or FOLFOX.  2. Performance status of 3.  3.  Normocytic anemia from iron deficiency and chronic kidney disease stage IIIb.  GFR 31 mL/min on 2/21/2024.On ferrous sulfate every other day.   4.  Type 2 diabetes, risk factor for chronic kidney disease.  5.  Intestinal malabsorption, unspecific type, intravenous iron as needed.      PLAN:   regarding the reason for the referral.  2.   regarding  role for adjuvant chemotherapy with capecitabine or FOLFOX due to high risk disease, less than 12 lymph nodes sampled.  Colon cancer survival calculator, disease specific survival at 5 years is 71% if he is really N0 disease.  Patient aware less than 12 lymph nodes were sampled.  3.  Obtain MSI/MMR from  [phone].  In patient with deficient MMR/MSI high with T4a disease and adequate lymph node sample, no adjuvant therapy is required.  However patient had less than 12 lymph nodes examined.  4.  Blood for CBC with differential, CMP, ferritin, iron panel, and CEA today.   5.  Colonoscopy at 1 year.  6.  Order CT of the chest without IV contrast for staging.  7.  eRx capecitabine 750 mg/m2 (dose reduced due to CKD) every 12 hours for 14 days every 21-day cycle x 24 weeks.  Plan to start after wound had healed.  Observe for potential adverse effects like anaphylaxis, hypersensitivity reaction, hepatotoxicity, hand-foot syndrome, nausea, vomiting, diarrhea, mucositis, fatigue, or cardiotoxicity arrhythmias or ischemia. Hold if GFR < 30.  If performance status improves, consider adjuvant FOLFOX with 25% dose reduction due to low GFR.  8.  eRx Zofran 8 mg p.o. every 8 hours as needed for nausea and vomiting #60, 2 refills.  9.  Weekly CBC with differential with capecitabine.  10. Advance Care Planning   ACP discussion was held with the patient during this visit. Patient has an advance directive in EMR which is still valid.    11.  Continue care per primary care physician at the specialist.  12.  Care discussed with patient.  Understand expressed.  Patient agreed to proceed.  13.  Epoetin alpha 40,000 units subcutaneous every 2 weeks if hemoglobin less than 11 and hematocrit less than 33, chronic kidney disease.  Observe for hypertension.  14.  CBC every 2 weeks.  15.  Injectafer 750 mg one dose.  Premed Tylenol 500 mg p.o. and Benadryl 25 mg IV.  Observe for anaphylaxis or infusion reactions. Stop oral iron.due to  malabsorption.   16.  Return to office in 2 weeks.  17.  Further recommendations pending.            I have reviewed the assessment and plan and verified the accuracy of it. No changes to assessment and plan since the information was documented. Navi Stearns MD 02/28/24           I spent 71 total minutes, face-to-face, caring for Bernard ramos. Greater than 50% of this time involved counseling and/or coordination of care as documented within this note.       Allan Schneider MD  (Dalton Villalobos MD)  (Shaquille Mckenzie MD)  Everton Webb MD

## 2024-02-23 ENCOUNTER — TELEPHONE (OUTPATIENT)
Dept: FAMILY MEDICINE CLINIC | Facility: CLINIC | Age: 74
End: 2024-02-23
Payer: MEDICARE

## 2024-02-26 ENCOUNTER — READMISSION MANAGEMENT (OUTPATIENT)
Dept: CALL CENTER | Facility: HOSPITAL | Age: 74
End: 2024-02-26
Payer: MEDICARE

## 2024-02-26 RX ORDER — ONDANSETRON HYDROCHLORIDE 8 MG/1
8 TABLET, FILM COATED ORAL EVERY 8 HOURS PRN
Qty: 60 TABLET | Refills: 2 | Status: CANCELLED | OUTPATIENT
Start: 2024-02-26

## 2024-02-26 NOTE — OUTREACH NOTE
General Surgery Week 2 Survey      Flowsheet Row Responses   [facility] patient discharged from? [place]   Does the patient have one of the following disease processes/diagnoses(primary or secondary)? General Surgery   Week 2 attempt successful? Yes   Call start time 1741   Call end time 1744   Discharge diagnosis Bowel perforation-Diagnostic lap converted to iliocectectomy   Is the patient taking all medications as directed (includes completed medication regime)? Yes   Does the patient have a follow up appointment scheduled with their surgeon? Yes   Has the patient kept scheduled appointments due by today? Yes   Comments Saw PCP and has appt Wed with Dr. Stearns and the follow Monday with surgeon.   Psychosocial issues? No   What is the patient's perception of their health status since discharge? Improving   Is the patient /caregiver able to teach back basic post-op care? Keep incision areas clean,dry and protected, Lifting as instructed by MD in discharge instructions   Is the patient/caregiver able to teach back signs and symptoms of incisional infection? Fever, Pus or odor from incision, Incisional warmth, Increased drainage or bleeding, Increased redness, swelling or pain at the incisonal site   Is the patient/caregiver able to teach back steps to recovery at home? Rest and rebuild strength, gradually increase activity, Set small, achievable goals for return to baseline health, Eat a well-balance diet   If the patient is a current smoker, are they able to teach back resources for cessation? Not a smoker   Is the patient/caregiver able to teach back the hierarchy of who to call/visit for symptoms/problems? PCP, Specialist, Home health nurse, Urgent Care, ED, 911 Yes   Additional teach back comments States he is tired but doing well.   Week 2 call completed? Yes   Graduated Yes   Graduated/Revoked comments Denies questions or needs at this time.   Call end time 1744            Alessandra CALIX - Licensed Nurse

## 2024-02-28 ENCOUNTER — LAB (OUTPATIENT)
Dept: LAB | Facility: HOSPITAL | Age: 74
End: 2024-02-28
Payer: MEDICARE

## 2024-02-28 ENCOUNTER — INFUSION (OUTPATIENT)
Dept: ONCOLOGY | Facility: HOSPITAL | Age: 74
End: 2024-02-28
Payer: MEDICARE

## 2024-02-28 ENCOUNTER — OFFICE VISIT (OUTPATIENT)
Dept: ONCOLOGY | Facility: CLINIC | Age: 74
End: 2024-02-28
Payer: MEDICARE

## 2024-02-28 ENCOUNTER — TRANSCRIBE ORDERS (OUTPATIENT)
Dept: LAB | Facility: HOSPITAL | Age: 74
End: 2024-02-28
Payer: MEDICARE

## 2024-02-28 VITALS
SYSTOLIC BLOOD PRESSURE: 112 MMHG | BODY MASS INDEX: 26.51 KG/M2 | WEIGHT: 179 LBS | DIASTOLIC BLOOD PRESSURE: 60 MMHG | HEIGHT: 69 IN | HEART RATE: 93 BPM | TEMPERATURE: 97.8 F | OXYGEN SATURATION: 96 % | RESPIRATION RATE: 18 BRPM

## 2024-02-28 VITALS
RESPIRATION RATE: 16 BRPM | SYSTOLIC BLOOD PRESSURE: 98 MMHG | WEIGHT: 179 LBS | BODY MASS INDEX: 26.51 KG/M2 | DIASTOLIC BLOOD PRESSURE: 59 MMHG | TEMPERATURE: 97.9 F | HEIGHT: 69 IN | OXYGEN SATURATION: 99 % | HEART RATE: 71 BPM

## 2024-02-28 DIAGNOSIS — N18.30 STAGE 3 CHRONIC KIDNEY DISEASE, UNSPECIFIED WHETHER STAGE 3A OR 3B CKD: ICD-10-CM

## 2024-02-28 DIAGNOSIS — D63.1 ANEMIA DUE TO STAGE 3B CHRONIC KIDNEY DISEASE: ICD-10-CM

## 2024-02-28 DIAGNOSIS — N18.32 STAGE 3B CHRONIC KIDNEY DISEASE (CKD): Primary | ICD-10-CM

## 2024-02-28 DIAGNOSIS — K90.9 INTESTINAL MALABSORPTION, UNSPECIFIED TYPE: ICD-10-CM

## 2024-02-28 DIAGNOSIS — D50.9 IRON DEFICIENCY ANEMIA, UNSPECIFIED IRON DEFICIENCY ANEMIA TYPE: Primary | ICD-10-CM

## 2024-02-28 DIAGNOSIS — N18.30 STAGE 3 CHRONIC KIDNEY DISEASE, UNSPECIFIED WHETHER STAGE 3A OR 3B CKD: Primary | ICD-10-CM

## 2024-02-28 DIAGNOSIS — N18.32 ANEMIA DUE TO STAGE 3B CHRONIC KIDNEY DISEASE: ICD-10-CM

## 2024-02-28 DIAGNOSIS — C18.2 MALIGNANT NEOPLASM OF ASCENDING COLON: ICD-10-CM

## 2024-02-28 PROBLEM — C18.9 COLON CANCER: Status: ACTIVE | Noted: 2024-02-28

## 2024-02-28 LAB
ALBUMIN SERPL-MCNC: 3.5 G/DL (ref 3.5–5.2)
ALBUMIN SERPL-MCNC: 3.5 G/DL (ref 3.5–5.2)
ALBUMIN/GLOB SERPL: 1 G/DL
ALP SERPL-CCNC: 167 U/L (ref 39–117)
ALT SERPL W P-5'-P-CCNC: 58 U/L (ref 1–41)
ANION GAP SERPL CALCULATED.3IONS-SCNC: 9 MMOL/L (ref 5–15)
ANION GAP SERPL CALCULATED.3IONS-SCNC: 9 MMOL/L (ref 5–15)
AST SERPL-CCNC: 30 U/L (ref 1–40)
BASOPHILS # BLD AUTO: 0.04 10*3/MM3 (ref 0–0.2)
BASOPHILS NFR BLD AUTO: 0.5 % (ref 0–1.5)
BILIRUB SERPL-MCNC: 0.2 MG/DL (ref 0–1.2)
BUN SERPL-MCNC: 30 MG/DL (ref 8–23)
BUN SERPL-MCNC: 30 MG/DL (ref 8–23)
BUN/CREAT SERPL: 15.2 (ref 7–25)
BUN/CREAT SERPL: 15.2 (ref 7–25)
CALCIUM SPEC-SCNC: 8.6 MG/DL (ref 8.6–10.5)
CALCIUM SPEC-SCNC: 8.6 MG/DL (ref 8.6–10.5)
CEA SERPL-MCNC: 7.1 NG/ML
CHLORIDE SERPL-SCNC: 101 MMOL/L (ref 98–107)
CHLORIDE SERPL-SCNC: 101 MMOL/L (ref 98–107)
CO2 SERPL-SCNC: 30 MMOL/L (ref 22–29)
CO2 SERPL-SCNC: 30 MMOL/L (ref 22–29)
CREAT SERPL-MCNC: 1.97 MG/DL (ref 0.76–1.27)
CREAT SERPL-MCNC: 1.97 MG/DL (ref 0.76–1.27)
CREAT UR-MCNC: 82.7 MG/DL
DEPRECATED RDW RBC AUTO: 49.3 FL (ref 37–54)
EGFRCR SERPLBLD CKD-EPI 2021: 34.8 ML/MIN/1.73
EGFRCR SERPLBLD CKD-EPI 2021: 34.8 ML/MIN/1.73
EOSINOPHIL # BLD AUTO: 0.27 10*3/MM3 (ref 0–0.4)
EOSINOPHIL NFR BLD AUTO: 3.4 % (ref 0.3–6.2)
ERYTHROCYTE [DISTWIDTH] IN BLOOD BY AUTOMATED COUNT: 14.3 % (ref 12.3–15.4)
FERRITIN SERPL-MCNC: 383.9 NG/ML (ref 30–400)
GLOBULIN UR ELPH-MCNC: 3.5 GM/DL
GLUCOSE SERPL-MCNC: 111 MG/DL (ref 65–99)
GLUCOSE SERPL-MCNC: 111 MG/DL (ref 65–99)
HCT VFR BLD AUTO: 27.9 % (ref 37.5–51)
HGB BLD-MCNC: 8.5 G/DL (ref 13–17.7)
IMM GRANULOCYTES # BLD AUTO: 0.03 10*3/MM3 (ref 0–0.05)
IMM GRANULOCYTES NFR BLD AUTO: 0.4 % (ref 0–0.5)
IRON 24H UR-MRATE: 37 MCG/DL (ref 59–158)
IRON SATN MFR SERPL: 15 % (ref 20–50)
LYMPHOCYTES # BLD AUTO: 1.34 10*3/MM3 (ref 0.7–3.1)
LYMPHOCYTES NFR BLD AUTO: 17 % (ref 19.6–45.3)
MCH RBC QN AUTO: 28.9 PG (ref 26.6–33)
MCHC RBC AUTO-ENTMCNC: 30.5 G/DL (ref 31.5–35.7)
MCV RBC AUTO: 94.9 FL (ref 79–97)
MONOCYTES # BLD AUTO: 0.59 10*3/MM3 (ref 0.1–0.9)
MONOCYTES NFR BLD AUTO: 7.5 % (ref 5–12)
NEUTROPHILS NFR BLD AUTO: 5.59 10*3/MM3 (ref 1.7–7)
NEUTROPHILS NFR BLD AUTO: 71.2 % (ref 42.7–76)
NRBC BLD AUTO-RTO: 0 /100 WBC (ref 0–0.2)
PHOSPHATE SERPL-MCNC: 2.7 MG/DL (ref 2.5–4.5)
PLATELET # BLD AUTO: 275 10*3/MM3 (ref 140–450)
PMV BLD AUTO: 9.5 FL (ref 6–12)
POTASSIUM SERPL-SCNC: 4.5 MMOL/L (ref 3.5–5.2)
POTASSIUM SERPL-SCNC: 4.5 MMOL/L (ref 3.5–5.2)
PROT ?TM UR-MCNC: 21.1 MG/DL
PROT SERPL-MCNC: 7 G/DL (ref 6–8.5)
PROT/CREAT UR: 255.1 MG/G CREA (ref 0–200)
PTH-INTACT SERPL-MCNC: 137.3 PG/ML (ref 15–65)
RBC # BLD AUTO: 2.94 10*6/MM3 (ref 4.14–5.8)
SODIUM SERPL-SCNC: 140 MMOL/L (ref 136–145)
SODIUM SERPL-SCNC: 140 MMOL/L (ref 136–145)
TIBC SERPL-MCNC: 250 MCG/DL (ref 298–536)
TRANSFERRIN SERPL-MCNC: 168 MG/DL (ref 200–360)
WBC NRBC COR # BLD AUTO: 7.86 10*3/MM3 (ref 3.4–10.8)

## 2024-02-28 PROCEDURE — 25810000003 SODIUM CHLORIDE 0.9 % SOLUTION: Performed by: INTERNAL MEDICINE

## 2024-02-28 PROCEDURE — 84156 ASSAY OF PROTEIN URINE: CPT

## 2024-02-28 PROCEDURE — 96367 TX/PROPH/DG ADDL SEQ IV INF: CPT

## 2024-02-28 PROCEDURE — A9270 NON-COVERED ITEM OR SERVICE: HCPCS | Performed by: INTERNAL MEDICINE

## 2024-02-28 PROCEDURE — 25010000002 FERRIC CARBOXYMALTOSE 750 MG/15ML SOLUTION 15 ML VIAL: Performed by: INTERNAL MEDICINE

## 2024-02-28 PROCEDURE — 84100 ASSAY OF PHOSPHORUS: CPT | Performed by: INTERNAL MEDICINE

## 2024-02-28 PROCEDURE — 82728 ASSAY OF FERRITIN: CPT | Performed by: INTERNAL MEDICINE

## 2024-02-28 PROCEDURE — 96365 THER/PROPH/DIAG IV INF INIT: CPT

## 2024-02-28 PROCEDURE — 63710000001 ACETAMINOPHEN 500 MG TABLET: Performed by: INTERNAL MEDICINE

## 2024-02-28 PROCEDURE — 25010000002 DIPHENHYDRAMINE PER 50 MG: Performed by: INTERNAL MEDICINE

## 2024-02-28 PROCEDURE — 83540 ASSAY OF IRON: CPT | Performed by: INTERNAL MEDICINE

## 2024-02-28 PROCEDURE — 36415 COLL VENOUS BLD VENIPUNCTURE: CPT

## 2024-02-28 PROCEDURE — 84466 ASSAY OF TRANSFERRIN: CPT | Performed by: INTERNAL MEDICINE

## 2024-02-28 PROCEDURE — 82378 CARCINOEMBRYONIC ANTIGEN: CPT | Performed by: INTERNAL MEDICINE

## 2024-02-28 PROCEDURE — 82570 ASSAY OF URINE CREATININE: CPT

## 2024-02-28 PROCEDURE — 83970 ASSAY OF PARATHORMONE: CPT

## 2024-02-28 PROCEDURE — 80053 COMPREHEN METABOLIC PANEL: CPT | Performed by: INTERNAL MEDICINE

## 2024-02-28 PROCEDURE — 85025 COMPLETE CBC W/AUTO DIFF WBC: CPT | Performed by: INTERNAL MEDICINE

## 2024-02-28 RX ORDER — ACETAMINOPHEN 500 MG
500 TABLET ORAL ONCE
Status: CANCELLED | OUTPATIENT
Start: 2024-03-06 | End: 2024-03-06

## 2024-02-28 RX ORDER — FAMOTIDINE 10 MG/ML
20 INJECTION, SOLUTION INTRAVENOUS AS NEEDED
Status: CANCELLED | OUTPATIENT
Start: 2024-03-06

## 2024-02-28 RX ORDER — ACETAMINOPHEN 500 MG
500 TABLET ORAL ONCE
Status: COMPLETED | OUTPATIENT
Start: 2024-02-28 | End: 2024-02-28

## 2024-02-28 RX ORDER — DIPHENHYDRAMINE HYDROCHLORIDE 50 MG/ML
50 INJECTION INTRAMUSCULAR; INTRAVENOUS AS NEEDED
Status: CANCELLED | OUTPATIENT
Start: 2024-03-06

## 2024-02-28 RX ORDER — FAMOTIDINE 10 MG/ML
20 INJECTION, SOLUTION INTRAVENOUS AS NEEDED
Status: DISCONTINUED | OUTPATIENT
Start: 2024-02-28 | End: 2024-02-28 | Stop reason: HOSPADM

## 2024-02-28 RX ORDER — DIPHENHYDRAMINE HYDROCHLORIDE 50 MG/ML
50 INJECTION INTRAMUSCULAR; INTRAVENOUS AS NEEDED
Status: DISCONTINUED | OUTPATIENT
Start: 2024-02-28 | End: 2024-02-28 | Stop reason: HOSPADM

## 2024-02-28 RX ORDER — SODIUM CHLORIDE 9 MG/ML
250 INJECTION, SOLUTION INTRAVENOUS ONCE
Status: COMPLETED | OUTPATIENT
Start: 2024-02-28 | End: 2024-02-28

## 2024-02-28 RX ORDER — SODIUM CHLORIDE 9 MG/ML
250 INJECTION, SOLUTION INTRAVENOUS ONCE
Status: CANCELLED | OUTPATIENT
Start: 2024-03-06

## 2024-02-28 RX ADMIN — ACETAMINOPHEN TAB 500 MG 500 MG: 500 TAB at 13:09

## 2024-02-28 RX ADMIN — FERRIC CARBOXYMALTOSE INJECTION 750 MG: 50 INJECTION, SOLUTION INTRAVENOUS at 13:26

## 2024-02-28 RX ADMIN — SODIUM CHLORIDE 250 ML: 9 INJECTION, SOLUTION INTRAVENOUS at 13:08

## 2024-02-28 RX ADMIN — DIPHENHYDRAMINE HYDROCHLORIDE 25 MG: 50 INJECTION, SOLUTION INTRAMUSCULAR; INTRAVENOUS at 13:09

## 2024-02-28 NOTE — PROGRESS NOTES
Labs reviewed and iron sat 15%. Per Dr Stearns hold Procrit today and schedule 1 dose of Injectafer. Per Dorinda, revenue integrity said no precert required.

## 2024-02-28 NOTE — LETTER
February 28, 2024       No Recipients    Patient: Bernard Matta   YOB: 1950   Date of Visit: 2/28/2024     Dear Brando Webb MD:       Thank you for referring Bernard Matta to me for evaluation. Below are the relevant portions of my assessment and plan of care.    If you have questions, please do not hesitate to call me. I look forward to following Bernard along with you.         Sincerely,        Navi Stearns MD        CC:   No Recipients    Navi Stearns MD  02/28/24 1146  Sign when Signing Visit  [facility] HEMATOLOGY & ONCOLOGY  [address]  [place] [id number] [phone]    Patient Name: Bernard Matta  Encounter Date: 02/28/2024  YOB: 1950  Patient Number: [id number]      REASON FOR FOLLOW-UP: Bernard Matta is a pleasant 73 y.o.  male who is seen regarding further management suggestions for colon cancer.  He is also seen for anemia from iron deficiency and chronic kidney disease stage IIIb.  GFR 31 mL/min on 2/21/2024. He is on epoetin alpha and intravenous iron as needed.  It is Injectafer was given 11/14/2023. He is seen with spouse, Nury.  History obtained from the patient.  He is a reliable historian.        DIAGNOSTIC ABNORMALITIES: Colon cancer.  Screening colonoscopy 8/12/2021 was negative.  He presented with diffuse abdominal pain, nausea and vomiting.  CT abdomen pelvis 2/6/2024.There is an abnormal bowel gas pattern with multiple moderately  distended loops of small bowel with mild bowel wall thickening. The right and transverse colon are also moderately distended. The more distal colon is decompressed. Within the right lower quadrant posterior to the cecum there is a complex collection which is not definitely contiguous with any adjacent GI structure suspicious for localized perforation with abscess formation. There is surrounding inflammatory changes. The appendix is not  clearly identified and this could represent a ruptured appendicitis. I do not see any documentation of previous appendectomy by electronic medical record. There is free air beneath the right diaphragm and within the perihepatic space.  Free fluid within the lower right paracolic gutter, central pelvis and lower pelvis. This fluid demonstrates some complexity. Small hiatal hernia. The prostate gland is enlarged. There is a bladder diverticulum emanating from both the left and right lateral bladder wall. A mild element of chronic bladder outlet obstruction is not excluded.  Chest x-ray 2/6/2024.Radiodense nodule in the right upper lobe-favor granuloma.  No acute infiltrate. Free air demonstrated on CT imaging is not as well delineated by plain film radiograph.  CT head on 2/10/2024.Chronic changes and no acute intracranial findings.  Paranasal sinus mucosal thickening.  Pathology report 2/10/2024.  Cecum, ileocecectomy:  A. Moderately differentiated adenocarcinoma arising in the cecal pit (1.9 cm).  B. Tumor invades through the visceral peritoneum resulting in macroscopic perforation.  C. The proximal, distal and radial adventitial soft tissue margins are free of tumor.  D. Acute peritonitis.  E. Pericolic cyst-like structure with hyalinized fibrous wall and acute and chronic inflammation and hemorrhage in  adjacent fibroadipose tissue.  F. Nine of 9 pericolic lymph nodes are negative for metastatic carcinoma.  G. Detached segment of viable bowel compatible with anastomotic tissue with large intestine and small intestinal tissue  seen, negative for malignancy.  AJCC stage: pT4a pN0            PREVIOUS INTERVENTIONS:  Laparotomy and ileocecectomy on 2/6/2024.  Positive for perforation in the posterior cecum.          Oncology/Hematology History    No history exists.       PAST MEDICAL HISTORY:  ALLERGIES:  Allergies   Allergen Reactions   • Jardiance [Empagliflozin] Other (See Comments)     Pt reports       CURRENT  MEDICATIONS:  Outpatient Encounter Medications as of 2/28/2024   Medication Sig Dispense Refill   • amitriptyline (ELAVIL) 10 MG tablet Take 1 tablet by mouth At Night As Needed for Sleep.     • aspirin 81 MG tablet Take 1 tablet by mouth Daily.     • atorvastatin (LIPITOR) 40 MG tablet Take 1 tablet by mouth every night at bedtime. 90 tablet 3   • cetirizine (zyrTEC) 10 MG tablet Take 1 tablet by mouth Daily.     • cholecalciferol (VITAMIN D3) 400 UNITS tablet Take 1 tablet by mouth Daily.     • cloNIDine (CATAPRES) 0.2 MG tablet Take 1 tablet by mouth 2 (Two) Times a Day.     • dorzolamide-timolol (COSOPT) 22.3-6.8 MG/ML ophthalmic solution Administer 1 drop to both eyes 2 (Two) Times a Day.     • epoetin shari-epbx (Retacrit) 41839 UNIT/ML injection Inject  under the skin into the appropriate area as directed Every 14 (Fourteen) Days.     • ezetimibe (Zetia) 10 MG tablet Take 1 tablet by mouth Daily. 90 tablet 3   • ferrous sulfate 325 (65 FE) MG tablet Take 1 tablet by mouth Every Other Day. Takes 1 every other day .     • fluticasone (FLONASE) 50 MCG/ACT nasal spray 2 sprays into the nostril(s) as directed by provider Daily.     • furosemide (Lasix) 20 MG tablet Take 1 tablet by mouth Daily. 90 tablet 3   • insulin aspart (novoLOG FLEXPEN) 100 UNIT/ML solution pen-injector sc pen Inject 0-20 Units under the skin into the appropriate area as directed 3 (Three) Times a Day With Meals. Sliding scale     • insulin degludec (TRESIBA FLEXTOUCH) 100 UNIT/ML solution pen-injector injection Inject 30 Units under the skin into the appropriate area as directed Every Night.     • latanoprost (XALATAN) 0.005 % ophthalmic solution Administer 1 drop to both eyes Every Night.     • lisinopril (PRINIVIL,ZESTRIL) 10 MG tablet Take 1 tablet by mouth Every Night.     • Multiple Vitamins-Minerals (MULTIVITAMIN WITH MINERALS) tablet tablet Take 1 tablet by mouth Daily.     • ondansetron ODT (ZOFRAN-ODT) 4 MG disintegrating tablet  Place 1 tablet on the tongue Every 8 (Eight) Hours As Needed for Nausea or Vomiting. 30 tablet 1   • Rhopressa 0.02 % solution Apply 1 drop to eye(s) as directed by provider Every Night.     • simethicone (Gas-X) 80 MG chewable tablet Chew 0.5 tablets Every 6 (Six) Hours As Needed for Flatulence (belching). 30 tablet 1   • tamsulosin (FLOMAX) 0.4 MG capsule 24 hr capsule Take 2 capsules by mouth Every Night.     • traMADol (Ultram) 50 MG tablet Take 1 tablet by mouth Every 8 (Eight) Hours As Needed for Moderate Pain. 30 tablet 0     No facility-administered encounter medications on file as of 2/28/2024.     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   • Hypertension I10   • Type 2 diabetes mellitus E11.9   • Hx of adenomatous colonic polyps Z86.010   • Iron deficiency anemia D50.9   • Intestinal malabsorption, unspecified K90.9   • Bowel perforation K63.1   • Stage 3b chronic kidney disease (CKD) N18.32   • Hyperkalemia E87.5   • COVID-19 virus detected U07.1     SURGERIES:  Past Surgical History:   Procedure Laterality Date   • COLONOSCOPY  2019    [place]   • COLONOSCOPY N/A 8/11/2020    Procedure: COLONOSCOPY WITH ANESTHESIA;  Surgeon: Shaquille Mckenzie DO;  Location: [facility] ENDOSCOPY;  Service: Gastroenterology;  Laterality: N/A;  preop; hx of polyps   postop; polyps   PCP Brando Webb    • COLONOSCOPY N/A 8/12/2021    Procedure: COLONOSCOPY WITH ANESTHESIA;  Surgeon: Shaquille Mckenzie DO;  Location: [facility] ENDOSCOPY;  Service: Gastroenterology;  Laterality: N/A;  pre hx adenomatous colon polyp  post normal  Brando Webb MD   • EXPLORATORY LAPAROTOMY N/A 2/6/2024    Procedure: DIAGNOSTIC LAPAROSCOPY CONVERTED TO LAPAROTOMY, ILIOCECTECTOMY;  Surgeon: Hemant Park MD;  Location: [facility] OR;  Service: General;  Laterality: N/A;     HEALTH MAINTENANCE ITEMS:  Health Maintenance Due   Topic Date Due   • COLONOSCOPY  08/12/2022        <no information>  Last Completed Colonoscopy       This patient has no relevant Health Maintenance data.          Immunization History   Administered Date(s) Administered   • COVID-19 (MODERNA) 1st,2nd,3rd Dose Monovalent 2021, 2021, 2021   • COVID-19 (PFIZER) BIVALENT 12+YRS 2022   • COVID-19 F23 (PFIZER) 12YRS+ (COMIRNATY) 2023   • Covid-19 (Pfizer) Gray Cap Monovalent 2022   • FLUAD TRI 65YR+ 2019   • Flu Vaccine Split Quad 2013   • Fluad Quad 65+ 2019, 2020   • Fluzone (or Fluarix & Flulaval for VFC) >6mos 2013, 2022   • Fluzone High Dose =>65 Years (Vaxcare ONLY) 2016, 10/02/2017, 10/08/2018   • Fluzone High-Dose 65+yrs 10/19/2021, 10/02/2023   • Pneumococcal Conjugate 13-Valent (PCV13) 2015   • Pneumococcal Polysaccharide (PPSV23) 2012, 2013   • Shingrix 2018, 2019   • Tdap 2013     Last Completed Mammogram       This patient has no relevant Health Maintenance data.              FAMILY HISTORY:  Family History   Problem Relation Age of Onset   • No Known Problems Maternal Grandmother    • No Known Problems Maternal Grandfather    • No Known Problems Paternal Grandmother    • No Known Problems Paternal Grandfather    • No Known Problems Mother    • No Known Problems Father    • Colon cancer Neg Hx    • Colon polyps Neg Hx    • Esophageal cancer Neg Hx      SOCIAL HISTORY:  Social History     Socioeconomic History   • Marital status:    Tobacco Use   • Smoking status: Former     Packs/day: 0.50     Years: 10.00     Additional pack years: 0.00     Total pack years: 5.00     Types: Cigarettes     Quit date: 1979     Years since quittin.1     Passive exposure: Past   • Smokeless tobacco: Never   Vaping Use   • Vaping Use: Never used   Substance and Sexual Activity   • Alcohol use: No   • Drug use: No   • Sexual activity: Defer       REVIEW OF SYSTEMS:    Review of Systems  "  Constitutional:  Positive for fatigue. Negative for chills and fever.        \"Very tired.\" He needs to sit most of the daytime.     HENT:  Negative for congestion and mouth sores.    Eyes:  Negative for discharge, redness and visual disturbance.   Respiratory:  Negative for shortness of breath and wheezing.    Cardiovascular:  Negative for chest pain and leg swelling.   Gastrointestinal:  Negative for constipation, diarrhea, nausea and vomiting.        Dressing, laparotomy wound. \"It's healing.\"   Endocrine: Negative for polydipsia and polyphagia.   Genitourinary:  Negative for dysuria and flank pain.   Musculoskeletal:  Negative for gait problem and joint swelling.   Skin:  Positive for pallor.   Allergic/Immunologic: Negative for food allergies.   Neurological:  Negative for dizziness, speech difficulty and weakness.   Hematological:  Negative for adenopathy. Does not bruise/bleed easily.   Psychiatric/Behavioral:  Negative for agitation, confusion and hallucinations.        VITAL SIGNS: /60   Pulse 93   Temp 97.8 °F (36.6 °C)   Resp 18   Ht 175.3 cm (69\")   Wt 81.2 kg (179 lb)   SpO2 96%   BMI 26.43 kg/m²  Lost 11 pounds since surgery.  Pain Score    02/28/24 1054   PainSc: 0-No pain       PHYSICAL EXAMINATION:     Physical Exam  Vitals reviewed.   Constitutional:       Appearance: He is ill-appearing.   HENT:      Head: Normocephalic and atraumatic.   Eyes:      General: No scleral icterus.  Cardiovascular:      Rate and Rhythm: Normal rate.   Pulmonary:      Effort: No respiratory distress.      Breath sounds: No wheezing.   Abdominal:      General: Bowel sounds are normal.      Palpations: Abdomen is soft.      Tenderness: There is no abdominal tenderness.   Musculoskeletal:         General: No swelling.      Cervical back: Neck supple.   Skin:     General: Skin is warm.      Coloration: Skin is pale.   Neurological:      Mental Status: He is oriented to person, place, and time.   Psychiatric:   "       Mood and Affect: Mood normal.         Behavior: Behavior normal.         Thought Content: Thought content normal.         Judgment: Judgment normal.         LABS    Lab Results - Last 18 Months   Lab Units 02/21/24  1029 02/14/24  0844 02/12/24  0212 02/11/24  0253 02/10/24  0245 02/09/24  0656 02/08/24  0547 02/07/24  0750 02/06/24  1350 02/06/24  1350   HEMOGLOBIN g/dL 8.1* 9.2* 9.7* 8.8* 8.9* 9.6* 10.2* 10.2*  --  11.9*   HEMATOCRIT % 26.0* 30.3* 32.1* 29.6* 29.0* 31.0* 33.3* 33.0*  --  37.5   MCV fL 94.9 95.9 94.7 96.7 94.2 94.5 95.4 95.7  --  93.1   WBC 10*3/mm3 9.54 7.61 5.76 5.67 6.58 9.01 7.12 7.34  --  25.58*   RDW % 13.9 14.8 14.9 15.2 14.9 14.9 14.5 14.0  --  13.8   MPV fL  --  9.9 9.1 8.9 9.0 9.2 9.2 9.3  --  9.5   PLATELETS 10*3/mm3 396 218 215 181 189 206 213 159  --  202   IMM GRAN % %  --  1.8* 1.6* 0.5 0.3 0.3  --   --   --  1.5*   NEUTROS ABS 10*3/mm3 7.32* 5.50 3.83 4.03 4.82 7.52* 5.91 6.68   < > 23.19*   LYMPHS ABS 10*3/mm3 1.14 1.04 0.81 0.73 0.77 0.57*  --   --   --  0.92   MONOS ABS 10*3/mm3 0.77 0.53 0.52 0.65 0.71 0.72  --   --   --  1.05*   EOS ABS 10*3/mm3 0.20 0.36 0.48* 0.20 0.24 0.14 0.21  --   --  0.00   BASOS ABS 10*3/mm3 0.07 0.04 0.03 0.03 0.02 0.03 0.14 0.07   < > 0.04   IMMATURE GRANS (ABS) 10*3/mm3  --  0.14* 0.09* 0.03 0.02 0.03  --   --   --  0.38*   NRBC /100 WBC 0.0 0.0 0.0 0.0 0.0 0.0  --   --   --  0.0   NEUTROPHIL % %  --   --   --   --   --   --  73.0 69.0  --   --    MONOCYTES % %  --   --   --   --   --   --  6.0 1.0*  --   --    BASOPHIL % %  --   --   --   --   --   --  2.0* 1.0  --   --    ATYP LYMPH % %  --   --   --   --   --   --   --  2.0  --   --    ANISOCYTOSIS   --   --   --   --   --   --   --  Slight/1+  --   --     < > = values in this interval not displayed.       Lab Results - Last 18 Months   Lab Units 02/21/24  1029 02/16/24  0533 02/15/24  1615 02/15/24  0534 02/14/24  0844 02/13/24  0312 02/12/24  1614 02/12/24  0212 02/11/24  0253   GLUCOSE  mg/dL 365* 290*  --  70 267* 191*  --  156* 237*   SODIUM mmol/L 133* 137  --  144 144 147*  --  150* 149*   POTASSIUM mmol/L 5.4* 4.8 4.9 3.3* 3.5 3.7   < > 3.3* 3.5   CO2 mmol/L 24.2 20.0*  --  24.0 24.0 21.0*  --  20.0* 19.0*   CHLORIDE mmol/L 96* 104  --  110* 109* 115*  --  115* 114*   ANION GAP mmol/L  --  13.0  --  10.0 11.0 11.0  --  15.0 16.0*   CREATININE mg/dL 2.17* 1.95*  --  1.93* 1.66* 1.60*  --  1.76* 2.09*   BUN mg/dL 35* 38*  --  40* 35* 32*  --  35* 39*   BUN / CREAT RATIO  16.1 19.5  --  20.7 21.1 20.0  --  19.9 18.7   CALCIUM mg/dL 8.4* 8.7  --  8.6 8.7 8.8  --  8.7 8.6   ALK PHOS U/L 132* 104  --  94 94 94  --  90 79   TOTAL PROTEIN g/dL  --  6.1  --  6.0 6.2 6.2  --  6.7 6.3   ALT (SGPT) U/L 76* 63*  --  61* 68* 68*  --  56* 49*   AST (SGOT) U/L 41* 35  --  36 36 46*  --  35 30   BILIRUBIN mg/dL 0.2 0.3  --  0.2 0.2 0.2  --  0.3 0.3   ALBUMIN g/dL 3.2* 2.9*  --  2.9* 3.0* 3.0*  --  3.2* 3.1*   GLOBULIN gm/dL  --  3.2  --  3.1 3.2 3.2  --  3.5 3.2    < > = values in this interval not displayed.       Lab Results - Last 18 Months   Lab Units 01/25/23  1019   M-SPIKE g/dL Not Observed   KAPPA/LAMBDA RATIO, S  1.55   FREE LAMBDA LIGHT CHAINS mg/L 25.8   IG KAPPA FREE LIGHT CHAIN mg/L 40.0*   LDH U/L 183       Lab Results - Last 18 Months   Lab Units 11/29/23  1100 11/01/23  0957 08/25/23  0759 08/02/23  1024 05/03/23  1221 04/05/23  0855 02/22/23  0946 01/25/23  1019   IRON mcg/dL 84 37*  --  53* 88 36* 55* 122   TIBC mcg/dL 216* 262* 247 244* 226* 256* 274* 337   IRON SATURATION %  --   --  27  --   --   --   --   --    IRON SATURATION (TSAT) % 39 14*  --  22 39 14* 20 36   FERRITIN ng/mL 797.10* 120.50 266.00 290.60 1,027.00* 59.42 256.20 16.82*   TSH uIU/mL  --   --  2.110  --   --  1.740  --   --    FOLATE ng/mL  --   --  >20.00  --   --   --   --  >20.00       Bernard Matta reports a pain score of 0.          ASSESSMENT:  Adenocarcinoma involving the cecum.  High risk.  Less than 12 lymph  nodes examined.  No lymphovascular or perineural invasion.  AJCC stage: IIb (pT4a, pN0, cM0, G2).  Tumor size 1.9 cm.  Zo status, 0/9 positive lymph node.  Treatment status: Pending adjuvant capecitabine or FOLFOX.  2. Performance status of 3.  3.  Normocytic anemia from iron deficiency and chronic kidney disease stage IIIb.  GFR 31 mL/min on 2/21/2024.On ferrous sulfate every other day.   4.  Type 2 diabetes, risk factor for chronic kidney disease.  5.  Intestinal malabsorption, unspecific type, intravenous iron as needed.      PLAN:   regarding the reason for the referral.  2.   regarding role for adjuvant chemotherapy with capecitabine or FOLFOX due to high risk disease, less than 12 lymph nodes sampled.  Colon cancer survival calculator, disease specific survival at 5 years is 71% if he is really N0 disease.  Patient aware less than 12 lymph nodes were sampled.  3.  Obtain MSI/MMR from  [phone].  In patient with deficient MMR/MSI high with T4a disease and adequate lymph node sample, no adjuvant therapy is required.  However patient had less than 12 lymph nodes examined.  4.  Blood for CBC with differential, CMP, ferritin, iron panel, and CEA today.   5.  Colonoscopy at 1 year.  6.  Order CT of the chest without IV contrast for staging.  7.  eRx capecitabine 750 mg/m2 (dose reduced due to CKD) every 12 hours for 14 days every 21-day cycle x 24 weeks.  Plan to start after wound had healed.  Observe for potential adverse effects like anaphylaxis, hypersensitivity reaction, hepatotoxicity, hand-foot syndrome, nausea, vomiting, diarrhea, mucositis, fatigue, or cardiotoxicity arrhythmias or ischemia. Hold if GFR < 30.  If performance status improves, consider adjuvant FOLFOX with 25% dose reduction due to low GFR.  8.  eRx Zofran 8 mg p.o. every 8 hours as needed for nausea and vomiting #60, 2 refills.  9.  Weekly CBC with differential with capecitabine.  10. Advance Care Planning  ACP discussion  was held with the patient during this visit. Patient has an advance directive in EMR which is still valid.    11.  Continue care per primary care physician at the specialist.  12.  Care discussed with patient.  Understand expressed.  Patient agreed to proceed.  13.  Epoetin alpha 40,000 units subcutaneous every 2 weeks if hemoglobin less than 11 and hematocrit less than 33, chronic kidney disease.  Observe for hypertension.  14.  CBC every 2 weeks.  15.  Injectafer 750 mg 2 doses 1 week apart.  Premed Tylenol 500 mg p.o. and Benadryl 25 mg IV.  Observe for anaphylaxis or infusion reactions. Stop oral iron.due to malabsorption.   16.  Return to office in 2 weeks.  17.  Further recommendations pending.            I have reviewed the assessment and plan and verified the accuracy of it. No changes to assessment and plan since the information was documented. Navi Stearns MD 02/28/24           I spent 71 total minutes, face-to-face, caring for Bernard ramos. Greater than 50% of this time involved counseling and/or coordination of care as documented within this note.       Allan Schneider MD  (Dalton Villalobos MD)  (Shaquille Mckenzie MD)  Everton Webb MD

## 2024-02-29 NOTE — PROGRESS NOTES
[facility] ONC [facility] HEMATOLOGY & ONCOLOGY  [address]  [place] [address] [phone]    Patient Name: Bernard Matta  Encounter Date: 03/13/2024  YOB: 1950  Patient Number: [id number]      REASON FOR FOLLOW-UP: Bernard Matta is a pleasant 73 y.o.  male who is seen regarding further management suggestions for adenocarcinoma the cecum.  He is also seen for anemia from iron deficiency and chronic kidney disease stage IIIb.  GFR 31 mL/min on 2/21/2024. He is on epoetin alpha and intravenous iron as indicated.  Latest Injectafer was given 2/28/2024. He is seen with his spouse, Nury.  History obtained from the patient.  Patient is a reliable historian.           DIAGNOSTIC ABNORMALITIES: Colon cancer.  Screening colonoscopy 8/12/2021 was negative.  He presented with diffuse abdominal pain, nausea and vomiting.  CT abdomen pelvis 2/6/2024.There is an abnormal bowel gas pattern with multiple moderately  distended loops of small bowel with mild bowel wall thickening. The right and transverse colon are also moderately distended. The more distal colon is decompressed. Within the right lower quadrant posterior to the cecum there is a complex collection which is not definitely contiguous with any adjacent GI structure suspicious for localized perforation with abscess formation. There is surrounding inflammatory changes. The appendix is not clearly identified and this could represent a ruptured appendicitis. I do not see any documentation of previous appendectomy by electronic medical record. There is free air beneath the right diaphragm and within the perihepatic space.  Free fluid within the lower right paracolic gutter, central pelvis and lower pelvis. This fluid demonstrates some complexity. Small hiatal hernia. The prostate gland is enlarged. There is a bladder diverticulum emanating from both the left and right lateral bladder wall.  A mild element of chronic bladder outlet obstruction is not excluded.  Chest x-ray 2/6/2024.Radiodense nodule in the right upper lobe-favor granuloma.  No acute infiltrate. Free air demonstrated on CT imaging is not as well delineated by plain film radiograph.  CT head on 2/10/2024.Chronic changes and no acute intracranial findings.  Paranasal sinus mucosal thickening.  Pathology report 2/10/2024.  Cecum, ileocecectomy:  A. Moderately differentiated adenocarcinoma arising in the cecal pit (1.9 cm).  B. Tumor invades through the visceral peritoneum resulting in macroscopic perforation.  C. The proximal, distal and radial adventitial soft tissue margins are free of tumor.  D. Acute peritonitis.  E. Pericolic cyst-like structure with hyalinized fibrous wall and acute and chronic inflammation and hemorrhage in  adjacent fibroadipose tissue.  F. Nine of 9 pericolic lymph nodes are negative for metastatic carcinoma.  G. Detached segment of viable bowel compatible with anastomotic tissue with large intestine and small intestinal tissue  seen, negative for malignancy.  AJCC stage: pT4a pN0               PREVIOUS INTERVENTIONS:  Laparotomy and ileocecectomy on 2/6/2024.  Positive for perforation in the posterior cecum.             Oncology/Hematology History   Colon cancer   2/28/2024 Initial Diagnosis    Colon cancer     2/28/2024 Cancer Staged    Staging form: Colon And Rectum, AJCC 8th Edition  - Pathologic stage from 2/28/2024: Stage IIB (pT4a, pN0, cM0) - Signed by Navi Stearns MD on 2/28/2024         PAST MEDICAL HISTORY:  ALLERGIES:  Allergies   Allergen Reactions    Jardiance [Empagliflozin] Other (See Comments)     Pt reports nausea/vomiting        CURRENT MEDICATIONS:  Outpatient Encounter Medications as of 3/13/2024   Medication Sig Dispense Refill    amitriptyline (ELAVIL) 10 MG tablet Take 1 tablet by mouth At Night As Needed for Sleep.      aspirin 81 MG tablet Take 1 tablet by mouth Daily.      atorvastatin  (LIPITOR) 40 MG tablet Take 1 tablet by mouth every night at bedtime. 90 tablet 3    cetirizine (zyrTEC) 10 MG tablet Take 1 tablet by mouth Daily.      cholecalciferol (VITAMIN D3) 400 UNITS tablet Take 1 tablet by mouth Daily.      cloNIDine (CATAPRES) 0.2 MG tablet Take 1 tablet by mouth 2 (Two) Times a Day.      dorzolamide-timolol (COSOPT) 22.3-6.8 MG/ML ophthalmic solution Administer 1 drop to both eyes 2 (Two) Times a Day.      epoetin shari-epbx (Retacrit) 90345 UNIT/ML injection Inject  under the skin into the appropriate area as directed Every 14 (Fourteen) Days.      ezetimibe (Zetia) 10 MG tablet Take 1 tablet by mouth Daily. 90 tablet 3    fluticasone (FLONASE) 50 MCG/ACT nasal spray 2 sprays into the nostril(s) as directed by provider Daily.      furosemide (Lasix) 20 MG tablet Take 1 tablet by mouth Daily. 90 tablet 3    insulin aspart (novoLOG FLEXPEN) 100 UNIT/ML solution pen-injector sc pen Inject 0-20 Units under the skin into the appropriate area as directed 3 (Three) Times a Day With Meals. Sliding scale      insulin degludec (TRESIBA FLEXTOUCH) 100 UNIT/ML solution pen-injector injection Inject 30 Units under the skin into the appropriate area as directed Every Night.      latanoprost (XALATAN) 0.005 % ophthalmic solution Administer 1 drop to both eyes Every Night.      lisinopril (PRINIVIL,ZESTRIL) 10 MG tablet Take 1 tablet by mouth Every Night.      Multiple Vitamins-Minerals (MULTIVITAMIN WITH MINERALS) tablet tablet Take 1 tablet by mouth Daily.      ondansetron ODT (ZOFRAN-ODT) 4 MG disintegrating tablet Place 1 tablet on the tongue Every 8 (Eight) Hours As Needed for Nausea or Vomiting. 30 tablet 1    Rhopressa 0.02 % solution Apply 1 drop to eye(s) as directed by provider Every Night.      simethicone (Gas-X) 80 MG chewable tablet Chew 0.5 tablets Every 6 (Six) Hours As Needed for Flatulence (belching). 30 tablet 1    tamsulosin (FLOMAX) 0.4 MG capsule 24 hr capsule Take 2 capsules by  mouth Every Night.      traMADol (Ultram) 50 MG tablet Take 1 tablet by mouth Every 8 (Eight) Hours As Needed for Moderate Pain. 30 tablet 0     No facility-administered encounter medications on file as of 3/13/2024.     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    Hypertension I10    Type 2 diabetes mellitus E11.9    Hx of adenomatous colonic polyps Z86.010    Iron deficiency anemia D50.9    Intestinal malabsorption, unspecified K90.9    Bowel perforation K63.1    Stage 3b chronic kidney disease (CKD) N18.32    Hyperkalemia E87.5    COVID-19 virus detected U07.1    Colon cancer C18.9     SURGERIES:  Past Surgical History:   Procedure Laterality Date    COLONOSCOPY  2019    [place]    COLONOSCOPY N/A 8/11/2020    Procedure: COLONOSCOPY WITH ANESTHESIA;  Surgeon: Shaquille Mckenzie DO;  Location: [facility] ENDOSCOPY;  Service: Gastroenterology;  Laterality: N/A;  preop; hx of polyps   postop; polyps   PCP Brando Webb     COLONOSCOPY N/A 8/12/2021    Procedure: COLONOSCOPY WITH ANESTHESIA;  Surgeon: Shaquille Mckenzie DO;  Location: [facility] ENDOSCOPY;  Service: Gastroenterology;  Laterality: N/A;  pre hx adenomatous colon polyp  post normal  Brando Webb MD    EXPLORATORY LAPAROTOMY N/A 2/6/2024    Procedure: DIAGNOSTIC LAPAROSCOPY CONVERTED TO LAPAROTOMY, ILIOCECTECTOMY;  Surgeon: Hemant Park MD;  Location: [facility] OR;  Service: General;  Laterality: N/A;     HEALTH MAINTENANCE ITEMS:  Health Maintenance Due   Topic Date Due    COLONOSCOPY  08/12/2022       <no information>  Last Completed Colonoscopy       This patient has no relevant Health Maintenance data.          Immunization History   Administered Date(s) Administered    COVID-19 (MODERNA) 1st,2nd,3rd Dose Monovalent 03/02/2021, 03/30/2021, 11/02/2021    COVID-19 (PFIZER) BIVALENT 12+YRS 09/29/2022    COVID-19 F23 (PFIZER) 12YRS+ (COMIRNATY) 11/16/2023    Covid-19 (Pfizer) Gray  "Cap Monovalent 2022    FLUAD TRI 65YR+ 2019    Flu Vaccine Split Quad 2013    Fluad Quad 65+ 2019, 2020    Fluzone (or Fluarix & Flulaval for VFC) >6mos 2013, 2022    Fluzone High Dose =>65 Years ([employer] ONLY) 2016, 10/02/2017, 10/08/2018    Fluzone High-Dose 65+yrs 10/19/2021, 10/02/2023    Pneumococcal Conjugate 13-Valent (PCV13) 2015    Pneumococcal Polysaccharide (PPSV23) 2012, 2013    Shingrix 2018, 2019    Tdap 2013     Last Completed Mammogram       This patient has no relevant Health Maintenance data.              FAMILY HISTORY:  Family History   Problem Relation Age of Onset    No Known Problems Maternal Grandmother     No Known Problems Maternal Grandfather     No Known Problems Paternal Grandmother     No Known Problems Paternal Grandfather     No Known Problems Mother     No Known Problems Father     Colon cancer Neg Hx     Colon polyps Neg Hx     Esophageal cancer Neg Hx      SOCIAL HISTORY:  Social History     Socioeconomic History    Marital status:    Tobacco Use    Smoking status: Former     Current packs/day: 0.00     Average packs/day: 0.5 packs/day for 10.0 years (5.0 ttl pk-yrs)     Types: Cigarettes     Start date: 1969     Quit date: 1979     Years since quittin.2     Passive exposure: Past    Smokeless tobacco: Never   Vaping Use    Vaping status: Never Used   Substance and Sexual Activity    Alcohol use: No    Drug use: No    Sexual activity: Defer       REVIEW OF SYSTEMS:    Review of Systems   Constitutional:  Positive for fatigue. Negative for chills and fever.        \"I feel tired.\"   HENT:  Negative for congestion and trouble swallowing.    Eyes:  Negative for redness and visual disturbance.   Respiratory:  Negative for shortness of breath and wheezing.    Cardiovascular:  Negative for chest pain and palpitations.   Gastrointestinal:  Negative for blood in stool, nausea and " "vomiting.   Endocrine: Negative for polydipsia and polyphagia.   Genitourinary:  Negative for difficulty urinating and dysuria.   Musculoskeletal:  Negative for gait problem.   Skin:  Positive for pallor.   Allergic/Immunologic: Negative for food allergies.   Neurological:  Negative for dizziness and speech difficulty.   Hematological:  Negative for adenopathy. Does not bruise/bleed easily.   Psychiatric/Behavioral:  Positive for hallucinations. Negative for agitation and confusion.        VITAL SIGNS: Pulse 72   Temp 97.2 °F (36.2 °C)   Wt 82.6 kg (182 lb)   SpO2 98%   BMI 26.88 kg/m²   Pain Score    03/13/24 0912   PainSc: 0-No pain       PHYSICAL EXAMINATION:     Physical Exam  Vitals reviewed.   Constitutional:       General: He is not in acute distress.  HENT:      Head: Normocephalic and atraumatic.   Eyes:      General: No scleral icterus.  Cardiovascular:      Rate and Rhythm: Normal rate.   Pulmonary:      Effort: No respiratory distress.      Breath sounds: No wheezing.   Abdominal:      General: Bowel sounds are normal.      Palpations: Abdomen is soft.      Tenderness: There is no abdominal tenderness.      Comments: + dressing.  Open wound, superior aspect, laparotomy site. \"Size of a dime.\"   Musculoskeletal:      Cervical back: Neck supple.      Comments: Ankle edema, bilateral.    Skin:     General: Skin is warm.      Coloration: Skin is pale.   Neurological:      Mental Status: He is alert and oriented to person, place, and time.   Psychiatric:         Mood and Affect: Mood normal.         Behavior: Behavior normal.         Thought Content: Thought content normal.         Judgment: Judgment normal.         LABS    Lab Results - Last 18 Months   Lab Units 03/12/24  0704 02/28/24  1201 02/21/24  1029 02/14/24  0844 02/12/24  0212 02/11/24  0253 02/10/24  0245 02/09/24  0656 02/08/24  0547 02/07/24  0750   HEMOGLOBIN g/dL 9.4* 8.5* 8.1* 9.2* 9.7* 8.8* 8.9* 9.6* 10.2* 10.2*   HEMATOCRIT % 29.5* " 27.9* 26.0* 30.3* 32.1* 29.6* 29.0* 31.0* 33.3* 33.0*   MCV fL 93.7 94.9 94.9 95.9 94.7 96.7 94.2 94.5 95.4 95.7   WBC 10*3/mm3 6.18 7.86 9.54 7.61 5.76 5.67 6.58 9.01 7.12 7.34   RDW % 14.6 14.3 13.9 14.8 14.9 15.2 14.9 14.9 14.5 14.0   MPV fL  --  9.5  --  9.9 9.1 8.9 9.0 9.2 9.2 9.3   PLATELETS 10*3/mm3 166 275 396 218 215 181 189 206 213 159   IMM GRAN % %  --  0.4  --  1.8* 1.6* 0.5 0.3 0.3  --   --    NEUTROS ABS 10*3/mm3 4.25 5.59 7.32* 5.50 3.83 4.03 4.82 7.52* 5.91 6.68   LYMPHS ABS 10*3/mm3 1.01 1.34 1.14 1.04 0.81 0.73 0.77 0.57*  --   --    MONOS ABS 10*3/mm3 0.47 0.59 0.77 0.53 0.52 0.65 0.71 0.72  --   --    EOS ABS 10*3/mm3 0.41* 0.27 0.20 0.36 0.48* 0.20 0.24 0.14 0.21  --    BASOS ABS 10*3/mm3 0.03 0.04 0.07 0.04 0.03 0.03 0.02 0.03 0.14 0.07   IMMATURE GRANS (ABS) 10*3/mm3  --  0.03  --  0.14* 0.09* 0.03 0.02 0.03  --   --    NRBC /100 WBC 0.0 0.0 0.0 0.0 0.0 0.0 0.0 0.0  --   --    NEUTROPHIL % %  --   --   --   --   --   --   --   --  73.0 69.0   MONOCYTES % %  --   --   --   --   --   --   --   --  6.0 1.0*   BASOPHIL % %  --   --   --   --   --   --   --   --  2.0* 1.0   ATYP LYMPH % %  --   --   --   --   --   --   --   --   --  2.0   ANISOCYTOSIS   --   --   --   --   --   --   --   --   --  Slight/1+       Lab Results - Last 18 Months   Lab Units 03/12/24  0704 02/28/24  1201 02/21/24  1029 02/16/24  0533 02/15/24  1615 02/15/24  0534 02/14/24  0844 02/13/24  0312 02/12/24  1614 02/12/24  0212   GLUCOSE mg/dL 138* 111*  111* 365* 290*  --  70 267* 191*  --  156*   SODIUM mmol/L 139 140  140 133* 137  --  144 144 147*  --  150*   POTASSIUM mmol/L 5.3* 4.5  4.5 5.4* 4.8 4.9 3.3* 3.5 3.7   < > 3.3*   CO2 mmol/L 29.3* 30.0*  30.0* 24.2 20.0*  --  24.0 24.0 21.0*  --  20.0*   CHLORIDE mmol/L 102 101  101 96* 104  --  110* 109* 115*  --  115*   ANION GAP mmol/L  --  9.0  9.0  --  13.0  --  10.0 11.0 11.0  --  15.0   CREATININE mg/dL 1.80* 1.97*  1.97* 2.17* 1.95*  --  1.93* 1.66* 1.60*  --   1.76*   BUN mg/dL 22 30*  30* 35* 38*  --  40* 35* 32*  --  35*   BUN / CREAT RATIO  12.2 15.2  15.2 16.1 19.5  --  20.7 21.1 20.0  --  19.9   CALCIUM mg/dL 8.4* 8.6  8.6 8.4* 8.7  --  8.6 8.7 8.8  --  8.7   ALK PHOS U/L 153* 167* 132* 104  --  94 94 94  --  90   TOTAL PROTEIN g/dL  --  7.0  --  6.1  --  6.0 6.2 6.2  --  6.7   ALT (SGPT) U/L 33 58* 76* 63*  --  61* 68* 68*  --  56*   AST (SGOT) U/L 19 30 41* 35  --  36 36 46*  --  35   BILIRUBIN mg/dL 0.4 0.2 0.2 0.3  --  0.2 0.2 0.2  --  0.3   ALBUMIN g/dL 3.5 3.5  3.5 3.2* 2.9*  --  2.9* 3.0* 3.0*  --  3.2*   GLOBULIN gm/dL  --  3.5  --  3.2  --  3.1 3.2 3.2  --  3.5    < > = values in this interval not displayed.       Lab Results - Last 18 Months   Lab Units 02/28/24  1201 01/25/23  1019   M-SPIKE g/dL  --  Not Observed   KAPPA/LAMBDA RATIO, S   --  1.55   FREE LAMBDA LIGHT CHAINS mg/L  --  25.8   IG KAPPA FREE LIGHT CHAIN mg/L  --  40.0*   CEA ng/mL 7.10  --    LDH U/L  --  183       Lab Results - Last 18 Months   Lab Units 02/28/24  1201 11/29/23  1100 11/01/23  0957 08/25/23  0759 08/02/23  1024 05/03/23  1221 04/05/23  0855 02/22/23  0946 01/25/23  1019   IRON mcg/dL 37* 84 37*  --  53* 88 36*   < > 122   TIBC mcg/dL 250* 216* 262* 247 244* 226* 256*   < > 337   IRON SATURATION %  --   --   --  27  --   --   --   --   --    IRON SATURATION (TSAT) % 15* 39 14*  --  22 39 14*   < > 36   FERRITIN ng/mL 383.90 797.10* 120.50 266.00 290.60 1,027.00* 59.42   < > 16.82*   TSH uIU/mL  --   --   --  2.110  --   --  1.740  --   --    FOLATE ng/mL  --   --   --  >20.00  --   --   --   --  >20.00    < > = values in this interval not displayed.       Bernard Matta reports a pain score of 0.        ASSESSMENT:  Adenocarcinoma involving the cecum.  High risk.  Less than 12 lymph nodes examined.  No lymphovascular or perineural invasion.  MSI/MMR report 3/11/2024. CLEMENCIA and pMMR.  AJCC stage: IIb (pT4a, pN0, cM0, G2).  Tumor size 1.9 cm.  Zo status, 0/9 positive lymph  "node.  Treatment status: Pending adjuvant capecitabine.  2. Performance status of 3.  3.  Normocytic anemia from iron deficiency and chronic kidney disease stage IIIb.  GFR 31 mL/min on 2/21/2024.  Latest Injectafer given 2/28/2024 due to malabsorption.    4.  Type 2 diabetes, contributing factor for chronic kidney disease.  5.  Intestinal malabsorption, unspecific type, intravenous iron as needed.  6.  Elevated transaminase, observe.         PLAN:   Re: Tolerance to Injectafer. \"It was good.\"  2.   Re: Indication for adjuvant chemotherapy with capecitabine or FOLFOX due to high risk disease, less than 12 lymph nodes sampled.  Colon cancer survival calculator, disease specific survival at 5 years is 71% if he is really N0 disease.  Patient aware less than 12 lymph nodes were sampled.  3.  Obtain MSI/MMR from  [id number].  In patient with deficient MMR/MSI high with T4a disease and adequate lymph node sample, no adjuvant therapy is required.  However patient had less than 12 lymph nodes examined.  4.   Re: Heme status.  WBC 7.8, hemoglobin 8.5, hematocrit 27.9, MCV 94.9 and platelet 275.  5.   Re: CMP.  GFR 38.8 mL/min.  Alkaline phosphatase 153 from 167 from 132 and ALT 58 from 76.  6.   Re: CEA 7.1, observe.  7.   Re: Ferritin 383.9 and saturation 15%.  Injectafer given on 2/28/2024.  8.   Re: CT chest report 3/8/2024.No evidence of metastatic disease in the chest.   9.  eRx capecitabine 750 mg/m2 (dose reduced due to CKD) every 12 hours for 14 days every 21-day cycle x 24 weeks.  Plan to start after wound had healed.  Observe for potential adverse effects like anaphylaxis, hypersensitivity reaction, hepatotoxicity, hand-foot syndrome, nausea, vomiting, diarrhea, mucositis, fatigue, or cardiotoxicity arrhythmias or ischemia. Hold if GFR < 30.  Patient preference for capecitabine rather than FOLFOX.   10.  eRx Zofran 8 mg p.o. every 8 hours as needed for nausea and " vomiting #60, 2 refills.  11.  eRx Compazine 10 mg p.o. every 4 hours as needed for nausea and vomiting #60, 2 refills.  12.  Weekly CBC with differential with chemo.  13. Advance Care Planning  ACP discussion was held with the patient during this visit. Patient has an advance directive in EMR which is still valid.   14.  Colonoscopy at 1 year.  15.  Continue care per primary care physician at the specialist.  16.  Care discussed with patient.  Understand expressed.  Patient agreed to proceed.  17.  Epoetin alpha 40,000 units subcutaneous every 2 weeks if hemoglobin less than 11 and hematocrit less than 33, chronic kidney disease.  Observe for hypertension.  18. Return to office in 3 weeks with CBC with differential, ferritin, iron panel, CEA, and CMP.           I have reviewed the assessment and plan and verified the accuracy of it. No changes to assessment and plan since the information was documented. Navi Stearns MD 03/13/24           I spent 42 total minutes, face-to-face, caring for Bernard ramos. Greater than 50% of this time involved counseling and/or coordination of care as documented within this note.           Allan Schneider MD  (Dalton Villalobos MD)  (Shaquille Mckenzie MD)  MD Rica Zarco PA

## 2024-03-04 ENCOUNTER — OFFICE VISIT (OUTPATIENT)
Dept: SURGERY | Facility: CLINIC | Age: 74
End: 2024-03-04
Payer: MEDICARE

## 2024-03-04 VITALS
SYSTOLIC BLOOD PRESSURE: 108 MMHG | BODY MASS INDEX: 26.72 KG/M2 | HEIGHT: 69 IN | OXYGEN SATURATION: 96 % | WEIGHT: 180.4 LBS | DIASTOLIC BLOOD PRESSURE: 62 MMHG | HEART RATE: 105 BPM

## 2024-03-04 DIAGNOSIS — Z90.49 S/P SMALL BOWEL RESECTION: Primary | ICD-10-CM

## 2024-03-04 PROCEDURE — 3074F SYST BP LT 130 MM HG: CPT

## 2024-03-04 PROCEDURE — 3078F DIAST BP <80 MM HG: CPT

## 2024-03-04 PROCEDURE — 1159F MED LIST DOCD IN RCRD: CPT

## 2024-03-04 PROCEDURE — 99024 POSTOP FOLLOW-UP VISIT: CPT

## 2024-03-04 PROCEDURE — 1160F RVW MEDS BY RX/DR IN RCRD: CPT

## 2024-03-04 NOTE — PROGRESS NOTES
"Patient: Bernard Matta    YOB: 1950    Date: 03/04/2024    Primary Care Provider: Brando Webb MD    Vital Signs:   Vitals:    03/04/24 0942   BP: 108/62   BP Location: Left arm   Patient Position: Sitting   Cuff Size: Adult   Pulse: 105   SpO2: 96%   Weight: 81.8 kg (180 lb 6.4 oz)   Height: 175.3 cm (69.02\")       The patient is tolerating a regular diet and has no complaints s/p ileocecectomy by Dr. Park on 2/6/24. The patient denies fevers, chills, nausea, vomiting, and excessive pain. Staples are still present in the midline incision. He has two areas of wound dehiscence at the superior aspect of his incision.     Results Review:   I reviewed the patient's new clinical results.    Tissue Pathology Exam (02/06/2024 20:54)   Cecum, ileocecectomy:  A.  Moderately differentiated adenocarcinoma arising in the cecal pit (1.9 cm).  B.  Tumor invades through the visceral peritoneum resulting in macroscopic perforation.  C.  The proximal, distal and radial adventitial soft tissue margins are free of tumor.  D.  Acute peritonitis.  E.  Pericolic cyst-like structure with hyalinized fibrous wall and acute and chronic inflammation and hemorrhage in adjacent fibroadipose tissue.  F. Nine of 9 pericolic lymph nodes are negative for metastatic carcinoma.  G.  Detached segment of viable bowel compatible with anastomotic tissue with large intestine and small intestinal tissue seen, negative for malignancy.    Assessment / Plan:    Diagnoses and all orders for this visit:    1. S/P small bowel resection (Primary)    Mr. Matta is a 72 y/o male who presents to the clinic for follow up after ileocecectomy by Dr. Park. He is overall doing well symptomatically. Staples were removed today in office. I have advised him and his wife to continue to pack the two areas of dehiscence and to clean the area with antibacterial soap. They will return to the clinic in 1 week for wound recheck. They voiced " understanding of this and will call for an appointment if he has any new problems or concerns.     Follow up:     Return in about 1 week (around 3/11/2024) for Recheck.        Electronically signed by Rica Khan PA-C  03/04/24  10:21 CST

## 2024-03-08 ENCOUNTER — OFFICE VISIT (OUTPATIENT)
Dept: UROLOGY | Facility: CLINIC | Age: 74
End: 2024-03-08
Payer: MEDICARE

## 2024-03-08 ENCOUNTER — HOSPITAL ENCOUNTER (OUTPATIENT)
Dept: CT IMAGING | Facility: HOSPITAL | Age: 74
Discharge: HOME | End: 2024-03-08
Payer: MEDICARE

## 2024-03-08 VITALS — TEMPERATURE: 97 F | WEIGHT: 180 LBS | HEIGHT: 69 IN | BODY MASS INDEX: 26.66 KG/M2

## 2024-03-08 DIAGNOSIS — N18.32 ANEMIA DUE TO STAGE 3B CHRONIC KIDNEY DISEASE: ICD-10-CM

## 2024-03-08 DIAGNOSIS — N18.32 STAGE 3B CHRONIC KIDNEY DISEASE (CKD): ICD-10-CM

## 2024-03-08 DIAGNOSIS — N40.1 BPH WITH OBSTRUCTION/LOWER URINARY TRACT SYMPTOMS: ICD-10-CM

## 2024-03-08 DIAGNOSIS — N13.8 BPH WITH OBSTRUCTION/LOWER URINARY TRACT SYMPTOMS: ICD-10-CM

## 2024-03-08 DIAGNOSIS — R39.11 URINARY HESITANCY: Primary | ICD-10-CM

## 2024-03-08 DIAGNOSIS — C18.2 MALIGNANT NEOPLASM OF ASCENDING COLON: ICD-10-CM

## 2024-03-08 DIAGNOSIS — D63.1 ANEMIA DUE TO STAGE 3B CHRONIC KIDNEY DISEASE: ICD-10-CM

## 2024-03-08 LAB
BILIRUB BLD-MCNC: NEGATIVE MG/DL
CLARITY, POC: CLEAR
COLOR UR: YELLOW
GLUCOSE UR STRIP-MCNC: NEGATIVE MG/DL
KETONES UR QL: NEGATIVE
LEUKOCYTE EST, POC: ABNORMAL
NITRITE UR-MCNC: NEGATIVE MG/ML
PH UR: 6 [PH] (ref 5–8)
PROT UR STRIP-MCNC: NEGATIVE MG/DL
RBC # UR STRIP: NEGATIVE /UL
SP GR UR: 1.01 (ref 1–1.03)
UROBILINOGEN UR QL: ABNORMAL

## 2024-03-08 PROCEDURE — 71250 CT THORAX DX C-: CPT

## 2024-03-11 LAB
CYTO UR: NORMAL
LAB AP CASE REPORT: NORMAL
LAB AP DIAGNOSIS COMMENT: NORMAL
LAB AP SYNOPTIC CHECKLIST: NORMAL
Lab: NORMAL
PATH REPORT.ADDENDUM SPEC: NORMAL
PATH REPORT.FINAL DX SPEC: NORMAL
PATH REPORT.GROSS SPEC: NORMAL

## 2024-03-12 ENCOUNTER — TELEPHONE (OUTPATIENT)
Dept: ONCOLOGY | Facility: CLINIC | Age: 74
End: 2024-03-12
Payer: MEDICARE

## 2024-03-12 ENCOUNTER — OFFICE VISIT (OUTPATIENT)
Dept: FAMILY MEDICINE CLINIC | Facility: CLINIC | Age: 74
End: 2024-03-12
Payer: MEDICARE

## 2024-03-12 VITALS
BODY MASS INDEX: 26.48 KG/M2 | OXYGEN SATURATION: 98 % | WEIGHT: 178.8 LBS | DIASTOLIC BLOOD PRESSURE: 81 MMHG | TEMPERATURE: 98.2 F | HEIGHT: 69 IN | SYSTOLIC BLOOD PRESSURE: 131 MMHG | HEART RATE: 91 BPM

## 2024-03-12 DIAGNOSIS — D64.9 ANEMIA, UNSPECIFIED TYPE: ICD-10-CM

## 2024-03-12 DIAGNOSIS — R53.83 FATIGUE, UNSPECIFIED TYPE: Primary | ICD-10-CM

## 2024-03-12 DIAGNOSIS — E11.9 TYPE 2 DIABETES MELLITUS WITHOUT COMPLICATION, WITH LONG-TERM CURRENT USE OF INSULIN: ICD-10-CM

## 2024-03-12 DIAGNOSIS — E78.2 MIXED HYPERLIPIDEMIA: ICD-10-CM

## 2024-03-12 DIAGNOSIS — Z79.4 TYPE 2 DIABETES MELLITUS WITHOUT COMPLICATION, WITH LONG-TERM CURRENT USE OF INSULIN: ICD-10-CM

## 2024-03-12 NOTE — TELEPHONE ENCOUNTER
----- Message from Navi Stearns MD sent at 3/8/2024  2:00 PM CST -----  No evidence of metastatic disease in the chest.

## 2024-03-12 NOTE — PROGRESS NOTES
Subjective   Bernard Matta is a 73 y.o. male.     History of Present Illness  73-year-old male follow-up after perforated colon from: CTA-right-sided-sliding scale sugars over the last month been better-expect A1c to be improved but not where it will be eventually      The following portions of the patient's history were reviewed and updated as appropriate: allergies, current medications, past family history, past medical history, past social history, past surgical history, and problem list.    Review of Systems   Respiratory:  Negative for shortness of breath.    Cardiovascular:  Negative for chest pain and leg swelling.   Gastrointestinal:  Negative for abdominal pain.        Superior aspect of incision not quite healed   Hematological:         Receiving intravenous iron       Objective   Physical Exam  Vitals and nursing note reviewed.   Constitutional:       Appearance: Normal appearance.   Eyes:      Extraocular Movements: Extraocular movements intact.      Pupils: Pupils are equal, round, and reactive to light.   Neck:      Vascular: No carotid bruit.   Cardiovascular:      Rate and Rhythm: Normal rate and regular rhythm.   Pulmonary:      Effort: Pulmonary effort is normal.      Breath sounds: Normal breath sounds.   Abdominal:      Palpations: Abdomen is soft. There is no mass.      Comments: Dressing covering the superior aspect of the incision-1 area nonhealed   Musculoskeletal:         General: No swelling or tenderness.   Lymphadenopathy:      Cervical: No cervical adenopathy.   Skin:     General: Skin is warm and dry.   Neurological:      General: No focal deficit present.      Mental Status: He is alert and oriented to person, place, and time.   Psychiatric:         Mood and Affect: Mood normal.         Behavior: Behavior normal.         Thought Content: Thought content normal.         Judgment: Judgment normal.         Assessment & Plan   Diagnoses and all orders for this visit:    1. Fatigue,  unspecified type (Primary)  -     CBC & Differential    2. Type 2 diabetes mellitus without complication, with long-term current use of insulin  -     Hemoglobin A1c    3. Mixed hyperlipidemia  -     Comprehensive Metabolic Panel  -     Lipid Panel    4. Anemia, unspecified type  -     CBC & Differential       Plan above-continue seeing specialist

## 2024-03-13 ENCOUNTER — OFFICE VISIT (OUTPATIENT)
Dept: ONCOLOGY | Facility: CLINIC | Age: 74
End: 2024-03-13
Payer: MEDICARE

## 2024-03-13 ENCOUNTER — LAB (OUTPATIENT)
Dept: LAB | Facility: HOSPITAL | Age: 74
End: 2024-03-13
Payer: MEDICARE

## 2024-03-13 ENCOUNTER — OFFICE VISIT (OUTPATIENT)
Dept: SURGERY | Facility: CLINIC | Age: 74
End: 2024-03-13
Payer: MEDICARE

## 2024-03-13 ENCOUNTER — APPOINTMENT (OUTPATIENT)
Dept: ONCOLOGY | Facility: HOSPITAL | Age: 74
End: 2024-03-13
Payer: MEDICARE

## 2024-03-13 VITALS
HEART RATE: 86 BPM | HEIGHT: 69 IN | SYSTOLIC BLOOD PRESSURE: 110 MMHG | WEIGHT: 183.8 LBS | OXYGEN SATURATION: 95 % | BODY MASS INDEX: 27.22 KG/M2 | DIASTOLIC BLOOD PRESSURE: 66 MMHG

## 2024-03-13 VITALS — HEART RATE: 72 BPM | TEMPERATURE: 97.2 F | OXYGEN SATURATION: 98 % | BODY MASS INDEX: 26.88 KG/M2 | WEIGHT: 182 LBS

## 2024-03-13 DIAGNOSIS — D50.9 IRON DEFICIENCY ANEMIA, UNSPECIFIED IRON DEFICIENCY ANEMIA TYPE: ICD-10-CM

## 2024-03-13 DIAGNOSIS — D50.0 IRON DEFICIENCY ANEMIA DUE TO CHRONIC BLOOD LOSS: ICD-10-CM

## 2024-03-13 DIAGNOSIS — N18.30 STAGE 3 CHRONIC KIDNEY DISEASE, UNSPECIFIED WHETHER STAGE 3A OR 3B CKD: Primary | ICD-10-CM

## 2024-03-13 DIAGNOSIS — C18.2 MALIGNANT NEOPLASM OF ASCENDING COLON: ICD-10-CM

## 2024-03-13 DIAGNOSIS — C18.2 MALIGNANT NEOPLASM OF ASCENDING COLON: Primary | ICD-10-CM

## 2024-03-13 DIAGNOSIS — N18.32 ANEMIA DUE TO STAGE 3B CHRONIC KIDNEY DISEASE: ICD-10-CM

## 2024-03-13 DIAGNOSIS — D63.1 ANEMIA DUE TO STAGE 3B CHRONIC KIDNEY DISEASE: ICD-10-CM

## 2024-03-13 DIAGNOSIS — N18.32 STAGE 3B CHRONIC KIDNEY DISEASE (CKD): ICD-10-CM

## 2024-03-13 DIAGNOSIS — Z90.49 S/P SMALL BOWEL RESECTION: Primary | ICD-10-CM

## 2024-03-13 LAB
ALBUMIN SERPL-MCNC: 3.3 G/DL (ref 3.5–5.2)
ALBUMIN SERPL-MCNC: 3.5 G/DL (ref 3.5–5.2)
ALBUMIN/GLOB SERPL: 1.1 G/DL
ALBUMIN/GLOB SERPL: 1.2 G/DL
ALP SERPL-CCNC: 141 U/L (ref 39–117)
ALP SERPL-CCNC: 153 U/L (ref 39–117)
ALT SERPL W P-5'-P-CCNC: 30 U/L (ref 1–41)
ALT SERPL-CCNC: 33 U/L (ref 1–41)
ANION GAP SERPL CALCULATED.3IONS-SCNC: 8 MMOL/L (ref 5–15)
AST SERPL-CCNC: 19 U/L (ref 1–40)
AST SERPL-CCNC: 22 U/L (ref 1–40)
BASOPHILS # BLD AUTO: 0.03 10*3/MM3 (ref 0–0.2)
BASOPHILS # BLD AUTO: 0.04 10*3/MM3 (ref 0–0.2)
BASOPHILS NFR BLD AUTO: 0.5 % (ref 0–1.5)
BASOPHILS NFR BLD AUTO: 0.6 % (ref 0–1.5)
BILIRUB SERPL-MCNC: 0.3 MG/DL (ref 0–1.2)
BILIRUB SERPL-MCNC: 0.4 MG/DL (ref 0–1.2)
BUN SERPL-MCNC: 22 MG/DL (ref 8–23)
BUN SERPL-MCNC: 22 MG/DL (ref 8–23)
BUN/CREAT SERPL: 11.9 (ref 7–25)
BUN/CREAT SERPL: 12.2 (ref 7–25)
CALCIUM SERPL-MCNC: 8.4 MG/DL (ref 8.6–10.5)
CALCIUM SPEC-SCNC: 8.5 MG/DL (ref 8.6–10.5)
CHLORIDE SERPL-SCNC: 102 MMOL/L (ref 98–107)
CHLORIDE SERPL-SCNC: 104 MMOL/L (ref 98–107)
CHOLEST SERPL-MCNC: 144 MG/DL (ref 0–200)
CO2 SERPL-SCNC: 29 MMOL/L (ref 22–29)
CO2 SERPL-SCNC: 29.3 MMOL/L (ref 22–29)
CREAT SERPL-MCNC: 1.8 MG/DL (ref 0.76–1.27)
CREAT SERPL-MCNC: 1.85 MG/DL (ref 0.76–1.27)
DEPRECATED RDW RBC AUTO: 57.3 FL (ref 37–54)
EGFRCR SERPLBLD CKD-EPI 2021: 37.5 ML/MIN/1.73
EGFRCR SERPLBLD CKD-EPI 2021: 38.8 ML/MIN/1.73
EOSINOPHIL # BLD AUTO: 0.41 10*3/MM3 (ref 0–0.4)
EOSINOPHIL # BLD AUTO: 0.46 10*3/MM3 (ref 0–0.4)
EOSINOPHIL NFR BLD AUTO: 6.6 % (ref 0.3–6.2)
EOSINOPHIL NFR BLD AUTO: 6.8 % (ref 0.3–6.2)
ERYTHROCYTE [DISTWIDTH] IN BLOOD BY AUTOMATED COUNT: 14.6 % (ref 12.3–15.4)
ERYTHROCYTE [DISTWIDTH] IN BLOOD BY AUTOMATED COUNT: 15.9 % (ref 12.3–15.4)
GLOBULIN SER CALC-MCNC: 3 GM/DL
GLOBULIN UR ELPH-MCNC: 3.1 GM/DL
GLUCOSE SERPL-MCNC: 138 MG/DL (ref 65–99)
GLUCOSE SERPL-MCNC: 175 MG/DL (ref 65–99)
HBA1C MFR BLD: 7.6 % (ref 4.8–5.6)
HCT VFR BLD AUTO: 28.8 % (ref 37.5–51)
HCT VFR BLD AUTO: 29.5 % (ref 37.5–51)
HDLC SERPL-MCNC: 39 MG/DL (ref 40–60)
HGB BLD-MCNC: 8.8 G/DL (ref 13–17.7)
HGB BLD-MCNC: 9.4 G/DL (ref 13–17.7)
HOLD SPECIMEN: NORMAL
IMM GRANULOCYTES # BLD AUTO: 0.01 10*3/MM3 (ref 0–0.05)
IMM GRANULOCYTES # BLD AUTO: 0.02 10*3/MM3 (ref 0–0.05)
IMM GRANULOCYTES NFR BLD AUTO: 0.2 % (ref 0–0.5)
IMM GRANULOCYTES NFR BLD AUTO: 0.3 % (ref 0–0.5)
LDLC SERPL CALC-MCNC: 86 MG/DL (ref 0–100)
LYMPHOCYTES # BLD AUTO: 1.01 10*3/MM3 (ref 0.7–3.1)
LYMPHOCYTES # BLD AUTO: 1.24 10*3/MM3 (ref 0.7–3.1)
LYMPHOCYTES NFR BLD AUTO: 16.3 % (ref 19.6–45.3)
LYMPHOCYTES NFR BLD AUTO: 18.4 % (ref 19.6–45.3)
MCH RBC QN AUTO: 29.8 PG (ref 26.6–33)
MCH RBC QN AUTO: 29.8 PG (ref 26.6–33)
MCHC RBC AUTO-ENTMCNC: 30.6 G/DL (ref 31.5–35.7)
MCHC RBC AUTO-ENTMCNC: 31.9 G/DL (ref 31.5–35.7)
MCV RBC AUTO: 93.7 FL (ref 79–97)
MCV RBC AUTO: 97.6 FL (ref 79–97)
MONOCYTES # BLD AUTO: 0.47 10*3/MM3 (ref 0.1–0.9)
MONOCYTES # BLD AUTO: 0.47 10*3/MM3 (ref 0.1–0.9)
MONOCYTES NFR BLD AUTO: 7 % (ref 5–12)
MONOCYTES NFR BLD AUTO: 7.6 % (ref 5–12)
NEUTROPHILS # BLD AUTO: 4.25 10*3/MM3 (ref 1.7–7)
NEUTROPHILS NFR BLD AUTO: 4.5 10*3/MM3 (ref 1.7–7)
NEUTROPHILS NFR BLD AUTO: 66.9 % (ref 42.7–76)
NEUTROPHILS NFR BLD AUTO: 68.8 % (ref 42.7–76)
NRBC BLD AUTO-RTO: 0 /100 WBC (ref 0–0.2)
NRBC BLD AUTO-RTO: 0 /100 WBC (ref 0–0.2)
PLATELET # BLD AUTO: 162 10*3/MM3 (ref 140–450)
PLATELET # BLD AUTO: 166 10*3/MM3 (ref 140–450)
PMV BLD AUTO: 9.3 FL (ref 6–12)
POTASSIUM SERPL-SCNC: 4.4 MMOL/L (ref 3.5–5.2)
POTASSIUM SERPL-SCNC: 5.3 MMOL/L (ref 3.5–5.2)
PROT SERPL-MCNC: 6.4 G/DL (ref 6–8.5)
PROT SERPL-MCNC: 6.5 G/DL (ref 6–8.5)
RBC # BLD AUTO: 2.95 10*6/MM3 (ref 4.14–5.8)
RBC # BLD AUTO: 3.15 10*6/MM3 (ref 4.14–5.8)
SODIUM SERPL-SCNC: 139 MMOL/L (ref 136–145)
SODIUM SERPL-SCNC: 141 MMOL/L (ref 136–145)
TRIGL SERPL-MCNC: 103 MG/DL (ref 0–150)
VLDLC SERPL CALC-MCNC: 19 MG/DL (ref 5–40)
WBC # BLD AUTO: 6.18 10*3/MM3 (ref 3.4–10.8)
WBC NRBC COR # BLD AUTO: 6.73 10*3/MM3 (ref 3.4–10.8)

## 2024-03-13 PROCEDURE — 3074F SYST BP LT 130 MM HG: CPT

## 2024-03-13 PROCEDURE — 85025 COMPLETE CBC W/AUTO DIFF WBC: CPT

## 2024-03-13 PROCEDURE — 99024 POSTOP FOLLOW-UP VISIT: CPT

## 2024-03-13 PROCEDURE — 80053 COMPREHEN METABOLIC PANEL: CPT

## 2024-03-13 PROCEDURE — 1160F RVW MEDS BY RX/DR IN RCRD: CPT

## 2024-03-13 PROCEDURE — 3078F DIAST BP <80 MM HG: CPT

## 2024-03-13 PROCEDURE — 36415 COLL VENOUS BLD VENIPUNCTURE: CPT

## 2024-03-13 PROCEDURE — 1159F MED LIST DOCD IN RCRD: CPT

## 2024-03-13 NOTE — PROGRESS NOTES
"Patient: Bernard Matta    YOB: 1950    Date: 03/13/2024    Primary Care Provider: Brando Webb MD    Vital Signs:   Vitals:    03/13/24 1258   BP: 110/66   BP Location: Left arm   Patient Position: Sitting   Cuff Size: Adult   Pulse: 86   SpO2: 95%   Weight: 83.4 kg (183 lb 12.8 oz)   Height: 175.3 cm (69.02\")       The patient is tolerating a regular diet and has no complaints s/p diagnostic laparoscopy converted to laparotomy with ileocecectomy by Dr. Park on 2/6/24. The patient denies fevers, chills, nausea, vomiting, and excessive pain. I removed staples at his last visit. He is here for a wound recheck.     Assessment / Plan:    Diagnoses and all orders for this visit:    1. S/P small bowel resection (Primary)    Mr. Matta is a 72 y/o male who presents to the clinic for wound recheck. The area is continuing to heal with 2 superficial 2 cm areas of opening at the superior aspect of his incision. I have instructed him and his wife to continue to wash the area with antibacterial soap and to dress the area until there is no area of opening. He will call for an appointment if he has any new problems or concerns. He and his wife voice understanding of this and are agreeable to the plan.     Follow up:     Return if symptoms worsen or fail to improve.        Electronically signed by Rica Khan PA-C  03/13/24  14:13 CDT                  "

## 2024-03-18 NOTE — PROGRESS NOTES
"Enter Query Response Below      Query Response: adenocarcinoma of the cecum was the cause of the perforation             If applicable, please update the problem list.       Patient: Bernard Matta        : 1950  Account: [id number]           Admit Date: 2024        How to Respond to this query:       a. Click New Note     b. Answer query within the yellow box.                c. Update the Problem List, if applicable.      If you have any questions about this query contact me at: [email or web address]     Dr. Park,    Patient presented on  and was determined to have cecal perforation.  Patient underwent surgery on .  Pathology showed:  \"Final Diagnosis -- -- -- [facility]  Result:  Cecum, ileocecectomy:  A.  Moderately differentiated adenocarcinoma arising in the cecal pit (1.9 cm).  B.  Tumor invades through the visceral peritoneum resulting in macroscopic perforation.\"    Can this be further clarified as:    - adenocarcinoma of the cecum was the cause of the perforation  - adenocarcinoma of the cecum was not the cause of the perforation  - other _______________________________  - clinically indeterminable    By submitting this query, we are merely seeking further clarification of documentation to accurately reflect all conditions that you are monitoring, evaluating, treating or that extend the hospitalization or utilize additional resources of care. Please utilize your independent clinical judgment when addressing the question(s) above.     This query and your response, once completed, will be entered into the legal medical record.    Sincerely,  Ely Correa RN CCDS [facility]  Clinical Documentation Integrity Program   "

## 2024-03-19 ENCOUNTER — HOSPITAL ENCOUNTER (OUTPATIENT)
Dept: CARDIOLOGY | Facility: HOSPITAL | Age: 74
Discharge: HOME | End: 2024-03-19
Payer: MEDICARE

## 2024-03-19 ENCOUNTER — HOSPITAL ENCOUNTER (OUTPATIENT)
Dept: ULTRASOUND IMAGING | Facility: HOSPITAL | Age: 74
Discharge: HOME | End: 2024-03-19
Payer: MEDICARE

## 2024-03-19 DIAGNOSIS — I45.2 RBBB (RIGHT BUNDLE BRANCH BLOCK WITH LEFT ANTERIOR FASCICULAR BLOCK): ICD-10-CM

## 2024-03-19 DIAGNOSIS — E78.2 MIXED HYPERLIPIDEMIA: ICD-10-CM

## 2024-03-19 DIAGNOSIS — I10 PRIMARY HYPERTENSION: ICD-10-CM

## 2024-03-19 DIAGNOSIS — R09.89 RIGHT CAROTID BRUIT: ICD-10-CM

## 2024-03-19 DIAGNOSIS — R06.09 DOE (DYSPNEA ON EXERTION): ICD-10-CM

## 2024-03-19 LAB
BH CV STRESS BP STAGE 1: NORMAL
BH CV STRESS DURATION MIN STAGE 1: 4
BH CV STRESS DURATION SEC STAGE 1: 11
BH CV STRESS ECHO POST STRESS EJECTION FRACTION EF: 80 %
BH CV STRESS GRADE STAGE 1: 10
BH CV STRESS HR STAGE 1: 135
BH CV STRESS METS STAGE 1: 5
BH CV STRESS PROTOCOL 1: NORMAL
BH CV STRESS RECOVERY BP: NORMAL MMHG
BH CV STRESS RECOVERY HR: 95 BPM
BH CV STRESS SPEED STAGE 1: 1.7
BH CV STRESS STAGE 1: 1
MAXIMAL PREDICTED HEART RATE: 145 BPM
PERCENT MAX PREDICTED HR: 93.1 %
STRESS BASELINE BP: NORMAL MMHG
STRESS BASELINE HR: 80 BPM
STRESS PERCENT HR: 110 %
STRESS POST ESTIMATED WORKLOAD: 5 METS
STRESS POST EXERCISE DUR MIN: 4 MIN
STRESS POST EXERCISE DUR SEC: 11 SEC
STRESS POST PEAK BP: NORMAL MMHG
STRESS POST PEAK HR: 135 BPM
STRESS TARGET HR: 123 BPM

## 2024-03-19 PROCEDURE — 93018 CV STRESS TEST I&R ONLY: CPT | Performed by: INTERNAL MEDICINE

## 2024-03-19 PROCEDURE — 93880 EXTRACRANIAL BILAT STUDY: CPT

## 2024-03-19 PROCEDURE — 93350 STRESS TTE ONLY: CPT

## 2024-03-19 PROCEDURE — 93017 CV STRESS TEST TRACING ONLY: CPT

## 2024-03-19 PROCEDURE — 93352 ADMIN ECG CONTRAST AGENT: CPT | Performed by: INTERNAL MEDICINE

## 2024-03-19 PROCEDURE — 93880 EXTRACRANIAL BILAT STUDY: CPT | Performed by: SURGERY

## 2024-03-19 PROCEDURE — 93350 STRESS TTE ONLY: CPT | Performed by: INTERNAL MEDICINE

## 2024-03-19 PROCEDURE — 25510000001 PERFLUTREN (DEFINITY) 8.476 MG IN SODIUM CHLORIDE (PF) 0.9 % 10 ML INJECTION: Performed by: INTERNAL MEDICINE

## 2024-03-19 RX ADMIN — PERFLUTREN 10 ML: 6.52 INJECTION, SUSPENSION INTRAVENOUS at 10:27

## 2024-03-20 NOTE — PROGRESS NOTES
[facility] HEMATOLOGY & ONCOLOGY  [address]  [facility] [address] [phone]    Patient Name: Bernard Matta  Encounter Date: 04/03/2024  YOB: 1950  Patient Number: [id number]      REASON FOR FOLLOW-UP: Bernard Matta is a pleasant 73 y.o.  male who is seen on follow-up for adenocarcinoma the cecum.  He is also seen for anemia from iron deficiency and chronic kidney disease stage IIIb.  GFR 31 mL/min on 2/21/2024.  Patient is on epoetin alpha and intravenous iron as indicated.  Latest ferric carboxymaltose was given 2/28/2024.  The patient is seen with his spouse, Nury.  History obtained from the patient.  History is considered reliable.           DIAGNOSTIC ABNORMALITIES: Colon cancer.  Screening colonoscopy 8/12/2021 was negative.  He presented with diffuse abdominal pain, nausea and vomiting.  CT abdomen pelvis 2/6/2024.There is an abnormal bowel gas pattern with multiple moderately  distended loops of small bowel with mild bowel wall thickening. The right and transverse colon are also moderately distended. The more distal colon is decompressed. Within the right lower quadrant posterior to the cecum there is a complex collection which is not definitely contiguous with any adjacent GI structure suspicious for localized perforation with abscess formation. There is surrounding inflammatory changes. The appendix is not clearly identified and this could represent a ruptured appendicitis. I do not see any documentation of previous appendectomy by electronic medical record. There is free air beneath the right diaphragm and within the perihepatic space.  Free fluid within the lower right paracolic gutter, central pelvis and lower pelvis. This fluid demonstrates some complexity. Small hiatal hernia. The prostate gland is enlarged. There is a bladder diverticulum emanating from both the left and right lateral bladder wall. A  mild element of chronic bladder outlet obstruction is not excluded.  Chest x-ray 2/6/2024.Radiodense nodule in the right upper lobe-favor granuloma.  No acute infiltrate. Free air demonstrated on CT imaging is not as well delineated by plain film radiograph.  CT head on 2/10/2024.Chronic changes and no acute intracranial findings.  Paranasal sinus mucosal thickening.  Pathology report 2/10/2024.  Cecum, ileocecectomy:  A. Moderately differentiated adenocarcinoma arising in the cecal pit (1.9 cm).  B. Tumor invades through the visceral peritoneum resulting in macroscopic perforation.  C. The proximal, distal and radial adventitial soft tissue margins are free of tumor.  D. Acute peritonitis.  E. Pericolic cyst-like structure with hyalinized fibrous wall and acute and chronic inflammation and hemorrhage in  adjacent fibroadipose tissue.  F. Nine of 9 pericolic lymph nodes are negative for metastatic carcinoma.  G. Detached segment of viable bowel compatible with anastomotic tissue with large intestine and small intestinal tissue  seen, negative for malignancy.  AJCC stage: pT4a pN0  CT chest report 3/8/2024.No evidence of metastatic disease in the chest.            PREVIOUS INTERVENTIONS:  Laparotomy and ileocecectomy on 2/6/2024.  Positive for perforation in the posterior cecum.          Oncology/Hematology History   Colon cancer   2/28/2024 Initial Diagnosis    Colon cancer     2/28/2024 Cancer Staged    Staging form: Colon And Rectum, AJCC 8th Edition  - Pathologic stage from 2/28/2024: Stage IIB (pT4a, pN0, cM0) - Signed by Navi Stearns MD on 2/28/2024         PAST MEDICAL HISTORY:  ALLERGIES:  Allergies   Allergen Reactions    Jardiance [Empagliflozin] Other (See Comments)     Pt reports nausea/vomiting        CURRENT MEDICATIONS:  Outpatient Encounter Medications as of 4/3/2024   Medication Sig Dispense Refill    amitriptyline (ELAVIL) 10 MG tablet Take 1 tablet by mouth At Night As Needed for Sleep.       aspirin 81 MG tablet Take 1 tablet by mouth Daily.      atorvastatin (LIPITOR) 40 MG tablet Take 1 tablet by mouth every night at bedtime. 90 tablet 3    cetirizine (zyrTEC) 10 MG tablet Take 1 tablet by mouth Daily.      cholecalciferol (VITAMIN D3) 400 UNITS tablet Take 1 tablet by mouth Daily.      cloNIDine (CATAPRES) 0.2 MG tablet Take 1 tablet by mouth 2 (Two) Times a Day.      dorzolamide-timolol (COSOPT) 22.3-6.8 MG/ML ophthalmic solution Administer 1 drop to both eyes 2 (Two) Times a Day.      epoetin shari-epbx (Retacrit) 94735 UNIT/ML injection Inject  under the skin into the appropriate area as directed Every 14 (Fourteen) Days.      ezetimibe (Zetia) 10 MG tablet Take 1 tablet by mouth Daily. 90 tablet 3    fluticasone (FLONASE) 50 MCG/ACT nasal spray 2 sprays into the nostril(s) as directed by provider Daily.      furosemide (Lasix) 20 MG tablet Take 1 tablet by mouth Daily. 90 tablet 3    insulin aspart (novoLOG FLEXPEN) 100 UNIT/ML solution pen-injector sc pen Inject 0-20 Units under the skin into the appropriate area as directed 3 (Three) Times a Day With Meals. Sliding scale      insulin degludec (TRESIBA FLEXTOUCH) 100 UNIT/ML solution pen-injector injection Inject 30 Units under the skin into the appropriate area as directed Every Night.      latanoprost (XALATAN) 0.005 % ophthalmic solution Administer 1 drop to both eyes Every Night.      lisinopril (PRINIVIL,ZESTRIL) 10 MG tablet Take 1 tablet by mouth Every Night.      Multiple Vitamins-Minerals (MULTIVITAMIN WITH MINERALS) tablet tablet Take 1 tablet by mouth Daily.      ondansetron ODT (ZOFRAN-ODT) 4 MG disintegrating tablet Place 1 tablet on the tongue Every 8 (Eight) Hours As Needed for Nausea or Vomiting. 30 tablet 1    Rhopressa 0.02 % solution Apply 1 drop to eye(s) as directed by provider Every Night.      simethicone (Gas-X) 80 MG chewable tablet Chew 0.5 tablets Every 6 (Six) Hours As Needed for Flatulence (belching). 30 tablet 1     tamsulosin (FLOMAX) 0.4 MG capsule 24 hr capsule Take 2 capsules by mouth Every Night.      traMADol (Ultram) 50 MG tablet Take 1 tablet by mouth Every 8 (Eight) Hours As Needed for Moderate Pain. 30 tablet 0     No facility-administered encounter medications on file as of 4/3/2024.     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    Hypertension I10    Type 2 diabetes mellitus E11.9    Hx of adenomatous colonic polyps Z86.010    Iron deficiency anemia D50.9    Intestinal malabsorption, unspecified K90.9    Bowel perforation K63.1    Stage 3b chronic kidney disease (CKD) N18.32    Hyperkalemia E87.5    COVID-19 virus detected U07.1    Colon cancer C18.9     SURGERIES:  Past Surgical History:   Procedure Laterality Date    COLONOSCOPY  2019    [place]    COLONOSCOPY N/A 8/11/2020    Procedure: COLONOSCOPY WITH ANESTHESIA;  Surgeon: Shaquille Mckenzie DO;  Location: [facility] ENDOSCOPY;  Service: Gastroenterology;  Laterality: N/A;  preop; hx of polyps   postop; polyps   PCP Brando Webb     COLONOSCOPY N/A 8/12/2021    Procedure: COLONOSCOPY WITH ANESTHESIA;  Surgeon: Shaquille Mckenzie DO;  Location: [facility] ENDOSCOPY;  Service: Gastroenterology;  Laterality: N/A;  pre hx adenomatous colon polyp  post normal  Brando Webb MD    EXPLORATORY LAPAROTOMY N/A 2/6/2024    Procedure: DIAGNOSTIC LAPAROSCOPY CONVERTED TO LAPAROTOMY, ILIOCECTECTOMY;  Surgeon: Hemant Park MD;  Location: [facility] OR;  Service: General;  Laterality: N/A;     HEALTH MAINTENANCE ITEMS:  Health Maintenance Due   Topic Date Due    COLONOSCOPY  08/12/2022       <no information>  Last Completed Colonoscopy       This patient has no relevant Health Maintenance data.          Immunization History   Administered Date(s) Administered    COVID-19 (MODERNA) 1st,2nd,3rd Dose Monovalent 03/02/2021, 03/30/2021, 11/02/2021    COVID-19 (PFIZER) BIVALENT 12+YRS 09/29/2022    COVID-19 F23  "(PFIZER) 12YRS+ (COMIRNATY) 2023    Covid-19 (Pfizer) Gray Cap Monovalent 2022    FLUAD TRI 65YR+ 2019    Flu Vaccine Split Quad 2013    Fluad Quad 65+ 2019, 2020    Fluzone (or Fluarix & Flulaval for VFC) >6mos 2013, 2022    Fluzone High Dose =>65 Years (Vaxcare ONLY) 2016, 10/02/2017, 10/08/2018    Fluzone High-Dose 65+yrs 10/19/2021, 10/02/2023    Pneumococcal Conjugate 13-Valent (PCV13) 2015    Pneumococcal Polysaccharide (PPSV23) 2012, 2013    Shingrix 2018, 2019    Tdap 2013     Last Completed Mammogram       This patient has no relevant Health Maintenance data.              FAMILY HISTORY:  Family History   Problem Relation Age of Onset    No Known Problems Maternal Grandmother     No Known Problems Maternal Grandfather     No Known Problems Paternal Grandmother     No Known Problems Paternal Grandfather     No Known Problems Mother     No Known Problems Father     Colon cancer Neg Hx     Colon polyps Neg Hx     Esophageal cancer Neg Hx      SOCIAL HISTORY:  Social History     Socioeconomic History    Marital status:    Tobacco Use    Smoking status: Former     Current packs/day: 0.00     Average packs/day: 0.5 packs/day for 10.0 years (5.0 ttl pk-yrs)     Types: Cigarettes     Start date: 1969     Quit date: 1979     Years since quittin.2     Passive exposure: Past    Smokeless tobacco: Never   Vaping Use    Vaping status: Never Used   Substance and Sexual Activity    Alcohol use: No    Drug use: No    Sexual activity: Defer       REVIEW OF SYSTEMS:    Review of Systems   Constitutional:  Positive for fatigue.        \"I am better.\"  He can perform self care.    HENT:  Negative for congestion and mouth sores.    Eyes:  Negative for redness and visual disturbance.   Respiratory:  Negative for shortness of breath and wheezing.    Cardiovascular:  Negative for chest pain and palpitations. " "  Gastrointestinal:  Negative for abdominal pain, constipation, diarrhea, nausea and vomiting.   Endocrine: Negative for cold intolerance and heat intolerance.   Genitourinary:  Negative for difficulty urinating and dysuria.   Musculoskeletal:  Negative for gait problem and myalgias.   Skin:  Positive for pallor.   Allergic/Immunologic: Negative for food allergies.   Neurological:  Negative for dizziness, seizures, speech difficulty and weakness.   Hematological:  Negative for adenopathy. Does not bruise/bleed easily.   Psychiatric/Behavioral:  Negative for agitation, confusion and hallucinations.        VITAL SIGNS: /68   Pulse 88   Temp 97.3 °F (36.3 °C)   Resp 18   Ht 175.3 cm (69.02\")   Wt 82 kg (180 lb 11.2 oz)   SpO2 96%   BMI 26.67 kg/m²   Pain Score    04/03/24 0936   PainSc: 0-No pain       PHYSICAL EXAMINATION:     Physical Exam  Vitals reviewed.   Constitutional:       General: He is not in acute distress.  HENT:      Head: Normocephalic and atraumatic.   Eyes:      General: No scleral icterus.  Cardiovascular:      Rate and Rhythm: Normal rate.   Pulmonary:      Effort: No respiratory distress.      Breath sounds: No wheezing.   Abdominal:      General: Bowel sounds are normal.      Palpations: Abdomen is soft.      Comments: Incisions are healed.    Musculoskeletal:      Cervical back: Neck supple.      Comments: Bilateral ankle edema.    Skin:     General: Skin is warm.      Coloration: Skin is pale.   Neurological:      Mental Status: He is alert and oriented to person, place, and time.   Psychiatric:         Mood and Affect: Mood normal.         Behavior: Behavior normal.         Thought Content: Thought content normal.         Judgment: Judgment normal.         LABS    Lab Results - Last 18 Months   Lab Units 04/03/24  0848 03/27/24  1417 03/13/24  0902 03/12/24  0704 02/28/24  1201 02/21/24  1029 02/14/24  0844 02/12/24  0212 02/09/24  0656 02/08/24  0547 02/07/24  0750   HEMOGLOBIN " g/dL 10.3* 9.3* 8.8* 9.4* 8.5* 8.1* 9.2* 9.7*   < > 10.2* 10.2*   HEMATOCRIT % 34.1* 29.4* 28.8* 29.5* 27.9* 26.0* 30.3* 32.1*   < > 33.3* 33.0*   MCV fL 100.3* 95.8 97.6* 93.7 94.9 94.9 95.9 94.7   < > 95.4 95.7   WBC 10*3/mm3 5.75 6.44 6.73 6.18 7.86 9.54 7.61 5.76   < > 7.12 7.34   RDW % 15.9* 15.3 15.9* 14.6 14.3 13.9 14.8 14.9   < > 14.5 14.0   MPV fL 8.8 9.1 9.3  --  9.5  --  9.9 9.1   < > 9.2 9.3   PLATELETS 10*3/mm3 181 202 162 166 275 396 218 215   < > 213 159   IMM GRAN % % 0.3 0.2 0.3  --  0.4  --  1.8* 1.6*   < >  --   --    NEUTROS ABS 10*3/mm3 3.91 3.73 4.50 4.25 5.59 7.32* 5.50 3.83   < > 5.91 6.68   LYMPHS ABS 10*3/mm3 1.03 1.59 1.24 1.01 1.34 1.14 1.04 0.81   < >  --   --    MONOS ABS 10*3/mm3 0.40 0.54 0.47 0.47 0.59 0.77 0.53 0.52   < >  --   --    EOS ABS 10*3/mm3 0.36 0.53* 0.46* 0.41* 0.27 0.20 0.36 0.48*   < > 0.21  --    BASOS ABS 10*3/mm3 0.03 0.04 0.04 0.03 0.04 0.07 0.04 0.03   < > 0.14 0.07   IMMATURE GRANS (ABS) 10*3/mm3 0.02 0.01 0.02  --  0.03  --  0.14* 0.09*   < >  --   --    NRBC /100 WBC 0.0 0.0 0.0 0.0 0.0 0.0 0.0 0.0   < >  --   --    NEUTROPHIL % %  --   --   --   --   --   --   --   --   --  73.0 69.0   MONOCYTES % %  --   --   --   --   --   --   --   --   --  6.0 1.0*   BASOPHIL % %  --   --   --   --   --   --   --   --   --  2.0* 1.0   ATYP LYMPH % %  --   --   --   --   --   --   --   --   --   --  2.0   ANISOCYTOSIS   --   --   --   --   --   --   --   --   --   --  Slight/1+    < > = values in this interval not displayed.       Lab Results - Last 18 Months   Lab Units 04/03/24  0848 03/27/24  1417 03/13/24  0902 03/12/24  0704 02/28/24  1201 02/21/24  1029 02/16/24  0533 02/15/24  1615 02/15/24  0534 02/14/24  0844   GLUCOSE mg/dL 267* 98 175* 138* 111*  111* 365* 290*  --  70 267*   SODIUM mmol/L 139 140 141 139 140  140 133* 137  --  144 144   POTASSIUM mmol/L 4.6 4.2 4.4 5.3* 4.5  4.5 5.4* 4.8   < > 3.3* 3.5   CO2 mmol/L 31.0* 29.0 29.0 29.3* 30.0*  30.0* 24.2  20.0*  --  24.0 24.0   CHLORIDE mmol/L 100 101 104 102 101  101 96* 104  --  110* 109*   ANION GAP mmol/L 8.0 10.0 8.0  --  9.0  9.0  --  13.0  --  10.0 11.0   CREATININE mg/dL 1.84* 1.70* 1.85* 1.80* 1.97*  1.97* 2.17* 1.95*  --  1.93* 1.66*   BUN mg/dL 23 24* 22 22 30*  30* 35* 38*  --  40* 35*   BUN / CREAT RATIO  12.5 14.1 11.9 12.2 15.2  15.2 16.1 19.5  --  20.7 21.1   CALCIUM mg/dL 8.9 8.7 8.5* 8.4* 8.6  8.6 8.4* 8.7  --  8.6 8.7   ALK PHOS U/L  --  120* 141* 153* 167* 132* 104  --  94 94   TOTAL PROTEIN g/dL  --  6.8 6.4  --  7.0  --  6.1  --  6.0 6.2   ALT (SGPT) U/L  --  22 30 33 58* 76* 63*  --  61* 68*   AST (SGOT) U/L  --  19 22 19 30 41* 35  --  36 36   BILIRUBIN mg/dL  --  0.3 0.3 0.4 0.2 0.2 0.3  --  0.2 0.2   ALBUMIN g/dL  --  3.7 3.3* 3.5 3.5  3.5 3.2* 2.9*  --  2.9* 3.0*   GLOBULIN gm/dL  --  3.1 3.1  --  3.5  --  3.2  --  3.1 3.2    < > = values in this interval not displayed.       Lab Results - Last 18 Months   Lab Units 03/27/24  1417 02/28/24  1201 01/25/23  1019   M-SPIKE g/dL  --   --  Not Observed   KAPPA/LAMBDA RATIO, S   --   --  1.55   FREE LAMBDA LIGHT CHAINS mg/L  --   --  25.8   IG KAPPA FREE LIGHT CHAIN mg/L  --   --  40.0*   CEA ng/mL 3.44 7.10  --    LDH U/L  --   --  183       Lab Results - Last 18 Months   Lab Units 03/27/24  1417 02/28/24  1201 11/29/23  1100 11/01/23  0957 08/25/23  0759 08/02/23  1024 05/03/23  1221 04/05/23  0855 02/22/23  0946 01/25/23  1019   IRON mcg/dL 58* 37* 84 37*  --  53* 88 36*   < > 122   TIBC mcg/dL 244* 250* 216* 262* 247 244* 226* 256*   < > 337   IRON SATURATION %  --   --   --   --  27  --   --   --   --   --    IRON SATURATION (TSAT) % 24 15* 39 14*  --  22 39 14*   < > 36   FERRITIN ng/mL 539.40* 383.90 797.10* 120.50 266.00 290.60 1,027.00* 59.42   < > 16.82*   TSH uIU/mL  --   --   --   --  2.110  --   --  1.740  --   --    FOLATE ng/mL  --   --   --   --  >20.00  --   --   --   --  >20.00    < > = values in this interval not  displayed.       Bernard Matta reports a pain score of 0.          ASSESSMENT:  Adenocarcinoma involving the cecum.  High risk.  Less than 12 lymph nodes examined.  No lymphovascular or perineural invasion.  MSI/MMR report 3/11/2024. CLEMENCIA and pMMR.  AJCC stage: IIb (pT4a, pN0, cM0, G2).  Tumor size 1.9 cm.  Zo status, 0/9 positive lymph node.  Treatment status: Adjuvant capecitabine pending.  2. Performance status of 2.   3.  Normocytic anemia from iron deficiency and chronic kidney disease stage IIIb.  GFR 31 mL/min on 2/21/2024.  Latest Injectafer given 2/28/2024 due to malabsorption.    4.  Type 2 diabetes, one of the etiology for chronic kidney disease. On insulin.   5.  Intestinal malabsorption, unspecific type.  Intravenous iron as indicated.  6.  Elevated transaminase, observation.           PLAN:   Re: Note from Rica Khan PA-C on 3/13/2024.  Patient tolerating diet.  2 superficial 2 cm areas of opening at the superior aspect of his incision.  2.   Re:  Seen by Dr. Allan Flores 3/21/2024 for chronic kidney disease stage III.   3.   Re:  Aware for role of adjuvant chemotherapy with capecitabine or FOLFOX due to high risk disease, less than 12 lymph nodes sampled.  Colon cancer survival calculator, disease specific survival at 5 years is 71% if he is really N0 disease.  Patient aware less than 12 lymph nodes were sampled.  4.   Re: Heme status.  WBC 5.75, hemoglobin 10.3, hematocrit 34.1, .3 and platelet 181.  5.   Re: CMP.  Glucose 237. GFR 37.8 mL/min.  Alkaline phosphatase 120 from 141 from 153 from 167 from 132 and ALT 58 from 76.  6.   Re: CEA 3.4 from 7.1, observe.  7.   Re: Ferritin 539.4 and saturation 24%.    8.   Re: Proceed with chemo.   9.  eRx capecitabine 750 mg/m2 (dose reduced due to CKD) every 12 hours for 14 days every 21-day cycle x 24 weeks.  Monitor for adverse reactions like anaphylaxis, hypersensitivity  reaction, nausea, vomiting, dermatitis, hepatotoxicity, hand-foot syndrome, diarrhea, fatigue, or cardiotoxicity arrhythmias or ischemia. Hold if GFR < 30.  Patient preference for capecitabine rather than FOLFOX.   10.  eRx ondansetron 8 mg p.o. every 8 hours as needed for nausea and vomiting #60, 2 refills.  11.  eRx prochlorperazine 10 mg p.o. every 4 hours as needed for nausea and vomiting #60, 2 refills.  12.  Weekly CBC with differential with chemo.  13. Advance Care Planning  ACP discussion was held with the patient during this visit. Patient has an advance directive in EMR which is still valid.   14.  Colonoscopy at 1 year.  15.  Continue care per primary care physician at the specialist.  16.  Care discussed with patient and spouse.  Understand expressed.  Patient agreed to proceed.  17.  Epoetin alpha 40,000 units subcutaneous every 2 weeks if hemoglobin less than 11 and hematocrit less than 33, chronic kidney disease.  After for hypertension.  18. Return to office in 3 weeks with CBC with differential, ferritin, iron panel, CEA, and CMP.           I have reviewed the assessment and plan and verified the accuracy of it. No changes to assessment and plan since the information was documented. Navi Stearns MD 04/03/24            I spent 42 total minutes, face-to-face, caring for Bernard ramos. Greater than 50% of this time involved counseling and/or coordination of care as documented within this note.              Allan Schneider MD  (Dalton Villalobos MD)  (Shaquille Mckenzie MD)  Everton Webb MD  (JORJE Novoa)

## 2024-03-22 ENCOUNTER — TELEPHONE (OUTPATIENT)
Dept: ONCOLOGY | Facility: CLINIC | Age: 74
End: 2024-03-22

## 2024-03-22 NOTE — TELEPHONE ENCOUNTER
[facility] received a fax that requires your attention. The document has been indexed to the patient’s chart for your review.      Reason for sending: RECEIVED MEDICAL RECORDS.    Documents Description: PROGRESS NOTE 03/21/24    Name of Sender: [facility] [phone]    Date Indexed: 03/21/24    Notes (if needed): THANKS!

## 2024-03-27 ENCOUNTER — INFUSION (OUTPATIENT)
Dept: ONCOLOGY | Facility: HOSPITAL | Age: 74
End: 2024-03-27
Payer: MEDICARE

## 2024-03-27 ENCOUNTER — LAB (OUTPATIENT)
Dept: LAB | Facility: HOSPITAL | Age: 74
End: 2024-03-27
Payer: MEDICARE

## 2024-03-27 VITALS
TEMPERATURE: 97.9 F | RESPIRATION RATE: 18 BRPM | SYSTOLIC BLOOD PRESSURE: 136 MMHG | HEART RATE: 102 BPM | DIASTOLIC BLOOD PRESSURE: 62 MMHG | OXYGEN SATURATION: 98 %

## 2024-03-27 DIAGNOSIS — D50.0 IRON DEFICIENCY ANEMIA DUE TO CHRONIC BLOOD LOSS: ICD-10-CM

## 2024-03-27 DIAGNOSIS — C18.2 MALIGNANT NEOPLASM OF ASCENDING COLON: ICD-10-CM

## 2024-03-27 DIAGNOSIS — D50.9 IRON DEFICIENCY ANEMIA, UNSPECIFIED IRON DEFICIENCY ANEMIA TYPE: Primary | ICD-10-CM

## 2024-03-27 LAB
ALBUMIN SERPL-MCNC: 3.7 G/DL (ref 3.5–5.2)
ALBUMIN/GLOB SERPL: 1.2 G/DL
ALP SERPL-CCNC: 120 U/L (ref 39–117)
ALT SERPL W P-5'-P-CCNC: 22 U/L (ref 1–41)
ANION GAP SERPL CALCULATED.3IONS-SCNC: 10 MMOL/L (ref 5–15)
AST SERPL-CCNC: 19 U/L (ref 1–40)
BASOPHILS # BLD AUTO: 0.04 10*3/MM3 (ref 0–0.2)
BASOPHILS NFR BLD AUTO: 0.6 % (ref 0–1.5)
BILIRUB SERPL-MCNC: 0.3 MG/DL (ref 0–1.2)
BUN SERPL-MCNC: 24 MG/DL (ref 8–23)
BUN/CREAT SERPL: 14.1 (ref 7–25)
CALCIUM SPEC-SCNC: 8.7 MG/DL (ref 8.6–10.5)
CHLORIDE SERPL-SCNC: 101 MMOL/L (ref 98–107)
CO2 SERPL-SCNC: 29 MMOL/L (ref 22–29)
CREAT SERPL-MCNC: 1.7 MG/DL (ref 0.76–1.27)
DEPRECATED RDW RBC AUTO: 53.6 FL (ref 37–54)
EGFRCR SERPLBLD CKD-EPI 2021: 41.5 ML/MIN/1.73
EOSINOPHIL # BLD AUTO: 0.53 10*3/MM3 (ref 0–0.4)
EOSINOPHIL NFR BLD AUTO: 8.2 % (ref 0.3–6.2)
ERYTHROCYTE [DISTWIDTH] IN BLOOD BY AUTOMATED COUNT: 15.3 % (ref 12.3–15.4)
FERRITIN SERPL-MCNC: 539.4 NG/ML (ref 30–400)
GLOBULIN UR ELPH-MCNC: 3.1 GM/DL
GLUCOSE SERPL-MCNC: 98 MG/DL (ref 65–99)
HCT VFR BLD AUTO: 29.4 % (ref 37.5–51)
HGB BLD-MCNC: 9.3 G/DL (ref 13–17.7)
IMM GRANULOCYTES # BLD AUTO: 0.01 10*3/MM3 (ref 0–0.05)
IMM GRANULOCYTES NFR BLD AUTO: 0.2 % (ref 0–0.5)
IRON 24H UR-MRATE: 58 MCG/DL (ref 59–158)
IRON SATN MFR SERPL: 24 % (ref 20–50)
LYMPHOCYTES # BLD AUTO: 1.59 10*3/MM3 (ref 0.7–3.1)
LYMPHOCYTES NFR BLD AUTO: 24.7 % (ref 19.6–45.3)
MCH RBC QN AUTO: 30.3 PG (ref 26.6–33)
MCHC RBC AUTO-ENTMCNC: 31.6 G/DL (ref 31.5–35.7)
MCV RBC AUTO: 95.8 FL (ref 79–97)
MONOCYTES # BLD AUTO: 0.54 10*3/MM3 (ref 0.1–0.9)
MONOCYTES NFR BLD AUTO: 8.4 % (ref 5–12)
NEUTROPHILS NFR BLD AUTO: 3.73 10*3/MM3 (ref 1.7–7)
NEUTROPHILS NFR BLD AUTO: 57.9 % (ref 42.7–76)
NRBC BLD AUTO-RTO: 0 /100 WBC (ref 0–0.2)
PLATELET # BLD AUTO: 202 10*3/MM3 (ref 140–450)
PMV BLD AUTO: 9.1 FL (ref 6–12)
POTASSIUM SERPL-SCNC: 4.2 MMOL/L (ref 3.5–5.2)
PROT SERPL-MCNC: 6.8 G/DL (ref 6–8.5)
RBC # BLD AUTO: 3.07 10*6/MM3 (ref 4.14–5.8)
SODIUM SERPL-SCNC: 140 MMOL/L (ref 136–145)
TIBC SERPL-MCNC: 244 MCG/DL (ref 298–536)
TRANSFERRIN SERPL-MCNC: 164 MG/DL (ref 200–360)
WBC NRBC COR # BLD AUTO: 6.44 10*3/MM3 (ref 3.4–10.8)

## 2024-03-27 PROCEDURE — 83540 ASSAY OF IRON: CPT

## 2024-03-27 PROCEDURE — 85025 COMPLETE CBC W/AUTO DIFF WBC: CPT

## 2024-03-27 PROCEDURE — 25010000002 EPOETIN ALFA PER 1000 UNITS: Performed by: NURSE PRACTITIONER

## 2024-03-27 PROCEDURE — 96372 THER/PROPH/DIAG INJ SC/IM: CPT

## 2024-03-27 PROCEDURE — 82728 ASSAY OF FERRITIN: CPT

## 2024-03-27 PROCEDURE — 82378 CARCINOEMBRYONIC ANTIGEN: CPT

## 2024-03-27 PROCEDURE — 80053 COMPREHEN METABOLIC PANEL: CPT

## 2024-03-27 PROCEDURE — 36415 COLL VENOUS BLD VENIPUNCTURE: CPT

## 2024-03-27 PROCEDURE — 84466 ASSAY OF TRANSFERRIN: CPT

## 2024-03-27 RX ADMIN — ERYTHROPOIETIN 40000 UNITS: 40000 INJECTION, SOLUTION INTRAVENOUS; SUBCUTANEOUS at 15:03

## 2024-03-28 LAB — CEA SERPL-MCNC: 3.44 NG/ML

## 2024-04-03 ENCOUNTER — TRANSCRIBE ORDERS (OUTPATIENT)
Dept: ADMINISTRATIVE | Facility: HOSPITAL | Age: 74
End: 2024-04-03
Payer: MEDICARE

## 2024-04-03 ENCOUNTER — LAB (OUTPATIENT)
Dept: LAB | Facility: HOSPITAL | Age: 74
End: 2024-04-03
Payer: MEDICARE

## 2024-04-03 ENCOUNTER — SPECIALTY PHARMACY (OUTPATIENT)
Dept: ONCOLOGY | Facility: HOSPITAL | Age: 74
End: 2024-04-03
Payer: MEDICARE

## 2024-04-03 ENCOUNTER — OFFICE VISIT (OUTPATIENT)
Dept: ONCOLOGY | Facility: CLINIC | Age: 74
End: 2024-04-03
Payer: MEDICARE

## 2024-04-03 VITALS
DIASTOLIC BLOOD PRESSURE: 68 MMHG | HEART RATE: 88 BPM | OXYGEN SATURATION: 96 % | RESPIRATION RATE: 18 BRPM | BODY MASS INDEX: 26.76 KG/M2 | SYSTOLIC BLOOD PRESSURE: 136 MMHG | WEIGHT: 180.7 LBS | TEMPERATURE: 97.3 F | HEIGHT: 69 IN

## 2024-04-03 DIAGNOSIS — D50.8 IRON DEFICIENCY ANEMIA SECONDARY TO INADEQUATE DIETARY IRON INTAKE: ICD-10-CM

## 2024-04-03 DIAGNOSIS — D63.1 ANEMIA SECONDARY TO RENAL FAILURE: ICD-10-CM

## 2024-04-03 DIAGNOSIS — N18.30 STAGE 3 CHRONIC KIDNEY DISEASE, UNSPECIFIED WHETHER STAGE 3A OR 3B CKD: Primary | ICD-10-CM

## 2024-04-03 DIAGNOSIS — N18.9 ANEMIA SECONDARY TO RENAL FAILURE: ICD-10-CM

## 2024-04-03 DIAGNOSIS — N18.30 STAGE 3 CHRONIC KIDNEY DISEASE, UNSPECIFIED WHETHER STAGE 3A OR 3B CKD: ICD-10-CM

## 2024-04-03 DIAGNOSIS — N18.32 STAGE 3B CHRONIC KIDNEY DISEASE (CKD): ICD-10-CM

## 2024-04-03 DIAGNOSIS — C18.9 MALIGNANT NEOPLASM OF COLON, UNSPECIFIED PART OF COLON: Primary | ICD-10-CM

## 2024-04-03 DIAGNOSIS — C18.2 MALIGNANT NEOPLASM OF ASCENDING COLON: Primary | ICD-10-CM

## 2024-04-03 LAB
ANION GAP SERPL CALCULATED.3IONS-SCNC: 8 MMOL/L (ref 5–15)
BASOPHILS # BLD AUTO: 0.03 10*3/MM3 (ref 0–0.2)
BASOPHILS NFR BLD AUTO: 0.5 % (ref 0–1.5)
BUN SERPL-MCNC: 23 MG/DL (ref 8–23)
BUN/CREAT SERPL: 12.5 (ref 7–25)
CALCIUM SPEC-SCNC: 8.9 MG/DL (ref 8.6–10.5)
CHLORIDE SERPL-SCNC: 100 MMOL/L (ref 98–107)
CO2 SERPL-SCNC: 31 MMOL/L (ref 22–29)
CREAT SERPL-MCNC: 1.84 MG/DL (ref 0.76–1.27)
DEPRECATED RDW RBC AUTO: 55.4 FL (ref 37–54)
EGFRCR SERPLBLD CKD-EPI 2021: 37.8 ML/MIN/1.73
EOSINOPHIL # BLD AUTO: 0.36 10*3/MM3 (ref 0–0.4)
EOSINOPHIL NFR BLD AUTO: 6.3 % (ref 0.3–6.2)
ERYTHROCYTE [DISTWIDTH] IN BLOOD BY AUTOMATED COUNT: 15.9 % (ref 12.3–15.4)
GLUCOSE SERPL-MCNC: 267 MG/DL (ref 65–99)
HCT VFR BLD AUTO: 34.1 % (ref 37.5–51)
HGB BLD-MCNC: 10.3 G/DL (ref 13–17.7)
IMM GRANULOCYTES # BLD AUTO: 0.02 10*3/MM3 (ref 0–0.05)
IMM GRANULOCYTES NFR BLD AUTO: 0.3 % (ref 0–0.5)
LYMPHOCYTES # BLD AUTO: 1.03 10*3/MM3 (ref 0.7–3.1)
LYMPHOCYTES NFR BLD AUTO: 17.9 % (ref 19.6–45.3)
MCH RBC QN AUTO: 30.3 PG (ref 26.6–33)
MCHC RBC AUTO-ENTMCNC: 30.2 G/DL (ref 31.5–35.7)
MCV RBC AUTO: 100.3 FL (ref 79–97)
MONOCYTES # BLD AUTO: 0.4 10*3/MM3 (ref 0.1–0.9)
MONOCYTES NFR BLD AUTO: 7 % (ref 5–12)
NEUTROPHILS NFR BLD AUTO: 3.91 10*3/MM3 (ref 1.7–7)
NEUTROPHILS NFR BLD AUTO: 68 % (ref 42.7–76)
NRBC BLD AUTO-RTO: 0 /100 WBC (ref 0–0.2)
PLATELET # BLD AUTO: 181 10*3/MM3 (ref 140–450)
PMV BLD AUTO: 8.8 FL (ref 6–12)
POTASSIUM SERPL-SCNC: 4.6 MMOL/L (ref 3.5–5.2)
RBC # BLD AUTO: 3.4 10*6/MM3 (ref 4.14–5.8)
SODIUM SERPL-SCNC: 139 MMOL/L (ref 136–145)
WBC NRBC COR # BLD AUTO: 5.75 10*3/MM3 (ref 3.4–10.8)

## 2024-04-03 PROCEDURE — 85025 COMPLETE CBC W/AUTO DIFF WBC: CPT

## 2024-04-03 PROCEDURE — 80048 BASIC METABOLIC PNL TOTAL CA: CPT

## 2024-04-03 PROCEDURE — 36415 COLL VENOUS BLD VENIPUNCTURE: CPT

## 2024-04-03 RX ORDER — PROCHLORPERAZINE MALEATE 10 MG
10 TABLET ORAL EVERY 4 HOURS PRN
Qty: 60 TABLET | Refills: 2 | Status: SHIPPED | OUTPATIENT
Start: 2024-04-03

## 2024-04-03 RX ORDER — ONDANSETRON HYDROCHLORIDE 8 MG/1
8 TABLET, FILM COATED ORAL EVERY 8 HOURS PRN
Qty: 60 TABLET | Refills: 2 | Status: SHIPPED | OUTPATIENT
Start: 2024-04-03

## 2024-04-03 RX ORDER — CAPECITABINE 500 MG/1
750 TABLET, FILM COATED ORAL 2 TIMES DAILY
Qty: 84 TABLET | Refills: 2 | Status: SHIPPED | OUTPATIENT
Start: 2024-04-03

## 2024-04-03 NOTE — LETTER
April 3, 2024       No Recipients    Patient: Bernard Matta   YOB: 1950   Date of Visit: 4/3/2024     Dear Brando Webb MD:       Thank you for referring Bernard Matta to me for evaluation. Below are the relevant portions of my assessment and plan of care.    If you have questions, please do not hesitate to call me. I look forward to following Bernard along with you.         Sincerely,        Navi Stearns MD        CC:   No Recipients    Navi Stearns MD  04/03/24 1004  Sign when Signing Visit  [facility] HEMATOLOGY & ONCOLOGY  [address]  [place] [id number] [phone]    Patient Name: Bernard Matta  Encounter Date: 04/03/2024  YOB: 1950  Patient Number: [id number]      REASON FOR FOLLOW-UP: Bernard Matta is a pleasant 73 y.o.  male who is seen on follow-up for adenocarcinoma the cecum.  He is also seen for anemia from iron deficiency and chronic kidney disease stage IIIb.  GFR 31 mL/min on 2/21/2024.  Patient is on epoetin alpha and intravenous iron as indicated.  Latest ferric carboxymaltose was given 2/28/2024.  The patient is seen with his spouse, Nury.  History obtained from the patient.  History is considered reliable.           DIAGNOSTIC ABNORMALITIES: Colon cancer.  Screening colonoscopy 8/12/2021 was negative.  He presented with diffuse abdominal pain, nausea and vomiting.  CT abdomen pelvis 2/6/2024.There is an abnormal bowel gas pattern with multiple moderately  distended loops of small bowel with mild bowel wall thickening. The right and transverse colon are also moderately distended. The more distal colon is decompressed. Within the right lower quadrant posterior to the cecum there is a complex collection which is not definitely contiguous with any adjacent GI structure suspicious for localized perforation with abscess formation. There is surrounding inflammatory changes. The  appendix is not clearly identified and this could represent a ruptured appendicitis. I do not see any documentation of previous appendectomy by electronic medical record. There is free air beneath the right diaphragm and within the perihepatic space.  Free fluid within the lower right paracolic gutter, central pelvis and lower pelvis. This fluid demonstrates some complexity. Small hiatal hernia. The prostate gland is enlarged. There is a bladder diverticulum emanating from both the left and right lateral bladder wall. A mild element of chronic bladder outlet obstruction is not excluded.  Chest x-ray 2/6/2024.Radiodense nodule in the right upper lobe-favor granuloma.  No acute infiltrate. Free air demonstrated on CT imaging is not as well delineated by plain film radiograph.  CT head on 2/10/2024.Chronic changes and no acute intracranial findings.  Paranasal sinus mucosal thickening.  Pathology report 2/10/2024.  Cecum, ileocecectomy:  A. Moderately differentiated adenocarcinoma arising in the cecal pit (1.9 cm).  B. Tumor invades through the visceral peritoneum resulting in macroscopic perforation.  C. The proximal, distal and radial adventitial soft tissue margins are free of tumor.  D. Acute peritonitis.  E. Pericolic cyst-like structure with hyalinized fibrous wall and acute and chronic inflammation and hemorrhage in  adjacent fibroadipose tissue.  F. Nine of 9 pericolic lymph nodes are negative for metastatic carcinoma.  G. Detached segment of viable bowel compatible with anastomotic tissue with large intestine and small intestinal tissue  seen, negative for malignancy.  AJCC stage: pT4a pN0  CT chest report 3/8/2024.No evidence of metastatic disease in the chest.            PREVIOUS INTERVENTIONS:  Laparotomy and ileocecectomy on 2/6/2024.  Positive for perforation in the posterior cecum.          Oncology/Hematology History   Colon cancer   2/28/2024 Initial Diagnosis    Colon cancer     2/28/2024 Cancer  Staged    Staging form: Colon And Rectum, AJCC 8th Edition  - Pathologic stage from 2/28/2024: Stage IIB (pT4a, pN0, cM0) - Signed by Navi Stearns MD on 2/28/2024         PAST MEDICAL HISTORY:  ALLERGIES:  Allergies   Allergen Reactions   • Jardiance [Empagliflozin] Other (See Comments)     Pt reports nausea/vomiting        CURRENT MEDICATIONS:  Outpatient Encounter Medications as of 4/3/2024   Medication Sig Dispense Refill   • amitriptyline (ELAVIL) 10 MG tablet Take 1 tablet by mouth At Night As Needed for Sleep.     • aspirin 81 MG tablet Take 1 tablet by mouth Daily.     • atorvastatin (LIPITOR) 40 MG tablet Take 1 tablet by mouth every night at bedtime. 90 tablet 3   • cetirizine (zyrTEC) 10 MG tablet Take 1 tablet by mouth Daily.     • cholecalciferol (VITAMIN D3) 400 UNITS tablet Take 1 tablet by mouth Daily.     • cloNIDine (CATAPRES) 0.2 MG tablet Take 1 tablet by mouth 2 (Two) Times a Day.     • dorzolamide-timolol (COSOPT) 22.3-6.8 MG/ML ophthalmic solution Administer 1 drop to both eyes 2 (Two) Times a Day.     • epoetin shari-epbx (Retacrit) 49589 UNIT/ML injection Inject  under the skin into the appropriate area as directed Every 14 (Fourteen) Days.     • ezetimibe (Zetia) 10 MG tablet Take 1 tablet by mouth Daily. 90 tablet 3   • fluticasone (FLONASE) 50 MCG/ACT nasal spray 2 sprays into the nostril(s) as directed by provider Daily.     • furosemide (Lasix) 20 MG tablet Take 1 tablet by mouth Daily. 90 tablet 3   • insulin aspart (novoLOG FLEXPEN) 100 UNIT/ML solution pen-injector sc pen Inject 0-20 Units under the skin into the appropriate area as directed 3 (Three) Times a Day With Meals. Sliding scale     • insulin degludec (TRESIBA FLEXTOUCH) 100 UNIT/ML solution pen-injector injection Inject 30 Units under the skin into the appropriate area as directed Every Night.     • latanoprost (XALATAN) 0.005 % ophthalmic solution Administer 1 drop to both eyes Every Night.     • lisinopril  (PRINIVIL,ZESTRIL) 10 MG tablet Take 1 tablet by mouth Every Night.     • Multiple Vitamins-Minerals (MULTIVITAMIN WITH MINERALS) tablet tablet Take 1 tablet by mouth Daily.     • ondansetron ODT (ZOFRAN-ODT) 4 MG disintegrating tablet Place 1 tablet on the tongue Every 8 (Eight) Hours As Needed for Nausea or Vomiting. 30 tablet 1   • Rhopressa 0.02 % solution Apply 1 drop to eye(s) as directed by provider Every Night.     • simethicone (Gas-X) 80 MG chewable tablet Chew 0.5 tablets Every 6 (Six) Hours As Needed for Flatulence (belching). 30 tablet 1   • tamsulosin (FLOMAX) 0.4 MG capsule 24 hr capsule Take 2 capsules by mouth Every Night.     • traMADol (Ultram) 50 MG tablet Take 1 tablet by mouth Every 8 (Eight) Hours As Needed for Moderate Pain. 30 tablet 0     No facility-administered encounter medications on file as of 4/3/2024.     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   • Hypertension I10   • Type 2 diabetes mellitus E11.9   • Hx of adenomatous colonic polyps Z86.010   • Iron deficiency anemia D50.9   • Intestinal malabsorption, unspecified K90.9   • Bowel perforation K63.1   • Stage 3b chronic kidney disease (CKD) N18.32   • Hyperkalemia E87.5   • COVID-19 virus detected U07.1   • Colon cancer C18.9     SURGERIES:  Past Surgical History:   Procedure Laterality Date   • COLONOSCOPY  2019    [place]   • COLONOSCOPY N/A 8/11/2020    Procedure: COLONOSCOPY WITH ANESTHESIA;  Surgeon: Shaquille Mckenzie DO;  Location: [facility] ENDOSCOPY;  Service: Gastroenterology;  Laterality: N/A;  preop; hx of polyps   postop; polyps   PCP Brando Webb    • COLONOSCOPY N/A 8/12/2021    Procedure: COLONOSCOPY WITH ANESTHESIA;  Surgeon: Shaquille Mckenzie DO;  Location: [facility] ENDOSCOPY;  Service: Gastroenterology;  Laterality: N/A;  pre hx adenomatous colon polyp  post normal  Brando Webb MD   • EXPLORATORY LAPAROTOMY N/A 2/6/2024    Procedure:  DIAGNOSTIC LAPAROSCOPY CONVERTED TO LAPAROTOMY, ILIOCECTECTOMY;  Surgeon: Hemant Park MD;  Location: [facility];  Service: General;  Laterality: N/A;     HEALTH MAINTENANCE ITEMS:  Health Maintenance Due   Topic Date Due   • COLONOSCOPY  08/12/2022       <no information>  Last Completed Colonoscopy       This patient has no relevant Health Maintenance data.          Immunization History   Administered Date(s) Administered   • COVID-19 (MODERNA) 1st,2nd,3rd Dose Monovalent 03/02/2021, 03/30/2021, 11/02/2021   • COVID-19 (PFIZER) BIVALENT 12+YRS 09/29/2022   • COVID-19 F23 (PFIZER) 12YRS+ (COMIRNATY) 11/16/2023   • Covid-19 (Pfizer) Gray Cap Monovalent 07/28/2022   • FLUAD TRI 65YR+ 09/09/2019   • Flu Vaccine Split Quad 01/01/2013   • Fluad Quad 65+ 09/09/2019, 09/09/2020   • Fluzone (or Fluarix & Flulaval for VFC) >6mos 01/01/2013, 09/20/2022   • Fluzone High Dose =>65 Years ([employer] ONLY) 09/14/2016, 10/02/2017, 10/08/2018   • Fluzone High-Dose 65+yrs 10/19/2021, 10/02/2023   • Pneumococcal Conjugate 13-Valent (PCV13) 06/11/2015   • Pneumococcal Polysaccharide (PPSV23) 04/27/2012, 01/01/2013   • Shingrix 06/21/2018, 01/07/2019   • Tdap 05/08/2013     Last Completed Mammogram       This patient has no relevant Health Maintenance data.              FAMILY HISTORY:  Family History   Problem Relation Age of Onset   • No Known Problems Maternal Grandmother    • No Known Problems Maternal Grandfather    • No Known Problems Paternal Grandmother    • No Known Problems Paternal Grandfather    • No Known Problems Mother    • No Known Problems Father    • Colon cancer Neg Hx    • Colon polyps Neg Hx    • Esophageal cancer Neg Hx      SOCIAL HISTORY:  Social History     Socioeconomic History   • Marital status:    Tobacco Use   • Smoking status: Former     Current packs/day: 0.00     Average packs/day: 0.5 packs/day for 10.0 years (5.0 ttl pk-yrs)     Types: Cigarettes     Start date: 1/1/1969     Quit date: 1/1/1979  "    Years since quittin.2     Passive exposure: Past   • Smokeless tobacco: Never   Vaping Use   • Vaping status: Never Used   Substance and Sexual Activity   • Alcohol use: No   • Drug use: No   • Sexual activity: Defer       REVIEW OF SYSTEMS:    Review of Systems   Constitutional:  Positive for fatigue.        \"I am better.\"  He can perform self care.    HENT:  Negative for congestion and mouth sores.    Eyes:  Negative for redness and visual disturbance.   Respiratory:  Negative for shortness of breath and wheezing.    Cardiovascular:  Negative for chest pain and palpitations.   Gastrointestinal:  Negative for abdominal pain, constipation, diarrhea, nausea and vomiting.   Endocrine: Negative for cold intolerance and heat intolerance.   Genitourinary:  Negative for difficulty urinating and dysuria.   Musculoskeletal:  Negative for gait problem and myalgias.   Skin:  Positive for pallor.   Allergic/Immunologic: Negative for food allergies.   Neurological:  Negative for dizziness, seizures, speech difficulty and weakness.   Hematological:  Negative for adenopathy. Does not bruise/bleed easily.   Psychiatric/Behavioral:  Negative for agitation, confusion and hallucinations.        VITAL SIGNS: /68   Pulse 88   Temp 97.3 °F (36.3 °C)   Resp 18   Ht 175.3 cm (69.02\")   Wt 82 kg (180 lb 11.2 oz)   SpO2 96%   BMI 26.67 kg/m²   Pain Score    24 0936   PainSc: 0-No pain       PHYSICAL EXAMINATION:     Physical Exam  Vitals reviewed.   Constitutional:       General: He is not in acute distress.  HENT:      Head: Normocephalic and atraumatic.   Eyes:      General: No scleral icterus.  Cardiovascular:      Rate and Rhythm: Normal rate.   Pulmonary:      Effort: No respiratory distress.      Breath sounds: No wheezing.   Abdominal:      General: Bowel sounds are normal.      Palpations: Abdomen is soft.      Comments: Incisions are healed.    Musculoskeletal:      Cervical back: Neck supple.      " Comments: Bilateral ankle edema.    Skin:     General: Skin is warm.      Coloration: Skin is pale.   Neurological:      Mental Status: He is alert and oriented to person, place, and time.   Psychiatric:         Mood and Affect: Mood normal.         Behavior: Behavior normal.         Thought Content: Thought content normal.         Judgment: Judgment normal.         LABS    Lab Results - Last 18 Months   Lab Units 04/03/24  0848 03/27/24  1417 03/13/24  0902 03/12/24  0704 02/28/24  1201 02/21/24  1029 02/14/24  0844 02/12/24  0212 02/09/24  0656 02/08/24  0547 02/07/24  0750   HEMOGLOBIN g/dL 10.3* 9.3* 8.8* 9.4* 8.5* 8.1* 9.2* 9.7*   < > 10.2* 10.2*   HEMATOCRIT % 34.1* 29.4* 28.8* 29.5* 27.9* 26.0* 30.3* 32.1*   < > 33.3* 33.0*   MCV fL 100.3* 95.8 97.6* 93.7 94.9 94.9 95.9 94.7   < > 95.4 95.7   WBC 10*3/mm3 5.75 6.44 6.73 6.18 7.86 9.54 7.61 5.76   < > 7.12 7.34   RDW % 15.9* 15.3 15.9* 14.6 14.3 13.9 14.8 14.9   < > 14.5 14.0   MPV fL 8.8 9.1 9.3  --  9.5  --  9.9 9.1   < > 9.2 9.3   PLATELETS 10*3/mm3 181 202 162 166 275 396 218 215   < > 213 159   IMM GRAN % % 0.3 0.2 0.3  --  0.4  --  1.8* 1.6*   < >  --   --    NEUTROS ABS 10*3/mm3 3.91 3.73 4.50 4.25 5.59 7.32* 5.50 3.83   < > 5.91 6.68   LYMPHS ABS 10*3/mm3 1.03 1.59 1.24 1.01 1.34 1.14 1.04 0.81   < >  --   --    MONOS ABS 10*3/mm3 0.40 0.54 0.47 0.47 0.59 0.77 0.53 0.52   < >  --   --    EOS ABS 10*3/mm3 0.36 0.53* 0.46* 0.41* 0.27 0.20 0.36 0.48*   < > 0.21  --    BASOS ABS 10*3/mm3 0.03 0.04 0.04 0.03 0.04 0.07 0.04 0.03   < > 0.14 0.07   IMMATURE GRANS (ABS) 10*3/mm3 0.02 0.01 0.02  --  0.03  --  0.14* 0.09*   < >  --   --    NRBC /100 WBC 0.0 0.0 0.0 0.0 0.0 0.0 0.0 0.0   < >  --   --    NEUTROPHIL % %  --   --   --   --   --   --   --   --   --  73.0 69.0   MONOCYTES % %  --   --   --   --   --   --   --   --   --  6.0 1.0*   BASOPHIL % %  --   --   --   --   --   --   --   --   --  2.0* 1.0   ATYP LYMPH % %  --   --   --   --   --   --   --   --    --   --  2.0   ANISOCYTOSIS   --   --   --   --   --   --   --   --   --   --  Slight/1+    < > = values in this interval not displayed.       Lab Results - Last 18 Months   Lab Units 04/03/24  0848 03/27/24  1417 03/13/24  0902 03/12/24  0704 02/28/24  1201 02/21/24  1029 02/16/24  0533 02/15/24  1615 02/15/24  0534 02/14/24  0844   GLUCOSE mg/dL 267* 98 175* 138* 111*  111* 365* 290*  --  70 267*   SODIUM mmol/L 139 140 141 139 140  140 133* 137  --  144 144   POTASSIUM mmol/L 4.6 4.2 4.4 5.3* 4.5  4.5 5.4* 4.8   < > 3.3* 3.5   CO2 mmol/L 31.0* 29.0 29.0 29.3* 30.0*  30.0* 24.2 20.0*  --  24.0 24.0   CHLORIDE mmol/L 100 101 104 102 101  101 96* 104  --  110* 109*   ANION GAP mmol/L 8.0 10.0 8.0  --  9.0  9.0  --  13.0  --  10.0 11.0   CREATININE mg/dL 1.84* 1.70* 1.85* 1.80* 1.97*  1.97* 2.17* 1.95*  --  1.93* 1.66*   BUN mg/dL 23 24* 22 22 30*  30* 35* 38*  --  40* 35*   BUN / CREAT RATIO  12.5 14.1 11.9 12.2 15.2  15.2 16.1 19.5  --  20.7 21.1   CALCIUM mg/dL 8.9 8.7 8.5* 8.4* 8.6  8.6 8.4* 8.7  --  8.6 8.7   ALK PHOS U/L  --  120* 141* 153* 167* 132* 104  --  94 94   TOTAL PROTEIN g/dL  --  6.8 6.4  --  7.0  --  6.1  --  6.0 6.2   ALT (SGPT) U/L  --  22 30 33 58* 76* 63*  --  61* 68*   AST (SGOT) U/L  --  19 22 19 30 41* 35  --  36 36   BILIRUBIN mg/dL  --  0.3 0.3 0.4 0.2 0.2 0.3  --  0.2 0.2   ALBUMIN g/dL  --  3.7 3.3* 3.5 3.5  3.5 3.2* 2.9*  --  2.9* 3.0*   GLOBULIN gm/dL  --  3.1 3.1  --  3.5  --  3.2  --  3.1 3.2    < > = values in this interval not displayed.       Lab Results - Last 18 Months   Lab Units 03/27/24  1417 02/28/24  1201 01/25/23  1019   M-SPIKE g/dL  --   --  Not Observed   KAPPA/LAMBDA RATIO, S   --   --  1.55   FREE LAMBDA LIGHT CHAINS mg/L  --   --  25.8   IG KAPPA FREE LIGHT CHAIN mg/L  --   --  40.0*   CEA ng/mL 3.44 7.10  --    LDH U/L  --   --  183       Lab Results - Last 18 Months   Lab Units 03/27/24  1417 02/28/24  1201 11/29/23  1100 11/01/23  0957 08/25/23  0759  08/02/23  1024 05/03/23  1221 04/05/23  0855 02/22/23  0946 01/25/23  1019   IRON mcg/dL 58* 37* 84 37*  --  53* 88 36*   < > 122   TIBC mcg/dL 244* 250* 216* 262* 247 244* 226* 256*   < > 337   IRON SATURATION %  --   --   --   --  27  --   --   --   --   --    IRON SATURATION (TSAT) % 24 15* 39 14*  --  22 39 14*   < > 36   FERRITIN ng/mL 539.40* 383.90 797.10* 120.50 266.00 290.60 1,027.00* 59.42   < > 16.82*   TSH uIU/mL  --   --   --   --  2.110  --   --  1.740  --   --    FOLATE ng/mL  --   --   --   --  >20.00  --   --   --   --  >20.00    < > = values in this interval not displayed.       Bernard Matta reports a pain score of 0.          ASSESSMENT:  Adenocarcinoma involving the cecum.  High risk.  Less than 12 lymph nodes examined.  No lymphovascular or perineural invasion.  MSI/MMR report 3/11/2024. CLEMENCIA and pMMR.  AJCC stage: IIb (pT4a, pN0, cM0, G2).  Tumor size 1.9 cm.  Zo status, 0/9 positive lymph node.  Treatment status: Adjuvant capecitabine pending.  2. Performance status of 2.   3.  Normocytic anemia from iron deficiency and chronic kidney disease stage IIIb.  GFR 31 mL/min on 2/21/2024.  Latest Injectafer given 2/28/2024 due to malabsorption.    4.  Type 2 diabetes, one of the etiology for chronic kidney disease. On insulin.   5.  Intestinal malabsorption, unspecific type.  Intravenous iron as indicated.  6.  Elevated transaminase, observation.           PLAN:   Re: Note from Rica Khan PA-C on 3/13/2024.  Patient tolerating diet.  2 superficial 2 cm areas of opening at the superior aspect of his incision.  2.   Re:  Seen by Dr. Allan Flores 3/21/2024 for chronic kidney disease stage III.   3.   Re:  Aware for role of adjuvant chemotherapy with capecitabine or FOLFOX due to high risk disease, less than 12 lymph nodes sampled.  Colon cancer survival calculator, disease specific survival at 5 years is 71% if he is really N0 disease.  Patient aware less than 12  lymph nodes were sampled.  4.   Re: Heme status.  WBC 5.75, hemoglobin 10.3, hematocrit 34.1, .3 and platelet 181.  5.   Re: CMP.  Glucose 237. GFR 37.8 mL/min.  Alkaline phosphatase 120 from 141 from 153 from 167 from 132 and ALT 58 from 76.  6.   Re: CEA 3.4 from 7.1, observe.  7.   Re: Ferritin 539.4 and saturation 24%.    8.   Re: Proceed with chemo.   9.  eRx capecitabine 750 mg/m2 (dose reduced due to CKD) every 12 hours for 14 days every 21-day cycle x 24 weeks.  Monitor for adverse reactions like anaphylaxis, hypersensitivity reaction, nausea, vomiting, dermatitis, hepatotoxicity, hand-foot syndrome, diarrhea, fatigue, or cardiotoxicity arrhythmias or ischemia. Hold if GFR < 30.  Patient preference for capecitabine rather than FOLFOX.   10.  eRx ondansetron 8 mg p.o. every 8 hours as needed for nausea and vomiting #60, 2 refills.  11.  eRx prochlorperazine 10 mg p.o. every 4 hours as needed for nausea and vomiting #60, 2 refills.  12.  Weekly CBC with differential with chemo.  13. Advance Care Planning  ACP discussion was held with the patient during this visit. Patient has an advance directive in EMR which is still valid.   14.  Colonoscopy at 1 year.  15.  Continue care per primary care physician at the specialist.  16.  Care discussed with patient and spouse.  Understand expressed.  Patient agreed to proceed.  17.  Epoetin alpha 40,000 units subcutaneous every 2 weeks if hemoglobin less than 11 and hematocrit less than 33, chronic kidney disease.  After for hypertension.  18. Return to office in 3 weeks with CBC with differential, ferritin, iron panel, CEA, and CMP.           I have reviewed the assessment and plan and verified the accuracy of it. No changes to assessment and plan since the information was documented. Navi Stearns MD 04/03/24            I spent 42 total minutes, face-to-face, caring for Bernard ramos. Greater than 50% of this time involved  counseling and/or coordination of care as documented within this note.              Allan Schneider MD  (Dalton Villalobos MD)  (Shaquille Mckenzie MD)  Everton Webb MD  (JORJE Novoa)

## 2024-04-08 ENCOUNTER — TELEPHONE (OUTPATIENT)
Dept: ONCOLOGY | Facility: CLINIC | Age: 74
End: 2024-04-08
Payer: MEDICARE

## 2024-04-08 NOTE — TELEPHONE ENCOUNTER
Returned call to patient.  He states he got his capecitabine in the mail and started them on Monday April 8, 2024.   He is going to get labs checked on Monday April 15 at Dr Everton Cortes office.   Patient to call with any problems or concerns.  He did state that he had tried to return call to the specialty pharmacy and was unable to reach them.  He will keep follow up as well.

## 2024-04-09 NOTE — PROGRESS NOTES
Specialty Pharmacy Patient Management Program  Oncology Initial Assessment       Bernard Matta is a 73 y.o. male with Malignant neoplasm of ascending colon seen seen by an Oncology provider and enrolled in the Oncology Patient Management program offered by [facility].  An initial outreach was conducted, including assessment of therapy appropriateness and specialty medication education for Xeloda. The patient was introduced to services offered by [facility], including: regular assessments, refill coordination, curbside pick-up or mail order delivery options, prior authorization maintenance, and financial assistance programs as applicable. The patient was also provided with contact information for the pharmacy team.     Diagnosis (Documented by Provider):  Adenocarcinoma involving the cecum.  High risk.  Less than 12 lymph nodes examined.  No lymphovascular or perineural invasion.    MSI/MMR report 3/11/2024. CLEMENCIA and pMMR.   AJCC stage: IIb (pT4a, pN0, cM0, G2).  Tumor size 1.9 cm.  Zo status, 0/9 positive lymph node.  Treatment status: Adjuvant capecitabine pending.  Performance status of 2.     Oncology Provider: Navi Stearns MD ([facility])  Oral Chemotherapy Regimen: capecitabine [Xeloda] 1500 mg (750 mg/m2) PO twice daily. Take on Days 1-14 out of 21 day cycle.  Consult Message Received:      Initiation Progress Updates:      Script Review and Evaluation:  Oral Treatment Plan Signed?: Yes   Oral Treatment Plan Released?: Yes, released on 4/3/2024.   Specialty Pharmacy Selected:  [facility]   Prior Authorization Status: Approved   Financial Assistance Status: None needed at this time , patient has a -pay amount of $3.42   Predicted Shipping Date: Patient received shipment on 4/6/2024   Predicted Start Date: Patient started medication cycle on 4/8/2024     Oncology/Hematology History Overview  Note    Oncology/Hematology History   Colon cancer   2/28/2024 Initial Diagnosis    Colon cancer     2/28/2024 Cancer Staged    Staging form: Colon And Rectum, AJCC 8th Edition  - Pathologic stage from 2/28/2024: Stage IIB (pT4a, pN0, cM0) - Signed by Navi Stearns MD on 2/28/2024     4/3/2024 -  Chemotherapy    ORAL Oncology - Capecitabine         Problem List   Medicines reviewed by June Siegel PharmD on 4/9/2024 at  4:58 PM  Allergies reviewed by June Siegel PharmD on 4/9/2024 at  4:56 PM  Patient Active Problem List   Diagnosis Code    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              Relevant Past Medical History, Comorbidities, and Vaccines:  Relevant medical history and concomitant health conditions were discussed with the patient. The patient's chart has been reviewed for relevant past medical history and comorbid health conditions and updated as necessary.  Vaccines are coordinated by the patient's oncologist and primary care provider.  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     Glaucoma     Hyperlipidemia     Hypertension     Ischemic optic neuropathy of both eyes      Social History     Socioeconomic History    Marital status:    Tobacco Use    Smoking status: Former     Current packs/day: 0.00     Average packs/day: 0.5 packs/day for 10.0 years (5.0 ttl pk-yrs)     Types: Cigarettes     Start date: 1/1/1969     Quit  date: 1979     Years since quittin.3     Passive exposure: Past    Smokeless tobacco: Never   Vaping Use    Vaping status: Never Used   Substance and Sexual Activity    Alcohol use: No    Drug use: No    Sexual activity: Defer       Relevant Laboratory Values:  The labs listed below have been reviewed. No dose adjustments are needed for the oral specialty medication(s) based on the labs.    Lab Results   Component Value Date    GLUCOSE 267 (H) 2024    CALCIUM 8.9 2024     2024    K 4.6 2024    CO2 31.0 (H) 2024     2024    BUN 23 2024    CREATININE 1.84 (H) 2024    EGFRIFAFRI 38 (L) 2021    EGFRIFNONA 33 (L) 2021    BCR 12.5 2024    ANIONGAP 8.0 2024     Lab Results   Component Value Date    WBC 5.75 2024    RBC 3.40 (L) 2024    HGB 10.3 (L) 2024    HCT 34.1 (L) 2024    .3 (H) 2024    MCH 30.3 2024    MCHC 30.2 (L) 2024    RDW 15.9 (H) 2024    RDWSD 55.4 (H) 2024    MPV 8.8 2024     2024    NEUTRORELPCT 68.0 2024    LYMPHORELPCT 17.9 (L) 2024    MONORELPCT 7.0 2024    EOSRELPCT 6.3 (H) 2024    BASORELPCT 0.5 2024    AUTOIGPER 0.3 2024    NEUTROABS 3.91 2024    LYMPHSABS 1.03 2024    MONOSABS 0.40 2024    EOSABS 0.36 2024    BASOSABS 0.03 2024    AUTOIGNUM 0.02 2024    NRBC 0.0 2024       Allergy Review:  Known allergies and reactions were discussed with the patient. The patient's chart has been reviewed for allergy information and updated as necessary.   Allergies   Allergen Reactions    Jardiance [Empagliflozin] Other (See Comments)     Pt reports nausea/vomiting           Current Medication List:  This medication list has been reviewed with the patient and evaluated for any interactions or necessary modifications/recommendations, and updated to include all  prescription medications, OTC medications, and supplements the patient is currently taking.  This list reflects what is contained in the patient's profile, which has also been marked as reviewed to communicate to other providers it is the most up to date version of the patient's current medication therapy.   Prior to Admission medications    Medication Sig Start Date End Date Taking? Authorizing Provider   amitriptyline (ELAVIL) 10 MG tablet Take 1 tablet by mouth At Night As Needed for Sleep.    Lui Ruiz MD   aspirin 81 MG tablet Take 1 tablet by mouth Daily.    Lui Ruiz MD   atorvastatin (LIPITOR) 40 MG tablet Take 1 tablet by mouth every night at bedtime. 8/16/23   Brando Webb MD   capecitabine (XELODA) 500 MG chemo tablet Take 3 tablets by mouth 2 (Two) Times a Day. Take on Days 1-14 every 21 day cycle. 4/3/24   Navi Stearns MD   cetirizine (zyrTEC) 10 MG tablet Take 1 tablet by mouth Daily.    Lui Ruiz MD   cholecalciferol (VITAMIN D3) 400 UNITS tablet Take 1 tablet by mouth Daily.    Lui Ruiz MD   cloNIDine (CATAPRES) 0.2 MG tablet Take 1 tablet by mouth 2 (Two) Times a Day.    Lui Ruiz MD   dorzolamide-timolol (COSOPT) 22.3-6.8 MG/ML ophthalmic solution Administer 1 drop to both eyes 2 (Two) Times a Day.    Lui Ruiz MD   epoetin shari-epbx (Retacrit) 94923 UNIT/ML injection Inject  under the skin into the appropriate area as directed Every 14 (Fourteen) Days.    Lui Ruiz MD   ezetimibe (Zetia) 10 MG tablet Take 1 tablet by mouth Daily. 9/18/23 9/17/24  Deric Byers MD   fluticasone (FLONASE) 50 MCG/ACT nasal spray 2 sprays into the nostril(s) as directed by provider Daily.    Lui Ruiz MD   furosemide (Lasix) 20 MG tablet Take 1 tablet by mouth Daily. 7/25/22   Brando Webb MD   insulin aspart (novoLOG FLEXPEN) 100 UNIT/ML solution pen-injector sc pen Inject 0-25 Units  under the skin into the appropriate area as directed 3 (Three) Times a Day With Meals. Sliding scale    Lui Ruiz MD   insulin degludec (TRESIBA FLEXTOUCH) 100 UNIT/ML solution pen-injector injection Inject 8 Units under the skin into the appropriate area as directed Every Night.    Lui Ruiz MD   latanoprost (XALATAN) 0.005 % ophthalmic solution Administer 1 drop to both eyes Every Night.    Lui Ruiz MD   lisinopril (PRINIVIL,ZESTRIL) 10 MG tablet Take 1 tablet by mouth Every Night.    Lui Ruiz MD   Multiple Vitamins-Minerals (MULTIVITAMIN WITH MINERALS) tablet tablet Take 1 tablet by mouth Daily.    Lui Ruiz MD   ondansetron (Zofran) 8 MG tablet Take 1 tablet by mouth Every 8 (Eight) Hours As Needed for Nausea or Vomiting. 4/3/24   Navi Stearns MD   prochlorperazine (COMPAZINE) 10 MG tablet Take 1 tablet by mouth Every 4 (Four) Hours As Needed for Nausea or Vomiting. 4/3/24   Navi Stearns MD   Rhopressa 0.02 % solution Apply 1 drop to eye(s) as directed by provider Every Night. 10/24/23   Lui Ruiz MD   simethicone (Gas-X) 80 MG chewable tablet Chew 0.5 tablets Every 6 (Six) Hours As Needed for Flatulence (belching). 2/16/24   Dakota Avitia MD   tamsulosin (FLOMAX) 0.4 MG capsule 24 hr capsule Take 2 capsules by mouth Every Night.    Lui Ruiz MD   traMADol (Ultram) 50 MG tablet Take 1 tablet by mouth Every 8 (Eight) Hours As Needed for Moderate Pain. 2/16/24   Dakota Avitia MD   ondansetron ODT (ZOFRAN-ODT) 4 MG disintegrating tablet Place 1 tablet on the tongue Every 8 (Eight) Hours As Needed for Nausea or Vomiting. 2/16/24 4/3/24  Dakota Avitia MD       Medication(s) Review:   Medicines reviewed by June Siegel, PharmD on 4/9/2024 at  4:58 PM  Prior to Admission medications    Medication Sig Start Date End Date Taking? Authorizing Provider   amitriptyline (ELAVIL) 10 MG tablet Take 1 tablet by mouth At  Night As Needed for Sleep.    Lui Ruiz MD   aspirin 81 MG tablet Take 1 tablet by mouth Daily.    Lui Ruiz MD   atorvastatin (LIPITOR) 40 MG tablet Take 1 tablet by mouth every night at bedtime. 8/16/23   Brando Webb MD   capecitabine (XELODA) 500 MG chemo tablet Take 3 tablets by mouth 2 (Two) Times a Day. Take on Days 1-14 every 21 day cycle. 4/3/24   Navi Stearns MD   cetirizine (zyrTEC) 10 MG tablet Take 1 tablet by mouth Daily.    Lui Ruiz MD   cholecalciferol (VITAMIN D3) 400 UNITS tablet Take 1 tablet by mouth Daily.    Lui Ruiz MD   cloNIDine (CATAPRES) 0.2 MG tablet Take 1 tablet by mouth 2 (Two) Times a Day.    Lui Ruiz MD   dorzolamide-timolol (COSOPT) 22.3-6.8 MG/ML ophthalmic solution Administer 1 drop to both eyes 2 (Two) Times a Day.    Lui Ruiz MD   epoetin shari-epbx (Retacrit) 93524 UNIT/ML injection Inject  under the skin into the appropriate area as directed Every 14 (Fourteen) Days.    Lui Ruiz MD   ezetimibe (Zetia) 10 MG tablet Take 1 tablet by mouth Daily. 9/18/23 9/17/24  Deric Byers MD   fluticasone (FLONASE) 50 MCG/ACT nasal spray 2 sprays into the nostril(s) as directed by provider Daily.    Lui Ruiz MD   furosemide (Lasix) 20 MG tablet Take 1 tablet by mouth Daily. 7/25/22   Brando Webb MD   insulin aspart (novoLOG FLEXPEN) 100 UNIT/ML solution pen-injector sc pen Inject 0-25 Units under the skin into the appropriate area as directed 3 (Three) Times a Day With Meals. Sliding scale    Lui Ruiz MD   insulin degludec (TRESIBA FLEXTOUCH) 100 UNIT/ML solution pen-injector injection Inject 8 Units under the skin into the appropriate area as directed Every Night.    Lui Ruiz MD   latanoprost (XALATAN) 0.005 % ophthalmic solution Administer 1 drop to both eyes Every Night.    Lui Ruiz MD   lisinopril  (PRINIVIL,ZESTRIL) 10 MG tablet Take 1 tablet by mouth Every Night.    ProviderLui MD   Multiple Vitamins-Minerals (MULTIVITAMIN WITH MINERALS) tablet tablet Take 1 tablet by mouth Daily.    Lui Ruiz MD   ondansetron (Zofran) 8 MG tablet Take 1 tablet by mouth Every 8 (Eight) Hours As Needed for Nausea or Vomiting. 4/3/24   Navi Stearns MD   prochlorperazine (COMPAZINE) 10 MG tablet Take 1 tablet by mouth Every 4 (Four) Hours As Needed for Nausea or Vomiting. 4/3/24   Navi Stearns MD   Rhopressa 0.02 % solution Apply 1 drop to eye(s) as directed by provider Every Night. 10/24/23   Lui Ruiz MD   simethicone (Gas-X) 80 MG chewable tablet Chew 0.5 tablets Every 6 (Six) Hours As Needed for Flatulence (belching). 2/16/24   Dakota Avitia MD   tamsulosin (FLOMAX) 0.4 MG capsule 24 hr capsule Take 2 capsules by mouth Every Night.    Lui Ruiz MD   traMADol (Ultram) 50 MG tablet Take 1 tablet by mouth Every 8 (Eight) Hours As Needed for Moderate Pain. 2/16/24   Dakota Avitia MD   ondansetron ODT (ZOFRAN-ODT) 4 MG disintegrating tablet Place 1 tablet on the tongue Every 8 (Eight) Hours As Needed for Nausea or Vomiting. 2/16/24 4/3/24  Dakota Avitia MD       Medication Regimen Assessment:  Administration: as prescribed .   Patient can self administer medications: yes  Medication Follow-Up Plan: Refill coordination    Drug-Drug Interaction(s) Assessment:  Reviewed concomitant medications, allergies, labs, comorbidities/medical history, quality of life, and immunization history.   Completed medication reconciliation today to assess for drug interactions. Patient denies starting or stopping any medications.    Drug-drug interactions noted and discussed during education: no significant drug interactions noted. . Reminded the patient to let us know before making any changes or starting any new prescription or OTC medications so we can first assess drug  interactions.  Drug-food interactions noted and discussed during education. Patient was instructed to avoid eating grapefruit and drinking grapefruit juice  Vaccines are coordinated by the patient's oncologist and primary care provider.    Prophylaxis Medications Assessment:  Bone Health (such as calcium/vitamin D, bisphosphonate, RANKL inhibitor) - Not Indicated   VTE prophylaxis - Not Indicated   Prophylactic antimicrobials - Not Indicated   Tumor lysis syndrome prophylaxis - Risk for TLS Low.     Initial Education Provided for Specialty Medication:  The patient has been provided with the following education. All questions and concerns have been addressed prior to the patient receiving the medication, and the patient has verbalized understanding of the education and any materials provided.  Additional patient education shall be provided and documented upon request by the patient, provider or payer.      Provided Patient With: 24-hour clinic phone number and my contact information and instructions to call should additional questions arise.     Medication Education Sheets Provided: (select all that apply)  Oral Specialty Medication: Xeloda (capecitabine)    TOPICS COMMENTS   Storage and Handling of Oral Specialty Medication Store in the original container, in a dry location out of direct sunlight, and out of reach of children or pets.  Discussed safe handling and what to do with any unused medication.   Administration of Oral Specialty Medication Take with food at the same time(s) each day.   Adherence to Oral Specialty Regimen and Handling Missed Doses Patient is likely to have good treatment adherence; reinforced the importance of adherence. Reviewed how to address missed doses and encouraged the patient to let us know of any missed doses.   Anemia: role of RBC, cause, s/s, ways to manage, role of transfusion Reviewed the role of RBC and the use of transfusions if hemoglobin decreases too much.  Patient to notify  us if he experiences shortness of breath, dizziness, or palpitations.  Also let patient know that he could feel more tired than usual and to try to stay active, but rest if he needs to.    Thrombocytopenia: role of platelet, cause, s/s, ways to prevent bleeding, things to avoid, when to seek help Reviewed the role of platelets in blood clotting and when to call clinic (bloody nose that bleeds for 5 minutes despite pressure, a cut that won't stop bleeding despite pressure, gums that bleed excessively with brushing or flossing). Recommended using an electric razor, soft bristle toothbrush, and blowing your nose gently.    Neutropenia: role of WBC, cause, infection precautions, s/s of infection, when to call MD Reviewed the role of WBC, good infection prevention practices, and when to call the clinic (temperature 100.4F, sore throat, burning urination, etc.   Nutrition and Appetite Changes:  importance of maintaining healthy diet & weight, ways to manage to improve intake, dietary consult, exercise regimen, electrolyte and/or blood glucose abnormalities Decreased Appetite: Discussed the risk of decreased appetite. Recommended eating smaller, more frequent meals. Instructed the patient to contact the clinic if losing weight or having difficulty eating enough to maintain energy level.   Diarrhea: causes, s/s of dehydration, ways to manage, dietary changes, when to call MD Chemotherapy : Discussed the risk of diarrhea. Instructed patient on use OTC loperamide with diarrhea onset, but emphasized the importance of calling the clinic if 4-6 episodes in 24 hours not relieved by OTC loperamide.   Constipation: causes, ways to manage, dietary changes, when to call MD Provided supplementary handout with instructions for use of docusate and other OTC therapies to manage constipation.  Patient was instructed to call us if medications aren't working.   Nausea/Vomiting: cause, use of antiemetics, dietary changes, when to call MD  Emetic risk: High  PRN home meds: Ondansetron Prochlorperazine  Pharmacy home meds sent to: [facility] #[id number] - [place] - [address] [facility] [phone] [facility] [phone] FX    Instructed the patient to take the scheduled anti-nausea medications even if he does not feel nauseous.  I explained this is to prevent delayed nausea.    Instructed the patient to take a dose of the PRN medication at the first onset of nausea and if it's not working to call us for additional medications.  Also provided non-drug measures to mitigate nausea.   Mouth Sores: causes, oral care, ways to manage Mouth sores can be prevented by making a mouth wash mixture of salt, baking soda, and water. The patient was instructed to swish and spit four times daily after meals and before bedtime.  Use of a soft bristle toothbrush was recommended.  The patient was instructed to avoid alcohol-containing OTC mouthwashes.    Alopecia: cause, ways to manage, resources Discussed the possibility of hair loss with the patient. Informed patient that he could request a prescription for a wig if desired and most of the cost is usually covered by insurance. Recommended covering the head with a hat and/or protecting the skin on the head with SPF 30 or higher.    Nervous System Changes: causes, s/s, neuropathies, cognitive changes, ways to manage discussed the adverse effect of peripheral neuropathy and signs/symptoms associated with this adverse effect. Instructed patient to call if symptoms become more frequent or worsen.    Pain: causes, ways to manage Chemo: Discussed muscle and joint aches/pains with chemotherapy, and recommended the use of OTC pain relief with ibuprofen or acetaminophen if needed.   Skin/Nail Changes: cause, s/s, ways to manage Hand-foot syndrome was discussed, including the s/s, prevention measures, and treatment measures. A supplementary handout with this information was also given to the patient.    Organ Toxicities: cause,  s/s, need for diagnostic tests, labs, when to notify MD Discussed potential effects on organ systems, monitoring, diagnostic tests, labs, and when to notify the MD. Discussed the signs/symptoms of the following: cardiotoxicity, central neurotoxicity (confusion, vision changes, stiff neck, seizure), and lung changes.     Adherence and Self-Administration Assessment:  Barriers to Patient Adherence and/or Self-Administration: none at this time  Methods for Supporting Patient Adherence and/or Self-Administration: dosing calendar and spouse  Expected duration of therapy: Until disease progression or intolerable toxicity    Goals of Therapy:  Patient Goals of Therapy:   Consistently take medications as prescribed  Patient will adhere to medication regimen  Patient will report any medication side effects to healthcare provider  Clinical Goals:    Goals Addressed Today        Specialty Pharmacy General Goal      Side effects from medication and taking 3 tablets twice daily.            Support patient understanding of medication regimen  Ensure patient knows the pharmacy contact information  Schedule regular follow-up to monitor the treatment serious adverse events  Schedule regular follow-up to confirm medication adherence  Schedule regular follow-up to monitor disease progression or stability    Quality of Life Assessment:  The patient's current (pre-therapy) quality of life was discussed with the patient. The QOL segment of this outreach has been reviewed and updated.   Quality of Life Score: 6/10    Reassessment Plan and Follow-Up:  Pharmacist to perform regular reassessments no more than (6) months from the previous assessment.  Welcome information and patient satisfaction survey to be sent by retail team with patient's initial fill.  Care Coordinator to set up future refill outreaches, coordinate prescription delivery, and escalate clinical questions to pharmacist.     Additional Plans, Therapy Recommendations or  Therapy Problems to Be Addressed: none at this time     Attestation:  I attest the patient was actively involved in and has agreed to the above plan of care. If the prescribed therapy is at any point deemed not appropriate based on the current or future assessments, a consultation will be initiated with the patient's specialty care provider to determine the best course of action. The revised plan of therapy will be documented along with any required assessments and/or additional patient education provided.     Finally, all questions and concerns today were addressed to the best of my knowledge within the initial clinical assessment office visit. Patient verbalized they had the Specialty Pharmacy Services contact information for any future concerns or questions.         Electronically signed 04/03/2024 17:00 CDT by:  June Siegel PharmD  Hematology & Oncology Specialty Pharmacist  [facility]  Phone: [phone]

## 2024-04-10 NOTE — PROGRESS NOTES
[facility] HEMATOLOGY & ONCOLOGY  [address]  [facility] [address] [phone]    Patient Name: Bernard Matta  Encounter Date: 04/24/2024  YOB: 1950  Patient Number: [id number]      REASON FOR FOLLOW-UP:Bernard Matta is a pleasant 73 y.o.  male who is seen on follow-up for stage IIb adenocarcinoma involving the cecum. He is seen C1D17 of adjuvant capecitabine. The patient is also followed for anemia from iron deficiency and chronic kidney disease stage IIIb.  GFR 31 mL/min on 2/21/2024.  He is on epoetin alpha and intravenous iron as indicated.  Most recent ferric carboxymaltose was given 2/28/2024.  He is seen with his spouse, Nury.  History obtained from the patient.  He is a reliable historian.           DIAGNOSTIC ABNORMALITIES: Colon cancer.  Screening colonoscopy 8/12/2021 was negative.  He presented with diffuse abdominal pain, nausea and vomiting.  CT abdomen pelvis 2/6/2024.There is an abnormal bowel gas pattern with multiple moderately  distended loops of small bowel with mild bowel wall thickening. The right and transverse colon are also moderately distended. The more distal colon is decompressed. Within the right lower quadrant posterior to the cecum there is a complex collection which is not definitely contiguous with any adjacent GI structure suspicious for localized perforation with abscess formation. There is surrounding inflammatory changes. The appendix is not clearly identified and this could represent a ruptured appendicitis. I do not see any documentation of previous appendectomy by electronic medical record. There is free air beneath the right diaphragm and within the perihepatic space.  Free fluid within the lower right paracolic gutter, central pelvis and lower pelvis. This fluid demonstrates some complexity. Small hiatal hernia. The prostate gland is enlarged. There is a bladder diverticulum  emanating from both the left and right lateral bladder wall. A mild element of chronic bladder outlet obstruction is not excluded.  Chest x-ray 2/6/2024.Radiodense nodule in the right upper lobe-favor granuloma.  No acute infiltrate. Free air demonstrated on CT imaging is not as well delineated by plain film radiograph.  CT head on 2/10/2024.Chronic changes and no acute intracranial findings.  Paranasal sinus mucosal thickening.  Pathology report 2/10/2024.  Cecum, ileocecectomy:  A. Moderately differentiated adenocarcinoma arising in the cecal pit (1.9 cm).  B. Tumor invades through the visceral peritoneum resulting in macroscopic perforation.  C. The proximal, distal and radial adventitial soft tissue margins are free of tumor.  D. Acute peritonitis.  E. Pericolic cyst-like structure with hyalinized fibrous wall and acute and chronic inflammation and hemorrhage in  adjacent fibroadipose tissue.  F. Nine of 9 pericolic lymph nodes are negative for metastatic carcinoma.  G. Detached segment of viable bowel compatible with anastomotic tissue with large intestine and small intestinal tissue  seen, negative for malignancy.  AJCC stage: pT4a pN0  CT chest report 3/8/2024.No evidence of metastatic disease in the chest.            PREVIOUS INTERVENTIONS:  Laparotomy and ileocecectomy on 2/6/2024.  Positive for perforation in the posterior cecum.  Adjuvant capecitabine 4/8/2024 through present.        PREVIOUS INTERVENTIONS:Anemia  Injectafer on 2/20/2024.  Epoetin shari on 3/27/2024 through present.    Oncology/Hematology History   Colon cancer   2/28/2024 Initial Diagnosis    Colon cancer     2/28/2024 Cancer Staged    Staging form: Colon And Rectum, AJCC 8th Edition  - Pathologic stage from 2/28/2024: Stage IIB (pT4a, pN0, cM0) - Signed by Navi Stearns MD on 2/28/2024     4/3/2024 -  Chemotherapy    [facility] - Capecitabine         PAST MEDICAL HISTORY:  ALLERGIES:  Allergies   Allergen Reactions    Jardiance  [Empagliflozin] Other (See Comments)     Pt reports nausea/vomiting        CURRENT MEDICATIONS:  Outpatient Encounter Medications as of 4/24/2024   Medication Sig Dispense Refill    amitriptyline (ELAVIL) 10 MG tablet Take 1 tablet by mouth At Night As Needed for Sleep.      aspirin 81 MG tablet Take 1 tablet by mouth Daily.      atorvastatin (LIPITOR) 40 MG tablet Take 1 tablet by mouth every night at bedtime. 90 tablet 3    capecitabine (XELODA) 500 MG chemo tablet Take 3 tablets by mouth 2 (Two) Times a Day. Take on Days 1-14 every 21 day cycle. 84 tablet 2    cetirizine (zyrTEC) 10 MG tablet Take 1 tablet by mouth Daily.      cholecalciferol (VITAMIN D3) 400 UNITS tablet Take 1 tablet by mouth Daily.      cloNIDine (CATAPRES) 0.2 MG tablet Take 1 tablet by mouth 2 (Two) Times a Day.      dorzolamide-timolol (COSOPT) 22.3-6.8 MG/ML ophthalmic solution Administer 1 drop to both eyes 2 (Two) Times a Day.      epoetin shari-epbx (Retacrit) 75095 UNIT/ML injection Inject  under the skin into the appropriate area as directed Every 14 (Fourteen) Days.      ezetimibe (Zetia) 10 MG tablet Take 1 tablet by mouth Daily. 90 tablet 3    fluticasone (FLONASE) 50 MCG/ACT nasal spray 2 sprays into the nostril(s) as directed by provider Daily.      furosemide (Lasix) 20 MG tablet Take 1 tablet by mouth Daily. 90 tablet 3    insulin aspart (novoLOG FLEXPEN) 100 UNIT/ML solution pen-injector sc pen Inject 0-25 Units under the skin into the appropriate area as directed 3 (Three) Times a Day With Meals. Sliding scale      insulin degludec (TRESIBA FLEXTOUCH) 100 UNIT/ML solution pen-injector injection Inject 8 Units under the skin into the appropriate area as directed Every Night.      latanoprost (XALATAN) 0.005 % ophthalmic solution Administer 1 drop to both eyes Every Night.      lisinopril (PRINIVIL,ZESTRIL) 10 MG tablet Take 1 tablet by mouth Every Night.      Miracle mouthwash oral suspension (diphenhydrAMINE HCl - lidocaine -  sodium bicarbonate - normal saline) Swish and spit 15 mL Every 4 (Four) Hours As Needed for Mucositis. 480 mL 3    Multiple Vitamins-Minerals (MULTIVITAMIN WITH MINERALS) tablet tablet Take 1 tablet by mouth Daily.      ondansetron (Zofran) 8 MG tablet Take 1 tablet by mouth Every 8 (Eight) Hours As Needed for Nausea or Vomiting. 60 tablet 2    prochlorperazine (COMPAZINE) 10 MG tablet Take 1 tablet by mouth Every 4 (Four) Hours As Needed for Nausea or Vomiting. 60 tablet 2    Rhopressa 0.02 % solution Apply 1 drop to eye(s) as directed by provider Every Night.      simethicone (Gas-X) 80 MG chewable tablet Chew 0.5 tablets Every 6 (Six) Hours As Needed for Flatulence (belching). 30 tablet 1    tamsulosin (FLOMAX) 0.4 MG capsule 24 hr capsule Take 2 capsules by mouth Every Night.      traMADol (Ultram) 50 MG tablet Take 1 tablet by mouth Every 8 (Eight) Hours As Needed for Moderate Pain. 30 tablet 0     No facility-administered encounter medications on file as of 4/24/2024.     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 ENDOSCOPY;  Service: Gastroenterology;  Laterality: N/A;  preop; hx of polyps   postop; polyps   PCP Brando Webb     COLONOSCOPY N/A 8/12/2021    Procedure: COLONOSCOPY WITH ANESTHESIA;  Surgeon: Shaquille Mckenzie DO;  Location: [facility] ENDOSCOPY;  Service: Gastroenterology;  Laterality: N/A;   pre hx adenomatous colon polyp  post normal  Brando Webb MD    EXPLORATORY LAPAROTOMY N/A 2/6/2024    Procedure: DIAGNOSTIC LAPAROSCOPY CONVERTED TO LAPAROTOMY, ILIOCECTECTOMY;  Surgeon: Hemant Park MD;  Location: [facility] OR;  Service: General;  Laterality: N/A;     HEALTH MAINTENANCE ITEMS:  Health Maintenance Due   Topic Date Due    COLONOSCOPY  08/12/2022       <no information>  Last Completed Colonoscopy       This patient has no relevant Health Maintenance data.          Immunization History   Administered Date(s) Administered    COVID-19 (MODERNA) 1st,2nd,3rd Dose Monovalent 03/02/2021, 03/30/2021, 11/02/2021    COVID-19 (PFIZER) BIVALENT 12+YRS 09/29/2022    COVID-19 F23 (PFIZER) 12YRS+ (COMIRNATY) 11/16/2023    Covid-19 (Pfizer) Gray Cap Monovalent 07/28/2022    FLUAD TRI 65YR+ 09/09/2019    Flu Vaccine Split Quad 01/01/2013    Fluad Quad 65+ 09/09/2019, 09/09/2020    Fluzone (or Fluarix & Flulaval for VFC) >6mos 01/01/2013, 09/20/2022    Fluzone High Dose =>65 Years ([employer] ONLY) 09/14/2016, 10/02/2017, 10/08/2018    Fluzone High-Dose 65+yrs 10/19/2021, 10/02/2023    Pneumococcal Conjugate 13-Valent (PCV13) 06/11/2015    Pneumococcal Polysaccharide (PPSV23) 04/27/2012, 01/01/2013    Shingrix 06/21/2018, 01/07/2019    Tdap 05/08/2013     Last Completed Mammogram       This patient has no relevant Health Maintenance data.              FAMILY HISTORY:  Family History   Problem Relation Age of Onset    No Known Problems Maternal Grandmother     No Known Problems Maternal Grandfather     No Known Problems Paternal Grandmother     No Known Problems Paternal Grandfather     No Known Problems Mother     No Known Problems Father     Colon cancer Neg Hx     Colon polyps Neg Hx     Esophageal cancer Neg Hx      SOCIAL HISTORY:  Social History     Socioeconomic History    Marital status:    Tobacco Use    Smoking status: Former     Current packs/day: 0.00     Average packs/day: 0.5 packs/day  "for 10.0 years (5.0 ttl pk-yrs)     Types: Cigarettes     Start date: 1969     Quit date: 1979     Years since quittin.3     Passive exposure: Past    Smokeless tobacco: Never   Vaping Use    Vaping status: Never Used   Substance and Sexual Activity    Alcohol use: No    Drug use: No    Sexual activity: Defer       REVIEW OF SYSTEMS:    Review of Systems   Constitutional:  Positive for fatigue. Negative for chills and fever.   HENT:  Negative for congestion.         Had mouth sores.    Eyes:  Negative for redness and visual disturbance.   Respiratory:  Negative for shortness of breath and wheezing.    Cardiovascular:  Negative for chest pain and palpitations.   Gastrointestinal:  Negative for constipation, diarrhea, nausea and vomiting.   Endocrine: Negative for cold intolerance and heat intolerance.   Genitourinary:  Negative for difficulty urinating and dysuria.   Musculoskeletal:  Negative for gait problem and joint swelling.   Skin:  Positive for pallor.   Allergic/Immunologic: Negative for food allergies.   Neurological:  Negative for dizziness and speech difficulty.   Hematological:  Negative for adenopathy. Does not bruise/bleed easily.   Psychiatric/Behavioral:  Negative for agitation and confusion. The patient is not nervous/anxious.        VITAL SIGNS: /58   Pulse 112   Temp 97.7 °F (36.5 °C)   Resp 18   Ht 175.3 cm (69.02\")   Wt 81 kg (178 lb 9.6 oz)   SpO2 97%   BMI 26.36 kg/m²  Lost 2 pounds. \"Diarrhea.\"  Pain Score    24 1017   PainSc: 2  Comment: in my feet       PHYSICAL EXAMINATION:     Physical Exam  Vitals reviewed.   Constitutional:       General: He is not in acute distress.  HENT:      Head: Normocephalic and atraumatic.   Eyes:      General: No scleral icterus.  Cardiovascular:      Rate and Rhythm: Normal rate.   Pulmonary:      Effort: No respiratory distress.      Breath sounds: No wheezing.   Abdominal:      General: Bowel sounds are normal.      Palpations: " Abdomen is soft.      Tenderness: There is no abdominal tenderness.   Musculoskeletal:         General: No swelling.      Cervical back: Neck supple.   Skin:     Coloration: Skin is pale.      Findings: Rash present.   Neurological:      Mental Status: He is alert and oriented to person, place, and time.   Psychiatric:         Mood and Affect: Mood normal.         Behavior: Behavior normal.         Thought Content: Thought content normal.         Judgment: Judgment normal.         LABS    Lab Results - Last 18 Months   Lab Units 04/24/24  0954 04/03/24  0848 03/27/24  1417 03/13/24  0902 03/12/24  0704 02/28/24  1201 02/21/24  1029 02/14/24  0844 02/09/24  0656 02/08/24  0547 02/07/24  0750   HEMOGLOBIN g/dL 10.4* 10.3* 9.3* 8.8* 9.4* 8.5*   < > 9.2*   < > 10.2* 10.2*   HEMATOCRIT % 33.1* 34.1* 29.4* 28.8* 29.5* 27.9*   < > 30.3*   < > 33.3* 33.0*   MCV fL 95.7 100.3* 95.8 97.6* 93.7 94.9   < > 95.9   < > 95.4 95.7   WBC 10*3/mm3 7.03 5.75 6.44 6.73 6.18 7.86   < > 7.61   < > 7.12 7.34   RDW % 13.9 15.9* 15.3 15.9* 14.6 14.3   < > 14.8   < > 14.5 14.0   MPV fL 8.8 8.8 9.1 9.3  --  9.5  --  9.9   < > 9.2 9.3   PLATELETS 10*3/mm3 207 181 202 162 166 275   < > 218   < > 213 159   IMM GRAN % % 0.4 0.3 0.2 0.3  --  0.4  --  1.8*   < >  --   --    NEUTROS ABS 10*3/mm3 4.97 3.91 3.73 4.50 4.25 5.59   < > 5.50   < > 5.91 6.68   LYMPHS ABS 10*3/mm3 1.26 1.03 1.59 1.24 1.01 1.34   < > 1.04   < >  --   --    MONOS ABS 10*3/mm3 0.31 0.40 0.54 0.47 0.47 0.59   < > 0.53   < >  --   --    EOS ABS 10*3/mm3 0.44* 0.36 0.53* 0.46* 0.41* 0.27   < > 0.36   < > 0.21  --    BASOS ABS 10*3/mm3 0.02 0.03 0.04 0.04 0.03 0.04   < > 0.04   < > 0.14 0.07   IMMATURE GRANS (ABS) 10*3/mm3 0.03 0.02 0.01 0.02  --  0.03  --  0.14*   < >  --   --    NRBC /100 WBC 0.0 0.0 0.0 0.0 0.0 0.0   < > 0.0   < >  --   --    NEUTROPHIL % %  --   --   --   --   --   --   --   --   --  73.0 69.0   MONOCYTES % %  --   --   --   --   --   --   --   --   --  6.0  1.0*   BASOPHIL % %  --   --   --   --   --   --   --   --   --  2.0* 1.0   ATYP LYMPH % %  --   --   --   --   --   --   --   --   --   --  2.0   ANISOCYTOSIS   --   --   --   --   --   --   --   --   --   --  Slight/1+    < > = values in this interval not displayed.       Lab Results - Last 18 Months   Lab Units 04/24/24  0954 04/03/24  0848 03/27/24  1417 03/13/24  0902 03/12/24  0704 02/28/24  1201 02/21/24  1029 02/16/24  0533 02/15/24  1615 02/15/24  0534   GLUCOSE mg/dL 195* 267* 98 175* 138* 111*  111* 365* 290*  --  70   SODIUM mmol/L 138 139 140 141 139 140  140 133* 137  --  144   POTASSIUM mmol/L 4.2 4.6 4.2 4.4 5.3* 4.5  4.5 5.4* 4.8   < > 3.3*   CO2 mmol/L 24.0 31.0* 29.0 29.0 29.3* 30.0*  30.0* 24.2 20.0*  --  24.0   CHLORIDE mmol/L 102 100 101 104 102 101  101 96* 104  --  110*   ANION GAP mmol/L 12.0 8.0 10.0 8.0  --  9.0  9.0  --  13.0  --  10.0   CREATININE mg/dL 1.92* 1.84* 1.70* 1.85* 1.80* 1.97*  1.97* 2.17* 1.95*  --  1.93*   BUN mg/dL 37* 23 24* 22 22 30*  30* 35* 38*  --  40*   BUN / CREAT RATIO  19.3 12.5 14.1 11.9 12.2 15.2  15.2 16.1 19.5  --  20.7   CALCIUM mg/dL 8.8 8.9 8.7 8.5* 8.4* 8.6  8.6 8.4* 8.7  --  8.6   ALK PHOS U/L 94  --  120* 141* 153* 167* 132* 104  --  94   TOTAL PROTEIN g/dL 6.8  --  6.8 6.4  --  7.0  --  6.1  --  6.0   ALT (SGPT) U/L 27  --  22 30 33 58* 76* 63*  --  61*   AST (SGOT) U/L 21  --  19 22 19 30 41* 35  --  36   BILIRUBIN mg/dL 0.4  --  0.3 0.3 0.4 0.2 0.2 0.3  --  0.2   ALBUMIN g/dL 3.9  --  3.7 3.3* 3.5 3.5  3.5 3.2* 2.9*  --  2.9*   GLOBULIN gm/dL 2.9  --  3.1 3.1  --  3.5  --  3.2  --  3.1    < > = values in this interval not displayed.       Lab Results - Last 18 Months   Lab Units 03/27/24  1417 02/28/24  1201 01/25/23  1019   M-SPIKE g/dL  --   --  Not Observed   KAPPA/LAMBDA RATIO, S   --   --  1.55   FREE LAMBDA LIGHT CHAINS mg/L  --   --  25.8   IG KAPPA FREE LIGHT CHAIN mg/L  --   --  40.0*   CEA ng/mL 3.44 7.10  --    LDH U/L  --   --   "183       Lab Results - Last 18 Months   Lab Units 04/24/24  0954 03/27/24  1417 02/28/24  1201 11/29/23  1100 11/01/23  0957 08/25/23  0759 08/02/23  1024 05/03/23  1221 04/05/23  0855 02/22/23  0946 01/25/23  1019   IRON mcg/dL 56* 58* 37* 84 37*  --  53*   < > 36*   < > 122   TIBC mcg/dL 259* 244* 250* 216* 262* 247 244*   < > 256*   < > 337   IRON SATURATION %  --   --   --   --   --  27  --   --   --   --   --    IRON SATURATION (TSAT) % 22 24 15* 39 14*  --  22   < > 14*   < > 36   FERRITIN ng/mL 424.20* 539.40* 383.90 797.10* 120.50 266.00 290.60   < > 59.42   < > 16.82*   TSH uIU/mL  --   --   --   --   --  2.110  --   --  1.740  --   --    FOLATE ng/mL  --   --   --   --   --  >20.00  --   --   --   --  >20.00    < > = values in this interval not displayed.       Bernard Matta reports a pain score of 2.  Given his pain assessment as noted, treatment options were discussed and the following options were decided upon as a follow-up plan to address the patient's pain: continuation of current treatment plan for pain.      ASSESSMENT:  Adenocarcinoma involving the cecum.  High risk.  Less than 12 lymph nodes examined.  No lymphovascular or perineural invasion.  MSI/MMR report 3/11/2024. CLEMENCIA and pMMR.  AJCC stage: IIb (pT4a, pN0, cM0, G2).  Tumor size 1.9 cm.  Zo status, 0/9 positive lymph node.  Treatment status: Adjuvant capecitabine pending.  2. Performance status of 2.   3.  Normocytic anemia from iron deficiency and chronic kidney disease stage IIIb.  GFR 31 mL/min on 2/21/2024.  Latest Injectafer given 2/28/2024 due to malabsorption.    4.  Grade 1 diarrhea from Xeloda.  5.  Rash, both arms, chest and back with Xeloda.  Observe. \"I was mowing.\"Instructed to avoid exposure. \"Okay.\" Try clobetasol as needed.    5.  Intestinal malabsorption, unspecific type.  Intravenous iron as indicated.  6.  Elevated transaminase, observation.           PLAN:   Re: Tolerance to capecitabine. \"Diarrhea and rash in " "my hands, chest and back.\"  2.   Re: Tolerance to epoetin alpha. \"It was okay.\"  3.   Re: Interval CBC on 4/17/2024.WBC 5.5, hemoglobin 12.5, hematocrit 37.2, MCV 95 and platelet 199.  Glucose 355. \"Pancakes.\" GFR 35.  Saturation 50%, ferritin 468 and CEA 4.1.  4.   Re: Heme status.  WBC 7.03, hemoglobin 10.4, hematocrit 33.1, MCV 95.7 and platelet 207.  5.   Re: CMP.  Glucose 195. GFR 35.9 mL/min.  Alkaline phosphatase 94 from 120 from 141 from 153 from 167 from 132 and ALT 27.  6.   Re: CEA 4.1 from 3.4 from 7.1, observe.  7.   Re: Ferritin 424.2 and saturation 22%.    8.   Re: Continue with adjuvant capecitabine.   9.  eRx capecitabine 750 mg/m2 (dose reduced due to CKD) every 12 hours for 14 days every 21-day cycle x 24 weeks C2D1 start on 4/29/2024.  Observe for potential allergic reactions, anaphylaxis, hand-foot syndrome, nausea, vomiting, diarrhea, worsening dermatitis, hepatotoxicity, fatigue, or cardiotoxicity arrhythmias or ischemia. Hold if GFR < 30.  Patient preference for capecitabine rather than FOLFOX.   10.  eRx ondansetron 8 mg p.o. every 8 hours as needed for nausea and vomiting #60, 2 refills.  11.  eRx prochlorperazine 10 mg p.o. every 4 hours as needed for nausea and vomiting #60, 2 refills.  12.  eRx magic mouth wash 15 mh swish and spit every 4 hours a s needed for mucositis, 480 ml. 2 refills if needed.   13.  Weekly CBC with differential with chemo.  14. Advance Care Planning  ACP discussion was held with the patient during this visit. Patient has an advance directive in EMR which is still valid.   15.  Colonoscopy at 1 year on 2/2025.  16.  Continue care per primary care physician at the specialist.  17.  Care discussed with patient and his spouse.  Understand expressed.  He is agreeable to proceed.  18.  Epoetin alpha 40,000 units subcutaneous every 2 weeks if hemoglobin less than 11 and hematocrit less than 33, chronic kidney disease.  " Observe for hypertension.  19. Return to office in 3 weeks with CBC with differential, ferritin, iron panel, CEA, and CMP.  Patient to call the office 4/29/2024 regarding status of his rash.  May need dose reduction or delay in treatment.             I have reviewed the assessment and plan and verified the accuracy of it. No changes to assessment and plan since the information was documented. Navi Stearns MD 04/24/24          I spent 32 total minutes, face-to-face, caring for Bernard rmaos. Greater than 50% of this time involved counseling and/or coordination of care as documented within this note.             Allan Schneider MD  (Dalton Villalobos MD)  (Shaquille Mckenzie MD)  Everton Webb MD  (JORJE Novoa)

## 2024-04-15 ENCOUNTER — LAB (OUTPATIENT)
Dept: FAMILY MEDICINE CLINIC | Facility: CLINIC | Age: 74
End: 2024-04-15
Payer: MEDICARE

## 2024-04-17 ENCOUNTER — TELEPHONE (OUTPATIENT)
Dept: ONCOLOGY | Facility: CLINIC | Age: 74
End: 2024-04-17
Payer: MEDICARE

## 2024-04-17 NOTE — TELEPHONE ENCOUNTER
Called and spoke with patient Bernard Matta regarding lab results. He was informed Glucose reading of 355, patient reports he had ate breakfast prior to lab draw and he does monitor his glucose readings with daily checks, takes his diabetes medication as he should and does watch his diet.     GFR: 35   Pt encouraged to increase his fluids, drink more water and f/u with PCP   Pt v/u of all information

## 2024-04-17 NOTE — TELEPHONE ENCOUNTER
----- Message from Navi Stearns MD sent at 4/17/2024  8:46 AM CDT -----  Notify patient.  Glucose 355.  GFR 35.  Saturation 50%, ferritin 468 and CEA 4.1.    WBC 5.5, hemoglobin 12.5, hematocrit 37.2, MCV 95 and platelet 199.

## 2024-04-23 ENCOUNTER — TELEPHONE (OUTPATIENT)
Dept: ONCOLOGY | Facility: CLINIC | Age: 74
End: 2024-04-23
Payer: MEDICARE

## 2024-04-23 ENCOUNTER — SPECIALTY PHARMACY (OUTPATIENT)
Dept: ONCOLOGY | Facility: HOSPITAL | Age: 74
End: 2024-04-23
Payer: MEDICARE

## 2024-04-23 DIAGNOSIS — K13.79 MOUTH SORES: Primary | ICD-10-CM

## 2024-04-23 NOTE — PROGRESS NOTES
"[facility]    ALERT - ENCOUNTER FOR PROBLEM      Consult Details  Consulting Provider:   Navi Stearns MD ([facility])   Medication and Regimen:  Xeloda target dose     Bernard is a 73 y.o. male, who is followed by the specialty pharmacy service for Xeloda support.     This patient contacted our service today due to a problem that requires attention.     Visit Type: phone call from office, patient     PROBLEM: \"Diarrhea and rash in my hands, chest and back.\"      Nausea - use Zofran and Compazine  Rash: as below  Mouth sores - magic mouthwash    Orders Signed This Visit (2)    clobetasol (CLOBEX) 0.05 % lotion          Apply topically to the appropriate area as directed 2 (Two) Times a Day. Apply to affected areas on back, chest, arms. Please do Not apply to face, Starting Wed 4/24/2024, Until Thu 4/25/2024, Normal       hydrocortisone (WESTCORT) 0.2 % cream          Apply 1 Application topically to the appropriate area as directed 2 (Two) Times a Day. Apply topically to affected areas on face., Starting Wed 4/24/2024, Until Thu 4/25/2024, Normal         Electronically signed 04/27/24 at 18:11 CDT by:  Owen Sandoval, Silvestre  Specialty Pharmacist - Hematology & Oncology  [facility]  [phone]    "

## 2024-04-23 NOTE — TELEPHONE ENCOUNTER
Notified pt to  Imodium OTC to help with the diarrhea. We will call in magic mouthwash for the mouth sores. We see him tomorrow in clinic and will see how the rash looks and go from there. Pt v/u

## 2024-04-24 ENCOUNTER — OFFICE VISIT (OUTPATIENT)
Dept: ONCOLOGY | Facility: CLINIC | Age: 74
End: 2024-04-24
Payer: MEDICARE

## 2024-04-24 ENCOUNTER — LAB (OUTPATIENT)
Dept: LAB | Facility: HOSPITAL | Age: 74
End: 2024-04-24
Payer: MEDICARE

## 2024-04-24 VITALS
OXYGEN SATURATION: 97 % | SYSTOLIC BLOOD PRESSURE: 110 MMHG | HEIGHT: 69 IN | DIASTOLIC BLOOD PRESSURE: 58 MMHG | BODY MASS INDEX: 26.45 KG/M2 | RESPIRATION RATE: 18 BRPM | HEART RATE: 112 BPM | WEIGHT: 178.6 LBS | TEMPERATURE: 97.7 F

## 2024-04-24 DIAGNOSIS — C18.2 MALIGNANT NEOPLASM OF ASCENDING COLON: Primary | ICD-10-CM

## 2024-04-24 DIAGNOSIS — T45.1X5A ANEMIA DUE TO ANTINEOPLASTIC CHEMOTHERAPY: ICD-10-CM

## 2024-04-24 DIAGNOSIS — D64.81 ANEMIA DUE TO ANTINEOPLASTIC CHEMOTHERAPY: ICD-10-CM

## 2024-04-24 LAB
ALBUMIN SERPL-MCNC: 3.9 G/DL (ref 3.5–5.2)
ALBUMIN/GLOB SERPL: 1.3 G/DL
ALP SERPL-CCNC: 94 U/L (ref 39–117)
ALT SERPL W P-5'-P-CCNC: 27 U/L (ref 1–41)
ANION GAP SERPL CALCULATED.3IONS-SCNC: 12 MMOL/L (ref 5–15)
AST SERPL-CCNC: 21 U/L (ref 1–40)
BASOPHILS # BLD AUTO: 0.02 10*3/MM3 (ref 0–0.2)
BASOPHILS NFR BLD AUTO: 0.3 % (ref 0–1.5)
BILIRUB SERPL-MCNC: 0.4 MG/DL (ref 0–1.2)
BUN SERPL-MCNC: 37 MG/DL (ref 8–23)
BUN/CREAT SERPL: 19.3 (ref 7–25)
CALCIUM SPEC-SCNC: 8.8 MG/DL (ref 8.6–10.5)
CEA SERPL-MCNC: 3.02 NG/ML
CHLORIDE SERPL-SCNC: 102 MMOL/L (ref 98–107)
CO2 SERPL-SCNC: 24 MMOL/L (ref 22–29)
CREAT SERPL-MCNC: 1.92 MG/DL (ref 0.76–1.27)
DEPRECATED RDW RBC AUTO: 47.8 FL (ref 37–54)
EGFRCR SERPLBLD CKD-EPI 2021: 35.9 ML/MIN/1.73
EOSINOPHIL # BLD AUTO: 0.44 10*3/MM3 (ref 0–0.4)
EOSINOPHIL NFR BLD AUTO: 6.3 % (ref 0.3–6.2)
ERYTHROCYTE [DISTWIDTH] IN BLOOD BY AUTOMATED COUNT: 13.9 % (ref 12.3–15.4)
FERRITIN SERPL-MCNC: 424.2 NG/ML (ref 30–400)
GLOBULIN UR ELPH-MCNC: 2.9 GM/DL
GLUCOSE SERPL-MCNC: 195 MG/DL (ref 65–99)
HCT VFR BLD AUTO: 33.1 % (ref 37.5–51)
HGB BLD-MCNC: 10.4 G/DL (ref 13–17.7)
IMM GRANULOCYTES # BLD AUTO: 0.03 10*3/MM3 (ref 0–0.05)
IMM GRANULOCYTES NFR BLD AUTO: 0.4 % (ref 0–0.5)
IRON 24H UR-MRATE: 56 MCG/DL (ref 59–158)
IRON SATN MFR SERPL: 22 % (ref 20–50)
LYMPHOCYTES # BLD AUTO: 1.26 10*3/MM3 (ref 0.7–3.1)
LYMPHOCYTES NFR BLD AUTO: 17.9 % (ref 19.6–45.3)
MCH RBC QN AUTO: 30.1 PG (ref 26.6–33)
MCHC RBC AUTO-ENTMCNC: 31.4 G/DL (ref 31.5–35.7)
MCV RBC AUTO: 95.7 FL (ref 79–97)
MONOCYTES # BLD AUTO: 0.31 10*3/MM3 (ref 0.1–0.9)
MONOCYTES NFR BLD AUTO: 4.4 % (ref 5–12)
NEUTROPHILS NFR BLD AUTO: 4.97 10*3/MM3 (ref 1.7–7)
NEUTROPHILS NFR BLD AUTO: 70.7 % (ref 42.7–76)
NRBC BLD AUTO-RTO: 0 /100 WBC (ref 0–0.2)
PLATELET # BLD AUTO: 207 10*3/MM3 (ref 140–450)
PMV BLD AUTO: 8.8 FL (ref 6–12)
POTASSIUM SERPL-SCNC: 4.2 MMOL/L (ref 3.5–5.2)
PROT SERPL-MCNC: 6.8 G/DL (ref 6–8.5)
RBC # BLD AUTO: 3.46 10*6/MM3 (ref 4.14–5.8)
SODIUM SERPL-SCNC: 138 MMOL/L (ref 136–145)
TIBC SERPL-MCNC: 259 MCG/DL (ref 298–536)
TRANSFERRIN SERPL-MCNC: 174 MG/DL (ref 200–360)
WBC NRBC COR # BLD AUTO: 7.03 10*3/MM3 (ref 3.4–10.8)

## 2024-04-24 PROCEDURE — 80053 COMPREHEN METABOLIC PANEL: CPT | Performed by: INTERNAL MEDICINE

## 2024-04-24 PROCEDURE — 85025 COMPLETE CBC W/AUTO DIFF WBC: CPT | Performed by: INTERNAL MEDICINE

## 2024-04-24 PROCEDURE — 82378 CARCINOEMBRYONIC ANTIGEN: CPT | Performed by: INTERNAL MEDICINE

## 2024-04-24 PROCEDURE — 84466 ASSAY OF TRANSFERRIN: CPT | Performed by: INTERNAL MEDICINE

## 2024-04-24 PROCEDURE — 82728 ASSAY OF FERRITIN: CPT | Performed by: INTERNAL MEDICINE

## 2024-04-24 PROCEDURE — 83540 ASSAY OF IRON: CPT | Performed by: INTERNAL MEDICINE

## 2024-04-24 RX ORDER — CLOBETASOL PROPIONATE 0.5 MG/ML
LOTION TOPICAL 2 TIMES DAILY
Qty: 59 ML | Refills: 1 | Status: SHIPPED | OUTPATIENT
Start: 2024-04-24 | End: 2024-04-25 | Stop reason: SDUPTHER

## 2024-04-24 RX ORDER — HYDROCORTISONE VALERATE CREAM 2 MG/G
1 CREAM TOPICAL 2 TIMES DAILY
Qty: 15 G | Refills: 0 | Status: SHIPPED | OUTPATIENT
Start: 2024-04-24 | End: 2024-04-25 | Stop reason: SDUPTHER

## 2024-04-24 NOTE — LETTER
April 24, 2024       No Recipients    Patient: Bernard Matta   YOB: 1950   Date of Visit: 4/24/2024     Dear Allan Flores MD:       Thank you for referring Bernard Matta to me for evaluation. Below are the relevant portions of my assessment and plan of care.    If you have questions, please do not hesitate to call me. I look forward to following Bernard along with you.         Sincerely,        Navi Stearns MD        CC:   No Recipients    Navi Stearns MD  04/24/24 1047  Sign when Signing Visit  [facility] HEMATOLOGY & ONCOLOGY  [address]  [place] [id number] [phone]    Patient Name: Bernard Matta  Encounter Date: 04/24/2024  YOB: 1950  Patient Number: [id number]      REASON FOR FOLLOW-UP:Bernard Matta is a pleasant 73 y.o.  male who is seen on follow-up for stage IIb adenocarcinoma involving the cecum. He is seen C1D17 of adjuvant capecitabine. The patient is also followed for anemia from iron deficiency and chronic kidney disease stage IIIb.  GFR 31 mL/min on 2/21/2024.  He is on epoetin alpha and intravenous iron as indicated.  Most recent ferric carboxymaltose was given 2/28/2024.  He is seen with his spouse, Nury.  History obtained from the patient.  He is a reliable historian.           DIAGNOSTIC ABNORMALITIES: Colon cancer.  Screening colonoscopy 8/12/2021 was negative.  He presented with diffuse abdominal pain, nausea and vomiting.  CT abdomen pelvis 2/6/2024.There is an abnormal bowel gas pattern with multiple moderately  distended loops of small bowel with mild bowel wall thickening. The right and transverse colon are also moderately distended. The more distal colon is decompressed. Within the right lower quadrant posterior to the cecum there is a complex collection which is not definitely contiguous with any adjacent GI structure suspicious for localized perforation with abscess  formation. There is surrounding inflammatory changes. The appendix is not clearly identified and this could represent a ruptured appendicitis. I do not see any documentation of previous appendectomy by electronic medical record. There is free air beneath the right diaphragm and within the perihepatic space.  Free fluid within the lower right paracolic gutter, central pelvis and lower pelvis. This fluid demonstrates some complexity. Small hiatal hernia. The prostate gland is enlarged. There is a bladder diverticulum emanating from both the left and right lateral bladder wall. A mild element of chronic bladder outlet obstruction is not excluded.  Chest x-ray 2/6/2024.Radiodense nodule in the right upper lobe-favor granuloma.  No acute infiltrate. Free air demonstrated on CT imaging is not as well delineated by plain film radiograph.  CT head on 2/10/2024.Chronic changes and no acute intracranial findings.  Paranasal sinus mucosal thickening.  Pathology report 2/10/2024.  Cecum, ileocecectomy:  A. Moderately differentiated adenocarcinoma arising in the cecal pit (1.9 cm).  B. Tumor invades through the visceral peritoneum resulting in macroscopic perforation.  C. The proximal, distal and radial adventitial soft tissue margins are free of tumor.  D. Acute peritonitis.  E. Pericolic cyst-like structure with hyalinized fibrous wall and acute and chronic inflammation and hemorrhage in  adjacent fibroadipose tissue.  F. Nine of 9 pericolic lymph nodes are negative for metastatic carcinoma.  G. Detached segment of viable bowel compatible with anastomotic tissue with large intestine and small intestinal tissue  seen, negative for malignancy.  AJCC stage: pT4a pN0  CT chest report 3/8/2024.No evidence of metastatic disease in the chest.            PREVIOUS INTERVENTIONS:  Laparotomy and ileocecectomy on 2/6/2024.  Positive for perforation in the posterior cecum.  Adjuvant capecitabine 4/8/2024 through present.        PREVIOUS  INTERVENTIONS:Anemia  Injectafer on 2/20/2024.  Epoetin shari on 3/27/2024 through present.    Oncology/Hematology History   Colon cancer   2/28/2024 Initial Diagnosis    Colon cancer     2/28/2024 Cancer Staged    Staging form: Colon And Rectum, AJCC 8th Edition  - Pathologic stage from 2/28/2024: Stage IIB (pT4a, pN0, cM0) - Signed by Navi Stearns MD on 2/28/2024     4/3/2024 -  Chemotherapy    [facility] - Capecitabine         PAST MEDICAL HISTORY:  ALLERGIES:  Allergies   Allergen Reactions   • Jardiance [Empagliflozin] Other (See Comments)     Pt reports nausea/vomiting        CURRENT MEDICATIONS:  Outpatient Encounter Medications as of 4/24/2024   Medication Sig Dispense Refill   • amitriptyline (ELAVIL) 10 MG tablet Take 1 tablet by mouth At Night As Needed for Sleep.     • aspirin 81 MG tablet Take 1 tablet by mouth Daily.     • atorvastatin (LIPITOR) 40 MG tablet Take 1 tablet by mouth every night at bedtime. 90 tablet 3   • capecitabine (XELODA) 500 MG chemo tablet Take 3 tablets by mouth 2 (Two) Times a Day. Take on Days 1-14 every 21 day cycle. 84 tablet 2   • cetirizine (zyrTEC) 10 MG tablet Take 1 tablet by mouth Daily.     • cholecalciferol (VITAMIN D3) 400 UNITS tablet Take 1 tablet by mouth Daily.     • cloNIDine (CATAPRES) 0.2 MG tablet Take 1 tablet by mouth 2 (Two) Times a Day.     • dorzolamide-timolol (COSOPT) 22.3-6.8 MG/ML ophthalmic solution Administer 1 drop to both eyes 2 (Two) Times a Day.     • epoetin shari-epbx (Retacrit) 09012 UNIT/ML injection Inject  under the skin into the appropriate area as directed Every 14 (Fourteen) Days.     • ezetimibe (Zetia) 10 MG tablet Take 1 tablet by mouth Daily. 90 tablet 3   • fluticasone (FLONASE) 50 MCG/ACT nasal spray 2 sprays into the nostril(s) as directed by provider Daily.     • furosemide (Lasix) 20 MG tablet Take 1 tablet by mouth Daily. 90 tablet 3   • insulin aspart (novoLOG FLEXPEN) 100 UNIT/ML solution pen-injector sc pen Inject 0-25  Units under the skin into the appropriate area as directed 3 (Three) Times a Day With Meals. Sliding scale     • insulin degludec (TRESIBA FLEXTOUCH) 100 UNIT/ML solution pen-injector injection Inject 8 Units under the skin into the appropriate area as directed Every Night.     • latanoprost (XALATAN) 0.005 % ophthalmic solution Administer 1 drop to both eyes Every Night.     • lisinopril (PRINIVIL,ZESTRIL) 10 MG tablet Take 1 tablet by mouth Every Night.     • Miracle mouthwash oral suspension (diphenhydrAMINE HCl - lidocaine - sodium bicarbonate - normal saline) Swish and spit 15 mL Every 4 (Four) Hours As Needed for Mucositis. 480 mL 3   • Multiple Vitamins-Minerals (MULTIVITAMIN WITH MINERALS) tablet tablet Take 1 tablet by mouth Daily.     • ondansetron (Zofran) 8 MG tablet Take 1 tablet by mouth Every 8 (Eight) Hours As Needed for Nausea or Vomiting. 60 tablet 2   • prochlorperazine (COMPAZINE) 10 MG tablet Take 1 tablet by mouth Every 4 (Four) Hours As Needed for Nausea or Vomiting. 60 tablet 2   • Rhopressa 0.02 % solution Apply 1 drop to eye(s) as directed by provider Every Night.     • simethicone (Gas-X) 80 MG chewable tablet Chew 0.5 tablets Every 6 (Six) Hours As Needed for Flatulence (belching). 30 tablet 1   • tamsulosin (FLOMAX) 0.4 MG capsule 24 hr capsule Take 2 capsules by mouth Every Night.     • traMADol (Ultram) 50 MG tablet Take 1 tablet by mouth Every 8 (Eight) Hours As Needed for Moderate Pain. 30 tablet 0     No facility-administered encounter medications on file as of 4/24/2024.     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 ENDOSCOPY;  Service: Gastroenterology;  Laterality: N/A;  preop; hx of polyps   postop; polyps   PCP Brando Webb    • COLONOSCOPY N/A 8/12/2021    Procedure: COLONOSCOPY WITH ANESTHESIA;  Surgeon: Shaquille Mckenzie DO;  Location: [facility] ENDOSCOPY;  Service: Gastroenterology;  Laterality: N/A;  pre hx adenomatous colon polyp  post normal  Brando Webb MD   • EXPLORATORY LAPAROTOMY N/A 2/6/2024    Procedure: DIAGNOSTIC LAPAROSCOPY CONVERTED TO LAPAROTOMY, ILIOCECTECTOMY;  Surgeon: Hemant Park MD;  Location: [facility] OR;  Service: General;  Laterality: N/A;     HEALTH MAINTENANCE ITEMS:  Health Maintenance Due   Topic Date Due   • COLONOSCOPY  08/12/2022       <no information>  Last Completed Colonoscopy       This patient has no relevant Health Maintenance data.          Immunization History   Administered Date(s) Administered   • COVID-19 (MODERNA) 1st,2nd,3rd Dose Monovalent 03/02/2021, 03/30/2021, 11/02/2021   • COVID-19 (PFIZER) BIVALENT 12+YRS 09/29/2022   • COVID-19 F23 (PFIZER) 12YRS+ (COMIRNATY) 11/16/2023   • Covid-19 (Pfizer) Gray Cap Monovalent 07/28/2022   • FLUAD TRI 65YR+ 09/09/2019   • Flu Vaccine Split Quad 01/01/2013   • Fluad Quad 65+ 09/09/2019, 09/09/2020   • Fluzone (or Fluarix & Flulaval for VFC) >6mos 01/01/2013, 09/20/2022   • Fluzone High Dose =>65 Years ([employer] ONLY) 09/14/2016, 10/02/2017, 10/08/2018   • Fluzone High-Dose 65+yrs 10/19/2021, 10/02/2023   • Pneumococcal Conjugate 13-Valent (PCV13) 06/11/2015   • Pneumococcal Polysaccharide (PPSV23) 04/27/2012, 01/01/2013   • Shingrix 06/21/2018, 01/07/2019   • Tdap 05/08/2013     Last Completed Mammogram       This patient has no relevant Health Maintenance data.               FAMILY HISTORY:  Family History   Problem Relation Age of Onset   • No Known Problems Maternal Grandmother    • No Known Problems Maternal Grandfather    • No Known Problems Paternal Grandmother    • No Known Problems Paternal Grandfather    • No Known Problems Mother    • No Known Problems Father    • Colon cancer Neg Hx    • Colon polyps Neg Hx    • Esophageal cancer Neg Hx      SOCIAL HISTORY:  Social History     Socioeconomic History   • Marital status:    Tobacco Use   • Smoking status: Former     Current packs/day: 0.00     Average packs/day: 0.5 packs/day for 10.0 years (5.0 ttl pk-yrs)     Types: Cigarettes     Start date: 1969     Quit date: 1979     Years since quittin.3     Passive exposure: Past   • Smokeless tobacco: Never   Vaping Use   • Vaping status: Never Used   Substance and Sexual Activity   • Alcohol use: No   • Drug use: No   • Sexual activity: Defer       REVIEW OF SYSTEMS:    Review of Systems   Constitutional:  Positive for fatigue. Negative for chills and fever.   HENT:  Negative for congestion.         Had mouth sores.    Eyes:  Negative for redness and visual disturbance.   Respiratory:  Negative for shortness of breath and wheezing.    Cardiovascular:  Negative for chest pain and palpitations.   Gastrointestinal:  Negative for constipation, diarrhea, nausea and vomiting.   Endocrine: Negative for cold intolerance and heat intolerance.   Genitourinary:  Negative for difficulty urinating and dysuria.   Musculoskeletal:  Negative for gait problem and joint swelling.   Skin:  Positive for pallor.   Allergic/Immunologic: Negative for food allergies.   Neurological:  Negative for dizziness and speech difficulty.   Hematological:  Negative for adenopathy. Does not bruise/bleed easily.   Psychiatric/Behavioral:  Negative for agitation and confusion. The patient is not nervous/anxious.        VITAL SIGNS: /58   Pulse 112   Temp 97.7 °F (36.5 °C)   Resp 18    "Ht 175.3 cm (69.02\")   Wt 81 kg (178 lb 9.6 oz)   SpO2 97%   BMI 26.36 kg/m²  Lost 2 pounds. \"Diarrhea.\"  Pain Score    04/24/24 1017   PainSc: 2  Comment: in my feet       PHYSICAL EXAMINATION:     Physical Exam  Vitals reviewed.   Constitutional:       General: He is not in acute distress.  HENT:      Head: Normocephalic and atraumatic.   Eyes:      General: No scleral icterus.  Cardiovascular:      Rate and Rhythm: Normal rate.   Pulmonary:      Effort: No respiratory distress.      Breath sounds: No wheezing.   Abdominal:      General: Bowel sounds are normal.      Palpations: Abdomen is soft.      Tenderness: There is no abdominal tenderness.   Musculoskeletal:         General: No swelling.      Cervical back: Neck supple.   Skin:     Coloration: Skin is pale.      Findings: Rash present.   Neurological:      Mental Status: He is alert and oriented to person, place, and time.   Psychiatric:         Mood and Affect: Mood normal.         Behavior: Behavior normal.         Thought Content: Thought content normal.         Judgment: Judgment normal.         LABS    Lab Results - Last 18 Months   Lab Units 04/24/24  0954 04/03/24  0848 03/27/24  1417 03/13/24  0902 03/12/24  0704 02/28/24  1201 02/21/24  1029 02/14/24  0844 02/09/24  0656 02/08/24  0547 02/07/24  0750   HEMOGLOBIN g/dL 10.4* 10.3* 9.3* 8.8* 9.4* 8.5*   < > 9.2*   < > 10.2* 10.2*   HEMATOCRIT % 33.1* 34.1* 29.4* 28.8* 29.5* 27.9*   < > 30.3*   < > 33.3* 33.0*   MCV fL 95.7 100.3* 95.8 97.6* 93.7 94.9   < > 95.9   < > 95.4 95.7   WBC 10*3/mm3 7.03 5.75 6.44 6.73 6.18 7.86   < > 7.61   < > 7.12 7.34   RDW % 13.9 15.9* 15.3 15.9* 14.6 14.3   < > 14.8   < > 14.5 14.0   MPV fL 8.8 8.8 9.1 9.3  --  9.5  --  9.9   < > 9.2 9.3   PLATELETS 10*3/mm3 207 181 202 162 166 275   < > 218   < > 213 159   IMM GRAN % % 0.4 0.3 0.2 0.3  --  0.4  --  1.8*   < >  --   --    NEUTROS ABS 10*3/mm3 4.97 3.91 3.73 4.50 4.25 5.59   < > 5.50   < > 5.91 6.68   LYMPHS ABS " 10*3/mm3 1.26 1.03 1.59 1.24 1.01 1.34   < > 1.04   < >  --   --    MONOS ABS 10*3/mm3 0.31 0.40 0.54 0.47 0.47 0.59   < > 0.53   < >  --   --    EOS ABS 10*3/mm3 0.44* 0.36 0.53* 0.46* 0.41* 0.27   < > 0.36   < > 0.21  --    BASOS ABS 10*3/mm3 0.02 0.03 0.04 0.04 0.03 0.04   < > 0.04   < > 0.14 0.07   IMMATURE GRANS (ABS) 10*3/mm3 0.03 0.02 0.01 0.02  --  0.03  --  0.14*   < >  --   --    NRBC /100 WBC 0.0 0.0 0.0 0.0 0.0 0.0   < > 0.0   < >  --   --    NEUTROPHIL % %  --   --   --   --   --   --   --   --   --  73.0 69.0   MONOCYTES % %  --   --   --   --   --   --   --   --   --  6.0 1.0*   BASOPHIL % %  --   --   --   --   --   --   --   --   --  2.0* 1.0   ATYP LYMPH % %  --   --   --   --   --   --   --   --   --   --  2.0   ANISOCYTOSIS   --   --   --   --   --   --   --   --   --   --  Slight/1+    < > = values in this interval not displayed.       Lab Results - Last 18 Months   Lab Units 04/24/24  0954 04/03/24  0848 03/27/24  1417 03/13/24  0902 03/12/24  0704 02/28/24  1201 02/21/24  1029 02/16/24  0533 02/15/24  1615 02/15/24  0534   GLUCOSE mg/dL 195* 267* 98 175* 138* 111*  111* 365* 290*  --  70   SODIUM mmol/L 138 139 140 141 139 140  140 133* 137  --  144   POTASSIUM mmol/L 4.2 4.6 4.2 4.4 5.3* 4.5  4.5 5.4* 4.8   < > 3.3*   CO2 mmol/L 24.0 31.0* 29.0 29.0 29.3* 30.0*  30.0* 24.2 20.0*  --  24.0   CHLORIDE mmol/L 102 100 101 104 102 101  101 96* 104  --  110*   ANION GAP mmol/L 12.0 8.0 10.0 8.0  --  9.0  9.0  --  13.0  --  10.0   CREATININE mg/dL 1.92* 1.84* 1.70* 1.85* 1.80* 1.97*  1.97* 2.17* 1.95*  --  1.93*   BUN mg/dL 37* 23 24* 22 22 30*  30* 35* 38*  --  40*   BUN / CREAT RATIO  19.3 12.5 14.1 11.9 12.2 15.2  15.2 16.1 19.5  --  20.7   CALCIUM mg/dL 8.8 8.9 8.7 8.5* 8.4* 8.6  8.6 8.4* 8.7  --  8.6   ALK PHOS U/L 94  --  120* 141* 153* 167* 132* 104  --  94   TOTAL PROTEIN g/dL 6.8  --  6.8 6.4  --  7.0  --  6.1  --  6.0   ALT (SGPT) U/L 27  --  22 30 33 58* 76* 63*  --  61*   AST  (SGOT) U/L 21  --  19 22 19 30 41* 35  --  36   BILIRUBIN mg/dL 0.4  --  0.3 0.3 0.4 0.2 0.2 0.3  --  0.2   ALBUMIN g/dL 3.9  --  3.7 3.3* 3.5 3.5  3.5 3.2* 2.9*  --  2.9*   GLOBULIN gm/dL 2.9  --  3.1 3.1  --  3.5  --  3.2  --  3.1    < > = values in this interval not displayed.       Lab Results - Last 18 Months   Lab Units 03/27/24  1417 02/28/24  1201 01/25/23  1019   M-SPIKE g/dL  --   --  Not Observed   KAPPA/LAMBDA RATIO, S   --   --  1.55   FREE LAMBDA LIGHT CHAINS mg/L  --   --  25.8   IG KAPPA FREE LIGHT CHAIN mg/L  --   --  40.0*   CEA ng/mL 3.44 7.10  --    LDH U/L  --   --  183       Lab Results - Last 18 Months   Lab Units 04/24/24  0954 03/27/24  1417 02/28/24  1201 11/29/23  1100 11/01/23  0957 08/25/23  0759 08/02/23  1024 05/03/23  1221 04/05/23  0855 02/22/23  0946 01/25/23  1019   IRON mcg/dL 56* 58* 37* 84 37*  --  53*   < > 36*   < > 122   TIBC mcg/dL 259* 244* 250* 216* 262* 247 244*   < > 256*   < > 337   IRON SATURATION %  --   --   --   --   --  27  --   --   --   --   --    IRON SATURATION (TSAT) % 22 24 15* 39 14*  --  22   < > 14*   < > 36   FERRITIN ng/mL 424.20* 539.40* 383.90 797.10* 120.50 266.00 290.60   < > 59.42   < > 16.82*   TSH uIU/mL  --   --   --   --   --  2.110  --   --  1.740  --   --    FOLATE ng/mL  --   --   --   --   --  >20.00  --   --   --   --  >20.00    < > = values in this interval not displayed.       Bernard Matta reports a pain score of 2.  Given his pain assessment as noted, treatment options were discussed and the following options were decided upon as a follow-up plan to address the patient's pain: continuation of current treatment plan for pain.      ASSESSMENT:  Adenocarcinoma involving the cecum.  High risk.  Less than 12 lymph nodes examined.  No lymphovascular or perineural invasion.  MSI/MMR report 3/11/2024. CLEMENCIA and pMMR.  AJCC stage: IIb (pT4a, pN0, cM0, G2).  Tumor size 1.9 cm.  Zo status, 0/9 positive lymph node.  Treatment status: Adjuvant  "capecitabine pending.  2. Performance status of 2.   3.  Normocytic anemia from iron deficiency and chronic kidney disease stage IIIb.  GFR 31 mL/min on 2/21/2024.  Latest Injectafer given 2/28/2024 due to malabsorption.    4.  Grade 1 diarrhea from Xeloda.  5.  Rash, both arms, chest and back with Xeloda.  Observe. \"I was mowing.\"Instructed to avoid exposure. \"Okay.\" Try clobetasol as needed.    5.  Intestinal malabsorption, unspecific type.  Intravenous iron as indicated.  6.  Elevated transaminase, observation.           PLAN:   Re: Tolerance to capecitabine. \"Diarrhea and rash in my hands, chest and back.\"  2.   Re: Tolerance to epoetin alpha. \"It was okay.\"  3.   Re: Interval CBC on 4/17/2024.WBC 5.5, hemoglobin 12.5, hematocrit 37.2, MCV 95 and platelet 199.  Glucose 355. \"Pancakes.\" GFR 35.  Saturation 50%, ferritin 468 and CEA 4.1.  4.   Re: Heme status.  WBC 7.03, hemoglobin 10.4, hematocrit 33.1, MCV 95.7 and platelet 207.  5.   Re: CMP.  Glucose 195. GFR 35.9 mL/min.  Alkaline phosphatase 94 from 120 from 141 from 153 from 167 from 132 and ALT 27.  6.   Re: CEA 4.1 from 3.4 from 7.1, observe.  7.   Re: Ferritin 424.2 and saturation 22%.    8.   Re: Continue with adjuvant capecitabine.   9.  eRx capecitabine 750 mg/m2 (dose reduced due to CKD) every 12 hours for 14 days every 21-day cycle x 24 weeks C2D1 start on 4/29/2024.  Observe for potential allergic reactions, anaphylaxis, hand-foot syndrome, nausea, vomiting, diarrhea, worsening dermatitis, hepatotoxicity, fatigue, or cardiotoxicity arrhythmias or ischemia. Hold if GFR < 30.  Patient preference for capecitabine rather than FOLFOX.   10.  eRx ondansetron 8 mg p.o. every 8 hours as needed for nausea and vomiting #60, 2 refills.  11.  eRx prochlorperazine 10 mg p.o. every 4 hours as needed for nausea and vomiting #60, 2 refills.  12.  eRx magic mouth wash 15 mh swish and spit every 4 hours a s " needed for mucositis, 480 ml. 2 refills if needed.   13.  Weekly CBC with differential with chemo.  14. Advance Care Planning  ACP discussion was held with the patient during this visit. Patient has an advance directive in EMR which is still valid.   15.  Colonoscopy at 1 year on 2/2025.  16.  Continue care per primary care physician at the specialist.  17.  Care discussed with patient and his spouse.  Understand expressed.  He is agreeable to proceed.  18.  Epoetin alpha 40,000 units subcutaneous every 2 weeks if hemoglobin less than 11 and hematocrit less than 33, chronic kidney disease.  Observe for hypertension.  19. Return to office in 3 weeks with CBC with differential, ferritin, iron panel, CEA, and CMP.             I have reviewed the assessment and plan and verified the accuracy of it. No changes to assessment and plan since the information was documented. Navi Stearns MD 04/24/24          I spent 32 total minutes, face-to-face, caring for Bernard ramos. Greater than 50% of this time involved counseling and/or coordination of care as documented within this note.             Allan Schneider MD  (Dalton Villalobos MD)  (Shaquille Mckenzie MD)  Everton Webb MD  (JOREJ Novoa)

## 2024-04-25 ENCOUNTER — TELEPHONE (OUTPATIENT)
Dept: ONCOLOGY | Facility: CLINIC | Age: 74
End: 2024-04-25
Payer: MEDICARE

## 2024-04-25 DIAGNOSIS — R21 RASH: Primary | ICD-10-CM

## 2024-04-25 RX ORDER — HYDROCORTISONE VALERATE CREAM 2 MG/G
1 CREAM TOPICAL 2 TIMES DAILY
Qty: 15 G | Refills: 0 | Status: SHIPPED | OUTPATIENT
Start: 2024-04-25

## 2024-04-25 RX ORDER — CLOBETASOL PROPIONATE 0.5 MG/ML
LOTION TOPICAL 2 TIMES DAILY
Qty: 59 ML | Refills: 1 | Status: SHIPPED | OUTPATIENT
Start: 2024-04-25

## 2024-04-25 NOTE — TELEPHONE ENCOUNTER
Caller: Nury Matta    Relationship: Emergency Contact    Best call back number: [phone]     Requested Prescriptions:   Requested Prescriptions     Pending Prescriptions Disp Refills    hydrocortisone (WESTCORT) 0.2 % cream 15 g 0     Sig: Apply 1 Application topically to the appropriate area as directed 2 (Two) Times a Day. Apply topically to affected areas on face.    clobetasol (CLOBEX) 0.05 % lotion 59 mL 1     Sig: Apply  topically to the appropriate area as directed 2 (Two) Times a Day. Apply to affected areas on back, chest, arms. Please do Not apply to face        Pharmacy where request should be sent: [place], [place] - [address] [facility] [phone] [facility] [phone] FX     Last office visit with prescribing clinician: 4/24/2024   Last telemedicine visit with prescribing clinician: Visit date not found   Next office visit with prescribing clinician: 5/15/2024     Additional details provided by patient: ORIGINAL PHARMACY RX WAS SENT TO DOES NOT HAVE IN STOCK    Does the patient have less than a 3 day supply:  [x] Yes  [] No    Would you like a call back once the refill request has been completed: [x] Yes [] No    If the office needs to give you a call back, can they leave a voicemail: [x] Yes [] No    Tra Marin Rep   04/25/24 08:05 CDT

## 2024-04-29 ENCOUNTER — TELEPHONE (OUTPATIENT)
Dept: ONCOLOGY | Facility: CLINIC | Age: 74
End: 2024-04-29
Payer: MEDICARE

## 2024-04-29 NOTE — TELEPHONE ENCOUNTER
Received call from patient Bernard Matta, he calls to report that he is having difficulties with getting his Clobex cream RX filled, he has c/o extreme weakness, and fatigue and feeling nauseated.   Call placed to [facility] spoke with Pharmacist regarding patient Rx, he reports that he is waiting on the medication to come from his  and as soon as it arrives at his store he will have it ready for dispense and patient can .  Instructed both patient and wife, how to take medications Zofran and Compazine to alternate for best results to control his nausea.  Fatigue and tiredness was addressed, patient was informed that this is a common side effect associated with the use of oral chemotherapy drugs, he was encouraged to rest as much as possible, but to try and continue to manage his ADL's. Patient v/u of all instruction.

## 2024-04-30 ENCOUNTER — OFFICE VISIT (OUTPATIENT)
Dept: FAMILY MEDICINE CLINIC | Facility: CLINIC | Age: 74
End: 2024-04-30
Payer: MEDICARE

## 2024-04-30 VITALS
WEIGHT: 181.2 LBS | RESPIRATION RATE: 16 BRPM | SYSTOLIC BLOOD PRESSURE: 106 MMHG | DIASTOLIC BLOOD PRESSURE: 66 MMHG | TEMPERATURE: 97 F | HEART RATE: 107 BPM | BODY MASS INDEX: 26.84 KG/M2 | HEIGHT: 69 IN | OXYGEN SATURATION: 97 %

## 2024-04-30 DIAGNOSIS — L03.031 CELLULITIS OF SECOND TOE OF RIGHT FOOT: Primary | ICD-10-CM

## 2024-04-30 NOTE — PROGRESS NOTES
[facility] HEMATOLOGY & ONCOLOGY  [address]  [facility] [address] [phone]    Patient Name: Bernard Matta  Encounter Date: 05/15/2024  YOB: 1950  Patient Number: [id number]      REASON FOR FOLLOW-UP: Bernard Matta is a pleasant 73 y.o.  male who is seen on follow-up for stage IIb adenocarcinoma of the cecum. He is seen C2D17 of adjuvant capecitabine, stopped due to intolerance. The patient is also followed for anemia from iron deficiency and chronic kidney disease stage IIIb.  GFR 31 mL/min on 2/21/2024.  Patient on therapy with epoetin alpha and intravenous iron as indicated.  The latest ferric carboxymaltose was given 2/28/2024.  The patient is seen with his spouse, Nury.  History obtained from the patient.  Patient is a reliable historian.           DIAGNOSTIC ABNORMALITIES: Colon cancer.  Screening colonoscopy 8/12/2021 was negative.  He presented with diffuse abdominal pain, nausea and vomiting.  CT abdomen pelvis 2/6/2024.There is an abnormal bowel gas pattern with multiple moderately  distended loops of small bowel with mild bowel wall thickening. The right and transverse colon are also moderately distended. The more distal colon is decompressed. Within the right lower quadrant posterior to the cecum there is a complex collection which is not definitely contiguous with any adjacent GI structure suspicious for localized perforation with abscess formation. There is surrounding inflammatory changes. The appendix is not clearly identified and this could represent a ruptured appendicitis. I do not see any documentation of previous appendectomy by electronic medical record. There is free air beneath the right diaphragm and within the perihepatic space.  Free fluid within the lower right paracolic gutter, central pelvis and lower pelvis. This fluid demonstrates some complexity. Small hiatal hernia. The prostate  "gland is enlarged. There is a bladder diverticulum emanating from both the left and right lateral bladder wall. A mild element of chronic bladder outlet obstruction is not excluded.  Chest x-ray 2/6/2024.Radiodense nodule in the right upper lobe-favor granuloma.  No acute infiltrate. Free air demonstrated on CT imaging is not as well delineated by plain film radiograph.  CT head on 2/10/2024.Chronic changes and no acute intracranial findings.  Paranasal sinus mucosal thickening.  Pathology report 2/10/2024.  Cecum, ileocecectomy:  A. Moderately differentiated adenocarcinoma arising in the cecal pit (1.9 cm).  B. Tumor invades through the visceral peritoneum resulting in macroscopic perforation.  C. The proximal, distal and radial adventitial soft tissue margins are free of tumor.  D. Acute peritonitis.  E. Pericolic cyst-like structure with hyalinized fibrous wall and acute and chronic inflammation and hemorrhage in  adjacent fibroadipose tissue.  F. Nine of 9 pericolic lymph nodes are negative for metastatic carcinoma.  G. Detached segment of viable bowel compatible with anastomotic tissue with large intestine and small intestinal tissue  seen, negative for malignancy.  AJCC stage: pT4a pN0  CT chest report 3/8/2024.No evidence of metastatic disease in the chest.            PREVIOUS INTERVENTIONS:  Laparotomy and ileocecectomy on 2/6/2024.  Positive for perforation in the posterior cecum.  She is taking adjuvant capecitabine 4/8/2024 through 5/13/2024, intolerant.  \"So weak, vomiting, diarrhea, can't eat and can't sleep.\"  Not a candidate for adjuvant FOLFOX.            PREVIOUS INTERVENTIONS:Anemia  Ferric carboxymaltose on 2/20/2024.  Epoetin shari on 3/27/2024 through present.               Oncology/Hematology History   Colon cancer   2/28/2024 Initial Diagnosis    Colon cancer     2/28/2024 Cancer Staged    Staging form: Colon And Rectum, AJCC 8th Edition  - Pathologic stage from 2/28/2024: Stage IIB (pT4a, pN0, " cM0) - Signed by Navi tSearns MD on 2/28/2024     4/3/2024 -  Chemotherapy    [facility] - Capecitabine         PAST MEDICAL HISTORY:  ALLERGIES:  Allergies   Allergen Reactions    Jardiance [Empagliflozin] Other (See Comments)     Pt reports nausea/vomiting        CURRENT MEDICATIONS:  Outpatient Encounter Medications as of 5/15/2024   Medication Sig Dispense Refill    amitriptyline (ELAVIL) 10 MG tablet Take 1 tablet by mouth At Night As Needed for Sleep.      aspirin 81 MG tablet Take 1 tablet by mouth Daily.      atorvastatin (LIPITOR) 40 MG tablet Take 1 tablet by mouth every night at bedtime. 90 tablet 3    cetirizine (zyrTEC) 10 MG tablet Take 1 tablet by mouth Daily.      cholecalciferol (VITAMIN D3) 400 UNITS tablet Take 1 tablet by mouth Daily.      cloNIDine (CATAPRES) 0.2 MG tablet Take 1 tablet by mouth 2 (Two) Times a Day.      diphenoxylate-atropine (LOMOTIL) 2.5-0.025 MG per tablet Take 2 tablets by mouth with first diarrhea type stool, then take 1 tablet by mouth with each loose stool there after with max of 6 tablets per day 60 tablet 1    dorzolamide-timolol (COSOPT) 22.3-6.8 MG/ML ophthalmic solution Administer 1 drop to both eyes 2 (Two) Times a Day.      epoetin shari-epbx (Retacrit) 57188 UNIT/ML injection Inject  under the skin into the appropriate area as directed Every 14 (Fourteen) Days.      ezetimibe (Zetia) 10 MG tablet Take 1 tablet by mouth Daily. 90 tablet 3    fluticasone (FLONASE) 50 MCG/ACT nasal spray 2 sprays into the nostril(s) as directed by provider Daily.      furosemide (Lasix) 20 MG tablet Take 1 tablet by mouth Daily. 90 tablet 3    hydrocortisone (WESTCORT) 0.2 % cream Apply 1 Application topically to the appropriate area as directed 2 (Two) Times a Day. Apply topically to affected areas on face. 15 g 0    insulin aspart (novoLOG FLEXPEN) 100 UNIT/ML solution pen-injector sc pen Inject 0-25 Units under the skin into the appropriate area as directed 3 (Three) Times a  Day With Meals. Sliding scale      insulin degludec (TRESIBA FLEXTOUCH) 100 UNIT/ML solution pen-injector injection Inject 8 Units under the skin into the appropriate area as directed Every Night.      latanoprost (XALATAN) 0.005 % ophthalmic solution Administer 1 drop to both eyes Every Night.      lisinopril (PRINIVIL,ZESTRIL) 10 MG tablet Take 1 tablet by mouth Every Night.      Miracle mouthwash oral suspension (diphenhydrAMINE HCl - lidocaine - sodium bicarbonate - normal saline) Swish and spit 15 mL Every 4 (Four) Hours As Needed for Mucositis. 480 mL 3    Multiple Vitamins-Minerals (MULTIVITAMIN WITH MINERALS) tablet tablet Take 1 tablet by mouth Daily.      ondansetron (Zofran) 8 MG tablet Take 1 tablet by mouth Every 8 (Eight) Hours As Needed for Nausea or Vomiting. 60 tablet 2    prochlorperazine (COMPAZINE) 10 MG tablet Take 1 tablet by mouth Every 4 (Four) Hours As Needed for Nausea or Vomiting. 60 tablet 2    Rhopressa 0.02 % solution Apply 1 drop to eye(s) as directed by provider Every Night.      simethicone (Gas-X) 80 MG chewable tablet Chew 0.5 tablets Every 6 (Six) Hours As Needed for Flatulence (belching). 30 tablet 1    tamsulosin (FLOMAX) 0.4 MG capsule 24 hr capsule Take 2 capsules by mouth Every Night.      traMADol (Ultram) 50 MG tablet Take 1 tablet by mouth Every 8 (Eight) Hours As Needed for Moderate Pain. 30 tablet 0    capecitabine (XELODA) 500 MG chemo tablet Take 3 tablets by mouth 2 (Two) Times a Day. Take on Days 1-14 every 21 day cycle. (Patient not taking: Reported on 5/15/2024) 84 tablet 2    clobetasol (CLOBEX) 0.05 % lotion Apply  topically to the appropriate area as directed 2 (Two) Times a Day. Apply to affected areas on back, chest, arms. 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    Hypertension I10    Type 2 diabetes mellitus E11.9    Hx of adenomatous colonic polyps Z86.010    Iron deficiency anemia D50.9    Intestinal malabsorption, unspecified K90.9    Bowel perforation K63.1    Stage 3b chronic kidney disease (CKD) N18.32    Hyperkalemia E87.5    COVID-19 virus detected U07.1    Colon cancer C18.9     SURGERIES:  Past Surgical History:   Procedure Laterality Date    COLONOSCOPY  2019    [facility]    COLONOSCOPY N/A 8/11/2020    Procedure: COLONOSCOPY WITH ANESTHESIA;  Surgeon: Shaquille Mckenzie DO;  Location:  [facility];  Service: Gastroenterology;  Laterality: N/A;  preop; hx of polyps   postop; polyps   PCP Brando Webb     COLONOSCOPY N/A 8/12/2021    Procedure: COLONOSCOPY WITH ANESTHESIA;  Surgeon: Shaquille Mckenzie DO;  Location:  [facility];  Service: Gastroenterology;  Laterality: N/A;  pre hx adenomatous colon polyp  post normal  Brando Webb MD    EXPLORATORY LAPAROTOMY N/A 2/6/2024    Procedure: DIAGNOSTIC LAPAROSCOPY CONVERTED TO LAPAROTOMY, ILIOCECTECTOMY;  Surgeon: Hemant Park MD;  Location: [facility] OR;  Service: General;  Laterality: N/A;     HEALTH MAINTENANCE ITEMS:  Health Maintenance Due   Topic Date Due    COLONOSCOPY  08/12/2022    COVID-19 Vaccine (7 - 2023-24 season) 03/16/2024    DIABETIC EYE EXAM  06/26/2024       <no information>  Last Completed Colonoscopy       This patient has no relevant Health Maintenance data.          Immunization History   Administered Date(s) Administered    COVID-19 (MODERNA) 1st,2nd,3rd Dose Monovalent 03/02/2021, 03/30/2021, 11/02/2021    COVID-19 (PFIZER) BIVALENT 12+YRS 09/29/2022    COVID-19 F23 (PFIZER) 12YRS+ (COMIRNATY) 11/16/2023    Covid-19 (Pfizer) Gray Cap Monovalent 07/28/2022    FLUAD TRI 65YR+ 09/09/2019    Flu Vaccine Split Quad  "2013    Fluad Quad 65+ 2019, 2020    Fluzone (or Fluarix & Flulaval for VFC) >6mos 2013, 2022    Fluzone High Dose =>65 Years ([employer] ONLY) 2016, 10/02/2017, 10/08/2018    Fluzone High-Dose 65+yrs 10/19/2021, 10/02/2023    Pneumococcal Conjugate 13-Valent (PCV13) 2015    Pneumococcal Polysaccharide (PPSV23) 2012, 2013    Shingrix 2018, 2019    Tdap 2013     Last Completed Mammogram       This patient has no relevant Health Maintenance data.              FAMILY HISTORY:  Family History   Problem Relation Age of Onset    No Known Problems Maternal Grandmother     No Known Problems Maternal Grandfather     No Known Problems Paternal Grandmother     No Known Problems Paternal Grandfather     No Known Problems Mother     No Known Problems Father     Colon cancer Neg Hx     Colon polyps Neg Hx     Esophageal cancer Neg Hx      SOCIAL HISTORY:  Social History     Socioeconomic History    Marital status:    Tobacco Use    Smoking status: Former     Current packs/day: 0.00     Average packs/day: 0.5 packs/day for 10.0 years (5.0 ttl pk-yrs)     Types: Cigarettes     Start date: 1969     Quit date: 1979     Years since quittin.4     Passive exposure: Past    Smokeless tobacco: Never   Vaping Use    Vaping status: Never Used   Substance and Sexual Activity    Alcohol use: No    Drug use: No    Sexual activity: Defer       REVIEW OF SYSTEMS:    Review of Systems   Constitutional:  Positive for fatigue. Negative for fever and unexpected weight change.        \"Very weak..\"   HENT:  Negative for congestion and mouth sores.    Eyes:  Negative for discharge and redness.   Respiratory:  Negative for shortness of breath and wheezing.    Cardiovascular:  Positive for leg swelling. Negative for chest pain.   Gastrointestinal:  Positive for diarrhea and nausea. Negative for constipation and vomiting.   Endocrine: Negative for heat intolerance. " "  Genitourinary:  Negative for difficulty urinating and dysuria.   Musculoskeletal:  Positive for gait problem.   Skin:  Negative for pallor.   Allergic/Immunologic: Negative for food allergies.   Neurological:  Negative for dizziness and speech difficulty.   Hematological:  Negative for adenopathy. Does not bruise/bleed easily.   Psychiatric/Behavioral:  Negative for agitation and hallucinations. The patient is not nervous/anxious.        VITAL SIGNS: BP 98/62   Pulse (!) 124   Temp 97.9 °F (36.6 °C) (Tympanic)   Resp 18   Ht 175.3 cm (69\")   Wt 80.3 kg (177 lb 1.6 oz)   SpO2 96%   BMI 26.15 kg/m²  Lost 1 pound.   Pain Score    05/15/24 1002   PainSc: 10-Worst pain ever       PHYSICAL EXAMINATION:     Physical Exam  Vitals reviewed.   Constitutional:       Appearance: He is ill-appearing.      Comments: He arrived in the exam room in a wheelchair.    HENT:      Head: Normocephalic and atraumatic.   Eyes:      General: No scleral icterus.  Cardiovascular:      Rate and Rhythm: Tachycardia present.   Pulmonary:      Effort: No respiratory distress.      Breath sounds: No wheezing.   Abdominal:      General: There is no distension.      Palpations: Abdomen is soft.   Musculoskeletal:         General: Swelling present.      Cervical back: Neck supple.      Comments: No calf tenderness.   Skin:     Coloration: Skin is not pale.      Comments: Some erythema, palm, bilateral. No peeling.   Neurological:      Mental Status: He is oriented to person, place, and time.   Psychiatric:         Mood and Affect: Mood normal.         Behavior: Behavior normal.         LABS    Lab Results - Last 18 Months   Lab Units 05/15/24  0917 04/24/24  0954 04/03/24  0848 03/27/24  1417 03/13/24  0902 03/12/24  0704 02/28/24  1201 02/21/24  1029 02/14/24  0844 02/09/24  0656 02/08/24  0547 02/07/24  0750   HEMOGLOBIN g/dL 9.4* 10.4* 10.3* 9.3* 8.8* 9.4* 8.5*   < > 9.2*   < > 10.2* 10.2*   HEMATOCRIT % 28.7* 33.1* 34.1* 29.4* 28.8* " 29.5* 27.9*   < > 30.3*   < > 33.3* 33.0*   MCV fL 94.7 95.7 100.3* 95.8 97.6* 93.7 94.9   < > 95.9   < > 95.4 95.7   WBC 10*3/mm3 3.30* 7.03 5.75 6.44 6.73 6.18 7.86   < > 7.61   < > 7.12 7.34   RDW % 15.0 13.9 15.9* 15.3 15.9* 14.6 14.3   < > 14.8   < > 14.5 14.0   MPV fL 9.0 8.8 8.8 9.1 9.3  --  9.5  --  9.9   < > 9.2 9.3   PLATELETS 10*3/mm3 289 207 181 202 162 166 275   < > 218   < > 213 159   IMM GRAN % %  --  0.4 0.3 0.2 0.3  --  0.4  --  1.8*   < >  --   --    NEUTROS ABS 10*3/mm3 2.40 4.97 3.91 3.73 4.50 4.25 5.59   < > 5.50   < > 5.91 6.68   LYMPHS ABS 10*3/mm3  --  1.26 1.03 1.59 1.24 1.01 1.34   < > 1.04   < >  --   --    MONOS ABS 10*3/mm3  --  0.31 0.40 0.54 0.47 0.47 0.59   < > 0.53   < >  --   --    EOS ABS 10*3/mm3 0.27 0.44* 0.36 0.53* 0.46* 0.41* 0.27   < > 0.36   < > 0.21  --    BASOS ABS 10*3/mm3 0.00 0.02 0.03 0.04 0.04 0.03 0.04   < > 0.04   < > 0.14 0.07   IMMATURE GRANS (ABS) 10*3/mm3  --  0.03 0.02 0.01 0.02  --  0.03  --  0.14*   < >  --   --    NRBC /100 WBC  --  0.0 0.0 0.0 0.0 0.0 0.0   < > 0.0   < >  --   --    NEUTROPHIL % % 51.5  --   --   --   --   --   --   --   --   --  73.0 69.0   MONOCYTES % % 2.0*  --   --   --   --   --   --   --   --   --  6.0 1.0*   BASOPHIL % % 0.0  --   --   --   --   --   --   --   --   --  2.0* 1.0   ATYP LYMPH % %  --   --   --   --   --   --   --   --   --   --   --  2.0   ANISOCYTOSIS  Slight/1+  --   --   --   --   --   --   --   --   --   --  Slight/1+   GIANT PLT  Slight/1+  --   --   --   --   --   --   --   --   --   --   --     < > = values in this interval not displayed.       Lab Results - Last 18 Months   Lab Units 05/15/24  0917 04/24/24  0954 04/03/24  0848 03/27/24  1417 03/13/24  0902 03/12/24  0704 02/28/24  1201 02/21/24  1029 02/16/24  0533   GLUCOSE mg/dL 527* 195* 267* 98 175* 138* 111*  111*   < > 290*   SODIUM mmol/L 127* 138 139 140 141 139 140  140   < > 137   POTASSIUM mmol/L 5.8* 4.2 4.6 4.2 4.4 5.3* 4.5  4.5   < > 4.8   CO2  mmol/L 17.0* 24.0 31.0* 29.0 29.0 29.3* 30.0*  30.0*   < > 20.0*   CHLORIDE mmol/L 94* 102 100 101 104 102 101  101   < > 104   ANION GAP mmol/L 16.0* 12.0 8.0 10.0 8.0  --  9.0  9.0  --  13.0   CREATININE mg/dL 3.24* 1.92* 1.84* 1.70* 1.85* 1.80* 1.97*  1.97*   < > 1.95*   BUN mg/dL 71* 37* 23 24* 22 22 30*  30*   < > 38*   BUN / CREAT RATIO  21.9 19.3 12.5 14.1 11.9 12.2 15.2  15.2   < > 19.5   CALCIUM mg/dL 8.6 8.8 8.9 8.7 8.5* 8.4* 8.6  8.6   < > 8.7   ALK PHOS U/L 86 94  --  120* 141* 153* 167*   < > 104   TOTAL PROTEIN g/dL 5.4* 6.8  --  6.8 6.4  --  7.0  --  6.1   ALT (SGPT) U/L 23 27  --  22 30 33 58*   < > 63*   AST (SGOT) U/L 12 21  --  19 22 19 30   < > 35   BILIRUBIN mg/dL 0.5 0.4  --  0.3 0.3 0.4 0.2   < > 0.3   ALBUMIN g/dL 2.9* 3.9  --  3.7 3.3* 3.5 3.5  3.5   < > 2.9*   GLOBULIN gm/dL 2.5 2.9  --  3.1 3.1  --  3.5  --  3.2    < > = values in this interval not displayed.       Lab Results - Last 18 Months   Lab Units 04/24/24  0954 03/27/24  1417 02/28/24  1201 01/25/23  1019   M-SPIKE g/dL  --   --   --  Not Observed   KAPPA/LAMBDA RATIO, S   --   --   --  1.55   FREE LAMBDA LIGHT CHAINS mg/L  --   --   --  25.8   IG KAPPA FREE LIGHT CHAIN mg/L  --   --   --  40.0*   CEA ng/mL 3.02 3.44 7.10  --    LDH U/L  --   --   --  183       Lab Results - Last 18 Months   Lab Units 05/15/24  0917 04/24/24  0954 03/27/24  1417 02/28/24  1201 11/29/23  1100 11/01/23  0957 08/25/23  0759 05/03/23  1221 04/05/23  0855 02/22/23  0946 01/25/23  1019   IRON mcg/dL 62 56* 58* 37* 84 37*  --    < > 36*   < > 122   TIBC mcg/dL 206* 259* 244* 250* 216* 262* 247   < > 256*   < > 337   IRON SATURATION %  --   --   --   --   --   --  27  --   --   --   --    IRON SATURATION (TSAT) % 30 22 24 15* 39 14*  --    < > 14*   < > 36   FERRITIN ng/mL 668.80* 424.20* 539.40* 383.90 797.10* 120.50 266.00   < > 59.42   < > 16.82*   TSH uIU/mL  --   --   --   --   --   --  2.110  --  1.740  --   --    FOLATE ng/mL  --   --   --   " --   --   --  >20.00  --   --   --  >20.00    < > = values in this interval not displayed.       Bernard Matta reports a pain score of 10.  He can take Tramadol. \"Okay.\" Given his pain assessment as noted, treatment options were discussed and the following options were decided upon as a follow-up plan to address the patient's pain: prescription for opiod analgesics.        ASSESSMENT:  Adenocarcinoma involving the cecum.  High risk.  Less than 12 lymph nodes examined.  No lymphovascular or perineural invasion.  MSI/MMR report 3/11/2024. CLEMENCIA and pMMR.  AJCC stage: IIb (pT4a, pN0, cM0, G2).  Tumor size 1.9 cm.  Zo status, 0/9 positive lymph node.  Treatment status: Patient taking adjuvant capecitabine on 4/8/2024 through 5/13/2024, stopped due to intolerance.  No FOLFOX due to poor performance status.  2. Performance status of 3.   3.  Normocytic anemia from iron deficiency and chronic kidney disease stage IIIb.  GFR 31 mL/min on 2/21/2024.  Latest Injectafer given 2/28/2024 due to malabsorption.    4.  Grade 1 diarrhea from Xeloda.  5.  Rash, both arms, chest and back with capecitabine, resolving.      5.  Intestinal malabsorption, unspecific type.  Intravenous iron as indicated.  6.  Elevated transaminase, observation.  7.  Grade 2 nausea and vomiting from capecitabine.  On Zofran and prochlorperazine.       PLAN:   Re: Tolerance to capecitabine. \"I can't take it anymore.\"  Use ice packs to affected areas for 20 minutes 3 times a day as needed.  Use nonmedicated lotion to affected areas.  2.   Re: Tolerance to epoetin alpha. \"Fine.\"  3.   Re: Phone note 4/29/2024.  He reports fatigue and difficulty getting Clobex cream.  4.   Re: Heme status.  WBC 3.3, ANC 2.4, hemoglobin 9.4, hematocrit 28.7, MCV 94.7 and platelet 289.  5.   Re: CMP.  Glucose 527. Patient injected 40 units insulin. GFR 19.1 mL/min.  Alkaline phosphatase 86 from 94 from 120 from 141 from 153 from 167 and ALT " "23.  Check CMP in 1 week to follow renal function.  No IV fluids today due to bilateral lower extremity edema.  Expect improvement in kidney function since nausea is resolving and no vomiting/improving diarrhea.  6.   Re: CEA pending from 4.1 from 3.4 from 7.1, observation.  7.   Re: Ferritin 668.8 and saturation 30%.    8.   Re: Discontinue with adjuvant capecitabine.   9.   Re: No further adjuvant chemo like FOLFOX. He is too ill to undergo further therapy. \"Okay.  It just not going to work.\"   10.  eRx ondansetron 8 mg p.o. every 8 hours as needed for nausea and vomiting #60, 2 refills.  11.  eRx prochlorperazine 10 mg p.o. every 4 hours as needed for nausea and vomiting #60, 2 refills.  12.  eRx magic mouth wash 15 mh swish and spit every 4 hours a s needed for mucositis, 480 ml. 2 refills if needed.   13.  eRx clobetasol 0.05% lotion apply 2 times daily to affected areas, 59 ml, 1 refill if needed.  14.  eRx Roxicodone 15 mg p.o. every 8 hours #90 as needed for pain.  15.  Weekly CBC with differential with chemo.  16. Advance Care Planning  ACP discussion was held with the patient during this visit. Patient has an advance directive in EMR which is still valid.   17.  Colonoscopy at 1 year on 2/2025.  18.  Continue care per primary care physician and the other specialist.  19.  Care discussed with patient and spouse, Nury.  Understand expressed.  He is agreeable to proceed.  20.  Epoetin alpha 40,000 units subcutaneous every 3 weeks if hemoglobin less than 11 and hematocrit less than 33, chronic kidney disease.  Monitor for elevated blood pressure.  21. Return to office in 3 weeks with CBC with differential, ferritin, iron panel, CEA, and CMP.               I have reviewed the assessment and plan and verified the accuracy of it. No changes to assessment and plan since the information was documented. Navi Stearns MD 05/15/24         I spent 40 total minutes, face-to-face, caring for " Bernard today. Greater than 50% of this time involved counseling and/or coordination of care as documented within this note.            Allan Schneider MD  (Dalton Villalobos MD)  (Shaquille Mckenzie MD)  Everton Webb MD  (JORJE Novoa)

## 2024-05-01 ENCOUNTER — TELEPHONE (OUTPATIENT)
Dept: ONCOLOGY | Facility: CLINIC | Age: 74
End: 2024-05-01
Payer: MEDICARE

## 2024-05-01 NOTE — TELEPHONE ENCOUNTER
START XELODA:  Received call from patient Bernard Matta, he calls to report he received his Xeloda Specialty Medication last nite via home delivery and will   START XELODA TODAY: 5/1/24  Take as directed  Patient encouraged to call if he has any issues or concerns, pt v/u.

## 2024-05-08 ENCOUNTER — TELEPHONE (OUTPATIENT)
Dept: ONCOLOGY | Facility: CLINIC | Age: 74
End: 2024-05-08
Payer: MEDICARE

## 2024-05-09 ENCOUNTER — TELEPHONE (OUTPATIENT)
Dept: ONCOLOGY | Facility: CLINIC | Age: 74
End: 2024-05-09
Payer: MEDICARE

## 2024-05-13 ENCOUNTER — TELEPHONE (OUTPATIENT)
Dept: ONCOLOGY | Facility: CLINIC | Age: 74
End: 2024-05-13
Payer: MEDICARE

## 2024-05-13 DIAGNOSIS — K59.1 FUNCTIONAL DIARRHEA: Primary | ICD-10-CM

## 2024-05-13 RX ORDER — DIPHENOXYLATE HYDROCHLORIDE AND ATROPINE SULFATE 2.5; .025 MG/1; MG/1
TABLET ORAL
Qty: 60 TABLET | Refills: 1 | Status: SHIPPED | OUTPATIENT
Start: 2024-05-13

## 2024-05-13 NOTE — TELEPHONE ENCOUNTER
"Received call from patient wife Nury, she calls to report patient/Bernard has had diarrhea all weekend and remains unchecked. Patient currently using OTC Imodium.   Wife confirms instructions to control patient diarrhea  2 Imodium with first diarrhea stool, and 1 tablet with each loose stool there after with max of 6 tabs per day.  Wife also reports patient has developed swelling of his extremities, with redness and pain with touch. Reports that \"there is no way Bernard could put socks or shoes on his feet due to swelling\".   Patient started oral chemotherapy medication  Xeloda 5/1/24    Recommendations please:  "

## 2024-05-13 NOTE — TELEPHONE ENCOUNTER
Return call to patient wife, Nury. She was informed to have patient/Bernard d/c use of Xeloda at this time due to the issues he is experiencing. Wife v/u.   Wife informed once the symptoms have improved and patient is seen by Dr Stearns other TXT options will be discussed at that time. Wife will have patient d/c Xeloda at this time.  Wife informed RX for Lomotil will be sent to the pharmacy for  take as directed for patient uncontrolled diarrhea issues.   Wife informed Lomotil instructions are basically the same as Imodium, take 2 tablets when RX is picked up at pharmacy and with each diarrhea stool there after take 1 tablet with max of 6 tabs per day, have patient increase his fluid intake, water, Gator Aid, Power Aid, a BRAT diet to help settle his stomach will help control his diarrhea type stools.   Wife reports patient swelling of extremities seems to be worse this afternoon. Patient is having difficulties walking, holding a glass, and opening a bottle of water. Patient reports that he is so uncomfortable and that he is in pain, notes increased swelling and tenderness. Wife was informed due to patient increased issues, and being so uncomfortable, it would be best that he go to his local ER dept for evaluation. Wife v/u and asked the patient/ if he was willing to go to the ER dept for evaluation and treatment of his symptoms. Patient is in agreement and feels a ER visit would be best. Patient will go to [facility] ER dept now.

## 2024-05-14 ENCOUNTER — TELEPHONE (OUTPATIENT)
Dept: ONCOLOGY | Facility: CLINIC | Age: 74
End: 2024-05-14
Payer: MEDICARE

## 2024-05-14 NOTE — TELEPHONE ENCOUNTER
"Follow up call placed to check on patient Bernard Matta this am. Spoke with patient wife Nury, she reports that they do not see much difference in how Bernard is feeling today vs yesterday.   Wife reports that she did take Bernard to [facility] ER dept for evaluation yesterday 5/13/24 as instructed due to patient c/o extreme lower extremity swelling of his feet and his hands, which made it very difficult for patient to walk without assistance, hold on to bottles, or open things.   Patient was evaluated in ER and labs were drawn, they were informed patient was he was severely dehydrated and was given 2 liters of NS prior to d/c home. ER Provider did not address patient swollen hands and feet \"telling patient and wife that he was not going to address the swelling because it would defeat the purpose of the IV fluids and hydrating Bernard for dehydration and decline in his renal function\".  Wife reports patient has had a couple of bouts if diarrhea this am but has started Lomotil and taking as directed and has had 3 tablets this am. Wife says \"I hope we can keep his sheeba apt tomorrow 5/15/24 with Dr Stearns\". Wife was strongly encouraged and informed that if at all possible patient needed to keep his sheeba with Dr Stearns tomorrow 5/15/24 due to the extreme issues he is currently experiencing. Wife v/u and will call back this afternoon to give update on how things are going. Wife informed if patient continues to decline and no improvement, he will need to return to his local ER for further evaluation and treatment. Wife. V/u.  "

## 2024-05-15 ENCOUNTER — LAB (OUTPATIENT)
Dept: LAB | Facility: HOSPITAL | Age: 74
End: 2024-05-15
Payer: MEDICARE

## 2024-05-15 ENCOUNTER — INFUSION (OUTPATIENT)
Dept: ONCOLOGY | Facility: HOSPITAL | Age: 74
End: 2024-05-15
Payer: MEDICARE

## 2024-05-15 ENCOUNTER — OFFICE VISIT (OUTPATIENT)
Dept: ONCOLOGY | Facility: CLINIC | Age: 74
End: 2024-05-15
Payer: MEDICARE

## 2024-05-15 ENCOUNTER — TELEPHONE (OUTPATIENT)
Dept: ONCOLOGY | Facility: CLINIC | Age: 74
End: 2024-05-15

## 2024-05-15 VITALS
OXYGEN SATURATION: 96 % | WEIGHT: 177.1 LBS | BODY MASS INDEX: 26.23 KG/M2 | RESPIRATION RATE: 18 BRPM | DIASTOLIC BLOOD PRESSURE: 62 MMHG | HEIGHT: 69 IN | HEART RATE: 124 BPM | TEMPERATURE: 97.9 F | SYSTOLIC BLOOD PRESSURE: 98 MMHG

## 2024-05-15 VITALS
OXYGEN SATURATION: 96 % | HEIGHT: 69 IN | RESPIRATION RATE: 18 BRPM | WEIGHT: 177 LBS | BODY MASS INDEX: 26.22 KG/M2 | TEMPERATURE: 97.8 F | DIASTOLIC BLOOD PRESSURE: 46 MMHG | SYSTOLIC BLOOD PRESSURE: 91 MMHG | HEART RATE: 120 BPM

## 2024-05-15 DIAGNOSIS — C18.2 MALIGNANT NEOPLASM OF ASCENDING COLON: Primary | ICD-10-CM

## 2024-05-15 DIAGNOSIS — C18.0 PRIMARY CANCER OF CECUM: Primary | ICD-10-CM

## 2024-05-15 DIAGNOSIS — T45.1X5A ANEMIA DUE TO ANTINEOPLASTIC CHEMOTHERAPY: ICD-10-CM

## 2024-05-15 DIAGNOSIS — R73.09 ELEVATED GLUCOSE LEVEL: Primary | ICD-10-CM

## 2024-05-15 DIAGNOSIS — D63.1 ANEMIA DUE TO STAGE 3B CHRONIC KIDNEY DISEASE: ICD-10-CM

## 2024-05-15 DIAGNOSIS — N18.32 ANEMIA DUE TO STAGE 3B CHRONIC KIDNEY DISEASE: ICD-10-CM

## 2024-05-15 DIAGNOSIS — D64.81 ANEMIA DUE TO ANTINEOPLASTIC CHEMOTHERAPY: ICD-10-CM

## 2024-05-15 DIAGNOSIS — D50.9 IRON DEFICIENCY ANEMIA, UNSPECIFIED IRON DEFICIENCY ANEMIA TYPE: Primary | ICD-10-CM

## 2024-05-15 LAB
ALBUMIN SERPL-MCNC: 2.9 G/DL (ref 3.5–5.2)
ALBUMIN/GLOB SERPL: 1.2 G/DL
ALP SERPL-CCNC: 86 U/L (ref 39–117)
ALT SERPL W P-5'-P-CCNC: 23 U/L (ref 1–41)
ANION GAP SERPL CALCULATED.3IONS-SCNC: 16 MMOL/L (ref 5–15)
ANISOCYTOSIS BLD QL: ABNORMAL
AST SERPL-CCNC: 12 U/L (ref 1–40)
BASOPHILS # BLD MANUAL: 0 10*3/MM3 (ref 0–0.2)
BASOPHILS NFR BLD MANUAL: 0 % (ref 0–1.5)
BILIRUB SERPL-MCNC: 0.5 MG/DL (ref 0–1.2)
BUN SERPL-MCNC: 71 MG/DL (ref 8–23)
BUN/CREAT SERPL: 21.9 (ref 7–25)
BURR CELLS BLD QL SMEAR: ABNORMAL
CALCIUM SPEC-SCNC: 8.6 MG/DL (ref 8.6–10.5)
CEA SERPL-MCNC: 2.47 NG/ML
CHLORIDE SERPL-SCNC: 94 MMOL/L (ref 98–107)
CO2 SERPL-SCNC: 17 MMOL/L (ref 22–29)
CREAT SERPL-MCNC: 3.24 MG/DL (ref 0.76–1.27)
DEPRECATED RDW RBC AUTO: 49.8 FL (ref 37–54)
EGFRCR SERPLBLD CKD-EPI 2021: 19.1 ML/MIN/1.73
EOSINOPHIL # BLD MANUAL: 0.27 10*3/MM3 (ref 0–0.4)
EOSINOPHIL NFR BLD MANUAL: 8.1 % (ref 0.3–6.2)
ERYTHROCYTE [DISTWIDTH] IN BLOOD BY AUTOMATED COUNT: 15 % (ref 12.3–15.4)
FERRITIN SERPL-MCNC: 668.8 NG/ML (ref 30–400)
GIANT PLATELETS: ABNORMAL
GLOBULIN UR ELPH-MCNC: 2.5 GM/DL
GLUCOSE SERPL-MCNC: 527 MG/DL (ref 65–99)
HCT VFR BLD AUTO: 28.7 % (ref 37.5–51)
HGB BLD-MCNC: 9.4 G/DL (ref 13–17.7)
HOLD SPECIMEN: NORMAL
IRON 24H UR-MRATE: 62 MCG/DL (ref 59–158)
IRON SATN MFR SERPL: 30 % (ref 20–50)
LYMPHOCYTES # BLD MANUAL: 0.57 10*3/MM3 (ref 0.7–3.1)
LYMPHOCYTES NFR BLD MANUAL: 2 % (ref 5–12)
MCH RBC QN AUTO: 31 PG (ref 26.6–33)
MCHC RBC AUTO-ENTMCNC: 32.8 G/DL (ref 31.5–35.7)
MCV RBC AUTO: 94.7 FL (ref 79–97)
MONOCYTES # BLD: 0.07 10*3/MM3 (ref 0.1–0.9)
NEUTROPHILS # BLD AUTO: 2.4 10*3/MM3 (ref 1.7–7)
NEUTROPHILS NFR BLD MANUAL: 51.5 % (ref 42.7–76)
NEUTS BAND NFR BLD MANUAL: 21.2 % (ref 0–5)
PLATELET # BLD AUTO: 289 10*3/MM3 (ref 140–450)
PMV BLD AUTO: 9 FL (ref 6–12)
POIKILOCYTOSIS BLD QL SMEAR: ABNORMAL
POTASSIUM SERPL-SCNC: 5.8 MMOL/L (ref 3.5–5.2)
PROT SERPL-MCNC: 5.4 G/DL (ref 6–8.5)
RBC # BLD AUTO: 3.03 10*6/MM3 (ref 4.14–5.8)
SODIUM SERPL-SCNC: 127 MMOL/L (ref 136–145)
TIBC SERPL-MCNC: 206 MCG/DL (ref 298–536)
TRANSFERRIN SERPL-MCNC: 138 MG/DL (ref 200–360)
VARIANT LYMPHS NFR BLD MANUAL: 17.2 % (ref 19.6–45.3)
WBC MORPH BLD: NORMAL
WBC NRBC COR # BLD AUTO: 3.3 10*3/MM3 (ref 3.4–10.8)

## 2024-05-15 PROCEDURE — 82728 ASSAY OF FERRITIN: CPT

## 2024-05-15 PROCEDURE — 96372 THER/PROPH/DIAG INJ SC/IM: CPT

## 2024-05-15 PROCEDURE — 85025 COMPLETE CBC W/AUTO DIFF WBC: CPT | Performed by: INTERNAL MEDICINE

## 2024-05-15 PROCEDURE — 80053 COMPREHEN METABOLIC PANEL: CPT

## 2024-05-15 PROCEDURE — 83540 ASSAY OF IRON: CPT

## 2024-05-15 PROCEDURE — 84466 ASSAY OF TRANSFERRIN: CPT

## 2024-05-15 PROCEDURE — 85007 BL SMEAR W/DIFF WBC COUNT: CPT | Performed by: INTERNAL MEDICINE

## 2024-05-15 PROCEDURE — 25010000002 EPOETIN ALFA-EPBX 40000 UNIT/ML SOLUTION: Performed by: NURSE PRACTITIONER

## 2024-05-15 PROCEDURE — 82378 CARCINOEMBRYONIC ANTIGEN: CPT

## 2024-05-15 PROCEDURE — 36415 COLL VENOUS BLD VENIPUNCTURE: CPT

## 2024-05-15 RX ORDER — OXYCODONE HYDROCHLORIDE 15 MG/1
15 TABLET ORAL EVERY 8 HOURS PRN
Qty: 90 TABLET | Refills: 0 | Status: SHIPPED | OUTPATIENT
Start: 2024-05-15

## 2024-05-15 RX ADMIN — EPOETIN ALFA-EPBX 40000 UNITS: 40000 INJECTION, SOLUTION INTRAVENOUS; SUBCUTANEOUS at 11:10

## 2024-05-15 NOTE — LETTER
May 15, 2024       No Recipients    Patient: Bernard Matta   YOB: 1950   Date of Visit: 5/15/2024     Dear Brando Webb MD:       Thank you for referring Bernard Matta to me for evaluation. Below are the relevant portions of my assessment and plan of care.    If you have questions, please do not hesitate to call me. I look forward to following Bernard along with you.         Sincerely,        Navi Stearns MD        CC:   No Recipients    Navi Stearns MD  05/15/24 1218  Sign when Signing Visit  [facility] HEMATOLOGY & ONCOLOGY  [address]  [place] [id number] [phone]    Patient Name: Bernard Matta  Encounter Date: 05/15/2024  YOB: 1950  Patient Number: [id number]      REASON FOR FOLLOW-UP: Bernard Matta is a pleasant 73 y.o.  male who is seen on follow-up for stage IIb adenocarcinoma of the cecum. He is seen C2D17 of adjuvant capecitabine, stopped due to intolerance. The patient is also followed for anemia from iron deficiency and chronic kidney disease stage IIIb.  GFR 31 mL/min on 2/21/2024.  Patient on therapy with epoetin alpha and intravenous iron as indicated.  The latest ferric carboxymaltose was given 2/28/2024.  The patient is seen with his spouse, Nury.  History obtained from the patient.  Patient is a reliable historian.           DIAGNOSTIC ABNORMALITIES: Colon cancer.  Screening colonoscopy 8/12/2021 was negative.  He presented with diffuse abdominal pain, nausea and vomiting.  CT abdomen pelvis 2/6/2024.There is an abnormal bowel gas pattern with multiple moderately  distended loops of small bowel with mild bowel wall thickening. The right and transverse colon are also moderately distended. The more distal colon is decompressed. Within the right lower quadrant posterior to the cecum there is a complex collection which is not definitely contiguous with any adjacent GI  structure suspicious for localized perforation with abscess formation. There is surrounding inflammatory changes. The appendix is not clearly identified and this could represent a ruptured appendicitis. I do not see any documentation of previous appendectomy by electronic medical record. There is free air beneath the right diaphragm and within the perihepatic space.  Free fluid within the lower right paracolic gutter, central pelvis and lower pelvis. This fluid demonstrates some complexity. Small hiatal hernia. The prostate gland is enlarged. There is a bladder diverticulum emanating from both the left and right lateral bladder wall. A mild element of chronic bladder outlet obstruction is not excluded.  Chest x-ray 2/6/2024.Radiodense nodule in the right upper lobe-favor granuloma.  No acute infiltrate. Free air demonstrated on CT imaging is not as well delineated by plain film radiograph.  CT head on 2/10/2024.Chronic changes and no acute intracranial findings.  Paranasal sinus mucosal thickening.  Pathology report 2/10/2024.  Cecum, ileocecectomy:  A. Moderately differentiated adenocarcinoma arising in the cecal pit (1.9 cm).  B. Tumor invades through the visceral peritoneum resulting in macroscopic perforation.  C. The proximal, distal and radial adventitial soft tissue margins are free of tumor.  D. Acute peritonitis.  E. Pericolic cyst-like structure with hyalinized fibrous wall and acute and chronic inflammation and hemorrhage in  adjacent fibroadipose tissue.  F. Nine of 9 pericolic lymph nodes are negative for metastatic carcinoma.  G. Detached segment of viable bowel compatible with anastomotic tissue with large intestine and small intestinal tissue  seen, negative for malignancy.  AJCC stage: pT4a pN0  CT chest report 3/8/2024.No evidence of metastatic disease in the chest.            PREVIOUS INTERVENTIONS:  Laparotomy and ileocecectomy on 2/6/2024.  Positive for perforation in the posterior cecum.  She  "is taking adjuvant capecitabine 4/8/2024 through 5/13/2024, intolerant.  \"So weak, vomiting, diarrhea, can't eat and can't sleep.\"  Not a candidate for adjuvant FOLFOX.            PREVIOUS INTERVENTIONS:Anemia  Ferric carboxymaltose on 2/20/2024.  Epoetin shari on 3/27/2024 through present.               Oncology/Hematology History   Colon cancer   2/28/2024 Initial Diagnosis    Colon cancer     2/28/2024 Cancer Staged    Staging form: Colon And Rectum, AJCC 8th Edition  - Pathologic stage from 2/28/2024: Stage IIB (pT4a, pN0, cM0) - Signed by Navi Stearns MD on 2/28/2024     4/3/2024 -  Chemotherapy    [facility] - Capecitabine         PAST MEDICAL HISTORY:  ALLERGIES:  Allergies   Allergen Reactions   • Jardiance [Empagliflozin] Other (See Comments)     Pt reports nausea/vomiting        CURRENT MEDICATIONS:  Outpatient Encounter Medications as of 5/15/2024   Medication Sig Dispense Refill   • amitriptyline (ELAVIL) 10 MG tablet Take 1 tablet by mouth At Night As Needed for Sleep.     • aspirin 81 MG tablet Take 1 tablet by mouth Daily.     • atorvastatin (LIPITOR) 40 MG tablet Take 1 tablet by mouth every night at bedtime. 90 tablet 3   • cetirizine (zyrTEC) 10 MG tablet Take 1 tablet by mouth Daily.     • cholecalciferol (VITAMIN D3) 400 UNITS tablet Take 1 tablet by mouth Daily.     • cloNIDine (CATAPRES) 0.2 MG tablet Take 1 tablet by mouth 2 (Two) Times a Day.     • diphenoxylate-atropine (LOMOTIL) 2.5-0.025 MG per tablet Take 2 tablets by mouth with first diarrhea type stool, then take 1 tablet by mouth with each loose stool there after with max of 6 tablets per day 60 tablet 1   • dorzolamide-timolol (COSOPT) 22.3-6.8 MG/ML ophthalmic solution Administer 1 drop to both eyes 2 (Two) Times a Day.     • epoetin shari-epbx (Retacrit) 75440 UNIT/ML injection Inject  under the skin into the appropriate area as directed Every 14 (Fourteen) Days.     • ezetimibe (Zetia) 10 MG tablet Take 1 tablet by mouth Daily. " 90 tablet 3   • fluticasone (FLONASE) 50 MCG/ACT nasal spray 2 sprays into the nostril(s) as directed by provider Daily.     • furosemide (Lasix) 20 MG tablet Take 1 tablet by mouth Daily. 90 tablet 3   • hydrocortisone (WESTCORT) 0.2 % cream Apply 1 Application topically to the appropriate area as directed 2 (Two) Times a Day. Apply topically to affected areas on face. 15 g 0   • insulin aspart (novoLOG FLEXPEN) 100 UNIT/ML solution pen-injector sc pen Inject 0-25 Units under the skin into the appropriate area as directed 3 (Three) Times a Day With Meals. Sliding scale     • insulin degludec (TRESIBA FLEXTOUCH) 100 UNIT/ML solution pen-injector injection Inject 8 Units under the skin into the appropriate area as directed Every Night.     • latanoprost (XALATAN) 0.005 % ophthalmic solution Administer 1 drop to both eyes Every Night.     • lisinopril (PRINIVIL,ZESTRIL) 10 MG tablet Take 1 tablet by mouth Every Night.     • Miracle mouthwash oral suspension (diphenhydrAMINE HCl - lidocaine - sodium bicarbonate - normal saline) Swish and spit 15 mL Every 4 (Four) Hours As Needed for Mucositis. 480 mL 3   • Multiple Vitamins-Minerals (MULTIVITAMIN WITH MINERALS) tablet tablet Take 1 tablet by mouth Daily.     • ondansetron (Zofran) 8 MG tablet Take 1 tablet by mouth Every 8 (Eight) Hours As Needed for Nausea or Vomiting. 60 tablet 2   • prochlorperazine (COMPAZINE) 10 MG tablet Take 1 tablet by mouth Every 4 (Four) Hours As Needed for Nausea or Vomiting. 60 tablet 2   • Rhopressa 0.02 % solution Apply 1 drop to eye(s) as directed by provider Every Night.     • simethicone (Gas-X) 80 MG chewable tablet Chew 0.5 tablets Every 6 (Six) Hours As Needed for Flatulence (belching). 30 tablet 1   • tamsulosin (FLOMAX) 0.4 MG capsule 24 hr capsule Take 2 capsules by mouth Every Night.     • traMADol (Ultram) 50 MG tablet Take 1 tablet by mouth Every 8 (Eight) Hours As Needed for Moderate Pain. 30 tablet 0   • capecitabine  (XELODA) 500 MG chemo tablet Take 3 tablets by mouth 2 (Two) Times a Day. Take on Days 1-14 every 21 day cycle. (Patient not taking: Reported on 5/15/2024) 84 tablet 2   • clobetasol (CLOBEX) 0.05 % lotion Apply  topically to the appropriate area as directed 2 (Two) Times a Day. Apply to affected areas on back, chest, arms. 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   • Hypertension I10   • Type 2 diabetes mellitus E11.9   • Hx of adenomatous colonic polyps Z86.010   • Iron deficiency anemia D50.9   • Intestinal malabsorption, unspecified K90.9   • Bowel perforation K63.1   • Stage 3b chronic kidney disease (CKD) N18.32   • Hyperkalemia E87.5   • COVID-19 virus detected U07.1   • Colon cancer C18.9     SURGERIES:  Past Surgical History:   Procedure Laterality Date   • COLONOSCOPY  2019    [place]   • COLONOSCOPY N/A 8/11/2020    Procedure: COLONOSCOPY WITH ANESTHESIA;  Surgeon: Shaquille Mckenzie DO;  Location: [facility] ENDOSCOPY;  Service: Gastroenterology;  Laterality: N/A;  preop; hx of polyps   postop; polyps   PCP Brando Webb    • COLONOSCOPY N/A 8/12/2021    Procedure: COLONOSCOPY WITH ANESTHESIA;  Surgeon: Shaquille Mckenzie DO;  Location: [facility] ENDOSCOPY;  Service: Gastroenterology;  Laterality: N/A;  pre hx adenomatous colon polyp  post normal  Brando Webb MD   • EXPLORATORY LAPAROTOMY N/A 2/6/2024    Procedure: DIAGNOSTIC LAPAROSCOPY CONVERTED TO LAPAROTOMY, ILIOCECTECTOMY;  Surgeon: Hemant Park MD;  Location: [facility] OR;  Service: General;  Laterality: N/A;     HEALTH MAINTENANCE ITEMS:  Health Maintenance Due   Topic Date Due   •  COLONOSCOPY  2022   • COVID-19 Vaccine ( season) 2024   • DIABETIC EYE EXAM  2024       <no information>  Last Completed Colonoscopy       This patient has no relevant Health Maintenance data.          Immunization History   Administered Date(s) Administered   • COVID-19 (MODERNA) 1st,2nd,3rd Dose Monovalent 2021, 2021, 2021   • COVID-19 (PFIZER) BIVALENT 12+YRS 2022   • COVID-19 F23 (PFIZER) 12YRS+ (COMIRNATY) 2023   • Covid-19 (Pfizer) Gray Cap Monovalent 2022   • FLUAD TRI 65YR+ 2019   • Flu Vaccine Split Quad 2013   • Fluad Quad 65+ 2019, 2020   • Fluzone (or Fluarix & Flulaval for VFC) >6mos 2013, 2022   • Fluzone High Dose =>65 Years (Vaxcare ONLY) 2016, 10/02/2017, 10/08/2018   • Fluzone High-Dose 65+yrs 10/19/2021, 10/02/2023   • Pneumococcal Conjugate 13-Valent (PCV13) 2015   • Pneumococcal Polysaccharide (PPSV23) 2012, 2013   • Shingrix 2018, 2019   • Tdap 2013     Last Completed Mammogram       This patient has no relevant Health Maintenance data.              FAMILY HISTORY:  Family History   Problem Relation Age of Onset   • No Known Problems Maternal Grandmother    • No Known Problems Maternal Grandfather    • No Known Problems Paternal Grandmother    • No Known Problems Paternal Grandfather    • No Known Problems Mother    • No Known Problems Father    • Colon cancer Neg Hx    • Colon polyps Neg Hx    • Esophageal cancer Neg Hx      SOCIAL HISTORY:  Social History     Socioeconomic History   • Marital status:    Tobacco Use   • Smoking status: Former     Current packs/day: 0.00     Average packs/day: 0.5 packs/day for 10.0 years (5.0 ttl pk-yrs)     Types: Cigarettes     Start date: 1969     Quit date: 1979     Years since quittin.4     Passive exposure: Past   • Smokeless tobacco: Never   Vaping Use   • Vaping status: Never Used   Substance  "and Sexual Activity   • Alcohol use: No   • Drug use: No   • Sexual activity: Defer       REVIEW OF SYSTEMS:    Review of Systems   Constitutional:  Positive for fatigue. Negative for fever and unexpected weight change.        \"Very weak..\"   HENT:  Negative for congestion and mouth sores.    Eyes:  Negative for discharge and redness.   Respiratory:  Negative for shortness of breath and wheezing.    Cardiovascular:  Positive for leg swelling. Negative for chest pain.   Gastrointestinal:  Positive for diarrhea and nausea. Negative for constipation and vomiting.   Endocrine: Negative for heat intolerance.   Genitourinary:  Negative for difficulty urinating and dysuria.   Musculoskeletal:  Positive for gait problem.   Skin:  Negative for pallor.   Allergic/Immunologic: Negative for food allergies.   Neurological:  Negative for dizziness and speech difficulty.   Hematological:  Negative for adenopathy. Does not bruise/bleed easily.   Psychiatric/Behavioral:  Negative for agitation and hallucinations. The patient is not nervous/anxious.        VITAL SIGNS: BP 98/62   Pulse (!) 124   Temp 97.9 °F (36.6 °C) (Tympanic)   Resp 18   Ht 175.3 cm (69\")   Wt 80.3 kg (177 lb 1.6 oz)   SpO2 96%   BMI 26.15 kg/m²  Lost 1 pound.   Pain Score    05/15/24 1002   PainSc: 10-Worst pain ever       PHYSICAL EXAMINATION:     Physical Exam  Vitals reviewed.   Constitutional:       Appearance: He is ill-appearing.      Comments: He arrived in the exam room in a wheelchair.    HENT:      Head: Normocephalic and atraumatic.   Eyes:      General: No scleral icterus.  Cardiovascular:      Rate and Rhythm: Tachycardia present.   Pulmonary:      Effort: No respiratory distress.      Breath sounds: No wheezing.   Abdominal:      General: There is no distension.      Palpations: Abdomen is soft.   Musculoskeletal:         General: Swelling present.      Cervical back: Neck supple.      Comments: No calf tenderness.   Skin:     Coloration: " Skin is not pale.      Comments: Some erythema, palm, bilateral. No peeling.   Neurological:      Mental Status: He is oriented to person, place, and time.   Psychiatric:         Mood and Affect: Mood normal.         Behavior: Behavior normal.         LABS    Lab Results - Last 18 Months   Lab Units 05/15/24  0917 04/24/24  0954 04/03/24  0848 03/27/24  1417 03/13/24  0902 03/12/24  0704 02/28/24  1201 02/21/24  1029 02/14/24  0844 02/09/24  0656 02/08/24  0547 02/07/24  0750   HEMOGLOBIN g/dL 9.4* 10.4* 10.3* 9.3* 8.8* 9.4* 8.5*   < > 9.2*   < > 10.2* 10.2*   HEMATOCRIT % 28.7* 33.1* 34.1* 29.4* 28.8* 29.5* 27.9*   < > 30.3*   < > 33.3* 33.0*   MCV fL 94.7 95.7 100.3* 95.8 97.6* 93.7 94.9   < > 95.9   < > 95.4 95.7   WBC 10*3/mm3 3.30* 7.03 5.75 6.44 6.73 6.18 7.86   < > 7.61   < > 7.12 7.34   RDW % 15.0 13.9 15.9* 15.3 15.9* 14.6 14.3   < > 14.8   < > 14.5 14.0   MPV fL 9.0 8.8 8.8 9.1 9.3  --  9.5  --  9.9   < > 9.2 9.3   PLATELETS 10*3/mm3 289 207 181 202 162 166 275   < > 218   < > 213 159   IMM GRAN % %  --  0.4 0.3 0.2 0.3  --  0.4  --  1.8*   < >  --   --    NEUTROS ABS 10*3/mm3 2.40 4.97 3.91 3.73 4.50 4.25 5.59   < > 5.50   < > 5.91 6.68   LYMPHS ABS 10*3/mm3  --  1.26 1.03 1.59 1.24 1.01 1.34   < > 1.04   < >  --   --    MONOS ABS 10*3/mm3  --  0.31 0.40 0.54 0.47 0.47 0.59   < > 0.53   < >  --   --    EOS ABS 10*3/mm3 0.27 0.44* 0.36 0.53* 0.46* 0.41* 0.27   < > 0.36   < > 0.21  --    BASOS ABS 10*3/mm3 0.00 0.02 0.03 0.04 0.04 0.03 0.04   < > 0.04   < > 0.14 0.07   IMMATURE GRANS (ABS) 10*3/mm3  --  0.03 0.02 0.01 0.02  --  0.03  --  0.14*   < >  --   --    NRBC /100 WBC  --  0.0 0.0 0.0 0.0 0.0 0.0   < > 0.0   < >  --   --    NEUTROPHIL % % 51.5  --   --   --   --   --   --   --   --   --  73.0 69.0   MONOCYTES % % 2.0*  --   --   --   --   --   --   --   --   --  6.0 1.0*   BASOPHIL % % 0.0  --   --   --   --   --   --   --   --   --  2.0* 1.0   ATYP LYMPH % %  --   --   --   --   --   --   --   --    --   --   --  2.0   ANISOCYTOSIS  Slight/1+  --   --   --   --   --   --   --   --   --   --  Slight/1+   GIANT PLT  Slight/1+  --   --   --   --   --   --   --   --   --   --   --     < > = values in this interval not displayed.       Lab Results - Last 18 Months   Lab Units 05/15/24  0917 04/24/24  0954 04/03/24  0848 03/27/24  1417 03/13/24  0902 03/12/24  0704 02/28/24  1201 02/21/24  1029 02/16/24  0533   GLUCOSE mg/dL 527* 195* 267* 98 175* 138* 111*  111*   < > 290*   SODIUM mmol/L 127* 138 139 140 141 139 140  140   < > 137   POTASSIUM mmol/L 5.8* 4.2 4.6 4.2 4.4 5.3* 4.5  4.5   < > 4.8   CO2 mmol/L 17.0* 24.0 31.0* 29.0 29.0 29.3* 30.0*  30.0*   < > 20.0*   CHLORIDE mmol/L 94* 102 100 101 104 102 101  101   < > 104   ANION GAP mmol/L 16.0* 12.0 8.0 10.0 8.0  --  9.0  9.0  --  13.0   CREATININE mg/dL 3.24* 1.92* 1.84* 1.70* 1.85* 1.80* 1.97*  1.97*   < > 1.95*   BUN mg/dL 71* 37* 23 24* 22 22 30*  30*   < > 38*   BUN / CREAT RATIO  21.9 19.3 12.5 14.1 11.9 12.2 15.2  15.2   < > 19.5   CALCIUM mg/dL 8.6 8.8 8.9 8.7 8.5* 8.4* 8.6  8.6   < > 8.7   ALK PHOS U/L 86 94  --  120* 141* 153* 167*   < > 104   TOTAL PROTEIN g/dL 5.4* 6.8  --  6.8 6.4  --  7.0  --  6.1   ALT (SGPT) U/L 23 27  --  22 30 33 58*   < > 63*   AST (SGOT) U/L 12 21  --  19 22 19 30   < > 35   BILIRUBIN mg/dL 0.5 0.4  --  0.3 0.3 0.4 0.2   < > 0.3   ALBUMIN g/dL 2.9* 3.9  --  3.7 3.3* 3.5 3.5  3.5   < > 2.9*   GLOBULIN gm/dL 2.5 2.9  --  3.1 3.1  --  3.5  --  3.2    < > = values in this interval not displayed.       Lab Results - Last 18 Months   Lab Units 04/24/24  0954 03/27/24  1417 02/28/24  1201 01/25/23  1019   M-SPIKE g/dL  --   --   --  Not Observed   KAPPA/LAMBDA RATIO, S   --   --   --  1.55   FREE LAMBDA LIGHT CHAINS mg/L  --   --   --  25.8   IG KAPPA FREE LIGHT CHAIN mg/L  --   --   --  40.0*   CEA ng/mL 3.02 3.44 7.10  --    LDH U/L  --   --   --  183       Lab Results - Last 18 Months   Lab Units 05/15/24  0915  "04/24/24  0954 03/27/24  1417 02/28/24  1201 11/29/23  1100 11/01/23  0957 08/25/23  0759 05/03/23  1221 04/05/23  0855 02/22/23  0946 01/25/23  1019   IRON mcg/dL 62 56* 58* 37* 84 37*  --    < > 36*   < > 122   TIBC mcg/dL 206* 259* 244* 250* 216* 262* 247   < > 256*   < > 337   IRON SATURATION %  --   --   --   --   --   --  27  --   --   --   --    IRON SATURATION (TSAT) % 30 22 24 15* 39 14*  --    < > 14*   < > 36   FERRITIN ng/mL 668.80* 424.20* 539.40* 383.90 797.10* 120.50 266.00   < > 59.42   < > 16.82*   TSH uIU/mL  --   --   --   --   --   --  2.110  --  1.740  --   --    FOLATE ng/mL  --   --   --   --   --   --  >20.00  --   --   --  >20.00    < > = values in this interval not displayed.       Bernard Matta reports a pain score of 10.  He can take Tramadol. \"Okay.\" Given his pain assessment as noted, treatment options were discussed and the following options were decided upon as a follow-up plan to address the patient's pain: prescription for opiod analgesics.        ASSESSMENT:  Adenocarcinoma involving the cecum.  High risk.  Less than 12 lymph nodes examined.  No lymphovascular or perineural invasion.  MSI/MMR report 3/11/2024. CLEMENCIA and pMMR.  AJCC stage: IIb (pT4a, pN0, cM0, G2).  Tumor size 1.9 cm.  Zo status, 0/9 positive lymph node.  Treatment status: Patient taking adjuvant capecitabine on 4/8/2024 through 5/13/2024, stopped due to intolerance.  No FOLFOX due to poor performance status.  2. Performance status of 3.   3.  Normocytic anemia from iron deficiency and chronic kidney disease stage IIIb.  GFR 31 mL/min on 2/21/2024.  Latest Injectafer given 2/28/2024 due to malabsorption.    4.  Grade 1 diarrhea from Xeloda.  5.  Rash, both arms, chest and back with capecitabine, resolving.      5.  Intestinal malabsorption, unspecific type.  Intravenous iron as indicated.  6.  Elevated transaminase, observation.  7.  Grade 2 nausea and vomiting from capecitabine.  On Zofran and " "prochlorperazine.       PLAN:   Re: Tolerance to capecitabine. \"I can't take it anymore.\"  Use ice packs to affected areas for 20 minutes 3 times a day as needed.  Use nonmedicated lotion to affected areas.  2.   Re: Tolerance to epoetin alpha. \"Fine.\"  3.   Re: Phone note 4/29/2024.  He reports fatigue and difficulty getting Clobex cream.  4.   Re: Heme status.  WBC 3.3, ANC 2.4, hemoglobin 9.4, hematocrit 28.7, MCV 94.7 and platelet 289.  5.   Re: CMP.  Glucose 527. Patient injected 40 units insulin. GFR 19.1 mL/min.  Alkaline phosphatase 86 from 94 from 120 from 141 from 153 from 167 and ALT 23.  Check CMP in 1 week to follow renal function.  No IV fluids today due to bilateral lower extremity edema.  Expect improvement in kidney function since nausea is resolving and no vomiting/improving diarrhea.  6.   Re: CEA pending from 4.1 from 3.4 from 7.1, observation.  7.   Re: Ferritin 668.8 and saturation 30%.    8.   Re: Discontinue with adjuvant capecitabine.   9.   Re: No further adjuvant chemo like FOLFOX. He is too ill to undergo further therapy. \"Okay.  It just not going to work.\"   10.  eRx ondansetron 8 mg p.o. every 8 hours as needed for nausea and vomiting #60, 2 refills.  11.  eRx prochlorperazine 10 mg p.o. every 4 hours as needed for nausea and vomiting #60, 2 refills.  12.  eRx magic mouth wash 15 mh swish and spit every 4 hours a s needed for mucositis, 480 ml. 2 refills if needed.   13.  eRx clobetasol 0.05% lotion apply 2 times daily to affected areas, 59 ml, 1 refill if needed.  14.  eRx Roxicodone 15 mg p.o. every 8 hours #90 as needed for pain.  15.  Weekly CBC with differential with chemo.  16. Advance Care Planning  ACP discussion was held with the patient during this visit. Patient has an advance directive in EMR which is still valid.   17.  Colonoscopy at 1 year on 2/2025.  18.  Continue care per primary care physician and the other " specialist.  19.  Care discussed with patient and spouse, Nury.  Understand expressed.  He is agreeable to proceed.  20.  Epoetin alpha 40,000 units subcutaneous every 3 weeks if hemoglobin less than 11 and hematocrit less than 33, chronic kidney disease.  Monitor for elevated blood pressure.  21. Return to office in 3 weeks with CBC with differential, ferritin, iron panel, CEA, and CMP.               I have reviewed the assessment and plan and verified the accuracy of it. No changes to assessment and plan since the information was documented. Navi Stearns MD 05/15/24         I spent 40 total minutes, face-to-face, caring for Bernard ramos. Greater than 50% of this time involved counseling and/or coordination of care as documented within this note.            Allan Schneider MD  (Dalton Villalobos MD)  (Shaquille Mckenzie MD)  Everton Webb MD  (JORJE Novoa)

## 2024-05-20 ENCOUNTER — TELEPHONE (OUTPATIENT)
Dept: ONCOLOGY | Facility: CLINIC | Age: 74
End: 2024-05-20
Payer: MEDICARE

## 2024-05-20 NOTE — TELEPHONE ENCOUNTER
"Received call from patient wife, Nury. She calls to give update on patient/ Bernard. She reports that after Bernard's visit last week with Dr Stearns 5/15/24, his health continued to decline and he was taken back to [facility] ER dept due to all the issues he was experiencing. Bi-lat lower extremity swelling, overall body rash, with redness and itching, continued uncontrolled diarrhea/vomiting, critical high glucose readings they could not manage or control and loss of appetite as well as weight loss. Wife reports \"Bernard just couldn't get over it\".   was notified per ER Physician @ [facility] requesting a bed so patient would be near his Oncologist but was informed they were on diversion due to bed availability and patient was then sent to [facility] and was admitte, patient is currently in-house in [place], [place]. Patient remains in ICU and is being monitored by his Care Team due to issues of Cardiac (tachycardia), renal, dehydration, critical high glucose readings.   Wife will call once again when patient is d/c and home with update.   "

## 2024-05-25 ENCOUNTER — READMISSION MANAGEMENT (OUTPATIENT)
Dept: CALL CENTER | Facility: HOSPITAL | Age: 74
End: 2024-05-25
Payer: MEDICARE

## 2024-05-25 NOTE — OUTREACH NOTE
Prep Survey      Flowsheet Row Responses   [facility] facility patient discharged from? Non-BH   Is LACE score < 7 ? Non-BH Discharge   Eligibility Not Eligible   What are the reasons patient is not eligible? Other  []   Does the patient have one of the following disease processes/diagnoses(primary or secondary)? Other   Prep survey completed? Yes            Mckayla WHITE - Registered Nurse

## 2024-05-28 ENCOUNTER — SPECIALTY PHARMACY (OUTPATIENT)
Dept: ONCOLOGY | Facility: HOSPITAL | Age: 74
End: 2024-05-28
Payer: MEDICARE

## (undated) DEVICE — DISSCT SECTO 1PC 1P/U 5MMX35CM STRL

## (undated) DEVICE — Device

## (undated) DEVICE — Device: Brand: DEFENDO AIR/WATER/SUCTION AND BIOPSY VALVE

## (undated) DEVICE — SENSR O2 OXIMAX FNGR A/ 18IN NONSTR

## (undated) DEVICE — TRAP SPECI MUCUS LF 40CC

## (undated) DEVICE — NDL VERRES PNEUMO 120MM PN120 LF

## (undated) DEVICE — SLV TROC XCEL OPTIVIEW THRD 5X100MM

## (undated) DEVICE — DRN JP RESERVIOR 100CC

## (undated) DEVICE — THE CHANNEL CLEANING BRUSH IS A NYLON FLEXI BRUSH ATTACHED TO A FLEXIBLE PLASTIC SHEATH DESIGNED TO SAFELY REMOVE DEBRIS FROM FLEXIBLE ENDOSCOPES.

## (undated) DEVICE — MASK,OXYGEN,MED CONC,ADLT,7' TUB, UC: Brand: PENDING

## (undated) DEVICE — SUT MNCRYL 4/0 PS2 27IN UD MCP426H

## (undated) DEVICE — SUT SILK 2/0 SH CR8 18IN CR8 C012D

## (undated) DEVICE — YANKAUER,BULB TIP WITH VENT: Brand: ARGYLE

## (undated) DEVICE — SUT SILK 3/0 SH CR8 18IN C013D

## (undated) DEVICE — SXN/IRR STRYKEFLOW2 10FT

## (undated) DEVICE — SUT ETHLN 2/0 FS 18IN 664H

## (undated) DEVICE — NDL HYPO PRECISIONGLIDE REG 22G 1 1/2

## (undated) DEVICE — TBG SMPL FLTR LINE NASL 02/C02 A/ BX/100

## (undated) DEVICE — TBG SXN CONN/F UNIV 1/4IN 12FT LF STRL

## (undated) DEVICE — SUT ETHIB 1 CTX CR8 18IN CX30D

## (undated) DEVICE — SYR LL TP 10ML STRL

## (undated) DEVICE — GLV SURG SENSICARE W/ALOE PF LF 6.5 STRL

## (undated) DEVICE — THE SINGLE USE ETRAP – POLYP TRAP IS USED FOR SUCTION RETRIEVAL OF ENDOSCOPICALLY REMOVED POLYPS.: Brand: ETRAP

## (undated) DEVICE — APPL CHLORAPREP HI/LITE 26ML ORNG

## (undated) DEVICE — DRN JP RND NO TROC SIL 15F 3/16IN

## (undated) DEVICE — PK LAP CHOLE 30

## (undated) DEVICE — PK MAJ BASN DBL

## (undated) DEVICE — IRRIGATOR BULB ASEPTO 60CC STRL

## (undated) DEVICE — KT DRP SPY/PHI W/ICG 25MG STRL

## (undated) DEVICE — SNAR POLYP CAPTIVATOR MICROHEX 13 240CM

## (undated) DEVICE — TP SXN SURG POOLE W/REMOV/SHEATH EYE/132

## (undated) DEVICE — MANIFLD WAST MGMT SYS NEPTUNE2 4PT

## (undated) DEVICE — GLV SURG SENSICARE W/ALOE PF LF SZ6 STRL